# Patient Record
Sex: FEMALE | Race: WHITE | NOT HISPANIC OR LATINO | URBAN - METROPOLITAN AREA
[De-identification: names, ages, dates, MRNs, and addresses within clinical notes are randomized per-mention and may not be internally consistent; named-entity substitution may affect disease eponyms.]

---

## 2017-09-11 ENCOUNTER — INPATIENT (INPATIENT)
Facility: HOSPITAL | Age: 72
LOS: 0 days | Discharge: HOME | End: 2017-09-12
Attending: INTERNAL MEDICINE | Admitting: FAMILY MEDICINE

## 2017-09-11 ENCOUNTER — EMERGENCY (EMERGENCY)
Facility: HOSPITAL | Age: 72
LOS: 0 days | Discharge: HOME | End: 2017-09-11
Admitting: FAMILY MEDICINE

## 2017-09-11 DIAGNOSIS — E11.9 TYPE 2 DIABETES MELLITUS WITHOUT COMPLICATIONS: ICD-10-CM

## 2017-09-11 DIAGNOSIS — R04.0 EPISTAXIS: ICD-10-CM

## 2017-09-11 DIAGNOSIS — Z95.5 PRESENCE OF CORONARY ANGIOPLASTY IMPLANT AND GRAFT: ICD-10-CM

## 2017-09-11 DIAGNOSIS — R00.0 TACHYCARDIA, UNSPECIFIED: ICD-10-CM

## 2017-09-11 DIAGNOSIS — R42 DIZZINESS AND GIDDINESS: ICD-10-CM

## 2017-09-11 DIAGNOSIS — Z79.01 LONG TERM (CURRENT) USE OF ANTICOAGULANTS: ICD-10-CM

## 2017-09-11 DIAGNOSIS — I25.10 ATHEROSCLEROTIC HEART DISEASE OF NATIVE CORONARY ARTERY WITHOUT ANGINA PECTORIS: ICD-10-CM

## 2017-09-11 DIAGNOSIS — I10 ESSENTIAL (PRIMARY) HYPERTENSION: ICD-10-CM

## 2017-09-11 DIAGNOSIS — E78.5 HYPERLIPIDEMIA, UNSPECIFIED: ICD-10-CM

## 2017-09-13 ENCOUNTER — APPOINTMENT (OUTPATIENT)
Dept: OTOLARYNGOLOGY | Facility: CLINIC | Age: 72
End: 2017-09-13
Payer: MEDICARE

## 2017-09-13 VITALS — HEIGHT: 62 IN | WEIGHT: 140 LBS | BODY MASS INDEX: 25.76 KG/M2

## 2017-09-13 DIAGNOSIS — E11.9 TYPE 2 DIABETES MELLITUS W/OUT COMPLICATIONS: ICD-10-CM

## 2017-09-13 DIAGNOSIS — I10 ESSENTIAL (PRIMARY) HYPERTENSION: ICD-10-CM

## 2017-09-13 DIAGNOSIS — Z80.52 FAMILY HISTORY OF MALIGNANT NEOPLASM OF BLADDER: ICD-10-CM

## 2017-09-13 PROCEDURE — 99213 OFFICE O/P EST LOW 20 MIN: CPT | Mod: 25

## 2017-09-13 PROCEDURE — 31231 NASAL ENDOSCOPY DX: CPT

## 2017-09-13 RX ORDER — METOPROLOL TARTRATE 50 MG/1
50 TABLET, FILM COATED ORAL
Qty: 180 | Refills: 0 | Status: ACTIVE | COMMUNITY
Start: 2017-06-29

## 2017-09-13 RX ORDER — ATORVASTATIN CALCIUM 80 MG/1
80 TABLET, FILM COATED ORAL
Qty: 90 | Refills: 0 | Status: ACTIVE | COMMUNITY
Start: 2017-06-29

## 2017-09-13 RX ORDER — AMLODIPINE BESYLATE 5 MG/1
5 TABLET ORAL
Refills: 0 | Status: ACTIVE | COMMUNITY

## 2017-09-13 RX ORDER — METFORMIN ER 750 MG 750 MG/1
750 TABLET ORAL
Qty: 90 | Refills: 0 | Status: ACTIVE | COMMUNITY
Start: 2017-07-26

## 2017-09-25 DIAGNOSIS — Z79.82 LONG TERM (CURRENT) USE OF ASPIRIN: ICD-10-CM

## 2017-09-25 DIAGNOSIS — D72.829 ELEVATED WHITE BLOOD CELL COUNT, UNSPECIFIED: ICD-10-CM

## 2017-09-25 DIAGNOSIS — I49.9 CARDIAC ARRHYTHMIA, UNSPECIFIED: ICD-10-CM

## 2017-09-25 DIAGNOSIS — R04.0 EPISTAXIS: ICD-10-CM

## 2017-09-25 DIAGNOSIS — E78.00 PURE HYPERCHOLESTEROLEMIA, UNSPECIFIED: ICD-10-CM

## 2017-09-25 DIAGNOSIS — F17.210 NICOTINE DEPENDENCE, CIGARETTES, UNCOMPLICATED: ICD-10-CM

## 2017-09-25 DIAGNOSIS — Z95.5 PRESENCE OF CORONARY ANGIOPLASTY IMPLANT AND GRAFT: ICD-10-CM

## 2017-09-25 DIAGNOSIS — I25.2 OLD MYOCARDIAL INFARCTION: ICD-10-CM

## 2017-09-25 DIAGNOSIS — Z79.84 LONG TERM (CURRENT) USE OF ORAL HYPOGLYCEMIC DRUGS: ICD-10-CM

## 2017-09-25 DIAGNOSIS — E11.9 TYPE 2 DIABETES MELLITUS WITHOUT COMPLICATIONS: ICD-10-CM

## 2017-09-25 DIAGNOSIS — I25.10 ATHEROSCLEROTIC HEART DISEASE OF NATIVE CORONARY ARTERY WITHOUT ANGINA PECTORIS: ICD-10-CM

## 2020-01-01 ENCOUNTER — LABORATORY RESULT (OUTPATIENT)
Age: 75
End: 2020-01-01

## 2020-01-01 ENCOUNTER — APPOINTMENT (OUTPATIENT)
Dept: HEMATOLOGY ONCOLOGY | Facility: CLINIC | Age: 75
End: 2020-01-01

## 2020-01-01 ENCOUNTER — RESULT REVIEW (OUTPATIENT)
Age: 75
End: 2020-01-01

## 2020-01-01 ENCOUNTER — OUTPATIENT (OUTPATIENT)
Dept: OUTPATIENT SERVICES | Facility: HOSPITAL | Age: 75
LOS: 1 days | Discharge: HOME | End: 2020-01-01

## 2020-01-01 ENCOUNTER — TRANSCRIPTION ENCOUNTER (OUTPATIENT)
Age: 75
End: 2020-01-01

## 2020-01-01 ENCOUNTER — APPOINTMENT (OUTPATIENT)
Dept: HEMATOLOGY ONCOLOGY | Facility: CLINIC | Age: 75
End: 2020-01-01
Payer: MEDICARE

## 2020-01-01 ENCOUNTER — APPOINTMENT (OUTPATIENT)
Dept: INFUSION THERAPY | Facility: CLINIC | Age: 75
End: 2020-01-01

## 2020-01-01 ENCOUNTER — OUTPATIENT (OUTPATIENT)
Dept: OUTPATIENT SERVICES | Facility: HOSPITAL | Age: 75
LOS: 1 days | Discharge: HOME | End: 2020-01-01
Payer: MEDICARE

## 2020-01-01 ENCOUNTER — APPOINTMENT (OUTPATIENT)
Dept: INFUSION THERAPY | Facility: CLINIC | Age: 75
End: 2020-01-01
Payer: MEDICARE

## 2020-01-01 ENCOUNTER — INPATIENT (INPATIENT)
Facility: HOSPITAL | Age: 75
LOS: 1 days | Discharge: HOME HEALTH | End: 2020-04-30
Attending: INTERNAL MEDICINE | Admitting: INTERNAL MEDICINE
Payer: MEDICARE

## 2020-01-01 ENCOUNTER — NON-APPOINTMENT (OUTPATIENT)
Age: 75
End: 2020-01-01

## 2020-01-01 ENCOUNTER — EMERGENCY (EMERGENCY)
Facility: HOSPITAL | Age: 75
LOS: 0 days | Discharge: HOME | End: 2020-06-11
Attending: EMERGENCY MEDICINE | Admitting: EMERGENCY MEDICINE
Payer: MEDICARE

## 2020-01-01 ENCOUNTER — INPATIENT (INPATIENT)
Facility: HOSPITAL | Age: 75
LOS: 3 days | Discharge: HOME HEALTH | End: 2020-07-09
Attending: HOSPITALIST | Admitting: HOSPITALIST
Payer: MEDICARE

## 2020-01-01 ENCOUNTER — INPATIENT (INPATIENT)
Facility: HOSPITAL | Age: 75
LOS: 8 days | Discharge: HOME HEALTH | End: 2020-09-02
Attending: INTERNAL MEDICINE | Admitting: INTERNAL MEDICINE
Payer: MEDICARE

## 2020-01-01 ENCOUNTER — INPATIENT (INPATIENT)
Facility: HOSPITAL | Age: 75
LOS: 8 days | Discharge: HOME HEALTH | End: 2020-10-21
Attending: STUDENT IN AN ORGANIZED HEALTH CARE EDUCATION/TRAINING PROGRAM | Admitting: STUDENT IN AN ORGANIZED HEALTH CARE EDUCATION/TRAINING PROGRAM
Payer: MEDICARE

## 2020-01-01 VITALS
DIASTOLIC BLOOD PRESSURE: 56 MMHG | RESPIRATION RATE: 14 BRPM | TEMPERATURE: 99.9 F | SYSTOLIC BLOOD PRESSURE: 97 MMHG | HEART RATE: 98 BPM

## 2020-01-01 VITALS
TEMPERATURE: 100 F | HEART RATE: 135 BPM | DIASTOLIC BLOOD PRESSURE: 51 MMHG | RESPIRATION RATE: 16 BRPM | SYSTOLIC BLOOD PRESSURE: 121 MMHG | OXYGEN SATURATION: 93 % | WEIGHT: 100.09 LBS | HEIGHT: 63 IN

## 2020-01-01 VITALS
WEIGHT: 127 LBS | DIASTOLIC BLOOD PRESSURE: 75 MMHG | TEMPERATURE: 96 F | HEIGHT: 62 IN | SYSTOLIC BLOOD PRESSURE: 153 MMHG | RESPIRATION RATE: 14 BRPM | HEART RATE: 84 BPM | BODY MASS INDEX: 23.37 KG/M2

## 2020-01-01 VITALS
RESPIRATION RATE: 14 BRPM | WEIGHT: 133 LBS | BODY MASS INDEX: 24.48 KG/M2 | DIASTOLIC BLOOD PRESSURE: 47 MMHG | TEMPERATURE: 96.2 F | HEART RATE: 102 BPM | SYSTOLIC BLOOD PRESSURE: 92 MMHG | HEIGHT: 62 IN

## 2020-01-01 VITALS
RESPIRATION RATE: 14 BRPM | SYSTOLIC BLOOD PRESSURE: 97 MMHG | HEART RATE: 108 BPM | DIASTOLIC BLOOD PRESSURE: 47 MMHG | TEMPERATURE: 96.2 F | HEIGHT: 62 IN

## 2020-01-01 VITALS
WEIGHT: 133 LBS | TEMPERATURE: 96.8 F | BODY MASS INDEX: 24.48 KG/M2 | HEART RATE: 113 BPM | RESPIRATION RATE: 14 BRPM | SYSTOLIC BLOOD PRESSURE: 120 MMHG | HEIGHT: 62 IN | DIASTOLIC BLOOD PRESSURE: 59 MMHG

## 2020-01-01 VITALS
SYSTOLIC BLOOD PRESSURE: 138 MMHG | BODY MASS INDEX: 23.55 KG/M2 | RESPIRATION RATE: 14 BRPM | DIASTOLIC BLOOD PRESSURE: 63 MMHG | WEIGHT: 128 LBS | TEMPERATURE: 98.5 F | HEIGHT: 62 IN | HEART RATE: 83 BPM

## 2020-01-01 VITALS
HEIGHT: 62 IN | BODY MASS INDEX: 24.48 KG/M2 | RESPIRATION RATE: 14 BRPM | WEIGHT: 133 LBS | SYSTOLIC BLOOD PRESSURE: 128 MMHG | HEART RATE: 98 BPM | TEMPERATURE: 97.8 F | DIASTOLIC BLOOD PRESSURE: 70 MMHG

## 2020-01-01 VITALS
RESPIRATION RATE: 14 BRPM | HEIGHT: 62 IN | BODY MASS INDEX: 23.74 KG/M2 | DIASTOLIC BLOOD PRESSURE: 66 MMHG | HEART RATE: 92 BPM | WEIGHT: 129 LBS | TEMPERATURE: 98.5 F | SYSTOLIC BLOOD PRESSURE: 145 MMHG

## 2020-01-01 VITALS
TEMPERATURE: 98 F | DIASTOLIC BLOOD PRESSURE: 63 MMHG | RESPIRATION RATE: 18 BRPM | HEART RATE: 97 BPM | SYSTOLIC BLOOD PRESSURE: 132 MMHG

## 2020-01-01 VITALS
OXYGEN SATURATION: 98 % | RESPIRATION RATE: 26 BRPM | DIASTOLIC BLOOD PRESSURE: 71 MMHG | HEART RATE: 161 BPM | TEMPERATURE: 99 F | SYSTOLIC BLOOD PRESSURE: 115 MMHG

## 2020-01-01 VITALS
SYSTOLIC BLOOD PRESSURE: 112 MMHG | RESPIRATION RATE: 20 BRPM | DIASTOLIC BLOOD PRESSURE: 56 MMHG | TEMPERATURE: 99 F | OXYGEN SATURATION: 97 % | HEART RATE: 108 BPM | WEIGHT: 132.94 LBS | HEIGHT: 62 IN

## 2020-01-01 VITALS
DIASTOLIC BLOOD PRESSURE: 66 MMHG | TEMPERATURE: 97 F | SYSTOLIC BLOOD PRESSURE: 149 MMHG | RESPIRATION RATE: 18 BRPM | HEART RATE: 98 BPM

## 2020-01-01 VITALS
RESPIRATION RATE: 18 BRPM | TEMPERATURE: 96 F | HEART RATE: 83 BPM | DIASTOLIC BLOOD PRESSURE: 69 MMHG | SYSTOLIC BLOOD PRESSURE: 156 MMHG

## 2020-01-01 VITALS — DIASTOLIC BLOOD PRESSURE: 55 MMHG | SYSTOLIC BLOOD PRESSURE: 119 MMHG

## 2020-01-01 VITALS
TEMPERATURE: 98 F | DIASTOLIC BLOOD PRESSURE: 58 MMHG | SYSTOLIC BLOOD PRESSURE: 114 MMHG | RESPIRATION RATE: 20 BRPM | HEART RATE: 98 BPM | OXYGEN SATURATION: 97 %

## 2020-01-01 VITALS
OXYGEN SATURATION: 100 % | HEART RATE: 98 BPM | RESPIRATION RATE: 19 BRPM | SYSTOLIC BLOOD PRESSURE: 112 MMHG | TEMPERATURE: 99 F | DIASTOLIC BLOOD PRESSURE: 54 MMHG

## 2020-01-01 VITALS — BODY MASS INDEX: 24.48 KG/M2 | WEIGHT: 133 LBS | HEIGHT: 62 IN

## 2020-01-01 VITALS
HEIGHT: 62 IN | HEART RATE: 113 BPM | TEMPERATURE: 98.6 F | DIASTOLIC BLOOD PRESSURE: 53 MMHG | RESPIRATION RATE: 14 BRPM | WEIGHT: 133 LBS | SYSTOLIC BLOOD PRESSURE: 109 MMHG | BODY MASS INDEX: 24.48 KG/M2

## 2020-01-01 VITALS
RESPIRATION RATE: 16 BRPM | SYSTOLIC BLOOD PRESSURE: 116 MMHG | WEIGHT: 139.99 LBS | HEIGHT: 62 IN | HEART RATE: 107 BPM | OXYGEN SATURATION: 98 % | TEMPERATURE: 99 F | DIASTOLIC BLOOD PRESSURE: 55 MMHG

## 2020-01-01 VITALS
RESPIRATION RATE: 22 BRPM | SYSTOLIC BLOOD PRESSURE: 60 MMHG | HEART RATE: 105 BPM | OXYGEN SATURATION: 97 % | HEIGHT: 63 IN | DIASTOLIC BLOOD PRESSURE: 30 MMHG | WEIGHT: 132.06 LBS

## 2020-01-01 VITALS — TEMPERATURE: 97.4 F | HEART RATE: 100 BPM

## 2020-01-01 VITALS
TEMPERATURE: 98 F | HEART RATE: 72 BPM | RESPIRATION RATE: 18 BRPM | DIASTOLIC BLOOD PRESSURE: 49 MMHG | SYSTOLIC BLOOD PRESSURE: 99 MMHG

## 2020-01-01 DIAGNOSIS — Z11.59 ENCOUNTER FOR SCREENING FOR OTHER VIRAL DISEASES: ICD-10-CM

## 2020-01-01 DIAGNOSIS — B49 UNSPECIFIED MYCOSIS: ICD-10-CM

## 2020-01-01 DIAGNOSIS — R16.1 SPLENOMEGALY, NOT ELSEWHERE CLASSIFIED: ICD-10-CM

## 2020-01-01 DIAGNOSIS — I25.10 ATHEROSCLEROTIC HEART DISEASE OF NATIVE CORONARY ARTERY WITHOUT ANGINA PECTORIS: ICD-10-CM

## 2020-01-01 DIAGNOSIS — K59.00 CONSTIPATION, UNSPECIFIED: ICD-10-CM

## 2020-01-01 DIAGNOSIS — T78.40XA ALLERGY, UNSPECIFIED, INITIAL ENCOUNTER: ICD-10-CM

## 2020-01-01 DIAGNOSIS — Z02.9 ENCOUNTER FOR ADMINISTRATIVE EXAMINATIONS, UNSPECIFIED: ICD-10-CM

## 2020-01-01 DIAGNOSIS — Z88.0 ALLERGY STATUS TO PENICILLIN: ICD-10-CM

## 2020-01-01 DIAGNOSIS — I10 ESSENTIAL (PRIMARY) HYPERTENSION: ICD-10-CM

## 2020-01-01 DIAGNOSIS — Z95.5 PRESENCE OF CORONARY ANGIOPLASTY IMPLANT AND GRAFT: Chronic | ICD-10-CM

## 2020-01-01 DIAGNOSIS — E11.9 TYPE 2 DIABETES MELLITUS WITHOUT COMPLICATIONS: ICD-10-CM

## 2020-01-01 DIAGNOSIS — A41.9 SEPSIS, UNSPECIFIED ORGANISM: ICD-10-CM

## 2020-01-01 DIAGNOSIS — D64.9 ANEMIA, UNSPECIFIED: ICD-10-CM

## 2020-01-01 DIAGNOSIS — N39.0 URINARY TRACT INFECTION, SITE NOT SPECIFIED: ICD-10-CM

## 2020-01-01 DIAGNOSIS — M79.605 PAIN IN LEFT LEG: ICD-10-CM

## 2020-01-01 DIAGNOSIS — R53.1 WEAKNESS: ICD-10-CM

## 2020-01-01 DIAGNOSIS — Z51.11 ENCOUNTER FOR ANTINEOPLASTIC CHEMOTHERAPY: ICD-10-CM

## 2020-01-01 DIAGNOSIS — E87.1 HYPO-OSMOLALITY AND HYPONATREMIA: ICD-10-CM

## 2020-01-01 DIAGNOSIS — C85.80 OTHER SPECIFIED TYPES OF NON-HODGKIN LYMPHOMA, UNSPECIFIED SITE: ICD-10-CM

## 2020-01-01 DIAGNOSIS — K44.9 DIAPHRAGMATIC HERNIA WITHOUT OBSTRUCTION OR GANGRENE: ICD-10-CM

## 2020-01-01 DIAGNOSIS — E86.0 DEHYDRATION: ICD-10-CM

## 2020-01-01 DIAGNOSIS — Z95.5 PRESENCE OF CORONARY ANGIOPLASTY IMPLANT AND GRAFT: ICD-10-CM

## 2020-01-01 DIAGNOSIS — Z20.828 CONTACT WITH AND (SUSPECTED) EXPOSURE TO OTHER VIRAL COMMUNICABLE DISEASES: ICD-10-CM

## 2020-01-01 DIAGNOSIS — D47.9 NEOPLASM OF UNCERTAIN BEHAVIOR OF LYMPHOID, HEMATOPOIETIC AND RELATED TISSUE, UNSPECIFIED: ICD-10-CM

## 2020-01-01 DIAGNOSIS — K29.80 DUODENITIS WITHOUT BLEEDING: ICD-10-CM

## 2020-01-01 DIAGNOSIS — F17.200 NICOTINE DEPENDENCE, UNSPECIFIED, UNCOMPLICATED: ICD-10-CM

## 2020-01-01 DIAGNOSIS — D63.0 ANEMIA IN NEOPLASTIC DISEASE: ICD-10-CM

## 2020-01-01 DIAGNOSIS — M79.89 OTHER SPECIFIED SOFT TISSUE DISORDERS: ICD-10-CM

## 2020-01-01 DIAGNOSIS — Z00.00 ENCOUNTER FOR GENERAL ADULT MEDICAL EXAMINATION W/OUT ABNORMAL FINDINGS: ICD-10-CM

## 2020-01-01 DIAGNOSIS — Z87.891 PERSONAL HISTORY OF NICOTINE DEPENDENCE: ICD-10-CM

## 2020-01-01 DIAGNOSIS — N30.90 CYSTITIS, UNSPECIFIED WITHOUT HEMATURIA: ICD-10-CM

## 2020-01-01 DIAGNOSIS — C85.88 OTHER SPECIFIED TYPES OF NON-HODGKIN LYMPHOMA, LYMPH NODES OF MULTIPLE SITES: ICD-10-CM

## 2020-01-01 DIAGNOSIS — E83.42 HYPOMAGNESEMIA: ICD-10-CM

## 2020-01-01 DIAGNOSIS — C85.10 UNSPECIFIED B-CELL LYMPHOMA, UNSPECIFIED SITE: ICD-10-CM

## 2020-01-01 DIAGNOSIS — D89.9 DISORDER INVOLVING THE IMMUNE MECHANISM, UNSPECIFIED: ICD-10-CM

## 2020-01-01 DIAGNOSIS — Z91.14 PATIENT'S OTHER NONCOMPLIANCE WITH MEDICATION REGIMEN: ICD-10-CM

## 2020-01-01 DIAGNOSIS — Z79.84 LONG TERM (CURRENT) USE OF ORAL HYPOGLYCEMIC DRUGS: ICD-10-CM

## 2020-01-01 DIAGNOSIS — R33.9 RETENTION OF URINE, UNSPECIFIED: ICD-10-CM

## 2020-01-01 DIAGNOSIS — A41.81 SEPSIS DUE TO ENTEROCOCCUS: ICD-10-CM

## 2020-01-01 DIAGNOSIS — D50.9 IRON DEFICIENCY ANEMIA, UNSPECIFIED: ICD-10-CM

## 2020-01-01 DIAGNOSIS — B95.2 ENTEROCOCCUS AS THE CAUSE OF DISEASES CLASSIFIED ELSEWHERE: ICD-10-CM

## 2020-01-01 DIAGNOSIS — E87.2 ACIDOSIS: ICD-10-CM

## 2020-01-01 DIAGNOSIS — Z71.6 TOBACCO ABUSE COUNSELING: ICD-10-CM

## 2020-01-01 DIAGNOSIS — F17.210 NICOTINE DEPENDENCE, CIGARETTES, UNCOMPLICATED: ICD-10-CM

## 2020-01-01 DIAGNOSIS — Z01.818 ENCOUNTER FOR OTHER PREPROCEDURAL EXAMINATION: ICD-10-CM

## 2020-01-01 DIAGNOSIS — G93.41 METABOLIC ENCEPHALOPATHY: ICD-10-CM

## 2020-01-01 DIAGNOSIS — R65.21 SEVERE SEPSIS WITH SEPTIC SHOCK: ICD-10-CM

## 2020-01-01 DIAGNOSIS — E86.1 HYPOVOLEMIA: ICD-10-CM
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
## 2020-01-01 DIAGNOSIS — K29.61 OTHER GASTRITIS WITH BLEEDING: ICD-10-CM

## 2020-01-01 DIAGNOSIS — Z79.82 LONG TERM (CURRENT) USE OF ASPIRIN: ICD-10-CM

## 2020-01-01 DIAGNOSIS — D69.6 THROMBOCYTOPENIA, UNSPECIFIED: ICD-10-CM

## 2020-01-01 DIAGNOSIS — R57.1 HYPOVOLEMIC SHOCK: ICD-10-CM

## 2020-01-01 DIAGNOSIS — I95.9 HYPOTENSION, UNSPECIFIED: ICD-10-CM

## 2020-01-01 DIAGNOSIS — C91.10 CHRONIC LYMPHOCYTIC LEUKEMIA OF B-CELL TYPE NOT HAVING ACHIEVED REMISSION: ICD-10-CM

## 2020-01-01 DIAGNOSIS — D61.818 OTHER PANCYTOPENIA: ICD-10-CM

## 2020-01-01 DIAGNOSIS — R00.0 TACHYCARDIA, UNSPECIFIED: ICD-10-CM

## 2020-01-01 DIAGNOSIS — D69.59 OTHER SECONDARY THROMBOCYTOPENIA: ICD-10-CM

## 2020-01-01 DIAGNOSIS — Z95.1 PRESENCE OF AORTOCORONARY BYPASS GRAFT: ICD-10-CM

## 2020-01-01 DIAGNOSIS — T45.1X5A ADVERSE EFFECT OF ANTINEOPLASTIC AND IMMUNOSUPPRESSIVE DRUGS, INITIAL ENCOUNTER: ICD-10-CM

## 2020-01-01 DIAGNOSIS — K20.90 ESOPHAGITIS, UNSPECIFIED WITHOUT BLEEDING: ICD-10-CM

## 2020-01-01 DIAGNOSIS — S80.02XA CONTUSION OF LEFT KNEE, INITIAL ENCOUNTER: ICD-10-CM

## 2020-01-01 DIAGNOSIS — E11.65 TYPE 2 DIABETES MELLITUS WITH HYPERGLYCEMIA: ICD-10-CM

## 2020-01-01 DIAGNOSIS — T78.2XXA ANAPHYLACTIC SHOCK, UNSPECIFIED, INITIAL ENCOUNTER: ICD-10-CM

## 2020-01-01 DIAGNOSIS — D72.820 LYMPHOCYTOSIS (SYMPTOMATIC): ICD-10-CM

## 2020-01-01 DIAGNOSIS — C85.12 UNSPECIFIED B-CELL LYMPHOMA, INTRATHORACIC LYMPH NODES: ICD-10-CM

## 2020-01-01 DIAGNOSIS — R31.9 HEMATURIA, UNSPECIFIED: ICD-10-CM

## 2020-01-01 DIAGNOSIS — R65.10 SYSTEMIC INFLAMMATORY RESPONSE SYNDROME (SIRS) OF NON-INFECTIOUS ORIGIN WITHOUT ACUTE ORGAN DYSFUNCTION: ICD-10-CM

## 2020-01-01 DIAGNOSIS — Z11.9 ENCOUNTER FOR SCREENING FOR INFECTIOUS AND PARASITIC DISEASES, UNSPECIFIED: ICD-10-CM

## 2020-01-01 DIAGNOSIS — D68.4 ACQUIRED COAGULATION FACTOR DEFICIENCY: ICD-10-CM

## 2020-01-01 DIAGNOSIS — Z87.440 PERSONAL HISTORY OF URINARY (TRACT) INFECTIONS: ICD-10-CM

## 2020-01-01 DIAGNOSIS — R41.82 ALTERED MENTAL STATUS, UNSPECIFIED: ICD-10-CM

## 2020-01-01 DIAGNOSIS — E78.5 HYPERLIPIDEMIA, UNSPECIFIED: ICD-10-CM
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
## 2020-01-01 DIAGNOSIS — R79.1 ABNORMAL COAGULATION PROFILE: ICD-10-CM

## 2020-01-01 DIAGNOSIS — E78.00 PURE HYPERCHOLESTEROLEMIA, UNSPECIFIED: ICD-10-CM
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
## 2020-01-01 LAB
% ALBUMIN: 60.5 % — SIGNIFICANT CHANGE UP
% ALPHA 1: 4.5 % — SIGNIFICANT CHANGE UP
% ALPHA 2: 9.1 % — SIGNIFICANT CHANGE UP
% BETA: 10.2 % — SIGNIFICANT CHANGE UP
% GAMMA, URINE: 14.2 % — SIGNIFICANT CHANGE UP
% GAMMA: 15.7 % — SIGNIFICANT CHANGE UP
% M SPIKE: SIGNIFICANT CHANGE UP
-  AMIKACIN: SIGNIFICANT CHANGE UP
-  AMOXICILLIN/CLAVULANIC ACID: SIGNIFICANT CHANGE UP
-  AMPICILLIN/SULBACTAM: SIGNIFICANT CHANGE UP
-  AMPICILLIN: SIGNIFICANT CHANGE UP
-  AMPICILLIN: SIGNIFICANT CHANGE UP
-  AZTREONAM: SIGNIFICANT CHANGE UP
-  CEFAZOLIN: SIGNIFICANT CHANGE UP
-  CEFEPIME: SIGNIFICANT CHANGE UP
-  CEFOXITIN: SIGNIFICANT CHANGE UP
-  CEFTRIAXONE: SIGNIFICANT CHANGE UP
-  CIPROFLOXACIN: SIGNIFICANT CHANGE UP
-  CIPROFLOXACIN: SIGNIFICANT CHANGE UP
-  DAPTOMYCIN: SIGNIFICANT CHANGE UP
-  ERTAPENEM: SIGNIFICANT CHANGE UP
-  GENTAMICIN: SIGNIFICANT CHANGE UP
-  IMIPENEM: SIGNIFICANT CHANGE UP
-  LEVOFLOXACIN: SIGNIFICANT CHANGE UP
-  LEVOFLOXACIN: SIGNIFICANT CHANGE UP
-  LINEZOLID: SIGNIFICANT CHANGE UP
-  MEROPENEM: SIGNIFICANT CHANGE UP
-  NITROFURANTOIN: SIGNIFICANT CHANGE UP
-  NITROFURANTOIN: SIGNIFICANT CHANGE UP
-  PIPERACILLIN/TAZOBACTAM: SIGNIFICANT CHANGE UP
-  TETRACYCLINE: SIGNIFICANT CHANGE UP
-  TIGECYCLINE: SIGNIFICANT CHANGE UP
-  TOBRAMYCIN: SIGNIFICANT CHANGE UP
-  TRIMETHOPRIM/SULFAMETHOXAZOLE: SIGNIFICANT CHANGE UP
-  VANCOMYCIN: SIGNIFICANT CHANGE UP
A1C WITH ESTIMATED AVERAGE GLUCOSE RESULT: 6.9 % — HIGH (ref 4–5.6)
A1C WITH ESTIMATED AVERAGE GLUCOSE RESULT: 7.4 % — HIGH (ref 4–5.6)
ABO + RH PNL BLD: NORMAL
ALBUMIN 24H MFR UR ELPH: 29.8 % — SIGNIFICANT CHANGE UP
ALBUMIN MFR SERPL ELPH: 61.4 %
ALBUMIN SERPL ELPH-MCNC: 2.6 G/DL — LOW (ref 3.5–5.2)
ALBUMIN SERPL ELPH-MCNC: 2.8 G/DL — LOW (ref 3.5–5.2)
ALBUMIN SERPL ELPH-MCNC: 2.9 G/DL — LOW (ref 3.5–5.2)
ALBUMIN SERPL ELPH-MCNC: 3 G/DL — LOW (ref 3.5–5.2)
ALBUMIN SERPL ELPH-MCNC: 3.1 G/DL — LOW (ref 3.5–5.2)
ALBUMIN SERPL ELPH-MCNC: 3.2 G/DL — LOW (ref 3.5–5.2)
ALBUMIN SERPL ELPH-MCNC: 3.2 G/DL — LOW (ref 3.5–5.2)
ALBUMIN SERPL ELPH-MCNC: 3.3 G/DL — LOW (ref 3.5–5.2)
ALBUMIN SERPL ELPH-MCNC: 3.3 G/DL — LOW (ref 3.5–5.2)
ALBUMIN SERPL ELPH-MCNC: 3.3 G/DL — LOW (ref 3.6–5.5)
ALBUMIN SERPL ELPH-MCNC: 3.4 G/DL
ALBUMIN SERPL ELPH-MCNC: 3.4 G/DL — LOW (ref 3.5–5.2)
ALBUMIN SERPL ELPH-MCNC: 3.5 G/DL — SIGNIFICANT CHANGE UP (ref 3.5–5.2)
ALBUMIN SERPL ELPH-MCNC: 3.5 G/DL — SIGNIFICANT CHANGE UP (ref 3.5–5.2)
ALBUMIN SERPL ELPH-MCNC: 3.6 G/DL
ALBUMIN SERPL ELPH-MCNC: 3.6 G/DL — SIGNIFICANT CHANGE UP (ref 3.5–5.2)
ALBUMIN SERPL ELPH-MCNC: 3.8 G/DL
ALBUMIN SERPL ELPH-MCNC: 3.8 G/DL
ALBUMIN SERPL ELPH-MCNC: 4 G/DL
ALBUMIN SERPL ELPH-MCNC: 4 G/DL — SIGNIFICANT CHANGE UP (ref 3.5–5.2)
ALBUMIN SERPL ELPH-MCNC: 4.1 G/DL
ALBUMIN SERPL ELPH-MCNC: 4.1 G/DL
ALBUMIN SERPL ELPH-MCNC: 4.2 G/DL
ALBUMIN SERPL-MCNC: 3.5 G/DL
ALBUMIN/GLOB SERPL ELPH: 1.5 RATIO — SIGNIFICANT CHANGE UP
ALBUMIN/GLOB SERPL: 1.6 RATIO
ALP BLD-CCNC: 178 U/L
ALP BLD-CCNC: 205 U/L
ALP BLD-CCNC: 206 U/L
ALP BLD-CCNC: 208 U/L
ALP BLD-CCNC: 219 U/L
ALP BLD-CCNC: 273 U/L
ALP BLD-CCNC: 284 U/L
ALP BLD-CCNC: 329 U/L
ALP SERPL-CCNC: 107 U/L — SIGNIFICANT CHANGE UP (ref 30–115)
ALP SERPL-CCNC: 129 U/L — HIGH (ref 30–115)
ALP SERPL-CCNC: 130 U/L — HIGH (ref 30–115)
ALP SERPL-CCNC: 133 U/L — HIGH (ref 30–115)
ALP SERPL-CCNC: 136 U/L — HIGH (ref 30–115)
ALP SERPL-CCNC: 160 U/L — HIGH (ref 30–115)
ALP SERPL-CCNC: 166 U/L — HIGH (ref 30–115)
ALP SERPL-CCNC: 175 U/L — HIGH (ref 30–115)
ALP SERPL-CCNC: 175 U/L — HIGH (ref 30–115)
ALP SERPL-CCNC: 179 U/L — HIGH (ref 30–115)
ALP SERPL-CCNC: 184 U/L — HIGH (ref 30–115)
ALP SERPL-CCNC: 193 U/L — HIGH (ref 30–115)
ALP SERPL-CCNC: 197 U/L — HIGH (ref 30–115)
ALP SERPL-CCNC: 205 U/L — HIGH (ref 30–115)
ALP SERPL-CCNC: 206 U/L — HIGH (ref 30–115)
ALP SERPL-CCNC: 206 U/L — HIGH (ref 30–115)
ALP SERPL-CCNC: 210 U/L — HIGH (ref 30–115)
ALP SERPL-CCNC: 218 U/L — HIGH (ref 30–115)
ALP SERPL-CCNC: 221 U/L — HIGH (ref 30–115)
ALP SERPL-CCNC: 223 U/L — HIGH (ref 30–115)
ALP SERPL-CCNC: 226 U/L — HIGH (ref 30–115)
ALP SERPL-CCNC: 238 U/L — HIGH (ref 30–115)
ALP SERPL-CCNC: 242 U/L — HIGH (ref 30–115)
ALP SERPL-CCNC: 246 U/L — HIGH (ref 30–115)
ALP SERPL-CCNC: 250 U/L — HIGH (ref 30–115)
ALP SERPL-CCNC: 83 U/L — SIGNIFICANT CHANGE UP (ref 30–115)
ALPHA1 GLOB 24H MFR UR ELPH: 28.4 % — SIGNIFICANT CHANGE UP
ALPHA1 GLOB MFR SERPL ELPH: 6.6 %
ALPHA1 GLOB SERPL ELPH-MCNC: 0.2 G/DL — SIGNIFICANT CHANGE UP (ref 0.1–0.4)
ALPHA1 GLOB SERPL ELPH-MCNC: 0.4 G/DL
ALPHA2 GLOB 24H MFR UR ELPH: 15.8 % — SIGNIFICANT CHANGE UP
ALPHA2 GLOB MFR SERPL ELPH: 9 %
ALPHA2 GLOB SERPL ELPH-MCNC: 0.5 G/DL
ALPHA2 GLOB SERPL ELPH-MCNC: 0.5 G/DL — SIGNIFICANT CHANGE UP (ref 0.5–1)
ALT FLD-CCNC: 12 U/L — SIGNIFICANT CHANGE UP (ref 0–41)
ALT FLD-CCNC: 15 U/L — SIGNIFICANT CHANGE UP (ref 0–41)
ALT FLD-CCNC: 16 U/L — SIGNIFICANT CHANGE UP (ref 0–41)
ALT FLD-CCNC: 17 U/L — SIGNIFICANT CHANGE UP (ref 0–41)
ALT FLD-CCNC: 18 U/L — SIGNIFICANT CHANGE UP (ref 0–41)
ALT FLD-CCNC: 19 U/L — SIGNIFICANT CHANGE UP (ref 0–41)
ALT FLD-CCNC: 21 U/L — SIGNIFICANT CHANGE UP (ref 0–41)
ALT FLD-CCNC: 21 U/L — SIGNIFICANT CHANGE UP (ref 0–41)
ALT FLD-CCNC: 22 U/L — SIGNIFICANT CHANGE UP (ref 0–41)
ALT FLD-CCNC: 24 U/L — SIGNIFICANT CHANGE UP (ref 0–41)
ALT FLD-CCNC: 24 U/L — SIGNIFICANT CHANGE UP (ref 0–41)
ALT FLD-CCNC: 25 U/L — SIGNIFICANT CHANGE UP (ref 0–41)
ALT FLD-CCNC: 27 U/L — SIGNIFICANT CHANGE UP (ref 0–41)
ALT FLD-CCNC: 29 U/L — SIGNIFICANT CHANGE UP (ref 0–41)
ALT FLD-CCNC: 29 U/L — SIGNIFICANT CHANGE UP (ref 0–41)
ALT FLD-CCNC: 30 U/L — SIGNIFICANT CHANGE UP (ref 0–41)
ALT FLD-CCNC: 30 U/L — SIGNIFICANT CHANGE UP (ref 0–41)
ALT FLD-CCNC: 33 U/L — SIGNIFICANT CHANGE UP (ref 0–41)
ALT FLD-CCNC: 34 U/L — SIGNIFICANT CHANGE UP (ref 0–41)
ALT FLD-CCNC: 34 U/L — SIGNIFICANT CHANGE UP (ref 0–41)
ALT FLD-CCNC: 35 U/L — SIGNIFICANT CHANGE UP (ref 0–41)
ALT FLD-CCNC: 37 U/L — SIGNIFICANT CHANGE UP (ref 0–41)
ALT SERPL-CCNC: 14 U/L
ALT SERPL-CCNC: 17 U/L
ALT SERPL-CCNC: 22 U/L
ALT SERPL-CCNC: 25 U/L
ALT SERPL-CCNC: 26 U/L
ALT SERPL-CCNC: 29 U/L
ALT SERPL-CCNC: 31 U/L
ALT SERPL-CCNC: 35 U/L
ANION GAP SERPL CALC-SCNC: 10 MMOL/L — SIGNIFICANT CHANGE UP (ref 7–14)
ANION GAP SERPL CALC-SCNC: 11 MMOL/L — SIGNIFICANT CHANGE UP (ref 7–14)
ANION GAP SERPL CALC-SCNC: 12 MMOL/L
ANION GAP SERPL CALC-SCNC: 12 MMOL/L — SIGNIFICANT CHANGE UP (ref 7–14)
ANION GAP SERPL CALC-SCNC: 13 MMOL/L
ANION GAP SERPL CALC-SCNC: 13 MMOL/L
ANION GAP SERPL CALC-SCNC: 13 MMOL/L — SIGNIFICANT CHANGE UP (ref 7–14)
ANION GAP SERPL CALC-SCNC: 13 MMOL/L — SIGNIFICANT CHANGE UP (ref 7–14)
ANION GAP SERPL CALC-SCNC: 14 MMOL/L
ANION GAP SERPL CALC-SCNC: 14 MMOL/L — SIGNIFICANT CHANGE UP (ref 7–14)
ANION GAP SERPL CALC-SCNC: 15 MMOL/L
ANION GAP SERPL CALC-SCNC: 15 MMOL/L
ANION GAP SERPL CALC-SCNC: 15 MMOL/L — HIGH (ref 7–14)
ANION GAP SERPL CALC-SCNC: 16 MMOL/L
ANION GAP SERPL CALC-SCNC: 17 MMOL/L
ANION GAP SERPL CALC-SCNC: 20 MMOL/L — HIGH (ref 7–14)
ANION GAP SERPL CALC-SCNC: 7 MMOL/L — SIGNIFICANT CHANGE UP (ref 7–14)
ANION GAP SERPL CALC-SCNC: 8 MMOL/L — SIGNIFICANT CHANGE UP (ref 7–14)
ANION GAP SERPL CALC-SCNC: 9 MMOL/L — SIGNIFICANT CHANGE UP (ref 7–14)
ANISOCYTOSIS BLD QL: SIGNIFICANT CHANGE UP
ANISOCYTOSIS BLD QL: SLIGHT — SIGNIFICANT CHANGE UP
APPEARANCE UR: ABNORMAL
APPEARANCE UR: CLEAR — SIGNIFICANT CHANGE UP
APPEARANCE: ABNORMAL
APPEARANCE: ABNORMAL
APPEARANCE: CLEAR
APTT BLD: 30.5 SEC — SIGNIFICANT CHANGE UP (ref 27–39.2)
APTT BLD: 32.2 SEC — SIGNIFICANT CHANGE UP (ref 27–39.2)
APTT BLD: 32.4 SEC — SIGNIFICANT CHANGE UP (ref 27–39.2)
APTT BLD: 34.4 SEC — SIGNIFICANT CHANGE UP (ref 27–39.2)
APTT BLD: 34.5 SEC — SIGNIFICANT CHANGE UP (ref 27–39.2)
APTT BLD: 39.4 SEC — HIGH (ref 27–39.2)
APTT BLD: 45.9 SEC — HIGH (ref 27–39.2)
AST SERPL-CCNC: 19 U/L
AST SERPL-CCNC: 19 U/L — SIGNIFICANT CHANGE UP (ref 0–41)
AST SERPL-CCNC: 21 U/L — SIGNIFICANT CHANGE UP (ref 0–41)
AST SERPL-CCNC: 22 U/L — SIGNIFICANT CHANGE UP (ref 0–41)
AST SERPL-CCNC: 24 U/L
AST SERPL-CCNC: 25 U/L — SIGNIFICANT CHANGE UP (ref 0–41)
AST SERPL-CCNC: 26 U/L
AST SERPL-CCNC: 26 U/L — SIGNIFICANT CHANGE UP (ref 0–41)
AST SERPL-CCNC: 26 U/L — SIGNIFICANT CHANGE UP (ref 0–41)
AST SERPL-CCNC: 28 U/L — SIGNIFICANT CHANGE UP (ref 0–41)
AST SERPL-CCNC: 29 U/L — SIGNIFICANT CHANGE UP (ref 0–41)
AST SERPL-CCNC: 30 U/L — SIGNIFICANT CHANGE UP (ref 0–41)
AST SERPL-CCNC: 31 U/L — SIGNIFICANT CHANGE UP (ref 0–41)
AST SERPL-CCNC: 32 U/L
AST SERPL-CCNC: 32 U/L — SIGNIFICANT CHANGE UP (ref 0–41)
AST SERPL-CCNC: 34 U/L — SIGNIFICANT CHANGE UP (ref 0–41)
AST SERPL-CCNC: 39 U/L — SIGNIFICANT CHANGE UP (ref 0–41)
AST SERPL-CCNC: 40 U/L
AST SERPL-CCNC: 41 U/L — SIGNIFICANT CHANGE UP (ref 0–41)
AST SERPL-CCNC: 42 U/L
AST SERPL-CCNC: 42 U/L
AST SERPL-CCNC: 43 U/L — HIGH (ref 0–41)
AST SERPL-CCNC: 44 U/L
AST SERPL-CCNC: 45 U/L — HIGH (ref 0–41)
AST SERPL-CCNC: 46 U/L — HIGH (ref 0–41)
AST SERPL-CCNC: 47 U/L — HIGH (ref 0–41)
AST SERPL-CCNC: 57 U/L — HIGH (ref 0–41)
AST SERPL-CCNC: 59 U/L — HIGH (ref 0–41)
B-GLOBULIN 24H MFR UR ELPH: 11.8 % — SIGNIFICANT CHANGE UP
B-GLOBULIN MFR SERPL ELPH: 8.9 %
B-GLOBULIN SERPL ELPH-MCNC: 0.5 G/DL
B-GLOBULIN SERPL ELPH-MCNC: 0.6 G/DL — SIGNIFICANT CHANGE UP (ref 0.5–1)
B-OH-BUTYR SERPL-SCNC: <0.2 MMOL/L — SIGNIFICANT CHANGE UP
BACTERIA # UR AUTO: ABNORMAL
BACTERIA # UR AUTO: ABNORMAL
BACTERIA # UR AUTO: NEGATIVE — SIGNIFICANT CHANGE UP
BACTERIA UR CULT: ABNORMAL
BACTERIA UR CULT: ABNORMAL
BASE EXCESS BLDV CALC-SCNC: -4.6 MMOL/L — LOW (ref -2–2)
BASE EXCESS BLDV CALC-SCNC: -5.1 MMOL/L — LOW (ref -2–2)
BASE EXCESS BLDV CALC-SCNC: -5.6 MMOL/L — LOW (ref -2–2)
BASE EXCESS BLDV CALC-SCNC: 3.3 MMOL/L — HIGH (ref -2–2)
BASO STIPL BLD QL SMEAR: PRESENT — SIGNIFICANT CHANGE UP
BASOPHILS # BLD AUTO: 0 K/UL — SIGNIFICANT CHANGE UP (ref 0–0.2)
BASOPHILS # BLD AUTO: 0.01 K/UL — SIGNIFICANT CHANGE UP (ref 0–0.2)
BASOPHILS # BLD AUTO: 0.02 K/UL — SIGNIFICANT CHANGE UP (ref 0–0.2)
BASOPHILS # BLD AUTO: 0.03 K/UL — SIGNIFICANT CHANGE UP (ref 0–0.2)
BASOPHILS # BLD AUTO: 0.04 K/UL — SIGNIFICANT CHANGE UP (ref 0–0.2)
BASOPHILS # BLD AUTO: 0.07 K/UL — SIGNIFICANT CHANGE UP (ref 0–0.2)
BASOPHILS # BLD AUTO: 0.07 K/UL — SIGNIFICANT CHANGE UP (ref 0–0.2)
BASOPHILS # BLD AUTO: 0.09 K/UL — SIGNIFICANT CHANGE UP (ref 0–0.2)
BASOPHILS # BLD AUTO: 0.1 K/UL — SIGNIFICANT CHANGE UP (ref 0–0.2)
BASOPHILS # BLD AUTO: 0.1 K/UL — SIGNIFICANT CHANGE UP (ref 0–0.2)
BASOPHILS # BLD AUTO: 0.2 K/UL — SIGNIFICANT CHANGE UP (ref 0–0.2)
BASOPHILS # BLD AUTO: 0.21 K/UL — HIGH (ref 0–0.2)
BASOPHILS # BLD AUTO: 0.24 K/UL — HIGH (ref 0–0.2)
BASOPHILS NFR BLD AUTO: 0 % — SIGNIFICANT CHANGE UP (ref 0–1)
BASOPHILS NFR BLD AUTO: 0.2 % — SIGNIFICANT CHANGE UP (ref 0–1)
BASOPHILS NFR BLD AUTO: 0.3 % — SIGNIFICANT CHANGE UP (ref 0–1)
BASOPHILS NFR BLD AUTO: 0.4 % — SIGNIFICANT CHANGE UP (ref 0–1)
BASOPHILS NFR BLD AUTO: 0.5 % — SIGNIFICANT CHANGE UP (ref 0–1)
BASOPHILS NFR BLD AUTO: 0.6 % — SIGNIFICANT CHANGE UP (ref 0–1)
BASOPHILS NFR BLD AUTO: 0.6 % — SIGNIFICANT CHANGE UP (ref 0–1)
BASOPHILS NFR BLD AUTO: 0.7 % — SIGNIFICANT CHANGE UP (ref 0–1)
BASOPHILS NFR BLD AUTO: 0.9 % — SIGNIFICANT CHANGE UP (ref 0–1)
BASOPHILS NFR BLD AUTO: 0.9 % — SIGNIFICANT CHANGE UP (ref 0–1)
BASOPHILS NFR BLD AUTO: 1 % — SIGNIFICANT CHANGE UP (ref 0–1)
BASOPHILS NFR BLD AUTO: 1 % — SIGNIFICANT CHANGE UP (ref 0–1)
BASOPHILS NFR BLD AUTO: 1.1 % — HIGH (ref 0–1)
BASOPHILS NFR BLD AUTO: 1.1 % — HIGH (ref 0–1)
BASOPHILS NFR BLD AUTO: 1.2 % — HIGH (ref 0–1)
BASOPHILS NFR BLD AUTO: 2.6 % — HIGH (ref 0–1)
BCR/ABL BY RT - PCR QUANTITATIVE: SIGNIFICANT CHANGE UP
BILIRUB SERPL-MCNC: 0.5 MG/DL
BILIRUB SERPL-MCNC: 0.5 MG/DL — SIGNIFICANT CHANGE UP (ref 0.2–1.2)
BILIRUB SERPL-MCNC: 0.9 MG/DL
BILIRUB SERPL-MCNC: 0.9 MG/DL — SIGNIFICANT CHANGE UP (ref 0.2–1.2)
BILIRUB SERPL-MCNC: 1 MG/DL
BILIRUB SERPL-MCNC: 1 MG/DL — SIGNIFICANT CHANGE UP (ref 0.2–1.2)
BILIRUB SERPL-MCNC: 1.1 MG/DL
BILIRUB SERPL-MCNC: 1.1 MG/DL — SIGNIFICANT CHANGE UP (ref 0.2–1.2)
BILIRUB SERPL-MCNC: 1.2 MG/DL — SIGNIFICANT CHANGE UP (ref 0.2–1.2)
BILIRUB SERPL-MCNC: 1.3 MG/DL
BILIRUB SERPL-MCNC: 1.3 MG/DL — HIGH (ref 0.2–1.2)
BILIRUB SERPL-MCNC: 1.4 MG/DL — HIGH (ref 0.2–1.2)
BILIRUB SERPL-MCNC: 1.4 MG/DL — HIGH (ref 0.2–1.2)
BILIRUB SERPL-MCNC: 1.5 MG/DL
BILIRUB SERPL-MCNC: 1.6 MG/DL — HIGH (ref 0.2–1.2)
BILIRUB SERPL-MCNC: 1.9 MG/DL — HIGH (ref 0.2–1.2)
BILIRUB SERPL-MCNC: 2.1 MG/DL — HIGH (ref 0.2–1.2)
BILIRUB UR-MCNC: NEGATIVE — SIGNIFICANT CHANGE UP
BILIRUBIN URINE: NEGATIVE
BLD GP AB SCN SERPL QL: NORMAL
BLD GP AB SCN SERPL QL: SIGNIFICANT CHANGE UP
BLOOD URINE: ABNORMAL
BLOOD URINE: NEGATIVE
BLOOD URINE: NORMAL
BUN SERPL-MCNC: 10 MG/DL — SIGNIFICANT CHANGE UP (ref 10–20)
BUN SERPL-MCNC: 11 MG/DL
BUN SERPL-MCNC: 11 MG/DL — SIGNIFICANT CHANGE UP (ref 10–20)
BUN SERPL-MCNC: 11 MG/DL — SIGNIFICANT CHANGE UP (ref 10–20)
BUN SERPL-MCNC: 12 MG/DL — SIGNIFICANT CHANGE UP (ref 10–20)
BUN SERPL-MCNC: 13 MG/DL
BUN SERPL-MCNC: 15 MG/DL
BUN SERPL-MCNC: 15 MG/DL
BUN SERPL-MCNC: 15 MG/DL — SIGNIFICANT CHANGE UP (ref 10–20)
BUN SERPL-MCNC: 15 MG/DL — SIGNIFICANT CHANGE UP (ref 10–20)
BUN SERPL-MCNC: 16 MG/DL
BUN SERPL-MCNC: 16 MG/DL — SIGNIFICANT CHANGE UP (ref 10–20)
BUN SERPL-MCNC: 16 MG/DL — SIGNIFICANT CHANGE UP (ref 10–20)
BUN SERPL-MCNC: 17 MG/DL — SIGNIFICANT CHANGE UP (ref 10–20)
BUN SERPL-MCNC: 19 MG/DL — SIGNIFICANT CHANGE UP (ref 10–20)
BUN SERPL-MCNC: 21 MG/DL — HIGH (ref 10–20)
BUN SERPL-MCNC: 25 MG/DL — HIGH (ref 10–20)
BUN SERPL-MCNC: 25 MG/DL — HIGH (ref 10–20)
BUN SERPL-MCNC: 40 MG/DL — HIGH (ref 10–20)
BUN SERPL-MCNC: 7 MG/DL
BUN SERPL-MCNC: 7 MG/DL — LOW (ref 10–20)
BUN SERPL-MCNC: 8 MG/DL
BUN SERPL-MCNC: 8 MG/DL — LOW (ref 10–20)
BUN SERPL-MCNC: 87 MG/DL — CRITICAL HIGH (ref 10–20)
BUN SERPL-MCNC: 9 MG/DL
BUN SERPL-MCNC: 9 MG/DL — LOW (ref 10–20)
BUN SERPL-MCNC: 91 MG/DL — CRITICAL HIGH (ref 10–20)
BUN SERPL-MCNC: 99 MG/DL — CRITICAL HIGH (ref 10–20)
BURR CELLS BLD QL SMEAR: PRESENT — SIGNIFICANT CHANGE UP
CA-I SERPL-SCNC: 1.12 MMOL/L — SIGNIFICANT CHANGE UP (ref 1.12–1.3)
CA-I SERPL-SCNC: 1.13 MMOL/L — SIGNIFICANT CHANGE UP (ref 1.12–1.3)
CA-I SERPL-SCNC: 1.13 MMOL/L — SIGNIFICANT CHANGE UP (ref 1.12–1.3)
CA-I SERPL-SCNC: 1.2 MMOL/L — SIGNIFICANT CHANGE UP (ref 1.12–1.3)
CALCIUM SERPL-MCNC: 7.6 MG/DL — LOW (ref 8.5–10.1)
CALCIUM SERPL-MCNC: 7.8 MG/DL — LOW (ref 8.5–10.1)
CALCIUM SERPL-MCNC: 7.9 MG/DL — LOW (ref 8.5–10.1)
CALCIUM SERPL-MCNC: 7.9 MG/DL — LOW (ref 8.5–10.1)
CALCIUM SERPL-MCNC: 8 MG/DL — LOW (ref 8.5–10.1)
CALCIUM SERPL-MCNC: 8.1 MG/DL — LOW (ref 8.5–10.1)
CALCIUM SERPL-MCNC: 8.2 MG/DL — LOW (ref 8.5–10.1)
CALCIUM SERPL-MCNC: 8.2 MG/DL — LOW (ref 8.5–10.1)
CALCIUM SERPL-MCNC: 8.3 MG/DL — LOW (ref 8.5–10.1)
CALCIUM SERPL-MCNC: 8.3 MG/DL — LOW (ref 8.5–10.1)
CALCIUM SERPL-MCNC: 8.4 MG/DL — LOW (ref 8.5–10.1)
CALCIUM SERPL-MCNC: 8.5 MG/DL — SIGNIFICANT CHANGE UP (ref 8.5–10.1)
CALCIUM SERPL-MCNC: 8.5 MG/DL — SIGNIFICANT CHANGE UP (ref 8.5–10.1)
CALCIUM SERPL-MCNC: 8.6 MG/DL — SIGNIFICANT CHANGE UP (ref 8.5–10.1)
CALCIUM SERPL-MCNC: 8.7 MG/DL
CALCIUM SERPL-MCNC: 8.7 MG/DL — SIGNIFICANT CHANGE UP (ref 8.5–10.1)
CALCIUM SERPL-MCNC: 8.8 MG/DL — SIGNIFICANT CHANGE UP (ref 8.5–10.1)
CALCIUM SERPL-MCNC: 8.8 MG/DL — SIGNIFICANT CHANGE UP (ref 8.5–10.1)
CALCIUM SERPL-MCNC: 9 MG/DL
CALCIUM SERPL-MCNC: 9 MG/DL — SIGNIFICANT CHANGE UP (ref 8.5–10.1)
CANCER AG15-3 SERPL-ACNC: 21.9 U/ML
CEA SERPL-MCNC: 6.6 NG/ML
CHLORIDE SERPL-SCNC: 100 MMOL/L
CHLORIDE SERPL-SCNC: 100 MMOL/L — SIGNIFICANT CHANGE UP (ref 98–110)
CHLORIDE SERPL-SCNC: 101 MMOL/L — SIGNIFICANT CHANGE UP (ref 98–110)
CHLORIDE SERPL-SCNC: 102 MMOL/L — SIGNIFICANT CHANGE UP (ref 98–110)
CHLORIDE SERPL-SCNC: 104 MMOL/L
CHLORIDE SERPL-SCNC: 104 MMOL/L — SIGNIFICANT CHANGE UP (ref 98–110)
CHLORIDE SERPL-SCNC: 104 MMOL/L — SIGNIFICANT CHANGE UP (ref 98–110)
CHLORIDE SERPL-SCNC: 105 MMOL/L — SIGNIFICANT CHANGE UP (ref 98–110)
CHLORIDE SERPL-SCNC: 106 MMOL/L — SIGNIFICANT CHANGE UP (ref 98–110)
CHLORIDE SERPL-SCNC: 92 MMOL/L
CHLORIDE SERPL-SCNC: 92 MMOL/L — LOW (ref 98–110)
CHLORIDE SERPL-SCNC: 95 MMOL/L
CHLORIDE SERPL-SCNC: 95 MMOL/L — LOW (ref 98–110)
CHLORIDE SERPL-SCNC: 96 MMOL/L
CHLORIDE SERPL-SCNC: 96 MMOL/L — LOW (ref 98–110)
CHLORIDE SERPL-SCNC: 97 MMOL/L — LOW (ref 98–110)
CHLORIDE SERPL-SCNC: 98 MMOL/L
CHLORIDE SERPL-SCNC: 98 MMOL/L
CHLORIDE SERPL-SCNC: 98 MMOL/L — SIGNIFICANT CHANGE UP (ref 98–110)
CHLORIDE SERPL-SCNC: 99 MMOL/L
CHLORIDE SERPL-SCNC: 99 MMOL/L — SIGNIFICANT CHANGE UP (ref 98–110)
CHROM ANALY INTERPHASE BLD FISH-IMP: SIGNIFICANT CHANGE UP
CHROM ANALY INTERPHASE BLD FISH-IMP: SIGNIFICANT CHANGE UP
CHROM ANALY OVERALL INTERP SPEC-IMP: SIGNIFICANT CHANGE UP
CHROM ANALY OVERALL INTERP SPEC-IMP: SIGNIFICANT CHANGE UP
CK SERPL-CCNC: 22 U/L — SIGNIFICANT CHANGE UP (ref 0–225)
CK SERPL-CCNC: 32 U/L — SIGNIFICANT CHANGE UP (ref 0–225)
CO2 SERPL-SCNC: 19 MMOL/L — SIGNIFICANT CHANGE UP (ref 17–32)
CO2 SERPL-SCNC: 20 MMOL/L
CO2 SERPL-SCNC: 20 MMOL/L — SIGNIFICANT CHANGE UP (ref 17–32)
CO2 SERPL-SCNC: 21 MMOL/L — SIGNIFICANT CHANGE UP (ref 17–32)
CO2 SERPL-SCNC: 22 MMOL/L
CO2 SERPL-SCNC: 22 MMOL/L — SIGNIFICANT CHANGE UP (ref 17–32)
CO2 SERPL-SCNC: 23 MMOL/L
CO2 SERPL-SCNC: 23 MMOL/L — SIGNIFICANT CHANGE UP (ref 17–32)
CO2 SERPL-SCNC: 24 MMOL/L
CO2 SERPL-SCNC: 24 MMOL/L
CO2 SERPL-SCNC: 24 MMOL/L — SIGNIFICANT CHANGE UP (ref 17–32)
CO2 SERPL-SCNC: 25 MMOL/L — SIGNIFICANT CHANGE UP (ref 17–32)
CO2 SERPL-SCNC: 26 MMOL/L — SIGNIFICANT CHANGE UP (ref 17–32)
CO2 SERPL-SCNC: 27 MMOL/L — SIGNIFICANT CHANGE UP (ref 17–32)
CO2 SERPL-SCNC: 28 MMOL/L
CO2 SERPL-SCNC: 29 MMOL/L — SIGNIFICANT CHANGE UP (ref 17–32)
COLOR SPEC: YELLOW — SIGNIFICANT CHANGE UP
COLOR: YELLOW
CREAT SERPL-MCNC: 0.5 MG/DL
CREAT SERPL-MCNC: 0.5 MG/DL — LOW (ref 0.7–1.5)
CREAT SERPL-MCNC: 0.6 MG/DL
CREAT SERPL-MCNC: 0.6 MG/DL — LOW (ref 0.7–1.5)
CREAT SERPL-MCNC: 0.9 MG/DL — SIGNIFICANT CHANGE UP (ref 0.7–1.5)
CREAT SERPL-MCNC: <0.5 MG/DL
CREAT SERPL-MCNC: <0.5 MG/DL — LOW (ref 0.7–1.5)
CREATININE, URINE RESULT: 80 MG/DL — SIGNIFICANT CHANGE UP
CRP SERPL-MCNC: 0.77 MG/DL — HIGH (ref 0–0.4)
CRYOCRIT SER SPUN WESTERGREN: 0 MM
CRYOGLOB SERPL-MCNC: NEGATIVE
CULTURE RESULTS: NO GROWTH — SIGNIFICANT CHANGE UP
CULTURE RESULTS: NO GROWTH — SIGNIFICANT CHANGE UP
CULTURE RESULTS: SIGNIFICANT CHANGE UP
DEPRECATED KAPPA LC FREE/LAMBDA SER: 3.72 RATIO
DIFF PNL FLD: NEGATIVE — SIGNIFICANT CHANGE UP
DIR ANTIGLOB POLYSPECIFIC INTERPRETATION: SIGNIFICANT CHANGE UP
DIR ANTIGLOB POLYSPECIFIC INTERPRETATION: SIGNIFICANT CHANGE UP
EOSINOPHIL # BLD AUTO: 0 K/UL — SIGNIFICANT CHANGE UP (ref 0–0.7)
EOSINOPHIL # BLD AUTO: 0.01 K/UL — SIGNIFICANT CHANGE UP (ref 0–0.7)
EOSINOPHIL # BLD AUTO: 0.02 K/UL — SIGNIFICANT CHANGE UP (ref 0–0.7)
EOSINOPHIL # BLD AUTO: 0.02 K/UL — SIGNIFICANT CHANGE UP (ref 0–0.7)
EOSINOPHIL # BLD AUTO: 0.03 K/UL — SIGNIFICANT CHANGE UP (ref 0–0.7)
EOSINOPHIL # BLD AUTO: 0.04 K/UL — SIGNIFICANT CHANGE UP (ref 0–0.7)
EOSINOPHIL # BLD AUTO: 0.05 K/UL — SIGNIFICANT CHANGE UP (ref 0–0.7)
EOSINOPHIL # BLD AUTO: 0.06 K/UL — SIGNIFICANT CHANGE UP (ref 0–0.7)
EOSINOPHIL # BLD AUTO: 0.06 K/UL — SIGNIFICANT CHANGE UP (ref 0–0.7)
EOSINOPHIL # BLD AUTO: 0.08 K/UL — SIGNIFICANT CHANGE UP (ref 0–0.7)
EOSINOPHIL # BLD AUTO: 0.08 K/UL — SIGNIFICANT CHANGE UP (ref 0–0.7)
EOSINOPHIL # BLD AUTO: 0.09 K/UL — SIGNIFICANT CHANGE UP (ref 0–0.7)
EOSINOPHIL # BLD AUTO: 0.1 K/UL — SIGNIFICANT CHANGE UP (ref 0–0.7)
EOSINOPHIL # BLD AUTO: 0.11 K/UL — SIGNIFICANT CHANGE UP (ref 0–0.7)
EOSINOPHIL # BLD AUTO: 0.16 K/UL — SIGNIFICANT CHANGE UP (ref 0–0.7)
EOSINOPHIL # BLD AUTO: 0.17 K/UL — SIGNIFICANT CHANGE UP (ref 0–0.7)
EOSINOPHIL # BLD AUTO: 0.17 K/UL — SIGNIFICANT CHANGE UP (ref 0–0.7)
EOSINOPHIL NFR BLD AUTO: 0 % — SIGNIFICANT CHANGE UP (ref 0–8)
EOSINOPHIL NFR BLD AUTO: 0.2 % — SIGNIFICANT CHANGE UP (ref 0–8)
EOSINOPHIL NFR BLD AUTO: 0.3 % — SIGNIFICANT CHANGE UP (ref 0–8)
EOSINOPHIL NFR BLD AUTO: 0.5 % — SIGNIFICANT CHANGE UP (ref 0–8)
EOSINOPHIL NFR BLD AUTO: 0.6 % — SIGNIFICANT CHANGE UP (ref 0–8)
EOSINOPHIL NFR BLD AUTO: 0.7 % — SIGNIFICANT CHANGE UP (ref 0–8)
EOSINOPHIL NFR BLD AUTO: 0.7 % — SIGNIFICANT CHANGE UP (ref 0–8)
EOSINOPHIL NFR BLD AUTO: 0.8 % — SIGNIFICANT CHANGE UP (ref 0–8)
EOSINOPHIL NFR BLD AUTO: 0.9 % — SIGNIFICANT CHANGE UP (ref 0–8)
EOSINOPHIL NFR BLD AUTO: 1 % — SIGNIFICANT CHANGE UP (ref 0–8)
EOSINOPHIL NFR BLD AUTO: 1.1 % — SIGNIFICANT CHANGE UP (ref 0–8)
EOSINOPHIL NFR BLD AUTO: 1.1 % — SIGNIFICANT CHANGE UP (ref 0–8)
EOSINOPHIL NFR BLD AUTO: 1.2 % — SIGNIFICANT CHANGE UP (ref 0–8)
EOSINOPHIL NFR BLD AUTO: 1.8 % — SIGNIFICANT CHANGE UP (ref 0–8)
EOSINOPHIL NFR BLD AUTO: 2 % — SIGNIFICANT CHANGE UP (ref 0–8)
EOSINOPHIL NFR BLD AUTO: 2.1 % — SIGNIFICANT CHANGE UP (ref 0–8)
EPI CELLS # UR: 1 /HPF — SIGNIFICANT CHANGE UP (ref 0–5)
EPI CELLS # UR: 10 /HPF — HIGH (ref 0–5)
EPI CELLS # UR: 10 /HPF — HIGH (ref 0–5)
EPI CELLS # UR: 14 /HPF — HIGH (ref 0–5)
EPI CELLS # UR: 7 /HPF — HIGH (ref 0–5)
ERYTHROCYTE [SEDIMENTATION RATE] IN BLOOD: 33 MM/HR — HIGH (ref 0–20)
ESTIMATED AVERAGE GLUCOSE: 151 MG/DL — HIGH (ref 68–114)
ESTIMATED AVERAGE GLUCOSE: 166 MG/DL — HIGH (ref 68–114)
FERRITIN SERPL-MCNC: 546 NG/ML
FERRITIN SERPL-MCNC: 646 NG/ML — HIGH (ref 15–150)
FERRITIN SERPL-MCNC: 7051 NG/ML
FOLATE SERPL-MCNC: 15.8 NG/ML
FOLATE SERPL-MCNC: 2.6 NG/ML — LOW
FOLATE SERPL-MCNC: 4.2 NG/ML
G6PD SER-CCNC: 18.2 U/G HGB
GAMMA GLOB FLD ELPH-MCNC: 0.8 G/DL
GAMMA GLOB MFR SERPL ELPH: 14.1 %
GAMMA GLOBULIN: 0.9 G/DL — SIGNIFICANT CHANGE UP (ref 0.6–1.6)
GAS PNL BLDV: 126 MMOL/L — LOW (ref 136–145)
GAS PNL BLDV: 127 MMOL/L — LOW (ref 136–145)
GAS PNL BLDV: 134 MMOL/L — LOW (ref 136–145)
GAS PNL BLDV: 135 MMOL/L — LOW (ref 136–145)
GAS PNL BLDV: SIGNIFICANT CHANGE UP
GIANT PLATELETS BLD QL SMEAR: PRESENT — SIGNIFICANT CHANGE UP
GLUCOSE BLDC GLUCOMTR-MCNC: 100 MG/DL — HIGH (ref 70–99)
GLUCOSE BLDC GLUCOMTR-MCNC: 105 MG/DL — HIGH (ref 70–99)
GLUCOSE BLDC GLUCOMTR-MCNC: 107 MG/DL — HIGH (ref 70–99)
GLUCOSE BLDC GLUCOMTR-MCNC: 107 MG/DL — HIGH (ref 70–99)
GLUCOSE BLDC GLUCOMTR-MCNC: 109 MG/DL — HIGH (ref 70–99)
GLUCOSE BLDC GLUCOMTR-MCNC: 109 MG/DL — HIGH (ref 70–99)
GLUCOSE BLDC GLUCOMTR-MCNC: 111 MG/DL — HIGH (ref 70–99)
GLUCOSE BLDC GLUCOMTR-MCNC: 113 MG/DL — HIGH (ref 70–99)
GLUCOSE BLDC GLUCOMTR-MCNC: 116 MG/DL — HIGH (ref 70–99)
GLUCOSE BLDC GLUCOMTR-MCNC: 117 MG/DL — HIGH (ref 70–99)
GLUCOSE BLDC GLUCOMTR-MCNC: 119 MG/DL — HIGH (ref 70–99)
GLUCOSE BLDC GLUCOMTR-MCNC: 122 MG/DL — HIGH (ref 70–99)
GLUCOSE BLDC GLUCOMTR-MCNC: 122 MG/DL — HIGH (ref 70–99)
GLUCOSE BLDC GLUCOMTR-MCNC: 123 MG/DL — HIGH (ref 70–99)
GLUCOSE BLDC GLUCOMTR-MCNC: 124 MG/DL — HIGH (ref 70–99)
GLUCOSE BLDC GLUCOMTR-MCNC: 124 MG/DL — HIGH (ref 70–99)
GLUCOSE BLDC GLUCOMTR-MCNC: 125 MG/DL — HIGH (ref 70–99)
GLUCOSE BLDC GLUCOMTR-MCNC: 126 MG/DL — HIGH (ref 70–99)
GLUCOSE BLDC GLUCOMTR-MCNC: 127 MG/DL — HIGH (ref 70–99)
GLUCOSE BLDC GLUCOMTR-MCNC: 127 MG/DL — HIGH (ref 70–99)
GLUCOSE BLDC GLUCOMTR-MCNC: 130 MG/DL — HIGH (ref 70–99)
GLUCOSE BLDC GLUCOMTR-MCNC: 131 MG/DL — HIGH (ref 70–99)
GLUCOSE BLDC GLUCOMTR-MCNC: 133 MG/DL — HIGH (ref 70–99)
GLUCOSE BLDC GLUCOMTR-MCNC: 135 MG/DL — HIGH (ref 70–99)
GLUCOSE BLDC GLUCOMTR-MCNC: 137 MG/DL — HIGH (ref 70–99)
GLUCOSE BLDC GLUCOMTR-MCNC: 138 MG/DL — HIGH (ref 70–99)
GLUCOSE BLDC GLUCOMTR-MCNC: 139 MG/DL — HIGH (ref 70–99)
GLUCOSE BLDC GLUCOMTR-MCNC: 141 MG/DL — HIGH (ref 70–99)
GLUCOSE BLDC GLUCOMTR-MCNC: 142 MG/DL — HIGH (ref 70–99)
GLUCOSE BLDC GLUCOMTR-MCNC: 142 MG/DL — HIGH (ref 70–99)
GLUCOSE BLDC GLUCOMTR-MCNC: 143 MG/DL — HIGH (ref 70–99)
GLUCOSE BLDC GLUCOMTR-MCNC: 144 MG/DL — HIGH (ref 70–99)
GLUCOSE BLDC GLUCOMTR-MCNC: 145 MG/DL — HIGH (ref 70–99)
GLUCOSE BLDC GLUCOMTR-MCNC: 147 MG/DL — HIGH (ref 70–99)
GLUCOSE BLDC GLUCOMTR-MCNC: 148 MG/DL — HIGH (ref 70–99)
GLUCOSE BLDC GLUCOMTR-MCNC: 150 MG/DL — HIGH (ref 70–99)
GLUCOSE BLDC GLUCOMTR-MCNC: 151 MG/DL — HIGH (ref 70–99)
GLUCOSE BLDC GLUCOMTR-MCNC: 152 MG/DL — HIGH (ref 70–99)
GLUCOSE BLDC GLUCOMTR-MCNC: 153 MG/DL — HIGH (ref 70–99)
GLUCOSE BLDC GLUCOMTR-MCNC: 155 MG/DL — HIGH (ref 70–99)
GLUCOSE BLDC GLUCOMTR-MCNC: 156 MG/DL — HIGH (ref 70–99)
GLUCOSE BLDC GLUCOMTR-MCNC: 158 MG/DL — HIGH (ref 70–99)
GLUCOSE BLDC GLUCOMTR-MCNC: 158 MG/DL — HIGH (ref 70–99)
GLUCOSE BLDC GLUCOMTR-MCNC: 160 MG/DL — HIGH (ref 70–99)
GLUCOSE BLDC GLUCOMTR-MCNC: 161 MG/DL — HIGH (ref 70–99)
GLUCOSE BLDC GLUCOMTR-MCNC: 162 MG/DL — HIGH (ref 70–99)
GLUCOSE BLDC GLUCOMTR-MCNC: 163 MG/DL — HIGH (ref 70–99)
GLUCOSE BLDC GLUCOMTR-MCNC: 165 MG/DL — HIGH (ref 70–99)
GLUCOSE BLDC GLUCOMTR-MCNC: 166 MG/DL — HIGH (ref 70–99)
GLUCOSE BLDC GLUCOMTR-MCNC: 166 MG/DL — HIGH (ref 70–99)
GLUCOSE BLDC GLUCOMTR-MCNC: 168 MG/DL — HIGH (ref 70–99)
GLUCOSE BLDC GLUCOMTR-MCNC: 168 MG/DL — HIGH (ref 70–99)
GLUCOSE BLDC GLUCOMTR-MCNC: 169 MG/DL — HIGH (ref 70–99)
GLUCOSE BLDC GLUCOMTR-MCNC: 172 MG/DL — HIGH (ref 70–99)
GLUCOSE BLDC GLUCOMTR-MCNC: 172 MG/DL — HIGH (ref 70–99)
GLUCOSE BLDC GLUCOMTR-MCNC: 175 MG/DL — HIGH (ref 70–99)
GLUCOSE BLDC GLUCOMTR-MCNC: 176 MG/DL — HIGH (ref 70–99)
GLUCOSE BLDC GLUCOMTR-MCNC: 177 MG/DL — HIGH (ref 70–99)
GLUCOSE BLDC GLUCOMTR-MCNC: 178 MG/DL — HIGH (ref 70–99)
GLUCOSE BLDC GLUCOMTR-MCNC: 180 MG/DL — HIGH (ref 70–99)
GLUCOSE BLDC GLUCOMTR-MCNC: 180 MG/DL — HIGH (ref 70–99)
GLUCOSE BLDC GLUCOMTR-MCNC: 187 MG/DL — HIGH (ref 70–99)
GLUCOSE BLDC GLUCOMTR-MCNC: 187 MG/DL — HIGH (ref 70–99)
GLUCOSE BLDC GLUCOMTR-MCNC: 188 MG/DL — HIGH (ref 70–99)
GLUCOSE BLDC GLUCOMTR-MCNC: 190 MG/DL — HIGH (ref 70–99)
GLUCOSE BLDC GLUCOMTR-MCNC: 195 MG/DL — HIGH (ref 70–99)
GLUCOSE BLDC GLUCOMTR-MCNC: 196 MG/DL — HIGH (ref 70–99)
GLUCOSE BLDC GLUCOMTR-MCNC: 196 MG/DL — HIGH (ref 70–99)
GLUCOSE BLDC GLUCOMTR-MCNC: 198 MG/DL — HIGH (ref 70–99)
GLUCOSE BLDC GLUCOMTR-MCNC: 201 MG/DL — HIGH (ref 70–99)
GLUCOSE BLDC GLUCOMTR-MCNC: 205 MG/DL — HIGH (ref 70–99)
GLUCOSE BLDC GLUCOMTR-MCNC: 209 MG/DL — HIGH (ref 70–99)
GLUCOSE BLDC GLUCOMTR-MCNC: 210 MG/DL — HIGH (ref 70–99)
GLUCOSE BLDC GLUCOMTR-MCNC: 221 MG/DL — HIGH (ref 70–99)
GLUCOSE BLDC GLUCOMTR-MCNC: 238 MG/DL — HIGH (ref 70–99)
GLUCOSE BLDC GLUCOMTR-MCNC: 249 MG/DL — HIGH (ref 70–99)
GLUCOSE BLDC GLUCOMTR-MCNC: 251 MG/DL — HIGH (ref 70–99)
GLUCOSE BLDC GLUCOMTR-MCNC: 255 MG/DL — HIGH (ref 70–99)
GLUCOSE BLDC GLUCOMTR-MCNC: 267 MG/DL — HIGH (ref 70–99)
GLUCOSE BLDC GLUCOMTR-MCNC: 267 MG/DL — HIGH (ref 70–99)
GLUCOSE BLDC GLUCOMTR-MCNC: 268 MG/DL — HIGH (ref 70–99)
GLUCOSE BLDC GLUCOMTR-MCNC: 302 MG/DL — HIGH (ref 70–99)
GLUCOSE BLDC GLUCOMTR-MCNC: 372 MG/DL — HIGH (ref 70–99)
GLUCOSE BLDC GLUCOMTR-MCNC: 396 MG/DL — HIGH (ref 70–99)
GLUCOSE BLDC GLUCOMTR-MCNC: 82 MG/DL — SIGNIFICANT CHANGE UP (ref 70–99)
GLUCOSE BLDC GLUCOMTR-MCNC: 83 MG/DL — SIGNIFICANT CHANGE UP (ref 70–99)
GLUCOSE BLDC GLUCOMTR-MCNC: 83 MG/DL — SIGNIFICANT CHANGE UP (ref 70–99)
GLUCOSE BLDC GLUCOMTR-MCNC: 89 MG/DL — SIGNIFICANT CHANGE UP (ref 70–99)
GLUCOSE BLDC GLUCOMTR-MCNC: 94 MG/DL — SIGNIFICANT CHANGE UP (ref 70–99)
GLUCOSE BLDC GLUCOMTR-MCNC: 94 MG/DL — SIGNIFICANT CHANGE UP (ref 70–99)
GLUCOSE BLDC GLUCOMTR-MCNC: 95 MG/DL — SIGNIFICANT CHANGE UP (ref 70–99)
GLUCOSE BLDC GLUCOMTR-MCNC: 96 MG/DL — SIGNIFICANT CHANGE UP (ref 70–99)
GLUCOSE BLDC GLUCOMTR-MCNC: 98 MG/DL — SIGNIFICANT CHANGE UP (ref 70–99)
GLUCOSE BLDC GLUCOMTR-MCNC: 99 MG/DL — SIGNIFICANT CHANGE UP (ref 70–99)
GLUCOSE QUALITATIVE U: NEGATIVE
GLUCOSE QUALITATIVE U: NEGATIVE
GLUCOSE QUALITATIVE U: NORMAL
GLUCOSE SERPL-MCNC: 101 MG/DL — HIGH (ref 70–99)
GLUCOSE SERPL-MCNC: 109 MG/DL — HIGH (ref 70–99)
GLUCOSE SERPL-MCNC: 109 MG/DL — HIGH (ref 70–99)
GLUCOSE SERPL-MCNC: 114 MG/DL — HIGH (ref 70–99)
GLUCOSE SERPL-MCNC: 118 MG/DL — HIGH (ref 70–99)
GLUCOSE SERPL-MCNC: 121 MG/DL — HIGH (ref 70–99)
GLUCOSE SERPL-MCNC: 121 MG/DL — HIGH (ref 70–99)
GLUCOSE SERPL-MCNC: 123 MG/DL
GLUCOSE SERPL-MCNC: 124 MG/DL — HIGH (ref 70–99)
GLUCOSE SERPL-MCNC: 124 MG/DL — HIGH (ref 70–99)
GLUCOSE SERPL-MCNC: 125 MG/DL — HIGH (ref 70–99)
GLUCOSE SERPL-MCNC: 126 MG/DL — HIGH (ref 70–99)
GLUCOSE SERPL-MCNC: 135 MG/DL — HIGH (ref 70–99)
GLUCOSE SERPL-MCNC: 136 MG/DL — HIGH (ref 70–99)
GLUCOSE SERPL-MCNC: 150 MG/DL
GLUCOSE SERPL-MCNC: 151 MG/DL — HIGH (ref 70–99)
GLUCOSE SERPL-MCNC: 152 MG/DL — HIGH (ref 70–99)
GLUCOSE SERPL-MCNC: 153 MG/DL
GLUCOSE SERPL-MCNC: 153 MG/DL — HIGH (ref 70–99)
GLUCOSE SERPL-MCNC: 158 MG/DL
GLUCOSE SERPL-MCNC: 160 MG/DL — HIGH (ref 70–99)
GLUCOSE SERPL-MCNC: 161 MG/DL — HIGH (ref 70–99)
GLUCOSE SERPL-MCNC: 162 MG/DL — HIGH (ref 70–99)
GLUCOSE SERPL-MCNC: 163 MG/DL — HIGH (ref 70–99)
GLUCOSE SERPL-MCNC: 168 MG/DL — HIGH (ref 70–99)
GLUCOSE SERPL-MCNC: 176 MG/DL
GLUCOSE SERPL-MCNC: 181 MG/DL
GLUCOSE SERPL-MCNC: 187 MG/DL — HIGH (ref 70–99)
GLUCOSE SERPL-MCNC: 189 MG/DL — HIGH (ref 70–99)
GLUCOSE SERPL-MCNC: 196 MG/DL
GLUCOSE SERPL-MCNC: 201 MG/DL — HIGH (ref 70–99)
GLUCOSE SERPL-MCNC: 217 MG/DL
GLUCOSE SERPL-MCNC: 262 MG/DL — HIGH (ref 70–99)
GLUCOSE SERPL-MCNC: 270 MG/DL — HIGH (ref 70–99)
GLUCOSE SERPL-MCNC: 282 MG/DL — HIGH (ref 70–99)
GLUCOSE SERPL-MCNC: 490 MG/DL — CRITICAL HIGH (ref 70–99)
GLUCOSE SERPL-MCNC: 66 MG/DL — LOW (ref 70–99)
GLUCOSE SERPL-MCNC: 73 MG/DL — SIGNIFICANT CHANGE UP (ref 70–99)
GLUCOSE SERPL-MCNC: 88 MG/DL — SIGNIFICANT CHANGE UP (ref 70–99)
GLUCOSE UR QL: ABNORMAL
GLUCOSE UR QL: NEGATIVE — SIGNIFICANT CHANGE UP
HAPTOGLOB SERPL-MCNC: 37 MG/DL
HAPTOGLOB SERPL-MCNC: 44 MG/DL — SIGNIFICANT CHANGE UP (ref 34–200)
HAPTOGLOB SERPL-MCNC: <20 MG/DL — LOW (ref 34–200)
HAPTOGLOB SERPL-MCNC: <20 MG/DL — LOW (ref 34–200)
HBV CORE IGG+IGM SER QL: NONREACTIVE
HBV SURFACE AB SER QL: NONREACTIVE
HBV SURFACE AG SER QL: NONREACTIVE
HCO3 BLDV-SCNC: 19 MMOL/L — LOW (ref 22–29)
HCO3 BLDV-SCNC: 20 MMOL/L — LOW (ref 22–29)
HCO3 BLDV-SCNC: 21 MMOL/L — LOW (ref 22–29)
HCO3 BLDV-SCNC: 28 MMOL/L — SIGNIFICANT CHANGE UP (ref 22–29)
HCT VFR BLD CALC: 14 % — LOW (ref 37–47)
HCT VFR BLD CALC: 14.3 % — LOW (ref 37–47)
HCT VFR BLD CALC: 20.2 %
HCT VFR BLD CALC: 20.6 % — LOW (ref 37–47)
HCT VFR BLD CALC: 21 %
HCT VFR BLD CALC: 21.2 % — LOW (ref 37–47)
HCT VFR BLD CALC: 21.4 % — LOW (ref 37–47)
HCT VFR BLD CALC: 21.7 % — LOW (ref 37–47)
HCT VFR BLD CALC: 22.3 % — LOW (ref 37–47)
HCT VFR BLD CALC: 22.5 % — LOW (ref 37–47)
HCT VFR BLD CALC: 22.9 % — LOW (ref 37–47)
HCT VFR BLD CALC: 22.9 % — LOW (ref 37–47)
HCT VFR BLD CALC: 23 %
HCT VFR BLD CALC: 23.5 % — LOW (ref 37–47)
HCT VFR BLD CALC: 23.7 % — LOW (ref 37–47)
HCT VFR BLD CALC: 23.8 % — LOW (ref 37–47)
HCT VFR BLD CALC: 24.2 % — LOW (ref 37–47)
HCT VFR BLD CALC: 24.2 % — LOW (ref 37–47)
HCT VFR BLD CALC: 24.3 %
HCT VFR BLD CALC: 24.4 % — LOW (ref 37–47)
HCT VFR BLD CALC: 24.6 % — LOW (ref 37–47)
HCT VFR BLD CALC: 24.7 % — LOW (ref 37–47)
HCT VFR BLD CALC: 24.8 % — LOW (ref 37–47)
HCT VFR BLD CALC: 25.1 % — LOW (ref 37–47)
HCT VFR BLD CALC: 25.2 % — LOW (ref 37–47)
HCT VFR BLD CALC: 25.3 % — LOW (ref 37–47)
HCT VFR BLD CALC: 25.7 %
HCT VFR BLD CALC: 26 %
HCT VFR BLD CALC: 26.1 % — LOW (ref 37–47)
HCT VFR BLD CALC: 26.1 % — LOW (ref 37–47)
HCT VFR BLD CALC: 26.2 % — LOW (ref 37–47)
HCT VFR BLD CALC: 26.3 % — LOW (ref 37–47)
HCT VFR BLD CALC: 26.3 % — LOW (ref 37–47)
HCT VFR BLD CALC: 26.4 % — LOW (ref 37–47)
HCT VFR BLD CALC: 26.5 %
HCT VFR BLD CALC: 26.6 %
HCT VFR BLD CALC: 26.8 % — LOW (ref 37–47)
HCT VFR BLD CALC: 27.1 % — LOW (ref 37–47)
HCT VFR BLD CALC: 27.7 %
HCT VFR BLD CALC: 28.1 %
HCT VFR BLD CALC: 28.1 % — LOW (ref 37–47)
HCT VFR BLD CALC: 28.3 % — LOW (ref 37–47)
HCT VFR BLD CALC: 28.7 % — LOW (ref 37–47)
HCT VFR BLD CALC: 28.8 % — LOW (ref 37–47)
HCT VFR BLD CALC: 29.3 % — LOW (ref 37–47)
HCT VFR BLD CALC: 29.4 % — LOW (ref 37–47)
HCT VFR BLD CALC: 29.5 % — LOW (ref 37–47)
HCT VFR BLD CALC: 29.5 % — LOW (ref 37–47)
HCT VFR BLD CALC: 29.6 % — LOW (ref 37–47)
HCT VFR BLD CALC: 29.6 % — LOW (ref 37–47)
HCT VFR BLD CALC: 30.1 % — LOW (ref 37–47)
HCT VFR BLD CALC: 30.4 %
HCT VFR BLD CALC: 30.5 % — LOW (ref 37–47)
HCT VFR BLD CALC: 30.8 % — LOW (ref 37–47)
HCT VFR BLD CALC: 31.5 %
HCT VFR BLD CALC: 32.6 %
HCT VFR BLD CALC: 32.8 %
HCT VFR BLD CALC: 35 %
HCT VFR BLD CALC: 35.8 %
HCT VFR BLD CALC: 36.3 %
HCT VFR BLD CALC: 37.2 %
HCT VFR BLD CALC: 39 %
HCT VFR BLD CALC: 41.7 %
HCT VFR BLDA CALC: 12.5 % — LOW (ref 34–44)
HCT VFR BLDA CALC: 15.6 % — LOW (ref 34–44)
HCT VFR BLDA CALC: 25 % — LOW (ref 34–44)
HCT VFR BLDA CALC: 28.9 % — LOW (ref 34–44)
HCV AB S/CO SERPL IA: 0.05 COI — SIGNIFICANT CHANGE UP
HCV AB SER QL: NONREACTIVE
HCV AB SERPL-IMP: SIGNIFICANT CHANGE UP
HCV S/CO RATIO: 0.2 S/CO
HEMATOPATHOLOGY REPORT: SIGNIFICANT CHANGE UP
HGB BLD CALC-MCNC: 4.1 G/DL — CRITICAL LOW (ref 14–18)
HGB BLD CALC-MCNC: 5.1 G/DL — LOW (ref 14–18)
HGB BLD CALC-MCNC: 8.1 G/DL — LOW (ref 14–18)
HGB BLD CALC-MCNC: 9.4 G/DL — LOW (ref 14–18)
HGB BLD-MCNC: 10.1 G/DL — LOW (ref 12–16)
HGB BLD-MCNC: 10.4 G/DL — LOW (ref 12–16)
HGB BLD-MCNC: 10.5 G/DL
HGB BLD-MCNC: 10.6 G/DL
HGB BLD-MCNC: 10.6 G/DL
HGB BLD-MCNC: 11 G/DL
HGB BLD-MCNC: 11.2 G/DL
HGB BLD-MCNC: 11.5 G/DL
HGB BLD-MCNC: 11.6 G/DL
HGB BLD-MCNC: 12.3 G/DL
HGB BLD-MCNC: 13.4 G/DL
HGB BLD-MCNC: 4.2 G/DL — CRITICAL LOW (ref 12–16)
HGB BLD-MCNC: 5 G/DL — CRITICAL LOW (ref 12–16)
HGB BLD-MCNC: 6.8 G/DL
HGB BLD-MCNC: 7.1 G/DL
HGB BLD-MCNC: 7.2 G/DL — LOW (ref 12–16)
HGB BLD-MCNC: 7.2 G/DL — LOW (ref 12–16)
HGB BLD-MCNC: 7.3 G/DL — LOW (ref 12–16)
HGB BLD-MCNC: 7.4 G/DL — LOW (ref 12–16)
HGB BLD-MCNC: 7.5 G/DL — LOW (ref 12–16)
HGB BLD-MCNC: 7.6 G/DL — LOW (ref 12–16)
HGB BLD-MCNC: 7.6 G/DL — LOW (ref 12–16)
HGB BLD-MCNC: 7.7 G/DL
HGB BLD-MCNC: 7.7 G/DL — LOW (ref 12–16)
HGB BLD-MCNC: 7.8 G/DL — LOW (ref 12–16)
HGB BLD-MCNC: 7.8 G/DL — LOW (ref 12–16)
HGB BLD-MCNC: 7.9 G/DL — LOW (ref 12–16)
HGB BLD-MCNC: 8 G/DL
HGB BLD-MCNC: 8 G/DL — LOW (ref 12–16)
HGB BLD-MCNC: 8 G/DL — LOW (ref 12–16)
HGB BLD-MCNC: 8.1 G/DL — LOW (ref 12–16)
HGB BLD-MCNC: 8.2 G/DL — LOW (ref 12–16)
HGB BLD-MCNC: 8.3 G/DL
HGB BLD-MCNC: 8.4 G/DL — LOW (ref 12–16)
HGB BLD-MCNC: 8.4 G/DL — LOW (ref 12–16)
HGB BLD-MCNC: 8.5 G/DL — LOW (ref 12–16)
HGB BLD-MCNC: 8.5 G/DL — LOW (ref 12–16)
HGB BLD-MCNC: 8.6 G/DL
HGB BLD-MCNC: 8.6 G/DL — LOW (ref 12–16)
HGB BLD-MCNC: 8.7 G/DL — LOW (ref 12–16)
HGB BLD-MCNC: 8.8 G/DL
HGB BLD-MCNC: 8.8 G/DL — LOW (ref 12–16)
HGB BLD-MCNC: 8.8 G/DL — LOW (ref 12–16)
HGB BLD-MCNC: 8.9 G/DL — LOW (ref 12–16)
HGB BLD-MCNC: 9 G/DL
HGB BLD-MCNC: 9 G/DL — LOW (ref 12–16)
HGB BLD-MCNC: 9 G/DL — LOW (ref 12–16)
HGB BLD-MCNC: 9.1 G/DL — LOW (ref 12–16)
HGB BLD-MCNC: 9.2 G/DL
HGB BLD-MCNC: 9.2 G/DL — LOW (ref 12–16)
HGB BLD-MCNC: 9.3 G/DL
HGB BLD-MCNC: 9.3 G/DL — LOW (ref 12–16)
HGB BLD-MCNC: 9.4 G/DL — LOW (ref 12–16)
HGB BLD-MCNC: 9.5 G/DL
HGB BLD-MCNC: 9.8 G/DL — LOW (ref 12–16)
HGB BLD-MCNC: 9.8 G/DL — LOW (ref 12–16)
HGB BLD-MCNC: 9.9 G/DL — LOW (ref 12–16)
HIV1+2 AB SPEC QL IA.RAPID: NONREACTIVE
HYALINE CASTS # UR AUTO: 11 /LPF — HIGH (ref 0–7)
HYALINE CASTS # UR AUTO: 30 /LPF — HIGH (ref 0–7)
HYALINE CASTS # UR AUTO: 4 /LPF — SIGNIFICANT CHANGE UP (ref 0–7)
HYALINE CASTS # UR AUTO: 7 /LPF — SIGNIFICANT CHANGE UP (ref 0–7)
HYALINE CASTS # UR AUTO: 7 /LPF — SIGNIFICANT CHANGE UP (ref 0–7)
HYPOCHROMIA BLD QL: SLIGHT — SIGNIFICANT CHANGE UP
IGA FLD-MCNC: 129 MG/DL — SIGNIFICANT CHANGE UP (ref 84–499)
IGA SER QL IEP: 82 MG/DL
IGG FLD-MCNC: 872 MG/DL — SIGNIFICANT CHANGE UP (ref 610–1660)
IGG SER QL IEP: 894 MG/DL
IGM SER QL IEP: 39 MG/DL
IGM SERPL-MCNC: 47 MG/DL — SIGNIFICANT CHANGE UP (ref 35–242)
IMM GRANULOCYTES NFR BLD AUTO: 0.2 % — SIGNIFICANT CHANGE UP (ref 0.1–0.3)
IMM GRANULOCYTES NFR BLD AUTO: 0.3 % — SIGNIFICANT CHANGE UP (ref 0.1–0.3)
IMM GRANULOCYTES NFR BLD AUTO: 0.4 % — HIGH (ref 0.1–0.3)
IMM GRANULOCYTES NFR BLD AUTO: 0.5 % — HIGH (ref 0.1–0.3)
IMM GRANULOCYTES NFR BLD AUTO: 0.7 % — HIGH (ref 0.1–0.3)
IMM GRANULOCYTES NFR BLD AUTO: 0.9 % — HIGH (ref 0.1–0.3)
IMM GRANULOCYTES NFR BLD AUTO: 0.9 % — HIGH (ref 0.1–0.3)
IMM GRANULOCYTES NFR BLD AUTO: 1 % — HIGH (ref 0.1–0.3)
IMM GRANULOCYTES NFR BLD AUTO: 1 % — HIGH (ref 0.1–0.3)
IMM GRANULOCYTES NFR BLD AUTO: 1.2 % — HIGH (ref 0.1–0.3)
IMM GRANULOCYTES NFR BLD AUTO: 1.2 % — HIGH (ref 0.1–0.3)
IMM GRANULOCYTES NFR BLD AUTO: 1.3 % — HIGH (ref 0.1–0.3)
IMM GRANULOCYTES NFR BLD AUTO: 1.3 % — HIGH (ref 0.1–0.3)
IMM GRANULOCYTES NFR BLD AUTO: 1.4 % — HIGH (ref 0.1–0.3)
IMM GRANULOCYTES NFR BLD AUTO: 1.7 % — HIGH (ref 0.1–0.3)
IMM GRANULOCYTES NFR BLD AUTO: 1.7 % — HIGH (ref 0.1–0.3)
IMM GRANULOCYTES NFR BLD AUTO: 1.9 % — HIGH (ref 0.1–0.3)
IMM GRANULOCYTES NFR BLD AUTO: 2 % — HIGH (ref 0.1–0.3)
IMM GRANULOCYTES NFR BLD AUTO: 2.4 % — HIGH (ref 0.1–0.3)
IMM GRANULOCYTES NFR BLD AUTO: 2.8 % — HIGH (ref 0.1–0.3)
IMM GRANULOCYTES NFR BLD AUTO: 2.9 % — HIGH (ref 0.1–0.3)
IMM GRANULOCYTES NFR BLD AUTO: 3.4 % — HIGH (ref 0.1–0.3)
INR BLD: 1.37 RATIO — HIGH (ref 0.65–1.3)
INR BLD: 1.37 RATIO — HIGH (ref 0.65–1.3)
INR BLD: 1.41 RATIO — HIGH (ref 0.65–1.3)
INR BLD: 1.41 RATIO — HIGH (ref 0.65–1.3)
INR BLD: 1.5 RATIO — HIGH (ref 0.65–1.3)
INR BLD: 1.57 RATIO — HIGH (ref 0.65–1.3)
INR BLD: 1.71 RATIO — HIGH (ref 0.65–1.3)
INR BLD: 1.77 RATIO — HIGH (ref 0.65–1.3)
INR BLD: 1.82 RATIO — HIGH (ref 0.65–1.3)
INTERPRETATION 24H UR IFE-IMP: SIGNIFICANT CHANGE UP
INTERPRETATION SERPL IEP-IMP: NORMAL
INTERPRETATION SERPL IFE-IMP: SIGNIFICANT CHANGE UP
IRON SATN MFR SERPL: 295 UG/DL — HIGH (ref 35–150)
IRON SATN MFR SERPL: 94 % — HIGH (ref 15–50)
IRON SATN MFR SERPL: NORMAL %
IRON SERPL-MCNC: 243 UG/DL
IRON SERPL-MCNC: 291 UG/DL
KAPPA LC CSF-MCNC: 1.53 MG/DL
KAPPA LC SER QL IFE: 5.87 MG/DL — HIGH (ref 0.33–1.94)
KAPPA LC SERPL-MCNC: 5.69 MG/DL
KAPPA/LAMBDA FREE LIGHT CHAIN RATIO, SERUM: 4.81 RATIO — HIGH (ref 0.26–1.65)
KETONES UR-MCNC: NEGATIVE — SIGNIFICANT CHANGE UP
KETONES URINE: NEGATIVE
LACTATE BLDV-MCNC: 2.7 MMOL/L — HIGH (ref 0.5–1.6)
LACTATE BLDV-MCNC: 3.9 MMOL/L — HIGH (ref 0.5–1.6)
LACTATE BLDV-MCNC: 4.1 MMOL/L — HIGH (ref 0.5–1.6)
LACTATE BLDV-MCNC: 6.2 MMOL/L — HIGH (ref 0.5–1.6)
LACTATE SERPL-SCNC: 1.4 MMOL/L — SIGNIFICANT CHANGE UP (ref 0.7–2)
LACTATE SERPL-SCNC: 1.6 MMOL/L — SIGNIFICANT CHANGE UP (ref 0.7–2)
LACTATE SERPL-SCNC: 2.4 MMOL/L — HIGH (ref 0.7–2)
LACTATE SERPL-SCNC: 2.5 MMOL/L — HIGH (ref 0.7–2)
LACTATE SERPL-SCNC: 2.9 MMOL/L — HIGH (ref 0.7–2)
LACTATE SERPL-SCNC: 3.1 MMOL/L — HIGH (ref 0.7–2)
LAMBDA LC SER QL IFE: 1.22 MG/DL — SIGNIFICANT CHANGE UP (ref 0.57–2.63)
LDH SERPL L TO P-CCNC: 177 — SIGNIFICANT CHANGE UP (ref 50–242)
LDH SERPL L TO P-CCNC: 272 — HIGH (ref 50–242)
LDH SERPL L TO P-CCNC: 297 — HIGH (ref 50–242)
LDH SERPL-CCNC: 227
LDH SERPL-CCNC: 242
LDH SERPL-CCNC: 294
LDH SERPL-CCNC: 409
LDH SERPL-CCNC: 446
LEUKOCYTE ESTERASE UR-ACNC: ABNORMAL
LEUKOCYTE ESTERASE UR-ACNC: NEGATIVE — SIGNIFICANT CHANGE UP
LEUKOCYTE ESTERASE UR-ACNC: NEGATIVE — SIGNIFICANT CHANGE UP
LEUKOCYTE ESTERASE URINE: ABNORMAL
LEUKOCYTE ESTERASE URINE: ABNORMAL
LEUKOCYTE ESTERASE URINE: NEGATIVE
LIDOCAIN IGE QN: 34 U/L — SIGNIFICANT CHANGE UP (ref 7–60)
LYMPHOCYTES # BLD AUTO: 1.14 K/UL — LOW (ref 1.2–3.4)
LYMPHOCYTES # BLD AUTO: 1.4 K/UL — SIGNIFICANT CHANGE UP (ref 1.2–3.4)
LYMPHOCYTES # BLD AUTO: 1.45 K/UL — SIGNIFICANT CHANGE UP (ref 1.2–3.4)
LYMPHOCYTES # BLD AUTO: 1.46 K/UL — SIGNIFICANT CHANGE UP (ref 1.2–3.4)
LYMPHOCYTES # BLD AUTO: 1.49 K/UL — SIGNIFICANT CHANGE UP (ref 1.2–3.4)
LYMPHOCYTES # BLD AUTO: 1.53 K/UL — SIGNIFICANT CHANGE UP (ref 1.2–3.4)
LYMPHOCYTES # BLD AUTO: 1.56 K/UL — SIGNIFICANT CHANGE UP (ref 1.2–3.4)
LYMPHOCYTES # BLD AUTO: 1.57 K/UL — SIGNIFICANT CHANGE UP (ref 1.2–3.4)
LYMPHOCYTES # BLD AUTO: 1.63 K/UL — SIGNIFICANT CHANGE UP (ref 1.2–3.4)
LYMPHOCYTES # BLD AUTO: 1.65 K/UL — SIGNIFICANT CHANGE UP (ref 1.2–3.4)
LYMPHOCYTES # BLD AUTO: 1.68 K/UL — SIGNIFICANT CHANGE UP (ref 1.2–3.4)
LYMPHOCYTES # BLD AUTO: 1.74 K/UL — SIGNIFICANT CHANGE UP (ref 1.2–3.4)
LYMPHOCYTES # BLD AUTO: 1.83 K/UL — SIGNIFICANT CHANGE UP (ref 1.2–3.4)
LYMPHOCYTES # BLD AUTO: 11.76 K/UL — HIGH (ref 1.2–3.4)
LYMPHOCYTES # BLD AUTO: 11.87 K/UL — HIGH (ref 1.2–3.4)
LYMPHOCYTES # BLD AUTO: 13.4 % — LOW (ref 20.5–51.1)
LYMPHOCYTES # BLD AUTO: 16.58 K/UL — HIGH (ref 1.2–3.4)
LYMPHOCYTES # BLD AUTO: 2.01 K/UL — SIGNIFICANT CHANGE UP (ref 1.2–3.4)
LYMPHOCYTES # BLD AUTO: 2.04 K/UL — SIGNIFICANT CHANGE UP (ref 1.2–3.4)
LYMPHOCYTES # BLD AUTO: 2.47 K/UL — SIGNIFICANT CHANGE UP (ref 1.2–3.4)
LYMPHOCYTES # BLD AUTO: 21.9 % — SIGNIFICANT CHANGE UP (ref 20.5–51.1)
LYMPHOCYTES # BLD AUTO: 24.4 % — SIGNIFICANT CHANGE UP (ref 20.5–51.1)
LYMPHOCYTES # BLD AUTO: 24.5 % — SIGNIFICANT CHANGE UP (ref 20.5–51.1)
LYMPHOCYTES # BLD AUTO: 25.2 % — SIGNIFICANT CHANGE UP (ref 20.5–51.1)
LYMPHOCYTES # BLD AUTO: 26.7 % — SIGNIFICANT CHANGE UP (ref 20.5–51.1)
LYMPHOCYTES # BLD AUTO: 27.6 % — SIGNIFICANT CHANGE UP (ref 20.5–51.1)
LYMPHOCYTES # BLD AUTO: 27.6 % — SIGNIFICANT CHANGE UP (ref 20.5–51.1)
LYMPHOCYTES # BLD AUTO: 28.3 % — SIGNIFICANT CHANGE UP (ref 20.5–51.1)
LYMPHOCYTES # BLD AUTO: 28.3 % — SIGNIFICANT CHANGE UP (ref 20.5–51.1)
LYMPHOCYTES # BLD AUTO: 29.4 % — SIGNIFICANT CHANGE UP (ref 20.5–51.1)
LYMPHOCYTES # BLD AUTO: 29.4 % — SIGNIFICANT CHANGE UP (ref 20.5–51.1)
LYMPHOCYTES # BLD AUTO: 29.8 % — SIGNIFICANT CHANGE UP (ref 20.5–51.1)
LYMPHOCYTES # BLD AUTO: 3.04 K/UL — SIGNIFICANT CHANGE UP (ref 1.2–3.4)
LYMPHOCYTES # BLD AUTO: 3.08 K/UL — SIGNIFICANT CHANGE UP (ref 1.2–3.4)
LYMPHOCYTES # BLD AUTO: 3.25 K/UL — SIGNIFICANT CHANGE UP (ref 1.2–3.4)
LYMPHOCYTES # BLD AUTO: 3.6 K/UL — HIGH (ref 1.2–3.4)
LYMPHOCYTES # BLD AUTO: 3.8 K/UL — HIGH (ref 1.2–3.4)
LYMPHOCYTES # BLD AUTO: 3.86 K/UL — HIGH (ref 1.2–3.4)
LYMPHOCYTES # BLD AUTO: 3.87 K/UL — HIGH (ref 1.2–3.4)
LYMPHOCYTES # BLD AUTO: 32.1 % — SIGNIFICANT CHANGE UP (ref 20.5–51.1)
LYMPHOCYTES # BLD AUTO: 32.3 % — SIGNIFICANT CHANGE UP (ref 20.5–51.1)
LYMPHOCYTES # BLD AUTO: 33.3 % — SIGNIFICANT CHANGE UP (ref 20.5–51.1)
LYMPHOCYTES # BLD AUTO: 33.6 % — SIGNIFICANT CHANGE UP (ref 20.5–51.1)
LYMPHOCYTES # BLD AUTO: 34.5 % — SIGNIFICANT CHANGE UP (ref 20.5–51.1)
LYMPHOCYTES # BLD AUTO: 37.4 % — SIGNIFICANT CHANGE UP (ref 20.5–51.1)
LYMPHOCYTES # BLD AUTO: 4.1 K/UL — HIGH (ref 1.2–3.4)
LYMPHOCYTES # BLD AUTO: 4.52 K/UL — HIGH (ref 1.2–3.4)
LYMPHOCYTES # BLD AUTO: 4.86 K/UL — HIGH (ref 1.2–3.4)
LYMPHOCYTES # BLD AUTO: 41.1 % — SIGNIFICANT CHANGE UP (ref 20.5–51.1)
LYMPHOCYTES # BLD AUTO: 41.7 % — SIGNIFICANT CHANGE UP (ref 20.5–51.1)
LYMPHOCYTES # BLD AUTO: 41.8 % — SIGNIFICANT CHANGE UP (ref 20.5–51.1)
LYMPHOCYTES # BLD AUTO: 43.7 % — SIGNIFICANT CHANGE UP (ref 20.5–51.1)
LYMPHOCYTES # BLD AUTO: 44.8 % — SIGNIFICANT CHANGE UP (ref 20.5–51.1)
LYMPHOCYTES # BLD AUTO: 45.6 % — SIGNIFICANT CHANGE UP (ref 20.5–51.1)
LYMPHOCYTES # BLD AUTO: 47.3 % — SIGNIFICANT CHANGE UP (ref 20.5–51.1)
LYMPHOCYTES # BLD AUTO: 5.36 K/UL — HIGH (ref 1.2–3.4)
LYMPHOCYTES # BLD AUTO: 5.69 K/UL — HIGH (ref 1.2–3.4)
LYMPHOCYTES # BLD AUTO: 5.73 K/UL — HIGH (ref 1.2–3.4)
LYMPHOCYTES # BLD AUTO: 50 % — SIGNIFICANT CHANGE UP (ref 20.5–51.1)
LYMPHOCYTES # BLD AUTO: 50.9 % — SIGNIFICANT CHANGE UP (ref 20.5–51.1)
LYMPHOCYTES # BLD AUTO: 51.7 % — HIGH (ref 20.5–51.1)
LYMPHOCYTES # BLD AUTO: 6.16 K/UL — HIGH (ref 1.2–3.4)
LYMPHOCYTES # BLD AUTO: 6.29 K/UL — HIGH (ref 1.2–3.4)
LYMPHOCYTES # BLD AUTO: 60.4 % — HIGH (ref 20.5–51.1)
LYMPHOCYTES # BLD AUTO: 60.9 % — HIGH (ref 20.5–51.1)
LYMPHOCYTES # BLD AUTO: 65.5 % — HIGH (ref 20.5–51.1)
LYMPHOCYTES # BLD AUTO: 66.2 % — HIGH (ref 20.5–51.1)
LYMPHOCYTES # BLD AUTO: 66.4 % — HIGH (ref 20.5–51.1)
LYMPHOCYTES # BLD AUTO: 66.4 % — HIGH (ref 20.5–51.1)
LYMPHOCYTES # BLD AUTO: 7.6 K/UL — HIGH (ref 1.2–3.4)
M PROTEIN 24H UR ELPH-MRATE: PRESENT
M PROTEIN MFR SERPL ELPH: NORMAL
M PROTEIN SPEC IFE-MCNC: NORMAL
M-SPIKE: SIGNIFICANT CHANGE UP (ref 0–0)
MACROCYTES BLD QL: SLIGHT — SIGNIFICANT CHANGE UP
MAGNESIUM SERPL-MCNC: 1.5 MG/DL — LOW (ref 1.8–2.4)
MAGNESIUM SERPL-MCNC: 1.6 MG/DL
MAGNESIUM SERPL-MCNC: 1.6 MG/DL — LOW (ref 1.8–2.4)
MAGNESIUM SERPL-MCNC: 1.6 MG/DL — LOW (ref 1.8–2.4)
MAGNESIUM SERPL-MCNC: 1.7 MG/DL — LOW (ref 1.8–2.4)
MAGNESIUM SERPL-MCNC: 1.8 MG/DL — SIGNIFICANT CHANGE UP (ref 1.8–2.4)
MAGNESIUM SERPL-MCNC: 1.9 MG/DL
MAGNESIUM SERPL-MCNC: 1.9 MG/DL — SIGNIFICANT CHANGE UP (ref 1.8–2.4)
MAGNESIUM SERPL-MCNC: 2.1 MG/DL — SIGNIFICANT CHANGE UP (ref 1.8–2.4)
MANUAL SMEAR VERIFICATION: SIGNIFICANT CHANGE UP
MCHC RBC-ENTMCNC: 28.2 PG — SIGNIFICANT CHANGE UP (ref 27–31)
MCHC RBC-ENTMCNC: 28.8 PG
MCHC RBC-ENTMCNC: 28.8 PG
MCHC RBC-ENTMCNC: 29.1 PG — SIGNIFICANT CHANGE UP (ref 27–31)
MCHC RBC-ENTMCNC: 29.2 PG
MCHC RBC-ENTMCNC: 29.2 PG
MCHC RBC-ENTMCNC: 29.4 G/DL — LOW (ref 32–37)
MCHC RBC-ENTMCNC: 29.5 PG
MCHC RBC-ENTMCNC: 29.5 PG
MCHC RBC-ENTMCNC: 29.5 PG — SIGNIFICANT CHANGE UP (ref 27–31)
MCHC RBC-ENTMCNC: 29.6 G/DL
MCHC RBC-ENTMCNC: 29.6 PG
MCHC RBC-ENTMCNC: 29.7 PG
MCHC RBC-ENTMCNC: 30 PG
MCHC RBC-ENTMCNC: 30 PG
MCHC RBC-ENTMCNC: 30 PG — SIGNIFICANT CHANGE UP (ref 27–31)
MCHC RBC-ENTMCNC: 30.1 PG
MCHC RBC-ENTMCNC: 30.1 PG — SIGNIFICANT CHANGE UP (ref 27–31)
MCHC RBC-ENTMCNC: 30.2 G/DL — LOW (ref 32–37)
MCHC RBC-ENTMCNC: 30.2 PG — SIGNIFICANT CHANGE UP (ref 27–31)
MCHC RBC-ENTMCNC: 30.3 PG
MCHC RBC-ENTMCNC: 30.3 PG
MCHC RBC-ENTMCNC: 30.3 PG — SIGNIFICANT CHANGE UP (ref 27–31)
MCHC RBC-ENTMCNC: 30.3 PG — SIGNIFICANT CHANGE UP (ref 27–31)
MCHC RBC-ENTMCNC: 30.4 PG
MCHC RBC-ENTMCNC: 30.5 PG
MCHC RBC-ENTMCNC: 30.5 PG — SIGNIFICANT CHANGE UP (ref 27–31)
MCHC RBC-ENTMCNC: 30.6 PG
MCHC RBC-ENTMCNC: 30.6 PG — SIGNIFICANT CHANGE UP (ref 27–31)
MCHC RBC-ENTMCNC: 30.6 PG — SIGNIFICANT CHANGE UP (ref 27–31)
MCHC RBC-ENTMCNC: 30.7 G/DL — LOW (ref 32–37)
MCHC RBC-ENTMCNC: 30.7 PG — SIGNIFICANT CHANGE UP (ref 27–31)
MCHC RBC-ENTMCNC: 30.7 PG — SIGNIFICANT CHANGE UP (ref 27–31)
MCHC RBC-ENTMCNC: 30.8 G/DL — LOW (ref 32–37)
MCHC RBC-ENTMCNC: 30.8 G/DL — LOW (ref 32–37)
MCHC RBC-ENTMCNC: 30.8 PG — SIGNIFICANT CHANGE UP (ref 27–31)
MCHC RBC-ENTMCNC: 30.9 PG — SIGNIFICANT CHANGE UP (ref 27–31)
MCHC RBC-ENTMCNC: 30.9 PG — SIGNIFICANT CHANGE UP (ref 27–31)
MCHC RBC-ENTMCNC: 31 G/DL — LOW (ref 32–37)
MCHC RBC-ENTMCNC: 31 PG — SIGNIFICANT CHANGE UP (ref 27–31)
MCHC RBC-ENTMCNC: 31.1 G/DL — LOW (ref 32–37)
MCHC RBC-ENTMCNC: 31.1 G/DL — LOW (ref 32–37)
MCHC RBC-ENTMCNC: 31.1 PG — HIGH (ref 27–31)
MCHC RBC-ENTMCNC: 31.2 PG — HIGH (ref 27–31)
MCHC RBC-ENTMCNC: 31.3 G/DL
MCHC RBC-ENTMCNC: 31.3 PG
MCHC RBC-ENTMCNC: 31.3 PG — HIGH (ref 27–31)
MCHC RBC-ENTMCNC: 31.4 PG
MCHC RBC-ENTMCNC: 31.4 PG — HIGH (ref 27–31)
MCHC RBC-ENTMCNC: 31.4 PG — HIGH (ref 27–31)
MCHC RBC-ENTMCNC: 31.5 G/DL
MCHC RBC-ENTMCNC: 31.5 G/DL — LOW (ref 32–37)
MCHC RBC-ENTMCNC: 31.5 PG
MCHC RBC-ENTMCNC: 31.5 PG
MCHC RBC-ENTMCNC: 31.6 G/DL — LOW (ref 32–37)
MCHC RBC-ENTMCNC: 31.7 G/DL
MCHC RBC-ENTMCNC: 31.7 G/DL — LOW (ref 32–37)
MCHC RBC-ENTMCNC: 31.7 G/DL — LOW (ref 32–37)
MCHC RBC-ENTMCNC: 31.7 PG — HIGH (ref 27–31)
MCHC RBC-ENTMCNC: 31.9 G/DL
MCHC RBC-ENTMCNC: 31.9 PG — HIGH (ref 27–31)
MCHC RBC-ENTMCNC: 32 G/DL
MCHC RBC-ENTMCNC: 32 PG — HIGH (ref 27–31)
MCHC RBC-ENTMCNC: 32 PG — HIGH (ref 27–31)
MCHC RBC-ENTMCNC: 32.1 G/DL
MCHC RBC-ENTMCNC: 32.1 G/DL — SIGNIFICANT CHANGE UP (ref 32–37)
MCHC RBC-ENTMCNC: 32.1 G/DL — SIGNIFICANT CHANGE UP (ref 32–37)
MCHC RBC-ENTMCNC: 32.1 PG — HIGH (ref 27–31)
MCHC RBC-ENTMCNC: 32.1 PG — HIGH (ref 27–31)
MCHC RBC-ENTMCNC: 32.3 G/DL
MCHC RBC-ENTMCNC: 32.3 G/DL — SIGNIFICANT CHANGE UP (ref 32–37)
MCHC RBC-ENTMCNC: 32.4 G/DL
MCHC RBC-ENTMCNC: 32.4 G/DL — SIGNIFICANT CHANGE UP (ref 32–37)
MCHC RBC-ENTMCNC: 32.4 G/DL — SIGNIFICANT CHANGE UP (ref 32–37)
MCHC RBC-ENTMCNC: 32.5 G/DL
MCHC RBC-ENTMCNC: 32.5 G/DL
MCHC RBC-ENTMCNC: 32.9 G/DL
MCHC RBC-ENTMCNC: 33 G/DL — SIGNIFICANT CHANGE UP (ref 32–37)
MCHC RBC-ENTMCNC: 33.1 G/DL
MCHC RBC-ENTMCNC: 33.1 G/DL
MCHC RBC-ENTMCNC: 33.1 G/DL — SIGNIFICANT CHANGE UP (ref 32–37)
MCHC RBC-ENTMCNC: 33.2 G/DL — SIGNIFICANT CHANGE UP (ref 32–37)
MCHC RBC-ENTMCNC: 33.2 G/DL — SIGNIFICANT CHANGE UP (ref 32–37)
MCHC RBC-ENTMCNC: 33.3 G/DL
MCHC RBC-ENTMCNC: 33.3 G/DL — SIGNIFICANT CHANGE UP (ref 32–37)
MCHC RBC-ENTMCNC: 33.5 G/DL
MCHC RBC-ENTMCNC: 33.5 G/DL
MCHC RBC-ENTMCNC: 33.5 G/DL — SIGNIFICANT CHANGE UP (ref 32–37)
MCHC RBC-ENTMCNC: 33.6 G/DL — SIGNIFICANT CHANGE UP (ref 32–37)
MCHC RBC-ENTMCNC: 33.6 G/DL — SIGNIFICANT CHANGE UP (ref 32–37)
MCHC RBC-ENTMCNC: 33.6 PG — HIGH (ref 27–31)
MCHC RBC-ENTMCNC: 33.7 G/DL
MCHC RBC-ENTMCNC: 33.7 G/DL — SIGNIFICANT CHANGE UP (ref 32–37)
MCHC RBC-ENTMCNC: 33.7 G/DL — SIGNIFICANT CHANGE UP (ref 32–37)
MCHC RBC-ENTMCNC: 33.8 G/DL
MCHC RBC-ENTMCNC: 33.8 G/DL — SIGNIFICANT CHANGE UP (ref 32–37)
MCHC RBC-ENTMCNC: 33.8 G/DL — SIGNIFICANT CHANGE UP (ref 32–37)
MCHC RBC-ENTMCNC: 34 G/DL — SIGNIFICANT CHANGE UP (ref 32–37)
MCHC RBC-ENTMCNC: 34 G/DL — SIGNIFICANT CHANGE UP (ref 32–37)
MCHC RBC-ENTMCNC: 34.2 G/DL — SIGNIFICANT CHANGE UP (ref 32–37)
MCHC RBC-ENTMCNC: 34.5 G/DL — SIGNIFICANT CHANGE UP (ref 32–37)
MCHC RBC-ENTMCNC: 34.5 G/DL — SIGNIFICANT CHANGE UP (ref 32–37)
MCHC RBC-ENTMCNC: 34.6 G/DL — SIGNIFICANT CHANGE UP (ref 32–37)
MCHC RBC-ENTMCNC: 34.7 G/DL
MCHC RBC-ENTMCNC: 35 G/DL — SIGNIFICANT CHANGE UP (ref 32–37)
MCHC RBC-ENTMCNC: 35.6 G/DL — SIGNIFICANT CHANGE UP (ref 32–37)
MCHC RBC-ENTMCNC: 35.7 G/DL — SIGNIFICANT CHANGE UP (ref 32–37)
MCHC RBC-ENTMCNC: 36.8 G/DL — SIGNIFICANT CHANGE UP (ref 32–37)
MCV RBC AUTO: 100 FL — HIGH (ref 81–99)
MCV RBC AUTO: 100.3 FL
MCV RBC AUTO: 100.3 FL — HIGH (ref 81–99)
MCV RBC AUTO: 100.7 FL — HIGH (ref 81–99)
MCV RBC AUTO: 101.2 FL — HIGH (ref 81–99)
MCV RBC AUTO: 101.2 FL — HIGH (ref 81–99)
MCV RBC AUTO: 101.4 FL
MCV RBC AUTO: 103.5 FL — HIGH (ref 81–99)
MCV RBC AUTO: 114.4 FL — HIGH (ref 81–99)
MCV RBC AUTO: 79.1 FL — LOW (ref 81–99)
MCV RBC AUTO: 82.8 FL — SIGNIFICANT CHANGE UP (ref 81–99)
MCV RBC AUTO: 83 FL — SIGNIFICANT CHANGE UP (ref 81–99)
MCV RBC AUTO: 85.6 FL
MCV RBC AUTO: 87.1 FL
MCV RBC AUTO: 87.4 FL
MCV RBC AUTO: 87.9 FL — SIGNIFICANT CHANGE UP (ref 81–99)
MCV RBC AUTO: 88 FL
MCV RBC AUTO: 88.1 FL
MCV RBC AUTO: 88.4 FL
MCV RBC AUTO: 88.4 FL
MCV RBC AUTO: 88.4 FL — SIGNIFICANT CHANGE UP (ref 81–99)
MCV RBC AUTO: 88.8 FL — SIGNIFICANT CHANGE UP (ref 81–99)
MCV RBC AUTO: 88.9 FL — SIGNIFICANT CHANGE UP (ref 81–99)
MCV RBC AUTO: 89.8 FL — SIGNIFICANT CHANGE UP (ref 81–99)
MCV RBC AUTO: 90 FL — SIGNIFICANT CHANGE UP (ref 81–99)
MCV RBC AUTO: 90 FL — SIGNIFICANT CHANGE UP (ref 81–99)
MCV RBC AUTO: 90.4 FL — SIGNIFICANT CHANGE UP (ref 81–99)
MCV RBC AUTO: 90.6 FL — SIGNIFICANT CHANGE UP (ref 81–99)
MCV RBC AUTO: 90.8 FL
MCV RBC AUTO: 90.8 FL
MCV RBC AUTO: 90.9 FL — SIGNIFICANT CHANGE UP (ref 81–99)
MCV RBC AUTO: 91.1 FL — SIGNIFICANT CHANGE UP (ref 81–99)
MCV RBC AUTO: 91.3 FL
MCV RBC AUTO: 91.3 FL — SIGNIFICANT CHANGE UP (ref 81–99)
MCV RBC AUTO: 91.5 FL — SIGNIFICANT CHANGE UP (ref 81–99)
MCV RBC AUTO: 91.6 FL — SIGNIFICANT CHANGE UP (ref 81–99)
MCV RBC AUTO: 91.6 FL — SIGNIFICANT CHANGE UP (ref 81–99)
MCV RBC AUTO: 92 FL — SIGNIFICANT CHANGE UP (ref 81–99)
MCV RBC AUTO: 92.3 FL
MCV RBC AUTO: 92.5 FL — SIGNIFICANT CHANGE UP (ref 81–99)
MCV RBC AUTO: 92.7 FL
MCV RBC AUTO: 92.9 FL — SIGNIFICANT CHANGE UP (ref 81–99)
MCV RBC AUTO: 93 FL — SIGNIFICANT CHANGE UP (ref 81–99)
MCV RBC AUTO: 93.8 FL
MCV RBC AUTO: 93.9 FL — SIGNIFICANT CHANGE UP (ref 81–99)
MCV RBC AUTO: 94.3 FL
MCV RBC AUTO: 94.6 FL — SIGNIFICANT CHANGE UP (ref 81–99)
MCV RBC AUTO: 94.9 FL — SIGNIFICANT CHANGE UP (ref 81–99)
MCV RBC AUTO: 95 FL — SIGNIFICANT CHANGE UP (ref 81–99)
MCV RBC AUTO: 95.6 FL — SIGNIFICANT CHANGE UP (ref 81–99)
MCV RBC AUTO: 95.7 FL — SIGNIFICANT CHANGE UP (ref 81–99)
MCV RBC AUTO: 95.8 FL
MCV RBC AUTO: 96.5 FL — SIGNIFICANT CHANGE UP (ref 81–99)
MCV RBC AUTO: 96.9 FL
MCV RBC AUTO: 97.5 FL — SIGNIFICANT CHANGE UP (ref 81–99)
MCV RBC AUTO: 97.9 FL — SIGNIFICANT CHANGE UP (ref 81–99)
MCV RBC AUTO: 98.1 FL
MCV RBC AUTO: 98.6 FL — SIGNIFICANT CHANGE UP (ref 81–99)
MCV RBC AUTO: 99 FL — SIGNIFICANT CHANGE UP (ref 81–99)
MCV RBC AUTO: 99 FL — SIGNIFICANT CHANGE UP (ref 81–99)
MCV RBC AUTO: 99.2 FL — HIGH (ref 81–99)
MCV RBC AUTO: 99.3 FL — HIGH (ref 81–99)
MCV RBC AUTO: 99.7 FL
METAMYELOCYTES # FLD: 1 % — HIGH (ref 0–0)
METHOD TYPE: SIGNIFICANT CHANGE UP
METHOD TYPE: SIGNIFICANT CHANGE UP
MICROCYTES BLD QL: SIGNIFICANT CHANGE UP
MICROCYTES BLD QL: SLIGHT — SIGNIFICANT CHANGE UP
MONOCLON BAND OBS SERPL: NORMAL
MONOCYTES # BLD AUTO: 0.1 K/UL — SIGNIFICANT CHANGE UP (ref 0.1–0.6)
MONOCYTES # BLD AUTO: 0.3 K/UL — SIGNIFICANT CHANGE UP (ref 0.1–0.6)
MONOCYTES # BLD AUTO: 0.42 K/UL — SIGNIFICANT CHANGE UP (ref 0.1–0.6)
MONOCYTES # BLD AUTO: 0.43 K/UL — SIGNIFICANT CHANGE UP (ref 0.1–0.6)
MONOCYTES # BLD AUTO: 0.43 K/UL — SIGNIFICANT CHANGE UP (ref 0.1–0.6)
MONOCYTES # BLD AUTO: 0.47 K/UL — SIGNIFICANT CHANGE UP (ref 0.1–0.6)
MONOCYTES # BLD AUTO: 0.49 K/UL — SIGNIFICANT CHANGE UP (ref 0.1–0.6)
MONOCYTES # BLD AUTO: 0.51 K/UL — SIGNIFICANT CHANGE UP (ref 0.1–0.6)
MONOCYTES # BLD AUTO: 0.51 K/UL — SIGNIFICANT CHANGE UP (ref 0.1–0.6)
MONOCYTES # BLD AUTO: 0.52 K/UL — SIGNIFICANT CHANGE UP (ref 0.1–0.6)
MONOCYTES # BLD AUTO: 0.53 K/UL — SIGNIFICANT CHANGE UP (ref 0.1–0.6)
MONOCYTES # BLD AUTO: 0.55 K/UL — SIGNIFICANT CHANGE UP (ref 0.1–0.6)
MONOCYTES # BLD AUTO: 0.58 K/UL — SIGNIFICANT CHANGE UP (ref 0.1–0.6)
MONOCYTES # BLD AUTO: 0.62 K/UL — HIGH (ref 0.1–0.6)
MONOCYTES # BLD AUTO: 0.62 K/UL — HIGH (ref 0.1–0.6)
MONOCYTES # BLD AUTO: 0.63 K/UL — HIGH (ref 0.1–0.6)
MONOCYTES # BLD AUTO: 0.65 K/UL — HIGH (ref 0.1–0.6)
MONOCYTES # BLD AUTO: 0.71 K/UL — HIGH (ref 0.1–0.6)
MONOCYTES # BLD AUTO: 0.73 K/UL — HIGH (ref 0.1–0.6)
MONOCYTES # BLD AUTO: 0.75 K/UL — HIGH (ref 0.1–0.6)
MONOCYTES # BLD AUTO: 0.78 K/UL — HIGH (ref 0.1–0.6)
MONOCYTES # BLD AUTO: 0.89 K/UL — HIGH (ref 0.1–0.6)
MONOCYTES # BLD AUTO: 1 K/UL — HIGH (ref 0.1–0.6)
MONOCYTES # BLD AUTO: 1.05 K/UL — HIGH (ref 0.1–0.6)
MONOCYTES # BLD AUTO: 1.09 K/UL — HIGH (ref 0.1–0.6)
MONOCYTES # BLD AUTO: 1.33 K/UL — HIGH (ref 0.1–0.6)
MONOCYTES # BLD AUTO: 1.5 K/UL — HIGH (ref 0.1–0.6)
MONOCYTES # BLD AUTO: 1.51 K/UL — HIGH (ref 0.1–0.6)
MONOCYTES # BLD AUTO: 2.26 K/UL — HIGH (ref 0.1–0.6)
MONOCYTES # BLD AUTO: 2.31 K/UL — HIGH (ref 0.1–0.6)
MONOCYTES # BLD AUTO: 2.51 K/UL — HIGH (ref 0.1–0.6)
MONOCYTES # BLD AUTO: 2.58 K/UL — HIGH (ref 0.1–0.6)
MONOCYTES # BLD AUTO: 2.6 K/UL — HIGH (ref 0.1–0.6)
MONOCYTES # BLD AUTO: 2.7 K/UL — HIGH (ref 0.1–0.6)
MONOCYTES # BLD AUTO: 4.19 K/UL — HIGH (ref 0.1–0.6)
MONOCYTES NFR BLD AUTO: 0.9 % — LOW (ref 1.7–9.3)
MONOCYTES NFR BLD AUTO: 10.2 % — HIGH (ref 1.7–9.3)
MONOCYTES NFR BLD AUTO: 10.6 % — HIGH (ref 1.7–9.3)
MONOCYTES NFR BLD AUTO: 11.3 % — HIGH (ref 1.7–9.3)
MONOCYTES NFR BLD AUTO: 11.5 % — HIGH (ref 1.7–9.3)
MONOCYTES NFR BLD AUTO: 11.7 % — HIGH (ref 1.7–9.3)
MONOCYTES NFR BLD AUTO: 12 % — HIGH (ref 1.7–9.3)
MONOCYTES NFR BLD AUTO: 12.2 % — HIGH (ref 1.7–9.3)
MONOCYTES NFR BLD AUTO: 14.1 % — HIGH (ref 1.7–9.3)
MONOCYTES NFR BLD AUTO: 16.9 % — HIGH (ref 1.7–9.3)
MONOCYTES NFR BLD AUTO: 18.4 % — HIGH (ref 1.7–9.3)
MONOCYTES NFR BLD AUTO: 21.8 % — HIGH (ref 1.7–9.3)
MONOCYTES NFR BLD AUTO: 24.3 % — HIGH (ref 1.7–9.3)
MONOCYTES NFR BLD AUTO: 26.1 % — HIGH (ref 1.7–9.3)
MONOCYTES NFR BLD AUTO: 27.3 % — HIGH (ref 1.7–9.3)
MONOCYTES NFR BLD AUTO: 29.7 % — HIGH (ref 1.7–9.3)
MONOCYTES NFR BLD AUTO: 3.4 % — SIGNIFICANT CHANGE UP (ref 1.7–9.3)
MONOCYTES NFR BLD AUTO: 5.1 % — SIGNIFICANT CHANGE UP (ref 1.7–9.3)
MONOCYTES NFR BLD AUTO: 5.3 % — SIGNIFICANT CHANGE UP (ref 1.7–9.3)
MONOCYTES NFR BLD AUTO: 7.1 % — SIGNIFICANT CHANGE UP (ref 1.7–9.3)
MONOCYTES NFR BLD AUTO: 7.1 % — SIGNIFICANT CHANGE UP (ref 1.7–9.3)
MONOCYTES NFR BLD AUTO: 7.8 % — SIGNIFICANT CHANGE UP (ref 1.7–9.3)
MONOCYTES NFR BLD AUTO: 8 % — SIGNIFICANT CHANGE UP (ref 1.7–9.3)
MONOCYTES NFR BLD AUTO: 8.3 % — SIGNIFICANT CHANGE UP (ref 1.7–9.3)
MONOCYTES NFR BLD AUTO: 8.4 % — SIGNIFICANT CHANGE UP (ref 1.7–9.3)
MONOCYTES NFR BLD AUTO: 8.6 % — SIGNIFICANT CHANGE UP (ref 1.7–9.3)
MONOCYTES NFR BLD AUTO: 8.6 % — SIGNIFICANT CHANGE UP (ref 1.7–9.3)
MONOCYTES NFR BLD AUTO: 8.7 % — SIGNIFICANT CHANGE UP (ref 1.7–9.3)
MONOCYTES NFR BLD AUTO: 8.8 % — SIGNIFICANT CHANGE UP (ref 1.7–9.3)
MONOCYTES NFR BLD AUTO: 9.2 % — SIGNIFICANT CHANGE UP (ref 1.7–9.3)
MONOCYTES NFR BLD AUTO: 9.2 % — SIGNIFICANT CHANGE UP (ref 1.7–9.3)
MONOCYTES NFR BLD AUTO: 9.3 % — SIGNIFICANT CHANGE UP (ref 1.7–9.3)
MONOCYTES NFR BLD AUTO: 9.3 % — SIGNIFICANT CHANGE UP (ref 1.7–9.3)
MONOCYTES NFR BLD AUTO: 9.4 % — HIGH (ref 1.7–9.3)
MONOCYTES NFR BLD AUTO: 9.7 % — HIGH (ref 1.7–9.3)
MRSA PCR RESULT.: NEGATIVE — SIGNIFICANT CHANGE UP
MYELOCYTES NFR BLD: 0.9 % — HIGH (ref 0–0)
MYELOCYTES NFR BLD: 0.9 % — HIGH (ref 0–0)
NEUTROPHILS # BLD AUTO: 1.63 K/UL — SIGNIFICANT CHANGE UP (ref 1.4–6.5)
NEUTROPHILS # BLD AUTO: 1.65 K/UL — SIGNIFICANT CHANGE UP (ref 1.4–6.5)
NEUTROPHILS # BLD AUTO: 1.87 K/UL — SIGNIFICANT CHANGE UP (ref 1.4–6.5)
NEUTROPHILS # BLD AUTO: 1.96 K/UL — SIGNIFICANT CHANGE UP (ref 1.4–6.5)
NEUTROPHILS # BLD AUTO: 13.13 K/UL — HIGH (ref 1.4–6.5)
NEUTROPHILS # BLD AUTO: 17.38 K/UL — HIGH (ref 1.4–6.5)
NEUTROPHILS # BLD AUTO: 2.07 K/UL — SIGNIFICANT CHANGE UP (ref 1.4–6.5)
NEUTROPHILS # BLD AUTO: 2.19 K/UL — SIGNIFICANT CHANGE UP (ref 1.4–6.5)
NEUTROPHILS # BLD AUTO: 2.3 K/UL — SIGNIFICANT CHANGE UP (ref 1.4–6.5)
NEUTROPHILS # BLD AUTO: 2.34 K/UL — SIGNIFICANT CHANGE UP (ref 1.4–6.5)
NEUTROPHILS # BLD AUTO: 2.37 K/UL — SIGNIFICANT CHANGE UP (ref 1.4–6.5)
NEUTROPHILS # BLD AUTO: 2.39 K/UL — SIGNIFICANT CHANGE UP (ref 1.4–6.5)
NEUTROPHILS # BLD AUTO: 2.44 K/UL — SIGNIFICANT CHANGE UP (ref 1.4–6.5)
NEUTROPHILS # BLD AUTO: 2.45 K/UL — SIGNIFICANT CHANGE UP (ref 1.4–6.5)
NEUTROPHILS # BLD AUTO: 2.88 K/UL — SIGNIFICANT CHANGE UP (ref 1.4–6.5)
NEUTROPHILS # BLD AUTO: 2.95 K/UL — SIGNIFICANT CHANGE UP (ref 1.4–6.5)
NEUTROPHILS # BLD AUTO: 20.94 K/UL — HIGH (ref 1.4–6.5)
NEUTROPHILS # BLD AUTO: 3.11 K/UL — SIGNIFICANT CHANGE UP (ref 1.4–6.5)
NEUTROPHILS # BLD AUTO: 3.23 K/UL — SIGNIFICANT CHANGE UP (ref 1.4–6.5)
NEUTROPHILS # BLD AUTO: 3.36 K/UL — SIGNIFICANT CHANGE UP (ref 1.4–6.5)
NEUTROPHILS # BLD AUTO: 3.4 K/UL — SIGNIFICANT CHANGE UP (ref 1.4–6.5)
NEUTROPHILS # BLD AUTO: 3.47 K/UL — SIGNIFICANT CHANGE UP (ref 1.4–6.5)
NEUTROPHILS # BLD AUTO: 3.48 K/UL — SIGNIFICANT CHANGE UP (ref 1.4–6.5)
NEUTROPHILS # BLD AUTO: 3.53 K/UL — SIGNIFICANT CHANGE UP (ref 1.4–6.5)
NEUTROPHILS # BLD AUTO: 3.57 K/UL — SIGNIFICANT CHANGE UP (ref 1.4–6.5)
NEUTROPHILS # BLD AUTO: 3.7 K/UL — SIGNIFICANT CHANGE UP (ref 1.4–6.5)
NEUTROPHILS # BLD AUTO: 3.72 K/UL — SIGNIFICANT CHANGE UP (ref 1.4–6.5)
NEUTROPHILS # BLD AUTO: 3.86 K/UL — SIGNIFICANT CHANGE UP (ref 1.4–6.5)
NEUTROPHILS # BLD AUTO: 3.9 K/UL — SIGNIFICANT CHANGE UP (ref 1.4–6.5)
NEUTROPHILS # BLD AUTO: 3.91 K/UL — SIGNIFICANT CHANGE UP (ref 1.4–6.5)
NEUTROPHILS # BLD AUTO: 3.97 K/UL — SIGNIFICANT CHANGE UP (ref 1.4–6.5)
NEUTROPHILS # BLD AUTO: 4.9 K/UL — SIGNIFICANT CHANGE UP (ref 1.4–6.5)
NEUTROPHILS # BLD AUTO: 5.07 K/UL — SIGNIFICANT CHANGE UP (ref 1.4–6.5)
NEUTROPHILS # BLD AUTO: 5.97 K/UL — SIGNIFICANT CHANGE UP (ref 1.4–6.5)
NEUTROPHILS # BLD AUTO: 9.04 K/UL — HIGH (ref 1.4–6.5)
NEUTROPHILS NFR BLD AUTO: 19 % — LOW (ref 42.2–75.2)
NEUTROPHILS NFR BLD AUTO: 19.8 % — LOW (ref 42.2–75.2)
NEUTROPHILS NFR BLD AUTO: 22 % — LOW (ref 42.2–75.2)
NEUTROPHILS NFR BLD AUTO: 22.4 % — LOW (ref 42.2–75.2)
NEUTROPHILS NFR BLD AUTO: 23.1 % — LOW (ref 42.2–75.2)
NEUTROPHILS NFR BLD AUTO: 23.5 % — LOW (ref 42.2–75.2)
NEUTROPHILS NFR BLD AUTO: 26.2 % — LOW (ref 42.2–75.2)
NEUTROPHILS NFR BLD AUTO: 26.9 % — LOW (ref 42.2–75.2)
NEUTROPHILS NFR BLD AUTO: 27.1 % — LOW (ref 42.2–75.2)
NEUTROPHILS NFR BLD AUTO: 28.1 % — LOW (ref 42.2–75.2)
NEUTROPHILS NFR BLD AUTO: 30.6 % — LOW (ref 42.2–75.2)
NEUTROPHILS NFR BLD AUTO: 31.2 % — LOW (ref 42.2–75.2)
NEUTROPHILS NFR BLD AUTO: 33.6 % — LOW (ref 42.2–75.2)
NEUTROPHILS NFR BLD AUTO: 35 % — LOW (ref 42.2–75.2)
NEUTROPHILS NFR BLD AUTO: 43.7 % — SIGNIFICANT CHANGE UP (ref 42.2–75.2)
NEUTROPHILS NFR BLD AUTO: 48.5 % — SIGNIFICANT CHANGE UP (ref 42.2–75.2)
NEUTROPHILS NFR BLD AUTO: 49.1 % — SIGNIFICANT CHANGE UP (ref 42.2–75.2)
NEUTROPHILS NFR BLD AUTO: 50.4 % — SIGNIFICANT CHANGE UP (ref 42.2–75.2)
NEUTROPHILS NFR BLD AUTO: 55.1 % — SIGNIFICANT CHANGE UP (ref 42.2–75.2)
NEUTROPHILS NFR BLD AUTO: 56.3 % — SIGNIFICANT CHANGE UP (ref 42.2–75.2)
NEUTROPHILS NFR BLD AUTO: 56.6 % — SIGNIFICANT CHANGE UP (ref 42.2–75.2)
NEUTROPHILS NFR BLD AUTO: 58.4 % — SIGNIFICANT CHANGE UP (ref 42.2–75.2)
NEUTROPHILS NFR BLD AUTO: 58.7 % — SIGNIFICANT CHANGE UP (ref 42.2–75.2)
NEUTROPHILS NFR BLD AUTO: 59.6 % — SIGNIFICANT CHANGE UP (ref 42.2–75.2)
NEUTROPHILS NFR BLD AUTO: 59.7 % — SIGNIFICANT CHANGE UP (ref 42.2–75.2)
NEUTROPHILS NFR BLD AUTO: 59.9 % — SIGNIFICANT CHANGE UP (ref 42.2–75.2)
NEUTROPHILS NFR BLD AUTO: 60.5 % — SIGNIFICANT CHANGE UP (ref 42.2–75.2)
NEUTROPHILS NFR BLD AUTO: 61.1 % — SIGNIFICANT CHANGE UP (ref 42.2–75.2)
NEUTROPHILS NFR BLD AUTO: 61.2 % — SIGNIFICANT CHANGE UP (ref 42.2–75.2)
NEUTROPHILS NFR BLD AUTO: 62.3 % — SIGNIFICANT CHANGE UP (ref 42.2–75.2)
NEUTROPHILS NFR BLD AUTO: 63.1 % — SIGNIFICANT CHANGE UP (ref 42.2–75.2)
NEUTROPHILS NFR BLD AUTO: 63.5 % — SIGNIFICANT CHANGE UP (ref 42.2–75.2)
NEUTROPHILS NFR BLD AUTO: 64.7 % — SIGNIFICANT CHANGE UP (ref 42.2–75.2)
NEUTROPHILS NFR BLD AUTO: 67.9 % — SIGNIFICANT CHANGE UP (ref 42.2–75.2)
NEUTROPHILS NFR BLD AUTO: 79.1 % — HIGH (ref 42.2–75.2)
NEUTS BAND # BLD: 0.9 % — SIGNIFICANT CHANGE UP (ref 0–6)
NEUTS BAND # BLD: 0.9 % — SIGNIFICANT CHANGE UP (ref 0–6)
NEUTS BAND # BLD: 1 % — SIGNIFICANT CHANGE UP (ref 0–6)
NEUTS BAND # BLD: 2.8 % — SIGNIFICANT CHANGE UP (ref 0–6)
NEUTS BAND # BLD: 4.4 % — SIGNIFICANT CHANGE UP (ref 0–6)
NITRITE UR-MCNC: NEGATIVE — SIGNIFICANT CHANGE UP
NITRITE URINE: NEGATIVE
NITRITE URINE: NEGATIVE
NITRITE URINE: POSITIVE
NRBC # BLD: 0 /100 WBCS — SIGNIFICANT CHANGE UP (ref 0–0)
NRBC # BLD: 0 /100 — SIGNIFICANT CHANGE UP (ref 0–0)
NRBC # BLD: 1 /100 WBCS — HIGH (ref 0–0)
NRBC # BLD: 1 /100 WBCS — HIGH (ref 0–0)
NRBC # BLD: 11 /100 WBCS — HIGH (ref 0–0)
NRBC # BLD: 13 /100 WBCS — HIGH (ref 0–0)
NRBC # BLD: 2 /100 WBCS — HIGH (ref 0–0)
NRBC # BLD: 20 /100 — HIGH (ref 0–0)
NRBC # BLD: 4 /100 WBCS — HIGH (ref 0–0)
NRBC # BLD: 4 /100 WBCS — HIGH (ref 0–0)
NRBC # BLD: 6 /100 — HIGH (ref 0–0)
NRBC # BLD: 9 /100 WBCS — HIGH (ref 0–0)
NRBC # BLD: SIGNIFICANT CHANGE UP /100 WBCS (ref 0–0)
NT-PROBNP SERPL-SCNC: 3119 PG/ML — HIGH (ref 0–300)
NT-PROBNP SERPL-SCNC: 3424 PG/ML — HIGH (ref 0–300)
ORGANISM # SPEC MICROSCOPIC CNT: SIGNIFICANT CHANGE UP
OSMOLALITY SERPL: 274 MOSMOL/KG — LOW (ref 280–301)
OSMOLALITY UR: 439 MOS/KG — SIGNIFICANT CHANGE UP (ref 50–1400)
OSMOLALITY UR: 506 MOS/KG — SIGNIFICANT CHANGE UP (ref 50–1400)
OVALOCYTES BLD QL SMEAR: SIGNIFICANT CHANGE UP
OVALOCYTES BLD QL SMEAR: SLIGHT — SIGNIFICANT CHANGE UP
OVALOCYTES BLD QL SMEAR: SLIGHT — SIGNIFICANT CHANGE UP
PCO2 BLDV: 33 MMHG — LOW (ref 41–51)
PCO2 BLDV: 37 MMHG — LOW (ref 41–51)
PCO2 BLDV: 40 MMHG — LOW (ref 41–51)
PCO2 BLDV: 42 MMHG — SIGNIFICANT CHANGE UP (ref 41–51)
PH BLDV: 7.31 — SIGNIFICANT CHANGE UP (ref 7.26–7.43)
PH BLDV: 7.34 — SIGNIFICANT CHANGE UP (ref 7.26–7.43)
PH BLDV: 7.37 — SIGNIFICANT CHANGE UP (ref 7.26–7.43)
PH BLDV: 7.45 — HIGH (ref 7.26–7.43)
PH UR: 5.5 — SIGNIFICANT CHANGE UP (ref 5–8)
PH UR: 6 — SIGNIFICANT CHANGE UP (ref 5–8)
PH URINE: 6
PH URINE: 6
PH URINE: 7.5
PHOSPHATE SERPL-MCNC: 4.2 MG/DL
PHOSPHATE SERPL-MCNC: 4.9 MG/DL — SIGNIFICANT CHANGE UP (ref 2.1–4.9)
PLAT MORPH BLD: ABNORMAL
PLAT MORPH BLD: NORMAL — SIGNIFICANT CHANGE UP
PLATELET # BLD AUTO: 101 K/UL — LOW (ref 130–400)
PLATELET # BLD AUTO: 102 K/UL
PLATELET # BLD AUTO: 102 K/UL — LOW (ref 130–400)
PLATELET # BLD AUTO: 102 K/UL — LOW (ref 130–400)
PLATELET # BLD AUTO: 104 K/UL — LOW (ref 130–400)
PLATELET # BLD AUTO: 107 K/UL — LOW (ref 130–400)
PLATELET # BLD AUTO: 114 K/UL — LOW (ref 130–400)
PLATELET # BLD AUTO: 116 K/UL — LOW (ref 130–400)
PLATELET # BLD AUTO: 118 K/UL
PLATELET # BLD AUTO: 125 K/UL — LOW (ref 130–400)
PLATELET # BLD AUTO: 127 K/UL
PLATELET # BLD AUTO: 127 K/UL — LOW (ref 130–400)
PLATELET # BLD AUTO: 127 K/UL — LOW (ref 130–400)
PLATELET # BLD AUTO: 128 K/UL
PLATELET # BLD AUTO: 129 K/UL
PLATELET # BLD AUTO: 131 K/UL — SIGNIFICANT CHANGE UP (ref 130–400)
PLATELET # BLD AUTO: 132 K/UL
PLATELET # BLD AUTO: 132 K/UL — SIGNIFICANT CHANGE UP (ref 130–400)
PLATELET # BLD AUTO: 133 K/UL
PLATELET # BLD AUTO: 136 K/UL — SIGNIFICANT CHANGE UP (ref 130–400)
PLATELET # BLD AUTO: 140 K/UL — SIGNIFICANT CHANGE UP (ref 130–400)
PLATELET # BLD AUTO: 142 K/UL — SIGNIFICANT CHANGE UP (ref 130–400)
PLATELET # BLD AUTO: 146 K/UL — SIGNIFICANT CHANGE UP (ref 130–400)
PLATELET # BLD AUTO: 150 K/UL — SIGNIFICANT CHANGE UP (ref 130–400)
PLATELET # BLD AUTO: 151 K/UL — SIGNIFICANT CHANGE UP (ref 130–400)
PLATELET # BLD AUTO: 162 K/UL
PLATELET # BLD AUTO: 212 K/UL — SIGNIFICANT CHANGE UP (ref 130–400)
PLATELET # BLD AUTO: 213 K/UL — SIGNIFICANT CHANGE UP (ref 130–400)
PLATELET # BLD AUTO: 255 K/UL — SIGNIFICANT CHANGE UP (ref 130–400)
PLATELET # BLD AUTO: 33 K/UL
PLATELET # BLD AUTO: 35 K/UL
PLATELET # BLD AUTO: 48 K/UL — LOW (ref 130–400)
PLATELET # BLD AUTO: 49 K/UL — LOW (ref 130–400)
PLATELET # BLD AUTO: 50 K/UL
PLATELET # BLD AUTO: 50 K/UL — LOW (ref 130–400)
PLATELET # BLD AUTO: 57 K/UL
PLATELET # BLD AUTO: 58 K/UL — LOW (ref 130–400)
PLATELET # BLD AUTO: 59 K/UL — LOW (ref 130–400)
PLATELET # BLD AUTO: 63 K/UL — LOW (ref 130–400)
PLATELET # BLD AUTO: 66 K/UL
PLATELET # BLD AUTO: 68 K/UL — LOW (ref 130–400)
PLATELET # BLD AUTO: 69 K/UL — LOW (ref 130–400)
PLATELET # BLD AUTO: 72 K/UL — LOW (ref 130–400)
PLATELET # BLD AUTO: 74 K/UL — LOW (ref 130–400)
PLATELET # BLD AUTO: 77 K/UL
PLATELET # BLD AUTO: 79 K/UL — LOW (ref 130–400)
PLATELET # BLD AUTO: 79 K/UL — LOW (ref 130–400)
PLATELET # BLD AUTO: 80 K/UL — LOW (ref 130–400)
PLATELET # BLD AUTO: 81 K/UL
PLATELET # BLD AUTO: 82 K/UL
PLATELET # BLD AUTO: 82 K/UL — LOW (ref 130–400)
PLATELET # BLD AUTO: 83 K/UL
PLATELET # BLD AUTO: 86 K/UL
PLATELET # BLD AUTO: 87 K/UL — LOW (ref 130–400)
PLATELET # BLD AUTO: 88 K/UL — LOW (ref 130–400)
PLATELET # BLD AUTO: 89 K/UL
PLATELET # BLD AUTO: 89 K/UL
PLATELET # BLD AUTO: 90 K/UL — LOW (ref 130–400)
PLATELET # BLD AUTO: 90 K/UL — LOW (ref 130–400)
PLATELET # BLD AUTO: 97 K/UL — LOW (ref 130–400)
PLATELET # BLD AUTO: 98 K/UL — LOW (ref 130–400)
PMV BLD: 10.6 FL
PMV BLD: 10.7 FL
PMV BLD: 11 FL
PMV BLD: 11.1 FL
PMV BLD: 11.2 FL
PMV BLD: 11.2 FL
PMV BLD: 11.3 FL
PMV BLD: 11.4 FL
PMV BLD: 11.5 FL
PMV BLD: 11.5 FL
PMV BLD: 11.7 FL
PMV BLD: 11.7 FL
PMV BLD: 11.8 FL
PMV BLD: 11.9 FL
PMV BLD: 11.9 FL
PMV BLD: 12.1 FL
PMV BLD: 12.5 FL
PMV BLD: NORMAL
PO2 BLDV: 18 MMHG — LOW (ref 20–40)
PO2 BLDV: 32 MMHG — SIGNIFICANT CHANGE UP (ref 20–40)
PO2 BLDV: 34 MMHG — SIGNIFICANT CHANGE UP (ref 20–40)
PO2 BLDV: 97 MMHG — HIGH (ref 20–40)
POIKILOCYTOSIS BLD QL AUTO: SIGNIFICANT CHANGE UP
POIKILOCYTOSIS BLD QL AUTO: SIGNIFICANT CHANGE UP
POIKILOCYTOSIS BLD QL AUTO: SLIGHT — SIGNIFICANT CHANGE UP
POLYCHROMASIA BLD QL SMEAR: SIGNIFICANT CHANGE UP
POLYCHROMASIA BLD QL SMEAR: SLIGHT — SIGNIFICANT CHANGE UP
POLYCHROMASIA BLD QL SMEAR: SLIGHT — SIGNIFICANT CHANGE UP
POTASSIUM BLDV-SCNC: 3.7 MMOL/L — SIGNIFICANT CHANGE UP (ref 3.3–5.6)
POTASSIUM BLDV-SCNC: 3.9 MMOL/L — SIGNIFICANT CHANGE UP (ref 3.3–5.6)
POTASSIUM BLDV-SCNC: 5.1 MMOL/L — SIGNIFICANT CHANGE UP (ref 3.3–5.6)
POTASSIUM BLDV-SCNC: 5.2 MMOL/L — SIGNIFICANT CHANGE UP (ref 3.3–5.6)
POTASSIUM SERPL-MCNC: 3.6 MMOL/L — SIGNIFICANT CHANGE UP (ref 3.5–5)
POTASSIUM SERPL-MCNC: 3.8 MMOL/L — SIGNIFICANT CHANGE UP (ref 3.5–5)
POTASSIUM SERPL-MCNC: 3.9 MMOL/L — SIGNIFICANT CHANGE UP (ref 3.5–5)
POTASSIUM SERPL-MCNC: 4 MMOL/L — SIGNIFICANT CHANGE UP (ref 3.5–5)
POTASSIUM SERPL-MCNC: 4.1 MMOL/L — SIGNIFICANT CHANGE UP (ref 3.5–5)
POTASSIUM SERPL-MCNC: 4.2 MMOL/L — SIGNIFICANT CHANGE UP (ref 3.5–5)
POTASSIUM SERPL-MCNC: 4.3 MMOL/L — SIGNIFICANT CHANGE UP (ref 3.5–5)
POTASSIUM SERPL-MCNC: 4.4 MMOL/L — SIGNIFICANT CHANGE UP (ref 3.5–5)
POTASSIUM SERPL-MCNC: 4.4 MMOL/L — SIGNIFICANT CHANGE UP (ref 3.5–5)
POTASSIUM SERPL-MCNC: 4.5 MMOL/L — SIGNIFICANT CHANGE UP (ref 3.5–5)
POTASSIUM SERPL-MCNC: 4.8 MMOL/L — SIGNIFICANT CHANGE UP (ref 3.5–5)
POTASSIUM SERPL-MCNC: 5 MMOL/L — SIGNIFICANT CHANGE UP (ref 3.5–5)
POTASSIUM SERPL-MCNC: 5.7 MMOL/L — HIGH (ref 3.5–5)
POTASSIUM SERPL-SCNC: 3.6 MMOL/L — SIGNIFICANT CHANGE UP (ref 3.5–5)
POTASSIUM SERPL-SCNC: 3.8 MMOL/L — SIGNIFICANT CHANGE UP (ref 3.5–5)
POTASSIUM SERPL-SCNC: 3.9 MMOL/L
POTASSIUM SERPL-SCNC: 3.9 MMOL/L — SIGNIFICANT CHANGE UP (ref 3.5–5)
POTASSIUM SERPL-SCNC: 4 MMOL/L
POTASSIUM SERPL-SCNC: 4 MMOL/L
POTASSIUM SERPL-SCNC: 4 MMOL/L — SIGNIFICANT CHANGE UP (ref 3.5–5)
POTASSIUM SERPL-SCNC: 4.1 MMOL/L — SIGNIFICANT CHANGE UP (ref 3.5–5)
POTASSIUM SERPL-SCNC: 4.2 MMOL/L
POTASSIUM SERPL-SCNC: 4.2 MMOL/L
POTASSIUM SERPL-SCNC: 4.2 MMOL/L — SIGNIFICANT CHANGE UP (ref 3.5–5)
POTASSIUM SERPL-SCNC: 4.3 MMOL/L — SIGNIFICANT CHANGE UP (ref 3.5–5)
POTASSIUM SERPL-SCNC: 4.4 MMOL/L
POTASSIUM SERPL-SCNC: 4.4 MMOL/L — SIGNIFICANT CHANGE UP (ref 3.5–5)
POTASSIUM SERPL-SCNC: 4.4 MMOL/L — SIGNIFICANT CHANGE UP (ref 3.5–5)
POTASSIUM SERPL-SCNC: 4.5 MMOL/L — SIGNIFICANT CHANGE UP (ref 3.5–5)
POTASSIUM SERPL-SCNC: 4.6 MMOL/L
POTASSIUM SERPL-SCNC: 4.6 MMOL/L
POTASSIUM SERPL-SCNC: 4.8 MMOL/L — SIGNIFICANT CHANGE UP (ref 3.5–5)
POTASSIUM SERPL-SCNC: 5 MMOL/L — SIGNIFICANT CHANGE UP (ref 3.5–5)
POTASSIUM SERPL-SCNC: 5.7 MMOL/L — HIGH (ref 3.5–5)
PROCALCITONIN SERPL-MCNC: 0.26 NG/ML — HIGH (ref 0.02–0.1)
PROCALCITONIN SERPL-MCNC: 0.43 NG/ML — HIGH (ref 0.02–0.1)
PROT ?TM UR-MCNC: 22 MG/DL — HIGH (ref 0–12)
PROT ?TM UR-MCNC: 22 MG/DL — HIGH (ref 0–12)
PROT PATTERN 24H UR ELPH-IMP: SIGNIFICANT CHANGE UP
PROT PATTERN SERPL ELPH-IMP: SIGNIFICANT CHANGE UP
PROT SERPL-MCNC: 3.9 G/DL — LOW (ref 6–8)
PROT SERPL-MCNC: 4.1 G/DL — LOW (ref 6–8)
PROT SERPL-MCNC: 4.2 G/DL — LOW (ref 6–8)
PROT SERPL-MCNC: 4.3 G/DL — LOW (ref 6–8)
PROT SERPL-MCNC: 4.4 G/DL — LOW (ref 6–8)
PROT SERPL-MCNC: 4.4 G/DL — LOW (ref 6–8)
PROT SERPL-MCNC: 4.5 G/DL — LOW (ref 6–8)
PROT SERPL-MCNC: 4.5 G/DL — LOW (ref 6–8)
PROT SERPL-MCNC: 4.6 G/DL — LOW (ref 6–8)
PROT SERPL-MCNC: 4.9 G/DL — LOW (ref 6–8)
PROT SERPL-MCNC: 5 G/DL — LOW (ref 6–8)
PROT SERPL-MCNC: 5 G/DL — LOW (ref 6–8)
PROT SERPL-MCNC: 5.1 G/DL — LOW (ref 6–8)
PROT SERPL-MCNC: 5.1 G/DL — LOW (ref 6–8)
PROT SERPL-MCNC: 5.2 G/DL
PROT SERPL-MCNC: 5.2 G/DL — LOW (ref 6–8)
PROT SERPL-MCNC: 5.3 G/DL
PROT SERPL-MCNC: 5.5 G/DL — LOW (ref 6–8.3)
PROT SERPL-MCNC: 5.5 G/DL — LOW (ref 6–8.3)
PROT SERPL-MCNC: 5.7 G/DL
PROT SERPL-MCNC: 5.7 G/DL
PROT SERPL-MCNC: 5.8 G/DL — LOW (ref 6–8)
PROT SERPL-MCNC: 5.9 G/DL
PROT SERPL-MCNC: 6 G/DL — SIGNIFICANT CHANGE UP (ref 6–8)
PROT SERPL-MCNC: 6.1 G/DL
PROT SERPL-MCNC: 6.1 G/DL — SIGNIFICANT CHANGE UP (ref 6–8)
PROT SERPL-MCNC: 6.1 G/DL — SIGNIFICANT CHANGE UP (ref 6–8)
PROT SERPL-MCNC: 6.2 G/DL
PROT SERPL-MCNC: 6.3 G/DL
PROT SERPL-MCNC: 6.3 G/DL
PROT UR-MCNC: ABNORMAL
PROT UR-MCNC: ABNORMAL
PROT UR-MCNC: SIGNIFICANT CHANGE UP
PROTEIN URINE: ABNORMAL
PROTEIN URINE: NORMAL
PROTEIN URINE: NORMAL
PROTHROM AB SERPL-ACNC: 15.7 SEC — HIGH (ref 9.95–12.87)
PROTHROM AB SERPL-ACNC: 15.8 SEC — HIGH (ref 9.95–12.87)
PROTHROM AB SERPL-ACNC: 16.2 SEC — HIGH (ref 9.95–12.87)
PROTHROM AB SERPL-ACNC: 16.2 SEC — HIGH (ref 9.95–12.87)
PROTHROM AB SERPL-ACNC: 17.3 SEC — HIGH (ref 9.95–12.87)
PROTHROM AB SERPL-ACNC: 18.1 SEC — HIGH (ref 9.95–12.87)
PROTHROM AB SERPL-ACNC: 19.7 SEC — HIGH (ref 9.95–12.87)
PROTHROM AB SERPL-ACNC: 20.4 SEC — HIGH (ref 9.95–12.87)
PROTHROM AB SERPL-ACNC: 20.9 SEC — HIGH (ref 9.95–12.87)
RBC # BLD: 1.25 M/UL — LOW (ref 4.2–5.4)
RBC # BLD: 1.77 M/UL — LOW (ref 4.2–5.4)
RBC # BLD: 2.29 M/UL — LOW (ref 4.2–5.4)
RBC # BLD: 2.33 M/UL — LOW (ref 4.2–5.4)
RBC # BLD: 2.36 M/UL
RBC # BLD: 2.37 M/UL — LOW (ref 4.2–5.4)
RBC # BLD: 2.37 M/UL — LOW (ref 4.2–5.4)
RBC # BLD: 2.4 M/UL — LOW (ref 4.2–5.4)
RBC # BLD: 2.4 M/UL — LOW (ref 4.2–5.4)
RBC # BLD: 2.41 M/UL
RBC # BLD: 2.41 M/UL — LOW (ref 4.2–5.4)
RBC # BLD: 2.41 M/UL — LOW (ref 4.2–5.4)
RBC # BLD: 2.43 M/UL — LOW (ref 4.2–5.4)
RBC # BLD: 2.44 M/UL — LOW (ref 4.2–5.4)
RBC # BLD: 2.49 M/UL — LOW (ref 4.2–5.4)
RBC # BLD: 2.53 M/UL — LOW (ref 4.2–5.4)
RBC # BLD: 2.54 M/UL — LOW (ref 4.2–5.4)
RBC # BLD: 2.56 M/UL — LOW (ref 4.2–5.4)
RBC # BLD: 2.56 M/UL — LOW (ref 4.2–5.4)
RBC # BLD: 2.6 M/UL — LOW (ref 4.2–5.4)
RBC # BLD: 2.61 M/UL
RBC # BLD: 2.65 M/UL
RBC # BLD: 2.69 M/UL — LOW (ref 4.2–5.4)
RBC # BLD: 2.7 M/UL — LOW (ref 4.2–5.4)
RBC # BLD: 2.71 M/UL — LOW (ref 4.2–5.4)
RBC # BLD: 2.74 M/UL — LOW (ref 4.2–5.4)
RBC # BLD: 2.75 M/UL — LOW (ref 4.2–5.4)
RBC # BLD: 2.77 M/UL — LOW (ref 4.2–5.4)
RBC # BLD: 2.78 M/UL
RBC # BLD: 2.78 M/UL — LOW (ref 4.2–5.4)
RBC # BLD: 2.79 M/UL — LOW (ref 4.2–5.4)
RBC # BLD: 2.81 M/UL — LOW (ref 4.2–5.4)
RBC # BLD: 2.82 M/UL — LOW (ref 4.2–5.4)
RBC # BLD: 2.82 M/UL — LOW (ref 4.2–5.4)
RBC # BLD: 2.83 M/UL
RBC # BLD: 2.86 M/UL
RBC # BLD: 2.87 M/UL — LOW (ref 4.2–5.4)
RBC # BLD: 2.88 M/UL — LOW (ref 4.2–5.4)
RBC # BLD: 2.91 M/UL — LOW (ref 4.2–5.4)
RBC # BLD: 2.98 M/UL — LOW (ref 4.2–5.4)
RBC # BLD: 2.99 M/UL — LOW (ref 4.2–5.4)
RBC # BLD: 3 M/UL — LOW (ref 4.2–5.4)
RBC # BLD: 3.01 M/UL
RBC # BLD: 3.01 M/UL
RBC # BLD: 3.03 M/UL
RBC # BLD: 3.07 M/UL — LOW (ref 4.2–5.4)
RBC # BLD: 3.18 M/UL
RBC # BLD: 3.18 M/UL — LOW (ref 4.2–5.4)
RBC # BLD: 3.23 M/UL — LOW (ref 4.2–5.4)
RBC # BLD: 3.24 M/UL — LOW (ref 4.2–5.4)
RBC # BLD: 3.25 M/UL — LOW (ref 4.2–5.4)
RBC # BLD: 3.43 M/UL — LOW (ref 4.2–5.4)
RBC # BLD: 3.47 M/UL
RBC # BLD: 3.48 M/UL
RBC # BLD: 3.57 M/UL
RBC # BLD: 3.67 M/UL
RBC # BLD: 3.73 M/UL
RBC # BLD: 3.79 M/UL
RBC # BLD: 3.86 M/UL
RBC # BLD: 3.91 M/UL
RBC # BLD: 4.52 M/UL
RBC # FLD: 15.1 %
RBC # FLD: 15.4 % — HIGH (ref 11.5–14.5)
RBC # FLD: 15.5 %
RBC # FLD: 16.4 % — HIGH (ref 11.5–14.5)
RBC # FLD: 16.4 % — HIGH (ref 11.5–14.5)
RBC # FLD: 16.7 % — HIGH (ref 11.5–14.5)
RBC # FLD: 16.9 %
RBC # FLD: 16.9 %
RBC # FLD: 17 %
RBC # FLD: 17 %
RBC # FLD: 17.1 %
RBC # FLD: 17.2 %
RBC # FLD: 17.2 % — HIGH (ref 11.5–14.5)
RBC # FLD: 17.4 %
RBC # FLD: 17.5 % — HIGH (ref 11.5–14.5)
RBC # FLD: 17.6 % — HIGH (ref 11.5–14.5)
RBC # FLD: 17.7 % — HIGH (ref 11.5–14.5)
RBC # FLD: 18 %
RBC # FLD: 18.1 %
RBC # FLD: 18.2 %
RBC # FLD: 18.2 %
RBC # FLD: 18.3 % — HIGH (ref 11.5–14.5)
RBC # FLD: 18.5 % — HIGH (ref 11.5–14.5)
RBC # FLD: 18.8 % — HIGH (ref 11.5–14.5)
RBC # FLD: 18.9 % — HIGH (ref 11.5–14.5)
RBC # FLD: 19 % — HIGH (ref 11.5–14.5)
RBC # FLD: 19.2 %
RBC # FLD: 19.4 % — HIGH (ref 11.5–14.5)
RBC # FLD: 19.4 % — HIGH (ref 11.5–14.5)
RBC # FLD: 19.7 % — HIGH (ref 11.5–14.5)
RBC # FLD: 19.8 % — HIGH (ref 11.5–14.5)
RBC # FLD: 19.8 % — HIGH (ref 11.5–14.5)
RBC # FLD: 19.9 % — HIGH (ref 11.5–14.5)
RBC # FLD: 20.1 % — HIGH (ref 11.5–14.5)
RBC # FLD: 20.1 % — HIGH (ref 11.5–14.5)
RBC # FLD: 20.2 % — HIGH (ref 11.5–14.5)
RBC # FLD: 20.4 %
RBC # FLD: 20.4 % — HIGH (ref 11.5–14.5)
RBC # FLD: 20.5 %
RBC # FLD: 20.5 %
RBC # FLD: 20.5 % — HIGH (ref 11.5–14.5)
RBC # FLD: 20.5 % — HIGH (ref 11.5–14.5)
RBC # FLD: 21.2 % — HIGH (ref 11.5–14.5)
RBC # FLD: 21.3 % — HIGH (ref 11.5–14.5)
RBC # FLD: 21.5 %
RBC # FLD: 21.5 %
RBC # FLD: 21.6 %
RBC # FLD: 21.9 % — HIGH (ref 11.5–14.5)
RBC # FLD: 22.4 % — HIGH (ref 11.5–14.5)
RBC # FLD: 23 % — HIGH (ref 11.5–14.5)
RBC # FLD: 23.1 % — HIGH (ref 11.5–14.5)
RBC # FLD: 23.2 % — HIGH (ref 11.5–14.5)
RBC # FLD: 23.9 % — HIGH (ref 11.5–14.5)
RBC # FLD: 24.5 % — HIGH (ref 11.5–14.5)
RBC # FLD: 24.5 % — HIGH (ref 11.5–14.5)
RBC # FLD: 24.9 % — HIGH (ref 11.5–14.5)
RBC # FLD: 26.5 % — HIGH (ref 11.5–14.5)
RBC BLD AUTO: ABNORMAL
RBC BLD AUTO: NORMAL — SIGNIFICANT CHANGE UP
RBC BLD AUTO: SIGNIFICANT CHANGE UP
RBC CASTS # UR COMP ASSIST: 2 /HPF — SIGNIFICANT CHANGE UP (ref 0–4)
RBC CASTS # UR COMP ASSIST: 4 /HPF — SIGNIFICANT CHANGE UP (ref 0–4)
RBC CASTS # UR COMP ASSIST: 4 /HPF — SIGNIFICANT CHANGE UP (ref 0–4)
RBC CASTS # UR COMP ASSIST: 7 /HPF — HIGH (ref 0–4)
RBC CASTS # UR COMP ASSIST: 9 /HPF — HIGH (ref 0–4)
RETICS # AUTO: 4.9 %
RETICS #: 14.5 K/UL — LOW (ref 25–125)
RETICS #: 358.8 K/UL — HIGH (ref 25–125)
RETICS AGGREG/RBC NFR: 181.1 K/UL
RETICS/RBC NFR: 0.6 % — SIGNIFICANT CHANGE UP (ref 0.5–1.5)
RETICS/RBC NFR: 15.1 % — HIGH (ref 0.5–1.5)
SAO2 % BLDV: 21 % — SIGNIFICANT CHANGE UP
SAO2 % BLDV: 61 % — SIGNIFICANT CHANGE UP
SAO2 % BLDV: 62 % — SIGNIFICANT CHANGE UP
SAO2 % BLDV: 98 % — SIGNIFICANT CHANGE UP
SARS-COV-2 IGG SERPL QL IA: NEGATIVE — SIGNIFICANT CHANGE UP
SARS-COV-2 IGG SERPL QL IA: NEGATIVE — SIGNIFICANT CHANGE UP
SARS-COV-2 IGM SERPL IA-ACNC: <0.1 INDEX — SIGNIFICANT CHANGE UP
SARS-COV-2 IGM SERPL IA-ACNC: <3.8 AU/ML — SIGNIFICANT CHANGE UP
SARS-COV-2 RNA SPEC QL NAA+PROBE: SIGNIFICANT CHANGE UP
SMUDGE CELLS # BLD: PRESENT — SIGNIFICANT CHANGE UP
SMUDGE CELLS # BLD: PRESENT — SIGNIFICANT CHANGE UP
SODIUM SERPL-SCNC: 125 MMOL/L — LOW (ref 135–146)
SODIUM SERPL-SCNC: 127 MMOL/L — LOW (ref 135–146)
SODIUM SERPL-SCNC: 127 MMOL/L — LOW (ref 135–146)
SODIUM SERPL-SCNC: 128 MMOL/L — LOW (ref 135–146)
SODIUM SERPL-SCNC: 128 MMOL/L — LOW (ref 135–146)
SODIUM SERPL-SCNC: 129 MMOL/L
SODIUM SERPL-SCNC: 129 MMOL/L — LOW (ref 135–146)
SODIUM SERPL-SCNC: 130 MMOL/L — LOW (ref 135–146)
SODIUM SERPL-SCNC: 130 MMOL/L — LOW (ref 135–146)
SODIUM SERPL-SCNC: 131 MMOL/L — LOW (ref 135–146)
SODIUM SERPL-SCNC: 133 MMOL/L
SODIUM SERPL-SCNC: 133 MMOL/L — LOW (ref 135–146)
SODIUM SERPL-SCNC: 134 MMOL/L
SODIUM SERPL-SCNC: 134 MMOL/L — LOW (ref 135–146)
SODIUM SERPL-SCNC: 135 MMOL/L
SODIUM SERPL-SCNC: 136 MMOL/L
SODIUM SERPL-SCNC: 136 MMOL/L — SIGNIFICANT CHANGE UP (ref 135–146)
SODIUM SERPL-SCNC: 137 MMOL/L
SODIUM SERPL-SCNC: 137 MMOL/L — SIGNIFICANT CHANGE UP (ref 135–146)
SODIUM SERPL-SCNC: 137 MMOL/L — SIGNIFICANT CHANGE UP (ref 135–146)
SODIUM SERPL-SCNC: 138 MMOL/L — SIGNIFICANT CHANGE UP (ref 135–146)
SODIUM SERPL-SCNC: 139 MMOL/L — SIGNIFICANT CHANGE UP (ref 135–146)
SODIUM SERPL-SCNC: 140 MMOL/L
SODIUM SERPL-SCNC: 140 MMOL/L
SODIUM SERPL-SCNC: 140 MMOL/L — SIGNIFICANT CHANGE UP (ref 135–146)
SODIUM UR-SCNC: 31 MMOL/L — SIGNIFICANT CHANGE UP
SP GR SPEC: 1.01 — SIGNIFICANT CHANGE UP (ref 1.01–1.02)
SP GR SPEC: 1.02 — SIGNIFICANT CHANGE UP (ref 1.01–1.02)
SP GR SPEC: 1.02 — SIGNIFICANT CHANGE UP (ref 1.01–1.03)
SPECIFIC GRAVITY URINE: 1.01
SPECIFIC GRAVITY URINE: 1.02
SPECIFIC GRAVITY URINE: 1.02
SPECIMEN SOURCE: SIGNIFICANT CHANGE UP
SURGICAL PATHOLOGY STUDY: SIGNIFICANT CHANGE UP
TARGETS BLD QL SMEAR: SLIGHT — SIGNIFICANT CHANGE UP
TIBC SERPL-MCNC: 315 UG/DL — SIGNIFICANT CHANGE UP (ref 220–430)
TIBC SERPL-MCNC: <260 UG/DL
TIBC SERPL-MCNC: <308 UG/DL
TM INTERPRETATION: SIGNIFICANT CHANGE UP
TM INTERPRETATION: SIGNIFICANT CHANGE UP
TOTAL VOLUME - 24 HOUR: SIGNIFICANT CHANGE UP ML
TROPONIN T SERPL-MCNC: 0.01 NG/ML — SIGNIFICANT CHANGE UP
TROPONIN T SERPL-MCNC: 0.02 NG/ML — HIGH
TROPONIN T SERPL-MCNC: 0.04 NG/ML — CRITICAL HIGH
TROPONIN T SERPL-MCNC: <0.01 NG/ML — SIGNIFICANT CHANGE UP
TSH SERPL-MCNC: 2.1 UIU/ML — SIGNIFICANT CHANGE UP (ref 0.27–4.2)
UIBC SERPL-MCNC: 20 UG/DL — LOW (ref 110–370)
UIBC SERPL-MCNC: <17 UG/DL
UIBC SERPL-MCNC: <17 UG/DL
URATE SERPL-MCNC: 5.6 MG/DL
URATE SERPL-MCNC: 6.7 MG/DL
URINE CREATININE CALCULATION: SIGNIFICANT CHANGE UP G/24 H (ref 0.8–1.8)
UROBILINOGEN FLD QL: SIGNIFICANT CHANGE UP
UROBILINOGEN URINE: NORMAL
VANCOMYCIN TROUGH SERPL-MCNC: 11.5 UG/ML — HIGH (ref 5–10)
VARIANT LYMPHS # BLD: 2.7 % — SIGNIFICANT CHANGE UP (ref 0–5)
VARIANT LYMPHS # BLD: 5.3 % — HIGH (ref 0–5)
VIT B12 SERPL-MCNC: 457 PG/ML
VIT B12 SERPL-MCNC: 472 PG/ML
VIT B12 SERPL-MCNC: 533 PG/ML — SIGNIFICANT CHANGE UP (ref 232–1245)
WBC # BLD: 10.36 K/UL — SIGNIFICANT CHANGE UP (ref 4.8–10.8)
WBC # BLD: 10.66 K/UL — SIGNIFICANT CHANGE UP (ref 4.8–10.8)
WBC # BLD: 10.81 K/UL — HIGH (ref 4.8–10.8)
WBC # BLD: 11.11 K/UL — HIGH (ref 4.8–10.8)
WBC # BLD: 11.43 K/UL — HIGH (ref 4.8–10.8)
WBC # BLD: 11.61 K/UL — HIGH (ref 4.8–10.8)
WBC # BLD: 14.89 K/UL — HIGH (ref 4.8–10.8)
WBC # BLD: 15.25 K/UL — HIGH (ref 4.8–10.8)
WBC # BLD: 26.26 K/UL — HIGH (ref 4.8–10.8)
WBC # BLD: 26.54 K/UL — HIGH (ref 4.8–10.8)
WBC # BLD: 35.32 K/UL — HIGH (ref 4.8–10.8)
WBC # BLD: 39.71 K/UL — HIGH (ref 4.8–10.8)
WBC # BLD: 4.33 K/UL — LOW (ref 4.8–10.8)
WBC # BLD: 4.94 K/UL — SIGNIFICANT CHANGE UP (ref 4.8–10.8)
WBC # BLD: 41.36 K/UL — CRITICAL HIGH (ref 4.8–10.8)
WBC # BLD: 5.2 K/UL — SIGNIFICANT CHANGE UP (ref 4.8–10.8)
WBC # BLD: 5.26 K/UL — SIGNIFICANT CHANGE UP (ref 4.8–10.8)
WBC # BLD: 5.27 K/UL — SIGNIFICANT CHANGE UP (ref 4.8–10.8)
WBC # BLD: 5.63 K/UL — SIGNIFICANT CHANGE UP (ref 4.8–10.8)
WBC # BLD: 5.75 K/UL — SIGNIFICANT CHANGE UP (ref 4.8–10.8)
WBC # BLD: 5.87 K/UL — SIGNIFICANT CHANGE UP (ref 4.8–10.8)
WBC # BLD: 5.91 K/UL — SIGNIFICANT CHANGE UP (ref 4.8–10.8)
WBC # BLD: 5.93 K/UL — SIGNIFICANT CHANGE UP (ref 4.8–10.8)
WBC # BLD: 5.96 K/UL — SIGNIFICANT CHANGE UP (ref 4.8–10.8)
WBC # BLD: 5.97 K/UL — SIGNIFICANT CHANGE UP (ref 4.8–10.8)
WBC # BLD: 6.08 K/UL — SIGNIFICANT CHANGE UP (ref 4.8–10.8)
WBC # BLD: 6.29 K/UL — SIGNIFICANT CHANGE UP (ref 4.8–10.8)
WBC # BLD: 6.35 K/UL — SIGNIFICANT CHANGE UP (ref 4.8–10.8)
WBC # BLD: 6.4 K/UL — SIGNIFICANT CHANGE UP (ref 4.8–10.8)
WBC # BLD: 6.62 K/UL — SIGNIFICANT CHANGE UP (ref 4.8–10.8)
WBC # BLD: 6.87 K/UL — SIGNIFICANT CHANGE UP (ref 4.8–10.8)
WBC # BLD: 7.99 K/UL — SIGNIFICANT CHANGE UP (ref 4.8–10.8)
WBC # BLD: 8.19 K/UL — SIGNIFICANT CHANGE UP (ref 4.8–10.8)
WBC # BLD: 8.46 K/UL — SIGNIFICANT CHANGE UP (ref 4.8–10.8)
WBC # BLD: 8.6 K/UL — SIGNIFICANT CHANGE UP (ref 4.8–10.8)
WBC # BLD: 8.7 K/UL — SIGNIFICANT CHANGE UP (ref 4.8–10.8)
WBC # BLD: 8.88 K/UL — SIGNIFICANT CHANGE UP (ref 4.8–10.8)
WBC # BLD: 8.94 K/UL — SIGNIFICANT CHANGE UP (ref 4.8–10.8)
WBC # BLD: 9.27 K/UL — SIGNIFICANT CHANGE UP (ref 4.8–10.8)
WBC # BLD: 9.38 K/UL — SIGNIFICANT CHANGE UP (ref 4.8–10.8)
WBC # BLD: 9.41 K/UL — SIGNIFICANT CHANGE UP (ref 4.8–10.8)
WBC # BLD: 9.48 K/UL — SIGNIFICANT CHANGE UP (ref 4.8–10.8)
WBC # BLD: 9.97 K/UL — SIGNIFICANT CHANGE UP (ref 4.8–10.8)
WBC # FLD AUTO: 10.2 K/UL
WBC # FLD AUTO: 10.27 K/UL
WBC # FLD AUTO: 10.35 K/UL
WBC # FLD AUTO: 10.36 K/UL — SIGNIFICANT CHANGE UP (ref 4.8–10.8)
WBC # FLD AUTO: 10.66 K/UL — SIGNIFICANT CHANGE UP (ref 4.8–10.8)
WBC # FLD AUTO: 10.76 K/UL
WBC # FLD AUTO: 10.8 K/UL
WBC # FLD AUTO: 10.81 K/UL — HIGH (ref 4.8–10.8)
WBC # FLD AUTO: 10.89 K/UL
WBC # FLD AUTO: 11.11 K/UL — HIGH (ref 4.8–10.8)
WBC # FLD AUTO: 11.43 K/UL — HIGH (ref 4.8–10.8)
WBC # FLD AUTO: 11.49 K/UL
WBC # FLD AUTO: 11.51 K/UL
WBC # FLD AUTO: 11.61 K/UL — HIGH (ref 4.8–10.8)
WBC # FLD AUTO: 12.84 K/UL
WBC # FLD AUTO: 14.89 K/UL — HIGH (ref 4.8–10.8)
WBC # FLD AUTO: 15.25 K/UL — HIGH (ref 4.8–10.8)
WBC # FLD AUTO: 26.26 K/UL — HIGH (ref 4.8–10.8)
WBC # FLD AUTO: 26.54 K/UL — HIGH (ref 4.8–10.8)
WBC # FLD AUTO: 27.82 K/UL
WBC # FLD AUTO: 31.51 K/UL
WBC # FLD AUTO: 35.32 K/UL — HIGH (ref 4.8–10.8)
WBC # FLD AUTO: 39.71 K/UL — HIGH (ref 4.8–10.8)
WBC # FLD AUTO: 4.08 K/UL
WBC # FLD AUTO: 4.33 K/UL — LOW (ref 4.8–10.8)
WBC # FLD AUTO: 4.94 K/UL — SIGNIFICANT CHANGE UP (ref 4.8–10.8)
WBC # FLD AUTO: 41.36 K/UL — CRITICAL HIGH (ref 4.8–10.8)
WBC # FLD AUTO: 5.2 K/UL — SIGNIFICANT CHANGE UP (ref 4.8–10.8)
WBC # FLD AUTO: 5.26 K/UL — SIGNIFICANT CHANGE UP (ref 4.8–10.8)
WBC # FLD AUTO: 5.27 K/UL — SIGNIFICANT CHANGE UP (ref 4.8–10.8)
WBC # FLD AUTO: 5.59 K/UL
WBC # FLD AUTO: 5.62 K/UL
WBC # FLD AUTO: 5.63 K/UL — SIGNIFICANT CHANGE UP (ref 4.8–10.8)
WBC # FLD AUTO: 5.75 K/UL — SIGNIFICANT CHANGE UP (ref 4.8–10.8)
WBC # FLD AUTO: 5.87 K/UL — SIGNIFICANT CHANGE UP (ref 4.8–10.8)
WBC # FLD AUTO: 5.91 K/UL — SIGNIFICANT CHANGE UP (ref 4.8–10.8)
WBC # FLD AUTO: 5.93 K/UL — SIGNIFICANT CHANGE UP (ref 4.8–10.8)
WBC # FLD AUTO: 5.96 K/UL — SIGNIFICANT CHANGE UP (ref 4.8–10.8)
WBC # FLD AUTO: 5.97 K/UL — SIGNIFICANT CHANGE UP (ref 4.8–10.8)
WBC # FLD AUTO: 6.08 K/UL — SIGNIFICANT CHANGE UP (ref 4.8–10.8)
WBC # FLD AUTO: 6.29 K/UL — SIGNIFICANT CHANGE UP (ref 4.8–10.8)
WBC # FLD AUTO: 6.35 K/UL — SIGNIFICANT CHANGE UP (ref 4.8–10.8)
WBC # FLD AUTO: 6.4 K/UL — SIGNIFICANT CHANGE UP (ref 4.8–10.8)
WBC # FLD AUTO: 6.61 K/UL
WBC # FLD AUTO: 6.62 K/UL — SIGNIFICANT CHANGE UP (ref 4.8–10.8)
WBC # FLD AUTO: 6.87 K/UL — SIGNIFICANT CHANGE UP (ref 4.8–10.8)
WBC # FLD AUTO: 6.97 K/UL
WBC # FLD AUTO: 7.99 K/UL
WBC # FLD AUTO: 7.99 K/UL — SIGNIFICANT CHANGE UP (ref 4.8–10.8)
WBC # FLD AUTO: 8.19 K/UL — SIGNIFICANT CHANGE UP (ref 4.8–10.8)
WBC # FLD AUTO: 8.46 K/UL — SIGNIFICANT CHANGE UP (ref 4.8–10.8)
WBC # FLD AUTO: 8.6 K/UL — SIGNIFICANT CHANGE UP (ref 4.8–10.8)
WBC # FLD AUTO: 8.7 K/UL — SIGNIFICANT CHANGE UP (ref 4.8–10.8)
WBC # FLD AUTO: 8.88 K/UL — SIGNIFICANT CHANGE UP (ref 4.8–10.8)
WBC # FLD AUTO: 8.94 K/UL — SIGNIFICANT CHANGE UP (ref 4.8–10.8)
WBC # FLD AUTO: 9.27 K/UL — SIGNIFICANT CHANGE UP (ref 4.8–10.8)
WBC # FLD AUTO: 9.38 K/UL — SIGNIFICANT CHANGE UP (ref 4.8–10.8)
WBC # FLD AUTO: 9.41 K/UL — SIGNIFICANT CHANGE UP (ref 4.8–10.8)
WBC # FLD AUTO: 9.48 K/UL — SIGNIFICANT CHANGE UP (ref 4.8–10.8)
WBC # FLD AUTO: 9.97 K/UL — SIGNIFICANT CHANGE UP (ref 4.8–10.8)
WBC # FLD AUTO: 9.99 K/UL
WBC UR QL: 17 /HPF — HIGH (ref 0–5)
WBC UR QL: 26 /HPF — HIGH (ref 0–5)
WBC UR QL: 31 /HPF — HIGH (ref 0–5)
WBC UR QL: 55 /HPF — HIGH (ref 0–5)
WBC UR QL: 61 /HPF — HIGH (ref 0–5)

## 2020-01-01 PROCEDURE — 43239 EGD BIOPSY SINGLE/MULTIPLE: CPT

## 2020-01-01 PROCEDURE — 74018 RADEX ABDOMEN 1 VIEW: CPT | Mod: 26

## 2020-01-01 PROCEDURE — 99215 OFFICE O/P EST HI 40 MIN: CPT

## 2020-01-01 PROCEDURE — 71260 CT THORAX DX C+: CPT | Mod: 26

## 2020-01-01 PROCEDURE — 99152 MOD SED SAME PHYS/QHP 5/>YRS: CPT

## 2020-01-01 PROCEDURE — 99291 CRITICAL CARE FIRST HOUR: CPT | Mod: 25

## 2020-01-01 PROCEDURE — 99497 ADVNCD CARE PLAN 30 MIN: CPT

## 2020-01-01 PROCEDURE — 93970 EXTREMITY STUDY: CPT | Mod: 26

## 2020-01-01 PROCEDURE — 99222 1ST HOSP IP/OBS MODERATE 55: CPT

## 2020-01-01 PROCEDURE — 99232 SBSQ HOSP IP/OBS MODERATE 35: CPT

## 2020-01-01 PROCEDURE — 88341 IMHCHEM/IMCYTCHM EA ADD ANTB: CPT | Mod: 26

## 2020-01-01 PROCEDURE — 99239 HOSP IP/OBS DSCHRG MGMT >30: CPT

## 2020-01-01 PROCEDURE — 71275 CT ANGIOGRAPHY CHEST: CPT | Mod: 26

## 2020-01-01 PROCEDURE — 99213 OFFICE O/P EST LOW 20 MIN: CPT

## 2020-01-01 PROCEDURE — 93010 ELECTROCARDIOGRAM REPORT: CPT

## 2020-01-01 PROCEDURE — 88189 FLOWCYTOMETRY/READ 16 & >: CPT

## 2020-01-01 PROCEDURE — 73630 X-RAY EXAM OF FOOT: CPT | Mod: 26,LT

## 2020-01-01 PROCEDURE — 36556 INSERT NON-TUNNEL CV CATH: CPT

## 2020-01-01 PROCEDURE — 99233 SBSQ HOSP IP/OBS HIGH 50: CPT

## 2020-01-01 PROCEDURE — 76705 ECHO EXAM OF ABDOMEN: CPT | Mod: 26

## 2020-01-01 PROCEDURE — 38222 DX BONE MARROW BX & ASPIR: CPT | Mod: LT

## 2020-01-01 PROCEDURE — 99218: CPT | Mod: CS

## 2020-01-01 PROCEDURE — 78815 PET IMAGE W/CT SKULL-THIGH: CPT | Mod: 26,PI

## 2020-01-01 PROCEDURE — 99223 1ST HOSP IP/OBS HIGH 75: CPT

## 2020-01-01 PROCEDURE — 88342 IMHCHEM/IMCYTCHM 1ST ANTB: CPT | Mod: 26

## 2020-01-01 PROCEDURE — 73502 X-RAY EXAM HIP UNI 2-3 VIEWS: CPT | Mod: 26,LT

## 2020-01-01 PROCEDURE — 71045 X-RAY EXAM CHEST 1 VIEW: CPT | Mod: 26

## 2020-01-01 PROCEDURE — 74177 CT ABD & PELVIS W/CONTRAST: CPT | Mod: 26

## 2020-01-01 PROCEDURE — 99285 EMERGENCY DEPT VISIT HI MDM: CPT

## 2020-01-01 PROCEDURE — 93925 LOWER EXTREMITY STUDY: CPT | Mod: 26

## 2020-01-01 PROCEDURE — 99233 SBSQ HOSP IP/OBS HIGH 50: CPT | Mod: GC

## 2020-01-01 PROCEDURE — 77012 CT SCAN FOR NEEDLE BIOPSY: CPT | Mod: 26

## 2020-01-01 PROCEDURE — 88305 TISSUE EXAM BY PATHOLOGIST: CPT | Mod: 26

## 2020-01-01 PROCEDURE — 99217: CPT | Mod: CS

## 2020-01-01 PROCEDURE — 88311 DECALCIFY TISSUE: CPT | Mod: 26

## 2020-01-01 PROCEDURE — 99497 ADVNCD CARE PLAN 30 MIN: CPT | Mod: 25

## 2020-01-01 PROCEDURE — 99223 1ST HOSP IP/OBS HIGH 75: CPT | Mod: AI

## 2020-01-01 PROCEDURE — 88312 SPECIAL STAINS GROUP 1: CPT | Mod: 26

## 2020-01-01 PROCEDURE — 99231 SBSQ HOSP IP/OBS SF/LOW 25: CPT

## 2020-01-01 PROCEDURE — 99291 CRITICAL CARE FIRST HOUR: CPT | Mod: CS

## 2020-01-01 PROCEDURE — 70450 CT HEAD/BRAIN W/O DYE: CPT | Mod: 26

## 2020-01-01 PROCEDURE — 73610 X-RAY EXAM OF ANKLE: CPT | Mod: 26,LT

## 2020-01-01 PROCEDURE — 99291 CRITICAL CARE FIRST HOUR: CPT | Mod: CS,GC

## 2020-01-01 PROCEDURE — 93306 TTE W/DOPPLER COMPLETE: CPT | Mod: 26

## 2020-01-01 PROCEDURE — 88313 SPECIAL STAINS GROUP 2: CPT | Mod: 26

## 2020-01-01 PROCEDURE — 99211 OFF/OP EST MAY X REQ PHY/QHP: CPT

## 2020-01-01 PROCEDURE — 76700 US EXAM ABDOM COMPLETE: CPT | Mod: 26

## 2020-01-01 RX ORDER — FUROSEMIDE 40 MG
20 TABLET ORAL ONCE
Refills: 0 | Status: COMPLETED | OUTPATIENT
Start: 2020-01-01 | End: 2020-01-01

## 2020-01-01 RX ORDER — INSULIN GLARGINE 100 [IU]/ML
15 INJECTION, SOLUTION SUBCUTANEOUS AT BEDTIME
Refills: 0 | Status: DISCONTINUED | OUTPATIENT
Start: 2020-01-01 | End: 2020-01-01

## 2020-01-01 RX ORDER — LINEZOLID 600 MG/300ML
INJECTION, SOLUTION INTRAVENOUS
Refills: 0 | Status: DISCONTINUED | OUTPATIENT
Start: 2020-01-01 | End: 2020-01-01

## 2020-01-01 RX ORDER — PANTOPRAZOLE SODIUM 20 MG/1
40 TABLET, DELAYED RELEASE ORAL
Refills: 0 | Status: DISCONTINUED | OUTPATIENT
Start: 2020-01-01 | End: 2020-01-01

## 2020-01-01 RX ORDER — SODIUM CHLORIDE 9 MG/ML
1000 INJECTION, SOLUTION INTRAVENOUS ONCE
Refills: 0 | Status: COMPLETED | OUTPATIENT
Start: 2020-01-01 | End: 2020-01-01

## 2020-01-01 RX ORDER — SODIUM CHLORIDE 9 MG/ML
1000 INJECTION INTRAMUSCULAR; INTRAVENOUS; SUBCUTANEOUS
Refills: 0 | Status: DISCONTINUED | OUTPATIENT
Start: 2020-01-01 | End: 2020-01-01

## 2020-01-01 RX ORDER — IOHEXOL 300 MG/ML
30 INJECTION, SOLUTION INTRAVENOUS ONCE
Refills: 0 | Status: COMPLETED | OUTPATIENT
Start: 2020-01-01 | End: 2020-01-01

## 2020-01-01 RX ORDER — DEXTROSE 50 % IN WATER 50 %
25 SYRINGE (ML) INTRAVENOUS ONCE
Refills: 0 | Status: DISCONTINUED | OUTPATIENT
Start: 2020-01-01 | End: 2020-01-01

## 2020-01-01 RX ORDER — CIPROFLOXACIN LACTATE 400MG/40ML
200 VIAL (ML) INTRAVENOUS ONCE
Refills: 0 | Status: COMPLETED | OUTPATIENT
Start: 2020-01-01 | End: 2020-01-01

## 2020-01-01 RX ORDER — LISINOPRIL 2.5 MG/1
10 TABLET ORAL DAILY
Refills: 0 | Status: DISCONTINUED | OUTPATIENT
Start: 2020-01-01 | End: 2020-01-01

## 2020-01-01 RX ORDER — INSULIN LISPRO 100/ML
5 VIAL (ML) SUBCUTANEOUS
Refills: 0 | Status: DISCONTINUED | OUTPATIENT
Start: 2020-01-01 | End: 2020-01-01

## 2020-01-01 RX ORDER — ONDANSETRON 8 MG/1
8 TABLET, FILM COATED ORAL ONCE
Refills: 0 | Status: COMPLETED | OUTPATIENT
Start: 2020-01-01 | End: 2020-01-01

## 2020-01-01 RX ORDER — ATORVASTATIN CALCIUM 80 MG/1
1 TABLET, FILM COATED ORAL
Qty: 0 | Refills: 0 | DISCHARGE

## 2020-01-01 RX ORDER — ACETAMINOPHEN 500 MG
650 TABLET ORAL EVERY 6 HOURS
Refills: 0 | Status: DISCONTINUED | OUTPATIENT
Start: 2020-01-01 | End: 2020-01-01

## 2020-01-01 RX ORDER — MIDODRINE HYDROCHLORIDE 2.5 MG/1
10 TABLET ORAL EVERY 8 HOURS
Refills: 0 | Status: DISCONTINUED | OUTPATIENT
Start: 2020-01-01 | End: 2020-01-01

## 2020-01-01 RX ORDER — CHLORHEXIDINE GLUCONATE 213 G/1000ML
1 SOLUTION TOPICAL
Refills: 0 | Status: DISCONTINUED | OUTPATIENT
Start: 2020-01-01 | End: 2020-01-01

## 2020-01-01 RX ORDER — METFORMIN HYDROCHLORIDE 850 MG/1
1 TABLET ORAL
Qty: 0 | Refills: 0 | DISCHARGE

## 2020-01-01 RX ORDER — ATORVASTATIN CALCIUM 80 MG/1
80 TABLET, FILM COATED ORAL AT BEDTIME
Refills: 0 | Status: DISCONTINUED | OUTPATIENT
Start: 2020-01-01 | End: 2020-01-01

## 2020-01-01 RX ORDER — AZTREONAM 2 G
1000 VIAL (EA) INJECTION EVERY 8 HOURS
Refills: 0 | Status: DISCONTINUED | OUTPATIENT
Start: 2020-01-01 | End: 2020-01-01

## 2020-01-01 RX ORDER — ASPIRIN/CALCIUM CARB/MAGNESIUM 324 MG
81 TABLET ORAL DAILY
Refills: 0 | Status: DISCONTINUED | OUTPATIENT
Start: 2020-01-01 | End: 2020-01-01

## 2020-01-01 RX ORDER — CEFEPIME 1 G/1
2000 INJECTION, POWDER, FOR SOLUTION INTRAMUSCULAR; INTRAVENOUS EVERY 12 HOURS
Refills: 0 | Status: DISCONTINUED | OUTPATIENT
Start: 2020-01-01 | End: 2020-01-01

## 2020-01-01 RX ORDER — MAGNESIUM SULFATE 500 MG/ML
2 VIAL (ML) INJECTION ONCE
Refills: 0 | Status: COMPLETED | OUTPATIENT
Start: 2020-01-01 | End: 2020-01-01

## 2020-01-01 RX ORDER — DEXTROSE 50 % IN WATER 50 %
12.5 SYRINGE (ML) INTRAVENOUS ONCE
Refills: 0 | Status: DISCONTINUED | OUTPATIENT
Start: 2020-01-01 | End: 2020-01-01

## 2020-01-01 RX ORDER — SODIUM CHLORIDE 9 MG/ML
500 INJECTION, SOLUTION INTRAVENOUS ONCE
Refills: 0 | Status: COMPLETED | OUTPATIENT
Start: 2020-01-01 | End: 2020-01-01

## 2020-01-01 RX ORDER — ACETAMINOPHEN 500 MG
650 TABLET ORAL ONCE
Refills: 0 | Status: COMPLETED | OUTPATIENT
Start: 2020-01-01 | End: 2020-01-01

## 2020-01-01 RX ORDER — MAGNESIUM SULFATE 500 MG/ML
2 VIAL (ML) INJECTION EVERY 4 HOURS
Refills: 0 | Status: COMPLETED | OUTPATIENT
Start: 2020-01-01 | End: 2020-01-01

## 2020-01-01 RX ORDER — ISOPROPYL ALCOHOL, BENZOCAINE .7; .06 ML/ML; ML/ML
1 SWAB TOPICAL
Qty: 100 | Refills: 1
Start: 2020-01-01 | End: 2020-01-01

## 2020-01-01 RX ORDER — METFORMIN HYDROCHLORIDE 850 MG/1
1 TABLET ORAL
Qty: 60 | Refills: 2
Start: 2020-01-01 | End: 2021-01-01

## 2020-01-01 RX ORDER — MEROPENEM 1 G/30ML
1000 INJECTION INTRAVENOUS EVERY 8 HOURS
Refills: 0 | Status: DISCONTINUED | OUTPATIENT
Start: 2020-01-01 | End: 2020-01-01

## 2020-01-01 RX ORDER — FOLIC ACID 1 MG/1
1 TABLET ORAL DAILY
Qty: 30 | Refills: 3 | Status: ACTIVE | COMMUNITY
Start: 2020-01-01 | End: 1900-01-01

## 2020-01-01 RX ORDER — FOLIC ACID 0.8 MG
1 TABLET ORAL DAILY
Refills: 0 | Status: DISCONTINUED | OUTPATIENT
Start: 2020-01-01 | End: 2020-01-01

## 2020-01-01 RX ORDER — FLUCONAZOLE 150 MG/1
2 TABLET ORAL
Qty: 0 | Refills: 0 | DISCHARGE

## 2020-01-01 RX ORDER — LISINOPRIL 2.5 MG/1
1 TABLET ORAL
Qty: 0 | Refills: 0 | DISCHARGE

## 2020-01-01 RX ORDER — CEFEPIME 1 G/1
2000 INJECTION, POWDER, FOR SOLUTION INTRAMUSCULAR; INTRAVENOUS ONCE
Refills: 0 | Status: COMPLETED | OUTPATIENT
Start: 2020-01-01 | End: 2020-01-01

## 2020-01-01 RX ORDER — CLOPIDOGREL BISULFATE 75 MG/1
75 TABLET, FILM COATED ORAL DAILY
Refills: 0 | Status: DISCONTINUED | OUTPATIENT
Start: 2020-01-01 | End: 2020-01-01

## 2020-01-01 RX ORDER — DIPHENHYDRAMINE HCL 50 MG
25 CAPSULE ORAL EVERY 4 HOURS
Refills: 0 | Status: DISCONTINUED | OUTPATIENT
Start: 2020-01-01 | End: 2020-01-01

## 2020-01-01 RX ORDER — FLUCONAZOLE 150 MG/1
400 TABLET ORAL DAILY
Refills: 0 | Status: DISCONTINUED | OUTPATIENT
Start: 2020-01-01 | End: 2020-01-01

## 2020-01-01 RX ORDER — IBRUTINIB 140 MG/1
1 TABLET, FILM COATED ORAL
Qty: 0 | Refills: 0 | DISCHARGE

## 2020-01-01 RX ORDER — AMOXICILLIN AND CLAVULANATE POTASSIUM 500; 125 MG/1; 1/1
500-125 TABLET, FILM COATED ORAL
Refills: 0 | Status: DISCONTINUED | COMMUNITY
End: 2020-01-01

## 2020-01-01 RX ORDER — SODIUM CHLORIDE 9 MG/ML
2000 INJECTION, SOLUTION INTRAVENOUS ONCE
Refills: 0 | Status: COMPLETED | OUTPATIENT
Start: 2020-01-01 | End: 2020-01-01

## 2020-01-01 RX ORDER — NYSTATIN 100000 1/G
100000 POWDER TOPICAL
Qty: 1 | Refills: 1 | Status: ACTIVE | COMMUNITY
Start: 2020-01-01 | End: 1900-01-01

## 2020-01-01 RX ORDER — CIPROFLOXACIN HYDROCHLORIDE 500 MG/1
500 TABLET, FILM COATED ORAL
Qty: 14 | Refills: 0 | Status: DISCONTINUED | COMMUNITY
Start: 2020-01-01 | End: 2020-01-01

## 2020-01-01 RX ORDER — BENZOCAINE AND MENTHOL 5; 1 G/100ML; G/100ML
1 LIQUID ORAL
Refills: 0 | Status: DISCONTINUED | OUTPATIENT
Start: 2020-01-01 | End: 2020-01-01

## 2020-01-01 RX ORDER — AZTREONAM 2 G
2000 VIAL (EA) INJECTION ONCE
Refills: 0 | Status: COMPLETED | OUTPATIENT
Start: 2020-01-01 | End: 2020-01-01

## 2020-01-01 RX ORDER — PANTOPRAZOLE 40 MG/1
40 TABLET, DELAYED RELEASE ORAL TWICE DAILY
Qty: 60 | Refills: 3 | Status: ACTIVE | COMMUNITY
Start: 2020-01-01 | End: 1900-01-01

## 2020-01-01 RX ORDER — VANCOMYCIN HCL 1 G
1250 VIAL (EA) INTRAVENOUS EVERY 12 HOURS
Refills: 0 | Status: DISCONTINUED | OUTPATIENT
Start: 2020-01-01 | End: 2020-01-01

## 2020-01-01 RX ORDER — RITUXIMAB 10 MG/ML
600 INJECTION, SOLUTION INTRAVENOUS ONCE
Refills: 0 | Status: COMPLETED | OUTPATIENT
Start: 2020-01-01 | End: 2020-01-01

## 2020-01-01 RX ORDER — DIPHENHYDRAMINE HCL 50 MG
50 CAPSULE ORAL ONCE
Refills: 0 | Status: COMPLETED | OUTPATIENT
Start: 2020-01-01 | End: 2020-01-01

## 2020-01-01 RX ORDER — ACYCLOVIR SODIUM 500 MG
1 VIAL (EA) INTRAVENOUS
Qty: 0 | Refills: 0 | DISCHARGE
Start: 2020-01-01

## 2020-01-01 RX ORDER — CIPROFLOXACIN LACTATE 400MG/40ML
VIAL (ML) INTRAVENOUS
Refills: 0 | Status: DISCONTINUED | OUTPATIENT
Start: 2020-01-01 | End: 2020-01-01

## 2020-01-01 RX ORDER — LINEZOLID 600 MG/300ML
600 INJECTION, SOLUTION INTRAVENOUS ONCE
Refills: 0 | Status: COMPLETED | OUTPATIENT
Start: 2020-01-01 | End: 2020-01-01

## 2020-01-01 RX ORDER — GLUCAGON INJECTION, SOLUTION 0.5 MG/.1ML
1 INJECTION, SOLUTION SUBCUTANEOUS ONCE
Refills: 0 | Status: DISCONTINUED | OUTPATIENT
Start: 2020-01-01 | End: 2020-01-01

## 2020-01-01 RX ORDER — LACTULOSE 10 G/15ML
10 SOLUTION ORAL ONCE
Refills: 0 | Status: COMPLETED | OUTPATIENT
Start: 2020-01-01 | End: 2020-01-01

## 2020-01-01 RX ORDER — CLOPIDOGREL BISULFATE 75 MG/1
75 TABLET, FILM COATED ORAL
Qty: 90 | Refills: 0 | Status: DISCONTINUED | COMMUNITY
Start: 2017-07-04 | End: 2020-01-01

## 2020-01-01 RX ORDER — EPINEPHRINE 0.3 MG/.3ML
0.3 INJECTION INTRAMUSCULAR; SUBCUTANEOUS ONCE
Refills: 0 | Status: COMPLETED | OUTPATIENT
Start: 2020-01-01 | End: 2020-01-01

## 2020-01-01 RX ORDER — INSULIN HUMAN 100 [IU]/ML
5 INJECTION, SOLUTION SUBCUTANEOUS
Qty: 100 | Refills: 0 | Status: DISCONTINUED | OUTPATIENT
Start: 2020-01-01 | End: 2020-01-01

## 2020-01-01 RX ORDER — CHLORHEXIDINE GLUCONATE 213 G/1000ML
1 SOLUTION TOPICAL EVERY 24 HOURS
Refills: 0 | Status: DISCONTINUED | OUTPATIENT
Start: 2020-01-01 | End: 2020-01-01

## 2020-01-01 RX ORDER — FLUCONAZOLE 150 MG/1
200 TABLET ORAL DAILY
Refills: 0 | Status: DISCONTINUED | OUTPATIENT
Start: 2020-01-01 | End: 2020-01-01

## 2020-01-01 RX ORDER — ACYCLOVIR 400 MG/1
400 TABLET ORAL DAILY
Qty: 30 | Refills: 0 | Status: ACTIVE | COMMUNITY
Start: 2020-01-01 | End: 1900-01-01

## 2020-01-01 RX ORDER — ATORVASTATIN CALCIUM 80 MG/1
1 TABLET, FILM COATED ORAL
Qty: 0 | Refills: 0 | DISCHARGE
Start: 2020-01-01

## 2020-01-01 RX ORDER — IPRATROPIUM/ALBUTEROL SULFATE 18-103MCG
3 AEROSOL WITH ADAPTER (GRAM) INHALATION
Refills: 0 | Status: COMPLETED | OUTPATIENT
Start: 2020-01-01 | End: 2020-01-01

## 2020-01-01 RX ORDER — NOREPINEPHRINE BITARTRATE/D5W 8 MG/250ML
0.05 PLASTIC BAG, INJECTION (ML) INTRAVENOUS
Qty: 8 | Refills: 0 | Status: DISCONTINUED | OUTPATIENT
Start: 2020-01-01 | End: 2020-01-01

## 2020-01-01 RX ORDER — FOLIC ACID 0.8 MG
1 TABLET ORAL
Qty: 30 | Refills: 0
Start: 2020-01-01 | End: 2020-01-01

## 2020-01-01 RX ORDER — PHYTONADIONE (VIT K1) 5 MG
5 TABLET ORAL ONCE
Refills: 0 | Status: COMPLETED | OUTPATIENT
Start: 2020-01-01 | End: 2020-01-01

## 2020-01-01 RX ORDER — VANCOMYCIN HCL 1 G
1000 VIAL (EA) INTRAVENOUS ONCE
Refills: 0 | Status: COMPLETED | OUTPATIENT
Start: 2020-01-01 | End: 2020-01-01

## 2020-01-01 RX ORDER — ACYCLOVIR SODIUM 500 MG
1 VIAL (EA) INTRAVENOUS
Qty: 30 | Refills: 0
Start: 2020-01-01 | End: 2020-01-01

## 2020-01-01 RX ORDER — DEXTROSE 50 % IN WATER 50 %
15 SYRINGE (ML) INTRAVENOUS ONCE
Refills: 0 | Status: DISCONTINUED | OUTPATIENT
Start: 2020-01-01 | End: 2020-01-01

## 2020-01-01 RX ORDER — CLOPIDOGREL BISULFATE 75 MG/1
1 TABLET, FILM COATED ORAL
Qty: 0 | Refills: 0 | DISCHARGE

## 2020-01-01 RX ORDER — AMLODIPINE BESYLATE 2.5 MG/1
5 TABLET ORAL DAILY
Refills: 0 | Status: DISCONTINUED | OUTPATIENT
Start: 2020-01-01 | End: 2020-01-01

## 2020-01-01 RX ORDER — VANCOMYCIN HCL 1 G
1000 VIAL (EA) INTRAVENOUS EVERY 12 HOURS
Refills: 0 | Status: DISCONTINUED | OUTPATIENT
Start: 2020-01-01 | End: 2020-01-01

## 2020-01-01 RX ORDER — ONDANSETRON 8 MG/1
8 TABLET ORAL EVERY 8 HOURS
Qty: 30 | Refills: 1 | Status: ACTIVE | COMMUNITY
Start: 2020-01-01 | End: 1900-01-01

## 2020-01-01 RX ORDER — ENOXAPARIN SODIUM 100 MG/ML
40 INJECTION SUBCUTANEOUS DAILY
Refills: 0 | Status: DISCONTINUED | OUTPATIENT
Start: 2020-01-01 | End: 2020-01-01

## 2020-01-01 RX ORDER — METOPROLOL TARTRATE 50 MG
1 TABLET ORAL
Qty: 0 | Refills: 0 | DISCHARGE

## 2020-01-01 RX ORDER — ASPIRIN/CALCIUM CARB/MAGNESIUM 324 MG
1 TABLET ORAL
Qty: 0 | Refills: 0 | DISCHARGE
Start: 2020-01-01

## 2020-01-01 RX ORDER — INSULIN HUMAN 100 [IU]/ML
10 INJECTION, SOLUTION SUBCUTANEOUS ONCE
Refills: 0 | Status: COMPLETED | OUTPATIENT
Start: 2020-01-01 | End: 2020-01-01

## 2020-01-01 RX ORDER — LINEZOLID 600 MG/300ML
600 INJECTION, SOLUTION INTRAVENOUS EVERY 12 HOURS
Refills: 0 | Status: DISCONTINUED | OUTPATIENT
Start: 2020-01-01 | End: 2020-01-01

## 2020-01-01 RX ORDER — INSULIN LISPRO 100/ML
VIAL (ML) SUBCUTANEOUS
Refills: 0 | Status: DISCONTINUED | OUTPATIENT
Start: 2020-01-01 | End: 2020-01-01

## 2020-01-01 RX ORDER — SULFAMETHOXAZOLE AND TRIMETHOPRIM 800; 160 MG/1; MG/1
800-160 TABLET ORAL TWICE DAILY
Qty: 10 | Refills: 0 | Status: DISCONTINUED | COMMUNITY
Start: 2020-01-01 | End: 2020-01-01

## 2020-01-01 RX ORDER — FAMOTIDINE 10 MG/ML
20 INJECTION INTRAVENOUS ONCE
Refills: 0 | Status: COMPLETED | OUTPATIENT
Start: 2020-01-01 | End: 2020-01-01

## 2020-01-01 RX ORDER — FLUCONAZOLE 200 MG/1
200 TABLET ORAL
Qty: 14 | Refills: 0 | Status: DISCONTINUED | COMMUNITY
Start: 2020-01-01 | End: 2020-01-01

## 2020-01-01 RX ORDER — PHYTONADIONE (VIT K1) 5 MG
2.5 TABLET ORAL ONCE
Refills: 0 | Status: COMPLETED | OUTPATIENT
Start: 2020-01-01 | End: 2020-01-01

## 2020-01-01 RX ORDER — ACYCLOVIR SODIUM 500 MG
400 VIAL (EA) INTRAVENOUS DAILY
Refills: 0 | Status: DISCONTINUED | OUTPATIENT
Start: 2020-01-01 | End: 2020-01-01

## 2020-01-01 RX ORDER — ENOXAPARIN SODIUM 100 MG/ML
40 INJECTION SUBCUTANEOUS AT BEDTIME
Refills: 0 | Status: DISCONTINUED | OUTPATIENT
Start: 2020-01-01 | End: 2020-01-01

## 2020-01-01 RX ORDER — SODIUM CHLORIDE 9 MG/ML
1000 INJECTION, SOLUTION INTRAVENOUS
Refills: 0 | Status: DISCONTINUED | OUTPATIENT
Start: 2020-01-01 | End: 2020-01-01

## 2020-01-01 RX ORDER — ACETAMINOPHEN 500 MG
975 TABLET ORAL ONCE
Refills: 0 | Status: COMPLETED | OUTPATIENT
Start: 2020-01-01 | End: 2020-01-01

## 2020-01-01 RX ORDER — DIPHENHYDRAMINE HCL 50 MG
25 CAPSULE ORAL ONCE
Refills: 0 | Status: COMPLETED | OUTPATIENT
Start: 2020-01-01 | End: 2020-01-01

## 2020-01-01 RX ORDER — IPRATROPIUM/ALBUTEROL SULFATE 18-103MCG
3 AEROSOL WITH ADAPTER (GRAM) INHALATION ONCE
Refills: 0 | Status: COMPLETED | OUTPATIENT
Start: 2020-01-01 | End: 2020-01-01

## 2020-01-01 RX ORDER — PHYTONADIONE (VIT K1) 5 MG
5 TABLET ORAL ONCE
Refills: 0 | Status: DISCONTINUED | OUTPATIENT
Start: 2020-01-01 | End: 2020-01-01

## 2020-01-01 RX ORDER — NOREPINEPHRINE BITARTRATE/D5W 8 MG/250ML
0.1 PLASTIC BAG, INJECTION (ML) INTRAVENOUS
Qty: 8 | Refills: 0 | Status: DISCONTINUED | OUTPATIENT
Start: 2020-01-01 | End: 2020-01-01

## 2020-01-01 RX ORDER — MAGNESIUM SULFATE 500 MG/ML
2 VIAL (ML) INJECTION ONCE
Refills: 0 | Status: DISCONTINUED | OUTPATIENT
Start: 2020-01-01 | End: 2020-01-01

## 2020-01-01 RX ORDER — METOPROLOL TARTRATE 50 MG
50 TABLET ORAL
Refills: 0 | Status: DISCONTINUED | OUTPATIENT
Start: 2020-01-01 | End: 2020-01-01

## 2020-01-01 RX ORDER — IBRUTINIB 140 MG/1
420 TABLET, FILM COATED ORAL DAILY
Refills: 0 | Status: DISCONTINUED | OUTPATIENT
Start: 2020-01-01 | End: 2020-01-01

## 2020-01-01 RX ORDER — PANTOPRAZOLE SODIUM 20 MG/1
40 TABLET, DELAYED RELEASE ORAL EVERY 12 HOURS
Refills: 0 | Status: DISCONTINUED | OUTPATIENT
Start: 2020-01-01 | End: 2020-01-01

## 2020-01-01 RX ORDER — CEFEPIME 1 G/1
INJECTION, POWDER, FOR SOLUTION INTRAMUSCULAR; INTRAVENOUS
Refills: 0 | Status: DISCONTINUED | OUTPATIENT
Start: 2020-01-01 | End: 2020-01-01

## 2020-01-01 RX ORDER — NOREPINEPHRINE BITARTRATE/D5W 8 MG/250ML
0.12 PLASTIC BAG, INJECTION (ML) INTRAVENOUS
Qty: 8 | Refills: 0 | Status: DISCONTINUED | OUTPATIENT
Start: 2020-01-01 | End: 2020-01-01

## 2020-01-01 RX ORDER — AMLODIPINE BESYLATE 2.5 MG/1
5 TABLET ORAL ONCE
Refills: 0 | Status: DISCONTINUED | OUTPATIENT
Start: 2020-01-01 | End: 2020-01-01

## 2020-01-01 RX ORDER — MAGNESIUM SULFATE 500 MG/ML
2 VIAL (ML) INJECTION
Refills: 0 | Status: COMPLETED | OUTPATIENT
Start: 2020-01-01 | End: 2020-01-01

## 2020-01-01 RX ORDER — INFLUENZA VIRUS VACCINE 15; 15; 15; 15 UG/.5ML; UG/.5ML; UG/.5ML; UG/.5ML
0.5 SUSPENSION INTRAMUSCULAR ONCE
Refills: 0 | Status: COMPLETED | OUTPATIENT
Start: 2020-01-01 | End: 2020-01-01

## 2020-01-01 RX ORDER — HYDROCORTISONE 20 MG
100 TABLET ORAL ONCE
Refills: 0 | Status: COMPLETED | OUTPATIENT
Start: 2020-01-01 | End: 2020-01-01

## 2020-01-01 RX ORDER — SODIUM CHLORIDE 9 MG/ML
500 INJECTION INTRAMUSCULAR; INTRAVENOUS; SUBCUTANEOUS
Refills: 0 | Status: DISCONTINUED | OUTPATIENT
Start: 2020-01-01 | End: 2021-01-01

## 2020-01-01 RX ORDER — PANTOPRAZOLE SODIUM 20 MG/1
1 TABLET, DELAYED RELEASE ORAL
Qty: 60 | Refills: 0
Start: 2020-01-01 | End: 2020-01-01

## 2020-01-01 RX ORDER — FOLIC ACID 0.8 MG
500 TABLET ORAL
Qty: 7 | Refills: 1 | Status: ACTIVE | COMMUNITY
Start: 2020-01-01 | End: 1900-01-01

## 2020-01-01 RX ORDER — FLUCONAZOLE 150 MG/1
2 TABLET ORAL
Qty: 0 | Refills: 0 | DISCHARGE
Start: 2020-01-01

## 2020-01-01 RX ORDER — VANCOMYCIN HCL 1 G
1500 VIAL (EA) INTRAVENOUS ONCE
Refills: 0 | Status: DISCONTINUED | OUTPATIENT
Start: 2020-01-01 | End: 2020-01-01

## 2020-01-01 RX ORDER — INSULIN HUMAN 100 [IU]/ML
20 INJECTION, SOLUTION SUBCUTANEOUS ONCE
Refills: 0 | Status: COMPLETED | OUTPATIENT
Start: 2020-01-01 | End: 2020-01-01

## 2020-01-01 RX ORDER — INSULIN LISPRO 100/ML
3 VIAL (ML) SUBCUTANEOUS ONCE
Refills: 0 | Status: COMPLETED | OUTPATIENT
Start: 2020-01-01 | End: 2020-01-01

## 2020-01-01 RX ORDER — METOPROLOL TARTRATE 50 MG
25 TABLET ORAL
Refills: 0 | Status: DISCONTINUED | OUTPATIENT
Start: 2020-01-01 | End: 2020-01-01

## 2020-01-01 RX ORDER — CIPROFLOXACIN LACTATE 400MG/40ML
200 VIAL (ML) INTRAVENOUS EVERY 12 HOURS
Refills: 0 | Status: DISCONTINUED | OUTPATIENT
Start: 2020-01-01 | End: 2020-01-01

## 2020-01-01 RX ORDER — LINEZOLID 600 MG/300ML
1 INJECTION, SOLUTION INTRAVENOUS
Qty: 2 | Refills: 0
Start: 2020-01-01 | End: 2020-01-01

## 2020-01-01 RX ORDER — METOPROLOL TARTRATE 50 MG
25 TABLET ORAL ONCE
Refills: 0 | Status: DISCONTINUED | OUTPATIENT
Start: 2020-01-01 | End: 2020-01-01

## 2020-01-01 RX ORDER — ACYCLOVIR SODIUM 500 MG
1 VIAL (EA) INTRAVENOUS
Qty: 0 | Refills: 0 | DISCHARGE

## 2020-01-01 RX ORDER — FLUCONAZOLE 150 MG/1
2 TABLET ORAL
Qty: 60 | Refills: 0
Start: 2020-01-01 | End: 2020-01-01

## 2020-01-01 RX ADMIN — ATORVASTATIN CALCIUM 80 MILLIGRAM(S): 80 TABLET, FILM COATED ORAL at 22:07

## 2020-01-01 RX ADMIN — CHLORHEXIDINE GLUCONATE 1 APPLICATION(S): 213 SOLUTION TOPICAL at 05:58

## 2020-01-01 RX ADMIN — FLUCONAZOLE 400 MILLIGRAM(S): 150 TABLET ORAL at 11:56

## 2020-01-01 RX ADMIN — LINEZOLID 100 MILLIGRAM(S): 600 INJECTION, SOLUTION INTRAVENOUS at 17:09

## 2020-01-01 RX ADMIN — PANTOPRAZOLE SODIUM 40 MILLIGRAM(S): 20 TABLET, DELAYED RELEASE ORAL at 05:01

## 2020-01-01 RX ADMIN — Medication 100 MILLIGRAM(S): at 21:00

## 2020-01-01 RX ADMIN — LINEZOLID 100 MILLIGRAM(S): 600 INJECTION, SOLUTION INTRAVENOUS at 05:01

## 2020-01-01 RX ADMIN — MEROPENEM 100 MILLIGRAM(S): 1 INJECTION INTRAVENOUS at 06:10

## 2020-01-01 RX ADMIN — Medication 250 MILLIGRAM(S): at 19:31

## 2020-01-01 RX ADMIN — ATORVASTATIN CALCIUM 80 MILLIGRAM(S): 80 TABLET, FILM COATED ORAL at 22:04

## 2020-01-01 RX ADMIN — Medication 5 UNIT(S): at 07:40

## 2020-01-01 RX ADMIN — ATORVASTATIN CALCIUM 80 MILLIGRAM(S): 80 TABLET, FILM COATED ORAL at 21:27

## 2020-01-01 RX ADMIN — PANTOPRAZOLE SODIUM 40 MILLIGRAM(S): 20 TABLET, DELAYED RELEASE ORAL at 17:42

## 2020-01-01 RX ADMIN — ATORVASTATIN CALCIUM 80 MILLIGRAM(S): 80 TABLET, FILM COATED ORAL at 21:18

## 2020-01-01 RX ADMIN — Medication 81 MILLIGRAM(S): at 12:11

## 2020-01-01 RX ADMIN — Medication 3 MILLILITER(S): at 15:09

## 2020-01-01 RX ADMIN — Medication 1: at 11:44

## 2020-01-01 RX ADMIN — Medication 400 MILLIGRAM(S): at 12:13

## 2020-01-01 RX ADMIN — Medication 81 MILLIGRAM(S): at 11:16

## 2020-01-01 RX ADMIN — CLOPIDOGREL BISULFATE 75 MILLIGRAM(S): 75 TABLET, FILM COATED ORAL at 11:27

## 2020-01-01 RX ADMIN — Medication 1 MILLIGRAM(S): at 11:35

## 2020-01-01 RX ADMIN — Medication 1 MILLIGRAM(S): at 11:13

## 2020-01-01 RX ADMIN — Medication 1 MILLIGRAM(S): at 11:16

## 2020-01-01 RX ADMIN — SODIUM CHLORIDE 1000 MILLILITER(S): 9 INJECTION, SOLUTION INTRAVENOUS at 19:03

## 2020-01-01 RX ADMIN — INSULIN HUMAN 20 UNIT(S): 100 INJECTION, SOLUTION SUBCUTANEOUS at 11:55

## 2020-01-01 RX ADMIN — LISINOPRIL 10 MILLIGRAM(S): 2.5 TABLET ORAL at 06:23

## 2020-01-01 RX ADMIN — Medication 400 MILLIGRAM(S): at 12:33

## 2020-01-01 RX ADMIN — Medication 1 MILLIGRAM(S): at 11:06

## 2020-01-01 RX ADMIN — LINEZOLID 100 MILLIGRAM(S): 600 INJECTION, SOLUTION INTRAVENOUS at 16:54

## 2020-01-01 RX ADMIN — PANTOPRAZOLE SODIUM 40 MILLIGRAM(S): 20 TABLET, DELAYED RELEASE ORAL at 06:00

## 2020-01-01 RX ADMIN — Medication 5 UNIT(S): at 08:10

## 2020-01-01 RX ADMIN — LISINOPRIL 10 MILLIGRAM(S): 2.5 TABLET ORAL at 06:07

## 2020-01-01 RX ADMIN — FLUCONAZOLE 400 MILLIGRAM(S): 150 TABLET ORAL at 12:12

## 2020-01-01 RX ADMIN — LISINOPRIL 10 MILLIGRAM(S): 2.5 TABLET ORAL at 06:24

## 2020-01-01 RX ADMIN — LINEZOLID 100 MILLIGRAM(S): 600 INJECTION, SOLUTION INTRAVENOUS at 05:35

## 2020-01-01 RX ADMIN — Medication 1 MILLIGRAM(S): at 11:59

## 2020-01-01 RX ADMIN — LINEZOLID 300 MILLIGRAM(S): 600 INJECTION, SOLUTION INTRAVENOUS at 05:51

## 2020-01-01 RX ADMIN — CHLORHEXIDINE GLUCONATE 1 APPLICATION(S): 213 SOLUTION TOPICAL at 05:02

## 2020-01-01 RX ADMIN — Medication 81 MILLIGRAM(S): at 11:59

## 2020-01-01 RX ADMIN — Medication 1 MILLIGRAM(S): at 11:26

## 2020-01-01 RX ADMIN — Medication 1: at 11:18

## 2020-01-01 RX ADMIN — Medication 5 UNIT(S): at 07:42

## 2020-01-01 RX ADMIN — Medication 400 MILLIGRAM(S): at 11:16

## 2020-01-01 RX ADMIN — Medication 81 MILLIGRAM(S): at 12:04

## 2020-01-01 RX ADMIN — MEROPENEM 100 MILLIGRAM(S): 1 INJECTION INTRAVENOUS at 07:31

## 2020-01-01 RX ADMIN — Medication 25 MILLIGRAM(S): at 17:03

## 2020-01-01 RX ADMIN — CHLORHEXIDINE GLUCONATE 1 APPLICATION(S): 213 SOLUTION TOPICAL at 05:13

## 2020-01-01 RX ADMIN — Medication 20 MILLIGRAM(S): at 11:18

## 2020-01-01 RX ADMIN — Medication 50 GRAM(S): at 11:58

## 2020-01-01 RX ADMIN — CHLORHEXIDINE GLUCONATE 1 APPLICATION(S): 213 SOLUTION TOPICAL at 05:11

## 2020-01-01 RX ADMIN — PANTOPRAZOLE SODIUM 40 MILLIGRAM(S): 20 TABLET, DELAYED RELEASE ORAL at 06:10

## 2020-01-01 RX ADMIN — Medication 50 MILLIGRAM(S): at 06:43

## 2020-01-01 RX ADMIN — Medication 250 MILLIGRAM(S): at 08:08

## 2020-01-01 RX ADMIN — FLUCONAZOLE 400 MILLIGRAM(S): 150 TABLET ORAL at 11:17

## 2020-01-01 RX ADMIN — SODIUM CHLORIDE 1000 MILLILITER(S): 9 INJECTION, SOLUTION INTRAVENOUS at 22:33

## 2020-01-01 RX ADMIN — Medication 81 MILLIGRAM(S): at 12:27

## 2020-01-01 RX ADMIN — ENOXAPARIN SODIUM 40 MILLIGRAM(S): 100 INJECTION SUBCUTANEOUS at 11:14

## 2020-01-01 RX ADMIN — Medication 1 MILLIGRAM(S): at 11:44

## 2020-01-01 RX ADMIN — Medication 400 MILLIGRAM(S): at 11:56

## 2020-01-01 RX ADMIN — Medication 100 MILLIGRAM(S): at 06:27

## 2020-01-01 RX ADMIN — Medication 5 UNIT(S): at 11:55

## 2020-01-01 RX ADMIN — Medication 50 MILLIGRAM(S): at 06:24

## 2020-01-01 RX ADMIN — CHLORHEXIDINE GLUCONATE 1 APPLICATION(S): 213 SOLUTION TOPICAL at 06:07

## 2020-01-01 RX ADMIN — LINEZOLID 100 MILLIGRAM(S): 600 INJECTION, SOLUTION INTRAVENOUS at 05:09

## 2020-01-01 RX ADMIN — Medication 3 MILLILITER(S): at 16:28

## 2020-01-01 RX ADMIN — LINEZOLID 300 MILLIGRAM(S): 600 INJECTION, SOLUTION INTRAVENOUS at 17:01

## 2020-01-01 RX ADMIN — ATORVASTATIN CALCIUM 80 MILLIGRAM(S): 80 TABLET, FILM COATED ORAL at 22:39

## 2020-01-01 RX ADMIN — CLOPIDOGREL BISULFATE 75 MILLIGRAM(S): 75 TABLET, FILM COATED ORAL at 12:24

## 2020-01-01 RX ADMIN — INSULIN GLARGINE 15 UNIT(S): 100 INJECTION, SOLUTION SUBCUTANEOUS at 21:07

## 2020-01-01 RX ADMIN — ATORVASTATIN CALCIUM 80 MILLIGRAM(S): 80 TABLET, FILM COATED ORAL at 22:08

## 2020-01-01 RX ADMIN — AMLODIPINE BESYLATE 5 MILLIGRAM(S): 2.5 TABLET ORAL at 12:11

## 2020-01-01 RX ADMIN — FLUCONAZOLE 400 MILLIGRAM(S): 150 TABLET ORAL at 12:01

## 2020-01-01 RX ADMIN — Medication 50 MILLIGRAM(S): at 15:23

## 2020-01-01 RX ADMIN — Medication 650 MILLIGRAM(S): at 19:03

## 2020-01-01 RX ADMIN — FLUCONAZOLE 200 MILLIGRAM(S): 150 TABLET ORAL at 11:46

## 2020-01-01 RX ADMIN — INSULIN GLARGINE 15 UNIT(S): 100 INJECTION, SOLUTION SUBCUTANEOUS at 22:08

## 2020-01-01 RX ADMIN — Medication 100 MILLIGRAM(S): at 15:23

## 2020-01-01 RX ADMIN — CHLORHEXIDINE GLUCONATE 1 APPLICATION(S): 213 SOLUTION TOPICAL at 06:24

## 2020-01-01 RX ADMIN — ATORVASTATIN CALCIUM 80 MILLIGRAM(S): 80 TABLET, FILM COATED ORAL at 21:02

## 2020-01-01 RX ADMIN — Medication 11.2 MICROGRAM(S)/KG/MIN: at 09:17

## 2020-01-01 RX ADMIN — Medication 1 MILLIGRAM(S): at 12:24

## 2020-01-01 RX ADMIN — ATORVASTATIN CALCIUM 80 MILLIGRAM(S): 80 TABLET, FILM COATED ORAL at 21:03

## 2020-01-01 RX ADMIN — Medication 1 MILLIGRAM(S): at 12:34

## 2020-01-01 RX ADMIN — Medication 650 MILLIGRAM(S): at 11:06

## 2020-01-01 RX ADMIN — LINEZOLID 100 MILLIGRAM(S): 600 INJECTION, SOLUTION INTRAVENOUS at 12:22

## 2020-01-01 RX ADMIN — EPINEPHRINE 0.3 MILLIGRAM(S): 0.3 INJECTION INTRAMUSCULAR; SUBCUTANEOUS at 14:23

## 2020-01-01 RX ADMIN — Medication 1: at 18:19

## 2020-01-01 RX ADMIN — LINEZOLID 100 MILLIGRAM(S): 600 INJECTION, SOLUTION INTRAVENOUS at 18:21

## 2020-01-01 RX ADMIN — Medication 1: at 11:37

## 2020-01-01 RX ADMIN — FLUCONAZOLE 400 MILLIGRAM(S): 150 TABLET ORAL at 11:05

## 2020-01-01 RX ADMIN — IOHEXOL 30 MILLILITER(S): 300 INJECTION, SOLUTION INTRAVENOUS at 10:58

## 2020-01-01 RX ADMIN — Medication 650 MILLIGRAM(S): at 14:58

## 2020-01-01 RX ADMIN — Medication 1 MILLIGRAM(S): at 10:14

## 2020-01-01 RX ADMIN — MEROPENEM 100 MILLIGRAM(S): 1 INJECTION INTRAVENOUS at 15:00

## 2020-01-01 RX ADMIN — Medication 400 MILLIGRAM(S): at 11:05

## 2020-01-01 RX ADMIN — LINEZOLID 300 MILLIGRAM(S): 600 INJECTION, SOLUTION INTRAVENOUS at 05:36

## 2020-01-01 RX ADMIN — Medication 1 MILLIGRAM(S): at 11:28

## 2020-01-01 RX ADMIN — SODIUM CHLORIDE 50 MILLILITER(S): 9 INJECTION INTRAMUSCULAR; INTRAVENOUS; SUBCUTANEOUS at 13:24

## 2020-01-01 RX ADMIN — PANTOPRAZOLE SODIUM 40 MILLIGRAM(S): 20 TABLET, DELAYED RELEASE ORAL at 05:13

## 2020-01-01 RX ADMIN — MEROPENEM 100 MILLIGRAM(S): 1 INJECTION INTRAVENOUS at 05:21

## 2020-01-01 RX ADMIN — Medication 81 MILLIGRAM(S): at 11:05

## 2020-01-01 RX ADMIN — Medication 50 MILLIGRAM(S): at 17:45

## 2020-01-01 RX ADMIN — CEFEPIME 100 MILLIGRAM(S): 1 INJECTION, POWDER, FOR SOLUTION INTRAMUSCULAR; INTRAVENOUS at 05:13

## 2020-01-01 RX ADMIN — Medication 400 MILLIGRAM(S): at 11:25

## 2020-01-01 RX ADMIN — MEROPENEM 100 MILLIGRAM(S): 1 INJECTION INTRAVENOUS at 15:51

## 2020-01-01 RX ADMIN — Medication 1 MILLIGRAM(S): at 12:01

## 2020-01-01 RX ADMIN — ENOXAPARIN SODIUM 40 MILLIGRAM(S): 100 INJECTION SUBCUTANEOUS at 11:38

## 2020-01-01 RX ADMIN — Medication 81 MILLIGRAM(S): at 11:45

## 2020-01-01 RX ADMIN — ATORVASTATIN CALCIUM 80 MILLIGRAM(S): 80 TABLET, FILM COATED ORAL at 21:07

## 2020-01-01 RX ADMIN — CHLORHEXIDINE GLUCONATE 1 APPLICATION(S): 213 SOLUTION TOPICAL at 06:16

## 2020-01-01 RX ADMIN — ATORVASTATIN CALCIUM 80 MILLIGRAM(S): 80 TABLET, FILM COATED ORAL at 21:43

## 2020-01-01 RX ADMIN — Medication 81 MILLIGRAM(S): at 12:24

## 2020-01-01 RX ADMIN — INSULIN GLARGINE 15 UNIT(S): 100 INJECTION, SOLUTION SUBCUTANEOUS at 23:01

## 2020-01-01 RX ADMIN — CHLORHEXIDINE GLUCONATE 1 APPLICATION(S): 213 SOLUTION TOPICAL at 06:10

## 2020-01-01 RX ADMIN — FLUCONAZOLE 200 MILLIGRAM(S): 150 TABLET ORAL at 12:09

## 2020-01-01 RX ADMIN — CEFEPIME 100 MILLIGRAM(S): 1 INJECTION, POWDER, FOR SOLUTION INTRAMUSCULAR; INTRAVENOUS at 16:40

## 2020-01-01 RX ADMIN — FLUCONAZOLE 200 MILLIGRAM(S): 150 TABLET ORAL at 12:24

## 2020-01-01 RX ADMIN — ATORVASTATIN CALCIUM 80 MILLIGRAM(S): 80 TABLET, FILM COATED ORAL at 21:39

## 2020-01-01 RX ADMIN — INSULIN GLARGINE 15 UNIT(S): 100 INJECTION, SOLUTION SUBCUTANEOUS at 21:49

## 2020-01-01 RX ADMIN — Medication 650 MILLIGRAM(S): at 15:28

## 2020-01-01 RX ADMIN — Medication 650 MILLIGRAM(S): at 09:31

## 2020-01-01 RX ADMIN — Medication 1: at 07:43

## 2020-01-01 RX ADMIN — LINEZOLID 100 MILLIGRAM(S): 600 INJECTION, SOLUTION INTRAVENOUS at 05:23

## 2020-01-01 RX ADMIN — MEROPENEM 100 MILLIGRAM(S): 1 INJECTION INTRAVENOUS at 05:51

## 2020-01-01 RX ADMIN — PANTOPRAZOLE SODIUM 40 MILLIGRAM(S): 20 TABLET, DELAYED RELEASE ORAL at 05:51

## 2020-01-01 RX ADMIN — ATORVASTATIN CALCIUM 80 MILLIGRAM(S): 80 TABLET, FILM COATED ORAL at 21:51

## 2020-01-01 RX ADMIN — FLUCONAZOLE 400 MILLIGRAM(S): 150 TABLET ORAL at 12:33

## 2020-01-01 RX ADMIN — Medication 81 MILLIGRAM(S): at 11:56

## 2020-01-01 RX ADMIN — PANTOPRAZOLE SODIUM 40 MILLIGRAM(S): 20 TABLET, DELAYED RELEASE ORAL at 17:39

## 2020-01-01 RX ADMIN — Medication 400 MILLIGRAM(S): at 11:14

## 2020-01-01 RX ADMIN — CHLORHEXIDINE GLUCONATE 1 APPLICATION(S): 213 SOLUTION TOPICAL at 06:19

## 2020-01-01 RX ADMIN — Medication 400 MILLIGRAM(S): at 12:21

## 2020-01-01 RX ADMIN — Medication 100 MILLIGRAM(S): at 05:09

## 2020-01-01 RX ADMIN — Medication 400 MILLIGRAM(S): at 12:24

## 2020-01-01 RX ADMIN — SODIUM CHLORIDE 1000 MILLILITER(S): 9 INJECTION, SOLUTION INTRAVENOUS at 20:07

## 2020-01-01 RX ADMIN — INSULIN GLARGINE 15 UNIT(S): 100 INJECTION, SOLUTION SUBCUTANEOUS at 23:22

## 2020-01-01 RX ADMIN — FAMOTIDINE 20 MILLIGRAM(S): 10 INJECTION INTRAVENOUS at 14:37

## 2020-01-01 RX ADMIN — PANTOPRAZOLE SODIUM 40 MILLIGRAM(S): 20 TABLET, DELAYED RELEASE ORAL at 17:43

## 2020-01-01 RX ADMIN — Medication 5 UNIT(S): at 16:40

## 2020-01-01 RX ADMIN — ATORVASTATIN CALCIUM 80 MILLIGRAM(S): 80 TABLET, FILM COATED ORAL at 21:38

## 2020-01-01 RX ADMIN — Medication 81 MILLIGRAM(S): at 11:29

## 2020-01-01 RX ADMIN — Medication 3 MILLILITER(S): at 16:27

## 2020-01-01 RX ADMIN — Medication 1 MILLIGRAM(S): at 12:19

## 2020-01-01 RX ADMIN — SODIUM CHLORIDE 50 MILLILITER(S): 9 INJECTION INTRAMUSCULAR; INTRAVENOUS; SUBCUTANEOUS at 06:41

## 2020-01-01 RX ADMIN — MIDODRINE HYDROCHLORIDE 10 MILLIGRAM(S): 2.5 TABLET ORAL at 05:55

## 2020-01-01 RX ADMIN — MEROPENEM 100 MILLIGRAM(S): 1 INJECTION INTRAVENOUS at 05:14

## 2020-01-01 RX ADMIN — Medication 1: at 11:47

## 2020-01-01 RX ADMIN — Medication 81 MILLIGRAM(S): at 11:25

## 2020-01-01 RX ADMIN — Medication 100 MILLIGRAM(S): at 12:15

## 2020-01-01 RX ADMIN — SODIUM CHLORIDE 75 MILLILITER(S): 9 INJECTION INTRAMUSCULAR; INTRAVENOUS; SUBCUTANEOUS at 00:39

## 2020-01-01 RX ADMIN — Medication 25 MILLIGRAM(S): at 05:35

## 2020-01-01 RX ADMIN — CLOPIDOGREL BISULFATE 75 MILLIGRAM(S): 75 TABLET, FILM COATED ORAL at 11:29

## 2020-01-01 RX ADMIN — LACTULOSE 10 GRAM(S): 10 SOLUTION ORAL at 16:30

## 2020-01-01 RX ADMIN — ATORVASTATIN CALCIUM 80 MILLIGRAM(S): 80 TABLET, FILM COATED ORAL at 21:44

## 2020-01-01 RX ADMIN — Medication 3 MILLILITER(S): at 15:07

## 2020-01-01 RX ADMIN — Medication 81 MILLIGRAM(S): at 12:21

## 2020-01-01 RX ADMIN — MEROPENEM 100 MILLIGRAM(S): 1 INJECTION INTRAVENOUS at 22:28

## 2020-01-01 RX ADMIN — CHLORHEXIDINE GLUCONATE 1 APPLICATION(S): 213 SOLUTION TOPICAL at 05:35

## 2020-01-01 RX ADMIN — Medication 25 MILLIGRAM(S): at 17:14

## 2020-01-01 RX ADMIN — MEROPENEM 100 MILLIGRAM(S): 1 INJECTION INTRAVENOUS at 22:39

## 2020-01-01 RX ADMIN — SODIUM CHLORIDE 1000 MILLILITER(S): 9 INJECTION, SOLUTION INTRAVENOUS at 09:49

## 2020-01-01 RX ADMIN — PANTOPRAZOLE SODIUM 40 MILLIGRAM(S): 20 TABLET, DELAYED RELEASE ORAL at 17:17

## 2020-01-01 RX ADMIN — ATORVASTATIN CALCIUM 80 MILLIGRAM(S): 80 TABLET, FILM COATED ORAL at 22:05

## 2020-01-01 RX ADMIN — Medication 5 UNIT(S): at 12:14

## 2020-01-01 RX ADMIN — ATORVASTATIN CALCIUM 80 MILLIGRAM(S): 80 TABLET, FILM COATED ORAL at 21:11

## 2020-01-01 RX ADMIN — PANTOPRAZOLE SODIUM 40 MILLIGRAM(S): 20 TABLET, DELAYED RELEASE ORAL at 05:10

## 2020-01-01 RX ADMIN — Medication 50 MILLIGRAM(S): at 06:20

## 2020-01-01 RX ADMIN — Medication 81 MILLIGRAM(S): at 12:08

## 2020-01-01 RX ADMIN — Medication 81 MILLIGRAM(S): at 11:27

## 2020-01-01 RX ADMIN — LINEZOLID 100 MILLIGRAM(S): 600 INJECTION, SOLUTION INTRAVENOUS at 23:05

## 2020-01-01 RX ADMIN — Medication 1: at 17:13

## 2020-01-01 RX ADMIN — Medication 400 MILLIGRAM(S): at 12:08

## 2020-01-01 RX ADMIN — SODIUM CHLORIDE 100 MILLILITER(S): 9 INJECTION INTRAMUSCULAR; INTRAVENOUS; SUBCUTANEOUS at 06:26

## 2020-01-01 RX ADMIN — Medication 5 MILLIGRAM(S): at 09:24

## 2020-01-01 RX ADMIN — AMLODIPINE BESYLATE 5 MILLIGRAM(S): 2.5 TABLET ORAL at 05:48

## 2020-01-01 RX ADMIN — PANTOPRAZOLE SODIUM 40 MILLIGRAM(S): 20 TABLET, DELAYED RELEASE ORAL at 17:45

## 2020-01-01 RX ADMIN — Medication 5 UNIT(S): at 09:12

## 2020-01-01 RX ADMIN — AMLODIPINE BESYLATE 5 MILLIGRAM(S): 2.5 TABLET ORAL at 06:18

## 2020-01-01 RX ADMIN — Medication 1 MILLIGRAM(S): at 11:39

## 2020-01-01 RX ADMIN — Medication 5 UNIT(S): at 17:35

## 2020-01-01 RX ADMIN — Medication 81 MILLIGRAM(S): at 11:38

## 2020-01-01 RX ADMIN — CEFEPIME 100 MILLIGRAM(S): 1 INJECTION, POWDER, FOR SOLUTION INTRAMUSCULAR; INTRAVENOUS at 11:17

## 2020-01-01 RX ADMIN — Medication 81 MILLIGRAM(S): at 11:14

## 2020-01-01 RX ADMIN — Medication 400 MILLIGRAM(S): at 11:27

## 2020-01-01 RX ADMIN — Medication 650 MILLIGRAM(S): at 21:17

## 2020-01-01 RX ADMIN — PANTOPRAZOLE SODIUM 40 MILLIGRAM(S): 20 TABLET, DELAYED RELEASE ORAL at 17:12

## 2020-01-01 RX ADMIN — PANTOPRAZOLE SODIUM 40 MILLIGRAM(S): 20 TABLET, DELAYED RELEASE ORAL at 06:36

## 2020-01-01 RX ADMIN — PANTOPRAZOLE SODIUM 40 MILLIGRAM(S): 20 TABLET, DELAYED RELEASE ORAL at 06:26

## 2020-01-01 RX ADMIN — SODIUM CHLORIDE 500 MILLILITER(S): 9 INJECTION INTRAMUSCULAR; INTRAVENOUS; SUBCUTANEOUS at 11:15

## 2020-01-01 RX ADMIN — ATORVASTATIN CALCIUM 80 MILLIGRAM(S): 80 TABLET, FILM COATED ORAL at 22:28

## 2020-01-01 RX ADMIN — Medication 25 MILLIGRAM(S): at 05:02

## 2020-01-01 RX ADMIN — MEROPENEM 100 MILLIGRAM(S): 1 INJECTION INTRAVENOUS at 15:50

## 2020-01-01 RX ADMIN — LISINOPRIL 10 MILLIGRAM(S): 2.5 TABLET ORAL at 06:26

## 2020-01-01 RX ADMIN — Medication 20 MILLIGRAM(S): at 15:41

## 2020-01-01 RX ADMIN — SODIUM CHLORIDE 1000 MILLILITER(S): 9 INJECTION, SOLUTION INTRAVENOUS at 11:07

## 2020-01-01 RX ADMIN — Medication 400 MILLIGRAM(S): at 12:00

## 2020-01-01 RX ADMIN — Medication 400 MILLIGRAM(S): at 11:45

## 2020-01-01 RX ADMIN — LINEZOLID 100 MILLIGRAM(S): 600 INJECTION, SOLUTION INTRAVENOUS at 17:14

## 2020-01-01 RX ADMIN — Medication 4.26 MICROGRAM(S)/KG/MIN: at 00:38

## 2020-01-01 RX ADMIN — Medication 650 MILLIGRAM(S): at 00:57

## 2020-01-01 RX ADMIN — Medication 5 UNIT(S): at 16:38

## 2020-01-01 RX ADMIN — Medication 400 MILLIGRAM(S): at 11:44

## 2020-01-01 RX ADMIN — Medication 25 MILLIGRAM(S): at 18:21

## 2020-01-01 RX ADMIN — CLOPIDOGREL BISULFATE 75 MILLIGRAM(S): 75 TABLET, FILM COATED ORAL at 11:16

## 2020-01-01 RX ADMIN — Medication 250 MILLIGRAM(S): at 17:39

## 2020-01-01 RX ADMIN — Medication 1 MILLIGRAM(S): at 11:29

## 2020-01-01 RX ADMIN — Medication 1 MILLIGRAM(S): at 11:45

## 2020-01-01 RX ADMIN — MIDODRINE HYDROCHLORIDE 10 MILLIGRAM(S): 2.5 TABLET ORAL at 14:41

## 2020-01-01 RX ADMIN — Medication 25 MILLIGRAM(S): at 06:16

## 2020-01-01 RX ADMIN — Medication 50 MILLIGRAM(S): at 21:17

## 2020-01-01 RX ADMIN — Medication 5 MILLIGRAM(S): at 16:31

## 2020-01-01 RX ADMIN — ATORVASTATIN CALCIUM 80 MILLIGRAM(S): 80 TABLET, FILM COATED ORAL at 21:59

## 2020-01-01 RX ADMIN — RITUXIMAB 100 MILLIGRAM(S): 10 INJECTION, SOLUTION INTRAVENOUS at 12:05

## 2020-01-01 RX ADMIN — Medication 1 MILLIGRAM(S): at 11:05

## 2020-01-01 RX ADMIN — FLUCONAZOLE 400 MILLIGRAM(S): 150 TABLET ORAL at 11:45

## 2020-01-01 RX ADMIN — Medication 100 MILLIGRAM(S): at 17:09

## 2020-01-01 RX ADMIN — FLUCONAZOLE 200 MILLIGRAM(S): 150 TABLET ORAL at 11:28

## 2020-01-01 RX ADMIN — Medication 50 MILLIGRAM(S): at 17:17

## 2020-01-01 RX ADMIN — Medication 81 MILLIGRAM(S): at 11:44

## 2020-01-01 RX ADMIN — Medication 3 UNIT(S): at 08:13

## 2020-01-01 RX ADMIN — Medication 50 GRAM(S): at 13:42

## 2020-01-01 RX ADMIN — Medication 400 MILLIGRAM(S): at 11:39

## 2020-01-01 RX ADMIN — ONDANSETRON 108 MILLIGRAM(S): 8 TABLET, FILM COATED ORAL at 11:21

## 2020-01-01 RX ADMIN — Medication 1: at 08:12

## 2020-01-01 RX ADMIN — LISINOPRIL 10 MILLIGRAM(S): 2.5 TABLET ORAL at 06:08

## 2020-01-01 RX ADMIN — ENOXAPARIN SODIUM 40 MILLIGRAM(S): 100 INJECTION SUBCUTANEOUS at 21:03

## 2020-01-01 RX ADMIN — CLOPIDOGREL BISULFATE 75 MILLIGRAM(S): 75 TABLET, FILM COATED ORAL at 11:38

## 2020-01-01 RX ADMIN — Medication 25 GRAM(S): at 13:16

## 2020-01-01 RX ADMIN — LINEZOLID 100 MILLIGRAM(S): 600 INJECTION, SOLUTION INTRAVENOUS at 17:03

## 2020-01-01 RX ADMIN — SODIUM CHLORIDE 50 MILLILITER(S): 9 INJECTION INTRAMUSCULAR; INTRAVENOUS; SUBCUTANEOUS at 12:46

## 2020-01-01 RX ADMIN — Medication 250 MILLIGRAM(S): at 11:39

## 2020-01-01 RX ADMIN — Medication 25 MILLIGRAM(S): at 05:24

## 2020-01-01 RX ADMIN — LISINOPRIL 10 MILLIGRAM(S): 2.5 TABLET ORAL at 05:39

## 2020-01-01 RX ADMIN — Medication 5 UNIT(S): at 17:43

## 2020-01-01 RX ADMIN — LINEZOLID 100 MILLIGRAM(S): 600 INJECTION, SOLUTION INTRAVENOUS at 10:14

## 2020-01-01 RX ADMIN — MEROPENEM 100 MILLIGRAM(S): 1 INJECTION INTRAVENOUS at 21:18

## 2020-01-01 RX ADMIN — FLUCONAZOLE 200 MILLIGRAM(S): 150 TABLET ORAL at 11:38

## 2020-01-01 RX ADMIN — PANTOPRAZOLE SODIUM 40 MILLIGRAM(S): 20 TABLET, DELAYED RELEASE ORAL at 17:02

## 2020-01-01 RX ADMIN — Medication 2.5 MILLIGRAM(S): at 15:50

## 2020-01-01 RX ADMIN — SODIUM CHLORIDE 2000 MILLILITER(S): 9 INJECTION, SOLUTION INTRAVENOUS at 09:18

## 2020-01-01 RX ADMIN — ATORVASTATIN CALCIUM 80 MILLIGRAM(S): 80 TABLET, FILM COATED ORAL at 21:45

## 2020-01-01 RX ADMIN — Medication 25 GRAM(S): at 14:01

## 2020-01-01 RX ADMIN — Medication 250 MILLIGRAM(S): at 22:00

## 2020-01-01 RX ADMIN — FLUCONAZOLE 400 MILLIGRAM(S): 150 TABLET ORAL at 11:06

## 2020-01-01 RX ADMIN — Medication 5 UNIT(S): at 08:12

## 2020-01-01 RX ADMIN — Medication 1: at 07:59

## 2020-01-01 RX ADMIN — CHLORHEXIDINE GLUCONATE 1 APPLICATION(S): 213 SOLUTION TOPICAL at 05:49

## 2020-01-01 RX ADMIN — Medication 20 MILLIGRAM(S): at 11:21

## 2020-01-01 RX ADMIN — Medication 50 MILLIGRAM(S): at 06:18

## 2020-01-01 RX ADMIN — Medication 50 MILLIGRAM(S): at 17:39

## 2020-01-01 RX ADMIN — Medication 5 UNIT(S): at 11:40

## 2020-01-01 RX ADMIN — Medication 101 MILLIGRAM(S): at 11:21

## 2020-01-01 RX ADMIN — Medication 5 UNIT(S): at 16:26

## 2020-01-01 RX ADMIN — Medication 5 UNIT(S): at 16:53

## 2020-01-01 RX ADMIN — SODIUM CHLORIDE 50 MILLILITER(S): 9 INJECTION INTRAMUSCULAR; INTRAVENOUS; SUBCUTANEOUS at 05:35

## 2020-01-01 RX ADMIN — PANTOPRAZOLE SODIUM 40 MILLIGRAM(S): 20 TABLET, DELAYED RELEASE ORAL at 05:17

## 2020-01-01 RX ADMIN — Medication 400 MILLIGRAM(S): at 11:29

## 2020-01-01 RX ADMIN — CLOPIDOGREL BISULFATE 75 MILLIGRAM(S): 75 TABLET, FILM COATED ORAL at 12:21

## 2020-01-01 RX ADMIN — PANTOPRAZOLE SODIUM 40 MILLIGRAM(S): 20 TABLET, DELAYED RELEASE ORAL at 17:05

## 2020-01-01 RX ADMIN — FLUCONAZOLE 200 MILLIGRAM(S): 150 TABLET ORAL at 11:29

## 2020-01-01 RX ADMIN — PANTOPRAZOLE SODIUM 40 MILLIGRAM(S): 20 TABLET, DELAYED RELEASE ORAL at 17:50

## 2020-01-01 RX ADMIN — CEFEPIME 100 MILLIGRAM(S): 1 INJECTION, POWDER, FOR SOLUTION INTRAMUSCULAR; INTRAVENOUS at 17:02

## 2020-01-01 RX ADMIN — CHLORHEXIDINE GLUCONATE 1 APPLICATION(S): 213 SOLUTION TOPICAL at 05:24

## 2020-01-01 RX ADMIN — FLUCONAZOLE 200 MILLIGRAM(S): 150 TABLET ORAL at 11:16

## 2020-01-01 RX ADMIN — Medication 5 UNIT(S): at 07:55

## 2020-01-01 RX ADMIN — Medication 1 MILLIGRAM(S): at 11:56

## 2020-01-01 RX ADMIN — ENOXAPARIN SODIUM 40 MILLIGRAM(S): 100 INJECTION SUBCUTANEOUS at 11:47

## 2020-01-01 RX ADMIN — Medication 5 UNIT(S): at 11:30

## 2020-01-01 RX ADMIN — SODIUM CHLORIDE 1000 MILLILITER(S): 9 INJECTION, SOLUTION INTRAVENOUS at 16:50

## 2020-01-01 RX ADMIN — PANTOPRAZOLE SODIUM 40 MILLIGRAM(S): 20 TABLET, DELAYED RELEASE ORAL at 06:55

## 2020-01-01 RX ADMIN — Medication 81 MILLIGRAM(S): at 11:06

## 2020-01-01 RX ADMIN — Medication 81 MILLIGRAM(S): at 13:48

## 2020-01-01 RX ADMIN — Medication 81 MILLIGRAM(S): at 12:33

## 2020-01-01 RX ADMIN — FLUCONAZOLE 200 MILLIGRAM(S): 150 TABLET ORAL at 11:14

## 2020-01-01 RX ADMIN — Medication 81 MILLIGRAM(S): at 11:35

## 2020-01-01 RX ADMIN — CHLORHEXIDINE GLUCONATE 1 APPLICATION(S): 213 SOLUTION TOPICAL at 05:52

## 2020-01-01 RX ADMIN — CHLORHEXIDINE GLUCONATE 1 APPLICATION(S): 213 SOLUTION TOPICAL at 06:18

## 2020-01-01 RX ADMIN — Medication 50 MILLIGRAM(S): at 05:08

## 2020-01-01 RX ADMIN — PANTOPRAZOLE SODIUM 40 MILLIGRAM(S): 20 TABLET, DELAYED RELEASE ORAL at 05:08

## 2020-01-01 RX ADMIN — Medication 5 UNIT(S): at 11:43

## 2020-01-01 RX ADMIN — Medication 1 MILLIGRAM(S): at 12:08

## 2020-01-01 RX ADMIN — CHLORHEXIDINE GLUCONATE 1 APPLICATION(S): 213 SOLUTION TOPICAL at 05:09

## 2020-01-01 RX ADMIN — AMLODIPINE BESYLATE 5 MILLIGRAM(S): 2.5 TABLET ORAL at 06:19

## 2020-01-01 RX ADMIN — Medication 650 MILLIGRAM(S): at 15:39

## 2020-01-01 RX ADMIN — Medication 20 MILLIGRAM(S): at 15:55

## 2020-01-01 RX ADMIN — Medication 400 MILLIGRAM(S): at 11:06

## 2020-01-01 RX ADMIN — LISINOPRIL 10 MILLIGRAM(S): 2.5 TABLET ORAL at 05:10

## 2020-01-01 RX ADMIN — Medication 975 MILLIGRAM(S): at 16:56

## 2020-01-01 RX ADMIN — PANTOPRAZOLE SODIUM 40 MILLIGRAM(S): 20 TABLET, DELAYED RELEASE ORAL at 05:55

## 2020-01-01 RX ADMIN — MEROPENEM 100 MILLIGRAM(S): 1 INJECTION INTRAVENOUS at 21:44

## 2020-01-01 RX ADMIN — Medication 50 MILLIGRAM(S): at 16:33

## 2020-01-01 RX ADMIN — Medication 650 MILLIGRAM(S): at 16:34

## 2020-01-01 RX ADMIN — CEFEPIME 100 MILLIGRAM(S): 1 INJECTION, POWDER, FOR SOLUTION INTRAMUSCULAR; INTRAVENOUS at 05:01

## 2020-01-01 RX ADMIN — LINEZOLID 300 MILLIGRAM(S): 600 INJECTION, SOLUTION INTRAVENOUS at 18:31

## 2020-01-01 RX ADMIN — ENOXAPARIN SODIUM 40 MILLIGRAM(S): 100 INJECTION SUBCUTANEOUS at 11:29

## 2020-01-01 RX ADMIN — Medication 1 MILLIGRAM(S): at 12:11

## 2020-01-01 RX ADMIN — FLUCONAZOLE 200 MILLIGRAM(S): 150 TABLET ORAL at 12:20

## 2020-01-01 RX ADMIN — PANTOPRAZOLE SODIUM 40 MILLIGRAM(S): 20 TABLET, DELAYED RELEASE ORAL at 06:16

## 2020-01-01 RX ADMIN — Medication 50 MILLIGRAM(S): at 05:48

## 2020-01-01 RX ADMIN — CHLORHEXIDINE GLUCONATE 1 APPLICATION(S): 213 SOLUTION TOPICAL at 05:14

## 2020-01-01 RX ADMIN — CHLORHEXIDINE GLUCONATE 1 APPLICATION(S): 213 SOLUTION TOPICAL at 05:08

## 2020-01-01 RX ADMIN — Medication 50 GRAM(S): at 12:23

## 2020-01-01 RX ADMIN — MIDODRINE HYDROCHLORIDE 10 MILLIGRAM(S): 2.5 TABLET ORAL at 11:29

## 2020-01-01 RX ADMIN — Medication 5 UNIT(S): at 11:24

## 2020-01-01 RX ADMIN — MIDODRINE HYDROCHLORIDE 10 MILLIGRAM(S): 2.5 TABLET ORAL at 21:51

## 2020-01-01 RX ADMIN — CLOPIDOGREL BISULFATE 75 MILLIGRAM(S): 75 TABLET, FILM COATED ORAL at 11:14

## 2020-01-01 RX ADMIN — Medication 5 UNIT(S): at 11:45

## 2020-01-01 RX ADMIN — CHLORHEXIDINE GLUCONATE 1 APPLICATION(S): 213 SOLUTION TOPICAL at 06:43

## 2020-01-01 RX ADMIN — Medication 100 MILLIGRAM(S): at 00:15

## 2020-01-01 RX ADMIN — FLUCONAZOLE 400 MILLIGRAM(S): 150 TABLET ORAL at 11:25

## 2020-01-01 RX ADMIN — SODIUM CHLORIDE 75 MILLILITER(S): 9 INJECTION INTRAMUSCULAR; INTRAVENOUS; SUBCUTANEOUS at 19:05

## 2020-01-01 RX ADMIN — PANTOPRAZOLE SODIUM 40 MILLIGRAM(S): 20 TABLET, DELAYED RELEASE ORAL at 06:19

## 2020-01-01 RX ADMIN — ATORVASTATIN CALCIUM 80 MILLIGRAM(S): 80 TABLET, FILM COATED ORAL at 21:41

## 2020-01-01 RX ADMIN — MEROPENEM 100 MILLIGRAM(S): 1 INJECTION INTRAVENOUS at 15:27

## 2020-01-01 RX ADMIN — INSULIN GLARGINE 15 UNIT(S): 100 INJECTION, SOLUTION SUBCUTANEOUS at 21:11

## 2020-01-01 RX ADMIN — Medication 5 UNIT(S): at 17:04

## 2020-01-01 RX ADMIN — Medication 400 MILLIGRAM(S): at 11:17

## 2020-01-01 RX ADMIN — Medication 650 MILLIGRAM(S): at 11:22

## 2020-01-01 RX ADMIN — Medication 25 GRAM(S): at 11:44

## 2020-01-01 RX ADMIN — CHLORHEXIDINE GLUCONATE 1 APPLICATION(S): 213 SOLUTION TOPICAL at 06:36

## 2020-01-01 RX ADMIN — PANTOPRAZOLE SODIUM 40 MILLIGRAM(S): 20 TABLET, DELAYED RELEASE ORAL at 05:35

## 2020-01-01 RX ADMIN — AMLODIPINE BESYLATE 5 MILLIGRAM(S): 2.5 TABLET ORAL at 05:08

## 2020-01-01 RX ADMIN — CLOPIDOGREL BISULFATE 75 MILLIGRAM(S): 75 TABLET, FILM COATED ORAL at 11:45

## 2020-01-01 RX ADMIN — LINEZOLID 100 MILLIGRAM(S): 600 INJECTION, SOLUTION INTRAVENOUS at 11:19

## 2020-01-01 RX ADMIN — Medication 250 MILLIGRAM(S): at 05:20

## 2020-01-01 RX ADMIN — CEFEPIME 100 MILLIGRAM(S): 1 INJECTION, POWDER, FOR SOLUTION INTRAMUSCULAR; INTRAVENOUS at 17:05

## 2020-01-01 RX ADMIN — Medication 81 MILLIGRAM(S): at 12:58

## 2020-01-01 RX ADMIN — ATORVASTATIN CALCIUM 80 MILLIGRAM(S): 80 TABLET, FILM COATED ORAL at 21:24

## 2020-01-01 RX ADMIN — PANTOPRAZOLE SODIUM 40 MILLIGRAM(S): 20 TABLET, DELAYED RELEASE ORAL at 06:15

## 2020-01-01 RX ADMIN — LINEZOLID 100 MILLIGRAM(S): 600 INJECTION, SOLUTION INTRAVENOUS at 06:15

## 2020-01-01 RX ADMIN — ATORVASTATIN CALCIUM 80 MILLIGRAM(S): 80 TABLET, FILM COATED ORAL at 21:32

## 2020-01-01 RX ADMIN — PANTOPRAZOLE SODIUM 40 MILLIGRAM(S): 20 TABLET, DELAYED RELEASE ORAL at 05:24

## 2020-01-01 RX ADMIN — INSULIN HUMAN 10 UNIT(S): 100 INJECTION, SOLUTION SUBCUTANEOUS at 18:15

## 2020-01-01 RX ADMIN — PANTOPRAZOLE SODIUM 40 MILLIGRAM(S): 20 TABLET, DELAYED RELEASE ORAL at 05:49

## 2020-01-01 RX ADMIN — ENOXAPARIN SODIUM 40 MILLIGRAM(S): 100 INJECTION SUBCUTANEOUS at 21:18

## 2020-01-01 RX ADMIN — Medication 20 MILLIGRAM(S): at 10:27

## 2020-01-01 RX ADMIN — Medication 25 MILLIGRAM(S): at 17:09

## 2020-01-01 RX ADMIN — Medication 1: at 17:09

## 2020-01-01 RX ADMIN — ENOXAPARIN SODIUM 40 MILLIGRAM(S): 100 INJECTION SUBCUTANEOUS at 21:32

## 2020-01-01 RX ADMIN — Medication 1: at 07:36

## 2020-01-01 RX ADMIN — Medication 25 GRAM(S): at 11:35

## 2020-04-28 NOTE — ED PROVIDER NOTE - PHYSICAL EXAMINATION
Vital Signs: I have reviewed the initial vital signs.  Constitutional: NAD, appears stated age.  HEENT: Airway patent, moist MM, no erythema/swelling/deformity of oral structures. EOMI, PERRLA.  CV: regular rate, regular rhythm, well-perfused extremities, 2+ b/l DP and radial pulses equal.  Lungs: BCTA, no increased WOB.  ABD: NTND, no guarding or rebound, no pulsatile mass, no hernias.   MSK: Neck supple, nontender, nl ROM, no stepoff. Chest nontender. Back nontender in TLS spine or to b/l bony structures or flanks. Ext nontender, nl rom, trace nonpitting edema of left LE throughout. relative coolness of left LE compared to right. Trace palpable DP and PT pulses of left, normal palpable pulse of right LE. No color difference.   INTEG: Skin warm, dry, no rash.  NEURO: A&Ox3, CN II-XII intact. 4/5 strength of right LE, 3.5/5 strength in left LE throughout, nl sensation throughout, normal speech.   PSYCH: Calm, cooperative, normal affect and interaction.

## 2020-04-28 NOTE — H&P ADULT - NSHPLABSRESULTS_GEN_ALL_CORE
5.0    11.61 )-----------( 255      ( 28 Apr 2020 14:03 )             14.0   04-28    138  |  99  |  17  ----------------------------<  153<H>  4.1   |  24  |  <0.5<L>    Ca    8.5      28 Apr 2020 14:51  Mg     1.8     04-28    TPro  6.1  /  Alb  4.0  /  TBili  1.0  /  DBili  x   /  AST  19  /  ALT  15  /  AlkPhos  130<H>  04-28    Blood Gas Venous - Lactate (04.28.20 @ 14:26) 2.7: Elevated lactate    Imaging:    < from: Xray Chest 1 View- PORTABLE-Urgent (04.28.20 @ 16:08) >  Impression:    No radiographic evidence of acute cardiopulmonary disease.  < end of copied text >    EKG:    < from: 12 Lead ECG (04.28.20 @ 14:19) >  Diagnosis Line Sinus tachycardia  Right bundle branch block  Abnormal ECG  < end of copied text >

## 2020-04-28 NOTE — CONSULT NOTE ADULT - SUBJECTIVE AND OBJECTIVE BOX
AD BASURTO [id number]  74y Female    HPI:  74F w/PMHx of CAD s/p stents, HTN, and HLD presents with progressive weakness and pain on exertion of the left lower extremity for the past two weeks. She also endorses decreased appetite and fatigue. She reports that prior to the onset of symptoms a few weeks ago, she was ambulating independently without assist. Two weeks ago she started using a walker to ambulate, and in the past few days has been limited to bedrest. In the ED she is vitally stable. Labs significant for WBC of 11.61, Hgb of 5.0. Transfused 2 units PRBCs. Arterial duplex performed, preliminary read not significant for obvious vascular disease.     PAST MEDICAL & SURGICAL HISTORY:  CAD (coronary artery disease): s/p stenting    MEDICATIONS  (STANDING):    MEDICATIONS  (PRN):    Allergies  penicillin (Anaphylaxis; Hives)    REVIEW OF SYSTEMS    [ X ] A ten-point review of systems was otherwise negative except as noted.  [ ] Due to altered mental status/intubation, subjective information were not able to be obtained from the patient. History was obtained, to the extent possible, from review of the chart and collateral sources of information.    Vital Signs Last 24 Hrs  T(C): 37.1 (28 Apr 2020 13:11), Max: 37.1 (28 Apr 2020 13:11)  T(F): 98.8 (28 Apr 2020 13:11), Max: 98.8 (28 Apr 2020 13:11)  HR: 98 (28 Apr 2020 13:11) (98 - 98)  BP: 112/54 (28 Apr 2020 13:11) (112/54 - 112/54)  RR: 19 (28 Apr 2020 13:11) (19 - 19)  SpO2: 100% (28 Apr 2020 13:11) (100% - 100%)    PHYSICAL EXAM:  GENERAL: NAD  CHEST/LUNG: Clear to auscultation bilaterally  HEART: Regular rate and rhythm  ABDOMEN: Soft, Nontender, Nondistended  EXTREMITIES: Left lower extremity warm, well-perfused; palpable PT pulse, no tenderness, sensation in tact; remainder of extremity exam WNL    LABS:                        5.0    11.61 )-----------( 255      ( 28 Apr 2020 14:03 )             14.0       Auto Neutrophil %: 33.6 % (04-28-20 @ 14:03)    04-28    138  |  99  |  17  ----------------------------<  153<H>  4.1   |  24  |  <0.5<L>    Calcium, Total Serum: 8.5 mg/dL (04-28-20 @ 14:51)    LFTs:             6.1  | 1.0  | 19       ------------------[130     ( 28 Apr 2020 14:51 )  4.0  | x    | 15          Lipase:34       Blood Gas Venous - Lactate: 2.7 mmoL/L (04-28-20 @ 14:26)    Coags:     18.10  ----< 1.57    ( 28 Apr 2020 14:03 )     32.2     CARDIAC MARKERS ( 28 Apr 2020 14:03 )  x     / <0.01 ng/mL / 32 U/L / x     / x        RADIOLOGY & ADDITIONAL STUDIES:  *F/U Arterial duplex read

## 2020-04-28 NOTE — ED ADULT TRIAGE NOTE - CHIEF COMPLAINT QUOTE
Patient brought in by daughter after being evaluated by Dr. Shepard for weakness over the past few weeks. At baseline about 3 weeks ago patient was independent with care now noted to have increased weakness. Patient at baseline mental status as per family, patient is alert and oriented just noted weakness.

## 2020-04-28 NOTE — H&P ADULT - ASSESSMENT
#Acute Microcytic Anemia    #Generalized weakness with decreasing mobility    #HTN    #DLD    #CAD s/p stents      DVT PPx: SCD   GI PPx: not indicated   Diet: Dash Diet   Activity: Increase as tolerated , pending PT & Physiatry evaluation  Code Status:  Dispo: 75 y/o F with PMH of CAD s/p stent, DLD, HTN who presented to the ED with a complaint of LE weakness and decreased PO intake.    #Acute Microcytic Anemia    #Generalized weakness with decreasing mobility    #HTN    #DLD    #CAD s/p stents      DVT PPx: SCD   GI PPx: not indicated   Diet: Dash Diet   Activity: Increase as tolerated , pending PT & Physiatry evaluation  Code Status:  Dispo: 73 y/o F with PMH of CAD s/p stent, DLD, HTN who presented to the ED with a complaint of LE weakness and decreased PO intake.    #Acute Microcytic Anemia  - Hgb of 5 on admission   - pt denies active bleeding, trauma, melena, history of anemia   - pt unsure when her last colonoscopy was done   - normal Hgb on labs from 2017 in [facility]   - 3 units of pRBC ordered (1st unit given at time of admission), delayed due to hemolyzed T&S  - follow up repeat Hgb at 11:30 &  in the AM   - follow up Iron studies  - monitor for signs of GI bleeding  - keep active type and screen    #Generalized weakness with decreasing mobility  - likely due to low hemoglobin   - no complaints of saddle anesthesia, negative straight leg test b/l   - 3+ muscle strength of b/l LE but no FND noted on exam   - vascular consulted, arterial and venous duplex performed, prelim is negative  - follow up official results of duplex  - Physiatry & PT eval   - fall precaution     #HTN  - c/w home meds (lisinopril 10mg daily)    #DLD  - c/w home meds (atorvastatin 80mg daily)    #CAD s/p stents  - unclear if pt on ASA at home (medication not listed with [facility]  - was on metorpolol 50mg BID but hasn't filled med since August (unclear why)  - c/w atorvastatin 80 mg daily  - consider resuming metoprolol if pt remains tachycardic post transfusion    #DM II  - has not filled metformin since Aug 2019  (unclear why), not on any other DM meds   - fingersticks before meals, qhs  - basal bolus insulin if >180   - carb consistency     Med Rec confirmed with [facility]     DVT PPx: SCD   GI PPx: not indicated   Diet: Dash Diet, carb consistent   Activity: Increase as tolerated , pending PT & Physiatry evaluation  Code Status: Full Code   Dispo: From home, acute pending work up

## 2020-04-28 NOTE — H&P ADULT - NSHPPHYSICALEXAM_GEN_ALL_CORE
Vital Signs Last 24 Hrs  T(C): 36.8 (28 Apr 2020 16:30), Max: 37.1 (28 Apr 2020 13:11)  T(F): 98.2 (28 Apr 2020 16:30), Max: 98.8 (28 Apr 2020 13:11)  HR: 107 (28 Apr 2020 16:30) (98 - 107)  BP: 129/60 (28 Apr 2020 16:30) (112/54 - 129/60)  BP(mean): --  RR: 18 (28 Apr 2020 16:30) (18 - 19)  SpO2: 94% (28 Apr 2020 16:30) (94% - 100%) Vital Signs Last 24 Hrs  T(C): 36.8 (28 Apr 2020 16:30), Max: 37.1 (28 Apr 2020 13:11)  T(F): 98.2 (28 Apr 2020 16:30), Max: 98.8 (28 Apr 2020 13:11)  HR: 107 (28 Apr 2020 16:30) (98 - 107)  BP: 129/60 (28 Apr 2020 16:30) (112/54 - 129/60)  BP(mean): --  RR: 18 (28 Apr 2020 16:30) (18 - 19)  SpO2: 94% (28 Apr 2020 16:30) (94% - 100%)    General: NAD, resting comfortably in bed   HEENT: NCAT  Cardiac: regular rate and rhythm, normal s1, s2. No rubs gallops or murmurs  Chest/Lungs: CTA b/l  Abdomen/: Soft, non tender, non distended, normal bowel sounds  Extremities: no lower extremity edema b/l, 3+ strength in b/l LE, 4+ strength UE b/l, negative straight leg test b/l   Neuro: A&O x , no FND   Skin: No rashes or open wounds

## 2020-04-28 NOTE — ED ADULT NURSE NOTE - NSIMPLEMENTINTERV_GEN_ALL_ED
Implemented All Fall Risk Interventions:  [place] to call system. Call bell, personal items and telephone within reach. Instruct patient to call for assistance. Room bathroom lighting operational. Non-slip footwear when patient is off stretcher. Physically safe environment: no spills, clutter or unnecessary equipment. Stretcher in lowest position, wheels locked, appropriate side rails in place. Provide visual cue, wrist band, yellow gown, etc. Monitor gait and stability. Monitor for mental status changes and reorient to person, place, and time. Review medications for side effects contributing to fall risk. Reinforce activity limits and safety measures with patient and family.

## 2020-04-28 NOTE — ED PROVIDER NOTE - OBJECTIVE STATEMENT
74 y F PMH HTN, HLD, CAD s/p stenting, pw generalized weakness worsening x 2-3 wks, with worst weakness and pain with wobbling under minimal exertion in left leg at home.  Associated poor appetite and PO intake reduced x 2 wks. Previously ambulating independently, then 2 wks ago with roller walker, then almost but not entirely stuck in bed x 2 days. No fever, chills, nausea, vomiting, diarrhea, chest pain, SOB, abd pain, cough, back pain, sweats, dysuria, hematuria.

## 2020-04-28 NOTE — GOALS OF CARE CONVERSATION - ADVANCED CARE PLANNING - CONVERSATION DETAILS
Goals of care discussed in light of patient's comorbidities and presenting symptoms, patient is to be full code.

## 2020-04-28 NOTE — ED PROVIDER NOTE - NS ED ROS FT
Constitutional: (-) fever, (-) chills  Eyes/ENT: (-) blurry vision, (-) epistaxis, (-) sore throat  Cardiovascular: (-) chest pain, (-) syncope  Respiratory: (-) cough, (-) shortness of breath  Gastrointestinal: (-) abdominal pain, (-) vomiting, (-) diarrhea  Musculoskeletal: (-) neck pain, (-) back pain, (-) joint pain, (+) limb pain.  Integumentary: (-) rash, (+) edema  Neurological: (-) headache, (-) altered mental status, (+) generalized weakness.  : (-) dysuria, hematuria.  Allergic/Immunologic: (-) pruritus, rash

## 2020-04-28 NOTE — H&P ADULT - ATTENDING COMMENTS
Discussed case with patient's daughter, Kathrine, via patient's 's phone at [phone], daughter's contact is [phone], states she is available 24/7 for updates. Patient provided limited history.     PHYSICAL EXAM:    CONSTITUTIONAL: NAD  ENMT: EOMI, PERRLA, pallor conjunctiva, No tonsillar erythema, exudates, or enlargement, neck supple, No JVD  PSYCH: Alert & Oriented X2  RESPIRATORY: Clear to percussion bilaterally; No rales, rhonchi, wheezing, or rubs  CARDIOVASCULAR: Regular rate and rhythm; No murmurs, rubs, or gallops, negative edema  GASTROINTESTINAL: Soft, Nontender, Nondistended; Bowel sounds present  EXTREMITIES:  2+ Peripheral Pulses, No clubbing, cyanosis  SKIN: No rashes or lesions    75 yo F with PMHx of CAD s/p PCI (14 years ago), DLD, HTN, as per daughter has not been to a physician for many years, was brought to Cardiologist's office today in hopes of expedited admission to ED for evaluation of lethargy, generalized weakness with inability to sit herself up and decreased PO intake for over 2 weeks duration with worsened progression in the last 2 days, as per daughter. New to daughter is patient's self report of left groin discomfort for "many years", patient points to her left groin area, unable to further elaborate on pain. Daughter states patient has not had any falls, is a&ox3 at baseline, and independent with all activities of daily living, patient's AMS is new as of day of presentation. As per daughter she has also noticed patient has had frequent urination and prolonged urination (states she may urinate up to 10 minutes at a time) patient denies any dysuria or changes in urine color or odor, ROS also significant for constipation, last bowel movement 3 days ago as per daughter, thought positive flatus since then.     #Symptomatic Anemia: Hgb 5.0 on admission, s/p 2 unit PRBC on interview, f/u CBC, iron studies, awaiting Hematology for evaluation of underlying malignancy/dyscrasia in light of increased lymphocytes, patient has not had any active bleeding, melena, hematochezia, trauma, remains hemodynamically stable    #Cystitis, Metabolic Encephalopathy: Ciprofloxacin in light of penicillin allergy, f/u urine culture    #Left Groin Pain: r/o Osetoarthritis, f/u left hip xray    #Constipation: Daughter is an EMT concern for obstruction, requested GI consult, advised her not warranted at this time, will proceed with KUB to assess stool burden, bowel regiment     #HTN/HLD/CAD/DM II (monitor fingersticks basal bolus insulin if greater than 180): continue home medications     Disposition: Acute, PT/Rehab once medically stable

## 2020-04-28 NOTE — ED PROVIDER NOTE - CLINICAL SUMMARY MEDICAL DECISION MAKING FREE TEXT BOX
pw generalized weakness, progressive, and left leg weakness and pain with decreased pulses. Evaluated by vascular surgery - deemed no acute intervention and likely exacerbated 2ndary to noted anemia on workup. CXR without acute cardiopulmonary process. plan for iron studies, transfusion. Patient to be admitted. Case discussed with and care endorsed to medical admitting resident. Admitting physician notified.

## 2020-04-28 NOTE — H&P ADULT - NSCORESITESY/N_GEN_A_CORE_RD
Nutrition/Hydration-ADULT     Nutrient/Hydration intake appropriate for improving, restoring or maintaining nutritional needs Progressing        Potential for Falls     Patient will remain free of falls Progressing        Prexisting or High Potential for Compromised Skin Integrity     Skin integrity is maintained or improved Progressing No

## 2020-04-28 NOTE — ED PROVIDER NOTE - CARE PLAN
Assessment and plan of treatment:	Eval for vascular insufficiency, arterial of greater likelihood than venous, of left LE. Also eval for organ dysfunction, electrolyte abnormality, or cardiorespiratory cause of generalized weakness limiting ability to function at home. Principal Discharge DX:	Anemia  Assessment and plan of treatment:	Eval for vascular insufficiency, arterial of greater likelihood than venous, of left LE. Also eval for organ dysfunction, electrolyte abnormality, or cardiorespiratory cause of generalized weakness limiting ability to function at home.  Secondary Diagnosis:	Left leg pain

## 2020-04-28 NOTE — H&P ADULT - HISTORY OF PRESENT ILLNESS
75 y/o F with PMH of CAD s/p stent, DLD, HTN who presented to the ED with a complaint of LE weakness and decreased PO intake.     In the ED, T-98.2, HR 98, /52, O2 100 on RA. Labs remarkable for WBC 11.61, Hgb 5, Lactate 2.7. CXR negative for acute pathology. Vascular consulted and prelim reads on LE arterieal and venous duplex negative (pending official read). 3 Total units of pRBC ordered by ED for transfusion. 73 y/o F with PMH of CAD s/p stent, DLD, HTN who presented to the ED with a complaint of LE weakness and decreased PO intake. History taken from the charts as patient is a poor historian and unable to make contact with . Symptoms began 2 weeks ago with LE weakness associated with tingling in her left leg and decreased mobility. Prior to this she was able to ambulate without issue. However, she initially required a walker to move around and for the last several days has not been ambulating at all. She has also had decreased PO intake.     She denies fevers, chills, cough, SOB, CP, abdominal pain, nausea, vomiting, diarrhea constipation, urinary symptoms, joint pain, radiating pain, saddle anesthesia, any recent trauma, falls or signs of active bleeding.     In the ED, T-98.2, HR 98, /52, O2 100 on RA. Labs remarkable for WBC 11.61, Hgb 5, Lactate 2.7. CXR negative for acute pathology. Vascular consulted and prelim reads on LE arterieal and venous duplex negative (pending official read). 3 Total units of pRBC ordered by ED for transfusion. 75 y/o F with PMH of CAD s/p stent, DLD, HTN who presented to the ED with a complaint of LE weakness and decreased PO intake. History taken from the charts as patient is a poor historian and unable to make contact with . Symptoms began 2 weeks ago with LE weakness associated with tingling in her left leg and decreased mobility. Prior to this she was able to ambulate without issue. However, she initially required a walker to move around and for the last several days has not been ambulating at all. She has also had decreased PO intake. Patient taken by her daughter to see Dr. Shepard who sent her to the ED.    She denies fevers, chills, cough, SOB, CP, abdominal pain, nausea, vomiting, diarrhea constipation, urinary symptoms, joint pain, radiating pain, saddle anesthesia, any recent trauma, falls or signs of active bleeding.     In the ED, T-98.2, HR 98, /52, O2 100 on RA. Labs remarkable for WBC 11.61, Hgb 5, Lactate 2.7. CXR negative for acute pathology. Vascular consulted and prelim reads on LE arterieal and venous duplex negative (pending official read). 3 Total units of pRBC ordered by ED for transfusion. 73 y/o F with PMH of CAD s/p stent, DLD, HTN who presented to the ED with a complaint of LE weakness and decreased PO intake. History taken from the charts as patient is a poor historian and unable to make contact with . Symptoms began 2 weeks ago with LE weakness associated with tingling in her left leg and decreased mobility. Prior to this she was able to ambulate without issue. However, she initially required a walker to move around and for the last several days has not been ambulating at all. She has also had decreased PO intake. Patient taken by her daughter to see Dr. Urban who sent her to the ED.    She denies fevers, chills, cough, SOB, CP, abdominal pain, nausea, vomiting, diarrhea constipation, urinary symptoms, joint pain, radiating pain, saddle anesthesia, any recent trauma, falls or signs of active bleeding.     In the ED, T-98.2, HR 98, /52, O2 100 on RA. Labs remarkable for WBC 11.61, Hgb 5, Lactate 2.7. CXR negative for acute pathology. Vascular consulted and prelim reads on LE arterieal and venous duplex negative (pending official read). 3 Total units of pRBC ordered by ED for transfusion.

## 2020-04-28 NOTE — ED PROVIDER NOTE - PLAN OF CARE
Eval for vascular insufficiency, arterial of greater likelihood than venous, of left LE. Also eval for organ dysfunction, electrolyte abnormality, or cardiorespiratory cause of generalized weakness limiting ability to function at home.

## 2020-04-28 NOTE — CONSULT NOTE ADULT - ASSESSMENT
74F w/PMHx of CAD s/p stents, HTN, and HLD presents with progressive weakness and pain on exertion of the left lower extremity for the past two weeks. Vascular surgery consulted for concern for LLE arterial disease.     Plan:   -Prelim read of arterial duplex insignificant for vascular abnormalities  -Follow-up official read of duplex   -Trend CBC, transfuse for hemoglobin >7   -Further imaging/testing necessary for workup of anemia   -No vascular surgery intervention indicated at this time   -Plan discussed with Vascular Surgery fellow and Dr. Dahl

## 2020-04-28 NOTE — ED ADULT NURSE NOTE - OBJECTIVE STATEMENT
Pt. c/o of weakness to bilateral lower extremities more specially the left for the past 3 weeks. The weakness, causes her to have the inability to ambulate. Pt. denies pain to extremities. Pt also denies CP, SOB, fever, chills, abdominal pain or any urinary symptoms.

## 2020-04-28 NOTE — ED PROVIDER NOTE - PROGRESS NOTE DETAILS
Consulted Vascular surgery - coming to see patient. Spoke to daughter Kathrine who reinforced story. Coming to ED to advocate for patient. Vascular prelims negative. Patient noted to have microcytic anemia. Iron studies sent. Transfusion consent with daughter. Patient to be admitted. Case discussed with and care endorsed to medical admitting resident. Admitting physician notified.

## 2020-04-29 NOTE — PHYSICAL THERAPY INITIAL EVALUATION ADULT - PLANNED THERAPY INTERVENTIONS, PT EVAL
postural re-education/transfer training/balance training/gait training/bed mobility training/strengthening

## 2020-04-29 NOTE — PHYSICAL THERAPY INITIAL EVALUATION ADULT - TRANSFER SAFETY CONCERNS NOTED: SIT/STAND, REHAB EVAL
decreased balance during turns/decreased step length/decreased sequencing ability/decreased safety awareness

## 2020-04-29 NOTE — CONSULT NOTE ADULT - ASSESSMENT
74 yof with PMH of CAD s/p PCI, HTN, HLD presented with increased weakness and LLE pain x 2 weeks.  Hematology consulted secondary to abnormal CBC.  Patient found to have WBC of 11.67 with lymphocyte predominance (absolute lymphocyte count 7600), hemoglobin of 5 (MCV 79.1), and platelet 255.  Lymphocyte predominance apparently present since 2017; anemia is new since then.    1. Anemia-New since 9/2017.  Patient responded appropriately to PRBC transfusion.  Iron studies appear to have been taken during or after PRBC transfusion and demonstrate iron of 295, TIBC of 315, % saturation of 94, and ferritin of 646.  LDH mildly elevated at 272, haptoglobin <20 (repeat pending for confirmation), reticulocyte count c/w hypoproliferation.  2. Mild leukocytosis with lymphocyte predominance-Also present on 9/2017 CBC.  Patient also incidentally found to have splenomegaly on KUB performed secondary to constipation.  Recommend ultrasound of spleen for further evaluation. 74 yof with PMH of CAD s/p PCI, HTN, HLD presented with increased weakness and LLE pain x 2 weeks.  Hematology consulted secondary to abnormal CBC.  Patient found to have WBC of 11.67 with lymphocyte predominance (absolute lymphocyte count 7600), hemoglobin of 5 (MCV 79.1), and platelet 255.  Lymphocyte predominance apparently present since 2017; anemia is new since then.    1. Anemia with mild leukocytosis (lymphocyte predominance)-Anemia new since 9/2017.  Leukocytosis with lymphocyte predominance present since at least 9/2017.  Patient responded appropriately to PRBC transfusion.  Iron studies appear to have been taken during or after PRBC transfusion and demonstrate iron of 295, TIBC of 315, % saturation of 94, and ferritin of 646.  LDH mildly elevated at 272, haptoglobin <20 (may be low secondary to transfusions), direct Keith negative, reticulocyte count c/w hypoproliferation.  No evidence of overt hemolysis. Patient also incidentally found to have splenomegaly on KUB.    Differential diagnosis includes CLL vs. other low-grade lymphoma.  Recommend checking flow cytometry, BCR-ABL (papers left in chart), folate, B12, MMA, TSH, SPEP, UPEP, serum immunofixation, free light chains, IgA, IgM, IgG.  Consider CT chest/abdomen/pelvis to evaluate for lymphadenopathy.    Explained need for further workup to patient.  Patient is reluctant to undergo any additional inpatient workup and adamantly states that she wants to go home.  Recommend above workup as inpatient, if patient is willing.    Patient does state that she is willing to follow up as outpatient.  Upon discharge, will make appointment with Dr. Guardado ([address] [place] [address], [phone]).

## 2020-04-29 NOTE — CONSULT NOTE ADULT - SUBJECTIVE AND OBJECTIVE BOX
Patient is a 74y old  Female who presents with a chief complaint of Weakness, low hemoglobin (28 Apr 2020 17:22)      HPI:  73 y/o F with PMH of CAD s/p stent, DLD, HTN who presented to the ED with a complaint of LE weakness and decreased PO intake. History taken from the charts as patient is a poor historian and unable to make contact with . Symptoms began 2 weeks ago with LE weakness associated with tingling in her left leg and decreased mobility. Prior to this she was able to ambulate without issue. However, she initially required a walker to move around and for the last several days has not been ambulating at all. She has also had decreased PO intake. Patient taken by her daughter to see Dr. Urban who sent her to the ED.    She denies fevers, chills, cough, SOB, CP, abdominal pain, nausea, vomiting, diarrhea constipation, urinary symptoms, joint pain, radiating pain, saddle anesthesia, any recent trauma, falls or signs of active bleeding.     In the ED, T-98.2, HR 98, /52, O2 100 on RA. Labs remarkable for WBC 11.61, Hgb 5, Lactate 2.7. CXR negative for acute pathology. Vascular consulted and prelim reads on LE arterieal and venous duplex negative (pending official read). 3 Total units of pRBC ordered by ED for transfusion. (28 Apr 2020 17:22)       ROS:  Negative except for:    PAST MEDICAL & SURGICAL HISTORY:  CAD (coronary artery disease): s/p stenting  No significant past surgical history      SOCIAL HISTORY:    FAMILY HISTORY:  No pertinent family history in first degree relatives      MEDICATIONS  (STANDING):  atorvastatin 80 milliGRAM(s) Oral at bedtime  chlorhexidine 4% Liquid 1 Application(s) Topical <User Schedule>  ciprofloxacin   IVPB      ciprofloxacin   IVPB 200 milliGRAM(s) IV Intermittent every 12 hours  lisinopril 10 milliGRAM(s) Oral daily  pantoprazole    Tablet 40 milliGRAM(s) Oral before breakfast  sodium chloride 0.9%. 1000 milliLiter(s) (50 mL/Hr) IV Continuous <Continuous>    MEDICATIONS  (PRN):    Height (cm): 157.5 (04-28-20 @ 18:48)  Weight (kg): 65.5 (04-28-20 @ 18:48)  BMI (kg/m2): 26.4 (04-28-20 @ 18:48)  BSA (m2): 1.66 (04-28-20 @ 18:48)  Allergies    penicillin (Anaphylaxis; Hives)    Intolerances        Vital Signs Last 24 Hrs  T(C): 37 (29 Apr 2020 04:20), Max: 37.1 (28 Apr 2020 13:11)  T(F): 98.6 (29 Apr 2020 04:20), Max: 98.8 (28 Apr 2020 13:11)  HR: 101 (29 Apr 2020 04:20) (98 - 112)  BP: 128/73 (29 Apr 2020 04:20) (112/54 - 139/61)  BP(mean): --  RR: 18 (29 Apr 2020 04:20) (18 - 20)  SpO2: 96% (28 Apr 2020 21:53) (93% - 100%)    PHYSICAL EXAM  General: adult in NAD  HEENT: clear oropharynx, anicteric sclera, pink conjunctiva  Neck: supple  CV: normal S1/S2 with no murmur rubs or gallops  Lungs: positive air movement b/l ant lungs,clear to auscultation, no wheezes, no rales  Abdomen: soft non-tender non-distended, no hepatosplenomegaly  Ext: no clubbing cyanosis or edema  Skin: no rashes and no petechiae  Neuro: alert and oriented X 4, no focal deficits      LABS:                          7.8    11.11 )-----------( 212      ( 29 Apr 2020 06:31 )             21.2         Mean Cell Volume : 82.8 fL  Mean Cell Hemoglobin : 30.5 pg  Mean Cell Hemoglobin Concentration : 36.8 g/dL  Auto Neutrophil # : 3.40 K/uL  Auto Lymphocyte # : 4.86 K/uL  Auto Monocyte # : 2.70 K/uL  Auto Eosinophil # : 0.10 K/uL  Auto Basophil # : 0.03 K/uL  Auto Neutrophil % : 30.6 %  Auto Lymphocyte % : 43.7 %  Auto Monocyte % : 24.3 %  Auto Eosinophil % : 0.9 %  Auto Basophil % : 0.3 %      Serial CBC's  04-29 @ 06:31  Hct-21.2 / Hgb-7.8 / Plat-212 / RBC-2.56 / WBC-11.11  Serial CBC's  04-28 @ 14:03  Hct-14.0 / Hgb-5.0 / Plat-255 / RBC-1.77 / WBC-11.61      04-29    138  |  102  |  16  ----------------------------<  109<H>  4.1   |  26  |  0.5<L>    Ca    8.6      29 Apr 2020 06:31  Mg     1.8     04-29    TPro  6.1  /  Alb  4.0  /  TBili  1.0  /  DBili  x   /  AST  19  /  ALT  15  /  AlkPhos  130<H>  04-28      PT/INR - ( 28 Apr 2020 14:03 )   PT: 18.10 sec;   INR: 1.57 ratio         PTT - ( 28 Apr 2020 14:03 )  PTT:32.2 sec    Ferritin, Serum: 646 ng/mL (04-28 @ 17:38)  Iron - Total Binding Capacity.: 315 ug/dL (04-28 @ 17:38)              BLOOD SMEAR INTERPRETATION:       RADIOLOGY & ADDITIONAL STUDIES: Patient is a 74y old  Female who presents with a chief complaint of Weakness, low hemoglobin (28 Apr 2020 17:22)      HPI:    74 yof with PMH of CAD s/p PCI, HTN, HLD presented with increased weakness and LLE pain x 2 weeks.  She normally ambulates with a walker, but for the past several days, she has been not been ambulating.  She was also complaining of LLE pain, weakness, and urinary frequency.  Patient went to her cardiologist, who sent her to the ED.  B/L LE venous and arterial duplexes were unremarkable.  Patient was seen by vascular surgery team, who did not recommend any intervention.    Hematology consulted secondary to abnormal CBC.  Patient found to have WBC of 11.67 with lymphocyte predominance (absolute lymphocyte count 7600), hemoglobin of 5 (MCV 79.1), and platelet 255.  Patient received 3 units of PRBCs.  Repeat CBC showed WBC of 11.11 with increased lymphocytes and monocytes, hemoglobin 7.8 (MCV of 82.8), and platelet count of 212.  Prior CBC from 9/11/2017 demonstrated WBC of 14.17 (increased lymphocyte and monocytes). hemoglobin of 13.3, and platelet count of 217.    Patient states that she was once told that she had anemia and was taking iron supplements many years ago.  She is unaware of additional details.  Has never had blood transfusion prior to this admission.  Denies ever having EGD or colonoscopy.  Denies melena, hematochezia, hematuria, vaginal bleeding, fevers, anorexia, weight loss, night sweats.  States that she is currently very tired.    Iron studies appear to have been taken during or after PRBC transfusion and demonstrate iron of 295, TIBC of 315, % saturation of 94, and ferritin of 646.  LDH elevated at 297 but hemolyzed, haptoglobin <20, reticulocyte count c/w hypoproliferation.  Patient also incidentally found to have splenomegaly on KUB performed secondary to constipation.       ROS:  Negative except for:  See HPI.    PAST MEDICAL & SURGICAL HISTORY:  CAD (coronary artery disease): s/p stenting  No significant past surgical history      SOCIAL HISTORY:  Smoker.  Lives with .    FAMILY HISTORY:  No pertinent family history in first degree relatives      MEDICATIONS  (STANDING):  atorvastatin 80 milliGRAM(s) Oral at bedtime  chlorhexidine 4% Liquid 1 Application(s) Topical <User Schedule>  ciprofloxacin   IVPB      ciprofloxacin   IVPB 200 milliGRAM(s) IV Intermittent every 12 hours  lisinopril 10 milliGRAM(s) Oral daily  pantoprazole    Tablet 40 milliGRAM(s) Oral before breakfast  sodium chloride 0.9%. 1000 milliLiter(s) (50 mL/Hr) IV Continuous <Continuous>    MEDICATIONS  (PRN):    Height (cm): 157.5 (04-28-20 @ 18:48)  Weight (kg): 65.5 (04-28-20 @ 18:48)  BMI (kg/m2): 26.4 (04-28-20 @ 18:48)  BSA (m2): 1.66 (04-28-20 @ 18:48)  Allergies    penicillin (Anaphylaxis; Hives)    Intolerances        Vital Signs Last 24 Hrs  T(C): 37 (29 Apr 2020 04:20), Max: 37.1 (28 Apr 2020 13:11)  T(F): 98.6 (29 Apr 2020 04:20), Max: 98.8 (28 Apr 2020 13:11)  HR: 101 (29 Apr 2020 04:20) (98 - 112)  BP: 128/73 (29 Apr 2020 04:20) (112/54 - 139/61)  BP(mean): --  RR: 18 (29 Apr 2020 04:20) (18 - 20)  SpO2: 96% (28 Apr 2020 21:53) (93% - 100%)    PHYSICAL EXAM  General: adult in NAD, resting comfortably in bed  HEENT: NC/AT  Lungs: positive air movement b/l ant lungs  Abdomen: soft  Skin: no rashes and no petechiae  Neuro: alert and oriented X 4, no focal deficits      LABS:                          7.8    11.11 )-----------( 212      ( 29 Apr 2020 06:31 )             21.2         Mean Cell Volume : 82.8 fL  Mean Cell Hemoglobin : 30.5 pg  Mean Cell Hemoglobin Concentration : 36.8 g/dL  Auto Neutrophil # : 3.40 K/uL  Auto Lymphocyte # : 4.86 K/uL  Auto Monocyte # : 2.70 K/uL  Auto Eosinophil # : 0.10 K/uL  Auto Basophil # : 0.03 K/uL  Auto Neutrophil % : 30.6 %  Auto Lymphocyte % : 43.7 %  Auto Monocyte % : 24.3 %  Auto Eosinophil % : 0.9 %  Auto Basophil % : 0.3 %      Serial CBC's  04-29 @ 06:31  Hct-21.2 / Hgb-7.8 / Plat-212 / RBC-2.56 / WBC-11.11  Serial CBC's  04-28 @ 14:03  Hct-14.0 / Hgb-5.0 / Plat-255 / RBC-1.77 / WBC-11.61      04-29    138  |  102  |  16  ----------------------------<  109<H>  4.1   |  26  |  0.5<L>    Ca    8.6      29 Apr 2020 06:31  Mg     1.8     04-29    TPro  6.1  /  Alb  4.0  /  TBili  1.0  /  DBili  x   /  AST  19  /  ALT  15  /  AlkPhos  130<H>  04-28      PT/INR - ( 28 Apr 2020 14:03 )   PT: 18.10 sec;   INR: 1.57 ratio         PTT - ( 28 Apr 2020 14:03 )  PTT:32.2 sec    Ferritin, Serum: 646 ng/mL (04-28 @ 17:38)  Iron - Total Binding Capacity.: 315 ug/dL (04-28 @ 17:38)              BLOOD SMEAR INTERPRETATION:       RADIOLOGY & ADDITIONAL STUDIES: Patient is a 74y old  Female who presents with a chief complaint of Weakness, low hemoglobin (28 Apr 2020 17:22)      HPI:    74 yof with PMH of CAD s/p PCI, HTN, HLD presented with increased weakness and LLE pain x 2 weeks.  She normally ambulates with a walker, but for the past several days, she has been not been ambulating.  She was also complaining of LLE pain, weakness, and urinary frequency.  Patient went to her cardiologist, who sent her to the ED.  B/L LE venous and arterial duplexes were unremarkable.  Patient was seen by vascular surgery team, who did not recommend any intervention.    Hematology consulted secondary to abnormal CBC.  Patient found to have WBC of 11.67 with lymphocyte predominance (absolute lymphocyte count 7600), hemoglobin of 5 (MCV 79.1), and platelet 255.  Patient received 3 units of PRBCs.  Repeat CBC showed WBC of 11.11 with increased lymphocytes and monocytes, hemoglobin 7.8 (MCV of 82.8), and platelet count of 212.  Prior CBC from 9/11/2017 demonstrated WBC of 14.17 (increased lymphocyte and monocytes). hemoglobin of 13.3, and platelet count of 217.    Patient states that she was once told that she had anemia and was taking iron supplements many years ago.  She is unaware of additional details.  Has never had blood transfusion prior to this admission.  Denies ever having EGD or colonoscopy.  Denies melena, hematochezia, hematuria, vaginal bleeding, fevers, anorexia, weight loss, night sweats.  States that she is currently very tired.    Iron studies appear to have been taken during or after PRBC transfusion and demonstrate iron of 295, TIBC of 315, % saturation of 94, and ferritin of 646.  LDH elevated at 297 but hemolyzed, haptoglobin <20, reticulocyte count c/w hypoproliferation.  Patient also incidentally found to have splenomegaly on KUB performed secondary to constipation.       ROS:  Negative except for:  See HPI.    PAST MEDICAL & SURGICAL HISTORY:  CAD (coronary artery disease): s/p stenting  No significant past surgical history      SOCIAL HISTORY:  Smoker.  Lives with .    FAMILY HISTORY:  No pertinent family history in first degree relatives      MEDICATIONS  (STANDING):  atorvastatin 80 milliGRAM(s) Oral at bedtime  chlorhexidine 4% Liquid 1 Application(s) Topical <User Schedule>  ciprofloxacin   IVPB      ciprofloxacin   IVPB 200 milliGRAM(s) IV Intermittent every 12 hours  lisinopril 10 milliGRAM(s) Oral daily  pantoprazole    Tablet 40 milliGRAM(s) Oral before breakfast  sodium chloride 0.9%. 1000 milliLiter(s) (50 mL/Hr) IV Continuous <Continuous>    MEDICATIONS  (PRN):    Height (cm): 157.5 (04-28-20 @ 18:48)  Weight (kg): 65.5 (04-28-20 @ 18:48)  BMI (kg/m2): 26.4 (04-28-20 @ 18:48)  BSA (m2): 1.66 (04-28-20 @ 18:48)  Allergies    penicillin (Anaphylaxis; Hives)    Intolerances        Vital Signs Last 24 Hrs  T(C): 37 (29 Apr 2020 04:20), Max: 37.1 (28 Apr 2020 13:11)  T(F): 98.6 (29 Apr 2020 04:20), Max: 98.8 (28 Apr 2020 13:11)  HR: 101 (29 Apr 2020 04:20) (98 - 112)  BP: 128/73 (29 Apr 2020 04:20) (112/54 - 139/61)  BP(mean): --  RR: 18 (29 Apr 2020 04:20) (18 - 20)  SpO2: 96% (28 Apr 2020 21:53) (93% - 100%)    PHYSICAL EXAM  General: adult in NAD, resting comfortably in bed  HEENT: NC/AT  Lungs: positive air movement b/l ant lungs  Abdomen: soft  Skin: no rashes and no petechiae  Neuro: alert and oriented X 4, no focal deficits      LABS:                          7.8    11.11 )-----------( 212      ( 29 Apr 2020 06:31 )             21.2         Mean Cell Volume : 82.8 fL  Mean Cell Hemoglobin : 30.5 pg  Mean Cell Hemoglobin Concentration : 36.8 g/dL  Auto Neutrophil # : 3.40 K/uL  Auto Lymphocyte # : 4.86 K/uL  Auto Monocyte # : 2.70 K/uL  Auto Eosinophil # : 0.10 K/uL  Auto Basophil # : 0.03 K/uL  Auto Neutrophil % : 30.6 %  Auto Lymphocyte % : 43.7 %  Auto Monocyte % : 24.3 %  Auto Eosinophil % : 0.9 %  Auto Basophil % : 0.3 %      Serial CBC's  04-29 @ 06:31  Hct-21.2 / Hgb-7.8 / Plat-212 / RBC-2.56 / WBC-11.11  Serial CBC's  04-28 @ 14:03  Hct-14.0 / Hgb-5.0 / Plat-255 / RBC-1.77 / WBC-11.61      04-29    138  |  102  |  16  ----------------------------<  109<H>  4.1   |  26  |  0.5<L>    Ca    8.6      29 Apr 2020 06:31  Mg     1.8     04-29    TPro  6.1  /  Alb  4.0  /  TBili  1.0  /  DBili  x   /  AST  19  /  ALT  15  /  AlkPhos  130<H>  04-28      PT/INR - ( 28 Apr 2020 14:03 )   PT: 18.10 sec;   INR: 1.57 ratio         PTT - ( 28 Apr 2020 14:03 )  PTT:32.2 sec    Ferritin, Serum: 646 ng/mL (04-28 @ 17:38)  Iron - Total Binding Capacity.: 315 ug/dL (04-28 @ 17:38)

## 2020-04-29 NOTE — CONSULT NOTE ADULT - ASSESSMENT
IMPRESSION: Rehab of Debilitation /anemia    PRECAUTIONS: [  x  ] Cardiac  [    ] Respiratory  [    ] Seizures [    ] Contact Isolation  [    ] Droplet Isolation  [    ] Other    Weight Bearing Status:     RECOMMENDATION:    Out of Bed to Chair     DVT/Decubiti Prophylaxis    REHAB PLAN:     [  x   ] Bedside P/T 3-5 times a week   [     ] Bedside O/T  2-3 times a week   [     ] No Rehab Therapy Indicated   [     ]  Speech Therapy   Conditioning/ROM                                 ADL  Bed Mobility                                            Conditioning/ROM  Transfers                                                  Bed Mobility  Sitting /Standing Balance                      Transfers                                        Gait Training                                            Sitting/Standing Balance  Stair Training [   ]Applicable                 Home equipment Eval                                                                     Splinting  [   ] Only      GOALS:   ADL   [  x  ]   Independent         Transfers  [ x   ] Independent            Ambulation  [  x   ] Independent     [  x   ] With device                            [    ]  CG                                               [    ]  CG                                                    [     ] CG                            [    ] Min A                                          [    ] Min A                                                [     ] Min  A          DISCHARGE PLAN:   [     ]  Good candidate for Intensive Rehabilitation/Hospital based                                             Will tolerate 3hrs Intensive Rehab Daily                                       [      ]  Short Term Rehab in Skilled Nursing Facility                                       [   x   ]  Home with Outpatient or VN services    REFUSES HOME SERVICES- STATES FAMILY AVAILABLE TO ASSIST WITH CARE                                       [      ]  Possible Candidate for Intensive Hospital based Rehab

## 2020-04-29 NOTE — PHYSICAL THERAPY INITIAL EVALUATION ADULT - PERTINENT HX OF CURRENT PROBLEM, REHAB EVAL
5 y/o F with PMH of CAD s/p stent, DLD, HTN who presented to the ED with a complaint of LE weakness and decreased PO intake. Symptoms began 2 weeks ago with LE weakness associated with tingling in her left leg and decreased mobility.

## 2020-04-29 NOTE — PHYSICAL THERAPY INITIAL EVALUATION ADULT - TRANSFER SAFETY CONCERNS NOTED: BED/CHAIR, REHAB EVAL
decreased balance during turns/decreased sequencing ability/decreased weight-shifting ability/decreased safety awareness

## 2020-04-29 NOTE — PHYSICAL THERAPY INITIAL EVALUATION ADULT - GENERAL OBSERVATIONS, REHAB EVAL
PT IE completed. Patient encountered semi fowlers in bed, in NAD, +IV lock,  agreeable to therapy. Patient c/o of generalized weakness but no pain noted.

## 2020-04-29 NOTE — PROGRESS NOTE ADULT - ATTENDING COMMENTS
Agree with resident's note, HPI, PE, assessment and plan.  Pt was seen and examined independently.     Pt does not look interested in conversation, she replies with yes-no, does not want to elaborate her answers. Denies bleeding, no vaginal spotting. Feeling weak for about 2 weeks only. states she had similar problem a few years ago, but did not want to elaborate. Active smoker. No FHx significant for malignancy.     Physical exam:  NAD, looks slightly dehydrated   HEENT: PERRL  NECK: supple, no JVD  RESP: CTA b/l, no crackles, rhonchi  CVS: S1S2,RRR  GI: abdomen soft NT, ND  Extremities: no c/c/edema  NEURO: AOx3, no focal deficit  H/L: no enlarged LN noted     labs reviewed  Hemoglobin: 7.8 (04-29)  Hemoglobin: 5.0 (04-28)  Auto Lymphocyte #: 4.86 K/uL (04.29.20 @ 06:31)  Lactate Dehydrogenase, Serum: 272 (04.29.20 @ 11:14)  Haptoglobin, Serum: <20: Test Repeated mg/dL (04.28.20 @ 17:38)  Dir Antiglob Polyspecific Interpretation: NEG (04.29.20 @ 12:20)  RBC Count: 2.54 M/uL (04.29.20 @ 11:14)    A/P: Acute symptomatic anemia, hypoproliferative, no signs of overt bleeding or hemolysis. lymphocytosis, r/o CLL, MM.  - heme/onc eval and w/up  - Hb with good response, no need for transfusion at the moment, monitor Q24 hrs    # Tachycardia - sinus tachy, likely due to anemia, monitor for now, cont IVF    # Uncomplicated cystitis - f/u UCx, if negative can dc ABx started on admission     # HTN - cont Lisinopril    # DM2 - pt is not complaint with meds at home, fu HbA1C - monitor CBG, insulin lispro sliding scale     # CAD - cont ASA and Statin, not on BB    DVT ppx

## 2020-04-29 NOTE — PROGRESS NOTE ADULT - SUBJECTIVE AND OBJECTIVE BOX
LENGTH OF HOSPITAL STAY: 1d    CHIEF COMPLAINT:   Patient is a 74y old  Female who presents with a chief complaint of Weakness, low hemoglobin (2020 10:10)      HISTORY OF PRESENTING ILLNESS:    HPI:  75 y/o F with PMH of CAD s/p stent, DLD, HTN who presented to the ED with a complaint of LE weakness and decreased PO intake. History taken from the charts as patient is a poor historian and unable to make contact with . Symptoms began 2 weeks ago with LE weakness associated with tingling in her left leg and decreased mobility. Prior to this she was able to ambulate without issue. However, she initially required a walker to move around and for the last several days has not been ambulating at all. She has also had decreased PO intake. Patient taken by her daughter to see Dr. Urban who sent her to the ED.    She denies fevers, chills, cough, SOB, CP, abdominal pain, nausea, vomiting, diarrhea constipation, urinary symptoms, joint pain, radiating pain, saddle anesthesia, any recent trauma, falls or signs of active bleeding.     In the ED, T-98.2, HR 98, /52, O2 100 on RA. Labs remarkable for WBC 11.61, Hgb 5, Lactate 2.7. CXR negative for acute pathology. Vascular consulted and prelim reads on LE arterieal and venous duplex negative (pending official read). 3 Total units of pRBC ordered by ED for transfusion. (2020 17:22)    PAST MEDICAL & SURGICAL HISTORY  PAST MEDICAL & SURGICAL HISTORY:  CAD (coronary artery disease): s/p stenting  No significant past surgical history    SOCIAL HISTORY:    ALLERGIES:  penicillin (Anaphylaxis; Hives)    MEDICATIONS:  STANDING MEDICATIONS  atorvastatin 80 milliGRAM(s) Oral at bedtime  chlorhexidine 4% Liquid 1 Application(s) Topical <User Schedule>  ciprofloxacin   IVPB      ciprofloxacin   IVPB 200 milliGRAM(s) IV Intermittent every 12 hours  lisinopril 10 milliGRAM(s) Oral daily  pantoprazole    Tablet 40 milliGRAM(s) Oral before breakfast  sodium chloride 0.9%. 1000 milliLiter(s) IV Continuous <Continuous>    PRN MEDICATIONS    VITALS:   T(F): 98.6  HR: 101  BP: 128/73  RR: 18  SpO2: 96%    LABS:                        7.8    11.11 )-----------( 212      ( 2020 06:31 )             21.2         138  |  102  |  16  ----------------------------<  109<H>  4.1   |  26  |  0.5<L>    Ca    8.6      2020 06:31  Mg     1.8         TPro  6.1  /  Alb  4.0  /  TBili  1.0  /  DBili  x   /  AST  19  /  ALT  15  /  AlkPhos  130<H>      PT/INR - ( 2020 14:03 )   PT: 18.10 sec;   INR: 1.57 ratio         PTT - ( 2020 14:03 )  PTT:32.2 sec  Urinalysis Basic - ( 2020 17:49 )    Color: Yellow / Appearance: Slightly Turbid / S.020 / pH: x  Gluc: x / Ketone: Negative  / Bili: Negative / Urobili: <2 mg/dL   Blood: x / Protein: Trace / Nitrite: Negative   Leuk Esterase: Large / RBC: 4 /HPF / WBC 17 /HPF   Sq Epi: x / Non Sq Epi: 14 /HPF / Bacteria: Negative        Lactate, Blood: 1.4 mmol/L (20 @ 06:31)  Troponin T, Serum: <0.01 ng/mL (20 @ 14:03)  Creatine Kinase, Serum: 32 U/L (20 @ 14:03)      CARDIAC MARKERS ( 2020 14:03 )  x     / <0.01 ng/mL / 32 U/L / x     / x          RADIOLOGY:  < from: Xray Kidney Ureter Bladder (20 @ 08:23) >  FINDINGS/  IMPRESSION:    Nonobstructive bowel gas pattern. Apparent splenomegaly; this may be confirmed with ultrasound if clinically warranted.    Levoscoliosis of the lumbar spine. Degenerative changes changes of the hips and spine.      < end of copied text >      PHYSICAL EXAM:  GEN: No acute distress  HEENT: AT, NC, PERRLA  LUNGS: Clear to auscultation bilaterally   HEART: S1/S2 present. RRR.   ABD: Soft, non-tender, non-distended. Bowel sounds present  EXT: edema, cyanosis, clubbing absent  NEURO: AAOX3

## 2020-04-29 NOTE — PROGRESS NOTE ADULT - ASSESSMENT
73 y/o F with PMH of CAD s/p stent, DLD, HTN who presented to the ED with a complaint of LE weakness and decreased PO intake.    Patient assessed this morning. Patientr reports that she is feeling better today although she appeared tired. Patient 3 units of PRBCs last night.    # Acute Microcytic Anemia ( Hgb 5 on admission)  - s/p 3 U PRBC , Hb currently 7.8 mg/dl  - pt denies active bleeding, trauma, melena, history of anemia   - screening colonoscopy never done, FOBT to be sent  - Microcytic anemia , Fe studies show iron overload  - possible hemolysis, haptoglobin decreased, elevated LDH , normal Bili , will send blood for Keith Ab  - keep active type and screen  - Splenomegaly suspected on Xray KUB, will order USG for clarification    # Generalized weakness with decreasing mobility  - much improved today, patient ready to participate for PT  - arterial doppler negative for any vascular insufficiency  - Physiatry & PT eval   - fall precaution     # HTN  -  c/w home meds (lisinopril 10mg daily)    # DLD  - c/w home meds (atorvastatin 80mg daily)    #CAD s/p stents  - ASA  - was on metorpolol 50mg BID but hasn't filled med since August    #DM II  -non-compliant with most of her medications, was using metformin at home, but was not refilled for long time  - fingersticks before meals, qhs  - basal bolus insulin if >180   - carb consistency     DVT PPx: SCD   GI PPx: not indicated   Diet: Dash Diet, carb consistent   Activity: Increase as tolerated , pending PT & Physiatry evaluation  Code Status: Full Code   Dispo: From home, acute pending work up   pending : Abdominal USg, Ab screen, FOBT, PT eval

## 2020-04-29 NOTE — PHYSICAL THERAPY INITIAL EVALUATION ADULT - IMPAIRMENTS FOUND, PT EVAL
ergonomics and body mechanics/poor safety awareness/gait, locomotion, and balance/muscle strength/aerobic capacity/endurance

## 2020-04-29 NOTE — CONSULT NOTE ADULT - SUBJECTIVE AND OBJECTIVE BOX
Patient is a 74y old  Female who presents with a chief complaint of Weakness, low hemoglobin (2020 11:33)    HPI:  75 y/o F with PMH of CAD s/p stent, DLD, HTN who presented to the ED with a complaint of LE weakness and decreased PO intake. History taken from the charts as patient is a poor historian and unable to make contact with . Symptoms began 2 weeks ago with LE weakness associated with tingling in her left leg and decreased mobility. Prior to this she was able to ambulate without issue. However, she initially required a walker to move around and for the last several days has not been ambulating at all. She has also had decreased PO intake. Patient taken by her daughter to see Dr. Urban who sent her to the ED.    She denies fevers, chills, cough, SOB, CP, abdominal pain, nausea, vomiting, diarrhea constipation, urinary symptoms, joint pain, radiating pain, saddle anesthesia, any recent trauma, falls or signs of active bleeding.     In the ED, T-98.2, HR 98, /52, O2 100 on RA. Labs remarkable for WBC 11.61, Hgb 5, Lactate 2.7. CXR negative for acute pathology. Vascular consulted and prelim reads on LE arterieal and venous duplex negative (pending official read). 3 Total units of pRBC ordered by ED for transfusion. (2020 17:22)      PAST MEDICAL & SURGICAL HISTORY:  CAD (coronary artery disease): s/p stenting  No significant past surgical history      Hospital Course: Acute Microcytic Anemia ( Hgb 5 on admission)  - s/p 3 U PRBC , Hb currently 7.8 mg/dl  - pt denies active bleeding, trauma, melena, history of anemia   - screening colonoscopy never done, FOBT to be sent  - Microcytic anemia , Fe studies show iron overload  - possible hemolysis, haptoglobin decreased, elevated LDH , normal Bili , will send blood for Keith Ab  - keep active type and screen  - Splenomegaly suspected on Xray KUB, will order USG for clarification    # Generalized weakness with decreasing mobility  HTN  -  c/w home meds (lisinopril 10mg daily)    # DLD  - c/w home meds (atorvastatin 80mg daily)    #CAD s/p stents  - ASA  - was on metorpolol 50mg BID but hasn't filled med since August    #DM II  -non-compliant with most of her medications, was using metformin at home, but was not refilled for long time  - fingersticks before meals, qhs  - basal bolus insulin if >180   - carb consistency     DVT PPx: SCD   GI PPx: not indicated   TODAY'S SUBJECTIVE & REVIEW OF SYMPTOMS:     Constitutional Weakness   Cardio WNL   Resp WNL   GI WNL  Heme WNL  Endo WNL  Skin WNL  MSK WNL  Neuro WNL  Cognitive WNL  Psych WNL      MEDICATIONS  (STANDING):  aspirin  chewable 81 milliGRAM(s) Oral daily  atorvastatin 80 milliGRAM(s) Oral at bedtime  chlorhexidine 4% Liquid 1 Application(s) Topical <User Schedule>  ciprofloxacin   IVPB      ciprofloxacin   IVPB 200 milliGRAM(s) IV Intermittent every 12 hours  lisinopril 10 milliGRAM(s) Oral daily  pantoprazole    Tablet 40 milliGRAM(s) Oral before breakfast  sodium chloride 0.9%. 1000 milliLiter(s) (50 mL/Hr) IV Continuous <Continuous>    MEDICATIONS  (PRN):  benzocaine 15 mG/menthol 3.6 mG (Sugar-Free) Lozenge 1 Lozenge Oral five times a day PRN Sore Throat      FAMILY HISTORY:  No pertinent family history in first degree relatives      Allergies    penicillin (Anaphylaxis; Hives)    Intolerances        SOCIAL HISTORY:    [    ] Etoh  [    ] Smoking  [    ] Substance abuse     Home Environment:  [    ] Home Alone  [  x  ] Lives with Family  [    ] Home Health Aid    Dwelling:  [    ] Apartment  [   x ] Private House  [    ] Adult Home  [    ] Skilled Nursing Facility      [    ] Short Term  [    ] Long Term  [   x ] Stairs                           [    ] Elevator     FUNCTIONAL STATUS PTA: (Check all that apply)  Ambulation: [     ]Independent    [   x ] Dependent     [    ] Non-Ambulatory  Assistive Device: [   x ] SA Cane  [    ]  Q Cane  [   x ] Walker  [ x   ]  Wheelchair  ADL : [   x ] Independent  [    ]  Dependent       Vital Signs Last 24 Hrs  T(C): 36.4 (2020 14:43), Max: 37 (2020 01:56)  T(F): 97.5 (2020 14:43), Max: 98.6 (2020 01:56)  HR: 97 (2020 14:43) (97 - 112)  BP: 103/51 (2020 14:43) (103/51 - 139/61)  BP(mean): --  RR: 18 (2020 14:43) (18 - 20)  SpO2: 96% (2020 21:53) (93% - 98%)      PHYSICAL EXAM: Alert & Oriented X3  GENERAL: NAD, well-groomed, well-developed  HEAD:  Atraumatic, Normocephalic  EYES: EOMI, PERRLA, conjunctiva and sclera clear  NECK: Supple  CHEST/LUNG: Clear bilaterally  HEART: Regular rate and rhythm  ABDOMEN: Soft, Nontender, Nondistended; Bowel sounds present  EXTREMITIES:  no calf tenderness,no edema BLES    NERVOUS SYSTEM:  Cranial Nerves 2-12 intact [x    ] Abnormal  [    ]  ROM: WFL all extremities [ x   ]  Abnormal [     ]  Motor Strength: WFL all extremities  [    ]  Abnormal [ x   ]3+/5BUES, 2-3/5 Ravi hips 4/5 distally  Sensation: intact to light touch [ x   ] Abnormal [    ]      FUNCTIONAL STATUS:  Bed Mobility: [   ]  Independent [    ]  Supervision [ x   ]  Needs Assistance [  ]  N/A  Transfers: [    ]  Independent [    ]  Supervision [  x  ]  Needs Assistance [    ]  N/A    Ambulation:  [    ]  Independent [    ]  Supervision [  x  ]  Needs Assistance [    ]  N/A   ADL:  [    ]   Independent [  x  ] Requires Assistance [    ] N/A   Min A Transfers, Min A few steps OOB To chair with PT    LABS:                        7.8    11.11 )-----------( 212      ( 2020 06:31 )             21.2         138  |  102  |  16  ----------------------------<  109<H>  4.1   |  26  |  0.5<L>    Ca    8.6      2020 06:31  Mg     1.8         TPro  6.1  /  Alb  4.0  /  TBili  1.0  /  DBili  x   /  AST  19  /  ALT  15  /  AlkPhos  130<H>  28    PT/INR - ( 2020 14:03 )   PT: 18.10 sec;   INR: 1.57 ratio         PTT - ( 2020 14:03 )  PTT:32.2 sec  Urinalysis Basic - ( 2020 17:49 )    Color: Yellow / Appearance: Slightly Turbid / S.020 / pH: x  Gluc: x / Ketone: Negative  / Bili: Negative / Urobili: <2 mg/dL   Blood: x / Protein: Trace / Nitrite: Negative   Leuk Esterase: Large / RBC: 4 /HPF / WBC 17 /HPF   Sq Epi: x / Non Sq Epi: 14 /HPF / Bacteria: Negative        RADIOLOGY & ADDITIONAL STUDIES:

## 2020-04-29 NOTE — CONSULT NOTE ADULT - ATTENDING COMMENTS
Pt seen  and examined.  Left foot warm with no pain.  Palpable pulse, normal arterial duplex.  No vascular concerns
Severe anemia, etiology unclear, no clear-cut evidence of hemolysis, Keith negative, or iron deficiency, please obtain B12, folate, MM panel. Patient responded to 3units of PRBC transfusion appropriately.   Long standing h/o leukocytosis with lymphocytosis, in presence of splenomegaly, please obtain flow cytometry, BCR/ABL.   We have informed patient that we recommend additional imaging such as CT C/A/P she wishes to pursue work up as outpatient.   Additionally we have informed patient that bone marrow biopsy may be required to obtain definitive diagnosis, patient declined and wishes to pursue all work up as out patient.  Recommend heme follow up as outpatient.   Will follow inhouse.

## 2020-04-30 NOTE — DISCHARGE NOTE PROVIDER - NSDCCPCAREPLAN_GEN_ALL_CORE_FT
PRINCIPAL DISCHARGE DIAGNOSIS  Diagnosis: Anemia  Assessment and Plan of Treatment: On presentation to the hospital, your hemoglobin was found to be 5. You received blood transfusions while here. On imaging here, your spleen was found to be enlarged. The heme onc team evaluated you and wanted to do a work up. Your work up was started here and the heme onc team will follow up the results with you as an outpatient. They can be reached out [phone] and it is located at [address] [place] [address]. Please note that you may need to undergo a bone marrow biopsy but the heme onc team will reach out to you to set that up.      SECONDARY DISCHARGE DIAGNOSES  Diagnosis: Left leg pain  Assessment and Plan of Treatment:
no

## 2020-04-30 NOTE — CHART NOTE - NSCHARTNOTEFT_GEN_A_CORE
<<<RESIDENT DISCHARGE NOTE>>>     AD BASURTO  MRN-[id number]    VITAL SIGNS:  T(F): 98.1 (04-30-20 @ 12:05), Max: 98.7 (04-29-20 @ 19:53)  HR: 94 (04-30-20 @ 12:05)  BP: 112/62 (04-30-20 @ 12:05)  SpO2: 91% (04-30-20 @ 07:42)  Weight (kg): 64.2 (04-29-20 @ 22:56)  BMI (kg/m2): 27.6 (04-29-20 @ 22:56)    PHYSICAL EXAMINATION:  General: NAD  HEENT: NCAT, PERRL, EOMI, No JVD  Pulmonary: CTA b/l  Cardiovascular: +s1s2 RRR  Gastrointestinal/Abdomen & Pelvis: soft, NT/ND  Neurologic/Motor: AAOx3, limited ambulation  Extremities: No edema, bruising on arms from IVs    TEST RESULTS:                        8.0    9.97  )-----------( 213      ( 30 Apr 2020 05:46 )             22.5       04-30    139  |  104  |  15  ----------------------------<  114<H>  3.9   |  27  |  0.5<L>    Ca    9.0      30 Apr 2020 05:46  Mg     1.8     04-29        FINAL DISCHARGE INTERVIEW:  Resident(s) Present: (Name: Elda)    DISCHARGE MEDICATION RECONCILIATION  reviewed with Attending (Name: Evan)    DISPOSITION:   [ X ] Home,    [  ] Home with Visiting Nursing Services,   [    ]  SNF/ NH,    [   ] Acute Rehab (4A),   [   ] Other (Specify:)

## 2020-04-30 NOTE — DISCHARGE NOTE PROVIDER - CARE PROVIDER_API CALL
Beatriz Guardado)  HematologyOncology; Internal Medicine; Medical Oncology  [address] [place] [address]  Phone: [phone]  Fax: [phone]  Follow Up Time: 1 week

## 2020-04-30 NOTE — PROGRESS NOTE ADULT - ASSESSMENT
73 y/o F with PMH of CAD s/p stent, DLD, HTN who presented to the ED with a complaint of LE weakness and decreased PO intake.    # Acute Microcytic Anemia (Hgb 5 on admission) likely 2/2 CLL vs indolent lymphoma vs other  s/p 3 U PRBC  min elevation of WBC, but more lymphs in diff  Hb stable (going up)   no active bleed or hemolysis  high LDH  CT C/A/P done: a/p above; chest: pend read  Fe studies show iron overload  needs outpt f/u w/ h/o (they will call pt to arrange for f/u) - needs outpt BM bx    # Generalized weakness with decreasing mobility  improving, but still weak  arterial doppler negative for any vascular insufficiency  PT saw pt today  use walker  pt has plenty of help at home    # HTN  c/w home meds (lisinopril 10mg daily)    # DLD  c/w home meds (atorvastatin 80mg daily)    # CAD s/p stents  c/w ASA  off BB since August 2019    # DM II  non-compliant with most of her medications  using metformin at home, but was not refilled for long time  FS here are fine, so pt can just follow diabetic diet    # DVT PPx: SCD     # GI PPx: not indicated on d/c    # Activity: use walker; needs asst    # Code Status: Full Code     # Spoke w/ dtr, Kathrine, told her about CLL vs lymphoma; Kathrine will ensure that her mom follows up w/ [facility].  I also spoke w/ Dr Starr to let him no of dx.  Kathrine does NOT want mom to go to SNF for STR, but she will accept HC w/ home PT    Dispo: can d/c home today w/ outpt f/u w/ h/o (dtr will  pt)

## 2020-04-30 NOTE — CHART NOTE - NSCHARTNOTEFT_GEN_A_CORE
Family Communication:    Current status and treatment plan discussed with spouse Osman Quinones [phone].  All questions answered.  He prefers in the future daughter Kathrine to be a designated contact [phone].

## 2020-04-30 NOTE — PROGRESS NOTE ADULT - SUBJECTIVE AND OBJECTIVE BOX
AD BASURTO  74y  Female  ***My note supersedes ALL resident notes that I sign.  My corrections for their notes are in my note.***    I can be reached directly on [employer] [address]. My office number is [phone]. My personal cell number is [phone].    INTERVAL EVENTS: Here for f/u of anemia. Pt says that she would really like to go home. She has a lot of help at home. She walked about 5' w/ walker.    T(F): 98.1 (20 @ 12:05), Max: 98.7 (20 @ 19:53)  HR: 94 (20 @ 12:05) (94 - 100)  BP: 112/62 (20 @ 12:05) (112/62 - 151/67)  RR: 18 (20 @ 12:05) (16 - 18)  SpO2: 91% (20 @ 07:42) (91% - 91%)    20 @ 07:01  -  20 @ 07:00  --------------------------------------------------------  IN: 0 mL / OUT: 1250 mL / NET: -1250 mL    20 @ 07:01  -  20 @ 15:23  --------------------------------------------------------  IN: 0 mL / OUT: 50 mL / NET: -50 mL    Gen: NAD  HEENT: conj pale; PERRL; EOMI; mouth clr  Neck: no JVD, no nodes, thyroid nl  lungs: clr  hrt: s1s2 rrr  abd: soft NT/ND + splenomegaly no hepatomegaly  ext: no edema, no c/c; forearms have bruising from phlebotomy and IVs, areas are a little tender, no infection  neuro aa ox3 cn intact can move all 4 ext; limited walking    LABS:                        8.0     (    83.0   9.97  )-----------( ---------      213      ( 2020 05:46 )             22.5    (    20.4     Hemoglobin: 8.0 g/dL ( 05:46)  Hemoglobin: 7.8 g/dL ( @ 06:31)  Hemoglobin: 5.0 g/dL ( @ 14:03)    139   (   104   (   114      20 @ 05:46  ----------------------               3.9   (   27   (   15                             -----                        0.5  Ca  9.0   Mg  --    P   --     Urinalysis Basic - ( 2020 17:49 )    Color: Yellow / Appearance: Slightly Turbid / S.020 / pH: x  Gluc: x / Ketone: Negative  / Bili: Negative / Urobili: <2 mg/dL   Blood: x / Protein: Trace / Nitrite: Negative   Leuk Esterase: Large / RBC: 4 /HPF / WBC 17 /HPF   Sq Epi: x / Non Sq Epi: 14 /HPF / Bacteria: Negative    CAPILLARY BLOOD GLUCOSE  POCT Blood Glucose.: 139 (20 @ 11:11)  POCT Blood Glucose.: 133 (20 @ 07:42)  POCT Blood Glucose.: 123 (20 @ 21:30)  POCT Blood Glucose.: 148 (20 @ 16:41)  POCT Blood Glucose.: 131 (20 @ 07:36)  POCT Blood Glucose.: 145 (20 @ 01:40)    Culture - Urine (collected 20 @ 02:00)  Source: .Urine Clean Catch (Midstream)  Final Report (20 @ 10:12):    <10,000 CFU/mL Normal Urogenital Macie    RADIOLOGY & ADDITIONAL TESTS:  < from: CT Abdomen and Pelvis w/ Oral Cont and w/ IV Cont (20 @ 13:56) >  IMPRESSION:    1.  Marked splenomegaly; differential considerations include neoplastic and myeloproliferative processes, amongst other etiologies.    2.  No enlarged abdominal or pelvic lymph nodes.    3.  Enlarged fibroid uterus.    < end of copied text >    < from: US Spleen (20 @ 12:06) >  Impression:  Splenomegaly.    < end of copied text >    MEDICATIONS:  ciprofloxacin   IVPB      ciprofloxacin   IVPB 200 milliGRAM(s) IV Intermittent every 12 hours    aspirin  chewable 81 milliGRAM(s) Oral daily  atorvastatin 80 milliGRAM(s) Oral at bedtime  chlorhexidine 4% Liquid 1 Application(s) Topical <User Schedule>  lisinopril 10 milliGRAM(s) Oral daily  pantoprazole    Tablet 40 milliGRAM(s) Oral before breakfast  sodium chloride 0.9%. 1000 milliLiter(s) IV Continuous <Continuous>

## 2020-04-30 NOTE — DISCHARGE NOTE NURSING/CASE MANAGEMENT/SOCIAL WORK - PATIENT PORTAL LINK FT
You can access the FollowMyHealth Patient Portal offered by [facility] by registering at the following website: [email or web address] By joining [email or web address] [email or web address] portal, you will also be able to view your health information using other applications (apps) compatible with our system.

## 2020-04-30 NOTE — DISCHARGE NOTE PROVIDER - NSDCMRMEDTOKEN_GEN_ALL_CORE_FT
atorvastatin 80 mg oral tablet: 1 tab(s) orally once a day  lisinopril 10 mg oral tablet: 1 tab(s) orally once a day  metFORMIN 750 mg oral tablet, extended release: 1 tab(s) orally 2 times a day  Metoprolol Tartrate 50 mg oral tablet: 1 tab(s) orally 2 times a day aspirin 81 mg oral tablet, chewable: 1 tab(s) orally once a day  atorvastatin 80 mg oral tablet: 1 tab(s) orally once a day  lisinopril 10 mg oral tablet: 1 tab(s) orally once a day  metFORMIN 750 mg oral tablet, extended release: 1 tab(s) orally 2 times a day  Metoprolol Tartrate 50 mg oral tablet: 1 tab(s) orally 2 times a day aspirin 81 mg oral tablet, chewable: 1 tab(s) orally once a day  atorvastatin 80 mg oral tablet: 1 tab(s) orally once a day  lisinopril 10 mg oral tablet: 1 tab(s) orally once a day

## 2020-04-30 NOTE — DISCHARGE NOTE PROVIDER - HOSPITAL COURSE
73 y/o F with PMH of CAD s/p stent, DLD, HTN who presented to the ED with a complaint of LE weakness and decreased PO intake. History taken from the charts, per daughter, as patient is a poor historian. Pt has not been to a physician for many years, went to Cardiologist's office (Dr. Urban) who sent her to ED for evaluation of lethargy, generalized weakness with inability to sit herself up and decreased PO intake for over 2 weeks duration, which worsened  in the last 2 days. She initially required a walker to move around, but for the last several days has not been ambulating at all. Unknown to daughter, was pt's self reported left groin discomfort shes been experiencing for "many years" (patient points to her left groin area - unable to further elaborate on pain). Patient also had AMS on day of presentation as well constipation (last bowel movement 3 days- had had flatus) and increased urinary frequency and duration  (states she may urinate up to 10 minutes at a time) patient denies any dysuria or changes in urine color or odor. Daughter states patient has not had any falls, is AAOx3 at baseline, and independent with all activities of daily living. In the ED pt was found to be anemic (hgb 5) with leukocytosis. LE arterial and venous duplex negative (pending official read). Pt given 3 Total units of pRBC causing increased hgb 8. Iron studies showed iron overload; Keith was negative; reticulocyte count was consistent with hypoproliferation.         Pt admitted for weakness and anemia workup. 75 y/o F with PMH of CAD s/p stent, DLD, HTN who presented to the ED with a complaint of LE weakness and decreased PO intake. History taken from the charts, per daughter, as patient is a poor historian. Pt has not been to a physician for many years, went to Cardiologist's office (Dr. Urban) who sent her to ED for evaluation of lethargy, generalized weakness with inability to sit herself up and decreased PO intake for over 2 weeks duration, which worsened  in the last 2 days. She initially required a walker to move around, but for the last several days has not been ambulating at all. Unknown to daughter, was pt's self reported left groin discomfort shes been experiencing for "many years" (patient points to her left groin area - unable to further elaborate on pain). Patient also had AMS on day of presentation as well constipation (last bowel movement 3 days- had had flatus) and increased urinary frequency and duration  (states she may urinate up to 10 minutes at a time) patient denies any dysuria or changes in urine color or odor. Daughter states patient has not had any falls, is AAOx3 at baseline, and independent with all activities of daily living. In the ED pt was found to be anemic (hgb 5) with leukocytosis. LE arterial and venous duplex negative (pending official read). Pt given 3 Total units of pRBC causing increased hgb 8. Iron studies showed iron overload; Keith was negative; reticulocyte count was consistent with hypoproliferation. Splenomegaly was incidentally found on KUB and confirmed with CT Scan w(w po/iv contrast). Pts was seen and evaluated by H/O. A full lab workup, including flow cytometry, bcr-abl, SPEP, UPEP, serum immunofixation, free light chains, IgA, IgM, IgG. Pt will follow up with hem/onc outpt for further workup and evaluation. Pts presenting complaint for weakness is most likely do to her anemia. Pt is cleared to return home with assistance (per PT) from a medical standpoint. Pt and family are agreeable with the plan and ready for discharge

## 2020-05-01 PROBLEM — I25.10 ATHEROSCLEROTIC HEART DISEASE OF NATIVE CORONARY ARTERY WITHOUT ANGINA PECTORIS: Chronic | Status: ACTIVE | Noted: 2020-01-01

## 2020-05-13 PROBLEM — Z00.00 ENCOUNTER FOR PREVENTIVE HEALTH EXAMINATION: Status: ACTIVE | Noted: 2017-09-12

## 2020-05-13 NOTE — H&P PST ADULT - NSICDXPASTMEDICALHX_GEN_ALL_CORE_FT
PAST MEDICAL HISTORY:  Anemia     B-cell chronic lymphocytic leukemia variant     CAD (coronary artery disease) s/p stenting    DM (diabetes mellitus)     High cholesterol     HTN (hypertension)

## 2020-05-13 NOTE — H&P PST ADULT - HISTORY OF PRESENT ILLNESS
Pt is poor historian. As per pt daughter, pt was recently admitted for severe anemia and has h/o Lymphoma. Denies any chest pain, difficulty breathing, SOB, palpitations, dysuria, URI, or any other infections in the last 2 weeks. Denies any recent travel, contact, or exposure to any persons with known or suspected COVID-19. Pt also denies COVID testing within the last 2 weeks. Pt is wheelchair bound. SEGUNDO reviewed with patient's daughter.

## 2020-05-13 NOTE — H&P PST ADULT - NSANTHOSAYNRD_GEN_A_CORE
No. SEGUNDO screening performed.  STOP BANG Legend: 0-2 = LOW Risk; 3-4 = INTERMEDIATE Risk; 5-8 = HIGH Risk

## 2020-05-15 PROBLEM — I10 ESSENTIAL (PRIMARY) HYPERTENSION: Chronic | Status: ACTIVE | Noted: 2020-01-01

## 2020-05-15 PROBLEM — E11.9 TYPE 2 DIABETES MELLITUS WITHOUT COMPLICATIONS: Chronic | Status: ACTIVE | Noted: 2020-01-01

## 2020-05-15 PROBLEM — C91.10 CHRONIC LYMPHOCYTIC LEUKEMIA OF B-CELL TYPE NOT HAVING ACHIEVED REMISSION: Chronic | Status: ACTIVE | Noted: 2020-01-01

## 2020-05-15 PROBLEM — E78.00 PURE HYPERCHOLESTEROLEMIA, UNSPECIFIED: Chronic | Status: ACTIVE | Noted: 2020-01-01

## 2020-05-15 PROBLEM — D64.9 ANEMIA, UNSPECIFIED: Chronic | Status: ACTIVE | Noted: 2020-01-01

## 2020-05-20 NOTE — PROGRESS NOTE ADULT - SUBJECTIVE AND OBJECTIVE BOX
INTERVENTIONAL RADIOLOGY BRIEF-OPERATIVE NOTE    Procedure: Image guided bone marrow biopsy    Pre-Op Diagnosis: Anemia    Post-Op Diagnosis: Same    Attending: Elliot Landau  Resident: None    Anesthesia (type):  [ ] General Anesthesia  [x] Sedation  [ ] Spinal Anesthesia  [x] Local/Regional    Contrast: 0    Estimated Blood Loss: < 5 cc    Condition:   [ ] Critical  [ ] Serious  [ ] Fair   [x] Good    Findings/Follow up Plan of Care: Image guided bone marrow biopsy performed without complication. Patient tolerated procedure well.    Specimens Removed: Surgical Pathology    Implants: None    Complications: None    Disposition: Recovery room pending D/C      Please call Interventional Radiology [phone] with any questions, concerns, or issues.

## 2020-06-10 NOTE — ED PROVIDER NOTE - ATTENDING CONTRIBUTION TO CARE
I personally evaluated the patient. I reviewed the Resident’s or Physician Assistant’s note (as assigned above), and agree with the findings and plan except as documented in my note.  75 Y/O F DIAGNOSED WITH B CELL LYMPHOMA 5/2020, HTN, DLD, CAD, S/P PCI (ON ASA/PLAVIX), + SMOKER SENT TO ED FROM [facility] WITH PRESUMED MEDICATION REACTION. PT RECEIVED ONE UNIT PRBCS FOR ANEMIA AND THEN LASIX AFTER THE TRANSFUSION. PT WAS THEN STARTED ON RITUXAN AND 1/2 HOUR INTO THE INFUSION DEVELOPED "THROAT CLOSING." AND BECAME "JITTERY" AND TREMULOUS. HX AS PER DAUGHTER AT BEDSIDE. THE INFUSION WAS STOPPED AND PT WAS GIVEN SOLUCORTEF 100MG AND BENADRYL 50 MG IV AND TYLENOL. PT C/O DIFFICULTY BREATHING. NO LIP OR TONGUE EDEMA. NO RASH. NO CP. NO N/V/D, ABD PAIN. I personally evaluated the patient. I reviewed the Resident’s or Physician Assistant’s note (as assigned above), and agree with the findings and plan except as documented in my note.  75 Y/O F DIAGNOSED WITH B CELL LYMPHOMA 5/2020, HTN, DLD, CAD, S/P PCI (ON ASA/PLAVIX), + SMOKER SENT TO ED FROM [facility] WITH PRESUMED MEDICATION REACTION. PT RECEIVED ONE UNIT PRBCS FOR ANEMIA AND THEN LASIX AFTER THE TRANSFUSION. PT WAS THEN STARTED ON RITUXAN AND 1/2 HOUR INTO THE INFUSION DEVELOPED "THROAT CLOSING." AND BECAME "JITTERY" AND TREMULOUS. HX AS PER DAUGHTER AT BEDSIDE. THE INFUSION WAS STOPPED AND PT WAS GIVEN SOLUCORTEF 100MG AND BENADRYL 50 MG IV AND TYLENOL. PT C/O DIFFICULTY BREATHING. NO LIP OR TONGUE EDEMA. NO RASH. NO CP. NO N/V/D, ABD PAIN. VITALS NOTED. ALERT OX3 MILD DISTRESS. + TACHYPNEA. + PSYCHOMOTOR AGITATION. NCAT PERRL. EOMI. OP NORMAL. MMM. NI LIP ON TONGUE EDEMA. NO STRIDOR. NECK SUPPLE. LUNGS WITH RHONCHI B/L. NO WHEEZING. TACHYCARDIA. S1S2. ABD- SOFT NONTENDER. NO LEG EDEMA. CN 2-12 INTACT. NEURO EXAM NONFOCAL. NO RASH.

## 2020-06-10 NOTE — ED CDU PROVIDER INITIAL DAY NOTE - PROGRESS NOTE DETAILS
received s/o from MARIO Zamora, evaluated patient at bedside. no complaints. no resp distress. no pruritis. Wants to eat and drink. Vss still tachy but daughter reported it has already improved from 160s. will give IV fluid. will continue to observe.

## 2020-06-10 NOTE — ED ADULT TRIAGE NOTE - CHIEF COMPLAINT QUOTE
Patien bIBA from chemo treatment. Started dose of Chemo for the first time. Shortness of breath noted and treatment stopped. Benadryl and solumedrol given at treatment center. Patient presents agitated and short of breath.

## 2020-06-10 NOTE — ED CDU PROVIDER INITIAL DAY NOTE - DETAILS
Pt had anaphylaxis and required epi. Pt responded well but still will admit for observation for potential rebound reaction

## 2020-06-10 NOTE — ED PROVIDER NOTE - CARE PLAN
Principal Discharge DX:	SOB (shortness of breath)  Secondary Diagnosis:	Anaphylactic reaction Principal Discharge DX:	SOB (shortness of breath)  Secondary Diagnosis:	Anaphylactic reaction  Secondary Diagnosis:	Tachycardia

## 2020-06-10 NOTE — ED CDU PROVIDER INITIAL DAY NOTE - OBJECTIVE STATEMENT
Pt with CLL received Rituxan today and developed a reaction. Had SOB, shakiness and tachycardia soon after receiving the medication. Was brought to the ED and treated for allergic reaction and given steroids and epi with good results

## 2020-06-10 NOTE — ED PROVIDER NOTE - PROGRESS NOTE DETAILS
DR. GARCIA, HEME/ONC FELLOW AT PTS BEDSIDE, LIKELY REACTION TO RITUXAN. CONTINUE SUPPORTIVE CARE. PT CLINICALLY IMPROVED. ALL SXS RESOLVED. PT WITH NO COMPLAINTS. PT 2-MN. WILL TRANSFER TO [facility] FOR OBSERVATION OVERNIGHT.

## 2020-06-10 NOTE — ED PROVIDER NOTE - PHYSICAL EXAMINATION
CONST: Pt appears uncomfortable  EYES: Sclera and conjunctiva clear.   ENT: No nasal discharge. Oropharynx normal appearing, no erythema or exudates. No abscess or swelling. Uvula midline.   NECK: Non-tender, no meningeal signs. normal ROM. supple   CARD: Tachycardiac S1 S2; No jvd  RESP: B/L LL crackles  GI: Soft, non-tender, non-distended. normal BS  MS: Normal ROM in all extremities. pulses 2 +. no calf tenderness or swelling  SKIN: Warm, dry, no acute rashes. Good turgor  NEURO: A&Ox3, No focal deficits. Strength 5/5 with no sensory deficits.

## 2020-06-10 NOTE — ED PROVIDER NOTE - NS ED ROS FT
Constitutional: (-) fever  Eyes/ENT: (+) throat closing, (-) blurry vision, (-) epistaxis  Cardiovascular: (-) chest pain, (-) syncope  Respiratory: (+) shortness of breath, (-) cough  Gastrointestinal: (-) vomiting, (-) diarrhea  : (-) dysuria, (-) hematuria  Musculoskeletal: (-) neck pain, (-) back pain, (-) joint pain  Integumentary: (-) rash, (-) edema  Neurological: (-) headache, (-) altered mental status  Allergic/Immunologic: (-) pruritus

## 2020-06-10 NOTE — ED CDU PROVIDER INITIAL DAY NOTE - MEDICAL DECISION MAKING DETAILS
73yo woman, h/o HTN, DM, CLL was placed in CDU for observation according to 2MN status after she had a reaction to first dose Rituximab in her oncologist's office (Dr Guardado). Pt was treated as anaphylaxis in the ED and improved after steroids and epi. She remained stable overnight, plan was for reassessment by hem-onc in the morning and if improved, d/c home.

## 2020-06-10 NOTE — ED PROVIDER NOTE - CLINICAL SUMMARY MEDICAL DECISION MAKING FREE TEXT BOX
73 Y/O F PMHX AS DOCUMENTED WITH MEDICATION REACTION TO FIRST DOSE OF RITUXAN. MEDICATIONS GIVEN AND PT IMPROVED CLINICALLY IN THE ED. PT TO [facility] FOR OBSERVATION. HEME/ONC CONSULTED.

## 2020-06-10 NOTE — ED PROVIDER NOTE - PMH
Anemia    B-cell chronic lymphocytic leukemia variant    CAD (coronary artery disease)  s/p stenting  DM (diabetes mellitus)    High cholesterol    HTN (hypertension)

## 2020-06-10 NOTE — ED CDU PROVIDER INITIAL DAY NOTE - ATTENDING CONTRIBUTION TO CARE
75yo woman, h/o HTN, DM, CLL was placed in CDU for observation according to 2MN status after she had a reaction to first dose Rituximab in her oncologist's office (Dr Guardado). Pt was treated as anaphylaxis in the ED and improved after steroids and epi. She remained stable overnight, plan was for reassessment by hem-onc in the morning and if improved, d/c home.

## 2020-06-11 NOTE — ED CDU PROVIDER DISPOSITION NOTE - PATIENT PORTAL LINK FT
You can access the FollowMyHealth Patient Portal offered by [facility] by registering at the following website: [email or web address]. By joining [employer]’s FollowMyHealth portal, you will also be able to view your health information using other applications (apps) compatible with our system.

## 2020-06-11 NOTE — ED CDU PROVIDER DISPOSITION NOTE - CLINICAL COURSE
pt symptoms much improved from yesterdays anaphylaxis, will follow up with Dr. ward. 75yo woman, h/o HTN, DM, CLL was placed in CDU for observation according to 2MN status after she had a reaction to first dose Rituximab in her oncologist's office (Dr Guardado). Pt was treated as anaphylaxis in the ED and improved after steroids and epi. She remained stable overnight, VS, exam as noted. On my eval pt feeling much better, O2sat in the low 90s on RA, but daughter has a pulse oximeter at home and says this is her baseline since she has been a heavy smoker for years (and continues daily smoking). Seen by Dr Guardado in the ED, agrees that this is likely pt's baseline and that sx from infusion have completely resolved. She will follow pt closely. Pt and daughter comfortable with d/c home.

## 2020-06-11 NOTE — CONSULT NOTE ADULT - SUBJECTIVE AND OBJECTIVE BOX
Patient is a 74y old  Female who presents with a chief complaint of     HPI: 73 y/o female with recent diagnosis of  B cell lymphoma was started on 1st dose rituxan yesterday and after 30 min started having symptoms of throat closing, feeling jittery, nervousness with confusion and mild agitation. So the infusion was stopped and She was given solucortef and benadryl and sent to ED. In ED she received epinephrine as well. Her vitals were stable, however she had mild distress and was tachypneic and she was monitored overnight   This morning her symptoms have resolved and she is back to baseline          ROS:  Negative except for:    PAST MEDICAL & SURGICAL HISTORY:  Anemia  DM (diabetes mellitus)  High cholesterol  HTN (hypertension)  B-cell chronic lymphocytic leukemia variant  CAD (coronary artery disease): s/p stenting  H/O heart artery stent        FAMILY HISTORY:  Family history of diabetes mellitus (DM)      MEDICATIONS  (STANDING):  aspirin enteric coated 81 milliGRAM(s) Oral daily  atorvastatin 80 milliGRAM(s) Oral at bedtime  lisinopril 10 milliGRAM(s) Oral daily  sodium chloride 0.9%. 1000 milliLiter(s) (125 mL/Hr) IV Continuous <Continuous>  sodium chloride 0.9%. 1000 milliLiter(s) (100 mL/Hr) IV Continuous <Continuous>    MEDICATIONS  (PRN):  diphenhydrAMINE 25 milliGRAM(s) Oral every 4 hours PRN Rash and/or Itching      Allergies    penicillin (Anaphylaxis; Hives)    Intolerances        Vital Signs Last 24 Hrs  T(C): 36.7 (2020 07:47), Max: 37.1 (10 Gonzalo 2020 14:15)  T(F): 98.1 (2020 07:47), Max: 98.7 (10 Gonzalo 2020 14:15)  HR: 98 (2020 07:47) (98 - 161)  BP: 114/58 (2020 07:47) (97/49 - 139/65)  BP(mean): 76 (10 Gonzalo 2020 20:34) (76 - 76)  RR: 20 (2020 07:47) (16 - 26)  SpO2: 97% (2020 07:47) (94% - 99%)    PHYSICAL EXAM  General: adult in NAD  HEENT: clear oropharynx  CV: normal S1/S2 with no murmur rubs or gallops  Lungs: positive air movement b/l ant lungs,  Abdomen: soft. Splenomegaly++  Ext: no clubbing cyanosis or edema  Skin: no rashes and no petechiae  Neuro: alert and oriented X 4, no focal deficits      LABS:                          9.8    11.43 )-----------( 131      ( 10 Gonzalo 2020 14:46 )             28.8         Mean Cell Volume : 88.9 fL  Mean Cell Hemoglobin : 30.2 pg  Mean Cell Hemoglobin Concentration : 34.0 g/dL  Auto Neutrophil # : 9.04 K/uL  Auto Lymphocyte # : 1.53 K/uL  Auto Monocyte # : 0.58 K/uL  Auto Eosinophil # : 0.04 K/uL  Auto Basophil # : 0.02 K/uL  Auto Neutrophil % : 79.1 %  Auto Lymphocyte % : 13.4 %  Auto Monocyte % : 5.1 %  Auto Eosinophil % : 0.3 %  Auto Basophil % : 0.2 %      Serial CBC's  06-10 @ 14:46  Hct-28.8 / Hgb-9.8 / Plat-131 / RBC-3.24 / WBC-11.43      06-10    137  |  98  |  15  ----------------------------<  187<H>  4.5   |  19  |  0.6<L>    Ca    8.4<L>      10 Gonzalo 2020 14:46    TPro  6.0  /  Alb  4.0  /  TBili  1.1  /  DBili  x   /  AST  59<H>  /  ALT  24  /  AlkPhos  242<H>  06-10      Hematopathology Report (20 @ 11:30)    Hematopathology Report:   ACCESSION No:  [id number]    AD BASURTO        Hematopathology Report          Final Diagnosis  1, 2 & 3. Bone marrow biopsy, clot & aspirate  - Bone marrow involvement by CD5 negative, CD10 negative B-cell  lymphoma  - See comment    Comment:  The patient has splenomegaly and weak IgG Kappa band detected by  serum immunofixation. The differential diagnosis includes splenic  marginal zone lymphoma, lymphoplasmacytic lymphoma and other CD5  negative, CD10 negative B-cell lymphoma.  Pending MyD88 molecular  study, the result will be reported in addendum. Case discussed  with Dr. CHAZ Gaurdado on 2020. Clinical correlation is  recommended.    Case reported to Tumor Registry.    PERIPHERAL BLOOD:  (performed on2020)  Test Code Result Reference Range  WBC 9.99 K/uL 4.80 - 10.80  RBC 2.61 M/uL L 4.20 - 5.40  HGB 7.7 g/dL L 12.0 - 16.0  HCT 23.0 % L 37.0 - 47.0  MCV 88.1 fL 81.0 - 99.0  MCH 29.5 pg 27.0 - 31.0  MCHC 33.5 g/dL 32.0 - 37.0  RDW 18.1 % H 11.5 - 14.5   K/uL 130 - 400  NEUT% 29.8 % L 42.2 - 75.2  NEUT# 2.97 K/uL 1.40 - 6.50  BANDS% 4.4 % 0.0 - 6.0  LYMPH% 36.9 % 20.5 - 51.1  LYMPH# 3.69 K/uL H 1.20 - 3.40  MONO% 31.8 % H 1.7 - 9.3  MONO# 3.18 K/uL H 0.10 - 0.60  EOS% 0.9 % 0.0 - 8.0  EOS# 0.09 K/uL 0.00 - 0.70  BASO% 0.4 % 0.0 - 1.0  BASO# 0.04 K/uL 0.00 - 0.20      PROTEIN STUDIES:  Serum protein electrophoresis (2020):  Weak Gamma-Migrating Paraprotein Identified.    Serum immunofixation (2020):  Weak IgG Kappa Band Identified.            AD BASURTO                       3        Hematopathology Report            Urine immunofixation (2020):  Bence Travis protein Kappa type.    Urine protein electrophoresis (2020):  Gamma Migrating Paraprotein Identified    BONE MARROW ASPIRATE SMEAR AND TOUCH IMPRINT:  Aspirate smear: Cellular spicules are not present, precluding  differential count. Review of the smear shows maturing and mature  myeloid, erythroid elements and some small lymphocytes with  mature cytological feature. Megakaryocytes are not seen.  Touch imprint: Maturing and mature myeloid, erythroid elements  and some small lymphocytes. The megakaryocytes are adequate with  normal morphology.    BONE MARROW/ BLOOD CLOT BIOPSY:  Quality: Subcortical bone marrow, suboptimal for evaluation  Cellularity: Slightly hypercellular (30-40%)  M:E ratio: Normal  Blasts: Not significantly increased  Myeloid lineage: Present with full maturation  Erythroid lineage: Present with full maturation  Megakaryocytes: Adequate with normal morphology  Lymphocytes: Increased in number with mature cytological features  (with mostly interstitial infiltrate pattern)  Plasma cells: Scattered    IMMUNOPHENOTYPIN)  FLOW CYTOMETRIC ANALYSIS: ([facility], [id number])  Bone marrow aspirate:  - The findings show 19% of CD5 negative, CD10 negative  monoclonal B-cells.  - The myeloid immunophenotypic findings show normal myeloid  granularity, no increase in myeloid immaturity, and normal  myeloid antigen maturation pattern.    2) Immunohistochemistry  Immunohistochemical studies performed on 1A: CD3, CD20, PAX5,  CD34,   B-cell antigens, CD20 and PAX5, highlight lymphoid infiltrate  (interstitial infiltrate pattern). CD3 highlights fewer T-cells.  There is no increase in CD34 or  positive cells.    CYTOGENETIC STUDIES: See separate report(s)      Clinical History  Anemia and splenomegaly, R/O lymphoma    Specimen(s) Submitted  1     Bone marrow biopsy - core  2     Bone marrow clot  3     Bone marrow aspirate slides - 10          < from: NM PET/CT Onc FDG Skull to Thigh, Inital (20 @ 10:34) >  No other sites of abnormal FDG uptake   IMPRESSION:   Nonspecific finding of diffusely increased FDG uptake in an enlarged spleen (SUV of spleen 5.1 compared to liver 3.8). The spleen measures 26.2 cm in length (coronal emission images).    Mild FDG uptake (SUV 4.8 -below our laboratory threshold for biologic tumor activity)) coregistering with the left fourth rib at the costochondral junction with no CT correlate (image 194).      No other sites of abnormal FDG uptake     APRYL HALL D.O., M.M.M, C.P.E.      < end of copied text >            BLOOD SMEAR INTERPRETATION:       RADIOLOGY & ADDITIONAL STUDIES:

## 2020-06-11 NOTE — ED CDU PROVIDER DISPOSITION NOTE - CARE PROVIDER_API CALL
Beatriz Guardado  HEMATOLOGY/ONCOLOGY  [address] [place] [address]  Phone: [phone]  Fax: [phone]  Follow Up Time:

## 2020-06-11 NOTE — ED CDU PROVIDER DISPOSITION NOTE - ATTENDING CONTRIBUTION TO CARE
73yo woman, h/o HTN, DM, CLL was placed in CDU for observation according to 2MN status after she had a reaction to first dose Rituximab in her oncologist's office (Dr Guardado). Pt was treated as anaphylaxis in the ED and improved after steroids and epi. She remained stable overnight, VS, exam as noted. On my eval pt feeling much better, O2sat in the low 90s on RA, but daughter has a pulse oximeter at home and says this is her baseline since she has been a heavy smoker for years (and continues daily smoking). Seen by Dr Guardado in the ED, agrees that this is likely pt's baseline and that sx from infusion have completely resolved. She will follow pt closely. Pt and daughter comfortable with d/c home.

## 2020-06-11 NOTE — ED CDU PROVIDER SUBSEQUENT DAY NOTE - PROGRESS NOTE DETAILS
patient resting comfortably. no complaints. will continue to observe . HR down to 107 patient resting comfortably. no complaints. will continue to observe . HR down to 104

## 2020-06-11 NOTE — ED ADULT NURSE REASSESSMENT NOTE - NS ED NURSE REASSESS COMMENT FT1
pt is hypotensive and tachycardic at this time. petar cee made aware. Patient initiated on LR maintenance fluids. Will continue to monitor.
Patient is sleeping and resting quietly, V/S are stable no signs or symptoms of distress noted. No needs or complaints expressed at this time, Monitored, pulse ox, and b/p continued.

## 2020-06-11 NOTE — CONSULT NOTE ADULT - ASSESSMENT
75 y/o female with new diagnosis of B cell lymphoma admitted after she had an allergic reaction to 1st dose Rituxan infusion   and was monitored overnight in ED       1. CD5 negative, CD10 negative B-cell lymphoma-  The differential diagnosis includes splenic marginal zone lymphoma, lymphoplasmacytic lymphoma   PET shows FDG uptake in the splenic area with splenomegaly     She was receiving her 1st dose rituxan yesterday and 30 min later- developed allergic reaction   She received Solu-cortef, benadryl and epinephrine   Today she is back to baseline with no symptoms     Labs reviewed - stable with Hb 9.8.    She can be discharged and follow with DR Guardado for further management  She has next lab appt at [facility] on June 15th-6pm

## 2020-06-12 NOTE — HISTORY OF PRESENT ILLNESS
[de-identified] : Kathrine is a savanna 74 year old female who presented to ER 04/28/2020 with weakness and lethargy. She went to see her cardiologist and she was referred to ER from there. \par She was found to have low HB of 5.0 on admission and she was transfused 3 units of PRBC. Also noted that her Absolute lymphocyte count was >5000. She has the following work up in hospital:\par CBC\par 11.61  High   \par  RBC Count 1.77        \par  Hemoglobin 5.0   \par  Hematocrit 14.0  Low %  37.0 - 47.0     \par  MCV 79.1  Low fL  81.0 - 99.0 Final      \par  MCH 28.2    pg  27.0 - 31.0 Final      \par  MCHC 35.7    g/dL  32.0 - 37.0 Final      \par  RCDW 26.5  High %  11.5 - 14.5 Final      \par  Platelet Count - Automated 255    K/uL  130 - 400 Final      \par  MPV 11.3  High fL  7.4 - 10.4 Final      \par  Auto Neutrophil % 33.6   \par  Auto Lymphocyte % 65.5  High %  20.5 - 51.1 Final      \par  Auto Monocyte % 0.9  Low %  1.7 - 9.3 Final      \par  Auto Eosinophil % 0.0    %  0.0 - 8.0 Final      \par  Auto Basophil % 0.0    %  0.0 - 1.0 Final      \par  Auto Neutrophil # 3.90    K/uL  1.40 - 6.50 Final      \par  Auto Lymphocyte # 7.60  High K/uL  1.20 - 3.40 Final      \par  Auto Monocyte # 0.10    \par  Auto Eosinophil # 0.00       \par  Auto Basophil # 0.00 \par M spike- unable to quantify. \par Weak IgG Kappa Band Identified. Immunoglobulins were normal.\par Reticulocyte -0.6\par Folate - 2.0\par Iron studies were done post transfusion. Ferritin -646.\par Keith- negative\par Flow cytometry -DIAGNOSIS:\par Peripheral blood:\par      - Monotypic B-cells (18% of cells), positive for kappa, CD19, CD20, FMC-7; negative CD5, CD10, CD23.\par      - The myeloid immunophenotypic findings show no increase in myeloid immaturity.\par \par Imaging: \par CT chest on 4/30/2020 revealed \par 1. No evidence for intrathoracic lymphadenopathy.\par 2. No suspicious mass or nodule.\par \par CT A/P on 4/30/2020 revealed \par Marked splenomegaly; differential considerations include neoplastic and myeloproliferative processes, amongst other etiologies.\par 2.  No enlarged abdominal or pelvic lymph nodes.\par 3.  Enlarged fibroid uterus.\par \par She was discharged few days later, as she preferred completing the work up as out patient.  She is an active smoker for more than 40 years. She never had colonoscopy. Mammogram was many years ago. \par  [de-identified] : 5/6/2020: She feels very tired and is wobbly. She is on wheelchair now, she lives with her  and her daughter. She denies B symptoms. Melena, bleeding per rectum. \par Images were personally reviewed by MD Debby and discussed with patient and family.\par

## 2020-06-12 NOTE — ASSESSMENT
[FreeTextEntry1] : Hypoproliferative  anemia and massive splenomegaly, flow consisitent with B-cell lymphoproliferative d/o\par --S/p 3 units of PRBC inhouse\par --Flow cytometry revealed Monotypic B-cells (18% of cells), positive for kappa, CD19, CD20, FMC-7; negative CD5, CD10, CD23.\par --No B symptoms. no evidence of overt bleeding. \par --No over evidence of hemolysis, Keith negative \par --PET CT ordered on 5/6/2020\par --Will also require BMBx, she prefers on arrange with IR\par \par Plan:\par Discussed with patient and her daughter the findings of flow cytometry and imaging.\par Differential includes Marginal zone lymphoma vs lymphoplasmacytic , mantle cell. \par She will need PET/CT scan to see if there are any additional sites of involvement. She would also need bone marrow biopsy if we do not see any other sites to obtain biopsy. \par she requested to be done undersedation so will schedule IR to do it. \par In the mean time will check CBC, CMP, LDH, Hepatitis, HIV  panel, B12 and folate. \par Will transfuse if her HB is '<7. \par She is also instructed to take Folic acid. \par \par Will also arrange for home blood draws until we have final diagnosis. Further recommendations on treatment will be made after above mentioned work up. \par \par Patient seen and examined with Dr. Guardado. \par

## 2020-06-12 NOTE — REVIEW OF SYSTEMS
[Fatigue] : fatigue [Constipation] : constipation [Joint Pain] : joint pain [Negative] : Heme/Lymph [Fever] : no fever [Chills] : no chills [Night Sweats] : no night sweats [Recent Change In Weight] : ~T no recent weight change

## 2020-06-12 NOTE — REASON FOR VISIT
[Follow-Up Visit] : a follow-up visit for [Lymphoma] : lymphoma [Family Member] : family member [FreeTextEntry2] : Anemia, B-cell lymphoproliferative D/O, HFU

## 2020-06-12 NOTE — ADDENDUM
[FreeTextEntry1] : Patient requires a light weight travel wheelchair to assist with mobility both inside and outside of the home. She is not independent with ambulation even with use of an assistive device. Use of a walker would not improve mobility. Patient is willing to use a lightweight wheelchair; he would be unable to propel a standard wheelchair. Daughter is willing to assist as needed. Use of a wheelchair would assure that patient can get to all medical appointments and follow ups. \par \par Patient required home O2  secondary to deterioration pulmonary status\par Recent O2 sat on room air is 87%\par This improved to O2 sat on 2L 94%\par Exertion parameters were not tested, patient is not ambulatory at baseline\par Patient requires  non-concentrated flow oxygen at 2L, continuously \par Length of need is 99 months.

## 2020-07-05 NOTE — ED ADULT NURSE NOTE - INTERVENTIONS DEFINITIONS
Reinforce activity limits and safety measures with patient and family/Monitor for mental status changes and reorient to person, place, and time/[place] to call system/Instruct patient to call for assistance/Physically safe environment: no spills, clutter or unnecessary equipment/Room bathroom lighting operational/Non-slip footwear when patient is off stretcher/Review medications for side effects contributing to fall risk/Stretcher in lowest position, wheels locked, appropriate side rails in place/Monitor gait and stability/Call bell, personal items and telephone within reach

## 2020-07-05 NOTE — ED PROVIDER NOTE - PROGRESS NOTE DETAILS
Talked to patient's oncologist who would like pt to get blood. Her hgb went from 9.6-->7.6 from june 10th to now. Dr. Kim believe he HGB will continue to drop. Patient spiked fever. Will do full septic workup Talked with Dr. Kim to update her that patient spiked a fever and will be admitted.

## 2020-07-05 NOTE — H&P ADULT - NSHPLABSRESULTS_GEN_ALL_CORE
7.6    7.99  )-----------( 101      ( 05 Jul 2020 13:56 )             22.9   07-05    134<L>  |  97<L>  |  12  ----------------------------<  152<H>  4.2   |  23  |  0.5<L>    Ca    9.0      05 Jul 2020 13:56    TPro  5.8<L>  /  Alb  3.6  /  TBili  1.2  /  DBili  x   /  AST  39  /  ALT  33  /  AlkPhos  205<H>  07-05

## 2020-07-05 NOTE — H&P ADULT - HISTORY OF PRESENT ILLNESS
75 y/o F with PMH of CAD s/p stent, DLD, HTN, B josue lymphoma, recently diagnosed B cell lymphoproliferative disorder (follows Dr. Guardado) presented to the ED after an episode of lightheadedness and mechanical fall. History obtained by patient and daughter over phone. Per the daughter, patient was being followed by Dr. Guardado with pending workup, PET scan and received 3 PRBC for symptomatic anemia. Patient developed acute anemia post chemo and with progression of splenomegaly. She was prescribed home O2 by Dr. Guardado. [place] to admission, around 1 am, patient was assisted to the bathroom by , when she tripped over her ankle, denies any loss of consciousness prodromal symptoms and or any trauma. P   pt denies hitting head, loc, f/c/n/v/d, abd pain, dysuria, urinary urgency/frequency, back pain, coug 73 y/o F with PMH of CAD s/p stent, DLD, HTN, B josue lymphoma, recently diagnosed B cell lymphoproliferative disorder (follows Dr. Guardado), on Ibrutinib presented to the ED after an episode of lightheadedness and mechanical fall. History obtained by patient and daughter over phone. Per the daughter, patient was being followed by Dr. Guardado with pending workup, PET scan and received 3 PRBC for symptomatic anemia. Patient developed acute anemia post chemo and with progression of splenomegaly. She was prescribed home O2 by Dr. Guardado. Piror to admission, around 1 am, patient was assisted to the bathroom by , when she tripped over her ankle, denies any loss of consciousness prodromal symptoms and or any trauma. Patient denies hitting head, loc, f/c/n/v/d, abd pain, dysuria, urinary urgency/frequency, back pain, cough.     ED: 112/56, HR: 108, RR;18, Tmax 101.2, Hb: 7.6 (9.8 hb on June 10th), EKG no ST changes, sinus tachycardia, Abdominal U/S: Splenomegaly 24 cm (stable since previous exam), + gallstones

## 2020-07-05 NOTE — ED PROVIDER NOTE - PHYSICAL EXAMINATION
GEN: Alert & Oriented x 3, No acute distress. Calm, appropriate. Pale in appearance.  Head and Neck: Normocephalic, atraumatic.   ENT:Oral mucosa pink, moist without lesions.   Eyes: PERRL. EOMI. No conjunctival injection. No scleral icterus.   RESP: Lungs clear to auscult bilat. no wheezes, rhonchi or rales. No retractions. Equal air entry.  CARDIO: regular rate and rhythm, no murmurs, rubs or gallops. Normal S1, S2. . Radial pulses 2+ bilaterally. No lower extremity edema. Distal pedal pulses present.  ABD: Soft, Nondistended. No rebound tenderness/guarding. No pulsatile mass. Tenderness with palpation to RUQ. No tenderness with palpation x RLQ/LLQ/LUQ.   MS: Swelling noted to left ankle. no tenderness with palpation to left ankle. no tenderness with palpation of knees, stable pelvis. no midline tenderness throughout. FROM of extremities and neck.   SKIN: no rashes/lesions, no petechiae, no ecchymosis.  NEURO: CN II-XII intact. Strength + sensation intact x 4 extremities. Speech and cognition normal. GEN: Alert & Oriented x 3, No acute distress. Calm, appropriate. Pale in appearance.  Head and Neck: Normocephalic, atraumatic.   ENT:Oral mucosa pink, moist without lesions.   Eyes: PERRL. EOMI. No conjunctival injection. No scleral icterus.   RESP: Lungs clear to auscult bilat. no wheezes, rhonchi or rales. No retractions. Equal air entry.  CARDIO: regular rate and rhythm, no murmurs, rubs or gallops. Normal S1, S2. . Radial pulses 2+ bilaterally. No lower extremity edema. Distal pedal pulses present.  ABD: Soft, Nondistended. No rebound tenderness/guarding. No pulsatile mass. Tenderness with palpation to RUQ. No tenderness with palpation x RLQ/LLQ/LUQ.   MS: Swelling noted to left ankle. no tenderness with palpation to left ankle. no tenderness with palpation of knees, stable pelvis. no midline tenderness throughout. FROM of extremities and neck.   SKIN: no rashes/lesions, no petechiae. (+) bruising noted to lateral aspect of left foot.   NEURO: CN II-XII intact. Strength + sensation intact x 4 extremities. Speech and cognition normal.

## 2020-07-05 NOTE — ED PROVIDER NOTE - OBJECTIVE STATEMENT
The pt is a 74y F with PMH HTN, spleen Ca undergoing chemo is presenting to ED with episode of lightheadedness. Pt states she felt lightheaded and fell onto her left ankle. pt denies hitting head, loc, f/c/n/v/d, abd pain, dysuria, urinary urgency/frequency, back pain, cough. The pt is a 74y F with PMH HTN, CAD, DM, leukemia undergoing chemo is presenting to ED with episode of lightheadedness. Pt states she felt lightheaded and fell onto her left ankle.Pt currently asymptomatic. pt denies hitting head, loc, f/c/n/v/d, abd pain, dysuria, urinary urgency/frequency, back pain, cough. Per pt she typically gets transfused due to low hb from [place]. Pt sees Dr. Kim (Oncologist). pt spoke with oncologist and sent in for blood transfusion.

## 2020-07-05 NOTE — H&P ADULT - ATTENDING COMMENTS
73 YO F with a PMH of CAD s/p stent, DLD, HTN, B cell lymphoma, recently diagnosed B cell lymphoproliferative disorder (follows Dr. Guardado), on Ibrutinib who presents to the hospital with a c/o left ankle pain s/p fall in bathroom due to lightheadedness,  was witness. Denies any LOC or head trauma. No black/bloody stools, CP, palpitations, or focal weakness. In the ED, pt with low Hgb. Plan was to transfuse and send home but pt spiked fever and became tachycardic. 73 YO F with a PMH of CAD s/p stent, DLD, HTN, B cell lymphoma, recently diagnosed B cell lymphoproliferative disorder (follows Dr. Guardado), on Ibrutinib who presents to the hospital with a c/o left ankle pain s/p fall in bathroom due to lightheadedness,  was witness. Denies any LOC or head trauma. No black/bloody stools, CP, palpitations, or focal weakness. In the ED, pt with low Hgb. Plan was to transfuse and send home but pt spiked fever and became tachycardic. Blood cultures drawn and pt started on IV ABXs (Aztreonam/Vanco). X-Rays of left ankle were negative (pending official read).     Physical exam shows pt in NAD. Tachycardic (110s), afebrile, not hypoxic on RA. A&Ox3. Non-focal neuro exam. Muscle strength/sensation intact. CTA B/L with no W/C/R. RRR, no M/G/R. ABD is soft and non-tender, normoactive BSs. LEs without swelling. No rashes. Labs and radiology as above.     Left ankle pain due to fall due to lightheadedness, likely caused by her normocytic anemia. PRN pain meds. ACE wrap. Active T&S. Transfuse 1 unit PRBCs. Send anemia studies. Supplements. Heme/Onc recs.    Fevers + tachycardia in pt on chemotherapy, rule out infectious process vs B cell symptoms. FU blood cultures. Send ESR/CRP. Chest X-Ray. B/L LE doppler. Send UA. ID consult. IV ABXs (Aztreonam/Vanco).    Elevated lactate, likley demand. IVFs (LR). Repeat lactate in the AM    HX of CAD s/p stent, DLD, HTN, B cell lymphoma, recently diagnosed B cell lymphoproliferative disorder. Restart home meds. GI and DVT PPX. Inform PCP of pt's admission to hospital. My note supersedes the residents note.

## 2020-07-05 NOTE — ED PROVIDER NOTE - ATTENDING CONTRIBUTION TO CARE
73 yo F with PMH of anemia, B-cell chronic lymphocytic leukemia (on chemo), CAD, DM, HTN presents to Ed for anemia. Patient felt weak this morning and fell onto her L ankle. She did not hit her head. She felt this way last time she needed a blood transfusion. Pt requires many blood transfusions due to her CA.   No SOB, palpitations, CP, nausea, vomiting, fever, chills, abdominal pain.     Const: Well nourished, well developed, appears stated age  Eyes: PERRL, pale conjunctiva   CV: RRR, Warm, well-perfused extremities  RESP: CTA B/L, no tachypnea   GI: soft, non-tender, non-distended  MSK: Ecchymosis to L ankle. Non-tender. Mild swelling.   Skin: Warm, dry. No rashes  Neuro: Alert, CNs II-XII grossly intact. Sensation and motor function of extremities grossly intact.  Psych: Appropriate mood and affect.    Concern for anemia. Will do labs.

## 2020-07-05 NOTE — H&P ADULT - ASSESSMENT
75 y/o F with PMH of CAD s/p stent, DLD, HTN, B josue lymphoma, recently diagnosed B cell lymphoproliferative disorder (follows Dr. Guardado), on Ibrutinib presented to the ED after an episode of lightheadedness and mechanical fall.    Lightheadedness + fevers, tachycardia suspected Sepsis in Immunocompromised Patient   Recent dx of lymphoproliferative Disorder  ddx includes sepsis vs B cell symptoms  - Tmax 101, sinus tachycardia, lightheadedness  - patient on active chemo (dr. Guardado), admission for severe anemia in April s/o PRBC  - Heme onc consulted  - Pending bcx, ucx, ESR, CRP  - CXR unremarkable for PNA,  - no clear source for infection, but given the patient is on active chemo will cover with broad spectrum abx (c/w vancomycin + aztreonem (penicillin allergy with profuse hives)  - ID consult placed     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   - patient with history of symptomatic anemia secondary to splenic sequestration  - recent admission in April with very low hb, requiring 3 PRBC  - hb 7.6 on admission (10.2 in June), baseline over 12, will get 1 PRBC (splenomegaly on exam)  - per daughter, patient not candidate for splenectomy  - f/u repeat lab work  - f/u further heme onc reccs  - c/w folic acid supplemental     S/p Mechanical fall  - left knee ecchymosis  - patient educated about risks of falls with containment splenomegaly   - no evidence of fracture  - ace wrap, ice left ankle     CAD s/p stents  HTN  - c/w aspirin 81 mg daily  - c/w Lisinopril 10 mg daily     DLD  - c/w Atorvastatin 80 mg    CODE: full  DVT ppx: heparin subq  Diet: DASH TLC  Dispo: from home, uses wheelchair at baseline, can walk with walker 73 y/o F with PMH of CAD s/p stent, DLD, HTN, B josue lymphoma, recently diagnosed B cell lymphoproliferative disorder (follows Dr. Guardado), on Ibrutinib presented to the ED after an episode of lightheadedness and mechanical fall.    Lightheadedness + fevers, tachycardia suspected Sepsis in Immunocompromised Patient   Recent dx of lymphoproliferative Disorder  ddx includes sepsis vs B cell symptoms  - Tmax 101, sinus tachycardia, lightheadedness  - patient on active chemo (dr. Guardado), admission for severe anemia in April s/o PRBC  - Heme onc consulted  - Pending bcx, ucx, ESR, CRP  - UA negative  - CXR unremarkable for PNA,  - no clear source for infection, but given the patient is on active immunotherapy will cover with broad spectrum abx (c/w vancomycin + aztreonem (penicillin allergy with profuse hives)  - ID consult placed     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   - patient with history of symptomatic anemia secondary to splenic sequestration  - recent admission in April with very low hb, requiring 3 PRBC  - hb 7.6 on admission (10.2 in June), baseline over 12, will get 1 PRBC (splenomegaly on exam)  - per daughter, patient not candidate for splenectomy  - f/u repeat lab work  - f/u further heme onc reccs  - c/w folic acid supplemental     S/p Mechanical fall  - left knee ecchymosis  - patient educated about risks of falls with containment splenomegaly   - no evidence of fracture  - ace wrap, ice left ankle     CAD s/p stents  HTN  - c/w aspirin 81 mg daily  - c/w Lisinopril 10 mg daily     DLD  - c/w Atorvastatin 80 mg    CODE: full  DVT ppx: heparin subq  Diet: DASH TLC  Dispo: from home, uses wheelchair at baseline, can walk with walker

## 2020-07-05 NOTE — H&P ADULT - NSHPREVIEWOFSYSTEMS_GEN_ALL_CORE
REVIEW OF SYSTEMS:    CONSTITUTIONAL: No weakness, + fevers, + lightheadedness  EYES/ENT: No visual changes;  No vertigo or throat pain   NECK: No pain or stiffness  RESPIRATORY: No cough, wheezing, hemoptysis; No shortness of breath  CARDIOVASCULAR: No chest pain or palpitations  GASTROINTESTINAL: No abdominal or epigastric pain. No nausea, vomiting, or hematemesis; No diarrhea or constipation. No melena or hematochezia.  GENITOURINARY: No dysuria, frequency or hematuria  NEUROLOGICAL: No numbness or weakness  EXTREMITIES: + left ankle mild swelling  SKIN: No itching, rashes

## 2020-07-05 NOTE — H&P ADULT - NSHPPHYSICALEXAM_GEN_ALL_CORE
General: frail, NAD  Neuro: alert and oriented, no focal deficits, moves all extremities spontaneously  HEENT: NCAT, EOMI, anicteric, mucosa moist  Respiratory: airway patent, respirations unlabored  CVS: regular rate and rhythm  Abdomen: soft, nontender, nondistended; Large splenomegaly   Extremities: left ankle ecchymosis, mild tenderness to palpation, no effusion, no acte TTP  Skin: warm, dry, appropriate color

## 2020-07-05 NOTE — ED PROVIDER NOTE - NS ED ROS FT
GEN: (-) fever, (-) chills, (-) malaise  HEENT: (-) vision changes, (-) HA  CV: (-) chest pain, (-) palpitations, (-) edema  PULM: (-) cough, (-) wheezing, (-) dyspnea  GI: (-) abdominal pain,(-) Nausea, (-) Vomiting, (-) Diarrhea  NEURO: (-) weakness, (-) paresthesias, (-) syncope, (+) lightheadedness  : (-) dysuria, (-) frequency, (-) urgency, (-) incontinence   MS: (-) back pain, (+) joint pain, (-)myalgias, (+) swelling  SKIN: (-) rashes, (-) new lesions  HEME: (-) bleeding, (-) ecchymosis

## 2020-07-05 NOTE — ED PROVIDER NOTE - CLINICAL SUMMARY MEDICAL DECISION MAKING FREE TEXT BOX
73 yo F presented to ED for lightheadedness. Pt received 1 unit RBCs. Patient was found to be febrile. Due to immunocompromised state with pt being on chemo she is immunocompromised. Therefore pt was treated as sepsis and started on broad spectrum antibiotics and admitted for further management.

## 2020-07-06 NOTE — CONSULT NOTE ADULT - SUBJECTIVE AND OBJECTIVE BOX
MIGUEL ANGELKADEAD  74y, Female  Allergy: penicillin (Anaphylaxis; Hives)      All historical available data reviewed.    HPI:  75 y/o F with PMH of CAD s/p stent, DLD, HTN, B josue lymphoma, recently diagnosed B cell lymphoproliferative disorder (follows Dr. Guardado), on Ibrutinib presented to the ED after an episode of lightheadedness and mechanical fall. History obtained by patient and daughter over phone. Per the daughter, patient was being followed by Dr. Guardado with pending workup, PET scan and received 3 PRBC for symptomatic anemia. Patient developed acute anemia post chemo and with progression of splenomegaly. She was prescribed home O2 by Dr. Guardado. Piror to admission, around 1 am, patient was assisted to the bathroom by , when she tripped over her ankle, denies any loss of consciousness prodromal symptoms and or any trauma. Patient denies hitting head, loc, f/c/n/v/d, abd pain, dysuria, urinary urgency/frequency, back pain, cough.     ED: 112/56, HR: 108, RR;18, Tmax 101.2, Hb: 7.6 (9.8 hb on ), EKG no ST changes, sinus tachycardia, Abdominal U/S: Splenomegaly 24 cm (stable since previous exam), + gallstones (2020 22:39)    FAMILY HISTORY:  Family history of diabetes mellitus (DM)    PAST MEDICAL & SURGICAL HISTORY:  Anemia  DM (diabetes mellitus)  High cholesterol  HTN (hypertension)  B-cell chronic lymphocytic leukemia variant  CAD (coronary artery disease): s/p stenting  H/O heart artery stent        VITALS:  T(F): 99, Max: 101 (20 @ 16:37)  HR: 110  BP: 114/56  RR: 22Vital Signs Last 24 Hrs  T(C): 37.2 (2020 06:30), Max: 38.3 (2020 16:37)  T(F): 99 (2020 06:30), Max: 101 (2020 16:37)  HR: 110 (2020 06:30) (104 - 115)  BP: 114/56 (2020 06:30) (97/50 - 134/70)  BP(mean): 75 (2020 18:15) (71 - 75)  RR: 22 (2020 06:30) (15 - 22)  SpO2: 95% (2020 00:00) (95% - 98%)    TESTS & MEASUREMENTS:                        7.9    6.40  )-----------( 72       ( 2020 09:30 )             22.9     07-06    131<L>  |  98  |  12  ----------------------------<  161<H>  4.0   |  22  |  0.5<L>    Ca    8.2<L>      2020 09:30  Mg     1.6     07-06    TPro  5.8<L>  /  Alb  3.6  /  TBili  1.2  /  DBili  x   /  AST  39  /  ALT  33  /  AlkPhos  205<H>  07-05    LIVER FUNCTIONS - ( 2020 13:56 )  Alb: 3.6 g/dL / Pro: 5.8 g/dL / ALK PHOS: 205 U/L / ALT: 33 U/L / AST: 39 U/L / GGT: x             Urinalysis Basic - ( 2020 00:00 )    Color: Yellow / Appearance: Slightly Turbid / S.015 / pH: x  Gluc: x / Ketone: Negative  / Bili: Negative / Urobili: <2 mg/dL   Blood: x / Protein: Trace / Nitrite: Negative   Leuk Esterase: Large / RBC: 2 /HPF / WBC 61 /HPF   Sq Epi: x / Non Sq Epi: 10 /HPF / Bacteria: Moderate          RADIOLOGY & ADDITIONAL TESTS:  Personal review of radiological diagnostics performed  Echo and EKG results noted when applicable.     MEDICATIONS:  aspirin  chewable 81 milliGRAM(s) Oral daily  atorvastatin 80 milliGRAM(s) Oral at bedtime  aztreonam  IVPB 1000 milliGRAM(s) IV Intermittent every 8 hours  chlorhexidine 4% Liquid 1 Application(s) Topical every 24 hours  enoxaparin Injectable 40 milliGRAM(s) SubCutaneous at bedtime  folic acid 1 milliGRAM(s) Oral daily  lisinopril 10 milliGRAM(s) Oral daily  vancomycin  IVPB 1000 milliGRAM(s) IV Intermittent every 12 hours      ANTIBIOTICS:  aztreonam  IVPB 1000 milliGRAM(s) IV Intermittent every 8 hours  vancomycin  IVPB 1000 milliGRAM(s) IV Intermittent every 12 hours

## 2020-07-06 NOTE — CONSULT NOTE ADULT - ASSESSMENT
75 y/o F with PMH of CAD s/p stent, DLD, HTN, B cell lymphoproliferative disorder, recently diagnosed B cell lymphoproliferative disorder (follows Dr. Guardado), on Ibrutinib presented to the ED after an episode of lightheadedness and mechanical fall.    IMPRESSION;   SIRS ( T 101.2, /m ) on admission with no clear source of infection  Clinically has no complaints  No PNA . CXR no focal consolidation and clinically no complains consistent with a PNA  No pyelonephritis. Has pyuria  no intraabdominal focus  COVID-19 7/5 NG    RECOMMENDATIONS;   BCx  vancomycin 1 gm iv q12  Aztreonam 1 gm iv q8h  Will hold ABx if BCx NG

## 2020-07-06 NOTE — CONSULT NOTE ADULT - NEGATIVE NEUROLOGICAL SYMPTOMS
no vertigo/no focal seizures/no transient paralysis/no loss of sensation/no generalized seizures/no paresthesias

## 2020-07-06 NOTE — CONSULT NOTE ADULT - ASSESSMENT
Patient is a 73 y/o F with PMH of CAD s/p stent, DLD, HTN, B cell lymphoproliferative disorder, recently diagnosed B cell lymphoproliferative disorder (follows Dr. Guardado), on Ibrutinib presented to the ED after an episode of lightheadedness and mechanical fall. Hemoglobin on admission 7.6 with hemoglobin of 10 as of June 2020. Primary team requesting consult for management of symptomatic anemia.     Assessment:    #) Symptomatic Anemia in the setting of B Cell lymphoproliferative disorder, DDx includes Marginal Cell vs Lymphoplasmacytic vs Mantle Cell Lymphoma  - Initial Bone Marroy biopsy was done on 5/20/20 and Flow cytometry revealed Monotypic B cells (18% of cells), positive for Kappa, CD19, CD20, FMC-7, negative for CD5, CD10, and CD23. 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 Atypical cells were negative for cyclin D1, molecular studies negative MYD88, L265P and BRAF V600 mutations. ANNEXIN1 seemed to be positive on T Cells and myeloid cells. The negative BRAF V600 makes hairy cell leukemia less likely. BCL2 was negative. Patient is a 73 y/o F with PMH of CAD s/p stent, DLD, HTN, B cell lymphoproliferative disorder, recently diagnosed B cell lymphoproliferative disorder (follows Dr. Guardado), on Ibrutinib presented to the ED after an episode of lightheadedness and mechanical fall. Hemoglobin on admission 7.6 with hemoglobin of 10 as of June 2020. Primary team requesting consult for management of symptomatic anemia.     Assessment:    #) Symptomatic Anemia with Splenomegaly in the setting of B Cell lymphoproliferative disorder, DDx includes Marginal Cell vs Lymphoplasmacytic vs Mantle Cell Lymphoma  - Initial Bone Marroy biopsy was done on 5/20/20 and Flow cytometry revealed Monotypic B cells (18% of cells), positive for Kappa, CD19, CD20, FMC-7, negative for CD5, CD10, and CD23. 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 Atypical cells were negative for cyclin D1, molecular studies negative MYD88, L265P and BRAF V600 mutations. ANNEXIN1 seemed to be positive on T Cells and myeloid cells. The negative BRAF V600 makes hairy cell leukemia less likely. BCL2 was negative.  - PET CT as of May 2020 showing diffuse FDG uptake in an enlarged spleen   - Patient pending repeat PET/CT as well as possible Bone Marrow Biopsy with IR to establish final diagnosis   - Patient with almost weekly transfusions of pRBCs as outpatient  - Goal Hb >7   - Ibrutinib started on June 2020 with progressive reduction in pills (3 pills down to 2 pill) based on patient symptoms   - Patient is a 73 y/o F with PMH of CAD s/p stent, DLD, HTN, B cell lymphoproliferative disorder, recently diagnosed B cell lymphoproliferative disorder (follows Dr. Guardado), on Ibrutinib presented to the ED after an episode of lightheadedness and mechanical fall. Hemoglobin on admission 7.6 with hemoglobin of 10 as of June 2020. Primary team requesting consult for management of symptomatic anemia.     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 Atypical cells were negative for cyclin D1, molecular studies negative MYD88, L265P and BRAF V600 mutations. ANNEXIN1 seemed to be positive on T Cells and myeloid cells. The negative BRAF V600 makes hairy cell leukemia less likely. BCL2 was negative.  - PET CT as of May 2020 showing diffuse FDG uptake in an enlarged spleen   - Patient with almost weekly transfusions of pRBCs as outpatient with goal Hb >7  - Plan for repeat flow cytometry with possible repeat of bone marrow biopsy  - Patient started on Ibrutinib 3 tabs daily on 6/17/2020. However by 6/20/2020 she began to developed mild thrombocytopenia and anemia. Ibrutinib was subsequently decreased to 2 pills per day  - Plan was to restart on 1 pill of Ibrutinib on 7/6/20 if rash resolved. However, given that the patient is currently undergoing active treatment for infection, will hold off on resuming Ibrutinib at this time until infection adequately treated     #) Elevated INR  - INR of 1.7, please repeat    #) Thrombocytopenia  - Likely reactive, continue to monitor   - Do not need to transfuse unless plt< 10,000    Plan:  - Hold Ibrutinib while patient actively undergoing treatment for underlying infection, f/u ID and pending blood cultures/ urine culture   - Trend Hb and platelets. Transfuse for Hb <7 and platelets < 10,000  - Repeat INR, If remains elevated can give small dose of Vitamin K 2.5 mg PO

## 2020-07-06 NOTE — CONSULT NOTE ADULT - SUBJECTIVE AND OBJECTIVE BOX
Patient is a 74y old  Female who presents with a chief complaint of Weakness s/p fall (06 Jul 2020 09:01)      HPI:  73 y/o F with PMH of CAD s/p stent, DLD, HTN, B cell lymphoma, recently diagnosed B cell lymphoproliferative disorder (follows Dr. Guardado), on Ibrutinib presented to the ED after an episode of lightheadedness and mechanical fall. History obtained by patient and daughter over phone. Per the daughter, patient was being followed by Dr. Guardado with pending workup, PET scan and received 3 PRBC for symptomatic anemia. Patient developed acute anemia post chemo and with progression of splenomegaly. She was prescribed home O2 by Dr. Guardado. Piror to admission, around 1 am, patient was assisted to the bathroom by , when she tripped over her ankle, denies any loss of consciousness prodromal symptoms and or any trauma. Patient denies hitting head, loc, f/c/n/v/d, abd pain, dysuria, urinary urgency/frequency, back pain, cough.     ED: 112/56, HR: 108, RR;18, Tmax 101.2, Hb: 7.6 (9.8 hb on June 10th), EKG no ST changes, sinus tachycardia, Abdominal U/S: Splenomegaly 24 cm (stable since previous exam), + gallstones (05 Jul 2020 22:39)    Oncology History:   Patient following with Dr. Guardado. currently being worked up for       ROS:  Negative except for:    PAST MEDICAL & SURGICAL HISTORY:  Anemia  DM (diabetes mellitus)  High cholesterol  HTN (hypertension)  B-cell chronic lymphocytic leukemia variant  CAD (coronary artery disease): s/p stenting  H/O heart artery stent      SOCIAL HISTORY:    FAMILY HISTORY:  Family history of diabetes mellitus (DM)      MEDICATIONS  (STANDING):  aspirin  chewable 81 milliGRAM(s) Oral daily  atorvastatin 80 milliGRAM(s) Oral at bedtime  aztreonam  IVPB 1000 milliGRAM(s) IV Intermittent every 8 hours  chlorhexidine 4% Liquid 1 Application(s) Topical every 24 hours  enoxaparin Injectable 40 milliGRAM(s) SubCutaneous at bedtime  folic acid 1 milliGRAM(s) Oral daily  lisinopril 10 milliGRAM(s) Oral daily  vancomycin  IVPB 1000 milliGRAM(s) IV Intermittent every 12 hours    MEDICATIONS  (PRN):    Height (cm): 157.48 (07-05-20 @ 13:20)  Weight (kg): 60.3 (07-05-20 @ 13:20)  BMI (kg/m2): 24.3 (07-05-20 @ 13:20)  BSA (m2): 1.61 (07-05-20 @ 13:20)  Allergies    penicillin (Anaphylaxis; Hives)    Intolerances        Vital Signs Last 24 Hrs  T(C): 37.2 (06 Jul 2020 06:30), Max: 38.3 (05 Jul 2020 16:37)  T(F): 99 (06 Jul 2020 06:30), Max: 101 (05 Jul 2020 16:37)  HR: 110 (06 Jul 2020 06:30) (104 - 115)  BP: 114/56 (06 Jul 2020 06:30) (97/50 - 134/70)  BP(mean): 75 (05 Jul 2020 18:15) (71 - 75)  RR: 22 (06 Jul 2020 06:30) (15 - 22)  SpO2: 95% (06 Jul 2020 00:00) (95% - 98%)    PHYSICAL EXAM  General: adult in NAD  HEENT: clear oropharynx, anicteric sclera, pink conjunctiva  Neck: supple  CV: normal S1/S2 with no murmur rubs or gallops  Lungs: positive air movement b/l ant lungs,clear to auscultation, no wheezes, no rales  Abdomen: soft non-tender non-distended, no hepatosplenomegaly  Ext: no clubbing cyanosis or edema  Skin: no rashes and no petechiae  Neuro: alert and oriented X 4, no focal deficits      LABS:                          7.9    6.40  )-----------( 72       ( 06 Jul 2020 09:30 )             22.9         Mean Cell Volume : 92.0 fL  Mean Cell Hemoglobin : 31.7 pg  Mean Cell Hemoglobin Concentration : 34.5 g/dL  Auto Neutrophil # : 3.91 K/uL  Auto Lymphocyte # : 1.56 K/uL  Auto Monocyte # : 0.75 K/uL  Auto Eosinophil # : 0.01 K/uL  Auto Basophil # : 0.04 K/uL  Auto Neutrophil % : 61.1 %  Auto Lymphocyte % : 24.4 %  Auto Monocyte % : 11.7 %  Auto Eosinophil % : 0.2 %  Auto Basophil % : 0.6 %      Serial CBC's  07-06 @ 09:30  Hct-22.9 / Hgb-7.9 / Plat-72 / RBC-2.49 / WBC-6.40  Serial CBC's  07-05 @ 13:56  Hct-22.9 / Hgb-7.6 / Plat-101 / RBC-2.44 / WBC-7.99      07-06    131<L>  |  98  |  12  ----------------------------<  161<H>  4.0   |  22  |  0.5<L>    Ca    8.2<L>      06 Jul 2020 09:30  Mg     1.6     07-06    TPro  5.8<L>  /  Alb  3.6  /  TBili  1.2  /  DBili  x   /  AST  39  /  ALT  33  /  AlkPhos  205<H>  07-05      PT/INR - ( 05 Jul 2020 17:08 )   PT: 19.70 sec;   INR: 1.71 ratio         PTT - ( 05 Jul 2020 17:08 )  PTT:32.4 sec                BLOOD SMEAR INTERPRETATION:       RADIOLOGY & ADDITIONAL STUDIES: Patient is a 74y old  Female who presents with a chief complaint of Weakness s/p fall (06 Jul 2020 09:01)      HPI:  73 y/o F with PMH of CAD s/p stent, DLD, HTN, B cell lymphoma, recently diagnosed B cell lymphoproliferative disorder (follows Dr. Guardado), on Ibrutinib presented to the ED after an episode of lightheadedness and mechanical fall. History obtained by patient and daughter over phone. Per the daughter, patient was being followed by Dr. Guardado with pending workup, PET scan and received 3 PRBC for symptomatic anemia. Patient developed acute anemia post chemo and with progression of splenomegaly. She was prescribed home O2 by Dr. Guardado. Piror to admission, around 1 am, patient was assisted to the bathroom by , when she tripped over her ankle, denies any loss of consciousness prodromal symptoms and or any trauma. Patient denies hitting head, loc, f/c/n/v/d, abd pain, dysuria, urinary urgency/frequency, back pain, cough.     ED: 112/56, HR: 108, RR;18, Tmax 101.2, Hb: 7.6 (9.8 hb on June 10th), EKG no ST changes, sinus tachycardia, Abdominal U/S: Splenomegaly 24 cm (stable since previous exam), + gallstones (05 Jul 2020 22:39)    Oncology History:   Patient following with Dr. Guardado. currently being worked up for B Cell Lymphoproliferative Disorder. Initial Bone Marroy biopsy was done on 5/20/20 and Flow cytometry revealed Monotypic B cells (18% of cells), positive for Kappa, CD19, CD20, FMC-7, negative for CD5, CD10, and CD23. 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 Atypical cells were negative for cyclin D1, molecular studies negative MYD88, L265P and BRAF V600 mutations. ANNEXIN1 seemed to be positive on T Cells and myeloid cells. The negative BRAF V600 makes hairy cell leukemia less likely. BCL2 was negative.    PET CT done in May 2020 showed evidence of diffuse FDG uptake in an enlarged spleen and mild FDG uptake at the left fourth rib at the costochondral junction with no CT correlate. Patient with no B Cell symptoms and currently denies any symptoms on current admission. Plan was for patient to undergo repeat PET/CT to evaluate for any additional sites of biopsy. If none, found, plan was to undergo CT guided biopsy via IR (per patient preference).     As far as treatment, patient attempted to start Rituxan in June 2020 but developed a severe allergic reaction. Given her severity of reaction, patient was instead started on Ibrutinib 3 tabs daily on 6/17/2020. However by 6/20/2020 she began to developed mild thrombocytopenia and anemia. Ibrutinb was subsequently decreased to 2 pills per day but patient on follow up had a rash to L breast and L groin so plan was to hold pill until 7/6/2020. If rash resolved, Ibrutinib could be restarted at 1 pill daily.     Patent had mechanical fall at home and admitted to hospital. On admission, found to have        ROS:  Negative except for:    PAST MEDICAL & SURGICAL HISTORY:  Anemia  DM (diabetes mellitus)  High cholesterol  HTN (hypertension)  B-cell chronic lymphocytic leukemia variant  CAD (coronary artery disease): s/p stenting  H/O heart artery stent      SOCIAL HISTORY:    FAMILY HISTORY:  Family history of diabetes mellitus (DM)      MEDICATIONS  (STANDING):  aspirin  chewable 81 milliGRAM(s) Oral daily  atorvastatin 80 milliGRAM(s) Oral at bedtime  aztreonam  IVPB 1000 milliGRAM(s) IV Intermittent every 8 hours  chlorhexidine 4% Liquid 1 Application(s) Topical every 24 hours  enoxaparin Injectable 40 milliGRAM(s) SubCutaneous at bedtime  folic acid 1 milliGRAM(s) Oral daily  lisinopril 10 milliGRAM(s) Oral daily  vancomycin  IVPB 1000 milliGRAM(s) IV Intermittent every 12 hours    MEDICATIONS  (PRN):    Height (cm): 157.48 (07-05-20 @ 13:20)  Weight (kg): 60.3 (07-05-20 @ 13:20)  BMI (kg/m2): 24.3 (07-05-20 @ 13:20)  BSA (m2): 1.61 (07-05-20 @ 13:20)  Allergies    penicillin (Anaphylaxis; Hives)    Intolerances        Vital Signs Last 24 Hrs  T(C): 37.2 (06 Jul 2020 06:30), Max: 38.3 (05 Jul 2020 16:37)  T(F): 99 (06 Jul 2020 06:30), Max: 101 (05 Jul 2020 16:37)  HR: 110 (06 Jul 2020 06:30) (104 - 115)  BP: 114/56 (06 Jul 2020 06:30) (97/50 - 134/70)  BP(mean): 75 (05 Jul 2020 18:15) (71 - 75)  RR: 22 (06 Jul 2020 06:30) (15 - 22)  SpO2: 95% (06 Jul 2020 00:00) (95% - 98%)    PHYSICAL EXAM  General: adult in NAD  HEENT: clear oropharynx, anicteric sclera, pink conjunctiva  Neck: supple  CV: normal S1/S2 with no murmur rubs or gallops  Lungs: positive air movement b/l ant lungs,clear to auscultation, no wheezes, no rales  Abdomen: soft non-tender non-distended, no hepatosplenomegaly  Ext: no clubbing cyanosis or edema  Skin: no rashes and no petechiae  Neuro: alert and oriented X 4, no focal deficits      LABS:                          7.9    6.40  )-----------( 72       ( 06 Jul 2020 09:30 )             22.9         Mean Cell Volume : 92.0 fL  Mean Cell Hemoglobin : 31.7 pg  Mean Cell Hemoglobin Concentration : 34.5 g/dL  Auto Neutrophil # : 3.91 K/uL  Auto Lymphocyte # : 1.56 K/uL  Auto Monocyte # : 0.75 K/uL  Auto Eosinophil # : 0.01 K/uL  Auto Basophil # : 0.04 K/uL  Auto Neutrophil % : 61.1 %  Auto Lymphocyte % : 24.4 %  Auto Monocyte % : 11.7 %  Auto Eosinophil % : 0.2 %  Auto Basophil % : 0.6 %      Serial CBC's  07-06 @ 09:30  Hct-22.9 / Hgb-7.9 / Plat-72 / RBC-2.49 / WBC-6.40  Serial CBC's  07-05 @ 13:56  Hct-22.9 / Hgb-7.6 / Plat-101 / RBC-2.44 / WBC-7.99      07-06    131<L>  |  98  |  12  ----------------------------<  161<H>  4.0   |  22  |  0.5<L>    Ca    8.2<L>      06 Jul 2020 09:30  Mg     1.6     07-06    TPro  5.8<L>  /  Alb  3.6  /  TBili  1.2  /  DBili  x   /  AST  39  /  ALT  33  /  AlkPhos  205<H>  07-05      PT/INR - ( 05 Jul 2020 17:08 )   PT: 19.70 sec;   INR: 1.71 ratio         PTT - ( 05 Jul 2020 17:08 )  PTT:32.4 sec                BLOOD SMEAR INTERPRETATION:       RADIOLOGY & ADDITIONAL STUDIES:    < from: Xray Foot AP + Lateral + Oblique, Left (07.05.20 @ 14:29) >  IMPRESSION:    No acute displaced fracture.    Mild soft tissue swelling.    < end of copied text >    < from: Xray Ankle Complete 3 Views, Left (07.05.20 @ 14:31) >  IMPRESSION:    There is ankle soft tissue swelling especially at the lateral malleolus.     Below the lateral malleolus is lucency and a pocket of subcutaneous emphysema is not excluded. There is no definite evidence of erosive or destructive bony changes to suggest osteomyelitis.    < end of copied text > Patient is a 74y old  Female who presents with a chief complaint of Weakness s/p fall (06 Jul 2020 09:01)      HPI:  73 y/o F with PMH of CAD s/p stent, DLD, HTN, B cell lymphoma, recently diagnosed B cell lymphoproliferative disorder (follows Dr. Guardado), on Ibrutinib presented to the ED after an episode of lightheadedness and mechanical fall. History obtained by patient and daughter over phone. Per the daughter, patient was being followed by Dr. Guardado with pending workup, PET scan and received 3 PRBC for symptomatic anemia. Patient developed acute anemia post chemo and with progression of splenomegaly. She was prescribed home O2 by Dr. Guardado. Piror to admission, around 1 am, patient was assisted to the bathroom by , when she tripped over her ankle, denies any loss of consciousness prodromal symptoms and or any trauma. Patient denies hitting head, loc, f/c/n/v/d, abd pain, dysuria, urinary urgency/frequency, back pain, cough.     ED: 112/56, HR: 108, RR;18, Tmax 101.2, Hb: 7.6 (9.8 hb on June 10th), EKG no ST changes, sinus tachycardia, Abdominal U/S: Splenomegaly 24 cm (stable since previous exam), + gallstones (05 Jul 2020 22:39)    Oncology History:   Patient following with Dr. Guardado. currently being worked up for B Cell Lymphoproliferative Disorder. Initial Bone Marroy biopsy was done on 5/20/20 and Flow cytometry revealed Monotypic B cells (18% of cells), positive for Kappa, CD19, CD20, FMC-7, negative for CD5, CD10, and CD23. 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 Atypical cells were negative for cyclin D1, molecular studies negative MYD88, L265P and BRAF V600 mutations. ANNEXIN1 seemed to be positive on T Cells and myeloid cells. The negative BRAF V600 makes hairy cell leukemia less likely. BCL2 was negative.    PET CT done in May 2020 showed evidence of diffuse FDG uptake in an enlarged spleen and mild FDG uptake at the left fourth rib at the costochondral junction with no CT correlate. Patient with no B Cell symptoms and currently denies any symptoms on current admission.     As far as treatment, patient attempted to start Rituxan in June 2020 but developed a severe allergic reaction. Given her severity of reaction, patient was instead started on Ibrutinib 3 tabs daily on 6/17/2020. However by 6/20/2020 she began to developed mild thrombocytopenia and anemia. Ibrutinib was subsequently decreased to 2 pills per day but patient on follow up had a rash to L breast and L groin so plan was to hold pill until 7/6/2020. If rash resolved, Ibrutinib could be restarted at 1 pill daily.     Patent had mechanical fall at home and admitted to hospital. On admission, found to have positive UA and currently on treatment with broad spectrum antibiotics until cultures have resulted.         ROS:  Negative except for:    PAST MEDICAL & SURGICAL HISTORY:  Anemia  DM (diabetes mellitus)  High cholesterol  HTN (hypertension)  B-cell chronic lymphocytic leukemia variant  CAD (coronary artery disease): s/p stenting  H/O heart artery stent      SOCIAL HISTORY:    FAMILY HISTORY:  Family history of diabetes mellitus (DM)      MEDICATIONS  (STANDING):  aspirin  chewable 81 milliGRAM(s) Oral daily  atorvastatin 80 milliGRAM(s) Oral at bedtime  aztreonam  IVPB 1000 milliGRAM(s) IV Intermittent every 8 hours  chlorhexidine 4% Liquid 1 Application(s) Topical every 24 hours  enoxaparin Injectable 40 milliGRAM(s) SubCutaneous at bedtime  folic acid 1 milliGRAM(s) Oral daily  lisinopril 10 milliGRAM(s) Oral daily  vancomycin  IVPB 1000 milliGRAM(s) IV Intermittent every 12 hours    MEDICATIONS  (PRN):    Height (cm): 157.48 (07-05-20 @ 13:20)  Weight (kg): 60.3 (07-05-20 @ 13:20)  BMI (kg/m2): 24.3 (07-05-20 @ 13:20)  BSA (m2): 1.61 (07-05-20 @ 13:20)  Allergies    penicillin (Anaphylaxis; Hives)    Intolerances        Vital Signs Last 24 Hrs  T(C): 37.2 (06 Jul 2020 06:30), Max: 38.3 (05 Jul 2020 16:37)  T(F): 99 (06 Jul 2020 06:30), Max: 101 (05 Jul 2020 16:37)  HR: 110 (06 Jul 2020 06:30) (104 - 115)  BP: 114/56 (06 Jul 2020 06:30) (97/50 - 134/70)  BP(mean): 75 (05 Jul 2020 18:15) (71 - 75)  RR: 22 (06 Jul 2020 06:30) (15 - 22)  SpO2: 95% (06 Jul 2020 00:00) (95% - 98%)    PHYSICAL EXAM  General: adult in NAD  HEENT: clear oropharynx, anicteric sclera, pink conjunctiva  Neck: supple  CV: normal S1/S2 with no murmur rubs or gallops  Lungs: CTABL  Abdomen: soft non-tender non-distended  Ext: no clubbing cyanosis or edema  Skin: no rashes and no petechiae  Neuro: alert and oriented X 4, no focal deficits      LABS:                          7.9    6.40  )-----------( 72       ( 06 Jul 2020 09:30 )             22.9         Mean Cell Volume : 92.0 fL  Mean Cell Hemoglobin : 31.7 pg  Mean Cell Hemoglobin Concentration : 34.5 g/dL  Auto Neutrophil # : 3.91 K/uL  Auto Lymphocyte # : 1.56 K/uL  Auto Monocyte # : 0.75 K/uL  Auto Eosinophil # : 0.01 K/uL  Auto Basophil # : 0.04 K/uL  Auto Neutrophil % : 61.1 %  Auto Lymphocyte % : 24.4 %  Auto Monocyte % : 11.7 %  Auto Eosinophil % : 0.2 %  Auto Basophil % : 0.6 %      Serial CBC's  07-06 @ 09:30  Hct-22.9 / Hgb-7.9 / Plat-72 / RBC-2.49 / WBC-6.40  Serial CBC's  07-05 @ 13:56  Hct-22.9 / Hgb-7.6 / Plat-101 / RBC-2.44 / WBC-7.99      07-06    131<L>  |  98  |  12  ----------------------------<  161<H>  4.0   |  22  |  0.5<L>    Ca    8.2<L>      06 Jul 2020 09:30  Mg     1.6     07-06    TPro  5.8<L>  /  Alb  3.6  /  TBili  1.2  /  DBili  x   /  AST  39  /  ALT  33  /  AlkPhos  205<H>  07-05      PT/INR - ( 05 Jul 2020 17:08 )   PT: 19.70 sec;   INR: 1.71 ratio         PTT - ( 05 Jul 2020 17:08 )  PTT:32.4 sec                BLOOD SMEAR INTERPRETATION:       RADIOLOGY & ADDITIONAL STUDIES:    < from: Xray Foot AP + Lateral + Oblique, Left (07.05.20 @ 14:29) >  IMPRESSION:    No acute displaced fracture.    Mild soft tissue swelling.    < end of copied text >    < from: Xray Ankle Complete 3 Views, Left (07.05.20 @ 14:31) >  IMPRESSION:    There is ankle soft tissue swelling especially at the lateral malleolus.     Below the lateral malleolus is lucency and a pocket of subcutaneous emphysema is not excluded. There is no definite evidence of erosive or destructive bony changes to suggest osteomyelitis.    < end of copied text >

## 2020-07-06 NOTE — DISCHARGE NOTE NURSING/CASE MANAGEMENT/SOCIAL WORK - PATIENT PORTAL LINK FT
You can access the FollowMyHealth Patient Portal offered by [facility] by registering at the following website: [email or web address]. By joining [facility] [employer]’s FollowMyHealth portal, you will also be able to view your health information using other applications (apps) compatible with our system.

## 2020-07-06 NOTE — CONSULT NOTE ADULT - ATTENDING COMMENTS
Kathrine was admitted s/p fall, noted to have a symptomatic UTI, currently managed with abroad spectrum antibiotics.   Trend Hb keep Hb above 8.0 and platelet count - keep above 50K.   Ibrutinib is currently on hold 2/2 rash, that is resolving, will consider rechallenging once all cultures are back and patient is no longer febrile.   Case was discussed with patients daughter Kathrine over the phone.

## 2020-07-06 NOTE — PROGRESS NOTE ADULT - ASSESSMENT
73 y/o F with PMH of CAD s/p stent, DLD, HTN, B josue lymphoma, recently diagnosed B cell lymphoproliferative disorder (follows Dr. Guardado), on Ibrutinib presented to the ED after an episode of lightheadedness and mechanical fall.    #Lightheadedness + fevers, tachycardia suspected Sepsis in Immunocompromised Patient, on active chemo w. Dr. Guardado (Ibrutinib)  Recent dx of lymphoproliferative Disorder  ddx includes sepsis vs B cell symptoms  - Febrile and tachycardic on presentation, febrile to 101 overnight  - Has no complaints at this time  - CXR unremarkable for PNA  - UA pos; pending bcx, ucx, ESR, CRP, Lactate  - no clear source for infection, but given the patient is on active immunotherapy will cover with broad spectrum abx (c/w vancomycin + aztreonam (penicillin allergy with profuse hives)  - ID consult placed   - Heme Onc c/s placed    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   - patient with history of symptomatic anemia secondary to splenic sequestration  - recent admission in April with very low hb, requiring 3 PRBC  - hb 7.6 on admission (10.2 in June), baseline over 12, s/p 1 U pRBCs, f/u repeat CBC this AM  - Splenomegaly appreciated on abdominal US on admission, stable since previous admission  - per daughter, patient not candidate for splenectomy  - c/w folic acid supplemental   - Heme Onc c/s placed    S/p Mechanical fall  - left knee ecchymosis  - patient educated about risks of falls with containment splenomegaly   - no evidence of fracture  - c/w pain control PRN    CAD s/p stents  HTN  - c/w aspirin 81 mg daily  - c/w Lisinopril 10 mg daily     DLD  - c/w Atorvastatin 80 mg    DVT ppx: HSQ  Diet: DASH TLC  Code status: Full code  Dispo: from home, uses wheelchair at baseline, can walk with walker

## 2020-07-07 NOTE — PROGRESS NOTE ADULT - ASSESSMENT
H· Assessment		  75 y/o F with PMH of CAD s/p stent, DLD, HTN, B cell lymphoproliferative disorder, recently diagnosed B cell lymphoproliferative disorder (follows Dr. Guardado), on Ibrutinib presented to the ED after an episode of lightheadedness and mechanical fall.    IMPRESSION;   Resolved SIRS   Clinically has no complaints and wants to go home  BCx 7/5 NG  WBC 5.9  No PNA . CXR no focal consolidation and clinically no complains consistent with a PNA  No pyelonephritis. Has pyuria  no intraabdominal focus  COVID-19 7/5 NG    RECOMMENDATIONS;   Hold ABx

## 2020-07-07 NOTE — PROGRESS NOTE ADULT - ATTENDING COMMENTS
73 y/o F with PMH of CAD s/p stent, DLD, HTN, B josue lymphoma, recently diagnosed B cell lymphoproliferative disorder (follows Dr. Guardado), on Ibrutinib presented to the ED after an episode of lightheadedness and mechanical fall.    #Lightheadedness a/w fevers, tachycardia - 2/2   # Sepsis (POA)   source unclear.   Immunocompromised Patient. UA positive. possible UTI.   s/p Vanc, Azrteonam (7/5 - 7/7). Now ID discontinued it.   Tmax [id number]  awaiting blood, urine culture.   If negative - d/c to home tomorrow. (w/ home PT)    #Recent dx of lymphoproliferative Disorder  on active chemo w. Dr. Guardado (Ibrutinib). Holding while she is septic.    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   - Has hx of symptomatic anemia secondary to splenic sequestration  - recent admission in April with very low hb, requiring 3 PRBC  - hb 7.6 on admission (10.2 in June)  - Hb 7.5 this morning, will transfuse 1 U pRBCs today, f/u repeat CBC at 4 PM    - Splenomegaly appreciated on abdominal US on admission, stable since previous admission  - per daughter, patient was not candidate for splenectomy  - c/w folic acid supplemental   - Heme Onc following - keep Hb >8, platelets > 50,000, monitor INR and if elevated, give Vitamin K    Hyponatremia  - 129 --> 133  - No clinical sx of hyponatremia, will cont to monitor    S/p Mechanical fall  - No evidence of fx on imaging on admission  - c/w pain control PRN  STR vs Home PT per physiatry    CAD s/p stents  HTN  - c/w aspirin 81 mg daily  - c/w Lisinopril 10 mg daily     DLD  - c/w Atorvastatin 80 mg    DVT ppx: HSQ  Diet: DASH TLC  Code status: Full code  Activity: Inc as tolerated, work with PT  Dispo: from home  If cultures negative and no more fevers, d/c tomorrow without any antibiotics.   Home w/ home PT vs STR (patient apparently preferring Home; confirm with daughter too) 75 y/o F with PMH of CAD s/p stent, DLD, HTN, B josue lymphoma, recently diagnosed B cell lymphoproliferative disorder (follows Dr. Guardado), on Ibrutinib presented to the ED after an episode of lightheadedness and mechanical fall.    #Lightheadedness a/w fevers, tachycardia - 2/2   # Sepsis (POA)   source unclear.   Immunocompromised Patient. UA positive. possible UTI.   s/p Vanc, Azrteonam (7/5 - 7/7). Now ID discontinued it.   Tmax [id number]  awaiting blood, urine culture.   If negative - d/c to home tomorrow. (w/ home PT)    EKG Sinus tach, check orthos.     #Recent dx of lymphoproliferative Disorder  on active chemo w. Dr. Guardado (Ibrutinib). Holding while she is septic.    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   - Has hx of symptomatic anemia secondary to splenic sequestration  - recent admission in April with very low hb, requiring 3 PRBC  - hb 7.6 on admission (10.2 in June)  - Hb 7.5 this morning, will transfuse 1 U pRBCs today, f/u repeat CBC at 4 PM    - Splenomegaly appreciated on abdominal US on admission, stable since previous admission  - per daughter, patient was not candidate for splenectomy  - c/w folic acid supplemental   - Heme Onc following - keep Hb >8, platelets > 50,000, monitor INR and if elevated, give Vitamin K    Hyponatremia  - 129 --> 133  - No clinical sx of hyponatremia, will cont to monitor    S/p Mechanical fall  - No evidence of fx on imaging on admission  - c/w pain control PRN  STR vs Home PT per physiatry    CAD s/p stents  HTN  - c/w aspirin 81 mg daily  - c/w Lisinopril 10 mg daily     DLD  - c/w Atorvastatin 80 mg    DVT ppx: HSQ  Diet: DASH TLC  Code status: Full code  Activity: Inc as tolerated, work with PT  Dispo: from home  If cultures negative and no more fevers, d/c tomorrow without any antibiotics.   Home w/ home PT vs STR (patient apparently preferring Home; confirm with daughter too)

## 2020-07-07 NOTE — CONSULT NOTE ADULT - CONSULT REASON
73 yo with CAD,  lymphoma, lymphoproliferative disorder, with symptomatic anemia and sepsis
gait dysfunction
sepsis

## 2020-07-07 NOTE — CONSULT NOTE ADULT - ASSESSMENT
IMPRESSION: Rehab of gait dysfunction     PRECAUTIONS: [  ] Cardiac  [  ] Respiratory  [  ] Seizures [  ] Contact Isolation  [  ] Droplet Isolation  [  ] Other    Weight Bearing Status:     RECOMMENDATION:    Out of Bed to Chair     DVT/Decubiti Prophylaxis    REHAB PLAN:     [x   ] Bedside P/T 3-5 times a week   [   ]   Bedside O/T  2-3 times a week             [   ] No Rehab Therapy Indicated                   [   ]  Speech Therapy   Conditioning/ROM                                    ADL  Bed Mobility                                               Conditioning/ROM  Transfers                                                     Bed Mobility  Sitting /Standing Balance                         Transfers                                        Gait Training                                               Sitting/Standing Balance  Stair Training [   ]Applicable                    Home equipment Eval                                                                        Splinting  [   ] Only      GOALS:   ADL   [   ]   Independent                    Transfers  [ x  ] Independent                          Ambulation  [ x  ] Independent     [  x  ] With device                            [   ]  CG                                                         [   ]  CG                                                                  [   ] CG                            [    ] Min A                                                   [   ] Min A                                                              [   ] Min  A          DISCHARGE PLAN:   [   ]  Good candidate for Intensive Rehabilitation/Hospital based                                             Will tolerate 3hrs Intensive Rehab Daily                                       [  x  ]  Short Term Rehab in Skilled Nursing Facility                              vs         [ x   ]  Home with Outpatient or VN services                                         [    ]  Possible Candidate for Intensive Hospital based Rehab

## 2020-07-07 NOTE — PHYSICAL THERAPY INITIAL EVALUATION ADULT - SPECIFY REASON(S)
pt currently does not have activity orders. PT will f/u to complete IE when pt has appropriate activity orders.

## 2020-07-07 NOTE — PROGRESS NOTE ADULT - ASSESSMENT
75 y/o F with PMH of CAD s/p stent, DLD, HTN, B josue lymphoma, recently diagnosed B cell lymphoproliferative disorder (follows Dr. Guardado), on Ibrutinib presented to the ED after an episode of lightheadedness and mechanical fall.    #Lightheadedness + fevers, tachycardia; suspected Sepsis in Immunocompromised Patient, on active chemo w. Dr. Guardado (Ibrutinib)  Recent dx of lymphoproliferative Disorder  - Febrile and tachycardic on presentation, fever of 100.5 yesterday  - CXR 7/6 negative for acute pulmonary dz  - UA revealed moderate bacteria, large LE, neg nitrites  - Lactate remains elevated 2.5, inflammatory markers slightly elevated  - no clear source for infection, but given the patient is on active immunotherapy will cover with broad spectrum ABx (c/w vancomycin + aztreonam (penicillin allergy with profuse hives)  - ID following - c/w ABx regimen, will d/c ABx if blood cx come back negative  - Blood Cx 7/5 prelim neg, Urine Cx pending    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   - Has hx of symptomatic anemia secondary to splenic sequestration  - recent admission in April with very low hb, requiring 3 PRBC  - hb 7.6 on admission (10.2 in June)  - Hb 7.5 this morning, will transfuse 1 U pRBCs today, f/u repeat CBC at 4 PM  - Splenomegaly appreciated on abdominal US on admission, stable since previous admission  - per daughter, patient not candidate for splenectomy  - c/w folic acid supplemental   - Heme Onc following - keep Hb >7, platelets > 50,000, monitor INR and if elevated, give Vitamin K    Hyponatremia  - 129 --> 133  - No clinical sx of hyponatremia, will cont to monitor    S/p Mechanical fall  - No evidence of fx on imaging on admission  - c/w pain control PRN    CAD s/p stents  HTN  - c/w aspirin 81 mg daily  - c/w Lisinopril 10 mg daily     DLD  - c/w Atorvastatin 80 mg    DVT ppx: HSQ  Diet: DASH TLC  Code status: Full code  Activity: Inc as tolerated, is refusing to get out of bed  Dispo: from home

## 2020-07-07 NOTE — PROGRESS NOTE ADULT - ASSESSMENT
Patient is a 75 y/o F with PMH of CAD s/p stent, DLD, HTN, B cell lymphoproliferative disorder, recently diagnosed B cell lymphoproliferative disorder (follows Dr. Guardado), on Ibrutinib presented to the ED after an episode of lightheadedness and mechanical fall. Hemoglobin on admission 7.6 with hemoglobin of 10 as of June 2020. Primary team requesting consult for management of symptomatic anemia.     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 Atypical cells were negative for cyclin D1, molecular studies negative MYD88, L265P and BRAF V600 mutations. ANNEXIN1 seemed to be positive on T Cells and myeloid cells. The negative BRAF V600 makes hairy cell leukemia less likely. BCL2 was negative.  - PET CT as of May 2020 showing diffuse FDG uptake in an enlarged spleen   - Patient with almost weekly transfusions of pRBCs as outpatient with goal Hb >7  - Patient started on Ibrutinib 3 tabs daily on 6/17/2020. However by 6/20/2020 she began to developed mild thrombocytopenia and anemia. Ibrutinib was subsequently decreased to 2 pills per day  - Plan was to restart on 1 pill of Ibrutinib on 7/6/20 if rash resolved. However, given that the patient is currently undergoing active treatment for infection, will hold off on resuming Ibrutinib at this time until infection adequately treated   -Thrombocytopenia is worsening likely 2/2 antibiotics, as per ID plan is to hold antibiotics, keep platelets above 50K, transfuse if needed, transfuse 1 more unit of PRBC    #) Elevated INR  - INR of 1.7  --Consider administering 2.5-5mg PO vitamin K    #) Thrombocytopenia  - Likely reactive, continue to monitor   - Keep platelets above 50K    Plan:  - Hold Ibrutinib while patient actively undergoing treatment for underlying infection, f/u ID and pending blood cultures/ urine culture   - Trend Hb and platelets. Transfuse for Hb <7 and platelets < 10,000  - Repeat INR, If remains elevated can give small dose of Vitamin K 2.5 mg PO     Case was discussed with house staff and patients daughter over the phone

## 2020-07-07 NOTE — CONSULT NOTE ADULT - SUBJECTIVE AND OBJECTIVE BOX
HPI:  75 y/o F with PMH of CAD s/p stent, DLD, HTN, B josue lymphoma, recently diagnosed B cell lymphoproliferative disorder (follows Dr. Guardado), on Ibrutinib presented to the ED after an episode of lightheadedness and mechanical fall. History obtained by patient and daughter over phone. Per the daughter, patient was being followed by Dr. Guardado with pending workup, PET scan and received 3 PRBC for symptomatic anemia. Patient developed acute anemia post chemo and with progression of splenomegaly. She was prescribed home O2 by Dr. Guardado. Piror to admission, around 1 am, patient was assisted to the bathroom by , when she tripped over her ankle, denies any loss of consciousness prodromal symptoms and or any trauma. Patient denies hitting head, loc, f/c/n/v/d, abd pain, dysuria, urinary urgency/frequency, back pain, cough.     ED: 112/56, HR: 108, RR;18, Tmax 101.2, Hb: 7.6 (9.8 hb on ), EKG no ST changes, sinus tachycardia, Abdominal U/S: Splenomegaly 24 cm (stable since previous exam), + gallstones (2020 22:39)      PAST MEDICAL & SURGICAL HISTORY:  Anemia  DM (diabetes mellitus)  High cholesterol  HTN (hypertension)  B-cell chronic lymphocytic leukemia variant  CAD (coronary artery disease): s/p stenting  H/O heart artery stent      Hospital Course:    TODAY'S SUBJECTIVE & REVIEW OF SYMPTOMS:     Constitutional WNL   Cardio WNL   Resp WNL   GI WNL  Heme WNL  Endo WNL  Skin WNL  MSK Weakness  Neuro WNL  Cognitive WNL  Psych WNL      MEDICATIONS  (STANDING):  aspirin  chewable 81 milliGRAM(s) Oral daily  atorvastatin 80 milliGRAM(s) Oral at bedtime  chlorhexidine 4% Liquid 1 Application(s) Topical every 24 hours  enoxaparin Injectable 40 milliGRAM(s) SubCutaneous at bedtime  folic acid 1 milliGRAM(s) Oral daily  lisinopril 10 milliGRAM(s) Oral daily    MEDICATIONS  (PRN):      FAMILY HISTORY:  Family history of diabetes mellitus (DM)      Allergies    penicillin (Anaphylaxis; Hives)    Intolerances        SOCIAL HISTORY:    [  ] Etoh  [  ] Smoking  [  ] Substance abuse     Home Environment:  [  ] Home Alone  [ x ] Lives with Family  [  ] Home Health Aid    Dwelling:  [  ] Apartment  [x  ] Private House  [  ] Adult Home  [  ] Skilled Nursing Facility      [  ] Short Term  [  ] Long Term  [ x ] Stairs       Elevator [  ]    FUNCTIONAL STATUS PTA: (Check all that apply)  Ambulation: [  x ]Independent    [  ] Dependent     [  ] Non-Ambulatory  Assistive Device: [  ] SA Cane  [  ]  Q Cane  [ x ] Walker  [ x ]  Wheelchair  ADL : [x  ] Independent  [  ]  Dependent       Vital Signs Last 24 Hrs  T(C): 37.3 (2020 14:18), Max: 38.1 (2020 16:28)  T(F): 99.2 (2020 14:18), Max: 100.5 (2020 16:28)  HR: 106 (2020 14:18) (104 - 107)  BP: 113/54 (2020 14:18) (106/54 - 113/54)  BP(mean): --  RR: 19 (2020 14:18) (19 - 22)  SpO2: --      PHYSICAL EXAM: Alert & Oriented X3  GENERAL: NAD  HEAD:  Atraumatic, Normocephalic  CHEST/LUNG: Clear   HEART: S1S2+  ABDOMEN: Soft, Nontender  EXTREMITIES:  no calf tenderness    NERVOUS SYSTEM:  Cranial Nerves 2-12 intact [  ] Abnormal  [  ]  ROM: WFL all extremities [x  ]  Abnormal [  ]  Motor Strength: WFL all extremities  [  ]  Abnormal [x  ]4/5 all ext  Sensation: intact to light touch [x  ] Abnormal [  ]  Reflexes: Symmetric [  ]  Abnormal [  ]    FUNCTIONAL STATUS:  Bed Mobility: Independent [  ]  Supervision [  ]  Needs Assistance [x  ]  N/A [  ]  Transfers: Independent [  ]  Supervision [  ]  Needs Assistance [x  ]  N/A [  ]   Ambulation: Independent [  ]  Supervision [  ]  Needs Assistance [  ]  N/A [  ]  ADL: Independent [  ] Requires Assistance [  ] N/A [  ]      LABS:                        7.5    5.91  )-----------( 63       ( 2020 05:27 )             22.3     07-    133<L>  |  97<L>  |  11  ----------------------------<  124<H>  3.6   |  23  |  <0.5<L>    Ca    8.3<L>      2020 05:27  Mg     1.9         TPro  5.1<L>  /  Alb  3.4<L>  /  TBili  1.4<H>  /  DBili  x   /  AST  41  /  ALT  35  /  AlkPhos  175<H>      PT/INR - ( 2020 11:03 )   PT: 20.90 sec;   INR: 1.82 ratio         PTT - ( 2020 11:03 )  PTT:34.4 sec  Urinalysis Basic - ( 2020 06:30 )    Color: Yellow / Appearance: Slightly Turbid / S.022 / pH: x  Gluc: x / Ketone: Negative  / Bili: Negative / Urobili: <2 mg/dL   Blood: x / Protein: Trace / Nitrite: Negative   Leuk Esterase: Large / RBC: 9 /HPF / WBC 55 /HPF   Sq Epi: x / Non Sq Epi: 7 /HPF / Bacteria: Negative        RADIOLOGY & ADDITIONAL STUDIES:    Assesment:

## 2020-07-08 NOTE — PROGRESS NOTE ADULT - ASSESSMENT
73 y/o F with PMH of CAD s/p stent, DLD, HTN, B josue lymphoma, recently diagnosed B cell lymphoproliferative disorder (follows Dr. Guardado), on Ibrutinib presented to the ED after an episode of lightheadedness and mechanical fall.    #Lightheadedness + fevers, tachycardia; suspected Sepsis in Immunocompromised Patient, on active chemo w. Dr. 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   - Has hx of symptomatic anemia secondary to splenic sequestration  - recent admission in April with very low hb, requiring 3 PRBC  - hb 7.6 on admission (10.2 in June)  - Pt s/p 2U transfusion of pRBCs, Hb now 9.8  - Splenomegaly appreciated on abdominal US on admission, stable since previous admission  - per daughter, patient not candidate for splenectomy  - c/w folic acid supplemental   - Plt count 48 today, s/p 1U platelets  - F/u CBC in morning  - Per Heme-Onc team, monitor pt for improvement of platelet count; possible d/c tomorrow    Hyponatremia - improving  - 129 --> 134  - No clinical sx of hyponatremia, will cont to monitor    S/p Mechanical fall  - No evidence of fx on imaging on admission  - c/w pain control PRN    CAD s/p stents  HTN  - c/w aspirin 81 mg daily  - c/w Lisinopril 10 mg daily     DLD  - c/w Atorvastatin 80 mg    DVT ppx: HSQ  Diet: DASH TLC  Code status: Full code  Activity: Inc as tolerated, is refusing to get out of bed; per daughter, pt requires max assistance at baseline  Dispo: from home, possible d/c tomorrow, pending improvement of CBC

## 2020-07-08 NOTE — PROGRESS NOTE ADULT - ASSESSMENT
Assessment		  73 y/o F with PMH of CAD s/p stent, DLD, HTN, B cell lymphoproliferative disorder, recently diagnosed B cell lymphoproliferative disorder (follows Dr. Guardado), on Ibrutinib presented to the ED after an episode of lightheadedness and mechanical fall.    IMPRESSION;   Resolved SIRS   Clinically has no complaints and wants to go home  BCx 7/5 NG  WBC 5.8  No PNA . CXR no focal consolidation and clinically no complains consistent with a PNA  No pyelonephritis. Has pyuria  no intraabdominal focus  COVID-19 7/5 NG    RECOMMENDATIONS;   Off ABx   recall prn please

## 2020-07-08 NOTE — PROGRESS NOTE ADULT - ASSESSMENT
73 y/o F with PMH of CAD s/p stent, DLD, HTN, B josue lymphoma, recently diagnosed B cell lymphoproliferative disorder (follows Dr. Guardado), on Ibrutinib presented to the ED after an episode of lightheadedness and mechanical fall.    #Lightheadedness a/w fevers, tachycardia - 2/2   # Sepsis (POA)   source unclear.   Immunocompromised Patient. UA positive. possible UTI.   off ABX   Tmax [id number]  blood culture is negative.       EKG Sinus tach, check orthos.     #Recent dx of lymphoproliferative Disorder  on active chemo w. Dr. Guardado (Ibrutinib). 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   - Has hx of symptomatic anemia secondary to splenic sequestration  - recent admission in April with very low hb, requiring 3 PRBC  - hb 7.6 on admission (10.2 in June)  - Hb  now is 9.8 s/p 3units of PRBC  - Splenomegaly appreciated on abdominal US on admission, stable since previous admission  - per daughter, patient was not candidate for splenectomy  - c/w folic acid supplemental   - Heme Onc following - keep Hb >8, platelets > 50,000, monitor INR and if elevated, got Vitamin K    Hyponatremia  - resolved  - No clinical sx of hyponatremia, will cont to monitor    S/p Mechanical fall  - No evidence of fx on imaging on admission  - c/w pain control PRN  STR vs Home PT per physiatry    CAD s/p stents  HTN  - c/w aspirin 81 mg daily  - c/w Lisinopril 10 mg daily     DLD  - c/w Atorvastatin 80 mg    Dc home today-- she could ambulate independently. spent more than 30mins.

## 2020-07-08 NOTE — OCCUPATIONAL THERAPY INITIAL EVALUATION ADULT - GENERAL OBSERVATIONS, REHAB EVAL
Pt. encountered semifowler in bed in NAD with +IV lock, required max encouragement to participate in OT IE. Pt. left as found with call bell within reach.

## 2020-07-08 NOTE — PHYSICAL THERAPY INITIAL EVALUATION ADULT - PERTINENT HX OF CURRENT PROBLEM, REHAB EVAL
73 y/o F with PMH of CAD s/p stent, DLD, HTN, B josue lymphoma, recently diagnosed B cell lymphoproliferative disorder (follows Dr. Guardado), on Ibrutinib presented to the ED after an episode of lightheadedness and mechanical fall.

## 2020-07-08 NOTE — OCCUPATIONAL THERAPY INITIAL EVALUATION ADULT - PLANNED THERAPY INTERVENTIONS, OT EVAL
ROM/balance training/strengthening/ADL retraining/transfer training/parent/caregiver training.../IADL retraining

## 2020-07-08 NOTE — PHYSICAL THERAPY INITIAL EVALUATION ADULT - GENERAL OBSERVATIONS, REHAB EVAL
encountered supine in bed, NAD, required max encouragement to participate. Time in: 13:30 Time out: 14:00

## 2020-07-08 NOTE — OCCUPATIONAL THERAPY INITIAL EVALUATION ADULT - NS ASR FOLLOW COMMAND OT EVAL
100% of the time/Pt. repeatedly verbalized that she wants to go home/able to follow multistep instructions

## 2020-07-08 NOTE — OCCUPATIONAL THERAPY INITIAL EVALUATION ADULT - ADDITIONAL COMMENTS
Pt. lives on the 1st floor of a  with 3 RAKAN and required supervision and assistance with ADL/IADL and functional transfers. Pt. reports there are at least 7 people living with her and received 24 hour supervision with +HCS throughout the week.

## 2020-07-08 NOTE — PHYSICAL THERAPY INITIAL EVALUATION ADULT - CRITERIA FOR SKILLED THERAPEUTIC INTERVENTIONS
anticipated discharge recommendation/impairments found/rehab potential/therapy frequency/anticipated equipment needs at discharge/functional limitations in following categories

## 2020-07-08 NOTE — OCCUPATIONAL THERAPY INITIAL EVALUATION ADULT - PERTINENT HX OF CURRENT PROBLEM, REHAB EVAL
75 y/o F with PMH of CAD s/p stent, DLD, HTN, B josue lymphoma, recently diagnosed B cell lymphoproliferative disorder (follows Dr. Guardado), on Ibrutinib presented to the ED after an episode of lightheadedness and mechanical fall.

## 2020-07-09 NOTE — PROGRESS NOTE ADULT - REASON FOR ADMISSION
Weakness s/p fall

## 2020-07-09 NOTE — DISCHARGE NOTE PROVIDER - CARE PROVIDER_API CALL
Beatriz Guardado  HEMATOLOGY/ONCOLOGY  [address] [place] [address]  Phone: [phone]  Fax: [phone]  Established Patient  Follow Up Time: 1 week

## 2020-07-09 NOTE — PROGRESS NOTE ADULT - ASSESSMENT
Patient is a 73 y/o F with PMH of CAD s/p stent, DLD, HTN, B cell lymphoproliferative disorder, recently diagnosed B cell lymphoproliferative disorder (follows Dr. Guardado), on Ibrutinib presented to the ED after an episode of lightheadedness and mechanical fall. Hemoglobin on admission 7.6 with hemoglobin of 10 as of June 2020. Primary team requesting consult for management of symptomatic anemia.     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 Atypical cells were negative for cyclin D1, molecular studies negative MYD88, L265P and BRAF V600 mutations. ANNEXIN1 seemed to be positive on T Cells and myeloid cells. The negative BRAF V600 makes hairy cell leukemia less likely. BCL2 was negative.  - PET CT as of May 2020 showing diffuse FDG uptake in an enlarged spleen   - Patient with almost weekly transfusions of pRBCs as outpatient with goal Hb >7  - Patient started on Ibrutinib 3 tabs daily on 6/17/2020. However by 6/20/2020 she began to developed mild thrombocytopenia and anemia. Ibrutinib was subsequently decreased to 2 pills per day  - Plan was to restart on 1 pill of Ibrutinib on 7/6/20 if rash resolved. However, given that the patient is currently undergoing active treatment for infection, will hold off on resuming Ibrutinib at this time until infection adequately treated   -Thrombocytopenia is worsening likely 2/2 antibiotics, as per ID plan is to hold antibiotics, keep platelets above 50K and Hb > 8    #) Elevated INR  - INR improved today to 1.4    #) Thrombocytopenia  - Likely reactive, continue to monitor   - Keep platelets above 50K    Plan:  - Hold Ibrutinib. Patient will continue as outpatient once she follows with Dr. Guardado. She will continue her weekly transfusions as outpatient   - Trend Hb and platelets. Transfuse for Hb <8 and platelets < 50,000  - Patient to received additional unit of platelets today (total of 2 units)  - She can be discharged home today after receiving platelets     Case was discussed with med resident Patient is a 73 y/o F with PMH of CAD s/p stent, DLD, HTN, B cell lymphoproliferative disorder, recently diagnosed B cell lymphoproliferative disorder (follows Dr. Guardado), on Ibrutinib presented to the ED after an episode of lightheadedness and mechanical fall. Hemoglobin on admission 7.6 with hemoglobin of 10 as of June 2020. Primary team requesting consult for management of symptomatic anemia.     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 Atypical cells were negative for cyclin D1, molecular studies negative MYD88, L265P and BRAF V600 mutations. ANNEXIN1 seemed to be positive on T Cells and myeloid cells. The negative BRAF V600 makes hairy cell leukemia less likely. BCL2 was negative.  - PET CT as of May 2020 showing diffuse FDG uptake in an enlarged spleen   - Patient with almost weekly transfusions of pRBCs as outpatient with goal Hb >7  - Patient started on Ibrutinib 3 tabs daily on 6/17/2020. However by 6/20/2020 she began to developed mild thrombocytopenia and anemia. Ibrutinib was subsequently decreased to 2 pills per day  - Plan was to restart on 1 pill of Ibrutinib on 7/6/20 if rash resolved. However, given that the patient is currently undergoing active treatment for infection, will hold off on resuming Ibrutinib at this time until infection adequately treated   -Thrombocytopenia is worsening likely 2/2 antibiotics, as per ID plan is to hold antibiotics, keep platelets above 50K and Hb > 8    #) Elevated INR  - INR improved today to 1.4    #) Thrombocytopenia  - Likely reactive, continue to monitor   - Keep platelets above 50K    Plan:  - Hold Ibrutinib. Patient will continue as outpatient once she follows with Dr. Guardado. She will continue her weekly transfusions as outpatient   - Trend Hb and platelets. Transfuse for Hb <8 and platelets < 50,000  - Patient to received additional unit of platelets today (total of 2 units)  - She can be discharged home today after receiving platelets   - Patient scheduled for blood work at [facility] on 7/13/2020 10:30AM and follow up appointment with Dr. Guardado on 7/14/2020 at 11:15AM     Case was discussed with med resident

## 2020-07-09 NOTE — DISCHARGE NOTE PROVIDER - HOSPITAL COURSE
73 y/o F with PMH of CAD s/p stent, DLD, HTN, B josue lymphoma, recently diagnosed B cell lymphoproliferative disorder (follows Dr. Guardado), on Ibrutinib presented to the ED after an episode of lightheadedness and mechanical fall. History obtained by patient and daughter over phone. Per the daughter, patient was being followed by Dr. Guardado with pending workup, PET scan and received 3 PRBC for symptomatic anemia. Patient developed acute anemia post chemo and with progression of splenomegaly. She was prescribed home O2 by Dr. Guardado. Piror to admission, around 1 am, patient was assisted to the bathroom by , when she tripped over her ankle, denies any loss of consciousness prodromal symptoms and or any trauma. Patient denies hitting head, loc, f/c/n/v/d, abd pain, dysuria, urinary urgency/frequency, back pain, cough.     ED: 112/56, HR: 108, RR;18, Tmax 101.2, Hb: 7.6 (9.8 hb on June 10th), EKG no ST changes, sinus tachycardia, Abdominal U/S: Splenomegaly 24 cm (stable since previous exam), + gallstones.     Echo revealed normal EF, orthostatics negative. Pt was thought to have sepsis from unknown source in setting of immunocompromised state and was started on Vanco and Aztreonam. UA revealed large LE, moderate bacteria, neg nitrites, urine and blood cx negative, antibiotics were d/c'd. Pt was transfused 2 U pRBCs. Plt count was found to be 48 during admission and pt was transfused 2 U plts. Heme Onc was following pt, recommend to keep Hb > 8 and platelets > 50K; she will resume outpt chemo regimen after discharge. Pt's weakness and lightheadedness improved and pt is stable for discharge.

## 2020-07-09 NOTE — DISCHARGE NOTE PROVIDER - NSDCFUSCHEDAPPT_GEN_ALL_CORE_FT
AD BASURTO ; 07/13/2020 ; [facility] AD [place]unes ; 07/15/2020 ; [facility] AD Willis ; 07/20/2020 ; [facility] AD [place]unes ; 07/22/2020 ; [facility] [place] FARAH

## 2020-07-09 NOTE — PROGRESS NOTE ADULT - SUBJECTIVE AND OBJECTIVE BOX
Patient Information:  AD BASURTO / 74y / Female / MRN#:[id number]    Hospital Day: 2d    Interval History:  Patient seen and examined at bedside. Pt had fever of 100.5 yesterday afternoon, improved with Tylenol. CXR was performed and negative for acute dz, urine cx and repeat blood cx were obtained. This morning, pt is sitting up in bed. She states that she feels fine, denies any complaints. She states that at baseline, she does not ambulate. Per nursing and PT staff, she refuses to get out of bed and ambulate. Will attempt to get in touch with daughter to clarify baseline activity level.    Past Medical History:  Anemia  DM (diabetes mellitus)  High cholesterol  HTN (hypertension)  B-cell chronic lymphocytic leukemia variant  CAD (coronary artery disease)    Past Surgical History:  H/O heart artery stent  No significant past surgical history    Allergies:  penicillin (Anaphylaxis; Hives)    Medications:  PRN:    Standing:  aspirin  chewable 81 milliGRAM(s) Oral daily  atorvastatin 80 milliGRAM(s) Oral at bedtime  aztreonam  IVPB 1000 milliGRAM(s) IV Intermittent every 8 hours  chlorhexidine 4% Liquid 1 Application(s) Topical every 24 hours  enoxaparin Injectable 40 milliGRAM(s) SubCutaneous at bedtime  folic acid 1 milliGRAM(s) Oral daily  lisinopril 10 milliGRAM(s) Oral daily  vancomycin  IVPB 1000 milliGRAM(s) IV Intermittent every 12 hours    Home:  aspirin 81 mg oral tablet, chewable: 1 tab(s) orally once a day  atorvastatin 80 mg oral tablet: 1 tab(s) orally once a day  lisinopril 10 mg oral tablet: 1 tab(s) orally once a day    Vitals:  T(C): 35.8, Max: 38.1 (20 @ 16:28)  T(F): 96.4, Max: 100.5 (20 @ 16:28)  HR: 104 (104 - 107)  BP: 106/54 (106/54 - 110/58)  RR: 19 (19 - 22)  SpO2: --    Physical Exam:  General: Awake, alert, NAD, resting comfortably in bed, on RA  HEENT: Head NC/AT  Heart: S1/S2; inc rate, normal rhythm; no rubs, murmurs appreciated  Lungs: Clear to auscultation bilaterally, no wheezing, rales or rhonchi  Abdomen: Soft, nontender, nondistended, BS+  Extremities: No edema, clubbing, cyanosis in upper or lower extremities  Neuro: AAOx2, NFD  Labs:  CBC ( @ 05:27)                        Hgb: 7.5    WBC: 5.91  )-----------------( Plts: 63                               Hct: 22.3     Chem (:27)  Na: 133  |     Cl: 97     |  BUN: 11  -----------------------------------------< Gluc: 124    K: 3.6   |    HCO3: 23  |  Cr: <0.5    Ca 8.3 ( @ :27)  Mg 1.9 (:27)    LFTs (:)  TPro 5.1  /  Alb 3.4  TBili 1.4  /  DBili     AST 41  /  ALT 35  /  AlkPhos 175    Cardiac Markers ( @ 11:15)  Troponin I X  Troponin T X  CK X  CKMB X  CKMB Units X  Myoglobin X  Lactate 2.5  ESR X    Cardiac Markers ( @ 09:30)  Troponin I X  Troponin T X  CK X  CKMB X  CKMB Units X  Myoglobin X  Lactate X  ESR 33    Cardiac Markers ( @ 17:08)  Troponin I X  Troponin T X  CK X  CKMB X  CKMB Units X  Myoglobin X  Lactate 2.4  ESR X        PT/INR ( @ 17:08)  PT: 19.70; INR: 1.71   PTT: 32.4           Urinalysis Basic ( @ 06:30)  Color: Yellow  Appearance: Slightly Turbid  S.022  pH:   Gluc:   Ketone: Negative  Bili: Negative  Urobili: <2 mg/dL   Blood:   Protein: Trace  Nitrite: Negative   Leuk Esterase: Large  RBC: 9  WBC: 55   Sq Epi:   Non Sq Epi: 7  Bacteria: Negative    Microbiology:  Culture - Blood (collected  @ 17:08)  Source: .Blood Blood-Peripheral  Preliminary Report ( @ 01:01):    No growth to date.    Culture - Blood (collected  @ 17:08)  Source: .Blood Blood-Peripheral  Preliminary Report ( @ 01:01):    No growth to date.
Patient is a 74y old  Female who presents with a chief complaint of Weakness s/p fall (06 Jul 2020 09:01)      HPI:  75 y/o F with PMH of CAD s/p stent, DLD, HTN, B cell lymphoma, recently diagnosed B cell lymphoproliferative disorder (follows Dr. Guardado), on Ibrutinib presented to the ED after an episode of lightheadedness and mechanical fall. History obtained by patient and daughter over phone. Per the daughter, patient was being followed by Dr. Guardado with pending workup, PET scan and received 3 PRBC for symptomatic anemia. Patient developed acute anemia post chemo and with progression of splenomegaly. She was prescribed home O2 by Dr. Guardado. Piror to admission, around 1 am, patient was assisted to the bathroom by , when she tripped over her ankle, denies any loss of consciousness prodromal symptoms and or any trauma. Patient denies hitting head, loc, f/c/n/v/d, abd pain, dysuria, urinary urgency/frequency, back pain, cough.     ED: 112/56, HR: 108, RR;18, Tmax 101.2, Hb: 7.6 (9.8 hb on June 10th), EKG no ST changes, sinus tachycardia, Abdominal U/S: Splenomegaly 24 cm (stable since previous exam), + gallstones (05 Jul 2020 22:39)    Oncology History:   Patient following with Dr. Guardado. currently being worked up for B Cell Lymphoproliferative Disorder. Initial Bone Marroy biopsy was done on 5/20/20 and Flow cytometry revealed Monotypic B cells (18% of cells), positive for Kappa, CD19, CD20, FMC-7, negative for CD5, CD10, and CD23. 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 Atypical cells were negative for cyclin D1, molecular studies negative MYD88, L265P and BRAF V600 mutations. ANNEXIN1 seemed to be positive on T Cells and myeloid cells. The negative BRAF V600 makes hairy cell leukemia less likely. BCL2 was negative.    PET CT done in May 2020 showed evidence of diffuse FDG uptake in an enlarged spleen and mild FDG uptake at the left fourth rib at the costochondral junction with no CT correlate. Patient with no B Cell symptoms and currently denies any symptoms on current admission.     As far as treatment, patient attempted to start Rituxan in June 2020 but developed a severe allergic reaction. Given her severity of reaction, patient was instead started on Ibrutinib 3 tabs daily on 6/17/2020. However by 6/20/2020 she began to developed mild thrombocytopenia and anemia. Ibrutinib was subsequently decreased to 2 pills per day but patient on follow up had a rash to L breast and L groin so plan was to hold pill until 7/6/2020. If rash resolved, Ibrutinib could be restarted at 1 pill daily.     Patent had mechanical fall at home and admitted to hospital. On admission, found to have positive UA and currently on treatment with broad spectrum antibiotics until cultures have resulted.         ROS:  Negative except for:    PAST MEDICAL & SURGICAL HISTORY:  Anemia  DM (diabetes mellitus)  High cholesterol  HTN (hypertension)  B-cell chronic lymphocytic leukemia variant  CAD (coronary artery disease): s/p stenting  H/O heart artery stent      SOCIAL HISTORY:    FAMILY HISTORY:  Family history of diabetes mellitus (DM)      MEDICATIONS  (STANDING):  aspirin  chewable 81 milliGRAM(s) Oral daily  atorvastatin 80 milliGRAM(s) Oral at bedtime  chlorhexidine 4% Liquid 1 Application(s) Topical every 24 hours  enoxaparin Injectable 40 milliGRAM(s) SubCutaneous at bedtime  folic acid 1 milliGRAM(s) Oral daily  lisinopril 10 milliGRAM(s) Oral daily    MEDICATIONS  (PRN):      penicillin (Anaphylaxis; Hives)    Intolerances    Vital Signs Last 24 Hrs  T(C): 36.4 (07 Jul 2020 20:40), Max: 37.3 (07 Jul 2020 14:18)  T(F): 97.6 (07 Jul 2020 20:40), Max: 99.2 (07 Jul 2020 14:18)  HR: 100 (07 Jul 2020 20:40) (100 - 106)  BP: 164/69 (07 Jul 2020 20:40) (106/54 - 164/69)  BP(mean): --  RR: 20 (07 Jul 2020 20:40) (19 - 20)  SpO2: 96% (07 Jul 2020 20:40) (96% - 96%)    PHYSICAL EXAM  General: adult in NAD  HEENT: clear oropharynx, anicteric sclera, pink conjunctiva  Neck: supple  CV: normal S1/S2 with no murmur rubs or gallops  Lungs: CTABL  Abdomen: soft non-tender non-distended  Ext: no clubbing cyanosis or edema  Skin: no rashes and no petechiae  Neuro: alert and oriented X 4, no focal deficits      LABS:                          7.5    5.91  )-----------( 63       ( 07 Jul 2020 05:27 )             22.3       Serial CBC's  07-06 @ 09:30  Hct-22.9 / Hgb-7.9 / Plat-72 / RBC-2.49 / WBC-6.40  Serial CBC's  07-05 @ 13:56  Hct-22.9 / Hgb-7.6 / Plat-101 / RBC-2.44 / WBC-7.99    07-07    133<L>  |  97<L>  |  11  ----------------------------<  124<H>  3.6   |  23  |  <0.5<L>    Ca    8.3<L>      07 Jul 2020 05:27  Mg     1.9     07-07    TPro  5.1<L>  /  Alb  3.4<L>  /  TBili  1.4<H>  /  DBili  x   /  AST  41  /  ALT  35  /  AlkPhos  175<H>  07-07        PT/INR - ( 05 Jul 2020 17:08 )   PT: 19.70 sec;   INR: 1.71 ratio         PTT - ( 05 Jul 2020 17:08 )  PTT:32.4 sec                BLOOD SMEAR INTERPRETATION:       RADIOLOGY & ADDITIONAL STUDIES:    < from: Xray Foot AP + Lateral + Oblique, Left (07.05.20 @ 14:29) >  IMPRESSION:    No acute displaced fracture.    Mild soft tissue swelling.    < end of copied text >    < from: Xray Ankle Complete 3 Views, Left (07.05.20 @ 14:31) >  IMPRESSION:    There is ankle soft tissue swelling especially at the lateral malleolus.     Below the lateral malleolus is lucency and a pocket of subcutaneous emphysema is not excluded. There is no definite evidence of erosive or destructive bony changes to suggest osteomyelitis.    < end of copied text >
Patient is a 74y old  Female who presents with a chief complaint of Weakness s/p fall (08 Jul 2020 14:21)      Subjective: Patient feels well today. Her energy level has improved and she reports a good appetite       Vital Signs Last 24 Hrs  T(C): 36.4 (09 Jul 2020 05:00), Max: 37.1 (08 Jul 2020 21:32)  T(F): 97.5 (09 Jul 2020 05:00), Max: 98.7 (08 Jul 2020 21:32)  HR: 97 (09 Jul 2020 05:00) (91 - 105)  BP: 140/80 (09 Jul 2020 05:00) (130/75 - 147/67)  BP(mean): --  RR: 18 (09 Jul 2020 05:00) (18 - 20)  SpO2: 94% (09 Jul 2020 07:30) (94% - 96%)    PHYSICAL EXAM  General: adult in NAD  HEENT: clear oropharynx, anicteric sclera, pink conjunctiva  Neck: supple  CV: normal S1/S2 with no murmur rubs or gallops  Lungs: CTABL  Abdomen: soft non-tender non-distended, splenomegaly  Ext: no clubbing cyanosis or edema  Skin: no rashes and no petechiae  Neuro: alert and oriented X 4, no focal deficits    MEDICATIONS  (STANDING):  aspirin  chewable 81 milliGRAM(s) Oral daily  atorvastatin 80 milliGRAM(s) Oral at bedtime  chlorhexidine 4% Liquid 1 Application(s) Topical every 24 hours  enoxaparin Injectable 40 milliGRAM(s) SubCutaneous at bedtime  folic acid 1 milliGRAM(s) Oral daily  lisinopril 10 milliGRAM(s) Oral daily    MEDICATIONS  (PRN):      LABS:                          9.9    4.94  )-----------( 50       ( 09 Jul 2020 05:48 )             29.4         Mean Cell Volume : 92.5 fL  Mean Cell Hemoglobin : 31.1 pg  Mean Cell Hemoglobin Concentration : 33.7 g/dL  Auto Neutrophil # : 2.95 K/uL  Auto Lymphocyte # : 1.45 K/uL  Auto Monocyte # : 0.43 K/uL  Auto Eosinophil # : 0.03 K/uL  Auto Basophil # : 0.03 K/uL  Auto Neutrophil % : 59.7 %  Auto Lymphocyte % : 29.4 %  Auto Monocyte % : 8.7 %  Auto Eosinophil % : 0.6 %  Auto Basophil % : 0.6 %      Serial CBC's  07-09 @ 05:48  Hct-29.4 / Hgb-9.9 / Plat-50 / RBC-3.18 / WBC-4.94  Serial CBC's  07-08 @ 14:36  Hct-30.8 / Hgb-10.4 / Plat-59 / RBC-3.43 / WBC-5.20  Serial CBC's  07-08 @ 05:35  Hct-29.6 / Hgb-9.8 / Plat-48 / RBC-3.25 / WBC-5.87  Serial CBC's  07-08 @ 00:52  Hct-29.5 / Hgb-10.1 / Plat-49 / RBC-3.23 / WBC-5.96  Serial CBC's  07-07 @ 05:27  Hct-22.3 / Hgb-7.5 / Plat-63 / RBC-2.40 / WBC-5.91  Serial CBC's  07-06 @ 20:23  Hct-23.8 / Hgb-8.1 / Plat-69 / RBC-2.60 / WBC-6.62  Serial CBC's  07-06 @ 09:30  Hct-22.9 / Hgb-7.9 / Plat-72 / RBC-2.49 / WBC-6.40  Serial CBC's  07-05 @ 13:56  Hct-22.9 / Hgb-7.6 / Plat-101 / RBC-2.44 / WBC-7.99      07-09    138  |  101  |  10  ----------------------------<  101<H>  4.0   |  26  |  <0.5<L>    Ca    8.2<L>      09 Jul 2020 05:48  Mg     1.8     07-09    TPro  5.0<L>  /  Alb  3.2<L>  /  TBili  1.9<H>  /  DBili  x   /  AST  46<H>  /  ALT  34  /  AlkPhos  221<H>  07-09      PT/INR - ( 09 Jul 2020 05:48 )   PT: 16.20 sec;   INR: 1.41 ratio         PTT - ( 07 Jul 2020 11:03 )  PTT:34.4 sec      Culture - Urine (collected 07 Jul 2020 06:30)  Source: .Urine Clean Catch (Midstream)  Final Report (08 Jul 2020 12:37):    No growth    Culture - Blood (collected 07 Jul 2020 05:27)  Source: .Blood None  Preliminary Report (08 Jul 2020 13:01):    No growth to date.    Culture - Blood (collected 06 Jul 2020 20:23)  Source: .Blood Blood-Venous  Preliminary Report (08 Jul 2020 03:01):    No growth to date.          BLOOD SMEAR INTERPRETATION:       RADIOLOGY & ADDITIONAL STUDIES:
SUBJECTIVE:    Patient is a 74y old Female who presents with a chief complaint of Weakness s/p fall (09 Jul 2020 10:24)    Currently admitted to medicine with the primary diagnosis of Sepsis     Today is hospital day 4d.     PAST MEDICAL & SURGICAL HISTORY  Anemia  DM (diabetes mellitus)  High cholesterol  HTN (hypertension)  B-cell chronic lymphocytic leukemia variant  CAD (coronary artery disease): s/p stenting  H/O heart artery stent    ALLERGIES:  penicillin (Anaphylaxis; Hives)    MEDICATIONS:  STANDING MEDICATIONS  aspirin  chewable 81 milliGRAM(s) Oral daily  atorvastatin 80 milliGRAM(s) Oral at bedtime  chlorhexidine 4% Liquid 1 Application(s) Topical every 24 hours  enoxaparin Injectable 40 milliGRAM(s) SubCutaneous at bedtime  folic acid 1 milliGRAM(s) Oral daily  lisinopril 10 milliGRAM(s) Oral daily    PRN MEDICATIONS    VITALS:   T(F): 97.5  HR: 97  BP: 140/80  RR: 18  SpO2: 94%    LABS:                        9.9    4.94  )-----------( 50       ( 09 Jul 2020 05:48 )             29.4     07-09    138  |  101  |  10  ----------------------------<  101<H>  4.0   |  26  |  <0.5<L>    Ca    8.2<L>      09 Jul 2020 05:48  Mg     1.8     07-09    TPro  5.0<L>  /  Alb  3.2<L>  /  TBili  1.9<H>  /  DBili  x   /  AST  46<H>  /  ALT  34  /  AlkPhos  221<H>  07-09    PT/INR - ( 09 Jul 2020 05:48 )   PT: 16.20 sec;   INR: 1.41 ratio                   Culture - Urine (collected 07 Jul 2020 06:30)  Source: .Urine Clean Catch (Midstream)  Final Report (08 Jul 2020 12:37):    No growth    Culture - Blood (collected 07 Jul 2020 05:27)  Source: .Blood None  Preliminary Report (08 Jul 2020 13:01):    No growth to date.    Culture - Blood (collected 06 Jul 2020 20:23)  Source: .Blood Blood-Venous  Preliminary Report (08 Jul 2020 03:01):    No growth to date.          RADIOLOGY:    PHYSICAL EXAM:  GEN: No acute distress  LUNGS: Clear to auscultation bilaterally   HEART: S1/S2 present. RRR.   ABD/ GI: Soft, non-tender, non-distended. Bowel sounds present  EXT: NC/NC/NE/2+PP/DUNBAR  NEURO: AAOX3
SUBJECTIVE:    Patient is a 74y old Female who presents with a chief complaint of Weakness s/p fall (2020 22:56)    Currently admitted to medicine with the primary diagnosis of Sepsis     Today is hospital day 3d.     PAST MEDICAL & SURGICAL HISTORY  Anemia  DM (diabetes mellitus)  High cholesterol  HTN (hypertension)  B-cell chronic lymphocytic leukemia variant  CAD (coronary artery disease): s/p stenting  H/O heart artery stent    ALLERGIES:  penicillin (Anaphylaxis; Hives)    MEDICATIONS:  STANDING MEDICATIONS  aspirin  chewable 81 milliGRAM(s) Oral daily  atorvastatin 80 milliGRAM(s) Oral at bedtime  chlorhexidine 4% Liquid 1 Application(s) Topical every 24 hours  enoxaparin Injectable 40 milliGRAM(s) SubCutaneous at bedtime  folic acid 1 milliGRAM(s) Oral daily  lisinopril 10 milliGRAM(s) Oral daily    PRN MEDICATIONS    VITALS:   T(F): 97.4  HR: 99  BP: 141/70  RR: 20  SpO2: 96%    LABS:                        9.8    5.87  )-----------( 48       ( 2020 05:35 )             29.6     07-    134<L>  |  98  |  9<L>  ----------------------------<  109<H>  3.9   |  25  |  <0.5<L>    Ca    8.1<L>      2020 05:35  Mg     1.8     -08    TPro  5.0<L>  /  Alb  3.3<L>  /  TBili  1.6<H>  /  DBili  x   /  AST  45<H>  /  ALT  37  /  AlkPhos  184<H>  -08    PT/INR - ( 2020 11:03 )   PT: 20.90 sec;   INR: 1.82 ratio         PTT - ( 2020 11:03 )  PTT:34.4 sec  Urinalysis Basic - ( 2020 06:30 )    Color: Yellow / Appearance: Slightly Turbid / S.022 / pH: x  Gluc: x / Ketone: Negative  / Bili: Negative / Urobili: <2 mg/dL   Blood: x / Protein: Trace / Nitrite: Negative   Leuk Esterase: Large / RBC: 9 /HPF / WBC 55 /HPF   Sq Epi: x / Non Sq Epi: 7 /HPF / Bacteria: Negative            Culture - Blood (collected 2020 20:23)  Source: .Blood Blood-Venous  Preliminary Report (2020 03:01):    No growth to date.    Culture - Blood (collected 2020 17:08)  Source: .Blood Blood-Peripheral  Preliminary Report (2020 01:01):    No growth to date.    Culture - Blood (collected 2020 17:08)  Source: .Blood Blood-Peripheral  Preliminary Report (2020 01:01):    No growth to date.          RADIOLOGY:    PHYSICAL EXAM:  GEN: No acute distress  LUNGS: Clear to auscultation bilaterally   HEART: S1/S2 present. RRR.   ABD/ GI: Soft, non-tender, non-distended. Bowel sounds present  EXT: NC/NC/NE/2+PP/DUNBAR  NEURO: AAOX3
AD BASURTO  74y, Female    All available historical data reviewed    OVERNIGHT EVENTS:  low grade fever  feels well and has no complaints     ROS:  General: Denies rigors, nightsweats  HEENT: Denies headache, rhinorrhea, sore throat, eye pain  CV: Denies CP, palpitations  PULM: Denies wheezing, hemoptysis  GI: Denies hematemesis, hematochezia, melena  : Denies discharge, hematuria  MSK: Denies arthralgias, myalgias  SKIN: Denies rash, lesions  NEURO: Denies paresthesias, weakness  PSYCH: Denies depression, anxiety    VITALS:  T(F): 96.4, Max: 100.5 (20 @ 16:28)  HR: 104  BP: 106/54  RR: 19Vital Signs Last 24 Hrs  T(C): 35.8 (2020 05:00), Max: 38.1 (2020 16:28)  T(F): 96.4 (2020 05:00), Max: 100.5 (2020 16:28)  HR: 104 (2020 05:00) (104 - 107)  BP: 106/54 (2020 05:00) (106/54 - 110/58)  BP(mean): --  RR: 19 (2020 05:00) (19 - 22)  SpO2: --    TESTS & MEASUREMENTS:                        7.5    5.91  )-----------( 63       ( 2020 05:27 )             22.3     -    133<L>  |  97<L>  |  11  ----------------------------<  124<H>  3.6   |  23  |  <0.5<L>    Ca    8.3<L>      2020 05:27  Mg     1.9     -    TPro  5.1<L>  /  Alb  3.4<L>  /  TBili  1.4<H>  /  DBili  x   /  AST  41  /  ALT  35  /  AlkPhos  175<H>  07    LIVER FUNCTIONS - ( 2020 05:27 )  Alb: 3.4 g/dL / Pro: 5.1 g/dL / ALK PHOS: 175 U/L / ALT: 35 U/L / AST: 41 U/L / GGT: x             Culture - Blood (collected 20 @ 17:08)  Source: .Blood Blood-Peripheral  Preliminary Report (20 @ 01:01):    No growth to date.    Culture - Blood (collected 20 @ 17:08)  Source: .Blood Blood-Peripheral  Preliminary Report (20 @ 01:01):    No growth to date.      Urinalysis Basic - ( 2020 06:30 )    Color: Yellow / Appearance: Slightly Turbid / S.022 / pH: x  Gluc: x / Ketone: Negative  / Bili: Negative / Urobili: <2 mg/dL   Blood: x / Protein: Trace / Nitrite: Negative   Leuk Esterase: Large / RBC: 9 /HPF / WBC 55 /HPF   Sq Epi: x / Non Sq Epi: 7 /HPF / Bacteria: Negative          RADIOLOGY & ADDITIONAL TESTS:  Personal review of radiological diagnostics performed  Echo and EKG results noted when applicable.     MEDICATIONS:  aspirin  chewable 81 milliGRAM(s) Oral daily  atorvastatin 80 milliGRAM(s) Oral at bedtime  aztreonam  IVPB 1000 milliGRAM(s) IV Intermittent every 8 hours  chlorhexidine 4% Liquid 1 Application(s) Topical every 24 hours  enoxaparin Injectable 40 milliGRAM(s) SubCutaneous at bedtime  folic acid 1 milliGRAM(s) Oral daily  lisinopril 10 milliGRAM(s) Oral daily  vancomycin  IVPB 1000 milliGRAM(s) IV Intermittent every 12 hours      ANTIBIOTICS:  aztreonam  IVPB 1000 milliGRAM(s) IV Intermittent every 8 hours  vancomycin  IVPB 1000 milliGRAM(s) IV Intermittent every 12 hours
Patient Information:  AD BASURTO / 74y / Female / MRN#:[id number]    Hospital Day: 1d    Interval History:  Patient seen and examined at bedside. No acute events overnight. Pt is resting in bed, on RA, denies any SOB, chest pain or discomfort, dizziness. Denies urinary symptoms. Reports that she has been eating well.     Past Medical History:  Anemia  DM (diabetes mellitus)  High cholesterol  HTN (hypertension)  B-cell chronic lymphocytic leukemia variant  CAD (coronary artery disease)    Past Surgical History:  H/O heart artery stent  No significant past surgical history    Allergies:  penicillin (Anaphylaxis; Hives)    Medications:  PRN:    Standing:  aspirin  chewable 81 milliGRAM(s) Oral daily  atorvastatin 80 milliGRAM(s) Oral at bedtime  aztreonam  IVPB 1000 milliGRAM(s) IV Intermittent every 8 hours  chlorhexidine 4% Liquid 1 Application(s) Topical every 24 hours  enoxaparin Injectable 40 milliGRAM(s) SubCutaneous at bedtime  folic acid 1 milliGRAM(s) Oral daily  lisinopril 10 milliGRAM(s) Oral daily  vancomycin  IVPB 1000 milliGRAM(s) IV Intermittent every 12 hours    Home:  aspirin 81 mg oral tablet, chewable: 1 tab(s) orally once a day  atorvastatin 80 mg oral tablet: 1 tab(s) orally once a day  lisinopril 10 mg oral tablet: 1 tab(s) orally once a day    Vitals:  T(C): 37.2, Max: 38.3 (20 @ 16:37)  T(F): 99, Max: 101 (20 @ 16:37)  HR: 110 (104 - 115)  BP: 114/56 (97/50 - 134/70)  RR: 22 (15 - 22)  SpO2: 95% (95% - 98%)    Physical Exam:  General: Awake, alert, NAD, resting comfortably in bed, on RA  HEENT: Head NC/AT  Heart: S1/S2; inc rate, normal rhythm; no rubs, murmurs appreciated  Lungs: Clear to auscultation bilaterally, no wheezing, rales or rhonchi  Abdomen: Soft, nontender, nondistended, BS+  Extremities: No edema, clubbing, cyanosis in upper or lower extremities; 5/5 motor strength of lower ext b/l, mild ecchymosis on L knee  Neuro: AAOx2, NFD    Labs:  CBC ( @ 13:56)                        Hgb: 7.6    WBC: 7.99  )-----------------( Plts: 101                              Hct: 22.9     Chem ( @ 13:56)  Na: 134  |     Cl: 97     |  BUN: 12  -----------------------------------------< Gluc: 152    K: 4.2   |    HCO3: 23  |  Cr: 0.5    Ca 9.0 ( @ 13:56)    LFTs ( @ 13:56)  TPro 5.8  /  Alb 3.6  TBili 1.2  /  DBili     AST 39  /  ALT 33  /  AlkPhos 205    Cardiac Markers ( @ 17:08)  Troponin I X  Troponin T X  CK X  CKMB X  CKMB Units X  Myoglobin X  Lactate 2.4  ESR X        PT/INR ( @ 17:08)  PT: 19.70; INR: 1.71   PTT: 32.4           Urinalysis Basic ( @ 00:00)  Color: Yellow  Appearance: Slightly Turbid  S.015  pH:   Gluc:   Ketone: Negative  Bili: Negative  Urobili: <2 mg/dL   Blood:   Protein: Trace  Nitrite: Negative   Leuk Esterase: Large  RBC: 2  WBC: 61   Sq Epi:   Non Sq Epi: 10  Bacteria: Moderate    Microbiology: pending
Patient Information:  AD BASURTO / 74y / Female / MRN#:[id number]    Hospital Day: 3d    Interval History:  Patient seen and examined at bedside. No acute events overnight. Pt is resting in bed this morning, states she feels tired, but "great".   She is s/p transfusion 2U of pRBC's yesterday due to Hb count of 7.5, s/p 1U platelets today due to plt count of 48.   She wishes to go home. Per Heme-Onc team, pt needs additional day of monitoring for improvement of platelet count. Possible d/c tomorrow.    Past Medical History:  Anemia  DM (diabetes mellitus)  High cholesterol  HTN (hypertension)  B-cell chronic lymphocytic leukemia variant  CAD (coronary artery disease)    Past Surgical History:  H/O heart artery stent  No significant past surgical history    Allergies:  penicillin (Anaphylaxis; Hives)    Medications:  PRN:    Standing:  aspirin  chewable 81 milliGRAM(s) Oral daily  atorvastatin 80 milliGRAM(s) Oral at bedtime  chlorhexidine 4% Liquid 1 Application(s) Topical every 24 hours  enoxaparin Injectable 40 milliGRAM(s) SubCutaneous at bedtime  folic acid 1 milliGRAM(s) Oral daily  lisinopril 10 milliGRAM(s) Oral daily    Home:  aspirin 81 mg oral tablet, chewable: 1 tab(s) orally once a day  atorvastatin 80 mg oral tablet: 1 tab(s) orally once a day  lisinopril 10 mg oral tablet: 1 tab(s) orally once a day    Vitals:  T(C): 36.3, Max: 37.3 (20 @ 14:18)  T(F): 97.4, Max: 99.2 (20 @ 14:18)  HR: 99 (99 - 106)  BP: 141/70 (113/54 - 164/69)  RR: 20 (19 - 20)  SpO2: 96% (96% - 96%)    Physical Exam:  General: Awake, alert, NAD, resting comfortably in bed, on RA  HEENT: Head NC/AT  Heart: S1/S2; inc rate, normal rhythm; no rubs, murmurs appreciated  Lungs: Clear to auscultation bilaterally, no wheezing, rales or rhonchi  Abdomen: Soft, nontender, nondistended, BS+  Extremities: No edema, clubbing, cyanosis in upper or lower extremities  Neuro: AAOx2, NFD    Labs:  CBC ( @ 05:35)                        Hgb: 9.8    WBC: 5.87  )-----------------( Plts: 48                               Hct: 29.6     Chem ( @ 05:35)  Na: 134  |     Cl: 98     |  BUN: 9   -----------------------------------------< Gluc: 109    K: 3.9   |    HCO3: 25  |  Cr: <0.5    Ca 8.1 ( 05:35)  Mg 1.8 ( 05:35)    LFTs ( @ 05:35)  TPro 5.0  /  Alb 3.3  TBili 1.6  /  DBili     AST 45  /  ALT 37  /  AlkPhos 184    Cardiac Markers ( @ 11:15)  Troponin I X  Troponin T X  CK X  CKMB X  CKMB Units X  Myoglobin X  Lactate 2.5  ESR X    Cardiac Markers ( @ 09:30)  Troponin I X  Troponin T X  CK X  CKMB X  CKMB Units X  Myoglobin X  Lactate X  ESR 33    Cardiac Markers ( @ 17:08)  Troponin I X  Troponin T X  CK X  CKMB X  CKMB Units X  Myoglobin X  Lactate 2.4  ESR X        PT/INR ( @ 11:03)  PT: 20.90; INR: 1.82   PTT: 34.4           Urinalysis Basic ( @ 06:30)  Color: Yellow  Appearance: Slightly Turbid  S.022  pH:   Gluc:   Ketone: Negative  Bili: Negative  Urobili: <2 mg/dL   Blood:   Protein: Trace  Nitrite: Negative   Leuk Esterase: Large  RBC: 9  WBC: 55   Sq Epi:   Non Sq Epi: 7  Bacteria: Negative    Microbiology:  Culture - Urine (collected  @ 06:30)  Source: .Urine Clean Catch (Midstream)  Final Report ( @ 12:37):    No growth    Culture - Blood (collected  @ 05:27)  Source: .Blood None  Preliminary Report ( @ 13:01):    No growth to date.    Culture - Blood (collected  @ 20:23)  Source: .Blood Blood-Venous  Preliminary Report ( @ 03:01):    No growth to date.    Culture - Blood (collected  @ 17:08)  Source: .Blood Blood-Peripheral  Preliminary Report ( @ 01:01):    No growth to date.    Culture - Blood (collected  @ 17:08)  Source: .Blood Blood-Peripheral  Preliminary Report (:01):    No growth to date.
AD BASURTO  74y, Female    All available historical data reviewed    OVERNIGHT EVENTS:  no fevers  feels well and has no complaints ans wants to go home    ROS:  General: Denies rigors, night sweats  HEENT: Denies headache, rhinorrhea, sore throat, eye pain  CV: Denies CP, palpitations  PULM: Denies wheezing, hemoptysis  GI: Denies hematemesis, hematochezia, melena  : Denies discharge, hematuria  MSK: Denies arthralgias, myalgias  SKIN: Denies rash, lesions  NEURO: Denies paresthesias, weakness  PSYCH: Denies depression, anxiety    VITALS:  T(F): 98.5, Max: 98.5 (20 @ 13:30)  HR: 91  BP: 147/67  RR: 20Vital Signs Last 24 Hrs  T(C): 36.9 (2020 13:30), Max: 36.9 (2020 13:30)  T(F): 98.5 (2020 13:30), Max: 98.5 (2020 13:30)  HR: 91 (2020 13:30) (91 - 100)  BP: 147/67 (2020 13:30) (141/70 - 164/69)  BP(mean): --  RR: 20 (2020 13:30) (20 - 20)  SpO2: 96% (2020 20:40) (96% - 96%)    TESTS & MEASUREMENTS:                        9.8    5.87  )-----------( 48       ( 2020 05:35 )             29.6     07-08    134<L>  |  98  |  9<L>  ----------------------------<  109<H>  3.9   |  25  |  <0.5<L>    Ca    8.1<L>      2020 05:35  Mg     1.8     07-08    TPro  5.0<L>  /  Alb  3.3<L>  /  TBili  1.6<H>  /  DBili  x   /  AST  45<H>  /  ALT  37  /  AlkPhos  184<H>  07-08    LIVER FUNCTIONS - ( 2020 05:35 )  Alb: 3.3 g/dL / Pro: 5.0 g/dL / ALK PHOS: 184 U/L / ALT: 37 U/L / AST: 45 U/L / GGT: x             Culture - Urine (collected 20 @ 06:30)  Source: .Urine Clean Catch (Midstream)  Final Report (20 @ 12:37):    No growth    Culture - Blood (collected 20 @ 05:27)  Source: .Blood None  Preliminary Report (20 @ 13:01):    No growth to date.    Culture - Blood (collected 20 @ 20:23)  Source: .Blood Blood-Venous  Preliminary Report (20 @ 03:01):    No growth to date.    Culture - Blood (collected 20 @ 17:08)  Source: .Blood Blood-Peripheral  Preliminary Report (20 @ 01:01):    No growth to date.    Culture - Blood (collected 20 @ 17:08)  Source: .Blood Blood-Peripheral  Preliminary Report (20 @ 01:01):    No growth to date.      Urinalysis Basic - ( 2020 06:30 )    Color: Yellow / Appearance: Slightly Turbid / S.022 / pH: x  Gluc: x / Ketone: Negative  / Bili: Negative / Urobili: <2 mg/dL   Blood: x / Protein: Trace / Nitrite: Negative   Leuk Esterase: Large / RBC: 9 /HPF / WBC 55 /HPF   Sq Epi: x / Non Sq Epi: 7 /HPF / Bacteria: Negative          RADIOLOGY & ADDITIONAL TESTS:  Personal review of radiological diagnostics performed  Echo and EKG results noted when applicable.     MEDICATIONS:  aspirin  chewable 81 milliGRAM(s) Oral daily  atorvastatin 80 milliGRAM(s) Oral at bedtime  chlorhexidine 4% Liquid 1 Application(s) Topical every 24 hours  enoxaparin Injectable 40 milliGRAM(s) SubCutaneous at bedtime  folic acid 1 milliGRAM(s) Oral daily  lisinopril 10 milliGRAM(s) Oral daily      ANTIBIOTICS:

## 2020-07-09 NOTE — DISCHARGE NOTE PROVIDER - NSDCMRMEDTOKEN_GEN_ALL_CORE_FT
aspirin 81 mg oral tablet, chewable: 1 tab(s) orally once a day  atorvastatin 80 mg oral tablet: 1 tab(s) orally once a day  folic acid 1 mg oral tablet: 1 tab(s) orally once a day  lisinopril 10 mg oral tablet: 1 tab(s) orally once a day

## 2020-07-09 NOTE — PROGRESS NOTE ADULT - PROVIDER SPECIALTY LIST ADULT
Heme/Onc
Heme/Onc
Hospitalist
Hospitalist
Internal Medicine
Internal Medicine
Infectious Disease
Internal Medicine
Infectious Disease

## 2020-07-09 NOTE — CDI QUERY NOTE - NSCDIOTHERTXTBX_GEN_ALL_CORE_HH
DOCUMENTATION CLARIFICATION FORM     Encounter #: [id number]                                      Patient’s Name: Kathrine Quinones  Medical Record #: [id number]                            Admit Date: 7-5-2020  CDI Specialist/: Ron                               Contact #: [phone]    Dear Dr. Hodge,                                Date: 7-5-2020                   The Physician’s or Provider’s documentation of the patient’s presentation, evaluation and  medical management, as identified below, may support a diagnosis that is not documented in the medical record.  In order to accurately capture all diagnoses to the greatest degree of specificity reflecting the patient’s actual severity of illness, the documentation in this patient’s medical record requires additional clarification.  Please include more specific documentation, either known or suspected, of a corresponding diagnosis associated with the clinical information described below in your Progress Note and/or Discharge Summary.    Conflicting Documentation  ?	7-5 In ED- T 101.0, , RR 20  ?	7-5 Blood Lactate Level 2.4, repeat 2.5, Pro-calcitonin 0.26  ?	7-5 Urinalysis Positive for Leukocytes, Negative for Nitrates  ?	Urine and Blood Cultures and x2 sets Negative  ?	7-6 ID Consult states: “SIRS ( T 101.2, /m ) on admission with no clear source of infection”  ?	7-8 PN states- “#Lightheadedness a/w fevers, tachycardia - 2/2 # Sepsis (POA) source unclear.  Immunocompromised Patient. UA positive. Possible UTI.”    Based on the clinical indicators and your professional judgment, please clarify the documentation of the following diagnoses:    (  ) Sepsis evaluated and after study, ruled in  (  ) Sepsis evaluated and after study, ruled out  (  ) Non Infectious SIRS evaluated and after study, ruled in  (  ) Non Infectious SIRS evaluated and after study, ruled out  (  ) Other (please specify): __________  (  ) Unable to clinically determine    Present on Admission:  Was the condition present on admission, if so, please document in the chart that “(the condition) was present on admission.”         Documentation clarification is required for compliance, accuracy in coding and billing, and reporting severity of illness, quality data   and risk of mortality.  --------------------------------------------------------------------------------------------------------------------------------------------  DO NOT REMOVE THIS RECORD WITHOUT FIRST NOTIFYING THE CDI SPECIALIST  This form is NOT a part of the permanent Medical Record.

## 2020-07-09 NOTE — PROGRESS NOTE ADULT - ATTENDING COMMENTS
Transfuse 1 unit of platelets in preparation for home D/C will arrange for CBC check with possible transfusion in Monday in the office.   Case was discussed with housestaff.

## 2020-07-09 NOTE — CHART NOTE - NSCHARTNOTEFT_GEN_A_CORE
Patient Information:  AD BASURTO / 74y / Female / MRN#:[id number]    Hospital Day: 4d    Interval History:  Patient seen and examined at bedside. No acute events overnight. Pt is resting in bed this morning, states she feels "great", wishes to go home. Pt will be getting 1 unit of platelets prior to discharge per Heme-Onc recommendations.    Past Medical History:  Anemia  DM (diabetes mellitus)  High cholesterol  HTN (hypertension)  B-cell chronic lymphocytic leukemia variant  CAD (coronary artery disease)    Past Surgical History:  H/O heart artery stent  No significant past surgical history    Allergies:  penicillin (Anaphylaxis; Hives)    Medications:  PRN:    Standing:  aspirin  chewable 81 milliGRAM(s) Oral daily  atorvastatin 80 milliGRAM(s) Oral at bedtime  chlorhexidine 4% Liquid 1 Application(s) Topical every 24 hours  enoxaparin Injectable 40 milliGRAM(s) SubCutaneous at bedtime  folic acid 1 milliGRAM(s) Oral daily  lisinopril 10 milliGRAM(s) Oral daily    Home:  aspirin 81 mg oral tablet, chewable: 1 tab(s) orally once a day  atorvastatin 80 mg oral tablet: 1 tab(s) orally once a day  lisinopril 10 mg oral tablet: 1 tab(s) orally once a day    Vitals:  T(C): 36.4, Max: 37.1 (07-08-20 @ 21:32)  T(F): 97.5, Max: 98.7 (07-08-20 @ 21:32)  HR: 97 (91 - 105)  BP: 140/80 (130/75 - 147/67)  RR: 18 (18 - 20)  SpO2: 94% (94% - 96%)    Physical Exam:  General: Awake, alert, NAD, resting comfortably in bed, on RA  HEENT: Head NC/AT  Heart: S1/S2; inc rate, normal rhythm; no rubs, murmurs appreciated  Lungs: Clear to auscultation bilaterally, no wheezing, rales or rhonchi  Abdomen: Soft, nontender, nondistended, BS+  Extremities: No edema, clubbing, cyanosis in upper or lower extremities  Neuro: AAOx2, NFD    #Lightheadedness + fevers, tachycardia; suspected Sepsis in Immunocompromised Patient, on active chemo wRenata Guardado (Ibrutinib)  Recent dx of lymphoproliferative Disorder  - CXR 7/6 negative for acute pulmonary dz   - EKG - sinus tachy, echo revealed normal EF  - UA revealed moderate bacteria, large LE, neg nitrites  - no clear source for infection, blood cx 7/5 NGTD, urine cx 7/7 NGTD  - Has been afebrile, HD stable  - No ABx indicated per ID recs    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   - Has 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

## 2020-07-09 NOTE — PROGRESS NOTE ADULT - ASSESSMENT
73 y/o F with PMH of CAD s/p stent, DLD, HTN, B josue lymphoma, recently diagnosed B cell lymphoproliferative disorder (follows Dr. Guardado), on Ibrutinib presented to the ED after an episode of lightheadedness and mechanical fall.    #Lightheadedness + fevers, tachycardia; suspected Sepsis in Immunocompromised Patient, on active chemo w. Dr. 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   - -she got 2 units of PRBC here  - Splenomegaly appreciated on abdominal US on admission, stable since previous admission  -- c/w folic acid supplemental   - Plt count 50 today, s/p 1U platelets yesterday and 2 more to be given today    #Hyponatremia - improving  - 129 --> 134  - No clinical sx of hyponatremia, will cont to monitor    S/p Mechanical fall  - No evidence of fx on imaging on admission  - c/w pain control PRN    CAD s/p stents  HTN  - c/w aspirin 81 mg daily  - c/w Lisinopril 10 mg daily     DLD  - c/w Atorvastatin 80 mg    pt requires max assistance at baseline but wants to go home-- will get 2 more units of PRBC  Dispo: Dc home today-- spent more than 30mins.

## 2020-07-15 NOTE — PHYSICAL EXAM
[Capable of only limited self care, confined to bed or chair more than 50% of waking hours] : Status 3- Capable of only limited self care, confined to bed or chair more than 50% of waking hours [Normal] : normal appearance, no rash, nodules, vesicles, ulcers, erythema [de-identified] : Macular papular rash under L breast and in the L groin, resolving [de-identified] : enlarged spleen

## 2020-07-15 NOTE — PHYSICAL EXAM
[Capable of only limited self care, confined to bed or chair more than 50% of waking hours] : Status 3- Capable of only limited self care, confined to bed or chair more than 50% of waking hours [Normal] : normal appearance, no rash, nodules, vesicles, ulcers, erythema [de-identified] : enlarged spleen  [de-identified] : Macular papular rash under L breast and in the L groin, resolving

## 2020-07-15 NOTE — REVIEW OF SYSTEMS
[Fatigue] : fatigue [Constipation] : constipation [Joint Pain] : joint pain [Negative] : Heme/Lymph [Chills] : no chills [Fever] : no fever [Night Sweats] : no night sweats [Recent Change In Weight] : ~T no recent weight change

## 2020-07-15 NOTE — HISTORY OF PRESENT ILLNESS
[de-identified] : Kathrine is a savanna 74 year old female who presented to ER 04/28/2020 with weakness and lethargy. She went to see her cardiologist and she was referred to ER from there. \par She was found to have low HB of 5.0 on admission and she was transfused 3 units of PRBC. Also noted that her Absolute lymphocyte count was >5000. She has the following work up in hospital:\par CBC\par 11.61  High   \par  RBC Count 1.77        \par  Hemoglobin 5.0   \par  Hematocrit 14.0  Low %  37.0 - 47.0     \par  MCV 79.1  Low fL  81.0 - 99.0 Final      \par  MCH 28.2    pg  27.0 - 31.0 Final      \par  MCHC 35.7    g/dL  32.0 - 37.0 Final      \par  RCDW 26.5  High %  11.5 - 14.5 Final      \par  Platelet Count - Automated 255    K/uL  130 - 400 Final      \par  MPV 11.3  High fL  7.4 - 10.4 Final      \par  Auto Neutrophil % 33.6   \par  Auto Lymphocyte % 65.5  High %  20.5 - 51.1 Final      \par  Auto Monocyte % 0.9  Low %  1.7 - 9.3 Final      \par  Auto Eosinophil % 0.0    %  0.0 - 8.0 Final      \par  Auto Basophil % 0.0    %  0.0 - 1.0 Final      \par  Auto Neutrophil # 3.90    K/uL  1.40 - 6.50 Final      \par  Auto Lymphocyte # 7.60  High K/uL  1.20 - 3.40 Final      \par  Auto Monocyte # 0.10    \par  Auto Eosinophil # 0.00       \par  Auto Basophil # 0.00 \par M spike- unable to quantify. \par Weak IgG Kappa Band Identified. Immunoglobulins were normal.\par Reticulocyte -0.6\par Folate - 2.0\par Iron studies were done post transfusion. Ferritin -646.\par Keith- negative\par Flow cytometry -DIAGNOSIS:\par Peripheral blood:\par      - Monotypic B-cells (18% of cells), positive for kappa, CD19, CD20, FMC-7; negative CD5, CD10, CD23.\par      - The myeloid immunophenotypic findings show no increase in myeloid immaturity.\par \par Imaging: \par CT chest on 4/30/2020 revealed \par 1. No evidence for intrathoracic lymphadenopathy.\par 2. No suspicious mass or nodule.\par \par CT A/P on 4/30/2020 revealed \par Marked splenomegaly; differential considerations include neoplastic and myeloproliferative processes, amongst other etiologies.\par 2.  No enlarged abdominal or pelvic lymph nodes.\par 3.  Enlarged fibroid uterus.\par \par She was discharged few days later, as she preferred completing the work up as out patient.  She is an active smoker for more than 40 years. She never had colonoscopy. Mammogram was many years ago. \par  [de-identified] : 5/6/2020: She feels very tired and is wobbly. She is on wheelchair now, she lives with her  and her daughter. She denies B symptoms. Melena, bleeding per rectum. \par Images were personally reviewed by MD Debby and discussed with patient and family.\par \par 7/1/2020: Kathrine is here for follow up. Since last visit she has required almost weekly transfusions of PRBC, Hb was going down to 5-6 range in between the transfusions. 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 BCL2 was negative. These findings were previously discussed with patient's daughter Kathrine.\par Patient was also seen in second opinion at [facility] in [place], clinical trial was offered, patient chose not to participate. \par On 6/10/2020 we have attempted to initiate Rituximab, Kathrine has developed a SEVERE allergic reaction to Rituxan, requiring overnight ER stay. \par Given the severity of reaction the plan is NOT to attempt rechallenging with Rituximab. \par She started on Ibrutinib 3 tabs daily on 6/17/2020, by 6/22/2020 she has started developing mild thrombocytopenia, but anemia improved, we asked her to decrease the pill to 2 a day, by 6/27/2020 she has developed a macular papular rash under her L breast and in her L groin, she was asked to hold the pill, the rash today looks fainter, stopped spreading and overall is improved. She will continue to hold Ibrutinib till next Monday on 7/6/2020 and then if rash has resolved will restart at 1 pill daily. \par US of the spleen to obtain new baseline on 6/27/2020 revealed Splenomegaly correlating with CT exam. Enlarged homogeneous \par spleen 22.5 cm x 9.3 cm in width with no focal mass or perisplenic fluid \par collection. Vascular flow, unremarkable.\par \par 7/15/2020: Kathrine was unable to restart on Ibrutibib yet, she sustained a fall at home and was brought to ED on 7/5/2020, she was noted to be febrile in ED, urine was positive and she was started on broad spectrum antibiotics, she was also noted to have worsening thrombocytopenia of admission, she required blood and platelet transfusions inhouse, platelets are improving since discharge, she is currently not on antibiotics. She reports she has not smoked for close to 3 weeks. She is in a wheelchair today and with home O2. She reports feeling well.

## 2020-07-15 NOTE — ASSESSMENT
[FreeTextEntry1] : Hypoproliferative  anemia and massive splenomegaly, flow consistent with B-cell lymphoproliferative disorder CD5 and CD10 negative, consistent with Marginal Zone Lymphoma of the Spleen, involving BM\par --BMBx on 5/20/2020 revealed Marginal Zone Lymphoma \par --S/p 3 units of PRBC inhouse at diagnosis, requiring frequent PRBC transfusions\par --Flow cytometry revealed Monotypic B-cells (18% of cells), positive for kappa, CD19, CD20, FMC-7; negative CD5, CD10, CD23.\par --No B symptoms, no evidence of overt bleeding\par --No over evidence of hemolysis, Keith negative \par --PET CT on 5/8/2020 reveals splenic uptake as well as splenomegaly \par --Unable to tolerate Rituxan\par --Developed thrombocytopenia and rash with 3 and 2 tabs of Ibrutinib, will plan to rechallenge, on 7/15/2020 still unable to restart on Ibrutinib 2/2 thrombocytopenia, which is improving \par --Continue with weekly CBC and type and cross with PRN transfusions \par --Keep Hb over 8.0, platelet count above 50K, patient is on antiplatelet therapy for h/o cardiac stent \par --Continue with folic acid PO\par \par Will also arrange for home blood draws until we have final diagnosis. Further recommendations on treatment will be made after above mentioned work up. \par Follow up in 2-3 weeks or earlier if needed \par

## 2020-07-15 NOTE — HISTORY OF PRESENT ILLNESS
[de-identified] : 5/6/2020: She feels very tired and is wobbly. She is on wheelchair now, she lives with her  and her daughter. She denies B symptoms. Melena, bleeding per rectum. \par Images were personally reviewed by MD Debby and discussed with patient and family.\par \par 7/1/2020: Kathrine is here for follow up. Since last visit she has required almost weekly transfusions of PRBC, Hb was going down to 5-6 range in between the transfusions. 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 BCL2 was negative. These findings were previously discussed with patient's daughter Kathrine.\par Patient was also seen in second opinion at [facility] [place] in [place], clinical trial was offered, patient chose not to participate. \par On 6/10/2020 we have attempted to initiate Rituximab, Kathrine has developed a SEVERE allergic reaction to Rituxan, requiring overnight ER stay. \par Given the severity of reaction the plan is NOT to attempt rechallenging with Rituximab. \par She started on Ibrutinib 3 tabs daily on 6/17/2020, by 6/22/2020 she has started developing mild thrombocytopenia, but anemia improved, we asked her to decrease the pill to 2 a day, by 6/27/2020 she has developed a macular papular rash under her L breast and in her L groin, she was asked to hold the pill, the rash today looks fainter, stopped spreading and overall is improved. She will continue to hold Ibrutinib till next Monday on 7/6/2020 and then if rash has resolved will restart at 1 pill daily. \par US of the spleen to obtain new baseline on 6/27/2020 revealed Splenomegaly correlating with CT exam. Enlarged homogeneous \par spleen 22.5 cm x 9.3 cm in width with no focal mass or perisplenic fluid \par collection. Vascular flow, unremarkable. [de-identified] : Kathrine is a savanna 74 year old female who presented to ER 04/28/2020 with weakness and lethargy. She went to see her cardiologist and she was referred to ER from there. \par She was found to have low HB of 5.0 on admission and she was transfused 3 units of PRBC. Also noted that her Absolute lymphocyte count was >5000. She has the following work up in hospital:\par CBC\par 11.61  High   \par  RBC Count 1.77        \par  Hemoglobin 5.0   \par  Hematocrit 14.0  Low %  37.0 - 47.0     \par  MCV 79.1  Low fL  81.0 - 99.0 Final      \par  MCH 28.2    pg  27.0 - 31.0 Final      \par  MCHC 35.7    g/dL  32.0 - 37.0 Final      \par  RCDW 26.5  High %  11.5 - 14.5 Final      \par  Platelet Count - Automated 255    K/uL  130 - 400 Final      \par  MPV 11.3  High fL  7.4 - 10.4 Final      \par  Auto Neutrophil % 33.6   \par  Auto Lymphocyte % 65.5  High %  20.5 - 51.1 Final      \par  Auto Monocyte % 0.9  Low %  1.7 - 9.3 Final      \par  Auto Eosinophil % 0.0    %  0.0 - 8.0 Final      \par  Auto Basophil % 0.0    %  0.0 - 1.0 Final      \par  Auto Neutrophil # 3.90    K/uL  1.40 - 6.50 Final      \par  Auto Lymphocyte # 7.60  High K/uL  1.20 - 3.40 Final      \par  Auto Monocyte # 0.10    \par  Auto Eosinophil # 0.00       \par  Auto Basophil # 0.00 \par M spike- unable to quantify. \par Weak IgG Kappa Band Identified. Immunoglobulins were normal.\par Reticulocyte -0.6\par Folate - 2.0\par Iron studies were done post transfusion. Ferritin -646.\par Keith- negative\par Flow cytometry -DIAGNOSIS:\par Peripheral blood:\par      - Monotypic B-cells (18% of cells), positive for kappa, CD19, CD20, FMC-7; negative CD5, CD10, CD23.\par      - The myeloid immunophenotypic findings show no increase in myeloid immaturity.\par \par Imaging: \par CT chest on 4/30/2020 revealed \par 1. No evidence for intrathoracic lymphadenopathy.\par 2. No suspicious mass or nodule.\par \par CT A/P on 4/30/2020 revealed \par Marked splenomegaly; differential considerations include neoplastic and myeloproliferative processes, amongst other etiologies.\par 2.  No enlarged abdominal or pelvic lymph nodes.\par 3.  Enlarged fibroid uterus.\par \par She was discharged few days later, as she preferred completing the work up as out patient.  She is an active smoker for more than 40 years. She never had colonoscopy. Mammogram was many years ago. \par

## 2020-07-15 NOTE — REVIEW OF SYSTEMS
[Fatigue] : fatigue [Constipation] : constipation [Joint Pain] : joint pain [Negative] : Heme/Lymph [Fever] : no fever [Night Sweats] : no night sweats [Chills] : no chills [Recent Change In Weight] : ~T no recent weight change

## 2020-07-15 NOTE — ASSESSMENT
[FreeTextEntry1] : Hypoproliferative  anemia and massive splenomegaly, flow consistent with B-cell lymphoproliferative disorder CD5 and CD10 negative, consistent with Marginal Zone Lymphoma of the Spleen, involving BM\par --BMBx on 5/20/2020 revealed Marginal Zone Lymphoma \par --S/p 3 units of PRBC inhouse at diagnosis, requiring frequent PRBC transfusions\par --Flow cytometry revealed Monotypic B-cells (18% of cells), positive for kappa, CD19, CD20, FMC-7; negative CD5, CD10, CD23.\par --No B symptoms, no evidence of overt bleeding\par --No over evidence of hemolysis, Keith negative \par --PET CT on 5/8/2020 reveals splenic uptake as well as splenomegaly \par --Unable to tolerate Rituxan\par --Developed thrombocytopenia and rash with 3 and 2 tabs of Ibrutinib, will plan to rechallenge \par --Continue with weekly CBC and type and cross with PRN transfusions \par --Keep Hb over 8.0, platelet count above 50K, patient is on antiplatelet therapy for h/o cardiac stent \par --Continue with folic acid PO\par \par Will also arrange for home blood draws until we have final diagnosis. Further recommendations on treatment will be made after above mentioned work up. \par Follow up in 2 weeks or earlier if needed \par

## 2020-07-28 PROBLEM — B49 FUNGAL INFECTION: Status: ACTIVE | Noted: 2020-01-01

## 2020-08-05 NOTE — HISTORY OF PRESENT ILLNESS
[de-identified] : Kathrine is a savanna 74 year old female who presented to ER 04/28/2020 with weakness and lethargy. She went to see her cardiologist and she was referred to ER from there. \par She was found to have low HB of 5.0 on admission and she was transfused 3 units of PRBC. Also noted that her Absolute lymphocyte count was >5000. She has the following work up in hospital:\par CBC\par 11.61  High   \par  RBC Count 1.77        \par  Hemoglobin 5.0   \par  Hematocrit 14.0  Low %  37.0 - 47.0     \par  MCV 79.1  Low fL  81.0 - 99.0 Final      \par  MCH 28.2    pg  27.0 - 31.0 Final      \par  MCHC 35.7    g/dL  32.0 - 37.0 Final      \par  RCDW 26.5  High %  11.5 - 14.5 Final      \par  Platelet Count - Automated 255    K/uL  130 - 400 Final      \par  MPV 11.3  High fL  7.4 - 10.4 Final      \par  Auto Neutrophil % 33.6   \par  Auto Lymphocyte % 65.5  High %  20.5 - 51.1 Final      \par  Auto Monocyte % 0.9  Low %  1.7 - 9.3 Final      \par  Auto Eosinophil % 0.0    %  0.0 - 8.0 Final      \par  Auto Basophil % 0.0    %  0.0 - 1.0 Final      \par  Auto Neutrophil # 3.90    K/uL  1.40 - 6.50 Final      \par  Auto Lymphocyte # 7.60  High K/uL  1.20 - 3.40 Final      \par  Auto Monocyte # 0.10    \par  Auto Eosinophil # 0.00       \par  Auto Basophil # 0.00 \par M spike- unable to quantify. \par Weak IgG Kappa Band Identified. Immunoglobulins were normal.\par Reticulocyte -0.6\par Folate - 2.0\par Iron studies were done post transfusion. Ferritin -646.\par Keith- negative\par Flow cytometry -DIAGNOSIS:\par Peripheral blood:\par      - Monotypic B-cells (18% of cells), positive for kappa, CD19, CD20, FMC-7; negative CD5, CD10, CD23.\par      - The myeloid immunophenotypic findings show no increase in myeloid immaturity.\par \par Imaging: \par CT chest on 4/30/2020 revealed \par 1. No evidence for intrathoracic lymphadenopathy.\par 2. No suspicious mass or nodule.\par \par CT A/P on 4/30/2020 revealed \par Marked splenomegaly; differential considerations include neoplastic and myeloproliferative processes, amongst other etiologies.\par 2.  No enlarged abdominal or pelvic lymph nodes.\par 3.  Enlarged fibroid uterus.\par \par She was discharged few days later, as she preferred completing the work up as out patient.  She is an active smoker for more than 40 years. She never had colonoscopy. Mammogram was many years ago. \par  [de-identified] : 5/6/2020: She feels very tired and is wobbly. She is on wheelchair now, she lives with her  and her daughter. She denies B symptoms. Melena, bleeding per rectum. \par Images were personally reviewed by MD Debby and discussed with patient and family.\par \par 7/1/2020: Kathrine is here for follow up. Since last visit she has required almost weekly transfusions of PRBC, Hb was going down to 5-6 range in between the transfusions. 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 BCL2 was negative. These findings were previously discussed with patient's daughter Kathrine.\par Patient was also seen in second opinion at [facility] [place] in [place], clinical trial was offered, patient chose not to participate. \par On 6/10/2020 we have attempted to initiate Rituximab, Kathrine has developed a SEVERE allergic reaction to Rituxan, requiring overnight ER stay. \par Given the severity of reaction the plan is NOT to attempt rechallenging with Rituximab. \par She started on Ibrutinib 3 tabs daily on 6/17/2020, by 6/22/2020 she has started developing mild thrombocytopenia, but anemia improved, we asked her to decrease the pill to 2 a day, by 6/27/2020 she has developed a macular papular rash under her L breast and in her L groin, she was asked to hold the pill, the rash today looks fainter, stopped spreading and overall is improved. She will continue to hold Ibrutinib till next Monday on 7/6/2020 and then if rash has resolved will restart at 1 pill daily. \par US of the spleen to obtain new baseline on 6/27/2020 revealed Splenomegaly correlating with CT exam. Enlarged homogeneous \par spleen 22.5 cm x 9.3 cm in width with no focal mass or perisplenic fluid \par collection. Vascular flow, unremarkable.\par \par 7/15/2020: Kathrine was unable to restart on Ibrutibib yet, she sustained a fall at home and was brought to ED on 7/5/2020, she was noted to be febrile in ED, urine was positive and she was started on broad spectrum antibiotics, she was also noted to have worsening thrombocytopenia of admission, she required blood and platelet transfusions inhouse, platelets are improving since discharge, she is currently not on antibiotics. She reports she has not smoked for close to 3 weeks. She is in a wheelchair today and with home O2. She reports feeling well. \par \par 7/28/2020:  Pt came in today accompanied by daughter c/o diffuse, erythematous rash to left groin since last week.  She changed the brand of her diapers from "Depends" to "Always".  She was using Nystatin powder to her rash underneath left breast which was not helping.  Daughter is concerned because her platelets are still low (plt=65,000).\par Hb has been holding up, will defer restarting on Imbruvica for now.

## 2020-08-05 NOTE — PHYSICAL EXAM
[Capable of only limited self care, confined to bed or chair more than 50% of waking hours] : Status 3- Capable of only limited self care, confined to bed or chair more than 50% of waking hours [Normal] : normal appearance, no rash, nodules, vesicles, ulcers, erythema [de-identified] : Macular papular rash under L breast.  Diffuse eythematous rash to left groin area ~10" x 5" [de-identified] : enlarged spleen

## 2020-08-05 NOTE — ASSESSMENT
[FreeTextEntry1] : Hypoproliferative  anemia and massive splenomegaly, flow consistent with B-cell lymphoproliferative disorder CD5 and CD10 negative, consistent with Marginal Zone Lymphoma of the Spleen, involving BM\par --BMBx on 5/20/2020 revealed Marginal Zone Lymphoma \par --S/p 3 units of PRBC inhouse at diagnosis, requiring frequent PRBC transfusions\par --Flow cytometry revealed Monotypic B-cells (18% of cells), positive for kappa, CD19, CD20, FMC-7; negative CD5, CD10, CD23.\par --No B symptoms, no evidence of overt bleeding\par --No over evidence of hemolysis, Keith negative \par --PET CT on 5/8/2020 reveals splenic uptake as well as splenomegaly \par --Unable to tolerate Rituxan\par --Developed thrombocytopenia and rash with 3 and 2 tabs of Ibrutinib, will plan to rechallenge, on 7/15/2020 still unable to restart on Ibrutinib 2/2 thrombocytopenia, which is improving \par --Continue with weekly CBC and type and cross with PRN transfusions \par --Keep Hb over 8.0, platelet count above 50K, patient is on antiplatelet therapy for h/o cardiac stent \par --Continue with folic acid PO\par --Prescribed Fluconazole 200 mg 2 tabs PO daily x 1 week and Acyclovir 400 mg PO daily.\par \par Will also arrange for home blood draws until we have final diagnosis. Further recommendations on treatment will be made after above mentioned work up. \par Follow up in 1 week.\par \par Case was seen and discussed with Dr. Guardado who agreed with the assessment and plan.\par

## 2020-08-12 NOTE — ASSESSMENT
[FreeTextEntry1] : Hypoproliferative  anemia and massive splenomegaly, flow consistent with B-cell lymphoproliferative disorder CD5 and CD10 negative, consistent with Marginal Zone Lymphoma of the Spleen, involving BM\par --BMBx on 5/20/2020 revealed Marginal Zone Lymphoma \par --S/p 3 units of PRBC inhouse at diagnosis, requiring frequent PRBC transfusions\par --Flow cytometry revealed Monotypic B-cells (18% of cells), positive for kappa, CD19, CD20, FMC-7; negative CD5, CD10, CD23.\par --No B symptoms, no evidence of overt bleeding\par --No over evidence of hemolysis, Keith negative \par --PET CT on 5/8/2020 reveals splenic uptake as well as splenomegaly \par --Unable to tolerate Rituxan\par --Developed thrombocytopenia and rash with 3 and 2 tabs of Ibrutinib, will plan to rechallenge, on 7/15/2020 still unable to restart on Ibrutinib 2/2 thrombocytopenia, which is improving \par --Continue with weekly CBC and type and cross with PRN transfusions \par --Keep Hb over 8.0, platelet count above 50K, patient is on antiplatelet therapy for h/o cardiac stent \par --Continue with folic acid PO\par --Acyclovir 400 mg PO daily ppx\par --Fluconazole 200mg BID ppx\par \par UTI\par --Hydration on 8/4/2020\par --Cipro prescribed \par \par Will also arrange for home blood draws until we have final diagnosis. Further recommendations on treatment will be made after above mentioned work up. \par Follow up in 1 week.\par \par Case was seen and discussed with Dr. Guardado who agreed with the assessment and plan.\par

## 2020-08-12 NOTE — PHYSICAL EXAM
[Capable of only limited self care, confined to bed or chair more than 50% of waking hours] : Status 3- Capable of only limited self care, confined to bed or chair more than 50% of waking hours [Normal] : normal appearance, no rash, nodules, vesicles, ulcers, erythema [de-identified] : enlarged spleen  [de-identified] : Macular papular rash under L breast.  Diffuse eythematous rash to left groin area ~10" x 5"

## 2020-08-12 NOTE — HISTORY OF PRESENT ILLNESS
[de-identified] : Kathrine is a savanna 74 year old female who presented to ER 04/28/2020 with weakness and lethargy. She went to see her cardiologist and she was referred to ER from there. \par She was found to have low HB of 5.0 on admission and she was transfused 3 units of PRBC. Also noted that her Absolute lymphocyte count was >5000. She has the following work up in hospital:\par CBC\par 11.61  High   \par  RBC Count 1.77        \par  Hemoglobin 5.0   \par  Hematocrit 14.0  Low %  37.0 - 47.0     \par  MCV 79.1  Low fL  81.0 - 99.0 Final      \par  MCH 28.2    pg  27.0 - 31.0 Final      \par  MCHC 35.7    g/dL  32.0 - 37.0 Final      \par  RCDW 26.5  High %  11.5 - 14.5 Final      \par  Platelet Count - Automated 255    K/uL  130 - 400 Final      \par  MPV 11.3  High fL  7.4 - 10.4 Final      \par  Auto Neutrophil % 33.6   \par  Auto Lymphocyte % 65.5  High %  20.5 - 51.1 Final      \par  Auto Monocyte % 0.9  Low %  1.7 - 9.3 Final      \par  Auto Eosinophil % 0.0    %  0.0 - 8.0 Final      \par  Auto Basophil % 0.0    %  0.0 - 1.0 Final      \par  Auto Neutrophil # 3.90    K/uL  1.40 - 6.50 Final      \par  Auto Lymphocyte # 7.60  High K/uL  1.20 - 3.40 Final      \par  Auto Monocyte # 0.10    \par  Auto Eosinophil # 0.00       \par  Auto Basophil # 0.00 \par M spike- unable to quantify. \par Weak IgG Kappa Band Identified. Immunoglobulins were normal.\par Reticulocyte -0.6\par Folate - 2.0\par Iron studies were done post transfusion. Ferritin -646.\par Keith- negative\par Flow cytometry -DIAGNOSIS:\par Peripheral blood:\par      - Monotypic B-cells (18% of cells), positive for kappa, CD19, CD20, FMC-7; negative CD5, CD10, CD23.\par      - The myeloid immunophenotypic findings show no increase in myeloid immaturity.\par \par Imaging: \par CT chest on 4/30/2020 revealed \par 1. No evidence for intrathoracic lymphadenopathy.\par 2. No suspicious mass or nodule.\par \par CT A/P on 4/30/2020 revealed \par Marked splenomegaly; differential considerations include neoplastic and myeloproliferative processes, amongst other etiologies.\par 2.  No enlarged abdominal or pelvic lymph nodes.\par 3.  Enlarged fibroid uterus.\par \par She was discharged few days later, as she preferred completing the work up as out patient.  She is an active smoker for more than 40 years. She never had colonoscopy. Mammogram was many years ago. \par  [de-identified] : 5/6/2020: She feels very tired and is wobbly. She is on wheelchair now, she lives with her  and her daughter. She denies B symptoms. Melena, bleeding per rectum. \par Images were personally reviewed by MD Debby and discussed with patient and family.\par \par 7/1/2020: Kathrine is here for follow up. Since last visit she has required almost weekly transfusions of PRBC, Hb was going down to 5-6 range in between the transfusions. 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 BCL2 was negative. These findings were previously discussed with patient's daughter Kathrine.\par Patient was also [place] [facility] [place] clinical [place] was offered, patient chose not to participate. \par On 6/10/2020 we have attempted to initiate Rituximab, Kathrine has developed a SEVERE allergic reaction to Rituxan, requiring overnight ER stay. \par Given the severity of reaction the plan is NOT to attempt rechallenging with Rituximab. \par She started on Ibrutinib 3 tabs daily on 6/17/2020, by 6/22/2020 she has started developing mild thrombocytopenia, but anemia improved, we asked her to decrease the pill to 2 a day, by 6/27/2020 she has developed a macular papular rash under her L breast and in her L groin, she was asked to hold the pill, the rash today looks fainter, stopped spreading and overall is improved. She will continue to hold Ibrutinib till next Monday on 7/6/2020 and then if rash has resolved will restart at 1 pill daily. \par US of the spleen to obtain new baseline on 6/27/2020 revealed Splenomegaly correlating with CT exam. Enlarged homogeneous \par spleen 22.5 cm x 9.3 cm in width with no focal mass or perisplenic fluid \par collection. Vascular flow, unremarkable.\par \par 7/15/2020: Kathrine was unable to restart on Ibrutibib yet, she sustained a fall at home and was brought to ED on 7/5/2020, she was noted to be febrile in ED, urine was positive and she was started on broad spectrum antibiotics, she was also noted to have worsening thrombocytopenia of admission, she required blood and platelet transfusions inhouse, platelets are improving since discharge, she is currently not on antibiotics. She reports she has not smoked for close to 3 weeks. She is in a wheelchair today and with home O2. She reports feeling well. \par \par 7/28/2020:  Pt came in today accompanied by daughter c/o diffuse, erythematous rash to left groin since last week.  She changed the brand of her diapers from "Depends" to "Always".  She was using Nystatin powder to her rash underneath left breast which was not helping.  Daughter is concerned because her platelets are still low (plt=65,000).\par Hb has been holding up, will defer restarting on Imbruvica for now. \par \par 8/4/2020; As per her daughter Kathrine has been feeling weaker than usual, complaining of urinary symptoms. We will check UA today and start on empiric antibiotics. CBC from today reveals Hb remains stable, transfusion is not necessary, platelets remain low at 57K. We will continue to hold Imbruvica at this point. We will also offer gentle hydration today.

## 2020-08-24 NOTE — HISTORY OF PRESENT ILLNESS
[de-identified] : Kathrine is a savanna 74 year old female who presented to ER 04/28/2020 with weakness and lethargy. She went to see her cardiologist and she was referred to ER from there. \par She was found to have low HB of 5.0 on admission and she was transfused 3 units of PRBC. Also noted that her Absolute lymphocyte count was >5000. She has the following work up in hospital:\par CBC\par 11.61  High   \par  RBC Count 1.77        \par  Hemoglobin 5.0   \par  Hematocrit 14.0  Low %  37.0 - 47.0     \par  MCV 79.1  Low fL  81.0 - 99.0 Final      \par  MCH 28.2    pg  27.0 - 31.0 Final      \par  MCHC 35.7    g/dL  32.0 - 37.0 Final      \par  RCDW 26.5  High %  11.5 - 14.5 Final      \par  Platelet Count - Automated 255    K/uL  130 - 400 Final      \par  MPV 11.3  High fL  7.4 - 10.4 Final      \par  Auto Neutrophil % 33.6   \par  Auto Lymphocyte % 65.5  High %  20.5 - 51.1 Final      \par  Auto Monocyte % 0.9  Low %  1.7 - 9.3 Final      \par  Auto Eosinophil % 0.0    %  0.0 - 8.0 Final      \par  Auto Basophil % 0.0    %  0.0 - 1.0 Final      \par  Auto Neutrophil # 3.90    K/uL  1.40 - 6.50 Final      \par  Auto Lymphocyte # 7.60  High K/uL  1.20 - 3.40 Final      \par  Auto Monocyte # 0.10    \par  Auto Eosinophil # 0.00       \par  Auto Basophil # 0.00 \par M spike- unable to quantify. \par Weak IgG Kappa Band Identified. Immunoglobulins were normal.\par Reticulocyte -0.6\par Folate - 2.0\par Iron studies were done post transfusion. Ferritin -646.\par Keith- negative\par Flow cytometry -DIAGNOSIS:\par Peripheral blood:\par      - Monotypic B-cells (18% of cells), positive for kappa, CD19, CD20, FMC-7; negative CD5, CD10, CD23.\par      - The myeloid immunophenotypic findings show no increase in myeloid immaturity.\par \par Imaging: \par CT chest on 4/30/2020 revealed \par 1. No evidence for intrathoracic lymphadenopathy.\par 2. No suspicious mass or nodule.\par \par CT A/P on 4/30/2020 revealed \par Marked splenomegaly; differential considerations include neoplastic and myeloproliferative processes, amongst other etiologies.\par 2.  No enlarged abdominal or pelvic lymph nodes.\par 3.  Enlarged fibroid uterus.\par \par She was discharged few days later, as she preferred completing the work up as out patient.  She is an active smoker for more than 40 years. She never had colonoscopy. Mammogram was many years ago. \par  [de-identified] : 5/6/2020: She feels very tired and is wobbly. She is on wheelchair now, she lives with her  and her daughter. She denies B symptoms. Melena, bleeding per rectum. \par Images were personally reviewed by MD Debby and discussed with patient and family.\par \par 7/1/2020: Kathrine is here for follow up. Since last visit she has required almost weekly transfusions of PRBC, Hb was going down to 5-6 range in between the transfusions. 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 BCL2 was negative. These findings were previously discussed with patient's daughter Kathrine.\par Patient was also seen in second opinion at [facility] [place] in [place], clinical trial was offered, patient chose not to participate. \par On 6/10/2020 we have attempted to initiate Rituximab, Kathrine has developed a SEVERE allergic reaction to Rituxan, requiring overnight ER stay. \par Given the severity of reaction the plan is NOT to attempt rechallenging with Rituximab. \par She started on Ibrutinib 3 tabs daily on 6/17/2020, by 6/22/2020 she has started developing mild thrombocytopenia, but anemia improved, we asked her to decrease the pill to 2 a day, by 6/27/2020 she has developed a macular papular rash under her L breast and in her L groin, she was asked to hold the pill, the rash today looks fainter, stopped spreading and overall is improved. She will continue to hold Ibrutinib till next Monday on 7/6/2020 and then if rash has resolved will restart at 1 pill daily. \par US of the spleen to obtain new baseline on 6/27/2020 revealed Splenomegaly correlating with CT exam. Enlarged homogeneous \par spleen 22.5 cm x 9.3 cm in width with no focal mass or perisplenic fluid \par collection. Vascular flow, unremarkable.\par \par 7/15/2020: Kathrine was unable to restart on Ibrutibib yet, she sustained a fall at home and was brought to ED on 7/5/2020, she was noted to be febrile in ED, urine was positive and she was started on broad spectrum antibiotics, she was also noted to have worsening thrombocytopenia of admission, she required blood and platelet transfusions inhouse, platelets are improving since discharge, she is currently not on antibiotics. She reports she has not smoked for close to 3 weeks. She is in a wheelchair today and with home O2. She reports feeling well. \par \par 7/28/2020:  Pt came in today accompanied by daughter c/o diffuse, erythematous rash to left groin since last week.  She changed the brand of her diapers from "Depends" to "Always".  She was using Nystatin powder to her rash underneath left breast which was not helping.  Daughter is concerned because her platelets are still low (plt=65,000).\par Hb has been holding up, will defer restarting on Imbruvica for now. \par \par 8/4/2020; As per her daughter Kathrine has been feeling weaker than usual, complaining of urinary symptoms. We will check UA today and start on empiric antibiotics. CBC from today reveals Hb remains stable, transfusion is not necessary, platelets remain low at 57K. We will continue to hold Imbruvica at this point. We will also offer gentle hydration today. \par 8/18/20:\par Kathrine is 74 years old female came for a follow up visit for  B-cell lymphoproliferative disorder. Patient completed a full course of Antibiotics Bactrim.\par  Patient still have high frequency for urination.  She denies Fever, dysuria , or Hematuria. \par Patient ' daughter started Imbruvica   one tab every other day.  Patient tolerated well. \par We communicated CBC from 8/19/20 with HGB / HCT is 9.2/26.5 with platelet count is 102, 000. NO skin rash.\par

## 2020-08-24 NOTE — END OF VISIT
[FreeTextEntry3] : I was physically present for the key portions of the evaluation and management service provided.  I agree with the history and physical, and plan which I have reviewed and edited where appropriate.\par She is tolerating Ibrutinib as above. Her Blood counts  are improving, possible responding. Will c.w Ibrutinib as above.

## 2020-08-24 NOTE — HISTORY OF PRESENT ILLNESS
[de-identified] : Kathrine is a savanna 74 year old female who presented to ER 04/28/2020 with weakness and lethargy. She went to see her cardiologist and she was referred to ER from there. \par She was found to have low HB of 5.0 on admission and she was transfused 3 units of PRBC. Also noted that her Absolute lymphocyte count was >5000. She has the following work up in hospital:\par CBC\par 11.61  High   \par  RBC Count 1.77        \par  Hemoglobin 5.0   \par  Hematocrit 14.0  Low %  37.0 - 47.0     \par  MCV 79.1  Low fL  81.0 - 99.0 Final      \par  MCH 28.2    pg  27.0 - 31.0 Final      \par  MCHC 35.7    g/dL  32.0 - 37.0 Final      \par  RCDW 26.5  High %  11.5 - 14.5 Final      \par  Platelet Count - Automated 255    K/uL  130 - 400 Final      \par  MPV 11.3  High fL  7.4 - 10.4 Final      \par  Auto Neutrophil % 33.6   \par  Auto Lymphocyte % 65.5  High %  20.5 - 51.1 Final      \par  Auto Monocyte % 0.9  Low %  1.7 - 9.3 Final      \par  Auto Eosinophil % 0.0    %  0.0 - 8.0 Final      \par  Auto Basophil % 0.0    %  0.0 - 1.0 Final      \par  Auto Neutrophil # 3.90    K/uL  1.40 - 6.50 Final      \par  Auto Lymphocyte # 7.60  High K/uL  1.20 - 3.40 Final      \par  Auto Monocyte # 0.10    \par  Auto Eosinophil # 0.00       \par  Auto Basophil # 0.00 \par M spike- unable to quantify. \par Weak IgG Kappa Band Identified. Immunoglobulins were normal.\par Reticulocyte -0.6\par Folate - 2.0\par Iron studies were done post transfusion. Ferritin -646.\par Keith- negative\par Flow cytometry -DIAGNOSIS:\par Peripheral blood:\par      - Monotypic B-cells (18% of cells), positive for kappa, CD19, CD20, FMC-7; negative CD5, CD10, CD23.\par      - The myeloid immunophenotypic findings show no increase in myeloid immaturity.\par \par Imaging: \par CT chest on 4/30/2020 revealed \par 1. No evidence for intrathoracic lymphadenopathy.\par 2. No suspicious mass or nodule.\par \par CT A/P on 4/30/2020 revealed \par Marked splenomegaly; differential considerations include neoplastic and myeloproliferative processes, amongst other etiologies.\par 2.  No enlarged abdominal or pelvic lymph nodes.\par 3.  Enlarged fibroid uterus.\par \par She was discharged few days later, as she preferred completing the work up as out patient.  She is an active smoker for more than 40 years. She never had colonoscopy. Mammogram was many years ago. \par  [de-identified] : 5/6/2020: She feels very tired and is wobbly. She is on wheelchair now, she lives with her  and her daughter. She denies B symptoms. Melena, bleeding per rectum. \par Images were personally reviewed by MD Debby and discussed with patient and family.\par \par 7/1/2020: Kathrine is here for follow up. Since last visit she has required almost weekly transfusions of PRBC, Hb was going down to 5-6 range in between the transfusions. 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 BCL2 was negative. These findings were previously discussed with patient's daughter Kathrine.\par Patient was also seen in second opinion at [facility] [place] in [place], clinical trial was offered, patient chose not to participate. \par On 6/10/2020 we have attempted to initiate Rituximab, Kathrine has developed a SEVERE allergic reaction to Rituxan, requiring overnight ER stay. \par Given the severity of reaction the plan is NOT to attempt rechallenging with Rituximab. \par She started on Ibrutinib 3 tabs daily on 6/17/2020, by 6/22/2020 she has started developing mild thrombocytopenia, but anemia improved, we asked her to decrease the pill to 2 a day, by 6/27/2020 she has developed a macular papular rash under her L breast and in her L groin, she was asked to hold the pill, the rash today looks fainter, stopped spreading and overall is improved. She will continue to hold Ibrutinib till next Monday on 7/6/2020 and then if rash has resolved will restart at 1 pill daily. \par US of the spleen to obtain new baseline on 6/27/2020 revealed Splenomegaly correlating with CT exam. Enlarged homogeneous \par spleen 22.5 cm x 9.3 cm in width with no focal mass or perisplenic fluid \par collection. Vascular flow, unremarkable.\par \par 7/15/2020: Kathrine was unable to restart on Ibrutibib yet, she sustained a fall at home and was brought to ED on 7/5/2020, she was noted to be febrile in ED, urine was positive and she was started on broad spectrum antibiotics, she was also noted to have worsening thrombocytopenia of admission, she required blood and platelet transfusions inhouse, platelets are improving since discharge, she is currently not on antibiotics. She reports she has not smoked for close to 3 weeks. She is in a wheelchair today and with home O2. She reports feeling well. \par \par 7/28/2020:  Pt came in today accompanied by daughter c/o diffuse, erythematous rash to left groin since last week.  She changed the brand of her diapers from "Depends" to "Always".  She was using Nystatin powder to her rash underneath left breast which was not helping.  Daughter is concerned because her platelets are still low (plt=65,000).\par Hb has been holding up, will defer restarting on Imbruvica for now. \par \par 8/4/2020; As per her daughter Kathrine has been feeling weaker than usual, complaining of urinary symptoms. We will check UA today and start on empiric antibiotics. CBC from today reveals Hb remains stable, transfusion is not necessary, platelets remain low at 57K. We will continue to hold Imbruvica at this point. We will also offer gentle hydration today. \par 8/18/20:\par Kathrine is 74 years old female came for a follow up visit for  B-cell lymphoproliferative disorder. Patient completed a full course of Antibiotics Bactrim.\par  Patient still have high frequency for urination.  She denies Fever, dysuria , or Hematuria. \par Patient ' daughter started Imbruvica   one tab every other day.  Patient tolerated well. \par We communicated CBC from 8/19/20 with HGB / HCT is 9.2/26.5 with platelet count is 102, 000. NO skin rash.\par

## 2020-08-24 NOTE — ED ADULT TRIAGE NOTE - CHIEF COMPLAINT QUOTE
pt BIBA for altered mental status due to UTI. pt also has low hemoglobin and needs possible transfusion

## 2020-08-24 NOTE — ED PROVIDER NOTE - NS ED ROS FT
Eyes:  No visual changes, eye pain or discharge.  ENMT:  No hearing changes, pain, no sore throat or runny nose, no difficulty swallowing  Cardiac:  No chest pain, SOB or edema. No chest pain with exertion.  Respiratory:  No cough or respiratory distress. No hemoptysis. No history of asthma or RAD.  GI:  +abdominal pain   :  +hematuria  MS:  No myalgia, muscle weakness, joint pain or back pain.  Neuro:  +AMS  Skin:  No skin rash.

## 2020-08-24 NOTE — H&P ADULT - NSHPLABSRESULTS_GEN_ALL_CORE
.  LABS:                         8.1    6.08  )-----------( 142      ( 24 Aug 2020 17:49 )             24.2         125<L>  |  92<L>  |  25<H>  ----------------------------<  163<H>  5.0   |  20  |  0.6<L>    Ca    8.8      24 Aug 2020 17:49    TPro  5.2<L>  /  Alb  3.5  /  TBili  2.1<H>  /  DBili  x   /  AST  32  /  ALT  17  /  AlkPhos  246<H>      PT/INR - ( 24 Aug 2020 17:49 )   PT: 20.40 sec;   INR: 1.77 ratio         PTT - ( 24 Aug 2020 17:49 )  PTT:39.4 sec  Urinalysis Basic - ( 24 Aug 2020 19:40 )    Color: Yellow / Appearance: Slightly Turbid / S.019 / pH: x  Gluc: x / Ketone: Negative  / Bili: Negative / Urobili: <2 mg/dL   Blood: x / Protein: 30 mg/dL / Nitrite: Negative   Leuk Esterase: Moderate / RBC: 7 /HPF / WBC 31 /HPF   Sq Epi: x / Non Sq Epi: 1 /HPF / Bacteria: Moderate            Lactate, Blood: 3.1 mmol/L ( @ 19:51)  Lactate, Blood: 2.9 mmol/L ( @ 17:49)    < from: CT Abdomen and Pelvis w/ IV Cont (20 @ 21:49) >    FINDINGS:    LOWER CHEST: There is mild bibasilar subsegmental atelectasis.    HEPATOBILIARY: The liver is normal in appearance. No evidence of intra or extrahepatic biliary dilatation. The gallbladder is suboptimally imaged.    SPLEEN: Stable splenomegaly, measuring approximately 25.8 cm craniocaudal dimension (coronal series 6, image 25).    PANCREAS: Unremarkable.    ADRENAL GLANDS: Unremarkable.    KIDNEYS: Symmetric pattern of renal enhancement. No evidence of a mass, hydronephrosis or hydroureter. Stable right renal lower pole cyst measuring 2.5 cm. Other subcentimeter hypodensities, too small to further characterize.    ABDOMINOPELVIC NODES: No enlarged abdominal or pelvic lymph nodes.    PELVICORGANS: Stable, enlarged fibroid uterus.  PERITONEUM/MESENTERY/BOWEL: Sigmoid diverticulosis. No obstruction, intraperitoneal free air or ascites.  BONES/SOFT TISSUES: Diffuse osteopenia. Scoliosis. Degenerative changes of thoracolumbar spine.  VASCULAR: Calcified and noncalcified atherosclerotic disease of the aorta.  IMPRESSION:  Stable, marked splenomegaly, consistent with patient's known history of B-cell lymphoproliferative disorder.  Otherwise, no CT evidence of acute intra-abdominal pathology.  < end of copied text >    < from: CT Head No Cont (20 @ 21:41) >      Impression:  No evidence of intracranial hemorrhage, territorial infarct, or mass effect.  Severe microvascular changes  Chronic left frontal lobe infarct.    < end of copied text >    < from: 12 Lead ECG (20 @ 17:22) >    Diagnosis Line Sinus tachycardia  Rightward axis  Low voltage QRS  RSR' or QR pattern in V1 suggests right ventricular conduction delay  Borderline ECG    < end of copied text >    RADIOLOGY, EKG & ADDITIONAL TESTS: Reviewed.

## 2020-08-24 NOTE — ED PROVIDER NOTE - PROGRESS NOTE DETAILS
Kathrine-daughter also health care proxy to be reached at [phone] before admission. spoke to daughter Kathrine at length in waiting room, updated on status.  spoke to heme onc fellow- recommends admit to medicine not heme onc service

## 2020-08-24 NOTE — PHYSICAL EXAM
[Capable of only limited self care, confined to bed or chair more than 50% of waking hours] : Status 3- Capable of only limited self care, confined to bed or chair more than 50% of waking hours [Normal] : normal appearance, no rash, nodules, vesicles, ulcers, erythema [de-identified] : enlarged spleen  [de-identified] : Macular papular rash under L breast.  Diffuse eythematous rash to left groin area ~10" x 5"

## 2020-08-24 NOTE — H&P ADULT - NSHPREVIEWOFSYSTEMS_GEN_ALL_CORE
REVIEW OF SYSTEMS:  CONSTITUTIONAL: No fever, weight loss, or fatigue  EYES: No eye pain, visual disturbances, or discharge  ENMT:  No difficulty hearing, tinnitus, vertigo; No sinus or throat pain  RESPIRATORY: No cough, wheezing, chills or hemoptysis; No shortness of breath  CARDIOVASCULAR: No chest pain, palpitations, dizziness, or leg swelling  GASTROINTESTINAL: No abdominal or epigastric pain. No nausea, vomiting, or hematemesis; No diarrhea or constipation. No melena or hematochezia.  GENITOURINARY: No dysuria, frequency, hematuria, or incontinence  NEUROLOGICAL: No headaches, memory loss, loss of strength, numbness, or tremors  SKIN: No itching, burning, rashes, or lesions   ENDOCRINE: No heat or cold intolerance; No hair loss  PSYCHIATRIC: No depression, anxiety, mood swings, or difficulty sleeping  HEME/LYMPH: No easy bruising, or bleeding gums  ALLERY AND IMMUNOLOGIC: No hives or eczema CONSTITUTIONAL: No fever, weight loss, or fatigue  EYES: No eye pain, visual disturbances, or discharge  ENMT:  No difficulty hearing, tinnitus, vertigo; No sinus or throat pain  RESPIRATORY: No cough, wheezing, chills or hemoptysis; No shortness of breath  CARDIOVASCULAR: No chest pain, palpitations, dizziness, or leg swelling  GASTROINTESTINAL: No abdominal or epigastric pain. No nausea, vomiting, or hematemesis; No diarrhea or constipation. No melena or hematochezia.  GENITOURINARY: No dysuria, frequency, hematuria, or incontinence  NEUROLOGICAL: No headaches, memory loss, loss of strength, numbness, or tremors  SKIN: No itching, burning, rashes, or lesions   ENDOCRINE: No heat or cold intolerance; No hair loss  PSYCHIATRIC: No depression, anxiety, mood swings, or difficulty sleeping  HEME/LYMPH: No easy bruising, or bleeding gums  ALLERY AND IMMUNOLOGIC: No hives or eczema

## 2020-08-24 NOTE — ED PROVIDER NOTE - CLINICAL SUMMARY MEDICAL DECISION MAKING FREE TEXT BOX
pt with history of lymphoma on PO chemo followed by onc presenting with confusion, dehydration. +UTI, sepsis, subsequently hypotensive despite fluids. uptrending lactate. peripheral pressors started, dw Dr. Castaneda, approved for ICU under Dr. Guo. will place TLC for longterm pressors

## 2020-08-24 NOTE — ED ADULT NURSE NOTE - NSIMPLEMENTINTERV_GEN_ALL_ED
Implemented All Fall with Harm Risk Interventions:  [place] to call system. Call bell, personal items and telephone within reach. Instruct patient to call for assistance. Room bathroom lighting operational. Non-slip footwear when patient is off stretcher. Physically safe environment: no spills, clutter or unnecessary equipment. Stretcher in lowest position, wheels locked, appropriate side rails in place. Provide visual cue, wrist band, yellow gown, etc. Monitor gait and stability. Monitor for mental status changes and reorient to person, place, and time. Review medications for side effects contributing to fall risk. Reinforce activity limits and safety measures with patient and family. Provide visual clues: red socks.

## 2020-08-24 NOTE — H&P ADULT - HISTORY OF PRESENT ILLNESS
74y F with PMH of CAD s/p stent, DLD, HTN, B josue lymphoma, recently diagnosed B cell lymphoproliferative disorder (follows Dr. Guardado), on Ibrutinib 140mg q48 presented to the ED with AMS, dehydration, and hematuria x1 day. Majority of the history obtained through daughter. Pt got lab work done today that showed a hemoglobin of 7.2. Pt normally AAOx2-3, but daughter noticed that pt was not acting per baseline today. Patient is  independent at baseline.  noticed blood in the urine x1 episode today. +abdominal discomfort . Normal BM. Daughter noticed dry lips on the pt and says that the last time patient had these symptoms was when she had a UTI a month ago. family also noticed decreased PO intake recently and  increased weakness.  Pt tested negative for COVID a few days ago. Daughter added  that  patient  recent completed  5 day course of bactrim  for recurrent UTI. At bedside the patient  denies fever  chills, abdominal pain,  but notes that she  had 3 ep of diarrhaea but cannot remember the character.

## 2020-08-24 NOTE — H&P ADULT - ASSESSMENT
IMPRESSION:  Sever Sepsis On admission/ Septic Shock  Possible secondary  to Urosepsis/ Sinus Tachycardia   No Evidence  of Pyelonephritis / hydro on CT abdomen   Hypovolemic Hyponatremia   Splenomegaly    HO recurrent UTI's  Hematuria?   Immunocompromised   HO B cell lymphoproliferative disorder On Ibrutinib   HO Normocytic  Anemia   Quit Smoking 6 weeks ago           PLAN:    CNS: Avoid  Sedatives     HEENT: Oral care    PULMONARY:  HOB @ 45 degrees, On RA     CARDIOVASCULAR: I=O avoid antihypertensive Wean levophed as tolerated, Volume repletion     GI: GI prophylaxis.  Feeding     RENAL:  Follow up lytes.  Correct as needed    INFECTIOUS DISEASE: Follow up cultures, f/u COVID PRC, start On meropenem and Vanc f/u Trough    HEMATOLOGICAL:  DVT prophylaxis. SCD's , f/u VA duplex    ENDOCRINE:  Follow up FS.  Insulin protocol if needed.    MUSCULOSKELETAL: Increased ambulation as tolerated       Electrolyte Imbalances: Correct as needed  [x]  Hyponatremia   /   Hypernatremia  []   []  Hypokalemia   /   Hyperkalemia  []   []  Hypochlorhydria   /    Hypochlorhydria  []   []  Hypomagnesemia   /   Hypermagnesemia  []   []  Hypophosphatemia   /   Hyperphosphatemia  []       GI ppx:                                   [x] Not indicated   /   [] Pantoprazole 40mg PO Daily    DVT ppx:  [] Not indicated / [] Heparin 5000mg SubQ / [] Lovenox 40mg SubQ / [x] SCDs    Fluids:   [x] PO  |  [] IVF    Activity:  [X] Assisatnce needed   [X] Increase as Tolerated  /  [] OOB w/ assist  /  [] Bed Rest    BMI:      DISPO:  Patient to be discharged when condition(s) optimized.  [x} From Home     [ ] NH/SNF   [ ] 4A Rehab  [ ] Detox Clinic  [X] Plan Discussion with patient and/or family.  [] Discussed Case and Plan with the Medical Attending.    CODE STATUS  [X] FULL   /    [] DNR IMPRESSION:  Sever Sepsis On admission/ Septic Shock  Possible secondary  to Urosepsis/ Sinus Tachycardia   No Evidence  of Pyelonephritis / hydro on CT abdomen   Hypovolemic Hyponatremia   Splenomegaly    HO recurrent UTI's  Hematuria?   Immunocompromised   HO B cell lymphoproliferative disorder On Ibrutinib   HO Normocytic  Anemia   Quit Smoking 6 weeks ago           PLAN:    CNS: Avoid  Sedatives     HEENT: Oral care    PULMONARY:  HOB @ 45 degrees, On RA     CARDIOVASCULAR: I=O avoid antihypertensive Wean levophed as tolerated, Volume repletion s/p 2L LR,     GI: GI prophylaxis.  Feeding     RENAL:  Follow up lytes.  Correct as needed, f/u BMP AM    INFECTIOUS DISEASE: Follow up cultures, f/u COVID PRC, start On meropenem and Vanc f/u Trough, c/w PPX fluconazole/acyclovir      HEMATOLOGICAL:  DVT prophylaxis. SCD's , f/u VA duplex    ENDOCRINE:  Follow up FS.  Insulin protocol if needed.    MUSCULOSKELETAL: Increased ambulation as tolerated       Electrolyte Imbalances: Correct as needed  [x]  Hyponatremia   /   Hypernatremia  []   []  Hypokalemia   /   Hyperkalemia  []   []  Hypochlorhydria   /    Hypochlorhydria  []   []  Hypomagnesemia   /   Hypermagnesemia  []   []  Hypophosphatemia   /   Hyperphosphatemia  []       GI ppx:                                   [x] Not indicated   /   [] Pantoprazole 40mg PO Daily    DVT ppx:  [] Not indicated / [] Heparin 5000mg SubQ / [] Lovenox 40mg SubQ / [x] SCDs    Fluids:   [x] PO  |  [] IVF    Activity:  [X] Assisatnce needed   [X] Increase as Tolerated  /  [] OOB w/ assist  /  [] Bed Rest    BMI:      DISPO:  Patient to be discharged when condition(s) optimized.  [x} From Home     [ ] NH/SNF   [ ] 4A Rehab  [ ] Detox Clinic  [X] Plan Discussion with patient and/or family.  [] Discussed Case and Plan with the Medical Attending.    CODE STATUS  [X] FULL   /    [] DNR IMPRESSION:  Sever Sepsis On admission/ Septic Shock  Possible secondary  to Urosepsis/ Sinus Tachycardia   No Evidence  of Pyelonephritis / hydro on CT abdomen   Hypovolemic Hyponatremia - Decreased  PO Intake   Splenomegaly    HO recurrent UTI's  Hematuria?   Immunocompromised   HO B cell lymphoproliferative disorder On Ibrutinib   HO Normocytic  Anemia   Quit Smoking 6 weeks ago           PLAN:    CNS: Avoid  Sedatives     HEENT: Oral care    PULMONARY:  HOB @ 45 degrees, On RA     CARDIOVASCULAR: I=O avoid antihypertensive Wean levophed as tolerated, Volume repletion s/p 2L LR,     GI: GI prophylaxis.  Feeding     RENAL:  Follow up lytes.  Correct as needed, f/u BMP AM    INFECTIOUS DISEASE: Follow up cultures, f/u COVID PRC, start On meropenem and Vanc f/u Trough, c/w PPX fluconazole/acyclovir      HEMATOLOGICAL:  DVT prophylaxis. SCD's , f/u VA duplex    ENDOCRINE:  Follow up FS.  Insulin protocol if needed.    MUSCULOSKELETAL: Increased ambulation as tolerated       Electrolyte Imbalances: Correct as needed  [x]  Hyponatremia   /   Hypernatremia  []   []  Hypokalemia   /   Hyperkalemia  []   []  Hypochlorhydria   /    Hypochlorhydria  []   []  Hypomagnesemia   /   Hypermagnesemia  []   []  Hypophosphatemia   /   Hyperphosphatemia  []       GI ppx:                                   [x] Not indicated   /   [] Pantoprazole 40mg PO Daily    DVT ppx:  [] Not indicated / [] Heparin 5000mg SubQ / [] Lovenox 40mg SubQ / [x] SCDs    Fluids:   [x] PO  |  [] IVF    Activity:  [X] Assisatnce needed   [X] Increase as Tolerated  /  [] OOB w/ assist  /  [] Bed Rest    BMI:      DISPO:  Patient to be discharged when condition(s) optimized.  [x} From Home     [ ] NH/SNF   [ ] 4A Rehab  [ ] Detox Clinic  [X] Plan Discussion with patient and/or family.  [] Discussed Case and Plan with the Medical Attending.    CODE STATUS  [X] FULL   /    [] DNR

## 2020-08-24 NOTE — ED PROVIDER NOTE - OBJECTIVE STATEMENT
74y F with PMH of CAD s/p stent, DLD, HTN, B josue lymphoma, recently diagnosed B cell lymphoproliferative disorder (follows Dr. Guardado), on Ibrutinib presented to the ED with AMS, dehydration, and hematuria x1 day. Majority of the history obtained through daughter. Pt got lab work done today that showed a hemoglobin of 7.2. Pt getting chemotherapy for cancer. Pt normally AAOx3, but daughter noticed that pt was not acting per baseline today.  noticed blood in the urine x1 episode today. +abdominal pain. Normal BM. Daughter noticed dry lips on the pt and says that the last time patient had these symptoms was when she had a UTI a month ago. Pt tested negative for COVID a few days ago.

## 2020-08-24 NOTE — ED ADULT NURSE REASSESSMENT NOTE - NS ED NURSE REASSESS COMMENT FT1
Pt. received. Pt. assessed. Pt. alert and responsive to tactile and verbal stimuli. Right triple lumen EJ central line placed. Pt. tolerated procedure well. Will continue to monitor.

## 2020-08-24 NOTE — ASSESSMENT
[FreeTextEntry1] : Hypoproliferative  anemia and massive splenomegaly, flow consistent with B-cell lymphoproliferative disorder CD5 and CD10 negative, consistent with Marginal Zone Lymphoma of the Spleen, involving BM\par --BMBx on 5/20/2020 revealed Marginal Zone Lymphoma \par --S/p 3 units of PRBC inhouse at diagnosis, requiring frequent PRBC transfusions\par --Flow cytometry revealed Monotypic B-cells (18% of cells), positive for kappa, CD19, CD20, FMC-7; negative CD5, CD10, CD23.\par --No B symptoms, no evidence of overt bleeding\par --No over evidence of hemolysis, Keith negative \par --PET CT on 5/8/2020 reveals splenic uptake as well as splenomegaly \par --Unable to tolerate Rituxan\par --Developed thrombocytopenia and rash with 3 and 2 tabs of Ibrutinib, will plan to rechallenge, on 7/15/2020 still unable to restart on Ibrutinib 2/2 thrombocytopenia, which is improving \par --Continue with weekly CBC and type and cross with PRN transfusions \par --Keep Hb over 8.0, platelet count above 50K, patient is on antiplatelet therapy for h/o cardiac stent .\par _ As of 8/12/20 patient received Ibrutinib one tab every other day. \par _ CBC improved .\par increase Ibrutinib to one tab daily. with weekly monitoring CBC.\par --Continue with folic acid PO\par --Acyclovir 400 mg PO daily ppx\par --Fluconazole 200mg BID ppx\par \par UTI\par --Hydration on 8/4/2020\par --Bactrim last dose on 8/19/20. \par \par Follow up in 2 week.\par  Patient seen and case discussed and reviewed with Dr Meneses who agreed for the above plan of care. \par \par

## 2020-08-24 NOTE — H&P ADULT - NSHPPHYSICALEXAM_GEN_ALL_CORE
General: No acute distress.  Alert,  interactive, nonfocal. Mildly confused  elderly female, thin    HEENT: Pupils equal, reactive to light symmetrically.    PULM: Clear to auscultation bilaterally, no significant sputum production.    CVS: Regular rate and rhythm,  holosystolic Murmur herd  best at apex 3/6 , rubs, or gallops.    GI: Soft, nondistended, nontender, no masses. Splenomegaly noted >5cm  below CVA margin    EXT: No edema, nontender.    SKIN: Warm and well perfused, no rashes noted.    PSYCH: AAOX2-3     NEURO:  NOn Focal

## 2020-08-24 NOTE — ED ADULT NURSE NOTE - OBJECTIVE STATEMENT
Pt brought in by daughter for ams and worsening uti. Pt family member noted blood in urine. Pt normally a+ox3. Pt alert and oriented x1 at this time.

## 2020-08-24 NOTE — ED PROVIDER NOTE - NS_BEDUNITTYPES_ED_ALL_ED
[address], 
[id number], [address] [facility] 
[phone] Patient: Benedicto Martino MRN: [id number] :1973 Visit Information Date & Time Provider Department Dept. Phone Encounter #  
 2018 10:20 AM Nicole Yoon MD [facility] [phone] [id number] Upcoming Health Maintenance Date Due Pneumococcal 19-64 Medium Risk (1 of 1 - PPSV23) 1992 DTaP/Tdap/Td series (1 - Tdap) 1994 PAP AKA CERVICAL CYTOLOGY 3/12/2018 MEDICARE YEARLY EXAM 3/20/2018 Influenza Age 5 to Adult 2018 Allergies as of 2018  Review Complete On: 2018 By: Nicole Yoon MD  
 No Known Allergies Current Immunizations  Reviewed on 2014 Name Date Influenza Vaccine (Quad) PF 2014 Not reviewed this visit You Were Diagnosed With   
  
 Codes Comments MS [facility]multiple sclerosis) ([facility]    -  Primary ICD-10-CM: G35 
ICD-9-CM: 086 Chronic bilateral low back pain with bilateral sciatica     ICD-10-CM: M54.42, M54.41, G89.29 ICD-9-CM: 724.2, 724.3, 338.29 Tobacco abuse     ICD-10-CM: Z72.0 ICD-9-CM: 305.1 Reactive depression     ICD-10-CM: F32.9 ICD-9-CM: 300.4 Muscle spasms of both lower extremities     ICD-10-CM: N82.920 ICD-9-CM: 728.85 Vitals BP Pulse Height(growth percentile) Weight(growth percentile) SpO2 BMI  
 122/74 73 5' 4\" (1.626 m) 220 lb (99.8 kg) 96% 37.76 kg/m2 OB Status Smoking Status Having regular periods Current Every Day Smoker BMI and BSA Data Body Mass Index Body Surface Area  
 37.76 kg/m 2 2.12 m 2 Preferred Pharmacy Pharmacy Name Phone [facility] [address] [phone] Your Updated Medication List  
  
   
This list is accurate as of 18 10:50 AM.  Always use your most recent med list.  
  
  
  
 amitriptyline 25 mg tablet Commonly known as:  ELAVIL Take 4 Tabs by mouth nightly. Indications: FIBROMYALGIA  
  
 diazePAM 2 mg tablet Commonly known as:  VALIUM Use 30 minutes prior to MRI  
  
 dimethyl fumarate 240 mg Cpdr  
Commonly known as:  Federica Fudge Take 240 mg by mouth two (2) times a day. DULoxetine 60 mg capsule Commonly known as:  CYMBALTA Take 1 Cap by mouth two (2) times a day. * gabapentin 800 mg tablet Commonly known as:  NEURONTIN Take 2 Tabs by mouth three (3) times daily. * gabapentin 400 mg capsule Commonly known as:  NEURONTIN  
take 4 capsules by mouth three times a day * gabapentin 800 mg tablet Commonly known as:  NEURONTIN Take 2 Tabs by mouth three (3) times daily. tiZANidine 4 mg tablet Commonly known as:  Issa Rodriguez Take one and one half tablet three times a day XANAX 0.5 mg tablet Generic drug:  ALPRAZolam  
Take 0.5 mg by mouth three (3) times daily as needed for Anxiety. zolpidem 10 mg tablet Commonly known as:  AMBIEN  
TAKE ONE TABLET BY MOUTH EVERY NIGHT in the BED AS NEEDED FOR SLEEP * Notice: This list has 3 medication(s) that are the same as other medications prescribed for you. Read the directions carefully, and ask your doctor or other care provider to review them with you. Prescriptions Sent to Pharmacy Refills  
 tiZANidine (ZANAFLEX) 4 mg tablet 2 Sig: Take one and one half tablet three times a day Class: Normal  
 Pharmacy: [facility] Ph #: [phone]  
 gabapentin (NEURONTIN) 800 mg tablet 3 Sig: Take 2 Tabs by mouth three (3) times daily. Class: Normal  
 Pharmacy: [facility] Ph #: [phone] Route: Oral  
  
To-Do List   
 05/29/2018 Imaging:  MRI BRAIN W WO CONT   
  
 05/29/2018 Imaging:  MRI CERV SPINE W WO CONT Patient Instructions Office Policies Phone calls/patient messages: Please allow up to 24 hours for someone in the office to contact you about your call or message. Be mindful your provider may be out of the office or your message may require further review. We encourage you to use Enish for your messages as this is a faster, more efficient way to communicate with our office Medication Refills: 
 
Prescription medications require up to 48 business hours to process. We encourage you to use Enish for your refills. For controlled medications: Please allow up to 72 business hours to process. Certain medications may require you to  a written prescription at our office. NO narcotic/controlled medications will be prescribed after 4pm Monday through Friday or on weekends Form/Paperwork Completion: 
 
Please note there is a $25 fee for all paperwork completed by our providers. We ask that you allow 7-14 business days. Pre-payment is due prior to picking up/faxing the completed form. You may also download your forms to Enish to have your doctor print off. A Healthy Lifestyle: Care Instructions Your Care Instructions A healthy lifestyle can help you feel good, stay at a healthy weight, and have plenty of energy for both work and play. A healthy lifestyle is something you can share with your whole family. A healthy lifestyle also can lower your risk for serious health problems, such as high blood pressure, heart disease, and diabetes. You can follow a few steps listed below to improve your health and the health of your family. Follow-up care is a key part of your treatment and safety. Be sure to make and go to all appointments, and call your doctor if you are having problems. It's also a good idea to know your test results and keep a list of the medicines you take. How can you care for yourself at home? · Do not eat too much sugar, fat, or fast foods.  You can still have dessert and treats now and then. The goal is moderation. · Start small to improve your eating habits. Pay attention to portion sizes, drink less juice and soda pop, and eat more fruits and vegetables. ¨ Eat a healthy amount of food. A 3-ounce serving of meat, for example, is about the size of a deck of cards. Fill the rest of your plate with vegetables and whole grains. ¨ Limit the amount of soda and sports drinks you have every day. Drink more water when you are thirsty. ¨ Eat at least 5 servings of fruits and vegetables every day. It may seem like a lot, but it is not hard to reach this goal. A serving or helping is 1 piece of fruit, 1 cup of vegetables, or 2 cups of leafy, raw vegetables. Have an apple or some carrot sticks as an afternoon snack instead of a candy bar. Try to have fruits and/or vegetables at every meal. 
· Make exercise part of your daily routine. You may want to start with simple activities, such as walking, bicycling, or slow swimming. Try to be active 30 to 60 minutes every day. You do not need to do all 30 to 60 minutes all at once. For example, you can exercise 3 times a day for 10 or 20 minutes. Moderate exercise is safe for most people, but it is always a good idea to talk to your doctor before starting an exercise program. 
· Keep moving. Madie Goel the lawn, work in the garden, or Ridemakerz. Take the stairs instead of the elevator at work. · If you smoke, quit. People who smoke have an increased risk for heart attack, stroke, cancer, and other lung illnesses. Quitting is hard, but there are ways to boost your chance of quitting tobacco for good. ¨ Use nicotine gum, patches, or lozenges. ¨ Ask your doctor about stop-smoking programs and medicines. ¨ Keep trying.  
In addition to reducing your risk of diseases in the future, you will notice some benefits soon after you stop using tobacco. If you have shortness of breath or asthma symptoms, they will likely get better within a few weeks after you quit. · Limit how much alcohol you drink. Moderate amounts of alcohol (up to 2 drinks a day for men, 1 drink a day for women) are okay. But drinking too much can lead to liver problems, high blood pressure, and other health problems. Family health If you have a family, there are many things you can do together to improve your health. · Eat meals together as a family as often as possible. · Eat healthy foods. This includes fruits, vegetables, lean meats and dairy, and whole grains. · Include your family in your fitness plan. Most people think of activities such as jogging or tennis as the way to fitness, but there are many ways you and your family can be more active. Anything that makes you breathe hard and gets your heart pumping is exercise. Here are some tips: 
¨ Walk to do errands or to take your child to school or the bus. ¨ Go for a family bike ride after dinner instead of watching TV. Where can you learn more? Go to http://damaris-eloise.info/. Enter H846 in the search box to learn more about \"A Healthy Lifestyle: Care Instructions. \" Current as of: May 12, 2017 Content Version: 11.4 © 5187-0400 [employer]. Care instructions adapted under license by [employer] (which disclaims liability or warranty for this information). If you have questions about a medical condition or this instruction, always ask your healthcare professional. [place] [address] any warranty or liability for your use of this information. Patient Instructions History Introducing [facility]! Dear Robel Fragoso: Thank you for requesting a MobGold account. Our records indicate that you already have an active MobGold account. You can access your account anytime at [email or web address] Did you know that you can access your hospital and ER discharge instructions at any time in MobGold?   You can also review all of your test results from your hospital stay or ER visit. Additional Information If you have questions, please visit the Frequently Asked Questions section of the iBid2Save website at [email or web address] Remember, iBid2Save is NOT to be used for urgent needs. For medical emergencies, dial 911. Now available from your iPhone and Android! Please provide this summary of care documentation to your next provider. Your primary care clinician is listed as Trish Dc. If you have any questions after today's visit, please call [phone]. ICU

## 2020-08-24 NOTE — ED PROVIDER NOTE - ATTENDING CONTRIBUTION TO CARE
74F PMH CAD stent HTN HL B cell lymphoma/splenomegaly on chemo, p/w 1 day gen weakness, AMS. pt too confused to provide hx. daughter phillip in waiting room providing hx. daughter didn't know she had fever. no recent falls. no cough, uri. no cp, sob. no abd pain, nvdc. no dysuria, freq, hematuria. no ha, numbness, weakness. hgb 7.2 this morning, chronic anemia w freq transfusions. onc sylvia. recently treated for UTI w cipro and then switched to bactrim after culture came back. hx freq utis.     on exam, FVSS, well harshil nad, ncat, eomi, perrla, mmm, lctab, rrr nl s1s2 no mrg, abd soft ntnd, aaox2 (name and place), no focal deficits, no le edema or calf ttp,     a/p; Fever, ams, recent uti. suspect UTI.  will do labs, cultures, lactate, ua, cxr, covid testing, CT a/p, transfuse prn, IVF, broad spectrum IV abx. admit,

## 2020-08-24 NOTE — H&P ADULT - NSHPSOCIALHISTORY_GEN_ALL_CORE
Quit Smoking 5cigarets per day  6 weeks ago  Denies ETOH  Use  Denies  Illicit drug Use  Retired  [employer]

## 2020-08-24 NOTE — ED PROVIDER NOTE - PHYSICAL EXAMINATION
CONSTITUTIONAL: Well-developed; well-nourished; in no acute distress.   SKIN: warm to touch  HEAD: Normocephalic; atraumatic.  EYES: no conjunctival injection. PERRL.   ENT: No nasal discharge; airway clear.  NECK: Supple; non tender.  CARD:Tachycardic  RESP: +wheezing to Left lower lobe  ABD: soft, diffusely tender, diffusely distended  EXT: Normal ROM.  No clubbing, cyanosis or edema.   LYMPH: No acute cervical adenopathy.  NEURO: AAOx1, not following command  PSYCH: Cooperative

## 2020-08-24 NOTE — REVIEW OF SYSTEMS
[Fatigue] : fatigue [Joint Pain] : joint pain [Constipation] : constipation [Negative] : Heme/Lymph [Fever] : no fever [Chills] : no chills [Night Sweats] : no night sweats [Recent Change In Weight] : ~T no recent weight change

## 2020-08-24 NOTE — PHYSICAL EXAM
[Capable of only limited self care, confined to bed or chair more than 50% of waking hours] : Status 3- Capable of only limited self care, confined to bed or chair more than 50% of waking hours [Normal] : grossly intact [de-identified] : enlarged spleen  [de-identified] : Macular papular rash under L breast.  Diffuse eythematous rash to left groin area ~10" x 5"

## 2020-08-25 NOTE — CONSULT NOTE ADULT - ASSESSMENT
IMPRESSION:    Sepsis present on admission  Septic shock  HO CLL     PLAN:    CNS:  No depressants     HEENT: Oral care    PULMONARY:  HOB @ 45 degrees.  Aspiration precautions     CARDIOVASCULAR:  Wean Levophed.  Continue NS    GI: GI prophylaxis.  Feeding.      RENAL:  Follow up lytes.  Correct as needed    INFECTIOUS DISEASE: Follow up cultures.  DC Vanc.  Continue Meropenem .  Fu cultures     HEMATOLOGICAL:  DVT prophylaxis.     ENDOCRINE:  Follow up FS.      MUSCULOSKELETAL:  OOB to chair     KEITH Murry DC TLC once off pressors

## 2020-08-25 NOTE — CONSULT NOTE ADULT - ASSESSMENT
Patient is a 75 y/o F with PMH of marginal zone lymphoma, recurrent UTI, CAD s/p stent, DLD, HTN  admitted for sepsis due to suspected UTI.    #) Sepsis: suspected source is UTI, UA showed negative nitrite but moderate bacteria and leuk esterase +; CT AP negative  - C/w meropenem  - F/u cultures  - Wean levophed as tolerated    #) Marginal zone lymphoma with anemia and thrombocytopenia/splenomegaly: Flow cytometry revealed Monotypic B cells (18% of cells), positive for Kappa, CD19, CD20, FMC-7, negative for CD5, CD10, and CD23. 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 Atypical cells were negative for cyclin D1, molecular studies negative MYD88, L265P and BRAF V600 mutations. ANNEXIN1 seemed to be positive on T Cells and myeloid cells. The negative BRAF V600 makes hairy cell leukemia less likely. BCL2 was negative.  - PET CT as of May 2020 showing diffuse FDG uptake in an enlarged spleen   - Patient with almost weekly transfusions of pRBCs as outpatient with goal Hb >7  - Patient started on Ibrutinib 3 tabs daily on 6/17/2020, dose reduced and eventually held due to cytopenias and infection  - Ibrutinib restarted 8/12 QOD  - 8/19 ibrutinib changed to QD  - Currently holding ibrutinib    #) Elevated INR: Elevated to 1.77  - Consider PO Vit K 2.5mg     #) Thrombocytopenia: Improved from previous DC  - Likely due to infection vs abx Patient is a 75 y/o F with PMH of marginal zone lymphoma, recurrent UTI, CAD s/p stent, DLD, HTN  admitted for sepsis due to suspected UTI.    #) Sepsis: suspected source is UTI, UA showed negative nitrite but moderate bacteria and leuk esterase +; CT AP negative  - C/w meropenem  - F/u cultures  - Wean levophed as tolerated    #) Marginal zone lymphoma with anemia and thrombocytopenia/splenomegaly: Flow cytometry revealed Monotypic B cells (18% of cells), positive for Kappa, CD19, CD20, FMC-7, negative for CD5, CD10, and CD23. 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 Atypical cells were negative for cyclin D1, molecular studies negative MYD88, L265P and BRAF V600 mutations. ANNEXIN1 seemed to be positive on T Cells and myeloid cells. The negative BRAF V600 makes hairy cell leukemia less likely. BCL2 was negative.  - PET CT as of May 2020 showing diffuse FDG uptake in an enlarged spleen   - Patient with almost weekly transfusions of pRBCs as outpatient with goal Hb >7  - Patient started on Ibrutinib 3 tabs daily on 6/17/2020, dose reduced and eventually held due to cytopenias and infection  - Ibrutinib restarted 8/12 QOD  - 8/19 ibrutinib changed to QD  - Currently holding ibrutinib  - F/u BCx, UCx    #) Elevated INR: Elevated to 1.77  	    #) Thrombocytopenia: Improved from previous DC  - Likely due to infection vs abx Patient is a 75 y/o F with PMH of marginal zone lymphoma, recurrent UTI, CAD s/p stent, DLD, HTN  admitted for sepsis due to suspected UTI.    #) Sepsis: suspected source is UTI, UA showed negative nitrite but moderate bacteria and leuk esterase +; CT AP negative; patient recently treated with cipro and then Bactrim  - C/w meropenem  - F/u BCx, UCx  - Wean levophed as tolerated    #) Marginal zone lymphoma with anemia and thrombocytopenia/splenomegaly: Flow cytometry revealed Monotypic B cells (18% of cells), positive for Kappa, CD19, CD20, FMC-7, negative for CD5, CD10, and CD23. 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 Atypical cells were negative for cyclin D1, molecular studies negative MYD88, L265P and BRAF V600 mutations. ANNEXIN1 seemed to be positive on T Cells and myeloid cells. The negative BRAF V600 makes hairy cell leukemia less likely. BCL2 was negative.  - PET CT as of May 2020 showing diffuse FDG uptake in an enlarged spleen   - Patient with almost weekly transfusions of pRBCs as outpatient with goal Hb >7  - Patient started on Ibrutinib 3 tabs daily on 6/17/2020, dose reduced and eventually held due to cytopenias and infection  - Unclear if patient restarted ibrutinib on 8/12 QOD and then QD on 8/19  - Hold ibrutinib for now    #) Elevated INR: Elevated to 1.77  - Vitamin K 2.5mg	    #) Anemia: Patient had been getting transfusions nearly weekly until recently  - Consider 1 unit pRBC transfusion

## 2020-08-25 NOTE — MEDICAL STUDENT PROGRESS NOTE(EDUCATION) - NS MD HP STUD ASPLAN PLAN FT
# Septic shock  - Tachy but normotensive, on levo 0.05  - Wean levo, d/c TLC when off pressors  - Fu Cx, d/c vanc  - c/w shantal  - c/w NS  - F/u lytes, correct as needed  - d/c roblero    # H/o B cell CLL  - Heme/onc consulted - fu reccs  - Fluconazole ppx     #Diet: DASH  #DVT ppx: ASA  OOB to chair

## 2020-08-25 NOTE — CONSULT NOTE ADULT - SUBJECTIVE AND OBJECTIVE BOX
Patient is a 74y old  Female who presents with a chief complaint of Weakness, Abdominal Discomfort and   blood in urine (25 Aug 2020 07:46)      HPI:  74y F with PMH of CAD s/p stent, DLD, HTN, B josue lymphoma, recently diagnosed B cell lymphoproliferative disorder (follows Dr. Guardado), on Ibrutinib 140mg q48 presented to the ED with AMS, dehydration, and hematuria x1 day. Majority of the history obtained through daughter. Pt got lab work done today that showed a hemoglobin of 7.2. Pt normally AAOx2-3, but daughter noticed that pt was not acting per baseline today. Patient is  independent at baseline.  noticed blood in the urine x1 episode today. +abdominal discomfort . Normal BM. Daughter noticed dry lips on the pt and says that the last time patient had these symptoms was when she had a UTI a month ago. family also noticed decreased PO intake recently and  increased weakness.  Pt tested negative for COVID a few days ago. Daughter added  that  patient  recent completed  5 day course of bactrim  for recurrent UTI. At bedside the patient  denies fever  chills, abdominal pain,  but notes that she  had 3 ep of diarrhaea but cannot remember the character. (24 Aug 2020 23:49)     Oncologic Hx:  Initial Bone Marroy biopsy was done on 5/20/20 and Flow cytometry revealed Monotypic B cells (18% of cells), positive for Kappa, CD19, CD20, FMC-7, negative for CD5, CD10, and CD23. 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 Atypical cells were negative for cyclin D1, molecular studies negative MYD88, L265P and BRAF V600 mutations. ANNEXIN1 seemed to be positive on T Cells and myeloid cells. The negative BRAF V600 makes hairy cell leukemia less likely. BCL2 was negative.    PET CT done in May 2020 showed evidence of diffuse FDG uptake in an enlarged spleen and mild FDG uptake at the left fourth rib at the costochondral junction with no CT correlate.  Rituxan attempted in June 2020 but developed a severe allergic reaction. Given her severity of reaction, patient was instead started on Ibrutinib 3 tabs daily on 6/17/2020. However by 6/20/2020 she began to developed mild thrombocytopenia and anemia. Ibrutinib was subsequently decreased to 2 pills per day but patient on follow up had a rash to L breast and L groin so plan was to hold pill until 7/6/2020. If rash resolved, Ibrutinib could be restarted at 1 pill daily.         ROS:  Negative except for:    PAST MEDICAL & SURGICAL HISTORY:  Anemia  DM (diabetes mellitus)  High cholesterol  HTN (hypertension)  B-cell chronic lymphocytic leukemia variant  CAD (coronary artery disease): s/p stenting  H/O heart artery stent      SOCIAL HISTORY:    FAMILY HISTORY:  Family history of diabetes mellitus (DM)      MEDICATIONS  (STANDING):  acyclovir   Oral Tab/Cap 400 milliGRAM(s) Oral daily  aspirin  chewable 81 milliGRAM(s) Oral daily  atorvastatin 80 milliGRAM(s) Oral at bedtime  chlorhexidine 4% Liquid 1 Application(s) Topical <User Schedule>  fluconAZOLE   Tablet 200 milliGRAM(s) Oral daily  folic acid 1 milliGRAM(s) Oral daily  meropenem  IVPB 1000 milliGRAM(s) IV Intermittent every 8 hours  norepinephrine Infusion 0.05 MICROgram(s)/kG/Min (4.26 mL/Hr) IV Continuous <Continuous>  sodium chloride 0.9%. 1000 milliLiter(s) (75 mL/Hr) IV Continuous <Continuous>    MEDICATIONS  (PRN):    Height (cm): 162.6 (08-25-20 @ 01:30)  Weight (kg): 56.2 (08-25-20 @ 01:30)  BMI (kg/m2): 21.3 (08-25-20 @ 01:30)  BSA (m2): 1.6 (08-25-20 @ 01:30)  Allergies    penicillin (Anaphylaxis; Hives)    Intolerances        Vital Signs Last 24 Hrs  T(C): 37.2 (25 Aug 2020 08:00), Max: 38.6 (24 Aug 2020 18:03)  T(F): 98.9 (25 Aug 2020 08:00), Max: 101.5 (24 Aug 2020 18:03)  HR: 110 (25 Aug 2020 10:15) (88 - 140)  BP: 107/50 (25 Aug 2020 10:15) (78/40 - 136/46)  BP(mean): 63 (25 Aug 2020 10:15) (47 - 97)  RR: 23 (25 Aug 2020 10:15) (15 - 23)  SpO2: 97% (25 Aug 2020 10:15) (73% - 100%)    PHYSICAL EXAM  General: NAD  HEENT: NCAT, EOMI  Neck: supple  CV: normal S1/S2 , RRR  Lungs: Clear to ascultation bilaterally, no respiratory distress  Abdomen: soft non-tender non-distended  Ext: No edema  Skin: no rashes and no petechiae  Neuro: alert and oriented X 4, no focal deficits      LABS:                          7.2    6.29  )-----------( 114      ( 25 Aug 2020 05:04 )             20.6         Mean Cell Volume : 88.4 fL  Mean Cell Hemoglobin : 30.9 pg  Mean Cell Hemoglobin Concentration : 35.0 g/dL  Auto Neutrophil # : 1.65 K/uL  Auto Lymphocyte # : 3.80 K/uL  Auto Monocyte # : 0.71 K/uL  Auto Eosinophil # : 0.04 K/uL  Auto Basophil # : 0.03 K/uL  Auto Neutrophil % : 26.2 %  Auto Lymphocyte % : 60.4 %  Auto Monocyte % : 11.3 %  Auto Eosinophil % : 0.6 %  Auto Basophil % : 0.5 %      Serial CBC's  08-25 @ 05:04  Hct-20.6 / Hgb-7.2 / Plat-114 / RBC-2.33 / WBC-6.29  Serial CBC's  08-24 @ 17:49  Hct-24.2 / Hgb-8.1 / Plat-142 / RBC-2.69 / WBC-6.08      08-25    127<L>  |  96<L>  |  25<H>  ----------------------------<  135<H>  4.3   |  23  |  <0.5<L>    Ca    8.5      25 Aug 2020 05:04  Phos  4.9     08-25    TPro  4.5<L>  /  Alb  3.2<L>  /  TBili  1.3<H>  /  DBili  x   /  AST  34  /  ALT  16  /  AlkPhos  193<H>  08-25      PT/INR - ( 24 Aug 2020 17:49 )   PT: 20.40 sec;   INR: 1.77 ratio         PTT - ( 24 Aug 2020 17:49 )  PTT:39.4 sec                BLOOD SMEAR INTERPRETATION:       RADIOLOGY & ADDITIONAL STUDIES: Patient is a 74y old  Female who presents with a chief complaint of Weakness, Abdominal Discomfort and   blood in urine (25 Aug 2020 07:46)      HPI:  74y F with PMH of CAD s/p stent, DLD, HTN, B josue lymphoma, recently diagnosed B cell lymphoproliferative disorder (follows Dr. Guardado), on Ibrutinib 140mg q48 presented to the ED with AMS, dehydration, and hematuria x1 day. Majority of the history obtained through daughter. Pt got lab work done today that showed a hemoglobin of 7.2. Pt normally AAOx2-3, but daughter noticed that pt was not acting per baseline today. Patient is  independent at baseline.  noticed blood in the urine x1 episode today. +abdominal discomfort . Normal BM. Daughter noticed dry lips on the pt and says that the last time patient had these symptoms was when she had a UTI a month ago. family also noticed decreased PO intake recently and  increased weakness.  Pt tested negative for COVID a few days ago. Daughter added  that  patient  recent completed  5 day course of bactrim  for recurrent UTI. At bedside the patient  denies fever  chills, abdominal pain,  but notes that she  had 3 ep of diarrhaea but cannot remember the character. (24 Aug 2020 23:49)     Oncologic Hx:  Initial Bone Marroy biopsy was done on 5/20/20 and Flow cytometry revealed Monotypic B cells (18% of cells), positive for Kappa, CD19, CD20, FMC-7, negative for CD5, CD10, and CD23. 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 Atypical cells were negative for cyclin D1, molecular studies negative MYD88, L265P and BRAF V600 mutations. ANNEXIN1 seemed to be positive on T Cells and myeloid cells. The negative BRAF V600 makes hairy cell leukemia less likely. BCL2 was negative.    PET CT done in May 2020 showed evidence of diffuse FDG uptake in an enlarged spleen and mild FDG uptake at the left fourth rib at the costochondral junction with no CT correlate.  Rituxan attempted in June 2020 but developed a severe allergic reaction. Given her severity of reaction, patient was instead started on Ibrutinib 3 tabs daily on 6/17/2020. However by 6/20/2020 she began to developed mild thrombocytopenia and anemia. Ibrutinib was subsequently decreased to 2 pills per day but she then developed a rash and worsening cytopenias requiring nearly weekly PRBC infusion, additionally she had a fall and was admitted requiring plateles and PRBCs. Ibrutinin was held until 8/12 when it was restarted QOD, and then QD on 8/19.     ROS:  Negative except for:    PAST MEDICAL & SURGICAL HISTORY:  Anemia  DM (diabetes mellitus)  High cholesterol  HTN (hypertension)  B-cell chronic lymphocytic leukemia variant  CAD (coronary artery disease): s/p stenting  H/O heart artery stent      SOCIAL HISTORY:    FAMILY HISTORY:  Family history of diabetes mellitus (DM)      MEDICATIONS  (STANDING):  acyclovir   Oral Tab/Cap 400 milliGRAM(s) Oral daily  aspirin  chewable 81 milliGRAM(s) Oral daily  atorvastatin 80 milliGRAM(s) Oral at bedtime  chlorhexidine 4% Liquid 1 Application(s) Topical <User Schedule>  fluconAZOLE   Tablet 200 milliGRAM(s) Oral daily  folic acid 1 milliGRAM(s) Oral daily  meropenem  IVPB 1000 milliGRAM(s) IV Intermittent every 8 hours  norepinephrine Infusion 0.05 MICROgram(s)/kG/Min (4.26 mL/Hr) IV Continuous <Continuous>  sodium chloride 0.9%. 1000 milliLiter(s) (75 mL/Hr) IV Continuous <Continuous>    MEDICATIONS  (PRN):    Height (cm): 162.6 (08-25-20 @ 01:30)  Weight (kg): 56.2 (08-25-20 @ 01:30)  BMI (kg/m2): 21.3 (08-25-20 @ 01:30)  BSA (m2): 1.6 (08-25-20 @ 01:30)  Allergies    penicillin (Anaphylaxis; Hives)    Intolerances        Vital Signs Last 24 Hrs  T(C): 37.2 (25 Aug 2020 08:00), Max: 38.6 (24 Aug 2020 18:03)  T(F): 98.9 (25 Aug 2020 08:00), Max: 101.5 (24 Aug 2020 18:03)  HR: 110 (25 Aug 2020 10:15) (88 - 140)  BP: 107/50 (25 Aug 2020 10:15) (78/40 - 136/46)  BP(mean): 63 (25 Aug 2020 10:15) (47 - 97)  RR: 23 (25 Aug 2020 10:15) (15 - 23)  SpO2: 97% (25 Aug 2020 10:15) (73% - 100%)    PHYSICAL EXAM  General: NAD  HEENT: NCAT, EOMI  Neck: supple  CV: normal S1/S2 , RRR  Lungs: Clear to ascultation bilaterally, no respiratory distress  Abdomen: soft non-tender non-distended  Ext: No edema  Skin: no rashes and no petechiae  Neuro: alert and oriented, no focal deficits      LABS:                          7.2    6.29  )-----------( 114      ( 25 Aug 2020 05:04 )             20.6         Mean Cell Volume : 88.4 fL  Mean Cell Hemoglobin : 30.9 pg  Mean Cell Hemoglobin Concentration : 35.0 g/dL  Auto Neutrophil # : 1.65 K/uL  Auto Lymphocyte # : 3.80 K/uL  Auto Monocyte # : 0.71 K/uL  Auto Eosinophil # : 0.04 K/uL  Auto Basophil # : 0.03 K/uL  Auto Neutrophil % : 26.2 %  Auto Lymphocyte % : 60.4 %  Auto Monocyte % : 11.3 %  Auto Eosinophil % : 0.6 %  Auto Basophil % : 0.5 %      Serial CBC's  08-25 @ 05:04  Hct-20.6 / Hgb-7.2 / Plat-114 / RBC-2.33 / WBC-6.29  Serial CBC's  08-24 @ 17:49  Hct-24.2 / Hgb-8.1 / Plat-142 / RBC-2.69 / WBC-6.08      08-25    127<L>  |  96<L>  |  25<H>  ----------------------------<  135<H>  4.3   |  23  |  <0.5<L>    Ca    8.5      25 Aug 2020 05:04  Phos  4.9     08-25    TPro  4.5<L>  /  Alb  3.2<L>  /  TBili  1.3<H>  /  DBili  x   /  AST  34  /  ALT  16  /  AlkPhos  193<H>  08-25      PT/INR - ( 24 Aug 2020 17:49 )   PT: 20.40 sec;   INR: 1.77 ratio         PTT - ( 24 Aug 2020 17:49 )  PTT:39.4 sec                BLOOD SMEAR INTERPRETATION:       RADIOLOGY & ADDITIONAL STUDIES: Patient is a 74y old  Female who presents with a chief complaint of Weakness, Abdominal Discomfort and   blood in urine (25 Aug 2020 07:46)      HPI:  74y F with PMH of CAD s/p stent, DLD, HTN, B josue lymphoma, recently diagnosed B cell lymphoproliferative disorder (follows Dr. Guardado), on Ibrutinib 140mg q48 presented to the ED with AMS, dehydration, and hematuria x1 day. Majority of the history obtained through daughter. Pt got lab work done today that showed a hemoglobin of 7.2. Pt normally AAOx2-3, but daughter noticed that pt was not acting per baseline today. Patient is  independent at baseline.  noticed blood in the urine x1 episode today. +abdominal discomfort . Normal BM. Daughter noticed dry lips on the pt and says that the last time patient had these symptoms was when she had a UTI a month ago. family also noticed decreased PO intake recently and  increased weakness.  Pt tested negative for COVID a few days ago. Daughter added  that  patient  recent completed  5 day course of bactrim  for recurrent UTI. At bedside the patient  denies fever  chills, abdominal pain,  but notes that she  had 3 ep of diarrhaea but cannot remember the character. (24 Aug 2020 23:49)     Oncologic Hx:  Initial Bone Marroy biopsy was done on 5/20/20 and Flow cytometry revealed Monotypic B cells (18% of cells), positive for Kappa, CD19, CD20, FMC-7, negative for CD5, CD10, and CD23. 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 Atypical cells were negative for cyclin D1, molecular studies negative MYD88, L265P and BRAF V600 mutations. ANNEXIN1 seemed to be positive on T Cells and myeloid cells. The negative BRAF V600 makes hairy cell leukemia less likely. BCL2 was negative.    PET CT done in May 2020 showed evidence of diffuse FDG uptake in an enlarged spleen and mild FDG uptake at the left fourth rib at the costochondral junction with no CT correlate.  Rituxan attempted in June 2020 but developed a severe allergic reaction. Given her severity of reaction, patient was instead started on Ibrutinib 3 tabs daily on 6/17/2020. However by 6/20/2020 she began to developed mild thrombocytopenia and anemia. Ibrutinib was subsequently decreased to 2 pills per day but she then developed a rash and worsening cytopenias requiring nearly weekly PRBC infusion, additionally she had a fall and was admitted requiring plateles and PRBCs. Patient may have resumed ibrutinib QOD beginning 8/12 and then QD on 8/19, however unclear at this time.        PAST MEDICAL & SURGICAL HISTORY:  Anemia  DM (diabetes mellitus)  High cholesterol  HTN (hypertension)  B-cell chronic lymphocytic leukemia variant  CAD (coronary artery disease): s/p stenting  H/O heart artery stent      SOCIAL HISTORY:    FAMILY HISTORY:  Family history of diabetes mellitus (DM)      MEDICATIONS  (STANDING):  acyclovir   Oral Tab/Cap 400 milliGRAM(s) Oral daily  aspirin  chewable 81 milliGRAM(s) Oral daily  atorvastatin 80 milliGRAM(s) Oral at bedtime  chlorhexidine 4% Liquid 1 Application(s) Topical <User Schedule>  fluconAZOLE   Tablet 200 milliGRAM(s) Oral daily  folic acid 1 milliGRAM(s) Oral daily  meropenem  IVPB 1000 milliGRAM(s) IV Intermittent every 8 hours  norepinephrine Infusion 0.05 MICROgram(s)/kG/Min (4.26 mL/Hr) IV Continuous <Continuous>  sodium chloride 0.9%. 1000 milliLiter(s) (75 mL/Hr) IV Continuous <Continuous>    MEDICATIONS  (PRN):    Height (cm): 162.6 (08-25-20 @ 01:30)  Weight (kg): 56.2 (08-25-20 @ 01:30)  BMI (kg/m2): 21.3 (08-25-20 @ 01:30)  BSA (m2): 1.6 (08-25-20 @ 01:30)  Allergies    penicillin (Anaphylaxis; Hives)    Intolerances        Vital Signs Last 24 Hrs  T(C): 37.2 (25 Aug 2020 08:00), Max: 38.6 (24 Aug 2020 18:03)  T(F): 98.9 (25 Aug 2020 08:00), Max: 101.5 (24 Aug 2020 18:03)  HR: 110 (25 Aug 2020 10:15) (88 - 140)  BP: 107/50 (25 Aug 2020 10:15) (78/40 - 136/46)  BP(mean): 63 (25 Aug 2020 10:15) (47 - 97)  RR: 23 (25 Aug 2020 10:15) (15 - 23)  SpO2: 97% (25 Aug 2020 10:15) (73% - 100%)    PHYSICAL EXAM  General: NAD  HEENT: NCAT, EOMI  Neck: supple  CV: normal S1/S2 , RRR  Lungs: Clear to ascultation bilaterally, no respiratory distress  Abdomen: soft non-tender non-distended  Ext: No edema  Skin: no rashes and no petechiae  Neuro: alert and oriented, no focal deficits      LABS:                          7.2    6.29  )-----------( 114      ( 25 Aug 2020 05:04 )             20.6         Mean Cell Volume : 88.4 fL  Mean Cell Hemoglobin : 30.9 pg  Mean Cell Hemoglobin Concentration : 35.0 g/dL  Auto Neutrophil # : 1.65 K/uL  Auto Lymphocyte # : 3.80 K/uL  Auto Monocyte # : 0.71 K/uL  Auto Eosinophil # : 0.04 K/uL  Auto Basophil # : 0.03 K/uL  Auto Neutrophil % : 26.2 %  Auto Lymphocyte % : 60.4 %  Auto Monocyte % : 11.3 %  Auto Eosinophil % : 0.6 %  Auto Basophil % : 0.5 %      Serial CBC's  08-25 @ 05:04  Hct-20.6 / Hgb-7.2 / Plat-114 / RBC-2.33 / WBC-6.29  Serial CBC's  08-24 @ 17:49  Hct-24.2 / Hgb-8.1 / Plat-142 / RBC-2.69 / WBC-6.08      08-25    127<L>  |  96<L>  |  25<H>  ----------------------------<  135<H>  4.3   |  23  |  <0.5<L>    Ca    8.5      25 Aug 2020 05:04  Phos  4.9     08-25    TPro  4.5<L>  /  Alb  3.2<L>  /  TBili  1.3<H>  /  DBili  x   /  AST  34  /  ALT  16  /  AlkPhos  193<H>  08-25      PT/INR - ( 24 Aug 2020 17:49 )   PT: 20.40 sec;   INR: 1.77 ratio         PTT - ( 24 Aug 2020 17:49 )  PTT:39.4 sec                BLOOD SMEAR INTERPRETATION:       RADIOLOGY & ADDITIONAL STUDIES:

## 2020-08-25 NOTE — PROGRESS NOTE ADULT - ASSESSMENT
73 y/o F w PMHx of B cell lymphoproliferative disorder on Imbrutinib, DM, HTN, CAD s/p stent p/w AMS, dehydration, & hematuria x1 day admitted for septic shock due to UTI    # Septic shock s/p UTI  - CT Abd Pel and CT Head unremarkable  - UA +ve for Leuk Est and WBCs  - Tachy but normotensive, on levo 0.05  - Wean levo, d/c TLC when off pressors  - Fu Cx, d/c vanc  - c/w shantal  - c/w NS at 75 cc/hr  - F/u lytes, correct as needed  - d/c roblero  - LE Duplex negative  - Lactate improved 3.2 to 1.6 today  - Hb 7.2 today, f/u cbc    # H/o B cell CLL  - Heme/onc consulted - fu recs  - Fluconazole ppx   - Acyclovir ppx    #H/o CAD s/p stent  - Plavix 75 mg qd    #Diet: DASH  #DVT ppx: ASA  Dispo: Possible downgrade today  OOB to chair

## 2020-08-25 NOTE — CONSULT NOTE ADULT - ATTENDING COMMENTS
Kathrine was admitted from home with AMS, diagnosed with UTI, currently in ICU requiring pressor support, no intubated, MS currently at baseline.   Hold ibrutinib. Continue with full supportive care, Keep Hb closer to 8.   Mgt per ICU, will follow.

## 2020-08-25 NOTE — MEDICAL STUDENT PROGRESS NOTE(EDUCATION) - SUBJECTIVE AND OBJECTIVE BOX
AD BASURTO, female, 74y ([birth year]),   MRN-[id number]  Admit Date: 20 (1d)    Chief Complaint:  Patient is a 74y old  Female who presents with a chief complaint of Weakness, Abdominal Discomfort and   blood in urine (25 Aug 2020 10:37)      Past Medical and Surgical History:  PAST MEDICAL & SURGICAL HISTORY:  Anemia  DM (diabetes mellitus)  High cholesterol  HTN (hypertension)  B-cell chronic lymphocytic leukemia variant  CAD (coronary artery disease): s/p stenting  H/O heart artery stent      Current Medications:  MEDICATIONS  (STANDING):  acyclovir   Oral Tab/Cap 400 milliGRAM(s) Oral daily  aspirin  chewable 81 milliGRAM(s) Oral daily  atorvastatin 80 milliGRAM(s) Oral at bedtime  chlorhexidine 4% Liquid 1 Application(s) Topical <User Schedule>  fluconAZOLE   Tablet 200 milliGRAM(s) Oral daily  folic acid 1 milliGRAM(s) Oral daily  meropenem  IVPB 1000 milliGRAM(s) IV Intermittent every 8 hours  norepinephrine Infusion 0.05 MICROgram(s)/kG/Min (4.26 mL/Hr) IV Continuous <Continuous>  sodium chloride 0.9%. 1000 milliLiter(s) (75 mL/Hr) IV Continuous <Continuous>    MEDICATIONS  (PRN):      Interval History:  No acute events overnight. No complaints at this time.    Vital Signs:  T(F): 98.9 (20 @ 08:00), Max: 101.5 (20 @ 18:03)  HR: 110 (20 @ 10:15) (88 - 140)  BP: 107/50 (20 @ 10:15) (78/40 - 136/46)  RR: 23 (20 @ 10:15) (15 - 23)  SpO2: 97% (20 @ 10:15) (73% - 100%)  CAPILLARY BLOOD GLUCOSE      POCT Blood Glucose.: 210 mg/dL (25 Aug 2020 09:25)  POCT Blood Glucose.: 147 mg/dL (25 Aug 2020 03:20)      Physical Exam:  General: Not in distress.   HEENT: Moist mucus membranes. PERRLA.  Cardio: Tachycardic 110. Regular rhythm, S1, S2, no murmur, rub, or gallop.  Pulm: Clear to auscultation bilaterally. No wheezing, rales, or rhonchi.  Abdomen: Splenomegaly. Lower midline tenderness. Soft, non-distended. Normoactive bowel sounds.  Extremities: No cyanosis or edema bilaterally. No calf tenderness to palpation.  Neuro: A&O x3. No focal deficits. CN II - XII grossly intact.    Labs and Imaging:  CBC Full  -  ( 25 Aug 2020 05:04 )  WBC Count : 6.29 K/uL  RBC Count : 2.33 M/uL  Hemoglobin : 7.2 g/dL  Hematocrit : 20.6 %  Platelet Count - Automated : 114 K/uL  Mean Cell Volume : 88.4 fL  Mean Cell Hemoglobin : 30.9 pg  Mean Cell Hemoglobin Concentration : 35.0 g/dL  Auto Neutrophil # : 1.65 K/uL  Auto Lymphocyte # : 3.80 K/uL  Auto Monocyte # : 0.71 K/uL  Auto Eosinophil # : 0.04 K/uL  Auto Basophil # : 0.03 K/uL  Auto Neutrophil % : 26.2 %  Auto Lymphocyte % : 60.4 %  Auto Monocyte % : 11.3 %  Auto Eosinophil % : 0.6 %  Auto Basophil % : 0.5 %    RDW: 17.6  17.6    PT/INR/PTT: 20 @ 17:49  20.40 | 39.4        ^      1.77    BMP: 20 @ 05:04  127 | 96 | 25   -----------------< 135  4.3  | 23 | <0.5  eGFR(AA): 119, eGFR (non-AA): 103  Ca 8.5, Mg --, P 4.9  BMP: 20 @ 17:49  125 | 92 | 25   -----------------< 163  5.0  | 20 | 0.6  eGFR(AA): 104, eGFR (non-AA): 90  Ca 8.8, Mg --, P --    LFTs: 20 @ 05:04  TP  4.5  | 3.2 Alb   ---------------  TB  1.3  | --  DB   ---------------  ALT 16  | 34  AST            ^          193 ALK  LFTs: 20 @ 17:49  TP  5.2  | 3.5 Alb   ---------------  TB  2.1  | --  DB   ---------------  ALT 17  | 32  AST            ^          246 ALK      LFTs: 20 @ 05:04  Ca  8.5  | 34 AST   -----------------  TP  4.5  | 16 ALT  -----------------  Alb 3.2  | 193 ALK          ^        1.3         TB  LFTs: 20 @ 17:49  Ca  8.8  | 32 AST   -----------------  TP  5.2  | 17 ALT  -----------------  Alb 3.5  | 246 ALK          ^        2.1         TB      Cardiac Enzymes:    Urinalysis:  Urinalysis Basic - ( 24 Aug 2020 19:40 )    Color: Yellow / Appearance: Slightly Turbid / S.019 / pH: x  Gluc: x / Ketone: Negative  / Bili: Negative / Urobili: <2 mg/dL   Blood: x / Protein: 30 mg/dL / Nitrite: Negative   Leuk Esterase: Moderate / RBC: 7 /HPF / WBC 31 /HPF   Sq Epi: x / Non Sq Epi: 1 /HPF / Bacteria: Moderate      < from: [facility] Duplex Lower Ext Vein Scan, Bilat (20 @ 09:57) >  Impression:  No evidence of deep venous thrombosis or superficial thrombophlebitis in the bilateral lower extremities.  < end of copied text >    < from: Xray Chest 1 View AP/PA (20 @ 01:45) >  Impression:  New right IJ central venous catheter with tip in the SVC. No pneumothorax.  < end of copied text >    < from: CT Abdomen and Pelvis w/ IV Cont (20 @ 21:49) >  IMPRESSION:  Stable, marked splenomegaly, consistent with patient's known history of B-cell lymphoproliferative disorder.  Otherwise, no CT evidence of acute intra-abdominal pathology.  < end of copied text >    < from: CT Head No Cont (20 @ 21:41) >  Impression:  No evidence of intracranial hemorrhage, territorial infarct, or mass effect.  Severe microvascular changes  Chronic left frontal lobe infarct.  < end of copied text >    Home Medications:  Home Medications:  acyclovir 400 mg oral tablet: 1 tab(s) orally once a day (25 Aug 2020 00:00)  amLODIPine 5 mg oral tablet: 1 tab(s) orally once a day (25 Aug 2020 00:00)  aspirin 81 mg oral tablet, chewable: 1 tab(s) orally once a day (25 Aug 2020 00:00)  atorvastatin 80 mg oral tablet: 1 tab(s) orally once a day (25 Aug 2020 00:00)  clopidogrel 75 mg oral tablet: 1 tab(s) orally once a day (25 Aug 2020 00:00)  fluconazole 200 mg oral tablet: 2 tab(s) orally once a day (25 Aug 2020 00:00)  folic acid 1 mg oral tablet: 1 tab(s) orally once a day   Imbruvica 140 mg oral capsule: 1 cap(s) orally every 5 days (25 Aug 2020 00:00)  metoprolol tartrate 50 mg oral tablet: 1 tab(s) orally 2 times a day (25 Aug 2020 00:00)

## 2020-08-25 NOTE — MEDICAL STUDENT PROGRESS NOTE(EDUCATION) - NS MD HP STUD ASPLAN ASSES FT
73 y/o F w PMHx of B cell lymphoproliferative disorder, on Imbrutinib, p/w AMS, dehydration, & hematuria x1 day admitted for septic shock due to UTI.

## 2020-08-25 NOTE — PROGRESS NOTE ADULT - SUBJECTIVE AND OBJECTIVE BOX
AD BASURTO 74y Female  MRN#: [id number]   CODE STATUS:________      SUBJECTIVE  Patient is a 74y old Female who presents with a chief complaint of Weakness, Abdominal Discomfort and   blood in urine (25 Aug 2020 10:37)    74y F with PMH of CAD s/p stent, DLD, HTN, B josue lymphoma, recently diagnosed B cell lymphoproliferative disorder (follows Dr. Guardado), on Ibrutinib 140mg q48 presented to the ED with AMS, dehydration, and hematuria x1 day. Majority of the history obtained through daughter. Pt got lab work done today that showed a hemoglobin of 7.2. Pt normally AAOx2-3, but daughter noticed that pt was not acting per baseline today. Patient is  independent at baseline.  noticed blood in the urine x1 episode today. +abdominal discomfort . Normal BM. Daughter noticed dry lips on the pt and says that the last time patient had these symptoms was when she had a UTI a month ago. family also noticed decreased PO intake recently and  increased weakness.  Pt tested negative for COVID a few days ago. Daughter added  that  patient  recent completed  5 day course of bactrim  for recurrent UTI. At bedside the patient  denies fever  chills, abdominal pain,  but notes that she  had 3 ep of diarrhaea but cannot remember the character.      Today is hospital day 1d, and this morning she is stable.  Weaning Levophed.  No acute complaints besides demanding "toast" for breakfast.   No acute overnight events.     OBJECTIVE  PAST MEDICAL & SURGICAL HISTORY  Anemia  DM (diabetes mellitus)  High cholesterol  HTN (hypertension)  B-cell chronic lymphocytic leukemia variant  CAD (coronary artery disease): s/p stenting  H/O heart artery stent    ALLERGIES:  penicillin (Anaphylaxis; Hives)    MEDICATIONS:  STANDING MEDICATIONS  acyclovir   Oral Tab/Cap 400 milliGRAM(s) Oral daily  aspirin  chewable 81 milliGRAM(s) Oral daily  atorvastatin 80 milliGRAM(s) Oral at bedtime  chlorhexidine 4% Liquid 1 Application(s) Topical <User Schedule>  fluconAZOLE   Tablet 200 milliGRAM(s) Oral daily  folic acid 1 milliGRAM(s) Oral daily  meropenem  IVPB 1000 milliGRAM(s) IV Intermittent every 8 hours  norepinephrine Infusion 0.05 MICROgram(s)/kG/Min IV Continuous <Continuous>  sodium chloride 0.9%. 1000 milliLiter(s) IV Continuous <Continuous>    PRN MEDICATIONS      VITAL SIGNS: Last 24 Hours  T(C): 37.2 (25 Aug 2020 08:00), Max: 38.6 (24 Aug 2020 18:03)  T(F): 98.9 (25 Aug 2020 08:00), Max: 101.5 (24 Aug 2020 18:03)  HR: 108 (25 Aug 2020 11:30) (88 - 140)  BP: 115/58 (25 Aug 2020 11:30) (78/40 - 138/63)  BP(mean): 75 (25 Aug 2020 11:30) (47 - 97)  RR: 24 (25 Aug 2020 11:30) (15 - 24)  SpO2: 98% (25 Aug 2020 11:30) (73% - 100%)    LABS:                        7.2    6.29  )-----------( 114      ( 25 Aug 2020 05:04 )             20.6     0825    127<L>  |  96<L>  |  25<H>  ----------------------------<  135<H>  4.3   |  23  |  <0.5<L>    Ca    8.5      25 Aug 2020 05:04  Phos  4.9         TPro  4.5<L>  /  Alb  3.2<L>  /  TBili  1.3<H>  /  DBili  x   /  AST  34  /  ALT  16  /  AlkPhos  193<H>  08    PT/INR - ( 24 Aug 2020 17:49 )   PT: 20.40 sec;   INR: 1.77 ratio         PTT - ( 24 Aug 2020 17:49 )  PTT:39.4 sec  Urinalysis Basic - ( 24 Aug 2020 19:40 )    Color: Yellow / Appearance: Slightly Turbid / S.019 / pH: x  Gluc: x / Ketone: Negative  / Bili: Negative / Urobili: <2 mg/dL   Blood: x / Protein: 30 mg/dL / Nitrite: Negative   Leuk Esterase: Moderate / RBC: 7 /HPF / WBC 31 /HPF   Sq Epi: x / Non Sq Epi: 1 /HPF / Bacteria: Moderate        Lactate, Blood: 1.6 mmol/L (20 @ 05:04)  Lactate, Blood: 3.1 mmol/L <H> (08-24-20 @ 19:51)  Lactate, Blood: 2.9 mmol/L <H> (20 @ 17:49)          RADIOLOGY:      PHYSICAL EXAM:    GENERAL: NAD, well-developed, AAOx3  HEENT:  Atraumatic, Normocephalic. EOMI, PERRLA, conjunctiva and sclera clear, No JVD, oral thrush?  PULMONARY: Clear to auscultation bilaterally; No wheeze  CARDIOVASCULAR: Tachycardic and rhythm; No murmurs, rubs, or gallops  GASTROINTESTINAL: Soft, moderate TTP of suprapubic region, Nondistended  MUSCULOSKELETAL:  2+ Peripheral Pulses, No clubbing, cyanosis, or edema  NEUROLOGY: non-focal  SKIN: No rashes or lesions      ADMISSION SUMMARY  Patient is a 74y old Female who presents with a chief complaint of Weakness, Abdominal Discomfort and   blood in urine (25 Aug 2020 10:37) AD BASURTO 74y Female  MRN#: [id number]   CODE STATUS: Full Code      SUBJECTIVE  Patient is a 74y old Female who presents with a chief complaint of Weakness, Abdominal Discomfort and   blood in urine (25 Aug 2020 10:37)    74y F with PMH of CAD s/p stent, DLD, HTN, B josue lymphoma, recently diagnosed B cell lymphoproliferative disorder (follows Dr. Guardado), on Ibrutinib 140mg q48 presented to the ED with AMS, dehydration, and hematuria x1 day. Majority of the history obtained through daughter. Pt got lab work done today that showed a hemoglobin of 7.2. Pt normally AAOx2-3, but daughter noticed that pt was not acting per baseline today. Patient is  independent at baseline.  noticed blood in the urine x1 episode today. +abdominal discomfort . Normal BM. Daughter noticed dry lips on the pt and says that the last time patient had these symptoms was when she had a UTI a month ago. family also noticed decreased PO intake recently and  increased weakness.  Pt tested negative for COVID a few days ago. Daughter added  that  patient  recent completed  5 day course of bactrim  for recurrent UTI. At bedside the patient  denies fever  chills, abdominal pain,  but notes that she  had 3 ep of diarrhaea but cannot remember the character.      Today is hospital day 1d, and this morning she is stable.  Weaning Levophed.  No acute complaints besides demanding "toast" for breakfast.   No acute overnight events.     OBJECTIVE  PAST MEDICAL & SURGICAL HISTORY  Anemia  DM (diabetes mellitus)  High cholesterol  HTN (hypertension)  B-cell chronic lymphocytic leukemia variant  CAD (coronary artery disease): s/p stenting  H/O heart artery stent    ALLERGIES:  penicillin (Anaphylaxis; Hives)    MEDICATIONS:  STANDING MEDICATIONS  acyclovir   Oral Tab/Cap 400 milliGRAM(s) Oral daily  aspirin  chewable 81 milliGRAM(s) Oral daily  atorvastatin 80 milliGRAM(s) Oral at bedtime  chlorhexidine 4% Liquid 1 Application(s) Topical <User Schedule>  fluconAZOLE   Tablet 200 milliGRAM(s) Oral daily  folic acid 1 milliGRAM(s) Oral daily  meropenem  IVPB 1000 milliGRAM(s) IV Intermittent every 8 hours  norepinephrine Infusion 0.05 MICROgram(s)/kG/Min IV Continuous <Continuous>  sodium chloride 0.9%. 1000 milliLiter(s) IV Continuous <Continuous>    PRN MEDICATIONS      VITAL SIGNS: Last 24 Hours  T(C): 37.2 (25 Aug 2020 08:00), Max: 38.6 (24 Aug 2020 18:03)  T(F): 98.9 (25 Aug 2020 08:00), Max: 101.5 (24 Aug 2020 18:03)  HR: 108 (25 Aug 2020 11:30) (88 - 140)  BP: 115/58 (25 Aug 2020 11:30) (78/40 - 138/63)  BP(mean): 75 (25 Aug 2020 11:30) (47 - 97)  RR: 24 (25 Aug 2020 11:30) (15 - 24)  SpO2: 98% (25 Aug 2020 11:30) (73% - 100%)    LABS:                        7.2    6.29  )-----------( 114      ( 25 Aug 2020 05:04 )             20.6     08    127<L>  |  96<L>  |  25<H>  ----------------------------<  135<H>  4.3   |  23  |  <0.5<L>    Ca    8.5      25 Aug 2020 05:04  Phos  4.9         TPro  4.5<L>  /  Alb  3.2<L>  /  TBili  1.3<H>  /  DBili  x   /  AST  34  /  ALT  16  /  AlkPhos  193<H>  08    PT/INR - ( 24 Aug 2020 17:49 )   PT: 20.40 sec;   INR: 1.77 ratio         PTT - ( 24 Aug 2020 17:49 )  PTT:39.4 sec  Urinalysis Basic - ( 24 Aug 2020 19:40 )    Color: Yellow / Appearance: Slightly Turbid / S.019 / pH: x  Gluc: x / Ketone: Negative  / Bili: Negative / Urobili: <2 mg/dL   Blood: x / Protein: 30 mg/dL / Nitrite: Negative   Leuk Esterase: Moderate / RBC: 7 /HPF / WBC 31 /HPF   Sq Epi: x / Non Sq Epi: 1 /HPF / Bacteria: Moderate        Lactate, Blood: 1.6 mmol/L (20 @ 05:04)  Lactate, Blood: 3.1 mmol/L <H> (20 @ 19:51)  Lactate, Blood: 2.9 mmol/L <H> (20 @ 17:49)          RADIOLOGY:      PHYSICAL EXAM:    GENERAL: NAD, well-developed, AAOx3  HEENT:  Atraumatic, Normocephalic. EOMI, PERRLA, conjunctiva and sclera clear, No JVD, oral thrush?  PULMONARY: Clear to auscultation bilaterally; No wheeze  CARDIOVASCULAR: Tachycardic and rhythm; No murmurs, rubs, or gallops  GASTROINTESTINAL: Soft, moderate TTP of suprapubic region, Nondistended  MUSCULOSKELETAL:  2+ Peripheral Pulses, No clubbing, cyanosis, or edema  NEUROLOGY: non-focal  SKIN: No rashes or lesions      ADMISSION SUMMARY  Patient is a 74y old Female who presents with a chief complaint of Weakness, Abdominal Discomfort and   blood in urine (25 Aug 2020 10:37)

## 2020-08-25 NOTE — CONSULT NOTE ADULT - SUBJECTIVE AND OBJECTIVE BOX
Patient is a 74y old  Female who presents with a chief complaint of Weakness, Abdominal Discomfort and   blood in urine (24 Aug 2020 23:49)      HPI:  74y F with PMH of CAD s/p stent, DLD, HTN, B josue lymphoma, recently diagnosed B cell lymphoproliferative disorder (follows Dr. Guardado), on Ibrutinib 140mg q48 presented to the ED with AMS, dehydration, and hematuria x1 day. Majority of the history obtained through daughter. Pt got lab work done today that showed a hemoglobin of 7.2. Pt normally AAOx2-3, but daughter noticed that pt was not acting per baseline today. Patient is  independent at baseline.  noticed blood in the urine x1 episode today. +abdominal discomfort . Normal BM. Daughter noticed dry lips on the pt and says that the last time patient had these symptoms was when she had a UTI a month ago. family also noticed decreased PO intake recently and  increased weakness.  Pt tested negative for COVID a few days ago. Daughter added  that  patient  recent completed  5 day course of bactrim  for recurrent UTI. At bedside the patient  denies fever  chills, abdominal pain,  but notes that she  had 3 ep of diarrhaea but cannot remember the character. (24 Aug 2020 23:49)      PAST MEDICAL & SURGICAL HISTORY:  Anemia  DM (diabetes mellitus)  High cholesterol  HTN (hypertension)  B-cell chronic lymphocytic leukemia variant  CAD (coronary artery disease): s/p stenting  H/O heart artery stent      SOCIAL HX:   Smoking                         ETOH                            Other    FAMILY HISTORY:  Family history of diabetes mellitus (DM)  :  No known cardiovacular family hisotry     Review Of Systems:     All ROS are negative except per HPI       Allergies    penicillin (Anaphylaxis; Hives)    Intolerances          PHYSICAL EXAM    ICU Vital Signs Last 24 Hrs  T(C): 37.2 (25 Aug 2020 04:00), Max: 38.6 (24 Aug 2020 18:03)  T(F): 98.9 (25 Aug 2020 04:00), Max: 101.5 (24 Aug 2020 18:03)  HR: 100 (25 Aug 2020 07:00) (88 - 140)  BP: 95/49 (25 Aug 2020 07:00) (84/46 - 136/46)  BP(mean): 59 (25 Aug 2020 07:00) (54 - 97)  ABP: --  ABP(mean): --  RR: 20 (25 Aug 2020 07:00) (15 - 21)  SpO2: 99% (25 Aug 2020 07:00) (73% - 100%)      CONSTITUTIONAL:  Well nourished.  NAD    ENT:   Airway patent,   Mouth with normal mucosa.   No thrush    EYES:   pupils equal,   round and reactive to light.    CARDIAC:   Tachycardic   Regular rhythm.    Heart sounds S1, S2.   No edema      Vascular:   normal systolic impulse  no bruits    RESPIRATORY:   No wheezing   Normal chest expansion  No use of accessory muscles    GASTROINTESTINAL:  Abdomen soft   Non-tender,   No guarding,   + BS    GENITOURINARY  normal genitalia for sex  no edema    MUSCULOSKELETAL:   Range of motion is not limited,  Nno clubbing, cyanosis    NEUROLOGICAL:   Alert   Follows commands   No motor or sensory deficits.    SKIN:   Skin normal color for race,   Warm and dry  No evidence of rash.    PSYCHIATRIC:   Normal mood and affect.   No apparent risk to self or others.    HEME LYMPH:   No cervical  lymphadenopathy.  No inguinal lymphadenopathy            20 @ 07:01  -  20 @ 07:00  --------------------------------------------------------  IN:    norepinephrine Infusion: 4 mL    sodium chloride 0.9%.: 300 mL  Total IN: 304 mL    OUT:    Indwelling Catheter - Urethral: 300 mL    Voided: 600 mL  Total OUT: 900 mL    Total NET: -596 mL          LABS:                          7.2    6.29  )-----------( 114      ( 25 Aug 2020 05:04 )             20.6                                               08-25             7.2    6.29  )-----------( 114      (  @ 05:04 )             20.6                8.1    6.08  )-----------( 142      (  @ 17:49 )             24.2           127<L>  |  96<L>  |  25<H>  ----------------------------<  135<H>  4.3   |  23  |  <0.5<L>    Ca    8.5      25 Aug 2020 05:04  Phos  4.9     0825    TPro  4.5<L>  /  Alb  3.2<L>  /  TBili  1.3<H>  /  DBili  x   /  AST  34  /  ALT  16  /  AlkPhos  193<H>  08-25      PT/INR - ( 24 Aug 2020 17:49 )   PT: 20.40 sec;   INR: 1.77 ratio         PTT - ( 24 Aug 2020 17:49 )  PTT:39.4 sec                                       Urinalysis Basic - ( 24 Aug 2020 19:40 )    Color: Yellow / Appearance: Slightly Turbid / S.019 / pH: x  Gluc: x / Ketone: Negative  / Bili: Negative / Urobili: <2 mg/dL   Blood: x / Protein: 30 mg/dL / Nitrite: Negative   Leuk Esterase: Moderate / RBC: 7 /HPF / WBC 31 /HPF   Sq Epi: x / Non Sq Epi: 1 /HPF / Bacteria: Moderate                                                  LIVER FUNCTIONS - ( 25 Aug 2020 05:04 )  Alb: 3.2 g/dL / Pro: 4.5 g/dL / ALK PHOS: 193 U/L / ALT: 16 U/L / AST: 34 U/L / GGT: x                                                                                                                                       X-Rays reviewed:                                                                                    ECHO    CXR interpreted by me:  No infiltrate     MEDICATIONS  (STANDING):  acyclovir   Oral Tab/Cap 400 milliGRAM(s) Oral daily  aspirin  chewable 81 milliGRAM(s) Oral daily  atorvastatin 80 milliGRAM(s) Oral at bedtime  chlorhexidine 4% Liquid 1 Application(s) Topical <User Schedule>  fluconAZOLE   Tablet 200 milliGRAM(s) Oral daily  folic acid 1 milliGRAM(s) Oral daily  meropenem  IVPB 1000 milliGRAM(s) IV Intermittent every 8 hours  norepinephrine Infusion 0.05 MICROgram(s)/kG/Min (4.26 mL/Hr) IV Continuous <Continuous>  sodium chloride 0.9%. 1000 milliLiter(s) (75 mL/Hr) IV Continuous <Continuous>  vancomycin  IVPB 1000 milliGRAM(s) IV Intermittent every 12 hours    MEDICATIONS  (PRN):

## 2020-08-26 NOTE — MEDICAL STUDENT PROGRESS NOTE(EDUCATION) - SUBJECTIVE AND OBJECTIVE BOX
AD BASURTO, female, 74y ([id number]),   MRN-[id number]  Admit Date: 20 (2d)    Chief Complaint:  Patient is a 74y old  Female who presents with a chief complaint of Weakness, Abdominal Discomfort and   blood in urine (26 Aug 2020 08:03)      Past Medical and Surgical History:  PAST MEDICAL & SURGICAL HISTORY:  Anemia  DM (diabetes mellitus)  High cholesterol  HTN (hypertension)  B-cell chronic lymphocytic leukemia variant  CAD (coronary artery disease): s/p stenting  H/O heart artery stent      Current Medications:  MEDICATIONS  (STANDING):  acyclovir   Oral Tab/Cap 400 milliGRAM(s) Oral daily  aspirin  chewable 81 milliGRAM(s) Oral daily  atorvastatin 80 milliGRAM(s) Oral at bedtime  chlorhexidine 4% Liquid 1 Application(s) Topical <User Schedule>  clopidogrel Tablet 75 milliGRAM(s) Oral daily  fluconAZOLE   Tablet 200 milliGRAM(s) Oral daily  folic acid 1 milliGRAM(s) Oral daily  meropenem  IVPB 1000 milliGRAM(s) IV Intermittent every 8 hours  norepinephrine Infusion 0.05 MICROgram(s)/kG/Min (4.26 mL/Hr) IV Continuous <Continuous>  pantoprazole    Tablet 40 milliGRAM(s) Oral before breakfast    MEDICATIONS  (PRN):      Interval History:  Retaining urine overnight s/p roblero. No complaints at this time.    Vital Signs:  T(F): 96.3 (20 @ 08:00), Max: 101.5 (20 @ 18:03)  HR: 102 (20 @ 09:30) (88 - 140)  BP: 89/46 (20 @ 09:30) (77/43 - 138/63)  RR: 18 (20 @ 09:30) (15 - 35)  SpO2: 85% (20 @ 09:30) (73% - 100%)  CAPILLARY BLOOD GLUCOSE      POCT Blood Glucose.: 238 mg/dL (26 Aug 2020 09:19)  POCT Blood Glucose.: 168 mg/dL (25 Aug 2020 16:26)      Physical Exam:  General: Not in distress.   HEENT: Moist mucus membranes. PERRLA.  Cardio: Rapid rate. Regular rhythm, S1, S2, no murmur, rub, or gallop.  Pulm: Clear to auscultation bilaterally. No wheezing, rales, or rhonchi.  Abdomen: Soft, non-tender, non-distended. Normoactive bowel sounds. Splenomegaly.   Extremities: No cyanosis or edema bilaterally. No calf tenderness to palpation.  Neuro: A&O x3. No focal deficits. CN II - XII grossly intact.    Labs and Imaging:  CBC Full  -  ( 26 Aug 2020 04:45 )  WBC Count : 8.60 K/uL  RBC Count : 2.40 M/uL  Hemoglobin : 7.2 g/dL  Hematocrit : 21.7 %  Platelet Count - Automated : 116 K/uL  Mean Cell Volume : 90.4 fL  Mean Cell Hemoglobin : 30.0 pg  Mean Cell Hemoglobin Concentration : 33.2 g/dL  Auto Neutrophil # : 1.63 K/uL  Auto Lymphocyte # : 5.69 K/uL  Auto Monocyte # : 1.05 K/uL  Auto Eosinophil # : 0.06 K/uL  Auto Basophil # : 0.09 K/uL  Auto Neutrophil % : 19.0 %  Auto Lymphocyte % : 66.2 %  Auto Monocyte % : 12.2 %  Auto Eosinophil % : 0.7 %  Auto Basophil % : 1.0 %    RDW: 17.5  17.6    PT/INR/PTT: 20 @ 17:49  20.40 | 39.4        ^      1.77    BMP: 20 @ 04:45  131 | 99 | 16   -----------------< 121  4.0  | 22 | <0.5  eGFR(AA): 131, eGFR (non-AA): 113  Ca 8.0, Mg --, P --  BMP: 20 @ 16:30  128 | 97 | 19   -----------------< 162  4.2  | 21 | <0.5  eGFR(AA): 119, eGFR (non-AA): 103  Ca 8.1, Mg --, P --    LFTs: 20 @ 04:45  TP  4.5  | 3.0 Alb   ---------------  TB  1.0  | --  DB   ---------------  ALT 16  | 28  AST            ^          206 ALK  LFTs: 20 @ 16:30  TP  4.6  | 3.1 Alb   ---------------  TB  0.9  | --  DB   ---------------  ALT 17  | 31  AST            ^          223 ALK  LFTs: 20 @ 05:04  TP  4.5  | 3.2 Alb   ---------------  TB  1.3  | --  DB   ---------------  ALT 16  | 34  AST            ^          193 ALK  LFTs: 20 @ 17:49  TP  5.2  | 3.5 Alb   ---------------  TB  2.1  | --  DB   ---------------  ALT 17  | 32  AST            ^          246 ALK      LFTs: 20 @ 04:45  Ca  8.0  | 28 AST   -----------------  TP  4.5  | 16 ALT  -----------------  Alb 3.0  | 206 ALK          ^        1.0         TB  LFTs: 20 @ 16:30  Ca  8.1  | 31 AST   -----------------  TP  4.6  | 17 ALT  -----------------  Alb 3.1  | 223 ALK          ^        0.9         TB      Cardiac Enzymes:    Urinalysis:  Urinalysis Basic - ( 24 Aug 2020 19:40 )    Color: Yellow / Appearance: Slightly Turbid / S.019 / pH: x  Gluc: x / Ketone: Negative  / Bili: Negative / Urobili: <2 mg/dL   Blood: x / Protein: 30 mg/dL / Nitrite: Negative   Leuk Esterase: Moderate / RBC: 7 /HPF / WBC 31 /HPF   Sq Epi: x / Non Sq Epi: 1 /HPF / Bacteria: Moderate      Cultures:     (collected 20 @ 19:40)  Source: .Urine Clean Catch (Midstream)  Preliminary Report:    >100,000 CFU/ml Gram positive organisms    <10,000 CFU/ml Normal Urogenital anthony present     (collected 20 @ 18:50)  Source: .Blood Blood-Peripheral  Preliminary Report:    No growth to date.     (collected 20 @ 17:49)  Source: .Blood Blood-Peripheral  Preliminary Report:    No growth to date.    < from: [facility] Duplex Lower Ext Vein Scan, Bilat (20 @ 09:57) >  Impression:  No evidence of deep venous thrombosis or superficial thrombophlebitis in the bilateral lower extremities.  < end of copied text >    < from: Xray Chest 1 View AP/PA (20 @ 01:45) >  Impression:  New right IJ central venous catheter with tip in the SVC. No pneumothorax.  < end of copied text >      Home Medications:  Home Medications:  acyclovir 400 mg oral tablet: 1 tab(s) orally once a day (25 Aug 2020 00:00)  amLODIPine 5 mg oral tablet: 1 tab(s) orally once a day (25 Aug 2020 00:00)  aspirin 81 mg oral tablet, chewable: 1 tab(s) orally once a day (25 Aug 2020 00:00)  atorvastatin 80 mg oral tablet: 1 tab(s) orally once a day (25 Aug 2020 00:00)  clopidogrel 75 mg oral tablet: 1 tab(s) orally once a day (25 Aug 2020 00:00)  fluconazole 200 mg oral tablet: 2 tab(s) orally once a day (25 Aug 2020 00:00)  Imbruvica 140 mg oral capsule: 1 cap(s) orally every 5 days (25 Aug 2020 00:00)  metoprolol tartrate 50 mg oral tablet: 1 tab(s) orally 2 times a day (25 Aug 2020 00:00)

## 2020-08-26 NOTE — PROGRESS NOTE ADULT - ASSESSMENT
73 y/o F w PMHx of B cell lymphoproliferative disorder on Imbrutinib, DM, HTN, CAD s/p stent p/w AMS, dehydration, & hematuria x1 day admitted for septic shock due to UTI    # Septic shock s/p UTI  - CT Abd Pel and CT Head unremarkable  - UA +ve for Leuk Est and WBCs  - Tachy but normotensive, on levo 0.05  - Wean levo, d/c TLC when off pressors  - Fu Cx  - d/c vanc  - c/w shantal  - c/w NS at 75 cc/hr  - F/u lytes, correct as needed  - d/c roblero  - LE Duplex negative  - Lactate improved 3.2 to 1.6 today  - Hb 7.2 today  - 1U prbc, repeat cbc, f/u Hb  - f/u ABG    # H/o B cell CLL  - Heme/onc consulted - fu recs  - Fluconazole ppx   - Acyclovir ppx    #H/o CAD s/p stent  - Plavix 75 mg qd    #Diet: DASH  #DVT ppx: ASA  Dispo: Possible downgrade to CEU later today  OOB to chair

## 2020-08-26 NOTE — PROGRESS NOTE ADULT - SUBJECTIVE AND OBJECTIVE BOX
Patient is a 74y old  Female who presents with a chief complaint of Weakness, Abdominal Discomfort and   blood in urine (25 Aug 2020 11:51)        Over Night Events: remains on low dose levophed. s/p roblero placement for retention 700CC.        ROS:     All ROS are negative except HPI         PHYSICAL EXAM    ICU Vital Signs Last 24 Hrs  T(C): 36.2 (26 Aug 2020 04:00), Max: 37.2 (25 Aug 2020 08:00)  T(F): 97.1 (26 Aug 2020 04:00), Max: 98.9 (25 Aug 2020 08:00)  HR: 94 (26 Aug 2020 07:00) (92 - 112)  BP: 92/45 (26 Aug 2020 07:) (78/40 - 138/63)  BP(mean): 62 (26 Aug 2020 07:) (47 - 96)  RR: 20 (26 Aug 2020 07:) (16 - 35)  SpO2: 93% (26 Aug 2020 07:) (91% - 100%)      CONSTITUTIONAL:  Well nourished.  NAD    ENT:   Airway patent,   Mouth with normal mucosa.   No thrush    EYES:   Pupils equal,   Round and reactive to light.    CARDIAC:   tachycardia.,   Regular rhythm.    No edema      Vascular:  Normal systolic impulse  No Carotid bruits    RESPIRATORY:   No wheezing  Bilateral BS  Normal chest expansion  Not tachypneic,  No use of accessory muscles    GASTROINTESTINAL:  Abdomen soft,   Non-tender,   No guarding,   + BS    MUSCULOSKELETAL:   Range of motion is not limited,  No clubbing, cyanosis    NEUROLOGICAL:   Alert and orientedx2   No motor  deficits.    SKIN:   Skin normal color for race,   Warm and dry and intact.   No evidence of rash.    PSYCHIATRIC:   Normal mood and affect.   No apparent risk to self or others.    HEMATOLOGICAL:  No cervical  lymphadenopathy.  no inguinal lymphadenopathy      20 @ 07:01  -  20 @ 07:00  --------------------------------------------------------  IN:    IV PiggyBack: 100 mL    norepinephrine Infusion: 96.4 mL    sodium chloride 0.9%.: 1800 mL  Total IN: 1996.4 mL    OUT:    Indwelling Catheter - Urethral: 200 mL    Ureteral Catheter: 750 mL  Total OUT: 950 mL    Total NET: 1046.4 mL          LABS:                            7.2    8.60  )-----------( 116      ( 26 Aug 2020 04:45 )             21.7                                                   131<L>  |  99  |  16  ----------------------------<  121<H>  4.0   |  22  |  <0.5<L>    Ca    8.0<L>      26 Aug 2020 04:45  Phos  4.9     08    TPro  4.5<L>  /  Alb  3.0<L>  /  TBili  1.0  /  DBili  x   /  AST  28  /  ALT  16  /  AlkPhos  206<H>        PT/INR - ( 24 Aug 2020 17:49 )   PT: 20.40 sec;   INR: 1.77 ratio         PTT - ( 24 Aug 2020 17:49 )  PTT:39.4 sec                                       Urinalysis Basic - ( 24 Aug 2020 19:40 )    Color: Yellow / Appearance: Slightly Turbid / S.019 / pH: x  Gluc: x / Ketone: Negative  / Bili: Negative / Urobili: <2 mg/dL   Blood: x / Protein: 30 mg/dL / Nitrite: Negative   Leuk Esterase: Moderate / RBC: 7 /HPF / WBC 31 /HPF   Sq Epi: x / Non Sq Epi: 1 /HPF / Bacteria: Moderate                                                  LIVER FUNCTIONS - ( 26 Aug 2020 04:45 )  Alb: 3.0 g/dL / Pro: 4.5 g/dL / ALK PHOS: 206 U/L / ALT: 16 U/L / AST: 28 U/L / GGT: x                                                  Culture - Blood (collected 24 Aug 2020 18:50)  Source: .Blood Blood-Peripheral  Preliminary Report (26 Aug 2020 02:01):    No growth to date.    Culture - Blood (collected 24 Aug 2020 17:49)  Source: .Blood Blood-Peripheral  Preliminary Report (26 Aug 2020 02:01):    No growth to date.                                                                                           MEDICATIONS  (STANDING):  acyclovir   Oral Tab/Cap 400 milliGRAM(s) Oral daily  aspirin  chewable 81 milliGRAM(s) Oral daily  atorvastatin 80 milliGRAM(s) Oral at bedtime  chlorhexidine 4% Liquid 1 Application(s) Topical <User Schedule>  clopidogrel Tablet 75 milliGRAM(s) Oral daily  fluconAZOLE   Tablet 200 milliGRAM(s) Oral daily  folic acid 1 milliGRAM(s) Oral daily  meropenem  IVPB 1000 milliGRAM(s) IV Intermittent every 8 hours  norepinephrine Infusion 0.05 MICROgram(s)/kG/Min (4.26 mL/Hr) IV Continuous <Continuous>  pantoprazole    Tablet 40 milliGRAM(s) Oral before breakfast  sodium chloride 0.9%. 1000 milliLiter(s) (75 mL/Hr) IV Continuous <Continuous>    MEDICATIONS  (PRN):      New X-rays reviewed:                                                                                  ECHO    CXR interpreted by me:  no opacity, TLC ok.

## 2020-08-26 NOTE — PROGRESS NOTE ADULT - SUBJECTIVE AND OBJECTIVE BOX
24H events:  Hyponatremia improving  Bcx negative  No acute events    PAST MEDICAL & SURGICAL HISTORY  Anemia  DM (diabetes mellitus)  High cholesterol  HTN (hypertension)  B-cell chronic lymphocytic leukemia variant  CAD (coronary artery disease): s/p stenting  H/O heart artery stent    SOCIAL HISTORY:  Negative for smoking/alcohol/drug use.     ALLERGIES:  penicillin (Anaphylaxis; Hives)    MEDICATIONS:  STANDING MEDICATIONS  acyclovir   Oral Tab/Cap 400 milliGRAM(s) Oral daily  aspirin  chewable 81 milliGRAM(s) Oral daily  atorvastatin 80 milliGRAM(s) Oral at bedtime  chlorhexidine 4% Liquid 1 Application(s) Topical <User Schedule>  clopidogrel Tablet 75 milliGRAM(s) Oral daily  fluconAZOLE   Tablet 200 milliGRAM(s) Oral daily  folic acid 1 milliGRAM(s) Oral daily  meropenem  IVPB 1000 milliGRAM(s) IV Intermittent every 8 hours  norepinephrine Infusion 0.05 MICROgram(s)/kG/Min IV Continuous <Continuous>  pantoprazole    Tablet 40 milliGRAM(s) Oral before breakfast  sodium chloride 0.9%. 1000 milliLiter(s) IV Continuous <Continuous>    PRN MEDICATIONS    VITALS:   T(F): 97.1  HR: 94  BP: 92/45  RR: 20  SpO2: 93%    LABS:                        7.2    8.60  )-----------( 116      ( 26 Aug 2020 04:45 )             21.7     08-26    131<L>  |  99  |  16  ----------------------------<  121<H>  4.0   |  22  |  <0.5<L>    Ca    8.0<L>      26 Aug 2020 04:45  Phos  4.9     08-25    TPro  4.5<L>  /  Alb  3.0<L>  /  TBili  1.0  /  DBili  x   /  AST  28  /  ALT  16  /  AlkPhos  206<H>  08-26    PT/INR - ( 24 Aug 2020 17:49 )   PT: 20.40 sec;   INR: 1.77 ratio         PTT - ( 24 Aug 2020 17:49 )  PTT:39.4 sec  Urinalysis Basic - ( 24 Aug 2020 19:40 )    Color: Yellow / Appearance: Slightly Turbid / S.019 / pH: x  Gluc: x / Ketone: Negative  / Bili: Negative / Urobili: <2 mg/dL   Blood: x / Protein: 30 mg/dL / Nitrite: Negative   Leuk Esterase: Moderate / RBC: 7 /HPF / WBC 31 /HPF   Sq Epi: x / Non Sq Epi: 1 /HPF / Bacteria: Moderate            Culture - Blood (collected 24 Aug 2020 18:50)  Source: .Blood Blood-Peripheral  Preliminary Report (26 Aug 2020 02:01):    No growth to date.    Culture - Blood (collected 24 Aug 2020 17:49)  Source: .Blood Blood-Peripheral  Preliminary Report (26 Aug 2020 02:01):    No growth to date.          RADIOLOGY:  < from: [facility] Duplex Lower Ext Vein Scan, Bilat (20 @ 09:57) >  Impression:    No evidence of deep venous thrombosis or superficial thrombophlebitis in the bilateral lower extremities.    PHYSICAL EXAM:  GEN: Sitting in chair, eating breakfast comfortably  HENT: NCAT, EOMI  LYMPH: No appreciable adenopathy  LUNGS: No respiratory distress, clear to auscultation bilaterally   HEART: regular rate and rhythm  ABD: Soft, non-tender, non-distended  SKIN: No rash  EXT: No edema  NEURO: No focal neurologic defecit

## 2020-08-26 NOTE — CHART NOTE - NSCHARTNOTEFT_GEN_A_CORE
MICU Transfer Note    Transfer from: MICU  Transfer to:  (X) Medicine    (  ) Telemetry    (  ) RCU    (  ) Palliative    (  ) Stroke Unit    (  ) _______________  CEU      MICU COURSE:    74y F with PMH of CAD s/p stent, DLD, HTN, B josue lymphoma, recently diagnosed B cell lymphoproliferative disorder (follows Dr. Guardado), on Ibrutinib 140mg q48, chronic anemia, presented to the ED with AMS, dehydration, and hematuria x1 day. Majority of the history obtained through daughter. Pt got lab work done today that showed a hemoglobin of 7.2. Pt normally AAOx2-3, but daughter noticed that pt was not acting per baseline today. Patient is  independent at baseline.  noticed blood in the urine x1 episode today. +abdominal discomfort . Normal BM. Daughter noticed dry lips on the pt and says that the last time patient had these symptoms was when she had a UTI a month ago. family also noticed decreased PO intake recently and  increased weakness.  Pt tested negative for COVID a few days ago. Daughter added  that  patient  recent completed  5 day course of bactrim  for recurrent UTI. At bedside the patient  denies fever  chills, abdominal pain,  but notes that she  had 3 ep of diarrhaea but cannot remember the character.    In ICU, she required low dose levophed and was treated for septic shock s/p UTI with meropenem and vanc.  Today is hospital day 2d, and this morning she is stable.  Weaning Levophed.  Then d/c TLC. Hb 7.2 this AM, received 1U prbc.  Received 1 unit total during ICU stay.  Pt is chronically anemic.  No other acute overnight events.       ASSESSMENT & PLAN:     73 y/o F w PMHx of B cell lymphoproliferative disorder on Imbrutinib, DM, HTN, CAD s/p stent p/w AMS, dehydration, & hematuria x1 day admitted for septic shock due to UTI    # Septic shock s/p UTI  - CT Abd Pel and CT Head unremarkable  - UA +ve for Leuk Est and WBCs  - Tachy but normotensive, on levo 0.05  - Wean levo, d/c TLC when off pressors  - Fu Cx  - d/c vanc  - c/w shantal  - c/w NS at 75 cc/hr  - F/u lytes, correct as needed  - d/c roblero  - LE Duplex negative  - Lactate improved 3.2 to 1.6 today  - Hb 7.2 today  - 1U prbc, repeat cbc, f/u Hb  - f/u ABG    # H/o B cell CLL  - Heme/onc consulted - fu recs  - Fluconazole ppx   - Acyclovir ppx  - f/u US Spleen    #H/o CAD s/p stent  - Plavix 75 mg qd    #Diet: DASH  #DVT ppx: ASA  Dispo: Downgrade to CEU  OOB to chair     For Follow-Up:    - f/u repeat Hb s/p transfusion  - f/u ABG  - f/u US Spleen        Vital Signs Last 24 Hrs  T(C): 36.3 (26 Aug 2020 16:00), Max: 36.4 (25 Aug 2020 20:00)  T(F): 97.3 (26 Aug 2020 16:00), Max: 97.6 (25 Aug 2020 20:00)  HR: 96 (26 Aug 2020 16:00) (90 - 114)  BP: 136/66 (26 Aug 2020 16:00) (77/43 - 140/66)  BP(mean): 88 (26 Aug 2020 16:00) (55 - 104)  RR: 19 (26 Aug 2020 16:00) (16 - 25)  SpO2: 100% (26 Aug 2020 16:00) (85% - 100%)  I&O's Summary    25 Aug 2020 07:01  -  26 Aug 2020 07:00  --------------------------------------------------------  IN: 1996.4 mL / OUT: 950 mL / NET: 1046.4 mL          MEDICATIONS  (STANDING):  acyclovir   Oral Tab/Cap 400 milliGRAM(s) Oral daily  aspirin  chewable 81 milliGRAM(s) Oral daily  atorvastatin 80 milliGRAM(s) Oral at bedtime  chlorhexidine 4% Liquid 1 Application(s) Topical <User Schedule>  clopidogrel Tablet 75 milliGRAM(s) Oral daily  fluconAZOLE   Tablet 200 milliGRAM(s) Oral daily  folic acid 1 milliGRAM(s) Oral daily  meropenem  IVPB 1000 milliGRAM(s) IV Intermittent every 8 hours  norepinephrine Infusion 0.05 MICROgram(s)/kG/Min (4.26 mL/Hr) IV Continuous <Continuous>  pantoprazole    Tablet 40 milliGRAM(s) Oral before breakfast    MEDICATIONS  (PRN):        LABS                                            9.0                   Neurophils% (auto):   19.8   (08-26 @ 18:00):    9.48 )-----------(90           Lymphocytes% (auto):  66.4                                          26.1                   Eosinphils% (auto):   0.9      Manual%: Neutrophils x    ; Lymphocytes x    ; Eosinophils x    ; Bands%: x    ; Blasts x                                    131    |  99     |  16                  Calcium: 8.0   / iCa: x      (08-26 @ 04:45)    ----------------------------<  121       Magnesium: x                                4.0     |  22     |  <0.5             Phosphorous: x        TPro  4.5    /  Alb  3.0    /  TBili  1.0    /  DBili  x      /  AST  28     /  ALT  16     /  AlkPhos  206    26 Aug 2020 04:45

## 2020-08-26 NOTE — PROGRESS NOTE ADULT - ASSESSMENT
Patient is a 75 y/o F with PMH of marginal zone lymphoma, recurrent UTI, CAD s/p stent, DLD, HTN  admitted for sepsis due to suspected UTI.    #) Sepsis: suspected source is UTI, UA showed negative nitrite but moderate bacteria and leuk esterase +; CT AP negative; patient recently treated with cipro and then Bactrim  - On meropenem, abx per CC (recent use cipro and then Bactrim)  - BCx prelim negative  - UCx pending  - Wean levophed as tolerated    #) Marginal zone lymphoma with anemia and thrombocytopenia/splenomegaly: Flow cytometry revealed Monotypic B cells (18% of cells), positive for Kappa, CD19, CD20, FMC-7, negative for CD5, CD10, and CD23. 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 Atypical cells were negative for cyclin D1, molecular studies negative MYD88, L265P and BRAF V600 mutations. ANNEXIN1 seemed to be positive on T Cells and myeloid cells. The negative BRAF V600 makes hairy cell leukemia less likely. BCL2 was negative.  - PET CT as of May 2020 showing diffuse FDG uptake in an enlarged spleen   - Patient with almost weekly transfusions of pRBCs as outpatient   - Patient started on Ibrutinib 3 tabs daily on 6/17/2020, dose reduced and eventually held due to cytopenias and infection  - Hold ibrutinib for now    #) Elevated INR: Elevated to 1.77  - Recommend PO Vitamin K 2.5mg  - Recheck INR	    #) Anemia: Patient had been getting transfusions nearly weekly until recently  - Consider 1 unit pRBC transfusion to keep Hg near 8 Patient is a 75 y/o F with PMH of marginal zone lymphoma, recurrent UTI, CAD s/p stent, DLD, HTN  admitted for sepsis due to suspected UTI.    #) Sepsis: suspected source is UTI, UA showed negative nitrite but moderate bacteria and leuk esterase +; CT AP negative; patient recently treated with cipro and then Bactrim  - On meropenem, abx per CC (recent use cipro and then Bactrim)  - BCx prelim negative  - UCx pending  - Wean levophed as tolerated    #) Marginal zone lymphoma with anemia and thrombocytopenia/splenomegaly: Flow cytometry revealed Monotypic B cells (18% of cells), positive for Kappa, CD19, CD20, FMC-7, negative for CD5, CD10, and CD23. 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 Atypical cells were negative for cyclin D1, molecular studies negative MYD88, L265P and BRAF V600 mutations. ANNEXIN1 seemed to be positive on T Cells and myeloid cells. The negative BRAF V600 makes hairy cell leukemia less likely. BCL2 was negative.  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

## 2020-08-26 NOTE — PROGRESS NOTE ADULT - SUBJECTIVE AND OBJECTIVE BOX
AD BASURTO 74y Female  MRN#: [id number]       SUBJECTIVE  Patient is a 74y old Female who presents with a chief complaint of Weakness, Abdominal Discomfort and   blood in urine (26 Aug 2020 08:03)          OBJECTIVE  PAST MEDICAL & SURGICAL HISTORY  Anemia  DM (diabetes mellitus)  High cholesterol  HTN (hypertension)  B-cell chronic lymphocytic leukemia variant  CAD (coronary artery disease): s/p stenting  H/O heart artery stent    ALLERGIES:  penicillin (Anaphylaxis; Hives)    MEDICATIONS:  STANDING MEDICATIONS  acyclovir   Oral Tab/Cap 400 milliGRAM(s) Oral daily  aspirin  chewable 81 milliGRAM(s) Oral daily  atorvastatin 80 milliGRAM(s) Oral at bedtime  chlorhexidine 4% Liquid 1 Application(s) Topical <User Schedule>  clopidogrel Tablet 75 milliGRAM(s) Oral daily  fluconAZOLE   Tablet 200 milliGRAM(s) Oral daily  folic acid 1 milliGRAM(s) Oral daily  meropenem  IVPB 1000 milliGRAM(s) IV Intermittent every 8 hours  norepinephrine Infusion 0.05 MICROgram(s)/kG/Min IV Continuous <Continuous>  pantoprazole    Tablet 40 milliGRAM(s) Oral before breakfast  phytonadione   Solution 2.5 milliGRAM(s) Oral once    PRN MEDICATIONS      VITAL SIGNS: Last 24 Hours  T(C): 35.7 (26 Aug 2020 08:00), Max: 36.9 (25 Aug 2020 18:00)  T(F): 96.3 (26 Aug 2020 08:00), Max: 98.5 (25 Aug 2020 18:00)  HR: 98 (26 Aug 2020 12:30) (90 - 114)  BP: 98/51 (26 Aug 2020 12:30) (77/43 - 134/52)  BP(mean): 65 (26 Aug 2020 12:30) (55 - 104)  RR: 21 (26 Aug 2020 12:30) (16 - 35)  SpO2: 94% (26 Aug 2020 12:30) (85% - 100%)    LABS:                        7.2    8.60  )-----------( 116      ( 26 Aug 2020 04:45 )             21.7     08-    131<L>  |  99  |  16  ----------------------------<  121<H>  4.0   |  22  |  <0.5<L>    Ca    8.0<L>      26 Aug 2020 04:45  Phos  4.9     08-25    TPro  4.5<L>  /  Alb  3.0<L>  /  TBili  1.0  /  DBili  x   /  AST  28  /  ALT  16  /  AlkPhos  206<H>  08-    PT/INR - ( 24 Aug 2020 17:49 )   PT: 20.40 sec;   INR: 1.77 ratio         PTT - ( 24 Aug 2020 17:49 )  PTT:39.4 sec  Urinalysis Basic - ( 24 Aug 2020 19:40 )    Color: Yellow / Appearance: Slightly Turbid / S.019 / pH: x  Gluc: x / Ketone: Negative  / Bili: Negative / Urobili: <2 mg/dL   Blood: x / Protein: 30 mg/dL / Nitrite: Negative   Leuk Esterase: Moderate / RBC: 7 /HPF / WBC 31 /HPF   Sq Epi: x / Non Sq Epi: 1 /HPF / Bacteria: Moderate            Culture - Urine (collected 24 Aug 2020 19:40)  Source: .Urine Clean Catch (Midstream)  Preliminary Report (26 Aug 2020 09:41):    >100,000 CFU/ml Gram positive organisms    <10,000 CFU/ml Normal Urogenital anthony present    Culture - Blood (collected 24 Aug 2020 18:50)  Source: .Blood Blood-Peripheral  Preliminary Report (26 Aug 2020 02:01):    No growth to date.    Culture - Blood (collected 24 Aug 2020 17:49)  Source: .Blood Blood-Peripheral  Preliminary Report (26 Aug 2020 02:01):    No growth to date.          RADIOLOGY:      PHYSICAL EXAM:    GENERAL: NAD, well-developed, AAOx3  HEENT:  Atraumatic, Normocephalic. EOMI, PERRLA, conjunctiva and sclera clear, No JVD  PULMONARY: Clear to auscultation bilaterally; No wheeze  CARDIOVASCULAR: Regular rate and rhythm; No murmurs, rubs, or gallops  GASTROINTESTINAL: Soft, Nontender, Nondistended; Bowel sounds present  MUSCULOSKELETAL:  2+ Peripheral Pulses, No clubbing, cyanosis, or edema  NEUROLOGY: non-focal  SKIN: No rashes or lesions      ADMISSION SUMMARY  Patient is a 74y old Female who presents with a chief complaint of Weakness, Abdominal Discomfort and   blood in urine (26 Aug 2020 08:03) AD BASURTO 74y Female  MRN#: [id number]       SUBJECTIVE  Patient is a 74y old Female who presents with a chief complaint of Weakness, Abdominal Discomfort and   blood in urine (26 Aug 2020 08:03)    74y F with PMH of CAD s/p stent, DLD, HTN, B josue lymphoma, recently diagnosed B cell lymphoproliferative disorder (follows Dr. Guardado), on Ibrutinib 140mg q48 presented to the ED with AMS, dehydration, and hematuria x1 day. Majority of the history obtained through daughter. Pt got lab work done today that showed a hemoglobin of 7.2. Pt normally AAOx2-3, but daughter noticed that pt was not acting per baseline today. Patient is  independent at baseline.  noticed blood in the urine x1 episode today. +abdominal discomfort . Normal BM. Daughter noticed dry lips on the pt and says that the last time patient had these symptoms was when she had a UTI a month ago. family also noticed decreased PO intake recently and  increased weakness.  Pt tested negative for COVID a few days ago. Daughter added  that  patient  recent completed  5 day course of bactrim  for recurrent UTI. At bedside the patient  denies fever  chills, abdominal pain,  but notes that she  had 3 ep of diarrhaea but cannot remember the character.      Today is hospital day 2d, and this morning she is stable.  Weaning Levophed.  Hb 7.2 this AM, receiving 1U prbc.  No other acute overnight events.       OBJECTIVE  PAST MEDICAL & SURGICAL HISTORY  Anemia  DM (diabetes mellitus)  High cholesterol  HTN (hypertension)  B-cell chronic lymphocytic leukemia variant  CAD (coronary artery disease): s/p stenting  H/O heart artery stent    ALLERGIES:  penicillin (Anaphylaxis; Hives)    MEDICATIONS:  STANDING MEDICATIONS  acyclovir   Oral Tab/Cap 400 milliGRAM(s) Oral daily  aspirin  chewable 81 milliGRAM(s) Oral daily  atorvastatin 80 milliGRAM(s) Oral at bedtime  chlorhexidine 4% Liquid 1 Application(s) Topical <User Schedule>  clopidogrel Tablet 75 milliGRAM(s) Oral daily  fluconAZOLE   Tablet 200 milliGRAM(s) Oral daily  folic acid 1 milliGRAM(s) Oral daily  meropenem  IVPB 1000 milliGRAM(s) IV Intermittent every 8 hours  norepinephrine Infusion 0.05 MICROgram(s)/kG/Min IV Continuous <Continuous>  pantoprazole    Tablet 40 milliGRAM(s) Oral before breakfast  phytonadione   Solution 2.5 milliGRAM(s) Oral once    PRN MEDICATIONS      VITAL SIGNS: Last 24 Hours  T(C): 35.7 (26 Aug 2020 08:00), Max: 36.9 (25 Aug 2020 18:00)  T(F): 96.3 (26 Aug 2020 08:00), Max: 98.5 (25 Aug 2020 18:00)  HR: 98 (26 Aug 2020 12:30) (90 - 114)  BP: 98/51 (26 Aug 2020 12:30) (77/43 - 134/52)  BP(mean): 65 (26 Aug 2020 12:30) (55 - 104)  RR: 21 (26 Aug 2020 12:30) (16 - 35)  SpO2: 94% (26 Aug 2020 12:30) (85% - 100%)    LABS:                        7.2    8.60  )-----------( 116      ( 26 Aug 2020 04:45 )             21.7     08-26    131<L>  |  99  |  16  ----------------------------<  121<H>  4.0   |  22  |  <0.5<L>    Ca    8.0<L>      26 Aug 2020 04:45  Phos  4.9     08-25    TPro  4.5<L>  /  Alb  3.0<L>  /  TBili  1.0  /  DBili  x   /  AST  28  /  ALT  16  /  AlkPhos  206<H>  08-26    PT/INR - ( 24 Aug 2020 17:49 )   PT: 20.40 sec;   INR: 1.77 ratio         PTT - ( 24 Aug 2020 17:49 )  PTT:39.4 sec  Urinalysis Basic - ( 24 Aug 2020 19:40 )    Color: Yellow / Appearance: Slightly Turbid / S.019 / pH: x  Gluc: x / Ketone: Negative  / Bili: Negative / Urobili: <2 mg/dL   Blood: x / Protein: 30 mg/dL / Nitrite: Negative   Leuk Esterase: Moderate / RBC: 7 /HPF / WBC 31 /HPF   Sq Epi: x / Non Sq Epi: 1 /HPF / Bacteria: Moderate            Culture - Urine (collected 24 Aug 2020 19:40)  Source: .Urine Clean Catch (Midstream)  Preliminary Report (26 Aug 2020 09:41):    >100,000 CFU/ml Gram positive organisms    <10,000 CFU/ml Normal Urogenital anthony present    Culture - Blood (collected 24 Aug 2020 18:50)  Source: .Blood Blood-Peripheral  Preliminary Report (26 Aug 2020 02:01):    No growth to date.    Culture - Blood (collected 24 Aug 2020 17:49)  Source: .Blood Blood-Peripheral  Preliminary Report (26 Aug 2020 02:01):    No growth to date.          RADIOLOGY:      PHYSICAL EXAM:    GENERAL: NAD, well-developed, AAOx3  HEENT:  Atraumatic, Normocephalic. EOMI, PERRLA, conjunctiva and sclera clear, No JVD, oral thrush?  PULMONARY: Clear to auscultation bilaterally; No wheeze  CARDIOVASCULAR: Tachycardic and rhythm; No murmurs, rubs, or gallops  GASTROINTESTINAL: Soft, moderate TTP to LUQ, Nondistended  MUSCULOSKELETAL:  2+ Peripheral Pulses, No clubbing, cyanosis, or edema  NEUROLOGY: non-focal  SKIN: No rashes or lesions      ADMISSION SUMMARY  Patient is a 74y old Female who presents with a chief complaint of Weakness, Abdominal Discomfort and   blood in urine (26 Aug 2020 08:03)

## 2020-08-26 NOTE — MEDICAL STUDENT PROGRESS NOTE(EDUCATION) - NS MD HP STUD ASPLAN ASSES FT
74 y.o F w PMHx of [facility] lymphoproliferative disorder, on ibrutinib, p/w urosepsis.

## 2020-08-26 NOTE — MEDICAL STUDENT PROGRESS NOTE(EDUCATION) - NS MD HP STUD ASPLAN PLAN FT
# Septic shock  - Tachy but normotensive, on levo 0.02  - Wean levo, d/c TLC when off pressors  - c/w shantal  - c/w NS  - F/u lytes, correct as needed      # H/o B cell CLL  - Heme/onc recommendations appreciated:  	- PET CT as of May 2020 showing diffuse FDG uptake in an enlarged spleen   	- Patient with almost weekly transfusions of pRBCs as outpatient   	- Patient started on Ibrutinib 3 tabs daily on 6/17/2020, dose reduced and eventually held due to cytopenias and infection  	- Hold ibrutinib for now  	- Thrombocytopenia stable  - Fluconazole ppx     #) Anemia   - Hgb 7.2 today    - Pt previously needed near weekly transfusion  - Keep Hg near 8, give 1 unit pRBCs    #) Elevated INR  - INR 1.77 today   - Heme/onc recommendation appreciated:  	- Recommend PO Vitamin K 2.5mg  	- Patient is on DAPT, had been getting ibrutinib which also increases risk of bleeding  - F/u INR	  	    #Diet: DASH  #DVT ppx: ASA  OOB to chair  DOWNGRADE TO CEU

## 2020-08-27 NOTE — PROGRESS NOTE ADULT - ASSESSMENT
IMPRESSION:    Sepsis present on admission  Septic shock  HO CLL on ibrutinib  UTI VRE     PLAN:    CNS:  No depressants     HEENT: Oral care    PULMONARY:  HOB @ 45 degrees.  Aspiration precautions     CARDIOVASCULAR:  DC IVF     GI: GI prophylaxis.  Feeding.      RENAL:  Follow up lytes.  Correct as needed    INFECTIOUS DISEASE: Zyvox for now .    HEMATOLOGICAL:  DVT prophylaxis.     ENDOCRINE:  Follow up FS.      MUSCULOSKELETAL:  OOB to chair     DC Murry  DC TLC once off levophed.  downgrade to CEU.

## 2020-08-27 NOTE — CONSULT NOTE ADULT - ASSESSMENT
ASSESSMENT  74y F with marginal cell lymphoma who presents with shock and hematuria     Anemia  DM (diabetes mellitus)  High cholesterol  HTN (hypertension)  B-cell chronic lymphocytic leukemia variant  CAD (coronary artery disease)      IMPRESSION  #Shock, possibly septic   - No symptoms of dysuria, although with acute encephelopathy, improved while on antibiotics  - CT Abd/Pelvis without pyelo  - Urine Cx VRE Faecium    #Marginal Cell Lymphoma  #Hyponatremia     #Abx allergy: penicillin - hives as a child      RECOMMENDATIONS  - continue linezolid for now, watch platelets while on linezolid  - if continues to improve, will stop meropenem soon  - follow-up cultures until negative  - please obtain procalcitonin with AM labs    Please call or message on [employer] Teams if with any questions.  Spectra [id number]

## 2020-08-27 NOTE — PROGRESS NOTE ADULT - ASSESSMENT
Patient is a 73 y/o F with PMH of marginal zone lymphoma, recurrent UTI, CAD s/p stent, DLD, HTN  admitted for sepsis due to suspected UTI.    #) Sepsis, resolved: suspected source is UTI, UCx positive for gram + bacteria >100,000 CFU; CT AP negative; patient recently treated with cipro and then Bactrim  - On meropenem, abx per CC (recent use cipro and then Bactrim)  - BCx  negative  - F/u UCx sensitivities   - Wean levophed as tolerated    #) Marginal zone lymphoma with splenomegaly: Flow cytometry revealed Monotypic B cells (18% of cells), positive for Kappa, CD19, CD20, FMC-7, negative for CD5, CD10, and CD23. 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 Atypical cells were negative for cyclin D1, molecular studies negative MYD88, L265P and BRAF V600 mutations. ANNEXIN1 seemed to be positive on T Cells and myeloid cells. The negative BRAF V600 makes hairy cell leukemia less likely. BCL2 was negative.  - PET CT as of May 2020 showing diffuse FDG uptake in an enlarged spleen   - Patient with almost weekly transfusions of pRBCs as outpatient   - Patient started on Ibrutinib 3 tabs daily on 6/17/2020, dose reduced and eventually held due to cytopenias and infection  - Hold ibrutinib for now      #) Thrombocytopenia with elevated INR: Thrombocytopenia likely due to lymphoma/splenomegaly, INR due to vit K deficiency; patient is on DAPT with thrombocytopenia and ibrutinib also increases bleeding risk so recommended to give Vitk K  - Received PO Vitamin K 2.5mg 8/26  - Recheck INR  - Monitor CBC  - Recheck INR	  - Patient is on DAPT, had been getting ibrutinib which also increases risk of bleeding	    #) 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    #) Sepsis, resolved: suspected source is UTI, UCx positive for gram + bacteria >100,000 CFU; CT AP negative; patient recently treated with cipro and then Bactrim  - On meropenem, abx per CC (recent use cipro and then Bactrim)  - BCx  negative  - F/u UCx sensitivities   - Wean levophed as tolerated    #) Marginal zone lymphoma with splenomegaly: Flow cytometry revealed Monotypic B cells (18% of cells), positive for Kappa, CD19, CD20, FMC-7, negative for CD5, CD10, and CD23. 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 Atypical cells were negative for cyclin D1, molecular studies negative MYD88, L265P and BRAF V600 mutations. ANNEXIN1 seemed to be positive on T Cells and myeloid cells. The negative BRAF V600 makes hairy cell leukemia less likely. BCL2 was negative.  - PET CT as of May 2020 showing diffuse FDG uptake in an enlarged spleen   - Patient with almost weekly transfusions of pRBCs as outpatient   - Patient started on Ibrutinib 3 tabs daily on 6/17/2020, dose reduced and eventually held due to cytopenias and infection  - Hold ibrutinib for now	    #) Thrombocytopenia with elevated INR: Thrombocytopenia likely due to lymphoma/splenomegaly, INR due to vit K deficiency  - Received PO Vitamin K 2.5mg 8/26 (patient is on DAPT, had been getting ibrutinib which also increases risk of bleeding in the setting of thrombocytopenia)  - Recheck INR  - Monitor CBC    #) 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

## 2020-08-27 NOTE — PROGRESS NOTE ADULT - SUBJECTIVE AND OBJECTIVE BOX
AD BASURTO 74y Female  MRN#: [id number]       SUBJECTIVE  Patient is a 74y old Female who presents with a chief complaint of Weakness, Abdominal Discomfort and   blood in urine (27 Aug 2020 08:31)    74y F with PMH of CAD s/p stent, DLD, HTN, B josue lymphoma, recently diagnosed B cell lymphoproliferative disorder (follows Dr. Guardado), on Ibrutinib 140mg q48, chronic anemia, presented to the ED with AMS, dehydration, and hematuria x1 day. Majority of the history obtained through daughter. Pt got lab work done today that showed a hemoglobin of 7.2. Pt normally AAOx2-3, but daughter noticed that pt was not acting per baseline today. Patient is  independent at baseline.  noticed blood in the urine x1 episode today. +abdominal discomfort . Normal BM. Daughter noticed dry lips on the pt and says that the last time patient had these symptoms was when she had a UTI a month ago. family also noticed decreased PO intake recently and  increased weakness.  Pt tested negative for COVID a few days ago. Daughter added  that  patient  recent completed  5 day course of bactrim  for recurrent UTI. At bedside the patient  denies fever  chills, abdominal pain,  but notes that she  had 3 ep of diarrhaea but cannot remember the character.    In ICU, she required low dose levophed and was treated for septic shock s/p UTI with meropenem and vanc.  Today is hospital day 2d, and this morning she is stable.  Weaning Levophed.  Then d/c TLC. Hb 7.2 this AM, received 1U prbc.  Received 1 unit total during ICU stay.  Pt is chronically anemic.  No other acute overnight events.       Today she is stable.  No acute complaints.  Off pressors. Downgrading this AM.  No acute overnight events.     OBJECTIVE  PAST MEDICAL & SURGICAL HISTORY  Anemia  DM (diabetes mellitus)  High cholesterol  HTN (hypertension)  B-cell chronic lymphocytic leukemia variant  CAD (coronary artery disease): s/p stenting  H/O heart artery stent    ALLERGIES:  penicillin (Anaphylaxis; Hives)    MEDICATIONS:  STANDING MEDICATIONS  acyclovir   Oral Tab/Cap 400 milliGRAM(s) Oral daily  aspirin  chewable 81 milliGRAM(s) Oral daily  atorvastatin 80 milliGRAM(s) Oral at bedtime  chlorhexidine 4% Liquid 1 Application(s) Topical <User Schedule>  clopidogrel Tablet 75 milliGRAM(s) Oral daily  fluconAZOLE   Tablet 200 milliGRAM(s) Oral daily  folic acid 1 milliGRAM(s) Oral daily  linezolid  IVPB      linezolid  IVPB 600 milliGRAM(s) IV Intermittent every 12 hours  meropenem  IVPB 1000 milliGRAM(s) IV Intermittent every 8 hours  pantoprazole    Tablet 40 milliGRAM(s) Oral before breakfast    PRN MEDICATIONS      VITAL SIGNS: Last 24 Hours  T(C): 36.7 (27 Aug 2020 14:20), Max: 36.7 (27 Aug 2020 10:00)  T(F): 98.1 (27 Aug 2020 14:20), Max: 98.1 (27 Aug 2020 14:20)  HR: 108 (27 Aug 2020 14:20) (94 - 110)  BP: 108/59 (27 Aug 2020 14:20) (92/56 - 135/57)  BP(mean): 78 (27 Aug 2020 14:20) (58 - 88)  RR: 18 (27 Aug 2020 14:20) (17 - 23)  SpO2: 96% (27 Aug 2020 10:00) (78% - 100%)    LABS:                        8.5    9.41  )-----------( 97       ( 27 Aug 2020 04:08 )             25.3     08-27    131<L>  |  100  |  12  ----------------------------<  189<H>  3.9   |  22  |  <0.5<L>    Ca    8.0<L>      27 Aug 2020 04:08  Mg     1.7     08-27    TPro  4.2<L>  /  Alb  3.0<L>  /  TBili  1.1  /  DBili  x   /  AST  26  /  ALT  17  /  AlkPhos  238<H>  08-27              Culture - Urine (collected 24 Aug 2020 19:40)  Source: .Urine Clean Catch (Midstream)  Final Report (27 Aug 2020 08:09):    >100,000 CFU/ml Enterococcus faecium (vancomycin resistant)    <10,000 CFU/ml Normal Urogenital anthony present  Organism: Enterococcus faecium (vancomycin resistant) (27 Aug 2020 08:09)  Organism: Enterococcus faecium (vancomycin resistant) (27 Aug 2020 08:09)    Culture - Blood (collected 24 Aug 2020 18:50)  Source: .Blood Blood-Peripheral  Preliminary Report (26 Aug 2020 02:01):    No growth to date.    Culture - Blood (collected 24 Aug 2020 17:49)  Source: .Blood Blood-Peripheral  Preliminary Report (26 Aug 2020 02:01):    No growth to date.          RADIOLOGY:      PHYSICAL EXAM:    GENERAL: NAD, well-developed, AAOx3  HEENT:  Atraumatic, Normocephalic. EOMI, PERRLA, conjunctiva and sclera clear, No JVD, oral thrush?  PULMONARY: Clear to auscultation bilaterally; No wheeze  CARDIOVASCULAR: Tachycardic and rhythm; No murmurs, rubs, or gallops  GASTROINTESTINAL: Soft, mild TTP to LUQ, Nondistended  MUSCULOSKELETAL:  2+ Peripheral Pulses, No clubbing, cyanosis, or edema  NEUROLOGY: non-focal  SKIN: No rashes or lesions      ADMISSION SUMMARY  Patient is a 74y old Female who presents with a chief complaint of Weakness, Abdominal Discomfort and   blood in urine (27 Aug 2020 08:31)

## 2020-08-27 NOTE — PROGRESS NOTE ADULT - ASSESSMENT
75 y/o F w PMHx of B cell lymphoproliferative disorder on Imbrutinib, DM, HTN, CAD s/p stent p/w AMS, dehydration, & hematuria x1 day admitted for septic shock due to UTI    # Septic shock s/p UTI  - CT Abd Pel and CT Head unremarkable  - UA +ve for Leuk Est and WBCs  - Tachy but normotensive  - Off pressors  - Start Linezolid  - Fu Cx  - d/c vanc  - c/w shantal  - c/w NS at 75 cc/hr  - F/u lytes, correct as needed  - d/c roblero  - LE Duplex negative  - Lactate improved 3.2 to 1.6 today  - Hb 8.5 today  - f/u Hb  - f/u ABG    # H/o B cell CLL  - Heme/onc consulted - fu recs  - Fluconazole ppx   - Acyclovir ppx    #H/o CAD s/p stent  - Plavix 75 mg qd    #Diet: DASH  #DVT ppx: ASA  Dispo: Downgrade to CEU  OOB to chair

## 2020-08-27 NOTE — PROGRESS NOTE ADULT - SUBJECTIVE AND OBJECTIVE BOX
Patient is a 74y old  Female who presents with a chief complaint of Weakness, Abdominal Discomfort and   blood in urine (27 Aug 2020 08:05)        Over Night Events:  Was on Low dose levophed overnight./  Now off Levophed.            ROS:     All ROS are negative except HPI         PHYSICAL EXAM    ICU Vital Signs Last 24 Hrs  T(C): 36.1 (27 Aug 2020 08:00), Max: 36.5 (26 Aug 2020 20:00)  T(F): 97 (27 Aug 2020 08:00), Max: 97.7 (26 Aug 2020 20:00)  HR: 98 (27 Aug 2020 08:00) (90 - 110)  BP: 118/59 (27 Aug 2020 08:00) (77/43 - 140/66)  BP(mean): 77 (27 Aug 2020 08:00) (55 - 102)  ABP: --  ABP(mean): --  RR: 18 (27 Aug 2020 08:00) (16 - 25)  SpO2: 98% (27 Aug 2020 08:00) (78% - 100%)      CONSTITUTIONAL:  Well nourished.  NAD    ENT:   Airway patent,   Mouth with normal mucosa.   No thrush    EYES:   Pupils equal,   Round and reactive to light.    CARDIAC:   Normal rate,   Regular rhythm.    No edema      Vascular:  Normal systolic impulse  No Carotid bruits    RESPIRATORY:   No wheezing  Bilateral BS  Normal chest expansion  Not tachypneic,  No use of accessory muscles    GASTROINTESTINAL:  Abdomen soft,   Non-tender,   No guarding,   + BS    MUSCULOSKELETAL:   Range of motion is not limited,  No clubbing, cyanosis    NEUROLOGICAL:   Alert confused.    No motor  deficits.    SKIN:   Skin normal color for race,   Warm and dry and intact.   No evidence of rash.    PSYCHIATRIC:   Normal mood and affect.   No apparent risk to self or others.    HEMATOLOGICAL:  No cervical  lymphadenopathy.  no inguinal lymphadenopathy      08-26-20 @ 07:01  -  08-27-20 @ 07:00  --------------------------------------------------------  IN:    IV PiggyBack: 100 mL    Oral Fluid: 60 mL  Total IN: 160 mL    OUT:    Ureteral Catheter: 435 mL  Total OUT: 435 mL    Total NET: -275 mL          LABS:                            8.5    9.41  )-----------( 97       ( 27 Aug 2020 04:08 )             25.3                                               08-27    131<L>  |  100  |  12  ----------------------------<  189<H>  3.9   |  22  |  <0.5<L>    Ca    8.0<L>      27 Aug 2020 04:08  Mg     1.7     08-27    TPro  4.2<L>  /  Alb  3.0<L>  /  TBili  1.1  /  DBili  x   /  AST  26  /  ALT  17  /  AlkPhos  238<H>  08-27                                                                                           LIVER FUNCTIONS - ( 27 Aug 2020 04:08 )  Alb: 3.0 g/dL / Pro: 4.2 g/dL / ALK PHOS: 238 U/L / ALT: 17 U/L / AST: 26 U/L / GGT: x                                                  Culture - Urine (collected 24 Aug 2020 19:40)  Source: .Urine Clean Catch (Midstream)  Final Report (27 Aug 2020 08:09):    >100,000 CFU/ml Enterococcus faecium (vancomycin resistant)    <10,000 CFU/ml Normal Urogenital anthony present  Organism: Enterococcus faecium (vancomycin resistant) (27 Aug 2020 08:09)  Organism: Enterococcus faecium (vancomycin resistant) (27 Aug 2020 08:09)    Culture - Blood (collected 24 Aug 2020 18:50)  Source: .Blood Blood-Peripheral  Preliminary Report (26 Aug 2020 02:01):    No growth to date.    Culture - Blood (collected 24 Aug 2020 17:49)  Source: .Blood Blood-Peripheral  Preliminary Report (26 Aug 2020 02:01):    No growth to date.                                                                                           MEDICATIONS  (STANDING):  acyclovir   Oral Tab/Cap 400 milliGRAM(s) Oral daily  aspirin  chewable 81 milliGRAM(s) Oral daily  atorvastatin 80 milliGRAM(s) Oral at bedtime  chlorhexidine 4% Liquid 1 Application(s) Topical <User Schedule>  clopidogrel Tablet 75 milliGRAM(s) Oral daily  fluconAZOLE   Tablet 200 milliGRAM(s) Oral daily  folic acid 1 milliGRAM(s) Oral daily  meropenem  IVPB 1000 milliGRAM(s) IV Intermittent every 8 hours  norepinephrine Infusion 0.05 MICROgram(s)/kG/Min (4.26 mL/Hr) IV Continuous <Continuous>  pantoprazole    Tablet 40 milliGRAM(s) Oral before breakfast    MEDICATIONS  (PRN):      New X-rays reviewed:                                                                                  ECHO    CXR interpreted by me:

## 2020-08-27 NOTE — PROGRESS NOTE ADULT - SUBJECTIVE AND OBJECTIVE BOX
24H events:  Received 1 unit of PRBCs  Received PO Vit K 2.5mg  Urine growing gram + bacteria  Levophed tapered yesterday  Remains on low amount of o2  No complaints    PAST MEDICAL & SURGICAL HISTORY  Anemia  DM (diabetes mellitus)  High cholesterol  HTN (hypertension)  B-cell chronic lymphocytic leukemia variant  CAD (coronary artery disease): s/p stenting  H/O heart artery stent    SOCIAL HISTORY:  Negative for smoking/alcohol/drug use.     ALLERGIES:  penicillin (Anaphylaxis; Hives)    MEDICATIONS:  STANDING MEDICATIONS  acyclovir   Oral Tab/Cap 400 milliGRAM(s) Oral daily  aspirin  chewable 81 milliGRAM(s) Oral daily  atorvastatin 80 milliGRAM(s) Oral at bedtime  chlorhexidine 4% Liquid 1 Application(s) Topical <User Schedule>  clopidogrel Tablet 75 milliGRAM(s) Oral daily  fluconAZOLE   Tablet 200 milliGRAM(s) Oral daily  folic acid 1 milliGRAM(s) Oral daily  meropenem  IVPB 1000 milliGRAM(s) IV Intermittent every 8 hours  norepinephrine Infusion 0.05 MICROgram(s)/kG/Min IV Continuous <Continuous>  pantoprazole    Tablet 40 milliGRAM(s) Oral before breakfast    PRN MEDICATIONS    VITALS:   T(F): 97.7  HR: 96  BP: 117/54  RR: 19  SpO2: 99%    LABS:                        8.5    9.41  )-----------( 97       ( 27 Aug 2020 04:08 )             25.3     08-27    131<L>  |  100  |  12  ----------------------------<  189<H>  3.9   |  22  |  <0.5<L>    Ca    8.0<L>      27 Aug 2020 04:08  Mg     1.7     08-27    TPro  4.2<L>  /  Alb  3.0<L>  /  TBili  1.1  /  DBili  x   /  AST  26  /  ALT  17  /  AlkPhos  238<H>  08-27              Culture - Urine (collected 24 Aug 2020 19:40)  Source: .Urine Clean Catch (Midstream)  Preliminary Report (26 Aug 2020 09:41):    >100,000 CFU/ml Gram positive organisms    <10,000 CFU/ml Normal Urogenital anthony present    Culture - Blood (collected 24 Aug 2020 18:50)  Source: .Blood Blood-Peripheral  Preliminary Report (26 Aug 2020 02:01):    No growth to date.    Culture - Blood (collected 24 Aug 2020 17:49)  Source: .Blood Blood-Peripheral  Preliminary Report (26 Aug 2020 02:01):    No growth to date.          RADIOLOGY:    PHYSICAL EXAM:  GEN: No acute distress  HENT: NCAT, EOMI  LYMPH: No appreciable adenopathy  LUNGS: No respiratory distress, clear to auscultation bilaterally   HEART: regular rate and rhythm  ABD: Soft, non-tender, non-distended  SKIN: No rash  EXT: No edema  NEURO: No focal neurologic deficit

## 2020-08-27 NOTE — CONSULT NOTE ADULT - SUBJECTIVE AND OBJECTIVE BOX
AD BASURTO  74y, Female  Allergy: penicillin (Anaphylaxis; Hives)      CHIEF COMPLAINT: Weakness, Abdominal Discomfort and   blood in urine (27 Aug 2020 16:08)      LOS  3d    HPI:  74y F with PMH of CAD s/p stent, DLD, HTN, B josue lymphoma, recently diagnosed B cell lymphoproliferative disorder (follows Dr. Guardado), on Ibrutinib 140mg q48 presented to the ED with AMS, dehydration, and hematuria x1 day. Majority of the history obtained through daughter. Pt got lab work done today that showed a hemoglobin of 7.2. Pt normally AAOx2-3, but daughter noticed that pt was not acting per baseline today. Patient is  independent at baseline.  noticed blood in the urine x1 episode today. +abdominal discomfort . Normal BM. Daughter noticed dry lips on the pt and says that the last time patient had these symptoms was when she had a UTI a month ago. family also noticed decreased PO intake recently and  increased weakness.  Pt tested negative for COVID a few days ago. Daughter added  that  patient  recent completed  5 day course of bactrim  for recurrent UTI. At bedside the patient  denies fever  chills, abdominal pain,  but notes that she  had 3 ep of diarrhaea but cannot remember the character. (24 Aug 2020 23:49)      INFECTIOUS DISEASE HISTORY:  On Linezolid and Meropenem  Blood Cx have remained negative  Urine Cx Growing VRE fecium.     PAST MEDICAL & SURGICAL HISTORY:  Anemia  DM (diabetes mellitus)  High cholesterol  HTN (hypertension)  B-cell chronic lymphocytic leukemia variant  CAD (coronary artery disease): s/p stenting  H/O heart artery stent      FAMILY HISTORY  Family history of diabetes mellitus (DM)  No pertinent family history in first degree relatives      SOCIAL HISTORY  Social History:  Quit Smoking 5cigarets per day  6 weeks ago  Denies ETOH  Use  Denies  Illicit drug Use  Retired   (24 Aug 2020 23:49)        ROS  General: Denies rigors, nightsweats  HEENT: Denies headache, rhinorrhea, sore throat, eye pain  CV: Denies CP, palpitations  PULM: Denies wheezing, hemoptysis  GI: Denies hematemesis, hematochezia, melena  : Denies discharge, hematuria  MSK: Denies arthralgias, myalgias  SKIN: Denies rash, lesions  NEURO: Denies paresthesias, weakness  PSYCH: Denies depression, anxiety    VITALS:  T(F): 98.1, Max: 98.1 (08-27-20 @ 14:20)  HR: 108  BP: 108/59  RR: 18Vital Signs Last 24 Hrs  T(C): 36.7 (27 Aug 2020 14:20), Max: 36.7 (27 Aug 2020 10:00)  T(F): 98.1 (27 Aug 2020 14:20), Max: 98.1 (27 Aug 2020 14:20)  HR: 108 (27 Aug 2020 14:20) (94 - 110)  BP: 108/59 (27 Aug 2020 14:20) (92/56 - 135/57)  BP(mean): 78 (27 Aug 2020 14:20) (58 - 88)  RR: 18 (27 Aug 2020 14:20) (17 - 23)  SpO2: 96% (27 Aug 2020 10:00) (78% - 100%)    PHYSICAL EXAM:  Gen: NAD, resting in bed  HEENT: Normocephalic, atraumatic  Neck: supple, no lymphadenopathy  CV: Regular rate & regular rhythm  Lungs: decreased BS at bases, no fremitus  Abdomen: Soft, BS present  Ext: Warm, well perfused  Neuro: non focal, awake  Skin: no rash, no erythema  Lines: no phlebitis    TESTS & MEASUREMENTS:                        8.5    9.41  )-----------( 97       ( 27 Aug 2020 04:08 )             25.3     08-27    131<L>  |  100  |  12  ----------------------------<  189<H>  3.9   |  22  |  <0.5<L>    Ca    8.0<L>      27 Aug 2020 04:08  Mg     1.7     08-27    TPro  4.2<L>  /  Alb  3.0<L>  /  TBili  1.1  /  DBili  x   /  AST  26  /  ALT  17  /  AlkPhos  238<H>  08-27    eGFR if Non African American: 103 mL/min/1.73M2 (08-27-20 @ 04:08)  eGFR if African American: 119 mL/min/1.73M2 (08-27-20 @ 04:08)    LIVER FUNCTIONS - ( 27 Aug 2020 04:08 )  Alb: 3.0 g/dL / Pro: 4.2 g/dL / ALK PHOS: 238 U/L / ALT: 17 U/L / AST: 26 U/L / GGT: x               Culture - Urine (collected 08-24-20 @ 19:40)  Source: .Urine Clean Catch (Midstream)  Final Report (08-27-20 @ 08:09):    >100,000 CFU/ml Enterococcus faecium (vancomycin resistant)    <10,000 CFU/ml Normal Urogenital anthony present  Organism: Enterococcus faecium (vancomycin resistant) (08-27-20 @ 08:09)  Organism: Enterococcus faecium (vancomycin resistant) (08-27-20 @ 08:09)      -  Ampicillin: R >8 Predicts results to ampicillin/sulbactam, amoxacillin-clavulanate and  piperacillin-tazobactam.      -  Ciprofloxacin: R >2      -  Daptomycin: SDD 4 The breakpoint for SDD (sensitive dose dependent)is based on a dosage regimen of 8-12 mg/kg administered every 24 h in adults and is intended for serious infections due to E. faecium. Consultation with an infectious diseases specialist is recommended.      -  Levofloxacin: R >4      -  Linezolid: S 2      -  Nitrofurantoin: S <=32 Should not be used to treat pyelonephritis.      -  Tetra/Doxy: S <=4      -  Vancomycin: R >16      Method Type: MAHENDRA    Culture - Blood (collected 08-24-20 @ 18:50)  Source: .Blood Blood-Peripheral  Preliminary Report (08-26-20 @ 02:01):    No growth to date.    Culture - Blood (collected 08-24-20 @ 17:49)  Source: .Blood Blood-Peripheral  Preliminary Report (08-26-20 @ 02:01):    No growth to date.    Culture - Urine (collected 07-07-20 @ 06:30)  Source: .Urine Clean Catch (Midstream)  Final Report (07-08-20 @ 12:37):    No growth    Culture - Blood (collected 07-07-20 @ 05:27)  Source: .Blood None  Final Report (07-12-20 @ 13:00):    No Growth Final    Culture - Blood (collected 07-06-20 @ 20:23)  Source: .Blood Blood-Venous  Final Report (07-12-20 @ 03:00):    No Growth Final    Culture - Blood (collected 07-05-20 @ 17:08)  Source: .Blood Blood-Peripheral  Final Report (07-11-20 @ 01:01):    No Growth Final    Culture - Blood (collected 07-05-20 @ 17:08)  Source: .Blood Blood-Peripheral  Final Report (07-11-20 @ 01:01):    No Growth Final    Culture - Urine (collected 04-29-20 @ 02:00)  Source: .Urine Clean Catch (Midstream)  Final Report (04-30-20 @ 10:12):    <10,000 CFU/mL Normal Urogenital Anthony        Lactate, Blood: 1.6 mmol/L (08-25-20 @ 05:04)  Lactate, Blood: 3.1 mmol/L (08-24-20 @ 19:51)  Lactate, Blood: 2.9 mmol/L (08-24-20 @ 17:49)      INFECTIOUS DISEASES TESTING  MRSA PCR Result.: Negative (08-25-20 @ 05:04)  COVID-19 PCR: NotDetec (08-24-20 @ 19:00)  Procalcitonin, Serum: 0.26 ng/mL (07-06-20 @ 09:30)  COVID-19 PCR: NotDetec (07-05-20 @ 18:24)  COVID-19 PCR: NotDetec (06-10-20 @ 14:21)  COVID-19 PCR: NotDetec (05-18-20 @ 14:28)      RADIOLOGY & ADDITIONAL TESTS:  I have personally reviewed the last Chest xray  CXR  Xray Chest 1 View AP/PA:   EXAM:  XR CHEST FRONTAL 1V            PROCEDURE DATE:  08/25/2020            INTERPRETATION:  Clinical History / Reason for exam: Post TLC    Comparison : Chest radiograph August 24, 2020.    Technique/Positioning: Frontal chest radiograph.    Findings:    Support devices: Interval placement of a right IJ central venous catheter with tip in the SVC.    Cardiac/mediastinum/hilum: Unchanged.    Lung parenchyma/Pleura: No pleural effusions or pneumothorax.    Skeleton/soft tissues: Unchanged.    Impression:    New right IJ central venous catheter with tip in the SVC. No pneumothorax.                SEBASTIAN RAHMAN M.D., ATTENDING RADIOLOGIST  This document has been electronically signed. Aug 25 2020  9:39AM             (08-25-20 @ 01:45)      CT  CT Abdomen and Pelvis w/ IV Cont:   EXAM:  CT ABDOMEN AND PELVIS IC            PROCEDURE DATE:  08/24/2020            INTERPRETATION:  CLINICAL HISTORY / REASON FOR EXAM: Abdominal pain, history of B-cell lymphoproliferative disorder.    TECHNIQUE: Contiguous axial CT images were obtained from the lower chest to the pubic symphysis following the administration of 100 mL Omnipaque 350 intravenous contrast. Oral contrast was not administered. Reformatted images in the coronal and sagittal planes were acquired.    COMPARISON CT: CT abdomen and pelvis from April 30, 2020    FINDINGS:    LOWER CHEST: There is mild bibasilar subsegmental atelectasis.    HEPATOBILIARY: The liver is normal in appearance. No evidence of intra or extrahepatic biliary dilatation. The gallbladder is suboptimally imaged.    SPLEEN: Stable splenomegaly, measuring approximately 25.8 cm craniocaudal dimension (coronal series 6, image 25).    PANCREAS: Unremarkable.    ADRENAL GLANDS: Unremarkable.    KIDNEYS: Symmetric pattern of renal enhancement. No evidence of a mass, hydronephrosis or hydroureter. Stable right renal lower pole cyst measuring 2.5 cm. Other subcentimeter hypodensities, too small to further characterize.    ABDOMINOPELVIC NODES: No enlarged abdominal or pelvic lymph nodes.    PELVICORGANS: Stable, enlarged fibroid uterus.    PERITONEUM/MESENTERY/BOWEL: Sigmoid diverticulosis. No obstruction, intraperitoneal free air or ascites.    BONES/SOFT TISSUES: Diffuse osteopenia. Scoliosis. Degenerative changes of thoracolumbar spine.    VASCULAR: Calcified and noncalcified atherosclerotic disease of the aorta.      IMPRESSION:      Stable, marked splenomegaly, consistent with patient's known history of B-cell lymphoproliferative disorder.    Otherwise, no CT evidence of acute intra-abdominal pathology.            AHMET MONTIEL M.D., RESIDENT RADIOLOGIST  This document has been electronically signed.  FRANCO MCDANIEL M.D., ATTENDING RADIOLOGIST  This document has been electronically signed. Aug 24 2020 10:32PM             (08-24-20 @ 21:49)      CARDIOLOGY TESTING  12 Lead ECG:   Ventricular Rate 134 BPM    Atrial Rate 134 BPM    P-R Interval 156 ms    QRS Duration 116 ms    Q-T Interval 284 ms    QTC Calculation(Bezet) 424 ms    P Axis 88 degrees    R Axis 90 degrees    T Axis 39 degrees    Diagnosis Line Sinus tachycardia  Rightward axis  Low voltage QRS  RSR' or QR pattern in V1 suggests right ventricular conduction delay  Borderline ECG    Confirmed by VERONIKA FOSTER MD ([id number]) on 8/24/2020 11:09:38 PM (08-24-20 @ 17:22)      MEDICATIONS  acyclovir   Oral Tab/Cap 400 Oral daily  aspirin  chewable 81 Oral daily  atorvastatin 80 Oral at bedtime  chlorhexidine 4% Liquid 1 Topical <User Schedule>  clopidogrel Tablet 75 Oral daily  fluconAZOLE   Tablet 200 Oral daily  folic acid 1 Oral daily  linezolid  IVPB     linezolid  IVPB 600 IV Intermittent every 12 hours  meropenem  IVPB 1000 IV Intermittent every 8 hours  pantoprazole    Tablet 40 Oral before breakfast      Weight  Weight (kg): 56.2 (08-25-20 @ 01:30)    ANTIBIOTICS:  acyclovir   Oral Tab/Cap 400 milliGRAM(s) Oral daily  fluconAZOLE   Tablet 200 milliGRAM(s) Oral daily  linezolid  IVPB      linezolid  IVPB 600 milliGRAM(s) IV Intermittent every 12 hours  meropenem  IVPB 1000 milliGRAM(s) IV Intermittent every 8 hours      ALLERGIES:  penicillin (Anaphylaxis; Hives)

## 2020-08-28 NOTE — DIETITIAN INITIAL EVALUATION ADULT. - CONTINUE CURRENT NUTRITION CARE PLAN
1. Add Ensure compact BID. 2. Continue DASH/TLC diet with emphasis on small/frequent meals for optimal tolerance.

## 2020-08-28 NOTE — PROGRESS NOTE ADULT - ASSESSMENT
ASSESSMENT  74y F with marginal cell lymphoma who presents with shock and hematuria     Anemia  DM (diabetes mellitus)  High cholesterol  HTN (hypertension)  B-cell chronic lymphocytic leukemia variant  CAD (coronary artery disease)      IMPRESSION  #Shock, possibly septic   - No symptoms of dysuria, although with acute encephelopathy, improved while on antibiotics  - CT Abd/Pelvis without pyelo  - Urine Cx VRE Faecium (Linszolid suscepbile, Dapto SDD MAHENDRA 4)    #Marginal Cell Lymphoma  #Hyponatremia     #Abx allergy: penicillin - hives as a child    Creatinine, Serum: <0.5 mg/dL (08.28.20 @ 06:47)    RECOMMENDATIONS  - continue linezolid for now, watch platelets while on linezolid  - stop meropenem  - if PLT continue to drop over weekend, stop meropenem and switch to daptomycin 8 mg/kg q 24 hours (with CK baseline check)  - follow-up cultures until negative  - please obtain procalcitonin with AM labs    Please call or message on [employer] Teams if with any questions.  Spectra [phone]      This is a preliminary incomplete pended note, all final recommendations to follow after interview and examination of the patient. ASSESSMENT  74y F with marginal cell lymphoma who presents with shock and hematuria     Anemia  DM (diabetes mellitus)  High cholesterol  HTN (hypertension)  B-cell chronic lymphocytic leukemia variant  CAD (coronary artery disease)      IMPRESSION  #Shock, possibly septic   - No symptoms of dysuria, although with acute encephelopathy, improved while on antibiotics  - CT Abd/Pelvis without pyelo  - Urine Cx VRE Faecium Linezolid suscepbile, Dapto SDD MAHENDRA 4); although appears to have responded while on cefepime/meropenem as well    #Marginal Cell Lymphoma  #Hyponatremia     #Abx allergy: penicillin - hives as a child      RECOMMENDATIONS  - continue linezolid for now, watch platelets while on linezolid (plan for 7 day course)  - stop meropenem  - if PLT continue to drop over weekend, stop linezolid and switch to daptomycin 8 mg/kg q 24 hours (with CK baseline check)  - follow-up cultures until negative  - please obtain procalcitonin with AM labs  - if febrile again over weekend, restart cefepime while obtaining cultures     Please call or message on [employer] Teams if with any questions.  Spectra [phone]

## 2020-08-28 NOTE — PROGRESS NOTE ADULT - ASSESSMENT
Patient is a 73 y/o F with PMH of marginal zone lymphoma, recurrent UTI, CAD s/p stent, DLD, HTN  admitted for sepsis due to suspected UTI.    #) Sepsis, resolved secondary to VRE in Urine   - On meropenem and Linezolid   - BCx  negative  - Platelets to be monitored while on Linezolid     #) Marginal zone lymphoma with splenomegaly: Flow cytometry revealed Monotypic B cells (18% of cells), positive for Kappa, CD19, CD20, FMC-7, negative for CD5, CD10, and CD23. 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 Atypical cells were negative for cyclin D1, molecular studies negative MYD88, L265P and BRAF V600 mutations. ANNEXIN1 seemed to be positive on T Cells and myeloid cells. The negative BRAF V600 makes hairy cell leukemia less likely. BCL2 was negative.  - PET CT as of May 2020 showing diffuse FDG uptake in an enlarged spleen   - Patient with almost weekly transfusions of pRBCs as outpatient   - Patient started on Ibrutinib 3 tabs daily on 6/17/2020, dose reduced and eventually held due to cytopenias and infection  - Hold ibrutinib for now	    #) Thrombocytopenia with elevated INR: Thrombocytopenia likely due to lymphoma/splenomegaly, INR due to vit K deficiency  - Received PO Vitamin K 2.5mg 8/26 (patient is on DAPT, had been getting ibrutinib which also increases risk of bleeding in the setting of thrombocytopenia)  - Recheck INR--> 1.37 on 8/27   - Monitor CBC    #) 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    #) Sepsis, resolved secondary to VRE in Urine   - On meropenem and Linezolid   - BCx  negative  - Platelets to be monitored while on Linezolid     #) Marginal zone lymphoma with splenomegaly: Flow cytometry revealed Monotypic B cells (18% of cells), positive for Kappa, CD19, CD20, FMC-7, negative for CD5, CD10, and CD23. 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 Atypical cells were negative for cyclin D1, molecular studies negative MYD88, L265P and BRAF V600 mutations. ANNEXIN1 seemed to be positive on T Cells and myeloid cells. The negative BRAF V600 makes hairy cell leukemia less likely. BCL2 was negative.  - PET CT as of May 2020 showing diffuse FDG uptake in an enlarged spleen   - Patient with almost weekly transfusions of pRBCs as outpatient   - Patient started on Ibrutinib 3 tabs daily on 6/17/2020, dose reduced and eventually held due to cytopenias and infection  - Hold ibrutinib for now	    #) Thrombocytopenia with elevated INR: Thrombocytopenia likely due to lymphoma/splenomegaly, INR due to vit K deficiency  - Received PO Vitamin K 2.5mg 8/26 (patient is on DAPT, had been getting ibrutinib which also increases risk of bleeding in the setting of thrombocytopenia)  - Recheck INR--> 1.37 on 8/27   - Monitor CBC  - Can start DVT PPx.     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    #) Sepsis, resolved secondary to VRE in Urine   - On meropenem and Linezolid   - BCx  negative  - Platelets to be monitored while on Linezolid     #) Marginal zone lymphoma with splenomegaly: Flow cytometry revealed Monotypic B cells (18% of cells), positive for Kappa, CD19, CD20, FMC-7, negative for CD5, CD10, and CD23. 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 Atypical cells were negative for cyclin D1, molecular studies negative MYD88, L265P and BRAF V600 mutations. ANNEXIN1 seemed to be positive on T Cells and myeloid cells. The negative BRAF V600 makes hairy cell leukemia less likely. BCL2 was negative.  - PET CT as of May 2020 showing diffuse FDG uptake in an enlarged spleen   - Patient with almost weekly transfusions of pRBCs as outpatient   - Patient started on Ibrutinib 3 tabs daily on 6/17/2020, dose reduced and eventually held due to cytopenias and infection  - Hold ibrutinib for now	    #) Thrombocytopenia with elevated INR: Thrombocytopenia likely due to lymphoma/splenomegaly, INR due to vit K deficiency  - Received PO Vitamin K 2.5mg 8/26 (patient is on DAPT, had been getting ibrutinib which also increases risk of bleeding in the setting of thrombocytopenia)  - Recheck INR--> 1.37 on 8/27   - Monitor CBC  - Can start DVT PPx as long as Platelets >20,000 and not bleeding.       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

## 2020-08-28 NOTE — DIETITIAN INITIAL EVALUATION ADULT. - FACTORS AFF FOOD INTAKE
abd distention/ discomfort and early satiety noted. poor dentition but denies difficulty chewing/swallowing. moderate loose BM today 8/28. MAP: 80@1pm.
Implemented All Universal Safety Interventions:  [place] to call system. Call bell, personal items and telephone within reach. Instruct patient to call for assistance. Room bathroom lighting operational. Non-slip footwear when patient is off stretcher. Physically safe environment: no spills, clutter or unnecessary equipment. Stretcher in lowest position, wheels locked, appropriate side rails in place.

## 2020-08-28 NOTE — PROGRESS NOTE ADULT - SUBJECTIVE AND OBJECTIVE BOX
Patient is a 74y old  Female who presents with a chief complaint of Weakness, Abdominal Discomfort and   blood in urine (27 Aug 2020 18:51)      Subjective: No new events over night. Patient has no new complaints. She remains off of pressors.       Vital Signs Last 24 Hrs  T(C): 36.4 (28 Aug 2020 06:00), Max: 36.9 (27 Aug 2020 20:02)  T(F): 97.6 (28 Aug 2020 06:00), Max: 98.4 (27 Aug 2020 20:02)  HR: 104 (28 Aug 2020 10:30) (103 - 115)  BP: 132/59 (28 Aug 2020 10:30) (104/59 - 137/61)  BP(mean): 77 (28 Aug 2020 06:00) (77 - 79)  RR: 17 (28 Aug 2020 10:30) (17 - 18)  SpO2: 94% (28 Aug 2020 10:30) (94% - 95%)    PHYSICAL EXAM  General: elderly female in NAD   HEENT: anicteric sclera, pink conjunctiva  Neck: supple  CV: normal S1/S2 with no murmur rubs or gallops  Lungs: CTABL  Abdomen: soft non-tender with no apparent distension but with splenomegaly  Ext: trace pedal edema   Neuro: no focal deficits    MEDICATIONS  (STANDING):  acyclovir   Oral Tab/Cap 400 milliGRAM(s) Oral daily  aspirin  chewable 81 milliGRAM(s) Oral daily  atorvastatin 80 milliGRAM(s) Oral at bedtime  chlorhexidine 4% Liquid 1 Application(s) Topical <User Schedule>  clopidogrel Tablet 75 milliGRAM(s) Oral daily  fluconAZOLE   Tablet 200 milliGRAM(s) Oral daily  folic acid 1 milliGRAM(s) Oral daily  linezolid  IVPB      linezolid  IVPB 600 milliGRAM(s) IV Intermittent every 12 hours  meropenem  IVPB 1000 milliGRAM(s) IV Intermittent every 8 hours  pantoprazole    Tablet 40 milliGRAM(s) Oral before breakfast    MEDICATIONS  (PRN):      LABS:                          8.4    8.46  )-----------( 79       ( 28 Aug 2020 06:47 )             25.2         Mean Cell Volume : 91.6 fL  Mean Cell Hemoglobin : 30.5 pg  Mean Cell Hemoglobin Concentration : 33.3 g/dL  Auto Neutrophil # : 1.96 K/uL  Auto Lymphocyte # : 3.86 K/uL  Auto Monocyte # : 2.31 K/uL  Auto Eosinophil # : 0.10 K/uL  Auto Basophil # : 0.09 K/uL  Auto Neutrophil % : 23.1 %  Auto Lymphocyte % : 45.6 %  Auto Monocyte % : 27.3 %  Auto Eosinophil % : 1.2 %  Auto Basophil % : 1.1 %      Serial CBC's  08-28 @ 06:47  Hct-25.2 / Hgb-8.4 / Plat-79 / RBC-2.75 / WBC-8.46  Serial CBC's  08-27 @ 18:59  Hct-26.3 / Hgb-8.9 / Plat-82 / RBC-2.87 / WBC-10.36  Serial CBC's  08-27 @ 04:08  Hct-25.3 / Hgb-8.5 / Plat-97 / RBC-2.81 / WBC-9.41  Serial CBC's  08-26 @ 20:45  Hct-26.1 / Hgb-8.8 / Plat-90 / RBC-2.88 / WBC-9.38  Serial CBC's  08-26 @ 18:00  Hct-26.1 / Hgb-9.0 / Plat-90 / RBC-2.87 / WBC-9.48  Serial CBC's  08-26 @ 04:45  Hct-21.7 / Hgb-7.2 / Plat-116 / RBC-2.40 / WBC-8.60  Serial CBC's  08-25 @ 16:30  Hct-21.4 / Hgb-7.4 / Plat-104 / RBC-2.41 / WBC-9.27  Serial CBC's  08-25 @ 05:04  Hct-20.6 / Hgb-7.2 / Plat-114 / RBC-2.33 / WBC-6.29  Serial CBC's  08-24 @ 17:49  Hct-24.2 / Hgb-8.1 / Plat-142 / RBC-2.69 / WBC-6.08      08-28    131<L>  |  99  |  10  ----------------------------<  126<H>  4.1   |  22  |  <0.5<L>    Ca    8.0<L>      28 Aug 2020 06:47  Mg     1.9     08-28    TPro  4.3<L>  /  Alb  2.9<L>  /  TBili  1.4<H>  /  DBili  x   /  AST  25  /  ALT  16  /  AlkPhos  226<H>  08-28      PT/INR - ( 27 Aug 2020 18:59 )   PT: 15.70 sec;   INR: 1.37 ratio               BLOOD SMEAR INTERPRETATION:       RADIOLOGY & ADDITIONAL STUDIES:

## 2020-08-28 NOTE — DIETITIAN INITIAL EVALUATION ADULT. - PERTINENT LABORATORY DATA
(8/28/2020) RBC 2.75, H/H 8.4/25.2, POCT 145/268, glucose 126, Cr <0.5, Na 131, corrected Ca 8.88, elevated LFTs

## 2020-08-28 NOTE — DIETITIAN INITIAL EVALUATION ADULT. - ENERGY INTAKE
Pt consumes ~50% meals at best. Reports appetite fair but not at baseline. Agreeable to trial of Ensure compact. Fair (>50%)

## 2020-08-28 NOTE — DIETITIAN INITIAL EVALUATION ADULT. - FEEDING SKILL
Pt reports fair appetite and intake but noticed decrease in portion size at meal times a/w early satiety. Pt also attributes this likely to chemo and diagnosis. Continues to consumes 3 meals./day but amount at meal time varies daily. Denies use of nutrition supplements, no cultural/Mu-ism food preferences & NKFA.

## 2020-08-28 NOTE — PROGRESS NOTE ADULT - ASSESSMENT
75 y/o F w PMHx of B cell lymphoproliferative disorder on Imbrutinib, DM, HTN, CAD s/p stent p/w AMS, dehydration, & hematuria x1 day admitted for septic shock due to UTI Enterococcus VRE    # Septic shock s/p UTI  - CT Abd Pel and CT Head unremarkable  - UA +ve for Leuk Est and WBCs  - Off pressors  - Start Linezolid for VRE monitor platelets   - d/c vanc  - c/w shantal  - c/w NS at 75 cc/hr  - LE Duplex negative  - Lactate 1.6   - Hb 8.5 today  - f/u Hb    # H/o B cell CLL  - Heme/onc consulted - fu recs  - Fluconazole ppx   - Acyclovir ppx    #H/o CAD s/p stent  - Plavix 75 mg qd     TLC removed 08/28   #Diet: DASH  #DVT ppx: ASA; lovenox resumed after hematologist approval monitor platelets if < 50 stop  OOB to chair

## 2020-08-28 NOTE — DIETITIAN INITIAL EVALUATION ADULT. - ENERGY NEEDS
estimated calorie needs = 8862-9745 kcal/day (25-30 kcal/kg CBW d/t cancer previously on chemo).   estimated protein needs = 56-67 g/day (1.0-1.2 g/kg CBW)  estimated fluid needs = 1mL/kcal or per CEU team

## 2020-08-28 NOTE — DIETITIAN INITIAL EVALUATION ADULT. - OTHER INFO
Pt p/w weakness, abd discomfort and blood in urine. Primary dx: septic shock d/t UTI Enterococcus VRE. Required ICU monitoring and pressor support, weaned off levophed today and downgraded to CEU. h/o Marginal zone lymphoma with splenomegaly: Flow cytometry revealed Monotypic B cells--per HemeOnc: ibrutinib held d/t infection; pt received weekly transfusions as oupt. Thrombocytopenia likely d/t lymphoma/splenomegaly. Anemia d/t MCL s/p 1unit PRBC 8/26, responded appropriately.

## 2020-08-28 NOTE — PROGRESS NOTE ADULT - SUBJECTIVE AND OBJECTIVE BOX
AD BASURTO  74y, Female  Allergy: penicillin (Anaphylaxis; Hives)      LOS  4d    CHIEF COMPLAINT: Weakness, Abdominal Discomfort and   blood in urine (28 Aug 2020 10:46)      INTERVAL EVENTS/HPI  - No acute events overnight  - T(F): , Max: 98.4 (08-27-20 @ 20:02)  - WBC continues to improve; feels less tired   - Tolerating medication  - WBC Count: 8.46 (08-28-20 @ 06:47)  WBC Count: 10.36 (08-27-20 @ 18:59)     - Creatinine, Serum: <0.5 (08-28-20 @ 06:47)  Creatinine, Serum: <0.5 (08-27-20 @ 04:08)       ROS  General: Denies rigors, nightsweats  HEENT: Denies headache, rhinorrhea, sore throat, eye pain  CV: Denies CP, palpitations  PULM: Denies wheezing, hemoptysis  GI: Denies hematemesis, hematochezia, melena  : Denies discharge, hematuria  MSK: Denies arthralgias, myalgias  SKIN: Denies rash, lesions  NEURO: Denies paresthesias, weakness  PSYCH: Denies depression, anxiety    VITALS:  T(F): 97.6, Max: 98.4 (08-27-20 @ 20:02)  HR: 104  BP: 121/60  RR: 17Vital Signs Last 24 Hrs  T(C): 36.4 (28 Aug 2020 06:00), Max: 36.9 (27 Aug 2020 20:02)  T(F): 97.6 (28 Aug 2020 06:00), Max: 98.4 (27 Aug 2020 20:02)  HR: 104 (28 Aug 2020 10:30) (103 - 115)  BP: 121/60 (28 Aug 2020 12:54) (104/59 - 137/61)  BP(mean): 77 (28 Aug 2020 06:00) (77 - 79)  RR: 17 (28 Aug 2020 10:30) (17 - 18)  SpO2: 94% (28 Aug 2020 10:30) (94% - 95%)    PHYSICAL EXAM:  Gen: NAD, resting in bed  HEENT: Normocephalic, atraumatic  Neck: supple, no lymphadenopathy  CV: Regular rate & regular rhythm  Lungs: decreased BS at bases, no fremitus  Abdomen: Soft, BS present  Ext: Warm, well perfused  Neuro: non focal, awake  Skin: no rash, no erythema  Lines: no phlebitis    FH: Non-contributory  Social Hx: Non-contributory    TESTS & MEASUREMENTS:                        8.4    8.46  )-----------( 79       ( 28 Aug 2020 06:47 )             25.2     08-28    131<L>  |  99  |  10  ----------------------------<  126<H>  4.1   |  22  |  <0.5<L>    Ca    8.0<L>      28 Aug 2020 06:47  Mg     1.9     08-28    TPro  4.3<L>  /  Alb  2.9<L>  /  TBili  1.4<H>  /  DBili  x   /  AST  25  /  ALT  16  /  AlkPhos  226<H>  08-28    eGFR if Non African American: 103 mL/min/1.73M2 (08-28-20 @ 06:47)  eGFR if : 119 mL/min/1.73M2 (08-28-20 @ 06:47)    LIVER FUNCTIONS - ( 28 Aug 2020 06:47 )  Alb: 2.9 g/dL / Pro: 4.3 g/dL / ALK PHOS: 226 U/L / ALT: 16 U/L / AST: 25 U/L / GGT: x               Culture - Urine (collected 08-24-20 @ 19:40)  Source: .Urine Clean Catch (Midstream)  Final Report (08-27-20 @ 08:09):    >100,000 CFU/ml Enterococcus faecium (vancomycin resistant)    <10,000 CFU/ml Normal Urogenital anthony present  Organism: Enterococcus faecium (vancomycin resistant) (08-27-20 @ 08:09)  Organism: Enterococcus faecium (vancomycin resistant) (08-27-20 @ 08:09)      -  Ampicillin: R >8 Predicts results to ampicillin/sulbactam, amoxacillin-clavulanate and  piperacillin-tazobactam.      -  Ciprofloxacin: R >2      -  Daptomycin: SDD 4 The breakpoint for SDD (sensitive dose dependent)is based on a dosage regimen of 8-12 mg/kg administered every 24 h in adults and is intended for serious infections due to E. faecium. Consultation with an infectious diseases specialist is recommended.      -  Levofloxacin: R >4      -  Linezolid: S 2      -  Nitrofurantoin: S <=32 Should not be used to treat pyelonephritis.      -  Tetra/Doxy: S <=4      -  Vancomycin: R >16      Method Type: MAHENDRA    Culture - Blood (collected 08-24-20 @ 18:50)  Source: .Blood Blood-Peripheral  Preliminary Report (08-26-20 @ 02:01):    No growth to date.    Culture - Blood (collected 08-24-20 @ 17:49)  Source: .Blood Blood-Peripheral  Preliminary Report (08-26-20 @ 02:01):    No growth to date.        Lactate, Blood: 1.6 mmol/L (08-25-20 @ 05:04)  Lactate, Blood: 3.1 mmol/L (08-24-20 @ 19:51)  Lactate, Blood: 2.9 mmol/L (08-24-20 @ 17:49)      INFECTIOUS DISEASES TESTING  MRSA PCR Result.: Negative (08-25-20 @ 05:04)  COVID-19 PCR: NotDetec (08-24-20 @ 19:00)  Procalcitonin, Serum: 0.26 (07-06-20 @ 09:30)  COVID-19 PCR: NotDetec (07-05-20 @ 18:24)  COVID-19 PCR: NotDetec (06-10-20 @ 14:21)  COVID-19 PCR: NotDetec (05-18-20 @ 14:28)      INFLAMMATORY MARKERS  Sedimentation Rate, Erythrocyte: 33 mm/Hr (07-06-20 @ 09:30)  C-Reactive Protein, Serum: 0.77 mg/dL (07-06-20 @ 09:30)      RADIOLOGY & ADDITIONAL TESTS:  I have personally reviewed the last available Chest xray  CXR      CT      CARDIOLOGY TESTING  12 Lead ECG:   Ventricular Rate 134 BPM    Atrial Rate 134 BPM    P-R Interval 156 ms    QRS Duration 116 ms    Q-T Interval 284 ms    QTC Calculation(Bezet) 424 ms    P Axis 88 degrees    R Axis 90 degrees    T Axis 39 degrees    Diagnosis Line Sinus tachycardia  Rightward axis  Low voltage QRS  RSR' or QR pattern in V1 suggests right ventricular conduction delay  Borderline ECG    Confirmed by VERONIKA FOSTER MD ([id number]) on 8/24/2020 11:09:38 PM (08-24-20 @ 17:22)      MEDICATIONS  acyclovir   Oral Tab/Cap 400 Oral daily  aspirin  chewable 81 Oral daily  atorvastatin 80 Oral at bedtime  chlorhexidine 4% Liquid 1 Topical <User Schedule>  clopidogrel Tablet 75 Oral daily  enoxaparin Injectable 40 SubCutaneous daily  fluconAZOLE   Tablet 200 Oral daily  folic acid 1 Oral daily  linezolid  IVPB     linezolid  IVPB 600 IV Intermittent every 12 hours  meropenem  IVPB 1000 IV Intermittent every 8 hours  pantoprazole    Tablet 40 Oral before breakfast      WEIGHT  Weight (kg): 56.2 (08-25-20 @ 01:30)  Creatinine, Serum: <0.5 mg/dL (08-28-20 @ 06:47)      ANTIBIOTICS:  acyclovir   Oral Tab/Cap 400 milliGRAM(s) Oral daily  fluconAZOLE   Tablet 200 milliGRAM(s) Oral daily  linezolid  IVPB      linezolid  IVPB 600 milliGRAM(s) IV Intermittent every 12 hours  meropenem  IVPB 1000 milliGRAM(s) IV Intermittent every 8 hours      All available historical records have been reviewed

## 2020-08-28 NOTE — PROGRESS NOTE ADULT - SUBJECTIVE AND OBJECTIVE BOX
24H events:    Patient is a 74y old Female who presents with a chief complaint of Weakness, Abdominal Discomfort found to have urosepsis VRE    74y F with PMH of CAD s/p stent, DLD, HTN, B josue lymphoma, recently diagnosed B cell lymphoproliferative disorder (follows Dr. Guardado), on Ibrutinib 140mg q48, chronic anemia, presented to the ED with AMS, dehydration, and hematuria x1 day. Patient is  independent at baseline.  noticed blood in the urine x1 episode today. +abdominal discomfort . Normal BM. Daughter noticed dry lips on the pt and says that the last time patient had these symptoms was when she had a UTI a month ago. family also noticed decreased PO intake recently and  increased weakness.  Pt tested negative for COVID a few days ago. Daughter added  that  patient  recent completed  5 day course of bactrim  for recurrent UTI. At bedside the patient  denies fever  chills, abdominal pain,  but notes that she  had 3 ep of diarrhaea but cannot remember the character.    In ICU, she required low dose levophed and was treated for septic shock s/p UTI with meropenem and vanc (stopped)      Patient is stable clinically, complaining of neck stiffness. Her BP has been maintained off pressor. TLC removed  Patient tolerating food  No major complaint or event overnight        PAST MEDICAL & SURGICAL HISTORY  Anemia  DM (diabetes mellitus)  High cholesterol  HTN (hypertension)  B-cell chronic lymphocytic leukemia variant  CAD (coronary artery disease): s/p stenting  H/O heart artery stent    SOCIAL HISTORY:  Social History:  Quit Smoking 5cigarets per day  6 weeks ago  Denies ETOH  Use  Denies  Illicit drug Use  Retired  [employer] (24 Aug 2020 23:49)      ALLERGIES:  penicillin (Anaphylaxis; Hives)    MEDICATIONS:  STANDING MEDICATIONS  acyclovir   Oral Tab/Cap 400 milliGRAM(s) Oral daily  aspirin  chewable 81 milliGRAM(s) Oral daily  atorvastatin 80 milliGRAM(s) Oral at bedtime  chlorhexidine 4% Liquid 1 Application(s) Topical <User Schedule>  clopidogrel Tablet 75 milliGRAM(s) Oral daily  enoxaparin Injectable 40 milliGRAM(s) SubCutaneous daily  fluconAZOLE   Tablet 200 milliGRAM(s) Oral daily  folic acid 1 milliGRAM(s) Oral daily  linezolid  IVPB      linezolid  IVPB 600 milliGRAM(s) IV Intermittent every 12 hours  meropenem  IVPB 1000 milliGRAM(s) IV Intermittent every 8 hours  pantoprazole    Tablet 40 milliGRAM(s) Oral before breakfast    PRN MEDICATIONS    VITALS:   T(F): 97.6  HR: 104  BP: 121/60  RR: 17  SpO2: 94%    PHYSICAL EXAM:  GENERAL: NAD,  AAOx3  HEENT:  Atraumatic, Normocephalic. EOMI, PERRLA, conjunctiva and sclera clear, No JVD  PULMONARY: Clear to auscultation bilaterally; No wheeze  CARDIOVASCULAR: Tachycardic and rhythm; No murmurs, rubs, or gallops, mitral murmur  GASTROINTESTINAL: Soft, mild TTP to LUQ, Nondistended  MUSCULOSKELETAL:  2+ Peripheral Pulses, No clubbing, cyanosis, or edema  NEUROLOGY: non-focal  SKIN: No rashes or lesions  LABS:                        8.4    8.46  )-----------( 79       ( 28 Aug 2020 06:47 )             25.2     08-28    131<L>  |  99  |  10  ----------------------------<  126<H>  4.1   |  22  |  <0.5<L>    Ca    8.0<L>      28 Aug 2020 06:47  Mg     1.9     08-28    TPro  4.3<L>  /  Alb  2.9<L>  /  TBili  1.4<H>  /  DBili  x   /  AST  25  /  ALT  16  /  AlkPhos  226<H>  08-28    PT/INR - ( 27 Aug 2020 18:59 )   PT: 15.70 sec;   INR: 1.37 ratio             Culture - Urine (collected 24 Aug 2020 19:40)  Source: .Urine Clean Catch (Midstream)  Final Report (27 Aug 2020 08:09):    >100,000 CFU/ml Enterococcus faecium (vancomycin resistant)    <10,000 CFU/ml Normal Urogenital anthony present  Organism: Enterococcus faecium (vancomycin resistant) (27 Aug 2020 08:09)  Organism: Enterococcus faecium (vancomycin resistant) (27 Aug 2020 08:09)    Culture - Blood (collected 24 Aug 2020 18:50)  Source: .Blood Blood-Peripheral  Preliminary Report (26 Aug 2020 02:01):    No growth to date.    Culture - Blood (collected 24 Aug 2020 17:49)  Source: .Blood Blood-Peripheral  Preliminary Report (26 Aug 2020 02:01):    No growth to date.            RADIOLOGY:    < from: [facility] Duplex Lower Ext Vein Scan, Bilat (08.25.20 @ 09:57) >  No evidence of deep venous thrombosis or superficial thrombophlebitis in the bilateral lower extremities.    < from: Xray Chest 1 View AP/PA (08.25.20 @ 01:45) >  New right IJ central venous catheter with tip in the SVC. No pneumothorax.    < from: CT Abdomen and Pelvis w/ IV Cont (08.24.20 @ 21:49) >    Stable, marked splenomegaly, consistent with patient's known history of B-cell lymphoproliferative disorder.    Otherwise, no CT evidence of acute intra-abdominal pathology.    < from: CT Head No Cont (08.24.20 @ 21:41) >  No evidence of intracranial hemorrhage, territorial infarct, or mass effect.    Severe microvascular changes    Chronic left frontal lobe infarct. 24H events:    Patient is a 74y old Female who presents with a chief complaint of Weakness, Abdominal Discomfort found to have urosepsis VRE    74y F with PMH of CAD s/p stent, DLD, HTN, B josue lymphoma, recently diagnosed B cell lymphoproliferative disorder (follows Dr. Guardado), on Ibrutinib 140mg q48, chronic anemia, presented to the ED with AMS, dehydration, and hematuria x1 day. Patient is  independent at baseline.  noticed blood in the urine x1 episode today. +abdominal discomfort . Normal BM. Daughter noticed dry lips on the pt and says that the last time patient had these symptoms was when she had a UTI a month ago. family also noticed decreased PO intake recently and  increased weakness.  Pt tested negative for COVID a few days ago. Daughter added  that  patient  recent completed  5 day course of bactrim  for recurrent UTI. At bedside the patient  denies fever  chills, abdominal pain,  but notes that she  had 3 ep of diarrhaea but cannot remember the character.    In ICU, she required low dose levophed and was treated for septic shock s/p UTI with meropenem and vanc (stopped)      Patient is stable clinically, complaining of neck stiffness. Her BP has been maintained off pressor. TLC removed  Patient tolerating food  No major complaint or event overnight        PAST MEDICAL & SURGICAL HISTORY  Anemia  DM (diabetes mellitus)  High cholesterol  HTN (hypertension)  B-cell chronic lymphocytic leukemia variant  CAD (coronary artery disease): s/p stenting  H/O heart artery stent    SOCIAL HISTORY:  Social History:  Quit Smoking 5cigarets per day  6 weeks ago  Denies ETOH  Use  Denies  Illicit drug Use  Retired  [employer] (24 Aug 2020 23:49)      ALLERGIES:  penicillin (Anaphylaxis; Hives)    MEDICATIONS:  STANDING MEDICATIONS  acyclovir   Oral Tab/Cap 400 milliGRAM(s) Oral daily  aspirin  chewable 81 milliGRAM(s) Oral daily  atorvastatin 80 milliGRAM(s) Oral at bedtime  chlorhexidine 4% Liquid 1 Application(s) Topical <User Schedule>  clopidogrel Tablet 75 milliGRAM(s) Oral daily  enoxaparin Injectable 40 milliGRAM(s) SubCutaneous daily  fluconAZOLE   Tablet 200 milliGRAM(s) Oral daily  folic acid 1 milliGRAM(s) Oral daily  linezolid  IVPB      linezolid  IVPB 600 milliGRAM(s) IV Intermittent every 12 hours  meropenem  IVPB 1000 milliGRAM(s) IV Intermittent every 8 hours  pantoprazole    Tablet 40 milliGRAM(s) Oral before breakfast    PRN MEDICATIONS    VITALS:   T(F): 97.6  HR: 104  BP: 121/60  RR: 17  SpO2: 94%    PHYSICAL EXAM:  GENERAL: NAD,  AAOx3, ill looking  HEENT:  Atraumatic, Normocephalic. EOMI, PERRLA, conjunctiva and sclera clear, No JVD  PULMONARY: dec bs both abses  CARDIOVASCULAR: Tachycardic and rhythm; No murmurs, rubs, or gallops, mitral murmur  GASTROINTESTINAL: Soft, mild TTP to LUQ, Nondistended  MUSCULOSKELETAL:  2+ Peripheral Pulses, No clubbing, cyanosis, or edema  NEUROLOGY: non-focal  SKIN: No rashes or lesions  LABS:                        8.4    8.46  )-----------( 79       ( 28 Aug 2020 06:47 )             25.2     08-28    131<L>  |  99  |  10  ----------------------------<  126<H>  4.1   |  22  |  <0.5<L>    Ca    8.0<L>      28 Aug 2020 06:47  Mg     1.9     08-28    TPro  4.3<L>  /  Alb  2.9<L>  /  TBili  1.4<H>  /  DBili  x   /  AST  25  /  ALT  16  /  AlkPhos  226<H>  08-28    PT/INR - ( 27 Aug 2020 18:59 )   PT: 15.70 sec;   INR: 1.37 ratio             Culture - Urine (collected 24 Aug 2020 19:40)  Source: .Urine Clean Catch (Midstream)  Final Report (27 Aug 2020 08:09):    >100,000 CFU/ml Enterococcus faecium (vancomycin resistant)    <10,000 CFU/ml Normal Urogenital anthony present  Organism: Enterococcus faecium (vancomycin resistant) (27 Aug 2020 08:09)  Organism: Enterococcus faecium (vancomycin resistant) (27 Aug 2020 08:09)    Culture - Blood (collected 24 Aug 2020 18:50)  Source: .Blood Blood-Peripheral  Preliminary Report (26 Aug 2020 02:01):    No growth to date.    Culture - Blood (collected 24 Aug 2020 17:49)  Source: .Blood Blood-Peripheral  Preliminary Report (26 Aug 2020 02:01):    No growth to date.            RADIOLOGY:    < from: [facility] Duplex Lower Ext Vein Scan, Bilat (08.25.20 @ 09:57) >  No evidence of deep venous thrombosis or superficial thrombophlebitis in the bilateral lower extremities.    < from: Xray Chest 1 View AP/PA (08.25.20 @ 01:45) >  New right IJ central venous catheter with tip in the SVC. No pneumothorax.    < from: CT Abdomen and Pelvis w/ IV Cont (08.24.20 @ 21:49) >    Stable, marked splenomegaly, consistent with patient's known history of B-cell lymphoproliferative disorder.    Otherwise, no CT evidence of acute intra-abdominal pathology.    < from: CT Head No Cont (08.24.20 @ 21:41) >  No evidence of intracranial hemorrhage, territorial infarct, or mass effect.    Severe microvascular changes    Chronic left frontal lobe infarct.

## 2020-08-28 NOTE — DIETITIAN INITIAL EVALUATION ADULT. - PHYSICAL APPEARANCE
BMI 21.3 (124#, 64in). pt unsure of UBW, feels she may have lost wt but unsure. no significant muscle wasting/fat loss observed. skin intact. no edema

## 2020-08-29 NOTE — PROGRESS NOTE ADULT - ASSESSMENT
Patient is a 73 y/o F with PMH of marginal zone lymphoma, recurrent UTI, CAD s/p stent, DLD, HTN  admitted for sepsis due to suspected UTI.    #) Sepsis, resolved secondary to VRE in Urine:   - Per ID, continue with linezolid, if platelets dropping (stable today) can switch to dapto  - BCx  negative    #) Marginal zone lymphoma with splenomegaly: Flow cytometry revealed Monotypic B cells (18% of cells), positive for Kappa, CD19, CD20, FMC-7, negative for CD5, CD10, and CD23. 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 Atypical cells were negative for cyclin D1, molecular studies negative MYD88, L265P and BRAF V600 mutations. ANNEXIN1 seemed to be positive on T Cells and myeloid cells. The negative BRAF V600 makes hairy cell leukemia less likely. BCL2 was negative.  - PET CT as of May 2020 showing diffuse FDG uptake in an enlarged spleen   - Patient with almost weekly transfusions of pRBCs as outpatient   - Patient started on Ibrutinib 3 tabs daily on 6/17/2020, dose reduced and eventually held due to cytopenias and infection  - Hold ibrutinib for now	    #) Thrombocytopenia with elevated INR: Thrombocytopenia likely due to lymphoma/splenomegaly and now linezolid, INR due to vit K deficiency  - Received PO Vitamin K 2.5mg 8/26 (patient is on DAPT, had been getting ibrutinib which also increases risk of bleeding in the setting of thrombocytopenia)  - Recheck INR--> 1.37 on 8/27   - Monitor CBC  - Can start DVT PPx as long as Platelets >20,000 and not bleeding.  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    #) Sepsis, resolved secondary to VRE in Urine:   - Per ID, continue with linezolid, if platelets dropping (stable today) can switch to dapto  - BCx  negative    #) Marginal zone lymphoma with splenomegaly: Flow cytometry revealed Monotypic B cells (18% of cells), positive for Kappa, CD19, CD20, FMC-7, negative for CD5, CD10, and CD23. 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 Atypical cells were negative for cyclin D1, molecular studies negative MYD88, L265P and BRAF V600 mutations. ANNEXIN1 seemed to be positive on T Cells and myeloid cells. The negative BRAF V600 makes hairy cell leukemia less likely. BCL2 was negative.  - PET CT as of May 2020 showing diffuse FDG uptake in an enlarged spleen   - Patient with almost weekly transfusions of pRBCs as outpatient   - Patient started on Ibrutinib 3 tabs daily on 6/17/2020, dose reduced and eventually held due to cytopenias and infection  - Hold ibrutinib for now	    #) Thrombocytopenia with elevated INR: Thrombocytopenia likely due to lymphoma/splenomegaly and now linezolid, INR due to vit K deficiency  - Received PO Vitamin K 2.5mg 8/26 (patient is on DAPT, had been getting ibrutinib which also increases risk of bleeding in the setting of thrombocytopenia)  - Recheck INR--> 1.37 on 8/27   - Monitor CBC  - Keep platelets >50K  - If dropping switch linezolid to dapto per ID    #) 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

## 2020-08-29 NOTE — PROGRESS NOTE ADULT - ASSESSMENT
75 y/o F w PMHx of B cell lymphoproliferative disorder on Imbrutinib, DM, HTN, CAD s/p stent p/w AMS, dehydration, & hematuria x1 day admitted for septic shock due to UTI Enterococcus VRE    # Septic shock s/p UTI  - CT Abd Pel and CT Head unremarkable  - UA +ve for Leuk Est and WBCs  - Off pressors  - Start Linezolid for VRE monitor platelets   - d/c vanc  - c/w shantal  - c/w NS at 75 cc/hr  - LE Duplex negative  - Lactate 1.6   - Hb 8.5 today  - f/u Hb    # H/o B cell CLL  - Heme/onc consulted - fu recs  - Fluconazole ppx   - Acyclovir ppx    #H/o CAD s/p stent  - Plavix 75 mg qd     TLC removed 08/28   #Diet: DASH  #DVT ppx: ASA; lovenox resumed after hematologist approval monitor platelets if < 50 stop  OOB to chair. 73 y/o F w PMHx of B cell lymphoproliferative disorder on Imbrutinib, DM, HTN, CAD s/p stent p/w AMS, dehydration, & hematuria x1 day admitted for septic shock due to UTI Enterococcus VRE    #Metabolic Encephalopathy &  # Septic shock (POA) : 2/2 VRE UTI  Improving on IV Linezolid.   c/w Linezolid for now. If platelets drop, switch to Daptomycin per ID note.   VSS now.   TLC out on 8/28.  Good urine output.  Encourage ambulation.     # Marginal B Cell Lymphoma:   Been on outpatient Ibrutinib.   Has splenomegaly (contributing to thrombocytopenia) with increased FDG uptake   Holding Ibrutinib for now per heme. OP f/u  On Fluconazole & Acyclovir ppx    #Thrombocytopenia:   d/t B cell Lymphoma/Splenomegaly and now Linezolid.   Monitor.     #Anemia:   Chronic.   2/2 B cell lymphoma.  Receives weekly transfusions as outpatient  Monitor  Keep Hgb > 8.    #H/o CAD s/p stent  - Plavix 75 mg qd     #Diet: DASH  #DVT ppx: ASA; lovenox resumed after hematologist approval monitor platelets if < 50 stop  OOB to chair. 73 y/o F w PMHx of B cell lymphoproliferative disorder on Imbrutinib, DM, HTN, CAD s/p stent p/w AMS, dehydration, & hematuria x1 day admitted for septic shock due to UTI Enterococcus VRE    #Metabolic Encephalopathy &  # Septic shock (POA) : 2/2 VRE UTI  Improving on IV Linezolid.   c/w Linezolid for now. If platelets drop, switch to Daptomycin per ID note.   VSS now.   TLC out on 8/28.  Good urine output.  Encourage ambulation.     # Marginal B Cell Lymphoma:   Been on outpatient Ibrutinib.   Has splenomegaly (contributing to thrombocytopenia) with increased FDG uptake   Holding Ibrutinib for now per heme. OP f/u  On Fluconazole & Acyclovir ppx    #Thrombocytopenia:   d/t B cell Lymphoma/Splenomegaly and now Linezolid.   Monitor.     #Anemia:   Chronic.   2/2 B cell lymphoma.  Receives weekly transfusions as outpatient  Monitor  Keep Hgb > 8.    #H/o CAD s/p stent  - Plavix 75 mg qd    #HTN:  Now BP better.   STarting to get tachy.  Reintroduce home dose metoprolol T slowly. Start with 25 BID.    #DM:  Does not use any meds at home  SSI here  Maybe PO on d/c.  check A1c     #Diet: DASH  #DVT ppx: ASA; lovenox resumed after hematologist approval monitor platelets if < 50 stop  OOB to chair. 75 y/o F w PMHx of B cell lymphoproliferative disorder on Imbrutinib, DM, HTN, CAD s/p stent p/w AMS, dehydration, & hematuria x1 day admitted for septic shock due to UTI Enterococcus VRE    #Metabolic Encephalopathy &  # Septic shock (POA) : 2/2 VRE UTI  Improving on IV Linezolid.   c/w Linezolid for now. If platelets drop, switch to Daptomycin per ID note.   VSS now.   TLC out on 8/28.  Good urine output.  Encourage ambulation.     # Marginal B Cell Lymphoma:   Been on outpatient Ibrutinib.   Has splenomegaly (contributing to thrombocytopenia) with increased FDG uptake   Holding Ibrutinib for now per heme. OP f/u  On Fluconazole & Acyclovir ppx    #Thrombocytopenia:   d/t B cell Lymphoma/Splenomegaly and now Linezolid.   Monitor.     #Anemia:   Chronic.   2/2 B cell lymphoma.  Receives weekly transfusions as outpatient  Monitor  Keep Hgb > 8.    #Hyponatremia:  ?Hypovolemic.  try NS @ 50.    #H/o CAD s/p stent  - Plavix 75 mg qd    #HTN:  Now BP better.   STarting to get tachy.  Reintroduce home dose metoprolol T slowly. Start with 25 BID.    #DM:  Does not use any meds at home  SSI here  Maybe PO on d/c.  check A1c     #Diet: DASH  #DVT ppx: ASA; lovenox resumed after hematologist approval monitor platelets if < 50 stop  OOB to chair.

## 2020-08-29 NOTE — PROGRESS NOTE ADULT - SUBJECTIVE AND OBJECTIVE BOX
24H events:    No acute events  Patient has no complaints  UCx grew VRE enterococcus    PAST MEDICAL & SURGICAL HISTORY  Anemia  DM (diabetes mellitus)  High cholesterol  HTN (hypertension)  B-cell chronic lymphocytic leukemia variant  CAD (coronary artery disease): s/p stenting  H/O heart artery stent    SOCIAL HISTORY:  Negative for smoking/alcohol/drug use.     ALLERGIES:  penicillin (Anaphylaxis; Hives)    MEDICATIONS:  STANDING MEDICATIONS  acyclovir   Oral Tab/Cap 400 milliGRAM(s) Oral daily  aspirin  chewable 81 milliGRAM(s) Oral daily  atorvastatin 80 milliGRAM(s) Oral at bedtime  chlorhexidine 4% Liquid 1 Application(s) Topical <User Schedule>  clopidogrel Tablet 75 milliGRAM(s) Oral daily  enoxaparin Injectable 40 milliGRAM(s) SubCutaneous daily  fluconAZOLE   Tablet 200 milliGRAM(s) Oral daily  folic acid 1 milliGRAM(s) Oral daily  linezolid  IVPB      linezolid  IVPB 600 milliGRAM(s) IV Intermittent every 12 hours  metoprolol succinate ER 25 milliGRAM(s) Oral once  pantoprazole    Tablet 40 milliGRAM(s) Oral before breakfast    PRN MEDICATIONS    VITALS:   T(F): 97  HR: 87  BP: 113/57  RR: 18  SpO2: 96%    LABS:                        8.4    8.70  )-----------( 88       ( 29 Aug 2020 07:10 )             25.1     08-29    128<L>  |  97<L>  |  8<L>  ----------------------------<  151<H>  4.2   |  24  |  <0.5<L>    Ca    8.0<L>      29 Aug 2020 07:10  Mg     1.9     08-28    TPro  4.1<L>  /  Alb  2.8<L>  /  TBili  1.1  /  DBili  x   /  AST  29  /  ALT  18  /  AlkPhos  218<H>  08-29    PT/INR - ( 27 Aug 2020 18:59 )   PT: 15.70 sec;   INR: 1.37 ratio                       RADIOLOGY:    PHYSICAL EXAM:  GEN: No acute distress  HENT: NCAT, EOMI  LYMPH: No appreciable adenopathy  LUNGS: No respiratory distress, adventitious bbreath sounds  HEART: regular rate and rhythm  ABD: Soft, non-tender, non-distended  SKIN: No rash  EXT: No edema  NEURO: AAOX3

## 2020-08-29 NOTE — PROGRESS NOTE ADULT - SUBJECTIVE AND OBJECTIVE BOX
AD BASURTO  74y, Female    All available historical data reviewed    OVERNIGHT EVENTS:  no fevers  NAD at rest  no roblero    ROS:  General: Denies rigors, nightsweats  HEENT: Denies headache, rhinorrhea, sore throat, eye pain  CV: Denies CP, palpitations  PULM: Denies wheezing, hemoptysis  GI: Denies hematemesis, hematochezia, melena  : Denies discharge, hematuria  MSK: Denies arthralgias, myalgias  SKIN: Denies rash, lesions  NEURO: Denies paresthesias, weakness  PSYCH: Denies depression, anxiety    VITALS:  T(F): 97, Max: 99.9 (08-28-20 @ 20:23)  HR: 87  BP: 113/57  RR: 18Vital Signs Last 24 Hrs  T(C): 36.1 (29 Aug 2020 05:06), Max: 37.7 (28 Aug 2020 20:23)  T(F): 97 (29 Aug 2020 05:06), Max: 99.9 (28 Aug 2020 20:23)  HR: 87 (29 Aug 2020 05:06) (87 - 111)  BP: 113/57 (29 Aug 2020 05:06) (108/55 - 129/84)  BP(mean): 74 (28 Aug 2020 14:42) (74 - 74)  RR: 18 (29 Aug 2020 05:06) (18 - 18)  SpO2: 96% (29 Aug 2020 05:06) (95% - 97%)    TESTS & MEASUREMENTS:                        8.4    8.70  )-----------( 88       ( 29 Aug 2020 07:10 )             25.1     08-29    128<L>  |  97<L>  |  8<L>  ----------------------------<  151<H>  4.2   |  24  |  <0.5<L>    Ca    8.0<L>      29 Aug 2020 07:10  Mg     1.9     08-28    TPro  4.1<L>  /  Alb  2.8<L>  /  TBili  1.1  /  DBili  x   /  AST  29  /  ALT  18  /  AlkPhos  218<H>  08-29    LIVER FUNCTIONS - ( 29 Aug 2020 07:10 )  Alb: 2.8 g/dL / Pro: 4.1 g/dL / ALK PHOS: 218 U/L / ALT: 18 U/L / AST: 29 U/L / GGT: x             Culture - Urine (collected 08-24-20 @ 19:40)  Source: .Urine Clean Catch (Midstream)  Final Report (08-27-20 @ 08:09):    >100,000 CFU/ml Enterococcus faecium (vancomycin resistant)    <10,000 CFU/ml Normal Urogenital anthony present  Organism: Enterococcus faecium (vancomycin resistant) (08-27-20 @ 08:09)  Organism: Enterococcus faecium (vancomycin resistant) (08-27-20 @ 08:09)      -  Ampicillin: R >8 Predicts results to ampicillin/sulbactam, amoxacillin-clavulanate and  piperacillin-tazobactam.      -  Ciprofloxacin: R >2      -  Daptomycin: SDD 4 The breakpoint for SDD (sensitive dose dependent)is based on a dosage regimen of 8-12 mg/kg administered every 24 h in adults and is intended for serious infections due to E. faecium. Consultation with an infectious diseases specialist is recommended.      -  Levofloxacin: R >4      -  Linezolid: S 2      -  Nitrofurantoin: S <=32 Should not be used to treat pyelonephritis.      -  Tetra/Doxy: S <=4      -  Vancomycin: R >16      Method Type: MAHENDRA    Culture - Blood (collected 08-24-20 @ 18:50)  Source: .Blood Blood-Peripheral  Preliminary Report (08-26-20 @ 02:01):    No growth to date.    Culture - Blood (collected 08-24-20 @ 17:49)  Source: .Blood Blood-Peripheral  Preliminary Report (08-26-20 @ 02:01):    No growth to date.            RADIOLOGY & ADDITIONAL TESTS:  Personal review of radiological diagnostics performed  Echo and EKG results noted when applicable.     MEDICATIONS:  acyclovir   Oral Tab/Cap 400 milliGRAM(s) Oral daily  aspirin  chewable 81 milliGRAM(s) Oral daily  atorvastatin 80 milliGRAM(s) Oral at bedtime  chlorhexidine 4% Liquid 1 Application(s) Topical <User Schedule>  clopidogrel Tablet 75 milliGRAM(s) Oral daily  enoxaparin Injectable 40 milliGRAM(s) SubCutaneous daily  fluconAZOLE   Tablet 200 milliGRAM(s) Oral daily  folic acid 1 milliGRAM(s) Oral daily  linezolid  IVPB      linezolid  IVPB 600 milliGRAM(s) IV Intermittent every 12 hours  metoprolol succinate ER 25 milliGRAM(s) Oral once  pantoprazole    Tablet 40 milliGRAM(s) Oral before breakfast      ANTIBIOTICS:  acyclovir   Oral Tab/Cap 400 milliGRAM(s) Oral daily  fluconAZOLE   Tablet 200 milliGRAM(s) Oral daily  linezolid  IVPB      linezolid  IVPB 600 milliGRAM(s) IV Intermittent every 12 hours

## 2020-08-29 NOTE — PROGRESS NOTE ADULT - SUBJECTIVE AND OBJECTIVE BOX
S: No new events/complaints  feeling better  In bed since admission  eating better      All other pertinent ROS negative.      08-28-20 @ 07:01  -  08-29-20 @ 07:00  --------------------------------------------------------  IN: 480 mL / OUT: 400 mL / NET: 80 mL    08-29-20 @ 07:01  -  08-29-20 @ 19:14  --------------------------------------------------------  IN: 400 mL / OUT: 400 mL / NET: 0 mL      Vital Signs Last 24 Hrs  T(C): 36.9 (29 Aug 2020 13:27), Max: 37.7 (28 Aug 2020 20:23)  T(F): 98.4 (29 Aug 2020 13:27), Max: 99.9 (28 Aug 2020 20:23)  HR: 84 (29 Aug 2020 17:06) (83 - 111)  BP: 119/57 (29 Aug 2020 17:06) (111/53 - 129/84)  BP(mean): --  RR: 18 (29 Aug 2020 13:27) (18 - 18)  SpO2: 95% (29 Aug 2020 09:00) (95% - 96%)  PHYSICAL EXAM:    Constitutional: NAD, awake and alert, well-developed  HEENT: PERR, EOMI, Normal Hearing, MMM  Neck: Soft and supple, No LAD, No JVD  Respiratory: Breath sounds are clear bilaterally, No wheezing, rales or rhonchi  Cardiovascular: S1 and S2, regular rate and rhythm, no Murmurs, gallops or rubs  Gastrointestinal: Bowel Sounds present, soft, nontender, nondistended, no guarding, no rebound  Extremities: No peripheral edema  TLC taken out       MEDICATIONS:  MEDICATIONS  (STANDING):  acyclovir   Oral Tab/Cap 400 milliGRAM(s) Oral daily  aspirin  chewable 81 milliGRAM(s) Oral daily  atorvastatin 80 milliGRAM(s) Oral at bedtime  chlorhexidine 4% Liquid 1 Application(s) Topical <User Schedule>  clopidogrel Tablet 75 milliGRAM(s) Oral daily  dextrose 5%. 1000 milliLiter(s) (50 mL/Hr) IV Continuous <Continuous>  dextrose 50% Injectable 12.5 Gram(s) IV Push once  dextrose 50% Injectable 25 Gram(s) IV Push once  dextrose 50% Injectable 25 Gram(s) IV Push once  enoxaparin Injectable 40 milliGRAM(s) SubCutaneous daily  fluconAZOLE   Tablet 200 milliGRAM(s) Oral daily  folic acid 1 milliGRAM(s) Oral daily  insulin lispro (HumaLOG) corrective regimen sliding scale   SubCutaneous three times a day before meals  linezolid  IVPB      linezolid  IVPB 600 milliGRAM(s) IV Intermittent every 12 hours  metoprolol tartrate 25 milliGRAM(s) Oral two times a day  pantoprazole    Tablet 40 milliGRAM(s) Oral before breakfast  sodium chloride 0.9%. 1000 milliLiter(s) (50 mL/Hr) IV Continuous <Continuous>      LABS: All Labs Reviewed:                        8.4    8.70  )-----------( 88       ( 29 Aug 2020 07:10 )             25.1     08-29    128<L>  |  97<L>  |  8<L>  ----------------------------<  151<H>  4.2   |  24  |  <0.5<L>    Ca    8.0<L>      29 Aug 2020 07:10  Mg     1.9     08-28    TPro  4.1<L>  /  Alb  2.8<L>  /  TBili  1.1  /  DBili  x   /  AST  29  /  ALT  18  /  AlkPhos  218<H>  08-29          Blood Culture: 08-24 @ 19:40  Organism Enterococcus faecium (vancomycin resistant)  Gram Stain Blood -- Gram Stain --  Specimen Source .Urine Clean Catch (Midstream)  Culture-Blood --        Radiology: reviewed

## 2020-08-29 NOTE — PROGRESS NOTE ADULT - ASSESSMENT
ASSESSMENT  74y F with marginal cell lymphoma who presents with shock and hematuria     Anemia  DM (diabetes mellitus)  High cholesterol  HTN (hypertension)  B-cell chronic lymphocytic leukemia variant  CAD (coronary artery disease)      IMPRESSION  #Shock, possibly septic   - No symptoms of dysuria, although with acute encephelopathy, improved while on antibiotics  - CT Abd/Pelvis without pyelo  - Urine Cx VRE Faecium Linezolid suscepbile, Dapto SDD MAHENDRA 4); although appears to have responded while on cefepime/meropenem as well    #Marginal Cell Lymphoma  #Hyponatremia     #Abx allergy: penicillin - hives as a child      RECOMMENDATIONS  - continue linezolid for now, watch platelets while on linezolid (plan for 7 day course)  - if PLT continue to drop over weekend, stop linezolid and switch to daptomycin 8 mg/kg q 24 hours (with CK baseline check)  - if febrile again over weekend, restart cefepime while obtaining cultures

## 2020-08-30 NOTE — PHYSICAL THERAPY INITIAL EVALUATION ADULT - LEVEL OF INDEPENDENCE: BED TO CHAIR, REHAB EVAL
unable to perform/Attempted, but too unsafe to complete.  Pt was very dizzy.  Dizziness increased with sitting

## 2020-08-30 NOTE — PROGRESS NOTE ADULT - SUBJECTIVE AND OBJECTIVE BOX
S: No events/complaints  mostly in bed  eating, drinking, urinating fine.     All other pertinent ROS negative.      08-29-20 @ 07:01  -  08-30-20 @ 07:00  --------------------------------------------------------  IN: 400 mL / OUT: 400 mL / NET: 0 mL    08-30-20 @ 07:01  -  08-30-20 @ 17:29  --------------------------------------------------------  IN: 450 mL / OUT: 0 mL / NET: 450 mL      Vital Signs Last 24 Hrs  T(C): 36.1 (30 Aug 2020 13:16), Max: 36.1 (30 Aug 2020 13:16)  T(F): 97 (30 Aug 2020 13:16), Max: 97 (30 Aug 2020 13:16)  HR: 75 (30 Aug 2020 17:00) (71 - 77)  BP: 101/57 (30 Aug 2020 17:00) (101/57 - 123/58)  BP(mean): --  RR: 18 (30 Aug 2020 13:16) (18 - 18)  SpO2: 95% (30 Aug 2020 08:16) (95% - 95%)  PHYSICAL EXAM:    Constitutional: NAD, awake and alert, well-developed  HEENT: PERR, EOMI, Normal Hearing, MMM  Neck: Soft and supple, No LAD, No JVD  Respiratory: Breath sounds are clear bilaterally, No wheezing, rales or rhonchi  Cardiovascular: S1 and S2, regular rate and rhythm, no Murmurs, gallops or rubs  Gastrointestinal: Bowel Sounds present, soft, nontender, nondistended, no guarding, no rebound  Extremities: No peripheral edema      MEDICATIONS:  MEDICATIONS  (STANDING):  acyclovir   Oral Tab/Cap 400 milliGRAM(s) Oral daily  aspirin  chewable 81 milliGRAM(s) Oral daily  atorvastatin 80 milliGRAM(s) Oral at bedtime  chlorhexidine 4% Liquid 1 Application(s) Topical <User Schedule>  clopidogrel Tablet 75 milliGRAM(s) Oral daily  dextrose 5%. 1000 milliLiter(s) (50 mL/Hr) IV Continuous <Continuous>  dextrose 50% Injectable 12.5 Gram(s) IV Push once  dextrose 50% Injectable 25 Gram(s) IV Push once  dextrose 50% Injectable 25 Gram(s) IV Push once  enoxaparin Injectable 40 milliGRAM(s) SubCutaneous daily  fluconAZOLE   Tablet 200 milliGRAM(s) Oral daily  folic acid 1 milliGRAM(s) Oral daily  insulin lispro (HumaLOG) corrective regimen sliding scale   SubCutaneous three times a day before meals  linezolid  IVPB      linezolid  IVPB 600 milliGRAM(s) IV Intermittent every 12 hours  metoprolol tartrate 25 milliGRAM(s) Oral two times a day  pantoprazole    Tablet 40 milliGRAM(s) Oral before breakfast  sodium chloride 0.9%. 1000 milliLiter(s) (50 mL/Hr) IV Continuous <Continuous>      LABS: All Labs Reviewed:                        8.7    8.19  )-----------( 107      ( 30 Aug 2020 05:32 )             26.4     08-30    134<L>  |  100  |  8<L>  ----------------------------<  118<H>  4.4   |  25  |  <0.5<L>    Ca    8.0<L>      30 Aug 2020 05:32    TPro  4.2<L>  /  Alb  2.8<L>  /  TBili  1.2  /  DBili  x   /  AST  30  /  ALT  19  /  AlkPhos  206<H>  08-30    PT/INR - ( 30 Aug 2020 05:32 )   PT: 17.30 sec;   INR: 1.50 ratio         PTT - ( 30 Aug 2020 05:32 )  PTT:45.9 sec  CARDIAC MARKERS ( 30 Aug 2020 05:32 )  x     / x     / 22 U/L / x     / x          Blood Culture:     Radiology: reviewed

## 2020-08-30 NOTE — PROGRESS NOTE ADULT - ASSESSMENT
Patient is a 75 y/o F with PMH of marginal zone lymphoma, recurrent UTI, CAD s/p stent, DLD, HTN  admitted for sepsis due to VRE UTI    #) Sepsis, resolved secondary to VRE in Urine:   - Per ID, continue with linezolid, if platelets dropping (stable today) can switch to dapto  - BCx  negative    #) Marginal zone lymphoma with splenomegaly: Flow cytometry revealed Monotypic B cells (18% of cells), positive for Kappa, CD19, CD20, FMC-7, negative for CD5, CD10, and CD23. 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 Atypical cells were negative for cyclin D1, molecular studies negative MYD88, L265P and BRAF V600 mutations. ANNEXIN1 seemed to be positive on T Cells and myeloid cells. The negative BRAF V600 makes hairy cell leukemia less likely. BCL2 was negative.  - PET CT as of May 2020 showing diffuse FDG uptake in an enlarged spleen   - Patient with almost weekly transfusions of pRBCs as outpatient   - Patient started on Ibrutinib 3 tabs daily on 6/17/2020, dose reduced and eventually held due to cytopenias and infection  - Hold ibrutinib for now	    #) Thrombocytopenia with elevated INR: Thrombocytopenia likely due to lymphoma/splenomegaly and now linezolid, INR due to vit K deficiency  - Received PO Vitamin K 2.5mg 8/26 (patient is on DAPT, had been getting ibrutinib which also increases risk of bleeding in the setting of thrombocytopenia)  - Recheck INR--> 1.37 on 8/27, now rising again  - Monitor CBC  - Keep platelets >50K (greater than 100K on 8/30)  - If dropping switch linezolid to dapto per ID    #) 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

## 2020-08-30 NOTE — PHYSICAL THERAPY INITIAL EVALUATION ADULT - RANGE OF MOTION EXAMINATION, REHAB EVAL
bilateral upper extremity ROM was WFL (within functional limits)/bilateral lower extremity ROM was WFL (within functional limits)/bilateral upper extremity ROM was WNL (within normal limits)

## 2020-08-30 NOTE — PHYSICAL THERAPY INITIAL EVALUATION ADULT - PLANNED THERAPY INTERVENTIONS, PT EVAL
strengthening/balance training/bed mobility training/motor coordination training/neuromuscular re-education/ROM/stretching/transfer training/gait training

## 2020-08-30 NOTE — PROGRESS NOTE ADULT - ASSESSMENT
75 y/o F w PMHx of B cell lymphoproliferative disorder on Imbrutinib, DM, HTN, CAD s/p stent p/w AMS, dehydration, & hematuria x1 day admitted for septic shock due to UTI Enterococcus VRE    #Metabolic Encephalopathy &  # Septic shock (POA) : 2/2 VRE UTI  Improving on IV Linezolid.   c/w Linezolid for now. If platelets drop, switch to Daptomycin per ID note.   VSS now.   TLC out on 8/28.  Good urine output.  Encourage ambulation.     # Marginal B Cell Lymphoma:   Been on outpatient Ibrutinib.   Has splenomegaly (contributing to thrombocytopenia) with increased FDG uptake   Holding Ibrutinib for now per heme. OP f/u  On Fluconazole & Acyclovir ppx    #Thrombocytopenia:   d/t B cell Lymphoma/Splenomegaly and now Linezolid.   Monitor. STable for now.    #Anemia:   Chronic.   2/2 B cell lymphoma.  Receives weekly transfusions as outpatient  Monitor  Keep Hgb > 8.    #Hyponatremia:  ?Hypovolemic.  improved with NS @ 50.    #H/o CAD s/p stent  - Plavix 75 mg qd    #HTN:  Now BP better.   STarting to get tachy.  Reintroduce home dose metoprolol T slowly. Start with 25 BID.    #DM:  Does not use any meds at home  SSI here  Maybe PO on d/c.  A1c 7.4     #Diet: DASH  #DVT ppx: ASA; lovenox resumed after hematologist approval monitor platelets if < 50 stop  OOB to chair.  Get PT Monday, Dispo planning

## 2020-08-30 NOTE — PHYSICAL THERAPY INITIAL EVALUATION ADULT - GENERAL OBSERVATIONS, REHAB EVAL
Pt found in bed in NAD.  Pt was very sleepy, but attempted to answer questions.  Pt was unable to get oob due to dizziness.

## 2020-08-30 NOTE — PHYSICAL THERAPY INITIAL EVALUATION ADULT - IMPAIRMENTS FOUND, PT EVAL
muscle strength/Severe dizziness limited therapeutic interventions/posture/gait, locomotion, and balance/aerobic capacity/endurance/gross motor

## 2020-08-30 NOTE — PROGRESS NOTE ADULT - SUBJECTIVE AND OBJECTIVE BOX
24H events:    Platelets and hemoglobin stable  No events  No complaints  Roblero in place      PAST MEDICAL & SURGICAL HISTORY  Anemia  DM (diabetes mellitus)  High cholesterol  HTN (hypertension)  B-cell chronic lymphocytic leukemia variant  CAD (coronary artery disease): s/p stenting  H/O heart artery stent    SOCIAL HISTORY:  Negative for smoking/alcohol/drug use.     ALLERGIES:  penicillin (Anaphylaxis; Hives)    MEDICATIONS:  STANDING MEDICATIONS  acyclovir   Oral Tab/Cap 400 milliGRAM(s) Oral daily  aspirin  chewable 81 milliGRAM(s) Oral daily  atorvastatin 80 milliGRAM(s) Oral at bedtime  chlorhexidine 4% Liquid 1 Application(s) Topical <User Schedule>  clopidogrel Tablet 75 milliGRAM(s) Oral daily  dextrose 5%. 1000 milliLiter(s) IV Continuous <Continuous>  dextrose 50% Injectable 12.5 Gram(s) IV Push once  dextrose 50% Injectable 25 Gram(s) IV Push once  dextrose 50% Injectable 25 Gram(s) IV Push once  enoxaparin Injectable 40 milliGRAM(s) SubCutaneous daily  fluconAZOLE   Tablet 200 milliGRAM(s) Oral daily  folic acid 1 milliGRAM(s) Oral daily  insulin lispro (HumaLOG) corrective regimen sliding scale   SubCutaneous three times a day before meals  linezolid  IVPB      linezolid  IVPB 600 milliGRAM(s) IV Intermittent every 12 hours  metoprolol tartrate 25 milliGRAM(s) Oral two times a day  pantoprazole    Tablet 40 milliGRAM(s) Oral before breakfast  sodium chloride 0.9%. 1000 milliLiter(s) IV Continuous <Continuous>    PRN MEDICATIONS  dextrose 40% Gel 15 Gram(s) Oral once PRN  glucagon  Injectable 1 milliGRAM(s) IntraMuscular once PRN    VITALS:   T(F): 96.3  HR: 76  BP: 123/58  RR: 18  SpO2: 95%    LABS:                        8.7    8.19  )-----------( 107      ( 30 Aug 2020 05:32 )             26.4     08-30    134<L>  |  100  |  8<L>  ----------------------------<  118<H>  4.4   |  25  |  <0.5<L>    Ca    8.0<L>      30 Aug 2020 05:32    TPro  4.2<L>  /  Alb  2.8<L>  /  TBili  1.2  /  DBili  x   /  AST  30  /  ALT  19  /  AlkPhos  206<H>  08    PT/INR - ( 30 Aug 2020 05:32 )   PT: 17.30 sec;   INR: 1.50 ratio         PTT - ( 30 Aug 2020 05:32 )  PTT:45.9 sec  Urinalysis Basic - ( 30 Aug 2020 05:00 )    Color: Yellow / Appearance: Slightly Turbid / S.019 / pH: x  Gluc: x / Ketone: Negative  / Bili: Negative / Urobili: <2 mg/dL   Blood: x / Protein: 30 mg/dL / Nitrite: Negative   Leuk Esterase: Negative / RBC: 4 /HPF / WBC 26 /HPF   Sq Epi: x / Non Sq Epi: 10 /HPF / Bacteria: Negative        Creatine Kinase, Serum: 22 U/L (20 @ 05:32)      CARDIAC MARKERS ( 30 Aug 2020 05:32 )  x     / x     / 22 U/L / x     / x          RADIOLOGY:    PHYSICAL EXAM:  GEN: No acute distress  HENT: NCAT, EOMI  LYMPH: No appreciable adenopathy  LUNGS: No respiratory distress, clear to auscultation bilaterally   HEART: regular rate and rhythm  ABD: Soft, non-tender, non-distended  : roblero in place  SKIN: No rash  EXT: No edema  NEURO: AAOX3

## 2020-08-30 NOTE — PHYSICAL THERAPY INITIAL EVALUATION ADULT - LEVEL OF CONSCIOUSNESS, REHAB EVAL
----- Message from Bela Lucio sent at 9/24/2019 11:42 AM CDT -----  Contact: Courtney from home health in [place]  Called to check on paper work faxed to Dr Marino.       They can be reached at [phone]    Thanks  KB   lethargic/somnolent

## 2020-08-31 NOTE — PROGRESS NOTE ADULT - ASSESSMENT
ASSESSMENT  74y F with marginal cell lymphoma who presents with shock and hematuria     Anemia  DM (diabetes mellitus)  High cholesterol  HTN (hypertension)  B-cell chronic lymphocytic leukemia variant  CAD (coronary artery disease)      IMPRESSION  #Shock, possibly septic   - No symptoms of dysuria, although with acute encephelopathy, improved while on antibiotics  - CT Abd/Pelvis without pyelo  - Urine Cx VRE Faecium Linezolid suscepbile, Dapto SDD MAHENDRA 4); although appears to have responded while on cefepime/meropenem as well    #Marginal Cell Lymphoma  #Hyponatremia     #Abx allergy: penicillin - hives as a child      RECOMMENDATIONS  - continue linezolid (end date 9/2)  - monitor cell counts  - on ACV and Fluconazole PPx    Please call or message on Microsoft Teams if with any questions.  Spectra [phone]    This is a preliminary incomplete pended note, all final recommendations to follow after interview and examination of the patient. ASSESSMENT  74y F with marginal cell lymphoma who presents with shock and hematuria     Anemia  DM (diabetes mellitus)  High cholesterol  HTN (hypertension)  B-cell chronic lymphocytic leukemia variant  CAD (coronary artery disease)      IMPRESSION  #Shock, possibly septic   - No symptoms of dysuria, although with acute encephelopathy, improved while on antibiotics  - CT Abd/Pelvis without pyelo  - Urine Cx VRE Faecium Linezolid suscepbile, Dapto SDD MAHENDRA 4); although appears to have responded while on cefepime/meropenem as well    #Marginal Cell Lymphoma  #Hyponatremia     #Abx allergy: penicillin - hives as a child      RECOMMENDATIONS  - continue linezolid (end date 9/2)  - monitor cell counts - PLT have been stable   - on ACV and Fluconazole PPx    Please call or message on Microsoft Teams if with any questions.  Spectra [phone]

## 2020-08-31 NOTE — CHART NOTE - NSCHARTNOTEFT_GEN_A_CORE
Registered Dietitian Follow-Up     Patient Profile Reviewed                           Yes [x]   No []     Nutrition History Previously Obtained        Yes [x]  No []       Pertinent Subjective Information: Pt reports she's doing better. Continues to report that she eats at her baseline, small meals throughout the day. Previous RD recommendations not activated. Will continue to recommend to optimize po intake.      Pertinent Medical Interventions:  #Metabolic Encephalopathy & Septic shock (POA) : 2/2 VRE UTI - improved while on abx   #Marginal B Cell Lymphoma  #Thrombocytopenia  Per heme/onc clinically improving, platelets improved/stable     Diet order: DASH/TLC     Anthropometrics:  - Ht. 63"  - Wt.   141lbs (8/28) -- wt fluctuations likely d/t bed scale error, ?fluid retention however last edema documented on (8/26) Staff aware to document more accurate wt in EMR   130.2lbs (8/27)  121.2lbs (8/26)  - %wt change  - BMI 21.4 -- based on 121.2lbs   - IBW 115lbs     Pertinent Lab Data: (8/31) H/H 8.5/26.2, Na 130, BUN 9, Cr <0.5, , A1c 7.4% (8/25)  CAPILLARY BLOOD GLUCOSE  POCT Blood Glucose.: 172 mg/dL (31 Aug 2020 11:21)  POCT Blood Glucose.: 177 mg/dL (31 Aug 2020 07:40)  POCT Blood Glucose.: 190 mg/dL (30 Aug 2020 21:22)  POCT Blood Glucose.: 188 mg/dL (30 Aug 2020 16:51)       Pertinent Meds: lovenox, aspirin, abx, sodium chloride 0.9%, humalog, protonix, lipitor, folic acid      Physical Findings:  - Appearance: Alert and oriented. Last edema documented on (8/26) +3 R+L foot edema per RN flow sheets  - GI function: Last BM (8/30) No c/o N+V, constipation or diarrhea   - Tubes: N/A  - Oral/Mouth cavity: no chewing/swallowing difficulties reported by staff   - Skin: WDL      Nutrition Requirements  Weight Used: CBW 56.2kg -- continued from initial RD assessment (8/28)      Estimated Energy Needs    Continue [x]  Adjust []  estimated calorie needs = 7558-6729 kcal/day (25-30 kcal/kg CBW d/t cancer previously on chemo).         Estimated Protein Needs    Continue [x]  Adjust []  estimated protein needs = 56-67 g/day (1.0-1.2 g/kg CBW)        Estimated Fluid Needs        Continue [x]  Adjust []  estimated fluid needs = 1mL/kcal or per CEU team     Nutrient Intake: 100% x last 3-4 days per RN flow sheets        [x] Previous Nutrition Diagnosis: Inadequate Energy Intake            [] Ongoing          [x] Resolved       Nutrition Intervention: meals and snacks, medical food supplements     Goal/Expected Outcome: Pt to continue to meet greater than 75% of estimated needs within 7 days     Indicator/Monitoring: energy intake, body comp, NFPF, lyte/renal/glucose profile     Recommendations:  1. Add consistent carbohydrate (with evening snack) diet modifier as pt now with good po intake   2. Order Ensure Compact BID as pt eats small frequent meals throughout the day d/t early satiety

## 2020-08-31 NOTE — PROGRESS NOTE ADULT - ASSESSMENT
Assessment:   73 y/o F w PMHx of B cell lymphoproliferative disorder on Imbrutinib, DM, HTN, CAD s/p stent p/w AMS, dehydration, & hematuria x1 day admitted for septic shock due to UTI Enterococcus VRE    Plan:   #Metabolic Encephalopathy &  # Septic shock (POA) : 2/2 VRE UTI  - No symptoms of dysuria, although with acute encephelopathy, improved while on antibiotics  - CT Abd/Pelvis without pyelo  - Urine Cx VRE Faecium Linezolid suscepbile, Dapto SDD MAHENDRA 4); although appears to have responded while on cefepime/meropenem as well  -Improving on IV Linezolid.   -c/w Linezolid for now until 9/2. If platelets drop, switch to Daptomycin per ID note.   - on ACV and Fluconazole PPx  -VSS now.   Good urine output.    # Marginal B Cell Lymphoma:   Been on outpatient Ibrutinib.   Has splenomegaly (contributing to thrombocytopenia) with increased FDG uptake   Holding Ibrutinib for now per heme. OP f/u  On Fluconazole & Acyclovir ppx    #Thrombocytopenia:   d/t B cell Lymphoma/Splenomegaly and now Linezolid.   Monitor. Stable for now.    #Anemia:   Chronic.   2/2 B cell lymphoma.  Receives weekly transfusions as outpatient  Monitor  Keep Hgb > 8.    #Hyponatremia:  ?Hypovolemic.  improved with NS @ 50.    #H/o CAD s/p stent  - Plavix 75 mg qd    #HTN:  Now BP better.   STarting to get tachy.  Started  metoprolol 25 BID.    #DM:  Does not use any meds at home  SSI here  Maybe PO on d/c.  A1c 7.4     #Diet: DASH  #DVT ppx: ASA; lovenox resumed after hematologist approval monitor platelets if < 50 stop  OOB to chair.  Dispo planning

## 2020-08-31 NOTE — PROGRESS NOTE ADULT - SUBJECTIVE AND OBJECTIVE BOX
AD BASURTO 74y Female  MRN#: [id number]       Subjective   Pt reports she's doing better. Continues to report that she eats at her baseline, small meals throughout the day. Patient declined PT today . Pending rehab if she does not walk on her own  . Or home if she does.   OBJECTIVE  PAST MEDICAL & SURGICAL HISTORY  Anemia  DM (diabetes mellitus)  High cholesterol  HTN (hypertension)  B-cell chronic lymphocytic leukemia variant  CAD (coronary artery disease): s/p stenting  H/O heart artery stent    ALLERGIES:  penicillin (Anaphylaxis; Hives)    MEDICATIONS:  STANDING MEDICATIONS  acyclovir   Oral Tab/Cap 400 milliGRAM(s) Oral daily  aspirin  chewable 81 milliGRAM(s) Oral daily  atorvastatin 80 milliGRAM(s) Oral at bedtime  chlorhexidine 4% Liquid 1 Application(s) Topical <User Schedule>  clopidogrel Tablet 75 milliGRAM(s) Oral daily  dextrose 5%. 1000 milliLiter(s) IV Continuous <Continuous>  dextrose 50% Injectable 12.5 Gram(s) IV Push once  dextrose 50% Injectable 25 Gram(s) IV Push once  dextrose 50% Injectable 25 Gram(s) IV Push once  enoxaparin Injectable 40 milliGRAM(s) SubCutaneous daily  fluconAZOLE   Tablet 200 milliGRAM(s) Oral daily  folic acid 1 milliGRAM(s) Oral daily  insulin lispro (HumaLOG) corrective regimen sliding scale   SubCutaneous three times a day before meals  linezolid  IVPB      linezolid  IVPB 600 milliGRAM(s) IV Intermittent every 12 hours  metoprolol tartrate 25 milliGRAM(s) Oral two times a day  pantoprazole    Tablet 40 milliGRAM(s) Oral before breakfast  sodium chloride 0.9%. 1000 milliLiter(s) IV Continuous <Continuous>    PRN MEDICATIONS  dextrose 40% Gel 15 Gram(s) Oral once PRN  glucagon  Injectable 1 milliGRAM(s) IntraMuscular once PRN      VITAL SIGNS: Last 24 Hours  T(C): 35.5 (31 Aug 2020 13:44), Max: 35.6 (30 Aug 2020 21:00)  T(F): 95.9 (31 Aug 2020 13:44), Max: 96.1 (31 Aug 2020 05:00)  HR: 71 (31 Aug 2020 13:44) (71 - 75)  BP: 109/55 (31 Aug 2020 13:44) (101/57 - 111/60)  BP(mean): --  RR: 16 (31 Aug 2020 13:44) (16 - 18)  SpO2: 96% (31 Aug 2020 07:41) (94% - 96%)    Physical Exam   Gen: NAD, resting in bed  HEENT: Normocephalic, atraumatic  Neck: supple, no lymphadenopathy  CV: Regular rate & regular rhythm  Lungs: decreased BS at bases, no fremitus  Abdomen: Soft, BS present  Ext: Warm, well perfused  Neuro: non focal, awake  Skin: no rash, no erythema  Lines: no phlebitis      LABS:                        8.5    8.88  )-----------( 102      ( 31 Aug 2020 05:28 )             26.2     08-    130<L>  |  96<L>  |  9<L>  ----------------------------<  168<H>  4.3   |  22  |  <0.5<L>    Ca    7.8<L>      31 Aug 2020 05:28    TPro  4.1<L>  /  Alb  2.9<L>  /  TBili  1.1  /  DBili  x   /  AST  30  /  ALT  21  /  AlkPhos  250<H>  08-    PT/INR - ( 30 Aug 2020 05:32 )   PT: 17.30 sec;   INR: 1.50 ratio         PTT - ( 30 Aug 2020 05:32 )  PTT:45.9 sec  Urinalysis Basic - ( 30 Aug 2020 05:00 )    Color: Yellow / Appearance: Slightly Turbid / S.019 / pH: x  Gluc: x / Ketone: Negative  / Bili: Negative / Urobili: <2 mg/dL   Blood: x / Protein: 30 mg/dL / Nitrite: Negative   Leuk Esterase: Negative / RBC: 4 /HPF / WBC 26 /HPF   Sq Epi: x / Non Sq Epi: 10 /HPF / Bacteria: Negative     Culture - Urine (collected 30 Aug 2020 05:00)  Source: .Urine Clean Catch (Midstream)  Final Report (31 Aug 2020 07:22):    No growth      CARDIAC MARKERS ( 30 Aug 2020 05:32 )  x     / x     / 22 U/L / x     / x          RADIOLOGY:    Diagnosis Line Sinus tachycardia  Rightward axis  Low voltage QRS  RSR' or QR pattern in V1 suggests right ventricular conduction delay  Borderline ECG    < from: US Spleen (08.26.20 @ 17:00) >  Impression:  Splenomegaly, consistent with recent CT abdomen/pelvis.  < end of copied text >        < from: CT Abdomen and Pelvis w/ IV Cont (20 @ 21:49) >    Stable, marked splenomegaly, consistent with patient's known history of B-cell lymphoproliferative disorder.    Otherwise, no CT evidence of acute intra-abdominal pathology.    < from: [facility] Duplex Lower Ext Vein Scan, Bilat (20 @ 09:57) >    No evidence of deep venous thrombosis or superficial thrombophlebitis in the bilateral lower extremities.    < from: Transthoracic Echocardiogram (20 @ 14:03) >  1. Normal global left ventricular systolic function.   2. LV Ejection Fraction by Marsh's Method with a biplane EF of 66 %.   3. Mild eccentric left ventricular hypertrophy involving the Mid-cavity wall.   4. Mildly increased LV wall thickness.   5. Normal left ventricular internal cavity size.   6. Mid-cavity hypertrophy with a late peaking systolic gradient with a peak of approximately 36 mm Hg. The mean global longitudinal peak strain by speckle tracking is -16.7% which is borderline normal.   7. Sclerotic aortic valve with normal opening.   8. Estimated pulmonary artery systolic pressure is 38.7 mmHg assuming a right atrial pressure of 3mmHg, which is consistent with borderline pulmonary hypertension.    MRSA PCR Result.: Negative:

## 2020-08-31 NOTE — CONSULT NOTE ADULT - REASON FOR ADMISSION
Weakness, Abdominal Discomfort and   blood in urine

## 2020-08-31 NOTE — CONSULT NOTE ADULT - ASSESSMENT
IMPRESSION: Rehab of   75 yo F with lymphoma, sepsis    PRECAUTIONS: [x  ] Cardiac  [  ] Respiratory  [  ] Seizures [  ] Contact Isolation  [  ] Droplet Isolation  [  ] Other    Weight Bearing Status:     RECOMMENDATION:    Out of Bed to Chair     DVT/Decubiti Prophylaxis    REHAB PLAN:     [ x  ] Bedside P/T 3-5 times a week   [   ]   Bedside O/T  2-3 times a week             [   ] No Rehab Therapy Indicated                   [   ]  Speech Therapy   Conditioning/ROM                                    ADL  Bed Mobility                                               Conditioning/ROM  Transfers                                                     Bed Mobility  Sitting /Standing Balance                         Transfers                                        Gait Training                                               Sitting/Standing Balance  Stair Training [   ]Applicable                    Home equipment Eval                                                                        Splinting  [   ] Only      GOALS:   ADL   [ x  ]   Independent                    Transfers  [ x  ] Independent                          Ambulation  [ x  ] Independent     [ x   ] With device                            [   ]  CG                                                         [   ]  CG                                                                  [   ] CG                            [    ] Min A                                                   [   ] Min A                                                              [   ] Min  A          DISCHARGE PLAN:   [   ]  Good candidate for Intensive Rehabilitation/Hospital based-[facility]A [facility]                                             Will tolerate 3hrs Intensive Rehab Daily                                       [ x   ]  Short Term Rehab in Skilled Nursing Facility                                                             VS                                     [  x  ]  Home with Outpatient or VN services                                         [    ]  Possible Candidate for Intensive Hospital based Rehab

## 2020-08-31 NOTE — PROGRESS NOTE ADULT - SUBJECTIVE AND OBJECTIVE BOX
AD BASURTO  74y, Female  Allergy: penicillin (Anaphylaxis; Hives)      LOS  7d    CHIEF COMPLAINT: Weakness, Abdominal Discomfort and   blood in urine (30 Aug 2020 17:28)      INTERVAL EVENTS/HPI  - No acute events overnight  - T(F): , Max: 97 (20 @ 13:16)  - Denies any worsening symptoms  - Tolerating medication  - WBC Count: 8.88 (20 @ 05:28)  WBC Count: 8.19 (20 @ 05:32)     - Creatinine, Serum: <0.5 (20 @ 05:28)  Creatinine, Serum: <0.5 (20 @ 05:32)       ROS  General: Denies rigors, nightsweats  HEENT: Denies headache, rhinorrhea, sore throat, eye pain  CV: Denies CP, palpitations  PULM: Denies wheezing, hemoptysis  GI: Denies hematemesis, hematochezia, melena  : Denies discharge, hematuria  MSK: Denies arthralgias, myalgias  SKIN: Denies rash, lesions  NEURO: Denies paresthesias, weakness  PSYCH: Denies depression, anxiety    VITALS:  T(F): 96.1, Max: 97 (20 @ 13:16)  HR: 74  BP: 111/60  RR: 18Vital Signs Last 24 Hrs  T(C): 35.6 (31 Aug 2020 05:00), Max: 36.1 (30 Aug 2020 13:16)  T(F): 96.1 (31 Aug 2020 05:00), Max: 97 (30 Aug 2020 13:16)  HR: 74 (31 Aug 2020 05:00) (71 - 75)  BP: 111/60 (31 Aug 2020 05:00) (101/57 - 111/60)  BP(mean): --  RR: 18 (31 Aug 2020 05:00) (18 - 18)  SpO2: 94% (30 Aug 2020 20:00) (94% - 95%)    PHYSICAL EXAM:  Gen: NAD, resting in bed  HEENT: Normocephalic, atraumatic  Neck: supple, no lymphadenopathy  CV: Regular rate & regular rhythm  Lungs: decreased BS at bases, no fremitus  Abdomen: Soft, BS present  Ext: Warm, well perfused  Neuro: non focal, awake  Skin: no rash, no erythema  Lines: no phlebitis    FH: Non-contributory  Social Hx: Non-contributory    TESTS & MEASUREMENTS:                        8.5    8.88  )-----------( 102      ( 31 Aug 2020 05:28 )             26.2         130<L>  |  96<L>  |  9<L>  ----------------------------<  168<H>  4.3   |  22  |  <0.5<L>    Ca    7.8<L>      31 Aug 2020 05:28    TPro  4.1<L>  /  Alb  2.9<L>  /  TBili  1.1  /  DBili  x   /  AST  30  /  ALT  21  /  AlkPhos  250<H>      eGFR if Non African American: 103 mL/min/1.73M2 (20 @ 05:28)  eGFR if African American: 119 mL/min/1.73M2 (20 @ 05:28)    LIVER FUNCTIONS - ( 31 Aug 2020 05:28 )  Alb: 2.9 g/dL / Pro: 4.1 g/dL / ALK PHOS: 250 U/L / ALT: 21 U/L / AST: 30 U/L / GGT: x           Urinalysis Basic - ( 30 Aug 2020 05:00 )    Color: Yellow / Appearance: Slightly Turbid / S.019 / pH: x  Gluc: x / Ketone: Negative  / Bili: Negative / Urobili: <2 mg/dL   Blood: x / Protein: 30 mg/dL / Nitrite: Negative   Leuk Esterase: Negative / RBC: 4 /HPF / WBC 26 /HPF   Sq Epi: x / Non Sq Epi: 10 /HPF / Bacteria: Negative        Culture - Urine (collected 20 @ 19:40)  Source: .Urine Clean Catch (Midstream)  Final Report (20 @ 08:09):    >100,000 CFU/ml Enterococcus faecium (vancomycin resistant)    <10,000 CFU/ml Normal Urogenital anthony present  Organism: Enterococcus faecium (vancomycin resistant) (20 @ 08:09)  Organism: Enterococcus faecium (vancomycin resistant) (20 @ 08:09)      -  Ampicillin: R >8 Predicts results to ampicillin/sulbactam, amoxacillin-clavulanate and  piperacillin-tazobactam.      -  Ciprofloxacin: R >2      -  Daptomycin: SDD 4 The breakpoint for SDD (sensitive dose dependent)is based on a dosage regimen of 8-12 mg/kg administered every 24 h in adults and is intended for serious infections due to E. faecium. Consultation with an infectious diseases specialist is recommended.      -  Levofloxacin: R >4      -  Linezolid: S 2      -  Nitrofurantoin: S <=32 Should not be used to treat pyelonephritis.      -  Tetra/Doxy: S <=4      -  Vancomycin: R >16      Method Type: MAHENDRA    Culture - Blood (collected 20 @ 18:50)  Source: .Blood Blood-Peripheral  Final Report (20 @ 02:02):    No Growth Final    Culture - Blood (collected 20 @ 17:49)  Source: .Blood Blood-Peripheral  Final Report (20 @ 02:02):    No Growth Final            INFECTIOUS DISEASES TESTING  MRSA PCR Result.: Negative (20 @ 05:04)  COVID-19 PCR: NotDetec (20 @ 19:00)  Procalcitonin, Serum: 0.26 (20 @ 09:30)  COVID-19 PCR: NotDetec (20 @ 18:24)  COVID-19 PCR: NotDetec (06-10-20 @ 14:21)  COVID-19 PCR: NotDetec (20 @ 14:28)      INFLAMMATORY MARKERS  Sedimentation Rate, Erythrocyte: 33 mm/Hr (20 @ 09:30)  C-Reactive Protein, Serum: 0.77 mg/dL (20 @ 09:30)      RADIOLOGY & ADDITIONAL TESTS:  I have personally reviewed the last available Chest xray  CXR      CT      CARDIOLOGY TESTING  12 Lead ECG:   Ventricular Rate 134 BPM    Atrial Rate 134 BPM    P-R Interval 156 ms    QRS Duration 116 ms    Q-T Interval 284 ms    QTC Calculation(Bezet) 424 ms    P Axis 88 degrees    R Axis 90 degrees    T Axis 39 degrees    Diagnosis Line Sinus tachycardia  Rightward axis  Low voltage QRS  RSR' or QR pattern in V1 suggests right ventricular conduction delay  Borderline ECG    Confirmed by VERONIKA FOSTER MD ([id number]) on 2020 11:09:38 PM (20 @ 17:22)      MEDICATIONS  acyclovir   Oral Tab/Cap 400 Oral daily  aspirin  chewable 81 Oral daily  atorvastatin 80 Oral at bedtime  chlorhexidine 4% Liquid 1 Topical <User Schedule>  clopidogrel Tablet 75 Oral daily  dextrose 5%. 1000 IV Continuous <Continuous>  dextrose 50% Injectable 12.5 IV Push once  dextrose 50% Injectable 25 IV Push once  dextrose 50% Injectable 25 IV Push once  enoxaparin Injectable 40 SubCutaneous daily  fluconAZOLE   Tablet 200 Oral daily  folic acid 1 Oral daily  insulin lispro (HumaLOG) corrective regimen sliding scale  SubCutaneous three times a day before meals  linezolid  IVPB     linezolid  IVPB 600 IV Intermittent every 12 hours  metoprolol tartrate 25 Oral two times a day  pantoprazole    Tablet 40 Oral before breakfast  sodium chloride 0.9%. 1000 IV Continuous <Continuous>      WEIGHT  Weight (kg): 64 (20 @ 20:23)  Creatinine, Serum: <0.5 mg/dL (20 @ 05:28)      ANTIBIOTICS:  acyclovir   Oral Tab/Cap 400 milliGRAM(s) Oral daily  fluconAZOLE   Tablet 200 milliGRAM(s) Oral daily  linezolid  IVPB      linezolid  IVPB 600 milliGRAM(s) IV Intermittent every 12 hours      All available historical records have been reviewed AD BASURTO  74y, Female  Allergy: penicillin (Anaphylaxis; Hives)      LOS  7d    CHIEF COMPLAINT: Weakness, Abdominal Discomfort and   blood in urine (30 Aug 2020 17:28)      INTERVAL EVENTS/HPI  - No acute events overnight  - appears comfortable, sleeping; denies any abdominal pain, nausea, vomiting  - Feels 80% back to normal   - T(F): , Max: 97 (20 @ 13:16)  - Denies any worsening symptoms  - Tolerating medication  - WBC Count: 8.88 (20 @ 05:28)  WBC Count: 8.19 (20 @ 05:32)     - Creatinine, Serum: <0.5 (20 @ 05:28)  Creatinine, Serum: <0.5 (20 @ 05:32)       ROS  General: Denies rigors, nightsweats  HEENT: Denies headache, rhinorrhea, sore throat, eye pain  CV: Denies CP, palpitations  PULM: Denies wheezing, hemoptysis  GI: Denies hematemesis, hematochezia, melena  : Denies discharge, hematuria  MSK: Denies arthralgias, myalgias  SKIN: Denies rash, lesions  NEURO: Denies paresthesias, weakness  PSYCH: Denies depression, anxiety    VITALS:  T(F): 96.1, Max: 97 (20 @ 13:16)  HR: 74  BP: 111/60  RR: 18Vital Signs Last 24 Hrs  T(C): 35.6 (31 Aug 2020 05:00), Max: 36.1 (30 Aug 2020 13:16)  T(F): 96.1 (31 Aug 2020 05:00), Max: 97 (30 Aug 2020 13:16)  HR: 74 (31 Aug 2020 05:00) (71 - 75)  BP: 111/60 (31 Aug 2020 05:00) (101/57 - 111/60)  BP(mean): --  RR: 18 (31 Aug 2020 05:00) (18 - 18)  SpO2: 94% (30 Aug 2020 20:00) (94% - 95%)    PHYSICAL EXAM:  Gen: NAD, resting in bed  HEENT: Normocephalic, atraumatic  Neck: supple, no lymphadenopathy  CV: Regular rate & regular rhythm  Lungs: decreased BS at bases, no fremitus  Abdomen: Soft, BS present  Ext: Warm, well perfused  Neuro: non focal, awake  Skin: no rash, no erythema  Lines: no phlebitis    FH: Non-contributory  Social Hx: Non-contributory    TESTS & MEASUREMENTS:                        8.5    8.88  )-----------( 102      ( 31 Aug 2020 05:28 )             26.2         130<L>  |  96<L>  |  9<L>  ----------------------------<  168<H>  4.3   |  22  |  <0.5<L>    Ca    7.8<L>      31 Aug 2020 05:28    TPro  4.1<L>  /  Alb  2.9<L>  /  TBili  1.1  /  DBili  x   /  AST  30  /  ALT  21  /  AlkPhos  250<H>      eGFR if Non African American: 103 mL/min/1.73M2 (20 @ 05:28)  eGFR if African American: 119 mL/min/1.73M2 (20 @ 05:28)    LIVER FUNCTIONS - ( 31 Aug 2020 05:28 )  Alb: 2.9 g/dL / Pro: 4.1 g/dL / ALK PHOS: 250 U/L / ALT: 21 U/L / AST: 30 U/L / GGT: x           Urinalysis Basic - ( 30 Aug 2020 05:00 )    Color: Yellow / Appearance: Slightly Turbid / S.019 / pH: x  Gluc: x / Ketone: Negative  / Bili: Negative / Urobili: <2 mg/dL   Blood: x / Protein: 30 mg/dL / Nitrite: Negative   Leuk Esterase: Negative / RBC: 4 /HPF / WBC 26 /HPF   Sq Epi: x / Non Sq Epi: 10 /HPF / Bacteria: Negative        Culture - Urine (collected 20 @ 19:40)  Source: .Urine Clean Catch (Midstream)  Final Report (20 @ 08:09):    >100,000 CFU/ml Enterococcus faecium (vancomycin resistant)    <10,000 CFU/ml Normal Urogenital anthony present  Organism: Enterococcus faecium (vancomycin resistant) (20 @ 08:09)  Organism: Enterococcus faecium (vancomycin resistant) (20 @ 08:09)      -  Ampicillin: R >8 Predicts results to ampicillin/sulbactam, amoxacillin-clavulanate and  piperacillin-tazobactam.      -  Ciprofloxacin: R >2      -  Daptomycin: SDD 4 The breakpoint for SDD (sensitive dose dependent)is based on a dosage regimen of 8-12 mg/kg administered every 24 h in adults and is intended for serious infections due to E. faecium. Consultation with an infectious diseases specialist is recommended.      -  Levofloxacin: R >4      -  Linezolid: S 2      -  Nitrofurantoin: S <=32 Should not be used to treat pyelonephritis.      -  Tetra/Doxy: S <=4      -  Vancomycin: R >16      Method Type: MAHENDRA    Culture - Blood (collected 20 @ 18:50)  Source: .Blood Blood-Peripheral  Final Report (20 @ 02:02):    No Growth Final    Culture - Blood (collected 20 @ 17:49)  Source: .Blood Blood-Peripheral  Final Report (20 @ 02:02):    No Growth Final            INFECTIOUS DISEASES TESTING  MRSA PCR Result.: Negative (20 @ 05:04)  COVID-19 PCR: NotDetec (20 @ 19:00)  Procalcitonin, Serum: 0.26 (20 @ 09:30)  COVID-19 PCR: NotDetec (20 @ 18:24)  COVID-19 PCR: NotDetec (06-10-20 @ 14:21)  COVID-19 PCR: NotDetec (20 @ 14:28)      INFLAMMATORY MARKERS  Sedimentation Rate, Erythrocyte: 33 mm/Hr (20 @ 09:30)  C-Reactive Protein, Serum: 0.77 mg/dL (20 @ 09:30)      RADIOLOGY & ADDITIONAL TESTS:  I have personally reviewed the last available Chest xray  CXR      CT      CARDIOLOGY TESTING  12 Lead ECG:   Ventricular Rate 134 BPM    Atrial Rate 134 BPM    P-R Interval 156 ms    QRS Duration 116 ms    Q-T Interval 284 ms    QTC Calculation(Bezet) 424 ms    P Axis 88 degrees    R Axis 90 degrees    T Axis 39 degrees    Diagnosis Line Sinus tachycardia  Rightward axis  Low voltage QRS  RSR' or QR pattern in V1 suggests right ventricular conduction delay  Borderline ECG    Confirmed by VERONIKA FOSTER MD ([id number]) on 2020 11:09:38 PM (20 @ 17:22)      MEDICATIONS  acyclovir   Oral Tab/Cap 400 Oral daily  aspirin  chewable 81 Oral daily  atorvastatin 80 Oral at bedtime  chlorhexidine 4% Liquid 1 Topical <User Schedule>  clopidogrel Tablet 75 Oral daily  dextrose 5%. 1000 IV Continuous <Continuous>  dextrose 50% Injectable 12.5 IV Push once  dextrose 50% Injectable 25 IV Push once  dextrose 50% Injectable 25 IV Push once  enoxaparin Injectable 40 SubCutaneous daily  fluconAZOLE   Tablet 200 Oral daily  folic acid 1 Oral daily  insulin lispro (HumaLOG) corrective regimen sliding scale  SubCutaneous three times a day before meals  linezolid  IVPB     linezolid  IVPB 600 IV Intermittent every 12 hours  metoprolol tartrate 25 Oral two times a day  pantoprazole    Tablet 40 Oral before breakfast  sodium chloride 0.9%. 1000 IV Continuous <Continuous>      WEIGHT  Weight (kg): 64 (20 @ 20:23)  Creatinine, Serum: <0.5 mg/dL (20 @ 05:28)      ANTIBIOTICS:  acyclovir   Oral Tab/Cap 400 milliGRAM(s) Oral daily  fluconAZOLE   Tablet 200 milliGRAM(s) Oral daily  linezolid  IVPB      linezolid  IVPB 600 milliGRAM(s) IV Intermittent every 12 hours      All available historical records have been reviewed

## 2020-08-31 NOTE — PROGRESS NOTE ADULT - ASSESSMENT
Patient is a 73 y/o F with PMH of marginal zone lymphoma, recurrent UTI, CAD s/p stent, DLD, HTN  admitted for sepsis due to VRE UTI    #) Sepsis, resolved secondary to VRE in Urine: Clinically improving, counts stable  - Per ID, continue with linezolid  - BCx  negative    #) Marginal zone lymphoma with splenomegaly: Flow cytometry revealed Monotypic B cells (18% of cells), positive for Kappa, CD19, CD20, FMC-7, negative for CD5, CD10, and CD23. 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 Atypical cells were negative for cyclin D1, molecular studies negative MYD88, L265P and BRAF V600 mutations. ANNEXIN1 seemed to be positive on T Cells and myeloid cells. The negative BRAF V600 makes hairy cell leukemia less likely. BCL2 was negative.  - PET CT as of May 2020 showing diffuse FDG uptake in an enlarged spleen   - Patient with almost weekly transfusions of pRBCs as outpatient   - Patient started on Ibrutinib 3 tabs daily on 6/17/2020, dose reduced and eventually held due to cytopenias and infection  - Continue to hold ibrutinib	    #) Thrombocytopenia with elevated INR: Thrombocytopenia multifactorial including lymphoma/splenomegaly   - Received PO Vitamin K 2.5mg 8/26 (patient is on DAPT, had been getting ibrutinib which also increases risk of bleeding in the setting of thrombocytopenia)  - recheck inr (initially improved after Vit K, now rising again  - Monitor CBC  - Keep platelets >50K (greater than 100K on 8/30)  - If dropping switch linezolid to dapto per ID    #) 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

## 2020-08-31 NOTE — PROGRESS NOTE ADULT - SUBJECTIVE AND OBJECTIVE BOX
24H events:    No acut evenst overnight  Platelets stable  Doing well    PAST MEDICAL & SURGICAL HISTORY  Anemia  DM (diabetes mellitus)  High cholesterol  HTN (hypertension)  B-cell chronic lymphocytic leukemia variant  CAD (coronary artery disease): s/p stenting  H/O heart artery stent    SOCIAL HISTORY:  Negative for smoking/alcohol/drug use.     ALLERGIES:  penicillin (Anaphylaxis; Hives)    MEDICATIONS:  STANDING MEDICATIONS  acyclovir   Oral Tab/Cap 400 milliGRAM(s) Oral daily  aspirin  chewable 81 milliGRAM(s) Oral daily  atorvastatin 80 milliGRAM(s) Oral at bedtime  chlorhexidine 4% Liquid 1 Application(s) Topical <User Schedule>  clopidogrel Tablet 75 milliGRAM(s) Oral daily  dextrose 5%. 1000 milliLiter(s) IV Continuous <Continuous>  dextrose 50% Injectable 12.5 Gram(s) IV Push once  dextrose 50% Injectable 25 Gram(s) IV Push once  dextrose 50% Injectable 25 Gram(s) IV Push once  enoxaparin Injectable 40 milliGRAM(s) SubCutaneous daily  fluconAZOLE   Tablet 200 milliGRAM(s) Oral daily  folic acid 1 milliGRAM(s) Oral daily  insulin lispro (HumaLOG) corrective regimen sliding scale   SubCutaneous three times a day before meals  linezolid  IVPB      linezolid  IVPB 600 milliGRAM(s) IV Intermittent every 12 hours  metoprolol tartrate 25 milliGRAM(s) Oral two times a day  pantoprazole    Tablet 40 milliGRAM(s) Oral before breakfast  sodium chloride 0.9%. 1000 milliLiter(s) IV Continuous <Continuous>    PRN MEDICATIONS  dextrose 40% Gel 15 Gram(s) Oral once PRN  glucagon  Injectable 1 milliGRAM(s) IntraMuscular once PRN    VITALS:   T(F): 95.9  HR: 71  BP: 109/55  RR: 16  SpO2: 96%    LABS:                        8.5    8.88  )-----------( 102      ( 31 Aug 2020 05:28 )             26.2     08-31    130<L>  |  96<L>  |  9<L>  ----------------------------<  168<H>  4.3   |  22  |  <0.5<L>    Ca    7.8<L>      31 Aug 2020 05:28    TPro  4.1<L>  /  Alb  2.9<L>  /  TBili  1.1  /  DBili  x   /  AST  30  /  ALT  21  /  AlkPhos  250<H>  08-31    PT/INR - ( 30 Aug 2020 05:32 )   PT: 17.30 sec;   INR: 1.50 ratio         PTT - ( 30 Aug 2020 05:32 )  PTT:45.9 sec  Urinalysis Basic - ( 30 Aug 2020 05:00 )    Color: Yellow / Appearance: Slightly Turbid / S.019 / pH: x  Gluc: x / Ketone: Negative  / Bili: Negative / Urobili: <2 mg/dL   Blood: x / Protein: 30 mg/dL / Nitrite: Negative   Leuk Esterase: Negative / RBC: 4 /HPF / WBC 26 /HPF   Sq Epi: x / Non Sq Epi: 10 /HPF / Bacteria: Negative            Culture - Urine (collected 30 Aug 2020 05:00)  Source: .Urine Clean Catch (Midstream)  Final Report (31 Aug 2020 07:22):    No growth      CARDIAC MARKERS ( 30 Aug 2020 05:32 )  x     / x     / 22 U/L / x     / x          RADIOLOGY:    PHYSICAL EXAM:  GEN: No acute distress  HENT: NCAT, EOMI  LYMPH: No appreciable adenopathy  LUNGS: No respiratory distress, clear to auscultation bilaterally   HEART: regular rate and rhythm  ABD: Soft, non-tender, non-distended  SKIN: No rash  EXT: No edema  NEURO: AAOX3

## 2020-08-31 NOTE — CONSULT NOTE ADULT - SUBJECTIVE AND OBJECTIVE BOX
HPI:  74y F with PMH of CAD s/p stent, DLD, HTN, B josue lymphoma, recently diagnosed B cell lymphoproliferative disorder (follows Dr. Guardado), on Ibrutinib 140mg q48 presented to the ED with AMS, dehydration, and hematuria x1 day. Majority of the history obtained through daughter. Pt got lab work done today that showed a hemoglobin of 7.2. Pt normally AAOx2-3, but daughter noticed that pt was not acting per baseline today. Patient is  independent at baseline.  noticed blood in the urine x1 episode today. +abdominal discomfort . Normal BM. Daughter noticed dry lips on the pt and says that the last time patient had these symptoms was when she had a UTI a month ago. family also noticed decreased PO intake recently and  increased weakness.  Pt tested negative for COVID a few days ago. Daughter added  that  patient  recent completed  5 day course of bactrim  for recurrent UTI. At bedside the patient  denies fever  chills, abdominal pain,  but notes that she  had 3 ep of diarrhaea but cannot remember the character. (24 Aug 2020 23:49)      PAST MEDICAL & SURGICAL HISTORY:  Anemia  DM (diabetes mellitus)  High cholesterol  HTN (hypertension)  B-cell chronic lymphocytic leukemia variant  CAD (coronary artery disease): s/p stenting  H/O heart artery stent      Hospital Course:  Treated for sepsis. She is very decond and weak.  TODAY'S SUBJECTIVE & REVIEW OF SYMPTOMS:     Constitutional WNL   Cardio WNL   Resp WNL   GI WNL  Heme WNL  Endo WNL  Skin WNL  MSK WNL  Neuro WNL  Cognitive WNL  Psych WNL      MEDICATIONS  (STANDING):  acyclovir   Oral Tab/Cap 400 milliGRAM(s) Oral daily  aspirin  chewable 81 milliGRAM(s) Oral daily  atorvastatin 80 milliGRAM(s) Oral at bedtime  chlorhexidine 4% Liquid 1 Application(s) Topical <User Schedule>  clopidogrel Tablet 75 milliGRAM(s) Oral daily  dextrose 5%. 1000 milliLiter(s) (50 mL/Hr) IV Continuous <Continuous>  dextrose 50% Injectable 12.5 Gram(s) IV Push once  dextrose 50% Injectable 25 Gram(s) IV Push once  dextrose 50% Injectable 25 Gram(s) IV Push once  enoxaparin Injectable 40 milliGRAM(s) SubCutaneous daily  fluconAZOLE   Tablet 200 milliGRAM(s) Oral daily  folic acid 1 milliGRAM(s) Oral daily  insulin lispro (HumaLOG) corrective regimen sliding scale   SubCutaneous three times a day before meals  linezolid  IVPB      linezolid  IVPB 600 milliGRAM(s) IV Intermittent every 12 hours  metoprolol tartrate 25 milliGRAM(s) Oral two times a day  pantoprazole    Tablet 40 milliGRAM(s) Oral before breakfast  sodium chloride 0.9%. 1000 milliLiter(s) (50 mL/Hr) IV Continuous <Continuous>    MEDICATIONS  (PRN):  dextrose 40% Gel 15 Gram(s) Oral once PRN Blood Glucose LESS THAN 70 milliGRAM(s)/deciliter  glucagon  Injectable 1 milliGRAM(s) IntraMuscular once PRN Glucose LESS THAN 70 milligrams/deciliter      FAMILY HISTORY:  Family history of diabetes mellitus (DM)      Allergies    penicillin (Anaphylaxis; Hives)    Intolerances        SOCIAL HISTORY:    [  ] Etoh  [  ] Smoking  [  ] Substance abuse     Home Environment:  [  ] Home Alone  [x  ] Lives with Family  [  ] Home Health Aid    Dwelling:  [  ] Apartment  [  ] Private House  [  ] Adult Home  [  ] Skilled Nursing Facility      [  ] Short Term  [  ] Long Term  [  ] Stairs       Elevator [  ]    FUNCTIONAL STATUS PTA: (Check all that apply)  Ambulation: [  x ]Independent    [  ] Dependent     [  ] Non-Ambulatory  Assistive Device: [  ] SA Cane  [  ]  Q Cane  [ x ] Walker  [  ]  Wheelchair  ADL : [  ] Independent  [  ]  Dependent       Vital Signs Last 24 Hrs  T(C): 35.6 (31 Aug 2020 05:00), Max: 36.1 (30 Aug 2020 13:16)  T(F): 96.1 (31 Aug 2020 05:00), Max: 97 (30 Aug 2020 13:16)  HR: 74 (31 Aug 2020 05:00) (71 - 75)  BP: 111/60 (31 Aug 2020 05:00) (101/57 - 111/60)  BP(mean): --  RR: 18 (31 Aug 2020 05:00) (18 - 18)  SpO2: 96% (31 Aug 2020 07:41) (94% - 96%)      PHYSICAL EXAM: Alert & Oriented X3  GENERAL: NAD, well-groomed, well-developed  HEAD:  Atraumatic, Normocephalic  EYES: EOMI, PERRLA, conjunctiva and sclera clear  NECK: Supple, No JVD, Normal thyroid  CHEST/LUNG: Clearbilaterally; No rales, rhonchi, wheezing, or rubs  HEART: Regular rate and rhythm; No murmurs, rubs, or gallops  ABDOMEN: Soft, Nontender, Nondistended; Bowel sounds present  EXTREMITIES:  2+ Peripheral Pulses, No clubbing, cyanosis, or edema    NERVOUS SYSTEM:  Cranial Nerves 2-12 intact [  ] Abnormal  [  ]  ROM: WFL all extremities [  ]  Abnormal [  ]  Motor Strength: WFL all extremities  [  ]  Abnormal [ x ] 4/5  Sensation: intact to light touch [  ] Abnormal [  ]  Reflexes: Symmetric [  ]  Abnormal [  ]    FUNCTIONAL STATUS:  Bed Mobility: Independent [  ]  Supervision [  ]  Needs Assistance [  ]  N/A [  ]  Transfers: Independent [  ]  Supervision [  ]  Needs Assistance [  ]  N/A [  ]   Ambulation: Independent [  ]  Supervision [  ]  Needs Assistance [  ]  N/A [  ]  ADL: Independent [  ] Requires Assistance [  ] N/A [  ]      LABS:                        8.5    8.88  )-----------( 102      ( 31 Aug 2020 05:28 )             26.2         130<L>  |  96<L>  |  9<L>  ----------------------------<  168<H>  4.3   |  22  |  <0.5<L>    Ca    7.8<L>      31 Aug 2020 05:28    TPro  4.1<L>  /  Alb  2.9<L>  /  TBili  1.1  /  DBili  x   /  AST  30  /  ALT  21  /  AlkPhos  250<H>      PT/INR - ( 30 Aug 2020 05:32 )   PT: 17.30 sec;   INR: 1.50 ratio         PTT - ( 30 Aug 2020 05:32 )  PTT:45.9 sec  Urinalysis Basic - ( 30 Aug 2020 05:00 )    Color: Yellow / Appearance: Slightly Turbid / S.019 / pH: x  Gluc: x / Ketone: Negative  / Bili: Negative / Urobili: <2 mg/dL   Blood: x / Protein: 30 mg/dL / Nitrite: Negative   Leuk Esterase: Negative / RBC: 4 /HPF / WBC 26 /HPF   Sq Epi: x / Non Sq Epi: 10 /HPF / Bacteria: Negative        RADIOLOGY & ADDITIONAL STUDIES:    Assesment:

## 2020-09-01 NOTE — DISCHARGE NOTE PROVIDER - PROVIDER TOKENS
PROVIDER:[TOKEN:[[id number]],FOLLOWUP:[1 week],ESTABLISHEDPATIENT:[T]],PROVIDER:[TOKEN:[[id number]],FOLLOWUP:[1 week],ESTABLISHEDPATIENT:[T]]

## 2020-09-01 NOTE — DISCHARGE NOTE PROVIDER - NSDCCPCAREPLAN_GEN_ALL_CORE_FT
PRINCIPAL DISCHARGE DIAGNOSIS  Diagnosis: Septic shock  Assessment and Plan of Treatment: You had a urinary tract infection with Enterococcus that caused you to go into shock. This was the cause of your weakness , abdominal pain , and urinary symptoms. We treated you with IV linezolid , an antibiotic. We switched this to linezolid that can be taken orally for your dischare . You have one day left for your antibiotic . Please take as prescribed on the bottle.   You were unable to urinate after removal of the roblero, you will therefore be discharged with the roblero. Please follow up with urology in 1 week for them to take it out.   FU with your primary care provider and Dr. Hurst within 1 week to continue the management of your lymphoproliferative disorder and thrombocytopenia. Until You follow up with them , please do not take your imbruvica.      SECONDARY DISCHARGE DIAGNOSES  Diagnosis: UTI (urinary tract infection)  Assessment and Plan of Treatment: PRINCIPAL DISCHARGE DIAGNOSIS  Diagnosis: Septic shock  Assessment and Plan of Treatment: You had a urinary tract infection with Enterococcus that caused you to go into shock. This was the cause of your weakness , abdominal pain , and urinary symptoms. We treated you with IV linezolid , an antibiotic. We switched this to linezolid that can be taken orally for your dischare . You have one day left for your antibiotic . Please take as prescribed on the bottle.   You were  able  to urinate after removal of the roblero, you will therefore not need the roblero at home.   Follow up  with your primary care provider and Dr. Hurst within 1 week to continue the management of your lymphoproliferative disorder and thrombocytopenia. Until You follow up with them , please do not take your imbruvica.      SECONDARY DISCHARGE DIAGNOSES  Diagnosis: UTI (urinary tract infection)  Assessment and Plan of Treatment:

## 2020-09-01 NOTE — DISCHARGE NOTE NURSING/CASE MANAGEMENT/SOCIAL WORK - PATIENT PORTAL LINK FT
You can access the FollowMyHealth Patient Portal offered by [facility] by registering at the following website: [email or web address]. By joining [facility]’s FollowMyHealth portal, you will also be able to view your health information using other applications (apps) compatible with our system.

## 2020-09-01 NOTE — PROGRESS NOTE ADULT - ATTENDING COMMENTS
75 y/o F w PMHx of B cell lymphoproliferative disorder on Imbrutinib, DM, HTN, CAD s/p stent p/w AMS, dehydration, & hematuria x1 day admitted for septic shock due to UTI Enterococcus VRE    #Metabolic Encephalopathy &  # Septic shock (POA) : 2/2 VRE UTI  Improving on IV Linezolid.   c/w Linezolid for now. If platelets drop, switch to Daptomycin per ID note.   VSS now.   TLC out on 8/28.  Good urine output.  Encourage ambulation.     # Marginal B Cell Lymphoma:   Been on outpatient Ibrutinib.   Has splenomegaly (contributing to thrombocytopenia) with increased FDG uptake   Holding Ibrutinib for now per heme. OP f/u  On Fluconazole & Acyclovir ppx    #Thrombocytopenia:   d/t B cell Lymphoma/Splenomegaly and now Linezolid.   Monitor. STable for now.    #Anemia:   Chronic.   2/2 B cell lymphoma.  Receives weekly transfusions as outpatient  Monitor  Keep Hgb > 8.    #Hyponatremia:  ?Hypovolemic.  improved with NS @ 50.    #H/o CAD s/p stent  - Plavix 75 mg qd    #HTN:  Now BP better.   STarting to get tachy.  Reintroduce home dose metoprolol T slowly. Start with 25 BID.    #DM:  Does not use any meds at home  SSI here  Maybe PO on d/c.  A1c 7.4     FAiled TOV. will be dischargd with roblero.  #Diet: DASH  #DVT ppx: ASA; lovenox resumed after hematologist approval monitor platelets if < 50 stop  OOB to chair. GET HER TO WALK.   Anticipate d/c in AM. May need SNF.
Clinically improving, platelet count remains stable. PT eval.   Will plan to restart on every other day Ibrutinib shortly.
Clinically improving, platelets improved/stable.   Will follow.
Platelets are holding. Continue to hold Imbruvica for now. Continue with antibiotics.
Seen and examined with the pulmonary fellow at the bed side.  Impression and plan as outlined above.
Agree with fellow assessment and plan
Clinically improving, ion lower pressor requirement. Please transfuse 1 unit of PRBC.   Will follow.
Pt feeling better, downgraded to floor.   Case at length discussed with daughter Kathrine.  Observe platelet count on Linezolid.
patient seen and examined, Keep ABX, dc TLC, ID / Hemoc eval/ transfer to floor
Clinically improving, now on Linezolid, will need to monitor for worsening thrombocytopenia.   Continue to hold Imbruvica.   DVT ppx.   Will follow, Keep Hb close to 8, platelet count above 50K.

## 2020-09-01 NOTE — PROGRESS NOTE ADULT - SUBJECTIVE AND OBJECTIVE BOX
24H events:    Patient doing well today, no complaints  Jeanmarie was out during my exam  No events     PAST MEDICAL & SURGICAL HISTORY  Anemia  DM (diabetes mellitus)  High cholesterol  HTN (hypertension)  B-cell chronic lymphocytic leukemia variant  CAD (coronary artery disease): s/p stenting  H/O heart artery stent    SOCIAL HISTORY:  Negative for smoking/alcohol/drug use.     ALLERGIES:  penicillin (Anaphylaxis; Hives)    MEDICATIONS:  STANDING MEDICATIONS  acyclovir   Oral Tab/Cap 400 milliGRAM(s) Oral daily  aspirin  chewable 81 milliGRAM(s) Oral daily  atorvastatin 80 milliGRAM(s) Oral at bedtime  chlorhexidine 4% Liquid 1 Application(s) Topical <User Schedule>  clopidogrel Tablet 75 milliGRAM(s) Oral daily  dextrose 5%. 1000 milliLiter(s) IV Continuous <Continuous>  dextrose 50% Injectable 12.5 Gram(s) IV Push once  dextrose 50% Injectable 25 Gram(s) IV Push once  dextrose 50% Injectable 25 Gram(s) IV Push once  enoxaparin Injectable 40 milliGRAM(s) SubCutaneous daily  fluconAZOLE   Tablet 200 milliGRAM(s) Oral daily  folic acid 1 milliGRAM(s) Oral daily  insulin lispro (HumaLOG) corrective regimen sliding scale   SubCutaneous three times a day before meals  linezolid  IVPB      linezolid  IVPB 600 milliGRAM(s) IV Intermittent every 12 hours  metoprolol tartrate 25 milliGRAM(s) Oral two times a day  pantoprazole    Tablet 40 milliGRAM(s) Oral before breakfast  sodium chloride 0.9%. 1000 milliLiter(s) IV Continuous <Continuous>    PRN MEDICATIONS  dextrose 40% Gel 15 Gram(s) Oral once PRN  glucagon  Injectable 1 milliGRAM(s) IntraMuscular once PRN    VITALS:   T(F): 96.4  HR: 73  BP: 135/67  RR: 18  SpO2: 95%    LABS:                        8.6    10.66 )-----------( 102      ( 01 Sep 2020 06:46 )             26.8     09-01    130<L>  |  99  |  7<L>  ----------------------------<  201<H>  4.0   |  21  |  <0.5<L>    Ca    7.6<L>      01 Sep 2020 06:46  Mg     1.5     09-01    TPro  3.9<L>  /  Alb  2.6<L>  /  TBili  1.0  /  DBili  x   /  AST  30  /  ALT  21  /  AlkPhos  210<H>  09-01              Culture - Urine (collected 30 Aug 2020 05:00)  Source: .Urine Clean Catch (Midstream)  Final Report (31 Aug 2020 07:22):    No growth          RADIOLOGY:    PHYSICAL EXAM:  GEN: No acute distress  HENT: NCAT, EOMI  LYMPH: No appreciable adenopathy  LUNGS: No respiratory distress, clear to auscultation bilaterally   HEART: regular rate and rhythm  ABD: Soft, non-tender, non-distended  SKIN: No rash  EXT: No edema  NEURO: AAOX3

## 2020-09-01 NOTE — CHART NOTE - NSCHARTNOTEFT_GEN_A_CORE
73 y/o F w PMHx of B cell lymphoproliferative disorder on Imbrutinib, DM, HTN, CAD s/p stent p/w AMS, dehydration, & hematuria x1 day admitted for septic shock due to UTI Enterococcus VRE  Septic shock Improving on IV Linezolid. Will switch to PO  linezolid  on DC (end date 9/2)  as per ID  Needed roblero due to urinary retention , failed TOV , will DC with roblero . Outpt urology follow up.      OP f/u with Dr. Hurst for lymphoproloferative d/o - established patient

## 2020-09-01 NOTE — DISCHARGE NOTE PROVIDER - CARE PROVIDER_API CALL
Beatriz Guardado  HEMATOLOGY/ONCOLOGY  [address] [address]  Phone: [phone]  Fax: [phone]  Established Patient  Follow Up Time: 1 week    Jamarcus Barry  PEDIATRICS  [address] [place] [id number]  Phone: [phone]  Fax: [phone]  Established Patient  Follow Up Time: 1 week

## 2020-09-01 NOTE — PROGRESS NOTE ADULT - ASSESSMENT
Patient is a 75 y/o F with PMH of marginal zone lymphoma, recurrent UTI, CAD s/p stent, DLD, HTN  admitted for sepsis due to VRE UTI    #) Sepsis, resolved secondary to VRE enterococcus in Urine: Clinically improving, counts stable  - Per ID, continue with linezolid for 7 days  - BCx  negative    #) Marginal zone lymphoma with splenomegaly: Flow cytometry revealed Monotypic B cells (18% of cells), positive for Kappa, CD19, CD20, FMC-7, negative for CD5, CD10, and CD23. 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 Atypical cells were negative for cyclin D1, molecular studies negative MYD88, L265P and BRAF V600 mutations. ANNEXIN1 seemed to be positive on T Cells and myeloid cells. The negative BRAF V600 makes hairy cell leukemia less likely. BCL2 was negative.  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

## 2020-09-01 NOTE — PROGRESS NOTE ADULT - SUBJECTIVE AND OBJECTIVE BOX
Progress Note:  Provider Speciality                            Hospitalist      AD BASURTO [id number] 74y Female     CHIEF PRESENTING COMPLAINT:  Patient is a 74y old  Female who presents with a chief complaint of Septic shock (01 Sep 2020 11:07)        SUBJECTIVE:  Patient was seen and examined at bedside.   Reports doing ok/ no new complaints  did participate with PT today   No significant overnight events reported.     HISTORY OF PRESENTING ILLNESS:  HPI:  74y F with PMH of CAD s/p stent, DLD, HTN, B josue lymphoma, recently diagnosed B cell lymphoproliferative disorder (follows Dr. Guardado), on Ibrutinib 140mg q48 presented to the ED with AMS, dehydration, and hematuria x1 day. Majority of the history obtained through daughter. Pt got lab work done today that showed a hemoglobin of 7.2. Pt normally AAOx2-3, but daughter noticed that pt was not acting per baseline today. Patient is  independent at baseline.  noticed blood in the urine x1 episode today. +abdominal discomfort . Normal BM. Daughter noticed dry lips on the pt and says that the last time patient had these symptoms was when she had a UTI a month ago. family also noticed decreased PO intake recently and  increased weakness.  Pt tested negative for COVID a few days ago. Daughter added  that  patient  recent completed  5 day course of bactrim  for recurrent UTI. At bedside the patient  denies fever  chills, abdominal pain,  but notes that she  had 3 ep of diarrhaea but cannot remember the character. (24 Aug 2020 23:49)        REVIEW OF SYSTEMS:    At least 10 systems were reviewed in ROS. All systems reviewed  are within normal limits except for the complaints as described in Subjective.    PAST MEDICAL & SURGICAL HISTORY:  PAST MEDICAL & SURGICAL HISTORY:  Anemia  DM (diabetes mellitus)  High cholesterol  HTN (hypertension)  B-cell chronic lymphocytic leukemia variant  CAD (coronary artery disease): s/p stenting  H/O heart artery stent          VITAL SIGNS:  Vital Signs Last 24 Hrs  T(C): 35.8 (01 Sep 2020 13:21), Max: 36 (31 Aug 2020 20:30)  T(F): 96.4 (01 Sep 2020 13:21), Max: 96.8 (31 Aug 2020 20:30)  HR: 75 (01 Sep 2020 13:21) (75 - 83)  BP: 130/59 (01 Sep 2020 13:21) (123/63 - 150/67)  BP(mean): --  RR: 18 (01 Sep 2020 13:21) (17 - 18)  SpO2: 95% (01 Sep 2020 07:41) (94% - 95%)          PHYSICAL EXAMINATION:    General: Awake, alert, Not in acute distress  HEENT:   EOMI, atraumatic  Heart: S1+S2 audible, no murmur  Lungs: bilateral  fair air entry, no wheezing, no crepitations.  Abdomen: Soft, non-tender, non-distended   CNS: no focal deficits.  Extremities:  No edema            CONSULTS:  Consultant(s) Notes Reviewed by me.   Care Discussed with Consultants/Other Providers where required.        MEDICATIONS:  MEDICATIONS  (STANDING):  acyclovir   Oral Tab/Cap 400 milliGRAM(s) Oral daily  aspirin  chewable 81 milliGRAM(s) Oral daily  atorvastatin 80 milliGRAM(s) Oral at bedtime  chlorhexidine 4% Liquid 1 Application(s) Topical <User Schedule>  clopidogrel Tablet 75 milliGRAM(s) Oral daily  dextrose 5%. 1000 milliLiter(s) (50 mL/Hr) IV Continuous <Continuous>  dextrose 50% Injectable 12.5 Gram(s) IV Push once  dextrose 50% Injectable 25 Gram(s) IV Push once  dextrose 50% Injectable 25 Gram(s) IV Push once  enoxaparin Injectable 40 milliGRAM(s) SubCutaneous daily  fluconAZOLE   Tablet 200 milliGRAM(s) Oral daily  folic acid 1 milliGRAM(s) Oral daily  insulin lispro (HumaLOG) corrective regimen sliding scale   SubCutaneous three times a day before meals  linezolid  IVPB      linezolid  IVPB 600 milliGRAM(s) IV Intermittent every 12 hours  metoprolol tartrate 25 milliGRAM(s) Oral two times a day  pantoprazole    Tablet 40 milliGRAM(s) Oral before breakfast  sodium chloride 0.9%. 1000 milliLiter(s) (50 mL/Hr) IV Continuous <Continuous>    MEDICATIONS  (PRN):  dextrose 40% Gel 15 Gram(s) Oral once PRN Blood Glucose LESS THAN 70 milliGRAM(s)/deciliter  glucagon  Injectable 1 milliGRAM(s) IntraMuscular once PRN Glucose LESS THAN 70 milligrams/deciliter      LABOROTORY DATA/MICROBIOLOGY/I & O's:                        8.6    10.66 )-----------( 102      ( 01 Sep 2020 06:46 )             26.8     09-01    130<L>  |  99  |  7<L>  ----------------------------<  201<H>  4.0   |  21  |  <0.5<L>    Ca    7.6<L>      01 Sep 2020 06:46  Mg     1.5     09-01    TPro  3.9<L>  /  Alb  2.6<L>  /  TBili  1.0  /  DBili  x   /  AST  30  /  ALT  21  /  AlkPhos  210<H>  09-01        CAPILLARY BLOOD GLUCOSE      POCT Blood Glucose.: 196 mg/dL (01 Sep 2020 11:09)  POCT Blood Glucose.: 178 mg/dL (01 Sep 2020 07:19)  POCT Blood Glucose.: 180 mg/dL (31 Aug 2020 21:54)  POCT Blood Glucose.: 196 mg/dL (31 Aug 2020 18:10)  POCT Blood Glucose.: 187 mg/dL (31 Aug 2020 16:38)                08-31-20 @ 07:01  -  09-01-20 @ 07:00  --------------------------------------------------------  IN: 0 mL / OUT: 800 mL / NET: -800 mL              ASSESSMENT/Plan:    73 y/o F w PMHx of B cell lymphoproliferative disorder on Imbrutinib, DM, HTN, CAD s/p stent p/w AMS, dehydration, & hematuria x1 day admitted for septic shock due to UTI Enterococcus VRE    Metabolic Encephalopathy due to Septic shock (POA) 2/2 VRE UTI:  sepsis improved.  BP stable .   Improving on IV Linezolid.   c/w linezolid for one more day end date 9/2 per last ID note.   mentation at baseline.     # Marginal B Cell Lymphoma/thrombocytopenia:    Been on outpatient Ibrutinib.   Has splenomegaly (contributing to thrombocytopenia) with increased FDG uptake   Holding Ibrutinib for now per heme. OP f/u  platelet count stable >100.  On Fluconazole & Acyclovir ppx    urinary retention:   patient has failed TOV in the past but roblero was dced again today- monitor for TOV.  if retaining urine- insert roblero cath and DC with roblero with urology fu as OP.     chronic dz Anemia:   Hb stable >8.   Receives weekly transfusions as outpatient with hem/ onc.     Hyponatremia possible Hypovolemic.  Na stable at 130 sp IV hydration.     #H/o CAD s/p stent  - Plavix 75 mg qd/statin.     #HTN:  controlled  c/w bb.     #DM:  Does not use any meds at home  BS controlled here with ISS.   A1c 7.4/ can DC with metformin 500mg bid and endocrine fu as OP.     hypomagnesemia:   repleted Iv    PT eval         dvt ppx     Progress note handoff:   pending: TOV monitoring and PT eval for safe DC home vs need for STR.   disposition: unknown at this time.

## 2020-09-01 NOTE — DISCHARGE NOTE PROVIDER - NSDCMRMEDTOKEN_GEN_ALL_CORE_FT
acyclovir 400 mg oral tablet: 1 tab(s) orally once a day  amLODIPine 5 mg oral tablet: 1 tab(s) orally once a day  aspirin 81 mg oral tablet, chewable: 1 tab(s) orally once a day  atorvastatin 80 mg oral tablet: 1 tab(s) orally once a day  clopidogrel 75 mg oral tablet: 1 tab(s) orally once a day  fluconazole 200 mg oral tablet: 2 tab(s) orally once a day  folic acid 1 mg oral tablet: 1 tab(s) orally once a day  linezolid 600 mg oral tablet: 1 tab(s) orally every 12 hours tomorrow, 9/2/20 only .   metoprolol tartrate 50 mg oral tablet: 1 tab(s) orally 2 times a day acyclovir 400 mg oral tablet: 1 tab(s) orally once a day  amLODIPine 5 mg oral tablet: 1 tab(s) orally once a day  aspirin 81 mg oral tablet, chewable: 1 tab(s) orally once a day  atorvastatin 80 mg oral tablet: 1 tab(s) orally once a day  clopidogrel 75 mg oral tablet: 1 tab(s) orally once a day  fluconazole 200 mg oral tablet: 2 tab(s) orally once a day  folic acid 1 mg oral tablet: 1 tab(s) orally once a day  metoprolol tartrate 50 mg oral tablet: 1 tab(s) orally 2 times a day

## 2020-09-01 NOTE — DISCHARGE NOTE NURSING/CASE MANAGEMENT/SOCIAL WORK - NSDCFUADDAPPT_GEN_ALL_CORE_FT
[facility] - urology clinic   [address] [place] [address] [place]   [phone]    Please call to make an appointment in 1 week to remove the roblero catheter ** it is very dangerous to try and remove this yourself. May cause injury to the urethra and bleeding . Please do not attempt this.

## 2020-09-01 NOTE — CHART NOTE - NSCHARTNOTEFT_GEN_A_CORE
As per medical team , patient will remain in hospital today . Safe dispo tomorrow pending TOV and PT eval . Discussed with attending

## 2020-09-01 NOTE — DISCHARGE NOTE PROVIDER - HOSPITAL COURSE
73 y/o F w PMHx of B cell lymphoproliferative disorder on Imbrutinib, DM, HTN, CAD s/p stent p/w AMS, dehydration, & hematuria x1 day admitted for septic shock due to UTI Enterococcus VRE    Septic shock Improved on IV Linezolid. Will switch to PO  linezolid  on DC (end date 9/2)  as per ID    Needed roblero due to urinary retention , failed TOV , will DC with roblero . Outpt urology follow up.          OP f/u with Dr. Hurst for lymphoproloferative d/o - established patient.        Patient now medically stable for discharge

## 2020-09-01 NOTE — DISCHARGE NOTE PROVIDER - NSDCFUSCHEDAPPT_GEN_ALL_CORE_FT
AD BASURTO ; 09/02/2020 ; \[facility] [place] [facility]"" [facility] ; 09/02/2020 ; \[facility] [place] [facility]\"" [facility] AD Willis ; 09/08/2020 ; \[facility] [place] [facility]\"" [facility] N[facility] ; 09/09/2020 ; \[facility] [place] [facility]\"" [facility] Barron FARAH AD BASURTO ; 09/08/2020 ; [facility] AD Nunes ; 09/09/2020 ; NP [facility] Barron FARAH

## 2020-09-02 NOTE — CHART NOTE - NSCHARTNOTEFT_GEN_A_CORE
Discharge note .    74 y.o F past medical history of CAD, b cell lymphoma presented with pleuritic pain on deep inspiration  due to splenomegaly. She must maintain head of bed elevation to prevent atelectasis and pneumonia .      It is medically necessary for this patient to receive a hospital bed. This patient requires positioning of the body in ways not feasible with an ordinary bed in order to alleviate pain . Pillows and wedges have been considered and ruled out.     Patient was to be discharged yesterday , but Trial of void was to be done at 6pm and patient did not have safe dc at the time. Patient is medically stable and ready to be DC today.

## 2020-09-02 NOTE — PROGRESS NOTE ADULT - REASON FOR ADMISSION
Weakness, Abdominal Discomfort and   blood in urine

## 2020-09-02 NOTE — PROGRESS NOTE ADULT - PROVIDER SPECIALTY LIST ADULT
Critical Care
Heme/Onc
Hospitalist
Infectious Disease
Internal Medicine
Internal Medicine
Hospitalist
Heme/Onc

## 2020-09-30 NOTE — ASSESSMENT
[FreeTextEntry1] : Hypoproliferative  anemia and massive splenomegaly, flow consistent with B-cell lymphoproliferative disorder CD5 and CD10 negative, consistent with Marginal Zone Lymphoma of the Spleen, involving BM\par --BMBx on 5/20/2020 revealed Marginal Zone Lymphoma \par --S/p 3 units of PRBC inhouse at diagnosis, requiring frequent PRBC transfusions\par --Flow cytometry revealed Monotypic B-cells (18% of cells), positive for kappa, CD19, CD20, FMC-7; negative CD5, CD10, CD23.\par --No B symptoms, no evidence of overt bleeding\par --No over evidence of hemolysis, Keith negative \par --PET CT on 5/8/2020 reveals splenic uptake as well as splenomegaly \par --Unable to tolerate Rituxan\par --Developed thrombocytopenia and rash with 3 and 2 tabs of Ibrutinib, will plan to rechallenge, on 7/15/2020 still unable to restart on Ibrutinib 2/2 thrombocytopenia, which is improving \par --Continue with weekly CBC and type and cross with PRN transfusions \par --Keep Hb over 8.0, platelet count above 50K, patient is on antiplatelet therapy for h/o cardiac stent .\par --As of 8/12/20 patient received Ibrutinib one tab every other day. \par --CBC was reviewed on 9/29 and 9/30, recommend platelet transfusion today \par --Daughter increased dose of ibrutinib to 2 tabs daily last week, recommended to hold drug till Monday 2/2 worsening thrombocytopenia, then will consider restarting at 1 pill a day, daughter was instructed to please NOT escalate the dose without consulting with us\par --Continue with folic acid PO\par --Acyclovir 400 mg PO daily ppx\par --Fluconazole 200mg BID ppx\par \par UTI\par --renewed Bactrim\par --u/a and culture pending\par \par LOWER EXTREMITY SWELLING\par --R/O DVT\par --venous duplex ordered\par \par Follow up 1 month or sooner if needed.\par Case discussed and patient seen with Dr. Guardado.\par \par \par

## 2020-09-30 NOTE — PHYSICAL EXAM
[Capable of only limited self care, confined to bed or chair more than 50% of waking hours] : Status 3- Capable of only limited self care, confined to bed or chair more than 50% of waking hours [Normal] : clear to auscultation bilaterally, no dullness, no wheezing [de-identified] : 2+ edema bilaterally, left >right, large bruise over L ankle [de-identified] : enlarged spleen, difficult to examine, patient sitting in wheelchair

## 2020-09-30 NOTE — REVIEW OF SYSTEMS
[Fatigue] : fatigue [Joint Pain] : joint pain [Dysuria] : dysuria [Negative] : Gastrointestinal [Chills] : no chills [Fever] : no fever [Night Sweats] : no night sweats [Recent Change In Weight] : ~T no recent weight change [Constipation] : no constipation [de-identified] : left ankle bruising and swelling, generalized bruising

## 2020-09-30 NOTE — HISTORY OF PRESENT ILLNESS
[de-identified] : Kathrine is a savanna 74 year old female who presented to ER 04/28/2020 with weakness and lethargy. She went to see her cardiologist and she was referred to ER from there. \par She was found to have low HB of 5.0 on admission and she was transfused 3 units of PRBC. Also noted that her Absolute lymphocyte count was >5000. She has the following work up in hospital:\par CBC\par 11.61  High   \par  RBC Count 1.77        \par  Hemoglobin 5.0   \par  Hematocrit 14.0  Low %  37.0 - 47.0     \par  MCV 79.1  Low fL  81.0 - 99.0 Final      \par  MCH 28.2    pg  27.0 - 31.0 Final      \par  MCHC 35.7    g/dL  32.0 - 37.0 Final      \par  RCDW 26.5  High %  11.5 - 14.5 Final      \par  Platelet Count - Automated 255    K/uL  130 - 400 Final      \par  MPV 11.3  High fL  7.4 - 10.4 Final      \par  Auto Neutrophil % 33.6   \par  Auto Lymphocyte % 65.5  High %  20.5 - 51.1 Final      \par  Auto Monocyte % 0.9  Low %  1.7 - 9.3 Final      \par  Auto Eosinophil % 0.0    %  0.0 - 8.0 Final      \par  Auto Basophil % 0.0    %  0.0 - 1.0 Final      \par  Auto Neutrophil # 3.90    K/uL  1.40 - 6.50 Final      \par  Auto Lymphocyte # 7.60  High K/uL  1.20 - 3.40 Final      \par  Auto Monocyte # 0.10    \par  Auto Eosinophil # 0.00       \par  Auto Basophil # 0.00 \par M spike- unable to quantify. \par Weak IgG Kappa Band Identified. Immunoglobulins were normal.\par Reticulocyte -0.6\par Folate - 2.0\par Iron studies were done post transfusion. Ferritin -646.\par Keith- negative\par Flow cytometry -DIAGNOSIS:\par Peripheral blood:\par      - Monotypic B-cells (18% of cells), positive for kappa, CD19, CD20, FMC-7; negative CD5, CD10, CD23.\par      - The myeloid immunophenotypic findings show no increase in myeloid immaturity.\par \par Imaging: \par CT chest on 4/30/2020 revealed \par 1. No evidence for intrathoracic lymphadenopathy.\par 2. No suspicious mass or nodule.\par \par CT A/P on 4/30/2020 revealed \par Marked splenomegaly; differential considerations include neoplastic and myeloproliferative processes, amongst other etiologies.\par 2.  No enlarged abdominal or pelvic lymph nodes.\par 3.  Enlarged fibroid uterus.\par \par She was discharged few days later, as she preferred completing the work up as out patient.  She is an active smoker for more than 40 years. She never had colonoscopy. Mammogram was many years ago. \par  [de-identified] : 5/6/2020: She feels very tired and is wobbly. She is on wheelchair now, she lives with her  and her daughter. She denies B symptoms. Melena, bleeding per rectum. \par Images were personally reviewed by MD Debby and discussed with patient and family.\par \par 7/1/2020: Kathrine is here for follow up. Since last visit she has required almost weekly transfusions of PRBC, Hb was going down to 5-6 range in between the transfusions. 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 BCL2 was negative. These findings were previously discussed with patient's daughter Kathrine.\par Patient was also seen in second opinion at [facility] [place], clinical trial was offered, patient chose not to participate. \par On 6/10/2020 we have attempted to initiate Rituximab, Kathrine has developed a SEVERE allergic reaction to Rituxan, requiring overnight ER stay. \par Given the severity of reaction the plan is NOT to attempt rechallenging with Rituximab. \par She started on Ibrutinib 3 tabs daily on 6/17/2020, by 6/22/2020 she has started developing mild thrombocytopenia, but anemia improved, we asked her to decrease the pill to 2 a day, by 6/27/2020 she has developed a macular papular rash under her L breast and in her L groin, she was asked to hold the pill, the rash today looks fainter, stopped spreading and overall is improved. She will continue to hold Ibrutinib till next Monday on 7/6/2020 and then if rash has resolved will restart at 1 pill daily. \par US of the spleen to obtain new baseline on 6/27/2020 revealed Splenomegaly correlating with CT exam. Enlarged homogeneous \par spleen 22.5 cm x 9.3 cm in width with no focal mass or perisplenic fluid \par collection. Vascular flow, unremarkable.\par \par 7/15/2020: Kathrine was unable to restart on Ibrutibib yet, she sustained a fall at home and was brought to ED on 7/5/2020, she was noted to be febrile in ED, urine was positive and she was started on broad spectrum antibiotics, she was also noted to have worsening thrombocytopenia of admission, she required blood and platelet transfusions inhouse, platelets are improving since discharge, she is currently not on antibiotics. She reports she has not smoked for close to 3 weeks. She is in a wheelchair today and with home O2. She reports feeling well. \par \par 7/28/2020:  Pt came in today accompanied by daughter c/o diffuse, erythematous rash to left groin since last week.  She changed the brand of her diapers from "Depends" to "Always".  She was using Nystatin powder to her rash underneath left breast which was not helping.  Daughter is concerned because her platelets are still low (plt=65,000).\par Hb has been holding up, will defer restarting on Imbruvica for now. \par \par 8/4/2020; As per her daughter Kathrine has been feeling weaker than usual, complaining of urinary symptoms. We will check UA today and start on empiric antibiotics. CBC from today reveals Hb remains stable, transfusion is not necessary, platelets remain low at 57K. We will continue to hold Imbruvica at this point. We will also offer gentle hydration today. \par 8/18/20:\par Kathrine is 74 years old female came for a follow up visit for  B-cell lymphoproliferative disorder. Patient completed a full course of Antibiotics Bactrim.\par  Patient still have high frequency for urination.  She denies Fever, dysuria , or Hematuria. \par Patient ' daughter started Imbruvica   one tab every other day.  Patient tolerated well. \par We communicated CBC from 8/19/20 with HGB / HCT is 9.2/26.5 with platelet count is 102, 000. NO skin rash.\par \par \par 9/30/2020: Patient here for follow up visit for B cell lymphoproliferative disorder.  She is up to 2 tabs of Ibrutinib per day for about 1 week now, she was not instructed to escalate the dose per our instructions.  She is complaining of urinary frequency for the last several days.  Her daughter states there is a bad odor. She also has had left ankle swelling and bruising since Saturday.  They have noticed more and more bruising especially on her arms. Patient denies cough, shortness of breath, fever, chills, night sweats or bone pain.\par

## 2020-10-12 NOTE — H&P ADULT - HISTORY OF PRESENT ILLNESS
74 year old female with PMH of HTN, DM, CAD, B cell lymphoproliferative disorder on Ibrutinib last dose yesterday, recent VRE UTI  is here for evaluation of confusion and AMS since this morning found by daughter. collateral history obtained from the daughter who stated that pt was confused today, not her normal self, thinking she was in the hospital who promoted calling EMS. patient denies fever, chills, nausea, vomiting, abdominal pain or change in bowel habits. pt endorses urinary frequency and urgency.     in the ED pat was septic with BP 75/50, .  central line was placed.  received 2 L of LR, insulin, linezolid and aztreonam.  approved for ICU admission. 74 year old female with PMH of HTN, DM, CAD, B cell lymphoproliferative disorder on Ibrutinib last dose yesterday, recent VRE UTI  is here for evaluation of confusion and AMS since this morning found by daughter. collateral history obtained from the daughter who stated that pt was confused today, not her normal self, thinking she was in the hospital who promoted calling EMS. patient denies fever, chills, nausea, vomiting, abdominal pain or change in bowel habits. pt endorses urinary frequency and urgency.     in the ED pat was septic with BP 75/50, .  found have WBC 26 with Hb of 4.2.   received 3 units pRBC.   central line was placed.  received 2 L of LR, insulin, linezolid and aztreonam.  approved for ICU admission.

## 2020-10-12 NOTE — CONSULT NOTE ADULT - SUBJECTIVE AND OBJECTIVE BOX
Patient is a 74y old  Female who presents with a chief complaint of     HPI:  74y F with PMH of CAD s/p stent, DLD, HTN, B josue lymphoma, recently diagnosed B cell lymphoproliferative disorder (follows Dr. Guardado), on Ibrutinib 140mg q48 presented to the ED with AMS, dehydration x1 day. Majority of the history obtained through daughter.    PAST MEDICAL & SURGICAL HISTORY:  Anemia    DM (diabetes mellitus)    High cholesterol    HTN (hypertension)    B-cell chronic lymphocytic leukemia variant    CAD (coronary artery disease)  s/p stenting    H/O heart artery stent        SOCIAL HX:   Smoking        quit 2 months ago                 ETOH      denies                      Other    FAMILY HISTORY:  Family history of diabetes mellitus (DM)    :  No known cardiovacular family hisotry     Review Of Systems:     All ROS are negative except per HPI       Allergies    penicillin (Anaphylaxis; Hives)    Intolerances          PHYSICAL EXAM    ICU Vital Signs Last 24 Hrs  T(C): 36.4 (12 Oct 2020 09:12), Max: 36.4 (12 Oct 2020 09:12)  T(F): 97.6 (12 Oct 2020 09:12), Max: 97.6 (12 Oct 2020 09:12)  HR: 82 (12 Oct 2020 10:17) (82 - 111)  BP: 160/77 (12 Oct 2020 10:17) (55/30 - 160/77)  BP(mean): 47 (12 Oct 2020 09:42) (47 - 47)  ABP: --  ABP(mean): --  RR: 20 (12 Oct 2020 09:42) (20 - 22)  SpO2: 99% (12 Oct 2020 09:42) (97% - 99%)      CONSTITUTIONAL:  Well nourished.   NAD    ENT:   Airway patent,   Mouth with normal mucosa.   No thrush      CARDIAC:   Normal rate,   Regular rhythm.    No edema      Vascular:   normal systolic impulse  no bruits    RESPIRATORY:   No wheezing  Bilateral BS   Not tachypneic,  No use of accessory muscles    GASTROINTESTINAL:  Abdomen soft,   Non-tender,   No guarding,   + BS      NEUROLOGICAL:   Alert and oriented   No motor deficits.    SKIN:   Skin normal color for race,   No evidence of rash.              LABS:                          4.2    26.26 )-----------( 146      ( 12 Oct 2020 09:07 )             14.3                                               10-12    127<L>  |  95<L>  |  99<HH>  ----------------------------<  490<HH>  5.7<H>   |  21  |  0.9    Ca    8.8      12 Oct 2020 09:07    TPro  4.9<L>  /  Alb  3.3<L>  /  TBili  0.5  /  DBili  x   /  AST  22  /  ALT  25  /  AlkPhos  136<H>  10-12      PT/INR - ( 12 Oct 2020 09:07 )   PT: 15.80 sec;   INR: 1.37 ratio         PTT - ( 12 Oct 2020 09:07 )  PTT:30.5 sec                                       Urinalysis Basic - ( 12 Oct 2020 09:40 )    Color: Yellow / Appearance: Clear / S.016 / pH: x  Gluc: x / Ketone: Negative  / Bili: Negative / Urobili: <2 mg/dL   Blood: x / Protein: Trace / Nitrite: Negative   Leuk Esterase: Negative / RBC: x / WBC x   Sq Epi: x / Non Sq Epi: x / Bacteria: x        CARDIAC MARKERS ( 12 Oct 2020 09:07 )  x     / 0.02 ng/mL / x     / x     / x                                                LIVER FUNCTIONS - ( 12 Oct 2020 09:07 )  Alb: 3.3 g/dL / Pro: 4.9 g/dL / ALK PHOS: 136 U/L / ALT: 25 U/L / AST: 22 U/L / GGT: x                                                                                                                                       X-Rays reviewed                                                                                     ECHO    CXR interpreted by me     MEDICATIONS  (STANDING):  norepinephrine Infusion 0.1 MICROgram(s)/kG/Min (11.2 mL/Hr) IV Continuous <Continuous>    MEDICATIONS  (PRN):         Patient is a 74y old  Female who presents with a chief complaint of     HPI:  74 year old female with PMH of HTN, DM, CAD, B cell lymphoproliferative disorder on Ibrutinib last dose yesterday, recent VRE UTI  is here for evaluation of confusion and AMS since this morning found by daughter. collateral history obtained from the daughter who stated that pt was confused today, not her normal self, thinking she was in the hospital who promoted calling EMS. patient denies fever, chills, nausea, vomiting, abdominal pain or change in bowel habits. pt endorses urinary frequency and urgency.     in the ED pat was septic with BP 75/50, .  found have WBC 26 with Hb of 4.2.   received 3 units pRBC.   central line was placed.  received 2 L of LR, insulin, linezolid and aztreonam.    PAST MEDICAL & SURGICAL HISTORY:  Anemia    DM (diabetes mellitus)    High cholesterol    HTN (hypertension)    B-cell chronic lymphocytic leukemia variant    CAD (coronary artery disease)  s/p stenting    H/O heart artery stent        SOCIAL HX:   Smoking        quit 2 months ago (heavy smoker in the past)                ETOH      denies                      Other    FAMILY HISTORY:  Family history of diabetes mellitus (DM)    :  No known cardiovacular family hisotry     Review Of Systems:     All ROS are negative except per HPI       Allergies    penicillin (Anaphylaxis; Hives)    Intolerances          PHYSICAL EXAM    ICU Vital Signs Last 24 Hrs  T(C): 36.4 (12 Oct 2020 09:12), Max: 36.4 (12 Oct 2020 09:12)  T(F): 97.6 (12 Oct 2020 09:12), Max: 97.6 (12 Oct 2020 09:12)  HR: 82 (12 Oct 2020 10:17) (82 - 111)  BP: 160/77 (12 Oct 2020 10:17) (55/30 - 160/77)  BP(mean): 47 (12 Oct 2020 09:42) (47 - 47)  ABP: --  ABP(mean): --  RR: 20 (12 Oct 2020 09:42) (20 - 22)  SpO2: 99% (12 Oct 2020 09:42) (97% - 99%)      CONSTITUTIONAL:  Well nourished.   NAD    ENT:   Airway patent,   Mouth with normal mucosa.   No thrush      CARDIAC:   Normal rate,   Regular rhythm.    No edema      Vascular:   normal systolic impulse  no bruits    RESPIRATORY:   No wheezing  Bilateral BS   Not tachypneic,  No use of accessory muscles    GASTROINTESTINAL:  Abdomen soft,   Non-tender,   No guarding,   + BS      NEUROLOGICAL:   Alert and oriented   No motor deficits.    SKIN:   Skin normal color for race,   No evidence of rash.              LABS:                          4.2    26.26 )-----------( 146      ( 12 Oct 2020 09:07 )             14.3                                               10    127<L>  |  95<L>  |  99<HH>  ----------------------------<  490<HH>  5.7<H>   |  21  |  0.9    Ca    8.8      12 Oct 2020 09:07    TPro  4.9<L>  /  Alb  3.3<L>  /  TBili  0.5  /  DBili  x   /  AST  22  /  ALT  25  /  AlkPhos  136<H>  10-12      PT/INR - ( 12 Oct 2020 09:07 )   PT: 15.80 sec;   INR: 1.37 ratio         PTT - ( 12 Oct 2020 09:07 )  PTT:30.5 sec                                       Urinalysis Basic - ( 12 Oct 2020 09:40 )    Color: Yellow / Appearance: Clear / S.016 / pH: x  Gluc: x / Ketone: Negative  / Bili: Negative / Urobili: <2 mg/dL   Blood: x / Protein: Trace / Nitrite: Negative   Leuk Esterase: Negative / RBC: x / WBC x   Sq Epi: x / Non Sq Epi: x / Bacteria: x        CARDIAC MARKERS ( 12 Oct 2020 09:07 )  x     / 0.02 ng/mL / x     / x     / x                                                LIVER FUNCTIONS - ( 12 Oct 2020 09:07 )  Alb: 3.3 g/dL / Pro: 4.9 g/dL / ALK PHOS: 136 U/L / ALT: 25 U/L / AST: 22 U/L / GGT: x                                                                                                                                       X-Rays reviewed                                                                                     ECHO    CXR interpreted by me     MEDICATIONS  (STANDING):  norepinephrine Infusion 0.1 MICROgram(s)/kG/Min (11.2 mL/Hr) IV Continuous <Continuous>    MEDICATIONS  (PRN):         Patient is a 74y old  Female who presents with a chief complaint of     HPI:  74 year old female with PMH of HTN, DM, CAD, B cell lymphoproliferative disorder on Ibrutinib last dose yesterday, recent VRE UTI  is here for evaluation of confusion and AMS since this morning found by daughter. collateral history obtained from the daughter who stated that pt was confused today, not her normal self, thinking she was in the hospital who promoted calling EMS. patient denies fever, chills, nausea, vomiting, abdominal pain or change in bowel habits. pt endorses urinary frequency and urgency.     in the ED pat was septic with BP 75/50, .  found have WBC 26 with Hb of 4.2.   received 3 units pRBC.   central line was placed.  received 2 L of LR, insulin, linezolid and aztreonam.    PAST MEDICAL & SURGICAL HISTORY:  Anemia    DM (diabetes mellitus)    High cholesterol    HTN (hypertension)    B-cell chronic lymphocytic leukemia variant    CAD (coronary artery disease)  s/p stenting    H/O heart artery stent        SOCIAL HX:   Smoking        quit 2 months ago (heavy smoker in the past)                ETOH      denies                      Other    FAMILY HISTORY:  Family history of diabetes mellitus (DM)    :  No known cardiovacular family hisotry     Review Of Systems:     All ROS are negative except per HPI       Allergies    penicillin (Anaphylaxis; Hives)    Intolerances          PHYSICAL EXAM    ICU Vital Signs Last 24 Hrs  T(C): 36.4 (12 Oct 2020 09:12), Max: 36.4 (12 Oct 2020 09:12)  T(F): 97.6 (12 Oct 2020 09:12), Max: 97.6 (12 Oct 2020 09:12)  HR: 82 (12 Oct 2020 10:17) (82 - 111)  BP: 160/77 (12 Oct 2020 10:17) (55/30 - 160/77)  BP(mean): 47 (12 Oct 2020 09:42) (47 - 47)  ABP: --  ABP(mean): --  RR: 20 (12 Oct 2020 09:42) (20 - 22)  SpO2: 99% (12 Oct 2020 09:42) (97% - 99%)      CONSTITUTIONAL:  Well nourished.   NAD    ENT:   Airway patent,   Mouth with normal mucosa.   No thrush      CARDIAC:   Normal rate,   Regular rhythm.    No edema      Vascular:   normal systolic impulse  no bruits    RESPIRATORY:   No wheezing  Bilateral BS   Not tachypneic,  No use of accessory muscles    GASTROINTESTINAL:  Abdomen soft,   Non-tender,   No guarding,   + BS      NEUROLOGICAL:   Alert and oriented   No motor deficits.    SKIN:   Skin normal color for race,   No evidence of rash.              LABS:                          4.2    26.26 )-----------( 146      ( 12 Oct 2020 09:07 )             14.3                                               10    127<L>  |  95<L>  |  99<HH>  ----------------------------<  490<HH>  5.7<H>   |  21  |  0.9    Ca    8.8      12 Oct 2020 09:07    TPro  4.9<L>  /  Alb  3.3<L>  /  TBili  0.5  /  DBili  x   /  AST  22  /  ALT  25  /  AlkPhos  136<H>  10-12      PT/INR - ( 12 Oct 2020 09:07 )   PT: 15.80 sec;   INR: 1.37 ratio         PTT - ( 12 Oct 2020 09:07 )  PTT:30.5 sec                                       Urinalysis Basic - ( 12 Oct 2020 09:40 )    Color: Yellow / Appearance: Clear / S.016 / pH: x  Gluc: x / Ketone: Negative  / Bili: Negative / Urobili: <2 mg/dL   Blood: x / Protein: Trace / Nitrite: Negative   Leuk Esterase: Negative / RBC: x / WBC x   Sq Epi: x / Non Sq Epi: x / Bacteria: x        CARDIAC MARKERS ( 12 Oct 2020 09:07 )  x     / 0.02 ng/mL / x     / x     / x                                                LIVER FUNCTIONS - ( 12 Oct 2020 09:07 )  Alb: 3.3 g/dL / Pro: 4.9 g/dL / ALK PHOS: 136 U/L / ALT: 25 U/L / AST: 22 U/L / GGT: x                                                                                                                                       X-Rays reviewed                                                                                     ECHO    CXR interpreted by me Small left pleural effusion     MEDICATIONS  (STANDING):  norepinephrine Infusion 0.1 MICROgram(s)/kG/Min (11.2 mL/Hr) IV Continuous <Continuous>    MEDICATIONS  (PRN):

## 2020-10-12 NOTE — H&P ADULT - NSHPLABSRESULTS_GEN_ALL_CORE
Complete Blood Count + Automated Diff (10.12.20 @ 09:07)   WBC Count: 26.26 K/uL   RBC Count: 1.25 M/uL   Hemoglobin: 4.2: This result has been called to DR. RAMÍREZ by Joan Oliver on 10 12   2020 at 0935, and has been read back. Sent to pathologist for review   This result has been called to DR. RAMÍREZ by Joan Oliver on 10 12   2020 at 0935, and has been read back. Sent to pathologist for review   TYPE: HYPERCRITICAL RESULT   TESTS: Hbg   DATE/TIME CALLED: 10/12/20 09:36   CALLED TO: Dr. Ramírez   READ BACK (2 Patient Identifiers)(Y/N): y   READ BACK VALUES (Y/N): y   LICENSED STAFF AVAILABLE FOR IMMEDIATE TREATMENT (Y/N): y   RAPID RESPONSE TEAM NOTIFIED (Y/N): n   RRT CALLED TO:n/a   CALLED BY: shayna g/dL   Hematocrit: 14.3: This result has been called to DR. RAMÍREZ by Joan Oliver on 10 12   2020 at 0935, and has been read back. Sent to pathologist for review %   Mean Cell Volume: 114.4 fL   Mean Cell Hemoglobin: 33.6 pg   Mean Cell Hemoglobin Conc: 29.4 g/dL   Red Cell Distrib Width: 23.9 %   Platelet Count - Automated: 146: Smear Reviewed, Result Confirmed. Specimen integrity verified. K/uL   Auto Neutrophil #: 13.13 K/uL   Auto Lymphocyte #: 11.76 K/uL   Auto Monocyte #: 0.89 K/uL   Auto Eosinophil #: 0.00 K/uL   Auto Basophil #: 0.24 K/uL   Auto Neutrophil %: 49.1: Differential percentages must be correlated with absolute numbers for   clinical significance. %   Auto Lymphocyte %: 44.8 %   Auto Monocyte %: 3.4 %   Auto Eosinophil %: 0.0 %   Auto Basophil %: 0.9 %     Basic Metabolic Panel in AM (07.06.20 @ 09:30)   Sodium, Serum: 131 mmol/L   Potassium, Serum: 4.0 mmol/L   Chloride, Serum: 98 mmol/L   Carbon Dioxide, Serum: 22 mmol/L   Anion Gap, Serum: 11 mmol/L   Blood Urea Nitrogen, Serum: 12 mg/dL   Creatinine, Serum: 0.5 mg/dL   Glucose, Serum: 161 mg/dL   Calcium, Total Serum: 8.2 mg/dL   eGFR if Non : 95: Interpretative comment     < from: CT Abdomen and Pelvis w/ IV Cont (10.12.20 @ 14:52) >    IMPRESSION:    Evaluation limited by patient motion artifact. Within this limitation, no evidence of pulmonary embolism and no evidence of acute intra-abdominal pathology.    Small left pleural effusion.    Chronic and/or incidental findings as above.    < end of copied text >    < from: CT Angio Chest PE Protocol w/ IV Cont (10.12.20 @ 14:52) >    IMPRESSION:    Evaluation limited by patient motion artifact. Within this limitation, no evidence of pulmonary embolism and no evidence of acute intra-abdominal pathology.    Small left pleural effusion.    Chronic and/or incidental findings as above.    < end of copied text >

## 2020-10-12 NOTE — CONSULT NOTE ADULT - ASSESSMENT
IMPRESSION:  Shock likely septic  Acute drop in hemoglobin    PLAN:    CNS: avoid sedative as much as possible    HEENT: Oral care    PULMONARY:  HOB @ 45 degrees.  Aspiration precautions     CARDIOVASCULAR: Goal Map >60    GI: GI prophylaxis.  Feeding.  Bowel regimen     RENAL:  Follow up lytes.  Correct as needed    INFECTIOUS DISEASE: Follow up cultures    HEMATOLOGICAL:  DVT prophylaxis.    ENDOCRINE:  Follow up FS.  Insulin protocol if needed.    MUSCULOSKELETAL:         IMPRESSION:  Shock likely septic  Acute drop in hemoglobin, ?hemolysis vs UGIB (hgb 9.5 on 10/7/2020)  Lactic acidosis likely 2/2 shock  Hyperkalemia  DILAN, ATN vs obstructive (2.4 liters in roblero bag from today)  HO recurrent uti/recent VRE UTI  HO b cell lymphoma on chemo      PLAN:    CNS: avoid sedative as much as possible    HEENT: Oral care    PULMONARY:  HOB @ 45 degrees.  Aspiration precautions     CARDIOVASCULAR: Goal Map >60, I>o, CHEETAH when in ICU. wean levophed    GI: IV PPI BID. NPO, okay with ice chips.  Bowel regimen. GI eval     RENAL:  Follow up lytes.  Correct as needed. Start lokelma. kidney U/S    INFECTIOUS DISEASE: Follow up cultures. Meropenem and Zyvox for now    HEMATOLOGICAL:  DVT prophylaxis seq, CBC q8h, check hemolysis panel, oncology f/u    ENDOCRINE:  Follow up FS.  Insulin protocol if needed.    MUSCULOSKELETAL: Bedrest    Dispo: MICU         IMPRESSION:  Shock improving  Acute drop in hemoglobin possible UGIB   Lactic acidosis resolved   HO recurrent uti/recent VRE UTI  HO B cell lymphoma on chemo      PLAN:    CNS: Avoid depressants     HEENT: Oral care    PULMONARY:  HOB @ 45 degrees.  Aspiration precautions     CARDIOVASCULAR: Goal Map >60, Wean Levophed  (GDE Fluid responsive) LR bolus     GI: IV PPI BID. NPO,   Bowel regimen. GI eval     RENAL:  Follow up lytes.  Correct as needed.     INFECTIOUS DISEASE: Follow up cultures.  ABX per ID     HEMATOLOGICAL:  DVT prophylaxis seq, CBC q8h, oncology f/u    ENDOCRINE:  Follow up FS.  Insulin protocol if needed.    MUSCULOSKELETAL: Bedrest    Dispo: MICU for Now     Murry for possible retention

## 2020-10-12 NOTE — ED PROCEDURE NOTE - CPROC ED INDICATIONS1
emergency venous access/volume resuscitation emergency venous access/hypertonic/irritant infusion/volume resuscitation

## 2020-10-12 NOTE — ED PROVIDER NOTE - CLINICAL SUMMARY MEDICAL DECISION MAKING FREE TEXT BOX
74yoF with h/o B cell lymphoma on chemotherapy (last dose yest), anemia and pancytopenia with frequent transfusions, HTN, HLD, CAD s/p stent, uncontrolled DM not on medication, presents with confusion per daughter onset this AM. Daughter states this is identical to her past presentation with confusion 2/2 UTI. Recent admission for the same, with VRE requiring linezolid - culture reviewed and confirms this. Hb 9.5 on Wed, plts 80. Daughter denies black or bloody stool or any other symptoms. On assessment pt denies all symptoms including all pain/CP/abd pain, SOB. On exam, afebrile, hypotensive, saturating well on RA, NAD, uncomfortable with any movement, head NCAT, EOMI grossly, anicteric, conjunctival pallor, MM dry, pallor, no JVD, tachycardia, nml S1/S2, systolic murmur, lungs CTAB, no w/r/r, abd diffusely TTP with no guarding, ND, no rebound or guarding, AAOx2 (not to time), CN's 3-12 grossly intact, DUNBAR spontaneously, LLE circumference > RLE with no pitting edema, skin warm, dry, no rashes or hives. No fever and no meningeal signs. No e/o cellulitis, UTI, PNA. However noted leukocytosis and will treat this as sepsis. Noted anemia, no active bleeding by hx or currently, no free fluid on FAST exam, given 3u PRBC's. Given fluids, started on levophed drip. Central line placed. Hemodynamically stable since then. Admitted to ICU.

## 2020-10-12 NOTE — H&P ADULT - ASSESSMENT
IMPRESSION:  Shock likely septic  Acute drop in hemoglobin    PLAN:    CNS: avoid sedative as much as possible    HEENT: Oral care    PULMONARY:  HOB @ 45 degrees.  Aspiration precautions     CARDIOVASCULAR: Goal Map >60    GI: GI prophylaxis.  Feeding.  Bowel regimen     RENAL:  Follow up lytes.  Correct as needed    INFECTIOUS DISEASE: Follow up cultures    HEMATOLOGICAL:  DVT prophylaxis.    ENDOCRINE:  Follow up FS.  Insulin protocol if needed.    MUSCULOSKELETAL:   IMPRESSION:  Shock likely septic  Acute drop in hemoglobin  HO recurrent UTI's - recent VRE URTI  HO B cell lymphoproliferative disorder On Ibrutinib   HO Normocytic  Anemia       PLAN:    CNS: avoid sedative as much as possible    HEENT: Oral care    PULMONARY:  HOB @ 45 degrees.  Aspiration precautions     CARDIOVASCULAR: Goal Map >60, titrate down levo, BNP, Echo, duplex     GI: PPI BID, keep NPO for now    RENAL:  Follow up lytes.  Correct as needed    INFECTIOUS DISEASE: Follow up cultures, c/w acyclovir and fluconazole as ppx, start linezolid and meropenem, ID conult, follow up cultures, procalcitonin     HEMATOLOGICAL:  DVT prophylaxis with SCD, CBCq 8hrs, monitor for signs of bleed, hold Plavix for now, hematology consult      ENDOCRINE: start insulin drip, titrate with cautious as pt is NPO     MUSCULOSKELETAL: bedrest    Left IJ  Admit to MICU  Full code - confirmed with daughter who is HCP

## 2020-10-12 NOTE — H&P ADULT - NSHPPHYSICALEXAM_GEN_ALL_CORE
GENERAL: NAD, lying in bed comfortably  CHEST/LUNG: Clear to auscultation bilaterally; No wheezing   HEART: tachycardiac, regular rhythm   ABDOMEN: Bowel sounds present; Soft, Nontender, distended.    EXTREMITIES:  2+ Peripheral Pulses, brisk capillary refill. No edema  NERVOUS SYSTEM:  Alert & Oriented X3, speech clear.    MSK: FROM all 4 extremities, full and equal strength

## 2020-10-12 NOTE — ED PROVIDER NOTE - ATTENDING CONTRIBUTION TO CARE
74yoF with h/o B cell lymphoma on chemotherapy (last dose yest), anemia and pancytopenia with frequent transfusions, HTN, HLD, CAD s/p stent, uncontrolled DM not on medication, presents with confusion per daughter onset this AM. Daughter states this is identical to her past presentation with confusion 2/2 UTI. Recent admission for the same, with VRE requiring linezolid - culture reviewed and confirms this. Hb 9.5 on Wed, plts 80. Daughter denies black or bloody stool or any other symptoms. On assessment pt denies all symptoms including all pain/CP/abd pain, SOB. On exam, afebrile, hypotensive, saturating well on RA, NAD, uncomfortable with any movement, head NCAT, EOMI grossly, anicteric, conjunctival pallor, MM dry, pallor, no JVD, tachycardia, nml S1/S2, systolic murmur, lungs CTAB, no w/r/r, abd diffusely TTP with no guarding, ND, no rebound or guarding, AAOx2 (not to time), CN's 3-12 grossly intact, DUNBAR spontaneously, LLE circumference > RLE with no pitting edema, skin warm, dry, no rashes or hives. No fever and no meningeal signs. No e/o cellulitis. 74yoF with h/o B cell lymphoma on chemotherapy (last dose yest), anemia and pancytopenia with frequent transfusions, HTN, HLD, CAD s/p stent, uncontrolled DM not on medication, presents with confusion per daughter onset this AM. Daughter states this is identical to her past presentation with confusion 2/2 UTI. Recent admission for the same, with VRE requiring linezolid - culture reviewed and confirms this. Hb 9.5 on Wed, plts 80. Daughter denies black or bloody stool or any other symptoms. On assessment pt denies all symptoms including all pain/CP/abd pain, SOB. On exam, afebrile, hypotensive, saturating well on RA, NAD, uncomfortable with any movement, head NCAT, EOMI grossly, anicteric, conjunctival pallor, MM dry, pallor, no JVD, tachycardia, nml S1/S2, systolic murmur, lungs CTAB, no w/r/r, abd diffusely TTP with no guarding, ND, no rebound or guarding, AAOx2 (not to time), CN's 3-12 grossly intact, DUNBAR spontaneously, LLE circumference > RLE with no pitting edema, skin warm, dry, no rashes or hives. No fever and no meningeal signs. No e/o cellulitis, UTI, PNA. However noted leukocytosis and will treat this as sepsis. Noted anemia, no active bleeding by hx or currently, no free fluid on FAST exam, given 3u PRBC's. Given fluids, started on levophed drip. Central line placed. Hemodynamically stable since then. Admitted to ICU.

## 2020-10-12 NOTE — ED PROVIDER NOTE - PHYSICAL EXAMINATION
CONSTITUTIONAL: Well-developed; well-nourished; in no acute distress.   SKIN: warm, dry, pale  HEAD: Normocephalic; atraumatic.  EYES: PERRL, EOMI, normal sclera. +conjunctival pallor  ENT: No nasal discharge; airway clear.  NECK: Supple; non tender.  CARD: S1, S2 normal; no murmurs, gallops, or rubs. Regular rate and rhythm.   RESP: No wheezes, rales or rhonchi.  ABD: soft +epigastric tenderness, RUQ tenderness  EXT: Normal ROM.  No clubbing, cyanosis or edema.   LYMPH: No acute cervical adenopathy.  NEURO: Alert, oriented  x 2, grossly unremarkable  PSYCH: Cooperative, appropriate.

## 2020-10-12 NOTE — ED PROVIDER NOTE - CARE PLAN
Principal Discharge DX:	Septic shock  Secondary Diagnosis:	Anemia  Secondary Diagnosis:	Uremia  Secondary Diagnosis:	Hyperglycemia  Secondary Diagnosis:	Hyponatremia

## 2020-10-12 NOTE — ED PROVIDER NOTE - OBJECTIVE STATEMENT
pt is a 74 yof w/HTN, DM, stent on plavix B cell lymphoproliferative disorder on Ibrutinib last dose yesterday, hx of VRE UTI w/ admission to ICU 3 weeks ago here for AMS since this morning found by daughter. Daughter states presentation is similar to 3 weeks ago when she got UTI. no alleviating or exacerbating factors. no cp, sob, abd pain, fevers, n/v/d

## 2020-10-12 NOTE — PROVIDER CONTACT NOTE (OTHER) - BACKGROUND
pt admitted from home to ED with Hgb 4.5. When questioned in ED if pt had any s/s of GI Bleed, daughter said no but upon returning home and speaking to pt , pt did have one large black BM.

## 2020-10-12 NOTE — PROVIDER CONTACT NOTE (OTHER) - ASSESSMENT
as per daughter pt strained to have BM Sunday night, BM was formed and brown. prior to EMS arriving pt had one large black BM at home as per Daughter and pt .

## 2020-10-12 NOTE — ED PROCEDURE NOTE - PROCEDURE ADDITIONAL DETAILS
1st attempt in the right IJ, needle in carotid. Pressure applied, no expanding hematoma after 10 minutes. 2nd attempt in the left IJ successful. 1st attempt in the right IJ, needle in carotid, no wire put down and no dilation performed. Pressure applied, no expanding hematoma after 10 minutes and monitoring later during ED stay. 2nd attempt in the left IJ successful.

## 2020-10-12 NOTE — H&P ADULT - NSHPSOCIALHISTORY_GEN_ALL_CORE
Quit Smoking 5cigarets per day  6 weeks ago  Denies ETOH  Use  Denies  Illicit drug Use  Retired  [place]

## 2020-10-13 NOTE — CONSULT NOTE ADULT - SUBJECTIVE AND OBJECTIVE BOX
Patient is a 74y old  Female who presents with a chief complaint of septic shock (13 Oct 2020 12:37)      HPI:  74 year old female with PMH of HTN, DM, CAD, B cell lymphoproliferative disorder on Ibrutinib last dose yesterday, recent VRE UTI  is here for evaluation of confusion and AMS since this morning found by daughter. collateral history obtained from the daughter who stated that pt was confused today, not her normal self, thinking she was in the hospital who promoted calling EMS. patient denies fever, chills, nausea, vomiting, abdominal pain or change in bowel habits. pt endorses urinary frequency and urgency.     in the ED pat was septic with BP 75/50, .  found have WBC 26 with Hb of 4.2.   received 3 units pRBC.   central line was placed.  received 2 L of LR, insulin, linezolid and aztreonam.  approved for ICU admission.  (12 Oct 2020 16:31)       ROS:  Negative except for:    PAST MEDICAL & SURGICAL HISTORY:  Anemia    DM (diabetes mellitus)    High cholesterol    HTN (hypertension)    B-cell chronic lymphocytic leukemia variant    CAD (coronary artery disease)  s/p stenting    H/O heart artery stent        SOCIAL HISTORY:    FAMILY HISTORY:  Family history of diabetes mellitus (DM)        MEDICATIONS  (STANDING):  acyclovir   Oral Tab/Cap 400 milliGRAM(s) Oral daily  aspirin  chewable 81 milliGRAM(s) Oral daily  atorvastatin 80 milliGRAM(s) Oral at bedtime  cefepime   IVPB      cefepime   IVPB 2000 milliGRAM(s) IV Intermittent every 12 hours  chlorhexidine 4% Liquid 1 Application(s) Topical <User Schedule>  fluconAZOLE   Tablet 400 milliGRAM(s) Oral daily  folic acid 1 milliGRAM(s) Oral daily  influenza   Vaccine 0.5 milliLiter(s) IntraMuscular once  insulin regular Infusion 5 Unit(s)/Hr (5 mL/Hr) IV Continuous <Continuous>  linezolid  IVPB 600 milliGRAM(s) IV Intermittent every 12 hours  norepinephrine Infusion 0.1 MICROgram(s)/kG/Min (11.2 mL/Hr) IV Continuous <Continuous>  pantoprazole  Injectable 40 milliGRAM(s) IV Push two times a day    MEDICATIONS  (PRN):      Allergies    penicillin (Anaphylaxis; Hives)    Intolerances        Vital Signs Last 24 Hrs  T(C): 35.8 (13 Oct 2020 08:00), Max: 37.3 (12 Oct 2020 18:00)  T(F): 96.4 (13 Oct 2020 08:00), Max: 99.2 (13 Oct 2020 00:00)  HR: 104 (13 Oct 2020 10:00) (102 - 128)  BP: 105/46 (13 Oct 2020 10:00) (64/45 - 121/60)  BP(mean): 63 (13 Oct 2020 10:00) (51 - 94)  RR: 18 (13 Oct 2020 10:00) (16 - 28)  SpO2: 98% (13 Oct 2020 10:00) (94% - 100%)    PHYSICAL EXAM  General: NAD  HEENT: NCAT, EOMI  Neck: supple  CV: normal S1/S2 , RRR  Lungs: Clear to ascultation bilaterally, no respiratory distress  Abdomen: soft non-tender non-distended  Ext: No edema  Skin: no rashes and no petechiae  Neuro: alert and oriented X 4, no focal deficits      LABS:                          7.6    26.54 )-----------( 136      ( 13 Oct 2020 11:00 )             23.5         Mean Cell Volume : 99.2 fL  Mean Cell Hemoglobin : 32.1 pg  Mean Cell Hemoglobin Concentration : 32.3 g/dL  Auto Neutrophil # : x  Auto Lymphocyte # : x  Auto Monocyte # : x  Auto Eosinophil # : x  Auto Basophil # : x  Auto Neutrophil % : x  Auto Lymphocyte % : x  Auto Monocyte % : x  Auto Eosinophil % : x  Auto Basophil % : x      Serial CBC's  10-13 @ 11:00  Hct-23.5 / Hgb-7.6 / Plat-136 / RBC-2.37 / WBC-26.54  Serial CBC's  10-13 @ 04:08  Hct-24.7 / Hgb-8.0 / Plat-150 / RBC-2.56 / WBC-35.32  Serial CBC's  10-13 @ 01:25  Hct-24.2 / Hgb-8.1 / Plat-151 / RBC-2.53 / WBC-41.36  Serial CBC's  10-12 @ 20:34  Hct-26.3 / Hgb-8.7 / Plat-140 / RBC-2.77 / WBC-39.71  Serial CBC's  10-12 @ 09:07  Hct-14.3 / Hgb-4.2 / Plat-146 / RBC-1.25 / WBC-26.26      10-13    134<L>  |  101  |  87<HH>  ----------------------------<  262<H>  4.4   |  23  |  0.5<L>    Ca    8.4<L>      13 Oct 2020 04:08  Mg     2.1     10-13    TPro  4.9<L>  /  Alb  3.3<L>  /  TBili  0.5  /  DBili  x   /  AST  22  /  ALT  25  /  AlkPhos  136<H>  10-12      PT/INR - ( 12 Oct 2020 09:07 )   PT: 15.80 sec;   INR: 1.37 ratio         PTT - ( 12 Oct 2020 09:07 )  PTT:30.5 sec    Reticulocyte Percent: 15.1 % (10-13 @ 11:00)              BLOOD SMEAR INTERPRETATION:       RADIOLOGY & ADDITIONAL STUDIES:

## 2020-10-13 NOTE — CONSULT NOTE ADULT - ASSESSMENT
74 year old female with PMH of HTN, DM, CAD s/p PCI on DAPT, B cell lymphoproliferative disorder on Ibrutinib is here for AMS yesterday and GI is being called for acute drop in Hg to 4.         74 year old female with PMH of HTN, DM, CAD s/p PCI on DAPT, B cell lymphoproliferative disorder on Ibrutinib is here for AMS yesterday and GI is being called for acute drop in Hg to 4.      # Acute on chronic anemia: R/O UGIB  Patient has soft brown stools.  OBIE: brown stools  Hg dropped but responded appropriately to 3units  BUN high  No ALEJANDRA on iron studies Ferritin and serum is high  Never had a colonoscopy or EGD       Rec:  Can start clear liquid diet  Patient is on septic shock likely  in origin.  No signs of overt GIB, so endoscopy deferred at this point as patient is in septic shock.                   74 year old female with PMH of HTN, DM, CAD s/p PCI on DAPT, B cell lymphoproliferative disorder on Ibrutinib is here for AMS yesterday and GI is being called for acute drop in Hg to 4.      # Acute on chronic anemia: R/O UGIB  Patient has soft brown stools.  OBIE: brown stools  Hg dropped but responded appropriately to 3units  BUN high  No ALEJANDRA on iron studies Ferritin and serum is high  Never had a colonoscopy or EGD       Rec:  Can start clear liquid diet  Patient is on septic shock likely  in origin.  No signs of overt GIB, so endoscopy deferred at this point as patient is in septic shock.      # Elevated ALP:   HBV/HCV negative  previously elevated  CT abdomen: no CBD dilation, no intra or extra hepatic dilation  get ALP fractionation  Rec: f/u in liver clinic for further work up.                 74 year old female with PMH of HTN, DM, CAD s/p PCI on DAPT, B cell lymphoproliferative disorder on Ibrutinib is here for AMS yesterday and GI is being called for acute drop in Hg to 4.      # Acute on chronic anemia: R/O UGIB   Patient has soft brown stools.  OBIE: brown stools  Hg dropped but responded appropriately to 3units  BUN high  No ALEJANDRA on iron studies Ferritin and serum is high  Never had a colonoscopy or EGD       Rec:  Can start clear liquid diet, advance diet as tolerated  Patient is on septic shock likely  in origin according to ID  No signs of overt GIB, so endoscopy deferred at this point as patient is in septic shock.  Please call us back after patient is stabilized. Also hematology thinks anemia, thrombocytopeni  possible side effects of chemotherapy.      # Elevated ALP:   HBV/HCV negative  previously elevated  CT abdomen: no CBD dilation, no intra or extra hepatic dilation  get ALP fractionation  Rec: f/u in liver clinic for further work up.

## 2020-10-13 NOTE — PROGRESS NOTE ADULT - SUBJECTIVE AND OBJECTIVE BOX
Patient is a 74y old  Female who presents with a chief complaint of septic shock (14 Oct 2020 21:01)        Over Night Events:  Looks better.  On LEvophed         ROS:     All ROS are negative except HPI         PHYSICAL EXAM    ICU Vital Signs Last 24 Hrs  T(C): 36.7 (14 Oct 2020 19:47), Max: 36.7 (14 Oct 2020 19:47)  T(F): 98 (14 Oct 2020 19:47), Max: 98 (14 Oct 2020 19:47)  HR: 82 (14 Oct 2020 19:47) (82 - 98)  BP: 126/515 (14 Oct 2020 19:47) (109/50 - 145/70)  BP(mean): 738 (14 Oct 2020 19:47) (78 - 738)  ABP: --  ABP(mean): --  RR: 20 (14 Oct 2020 19:47) (18 - 20)  SpO2: 97% (14 Oct 2020 18:27) (96% - 98%)      CONSTITUTIONAL:  Well nourished.  NAD    ENT:   Airway patent,   Mouth with normal mucosa.   No thrush    EYES:   Pupils equal,   Round and reactive to light.    CARDIAC:   Normal rate,   Regular rhythm.    No edema      Vascular:  Normal systolic impulse  No Carotid bruits    RESPIRATORY:   No wheezing  Bilateral BS  Normal chest expansion  Not tachypneic,  No use of accessory muscles    GASTROINTESTINAL:  Abdomen soft,   Non-tender,   No guarding,   + BS    MUSCULOSKELETAL:   Range of motion is not limited,  No clubbing, cyanosis    NEUROLOGICAL:   Alert and oriented   No motor  deficits.    SKIN:   Skin normal color for race,   Warm and dry and intact.   No evidence of rash.    PSYCHIATRIC:   Normal mood and affect.   No apparent risk to self or others.    HEMATOLOGICAL:  No cervical  lymphadenopathy.  no inguinal lymphadenopathy      10-13-20 @ 07:01  -  10-14-20 @ 07:00  --------------------------------------------------------  IN:    Insulin: 24 mL    IV PiggyBack: 850 mL    Lactated Ringers Bolus: 500 mL    Norepinephrine: 40.5 mL    Norepinephrine: 110.9 mL    Norepinephrine: 175.5 mL    Oral Fluid: 100 mL  Total IN: 1800.9 mL    OUT:    Indwelling Catheter - Urethral (mL): 1155 mL  Total OUT: 1155 mL    Total NET: 645.9 mL      10-14-20 @ 07:01  -  10-14-20 @ 21:09  --------------------------------------------------------  IN:    Norepinephrine: 33.6 mL    Norepinephrine: 67.5 mL  Total IN: 101.1 mL    OUT:    Indwelling Catheter - Urethral (mL): 300 mL  Total OUT: 300 mL    Total NET: -198.9 mL          LABS:                            7.3    10.81 )-----------( 125      ( 14 Oct 2020 07:42 )             23.7                                               10-14    134<L>  |  105  |  40<H>  ----------------------------<  282<H>  4.1   |  21  |  <0.5<L>    Ca    8.0<L>      14 Oct 2020 07:42  Mg     1.9     10-14    TPro  4.1<L>  /  Alb  2.8<L>  /  TBili  0.9  /  DBili  x   /  AST  26  /  ALT  22  /  AlkPhos  83  10-14                                                                                           LIVER FUNCTIONS - ( 14 Oct 2020 07:42 )  Alb: 2.8 g/dL / Pro: 4.1 g/dL / ALK PHOS: 83 U/L / ALT: 22 U/L / AST: 26 U/L / GGT: x                                                  Culture - Urine (collected 12 Oct 2020 09:40)  Source: .Urine Clean Catch (Midstream)  Final Report (14 Oct 2020 17:20):    50,000 - 99,000 CFU/mL Enterobacter cloacae complex  Organism: Enterobacter cloacae complex (14 Oct 2020 17:20)  Organism: Enterobacter cloacae complex (14 Oct 2020 17:20)    Culture - Blood (collected 12 Oct 2020 09:07)  Source: .Blood Blood-Peripheral  Preliminary Report (13 Oct 2020 22:01):    No growth to date.    Culture - Blood (collected 12 Oct 2020 09:07)  Source: .Blood Blood-Peripheral  Preliminary Report (13 Oct 2020 22:01):    No growth to date.                                                                                           MEDICATIONS  (STANDING):  acyclovir   Oral Tab/Cap 400 milliGRAM(s) Oral daily  aspirin  chewable 81 milliGRAM(s) Oral daily  atorvastatin 80 milliGRAM(s) Oral at bedtime  cefepime   IVPB      cefepime   IVPB 2000 milliGRAM(s) IV Intermittent every 12 hours  chlorhexidine 4% Liquid 1 Application(s) Topical <User Schedule>  dextrose 5%. 1000 milliLiter(s) (50 mL/Hr) IV Continuous <Continuous>  dextrose 50% Injectable 12.5 Gram(s) IV Push once  dextrose 50% Injectable 25 Gram(s) IV Push once  dextrose 50% Injectable 25 Gram(s) IV Push once  fluconAZOLE   Tablet 400 milliGRAM(s) Oral daily  folic acid 1 milliGRAM(s) Oral daily  influenza   Vaccine 0.5 milliLiter(s) IntraMuscular once  insulin glargine Injectable (LANTUS) 15 Unit(s) SubCutaneous at bedtime  insulin lispro Injectable (HumaLOG) 5 Unit(s) SubCutaneous three times a day before meals  norepinephrine Infusion 0.05 MICROgram(s)/kG/Min (5.62 mL/Hr) IV Continuous <Continuous>  pantoprazole  Injectable 40 milliGRAM(s) IV Push two times a day    MEDICATIONS  (PRN):  dextrose 40% Gel 15 Gram(s) Oral once PRN Blood Glucose LESS THAN 70 milliGRAM(s)/deciliter  glucagon  Injectable 1 milliGRAM(s) IntraMuscular once PRN Glucose LESS THAN 70 milligrams/deciliter      New X-rays reviewed:                                                                                  ECHO    CXR interpreted by me:

## 2020-10-13 NOTE — CONSULT NOTE ADULT - RS GEN HX ROS MEA POS PC
[facility] NEPHROLOGY  Harish Valera M.D.  Kevin Cedeno D.O.  Rachele Mckeon M.D.  Christina Westfall, MSN, ANP-C    Telephone: [phone]  Facsimile: [phone]    [address] [place] [address] dyspnea

## 2020-10-13 NOTE — CONSULT NOTE ADULT - SUBJECTIVE AND OBJECTIVE BOX
AD BASURTO  74y, Female  Allergy: penicillin (Anaphylaxis; Hives)      All historical available data reviewed.    HPI:  74 year old female with PMH of HTN, DM, CAD, B cell lymphoproliferative disorder on Ibrutinib last dose yesterday, recent VRE UTI  is here for evaluation of confusion and AMS since this morning found by daughter. collateral history obtained from the daughter who stated that pt was confused today, not her normal self, thinking she was in the hospital who promoted calling EMS. patient denies fever, chills, nausea, vomiting, abdominal pain or change in bowel habits. pt endorses urinary frequency and urgency.     in the ED pat  with BP 75/50, .  found have WBC 26 with Hb of 4.2.   received 3 units pRBC.   central line was placed.  received 2 L of LR, insulin, linezolid and aztreonam.  approved for ICU admission.  (12 Oct 2020 16:31)    ID called for sepsis    FAMILY HISTORY:  Family history of diabetes mellitus (DM)      PAST MEDICAL & SURGICAL HISTORY:  Anemia    DM (diabetes mellitus)    High cholesterol    HTN (hypertension)    B-cell chronic lymphocytic leukemia variant    CAD (coronary artery disease)  s/p stenting    H/O heart artery stent          VITALS:  T(F): 96.4, Max: 99.2 (10-13-20 @ 00:00)  HR: 108  BP: 95/46  RR: 18Vital Signs Last 24 Hrs  T(C): 35.8 (13 Oct 2020 08:00), Max: 37.3 (12 Oct 2020 18:00)  T(F): 96.4 (13 Oct 2020 08:00), Max: 99.2 (13 Oct 2020 00:00)  HR: 108 (13 Oct 2020 08:45) (82 - 128)  BP: 95/46 (13 Oct 2020 08:45) (55/30 - 160/77)  BP(mean): 71 (13 Oct 2020 08:45) (47 - 94)  RR: 18 (13 Oct 2020 08:45) (16 - 28)  SpO2: 95% (13 Oct 2020 08:45) (94% - 100%)    TESTS & MEASUREMENTS:                        8.0    35.32 )-----------( 150      ( 13 Oct 2020 04:08 )             24.7     10-13    134<L>  |  101  |  87<HH>  ----------------------------<  262<H>  4.4   |  23  |  0.5<L>    Ca    8.4<L>      13 Oct 2020 04:08  Mg     2.1     10-13    TPro  4.9<L>  /  Alb  3.3<L>  /  TBili  0.5  /  DBili  x   /  AST  22  /  ALT  25  /  AlkPhos  136<H>  10-12    LIVER FUNCTIONS - ( 12 Oct 2020 09:07 )  Alb: 3.3 g/dL / Pro: 4.9 g/dL / ALK PHOS: 136 U/L / ALT: 25 U/L / AST: 22 U/L / GGT: x             Urinalysis Basic - ( 12 Oct 2020 09:40 )    Color: Yellow / Appearance: Clear / S.016 / pH: x  Gluc: x / Ketone: Negative  / Bili: Negative / Urobili: <2 mg/dL   Blood: x / Protein: Trace / Nitrite: Negative   Leuk Esterase: Negative / RBC: x / WBC x   Sq Epi: x / Non Sq Epi: x / Bacteria: x          RADIOLOGY & ADDITIONAL TESTS:  Personal review of radiological diagnostics performed  Echo and EKG results noted when applicable.     MEDICATIONS:  acyclovir   Oral Tab/Cap 400 milliGRAM(s) Oral daily  aspirin  chewable 81 milliGRAM(s) Oral daily  atorvastatin 80 milliGRAM(s) Oral at bedtime  chlorhexidine 4% Liquid 1 Application(s) Topical <User Schedule>  fluconAZOLE   Tablet 400 milliGRAM(s) Oral daily  folic acid 1 milliGRAM(s) Oral daily  influenza   Vaccine 0.5 milliLiter(s) IntraMuscular once  insulin regular Infusion 5 Unit(s)/Hr IV Continuous <Continuous>  linezolid  IVPB 600 milliGRAM(s) IV Intermittent every 12 hours  meropenem  IVPB 1000 milliGRAM(s) IV Intermittent every 8 hours  norepinephrine Infusion 0.1 MICROgram(s)/kG/Min IV Continuous <Continuous>  pantoprazole  Injectable 40 milliGRAM(s) IV Push two times a day      ANTIBIOTICS:  acyclovir   Oral Tab/Cap 400 milliGRAM(s) Oral daily  fluconAZOLE   Tablet 400 milliGRAM(s) Oral daily  linezolid  IVPB 600 milliGRAM(s) IV Intermittent every 12 hours  meropenem  IVPB 1000 milliGRAM(s) IV Intermittent every 8 hours

## 2020-10-13 NOTE — CONSULT NOTE ADULT - ASSESSMENT
74 year old female with PMH of HTN, DM, CAD, B cell lymphoproliferative disorder on Ibrutinib last dose yesterday, recent VRE UTI  is here for evaluation of confusion and AMS since this morning found by daughter.    IMPRESSION;  Shock ( on low dosis of levo,BP 75/50, /m, WBC 26.2 ) hypovolemic secondary to GI bleed ( Hg/HtC 4.2/14.3 ). Recent black stools  Not convinced of a septic shock. Only possible source is  which is unusual as no pyuria and clinically asymptomatic.  No PNA or intraabdominal infectious focus  CT abd : unremarkable    RECOMMENDATIONS;  BCx  Stool guiac  GI evaluation  Zyvox 600 mg iv q12h  Cefepime 2 gm iv q12h  d/c meropenem

## 2020-10-13 NOTE — CONSULT NOTE ADULT - ATTENDING COMMENTS
Severe anemia, multifactorial. Appropriate response to transfusions.   BUN is elevated, acutely, need to consider possibility of GI bleed, consider GI work up.   Elevated retic is suggestive of hemolytic component, with normal bili, likely extravascular, Keith and indirect Keith negative, maybe related to recent Bactim use.   Please rescan spleen to r/o sequestration.   Hold imbruvica.   Septic shock management per ICU.

## 2020-10-13 NOTE — PROGRESS NOTE ADULT - SUBJECTIVE AND OBJECTIVE BOX
DA BASURTO 74y Female  MRN#: [id number]   CODE STATUS:FULL      SUBJECTIVE    HPI and Hospital Course    Patient is a 74y old woman with PMH of HTN,d DM, CAD, B cell lymphoproliferative disorder (on Ibrutinib, last dose 10/11) and recent VRE UTI who presented for confusion and AMS. Patient daughter endorsed  dark stool weekend prior to presentation. Found to be septic in ED with BP 75/50, , and WBC of 26. Given 2L LR, linezolid, aztreonam in ED. Also transfused 3 units pRBC due to hemoglobin of 4.2 with response to 8.0.    Interval Events    Patient examined at bedside. Mental condition improved. No events overnight.    OBJECTIVE  PAST MEDICAL & SURGICAL HISTORY  Anemia    DM (diabetes mellitus)    High cholesterol    HTN (hypertension)    B-cell chronic lymphocytic leukemia variant    CAD (coronary artery disease)  s/p stenting    H/O heart artery stent      ALLERGIES:  penicillin (Anaphylaxis; Hives)    MEDICATIONS:  STANDING MEDICATIONS  acyclovir   Oral Tab/Cap 400 milliGRAM(s) Oral daily  aspirin  chewable 81 milliGRAM(s) Oral daily  atorvastatin 80 milliGRAM(s) Oral at bedtime  cefepime   IVPB      cefepime   IVPB 2000 milliGRAM(s) IV Intermittent every 12 hours  chlorhexidine 4% Liquid 1 Application(s) Topical <User Schedule>  fluconAZOLE   Tablet 400 milliGRAM(s) Oral daily  folic acid 1 milliGRAM(s) Oral daily  influenza   Vaccine 0.5 milliLiter(s) IntraMuscular once  insulin regular Infusion 5 Unit(s)/Hr IV Continuous <Continuous>  linezolid  IVPB 600 milliGRAM(s) IV Intermittent every 12 hours  norepinephrine Infusion 0.1 MICROgram(s)/kG/Min IV Continuous <Continuous>  pantoprazole  Injectable 40 milliGRAM(s) IV Push two times a day    PRN MEDICATIONS      VITAL SIGNS: Last 24 Hours  T(C): 35.8 (13 Oct 2020 08:00), Max: 37.3 (12 Oct 2020 18:00)  T(F): 96.4 (13 Oct 2020 08:00), Max: 99.2 (13 Oct 2020 00:00)  HR: 104 (13 Oct 2020 10:00) (102 - 128)  BP: 105/46 (13 Oct 2020 10:00) (64/45 - 121/60)  BP(mean): 63 (13 Oct 2020 10:00) (51 - 94)  RR: 18 (13 Oct 2020 10:00) (16 - 28)  SpO2: 98% (13 Oct 2020 10:00) (94% - 100%)    LABS:                        7.6    26.54 )-----------( 136      ( 13 Oct 2020 11:00 )             23.5     10-13    134<L>  |  101  |  87<HH>  ----------------------------<  262<H>  4.4   |  23  |  0.5<L>    Ca    8.4<L>      13 Oct 2020 04:08  Mg     2.1     10-13    TPro  4.9<L>  /  Alb  3.3<L>  /  TBili  0.5  /  DBili  x   /  AST  22  /  ALT  25  /  AlkPhos  136<H>  10-12    PT/INR - ( 12 Oct 2020 09:07 )   PT: 15.80 sec;   INR: 1.37 ratio         PTT - ( 12 Oct 2020 09:07 )  PTT:30.5 sec  Urinalysis Basic - ( 12 Oct 2020 09:40 )    Color: Yellow / Appearance: Clear / S.016 / pH: x  Gluc: x / Ketone: Negative  / Bili: Negative / Urobili: <2 mg/dL   Blood: x / Protein: Trace / Nitrite: Negative   Leuk Esterase: Negative / RBC: x / WBC x   Sq Epi: x / Non Sq Epi: x / Bacteria: x        Troponin T, Serum: 0.04 ng/mL <HH> (10-12-20 @ 20:34)      CARDIAC MARKERS ( 12 Oct 2020 20:34 )  x     / 0.04 ng/mL / x     / x     / x      CARDIAC MARKERS ( 12 Oct 2020 09:07 )  x     / 0.02 ng/mL / x     / x     / x          RADIOLOGY:      PHYSICAL EXAM:    GENERAL: NAD, well-developed, AAOx3  HEENT:  Atraumatic, Normocephalic. EOMI, PERRLA, conjunctiva and sclera clear, No JVD  PULMONARY: Clear to auscultation bilaterally; No wheeze  CARDIOVASCULAR: Regular rate and rhythm; No murmurs, rubs, or gallops  GASTROINTESTINAL: Soft, Nontender, Nondistended; Bowel sounds present  MUSCULOSKELETAL:  2+ Peripheral Pulses, No clubbing, cyanosis, or edema  NEUROLOGY: non-focal  SKIN: Ecchymoses throughout upper extremities       ASSESSMENT & PLAN    Patient is a 74y old woman with PMH of HTN, DM, CAD, B cell lymphoproliferative disorder (on Ibrutinib, last dose 10/11) and recent VRE UTI who presented for confusion and AMS. Being admitted to ICU for treatment of shock.     1. Shock  -More likely septic, less likely hemorrhagic  -WBC 26, , BP 75/50  -Endorsed 1 episode of dark melena over weekend; HgB 4.2 on presentation  -Given 3 units pRBC  -S/P 2 L LR in ED; given 500cc today  -ID consult appreciated  -Continue linezolid 600mg q12, cefepime 2gm q12  -GI consult appreciated; no intervention as of now  -PPI IV BID  -Wean pressors  -Keep active type and screen  -F/U CBC   -F/U BCX    2. HTN  -Anti-hypertensives held in light of shock    3. DM  -Insulin drip for now; POCT glucose q1hrs    4. B cell lymphoproliferative disorder  -Oncology consult appreciated    MISC:  -DVT prophylaxis: Sequentials  -GI prophylaxis: PPI  -Code: FULL  -Diet: Clears  -Activity: Bedrest

## 2020-10-13 NOTE — PROGRESS NOTE ADULT - ASSESSMENT
IMPRESSION:  Shock improving  Drop in Hg stable   UTI   Lactic acidosis resolved   HO recurrent UTI   HO B cell lymphoma on chemo      PLAN:    CNS: Avoid depressants     HEENT: Oral care    PULMONARY:  HOB @ 45 degrees.  Aspiration precautions     CARDIOVASCULAR: Goal Map >60, Wean Levophed      GI: GI prophylaxis  Feeding pe rGI.  FU with GI   Bowel regimen.     RENAL:  Follow up lytes.  Correct as needed.     INFECTIOUS DISEASE: Follow up cultures.  ABX per ID     HEMATOLOGICAL:  DVT prophylaxis seq, FU CBC  oncology f/u    ENDOCRINE:  Follow up FS.  Insulin protocol if needed.    MUSCULOSKELETAL: Bedrest    Dispo: Possible Step down pm

## 2020-10-13 NOTE — CONSULT NOTE ADULT - SUBJECTIVE AND OBJECTIVE BOX
Gastroenterology Consultation:    Patient is a 74y old  Female who presents with a chief complaint of septic shock (12 Oct 2020 16:31)      Admitted on: 10-12-20    HPI:    74 year old female with PMH of HTN, DM, CAD s/p PCI on DAPT, B cell lymphoproliferative disorder on Ibrutinib is here for AMS since this morning found by daughter. collateral history obtained from the daughter who stated that pt was confused today, not her normal self, thinking she was in the hospital who promoted calling EMS. patient denies fever, chills, nausea, vomiting, abdominal pain or change in bowel habits. pt endorses urinary frequency and urgency.     in the ED pat was septic with BP 75/50, .  found have WBC 26 with Hb of 4.2.   received 3 units pRBC.   central line was placed.  received 2 L of LR, insulin, linezolid and aztreonam.  approved for ICU admission.  (12 Oct 2020 16:31)      COVID 19: Not detected.    Prior records Reviewed (Y/N): Y  History obtained from person other than patient (Y/N): N    Prior EGD/colonoscopy: None in system        PAST MEDICAL & SURGICAL HISTORY:  Anemia    DM (diabetes mellitus)    High cholesterol    HTN (hypertension)    B-cell chronic lymphocytic leukemia variant    CAD (coronary artery disease)  s/p stenting    H/O heart artery stent        FAMILY HISTORY:  Family history of diabetes mellitus (DM)        Social History:  Tobacco:  Alcohol:  Drugs:    Home Medications:  acyclovir 400 mg oral tablet: 1 tab(s) orally once a day (25 Aug 2020 00:00)  amLODIPine 5 mg oral tablet: 1 tab(s) orally once a day (25 Aug 2020 00:00)  aspirin 81 mg oral tablet, chewable: 1 tab(s) orally once a day (25 Aug 2020 00:00)  atorvastatin 80 mg oral tablet: 1 tab(s) orally once a day (25 Aug 2020 00:00)  clopidogrel 75 mg oral tablet: 1 tab(s) orally once a day (25 Aug 2020 00:00)  fluconazole 200 mg oral tablet: 2 tab(s) orally once a day (25 Aug 2020 00:00)  metoprolol tartrate 50 mg oral tablet: 1 tab(s) orally 2 times a day (25 Aug 2020 00:00)    MEDICATIONS  (STANDING):  acyclovir   Oral Tab/Cap 400 milliGRAM(s) Oral daily  aspirin  chewable 81 milliGRAM(s) Oral daily  atorvastatin 80 milliGRAM(s) Oral at bedtime  chlorhexidine 4% Liquid 1 Application(s) Topical <User Schedule>  fluconAZOLE   Tablet 400 milliGRAM(s) Oral daily  folic acid 1 milliGRAM(s) Oral daily  influenza   Vaccine 0.5 milliLiter(s) IntraMuscular once  insulin regular Infusion 5 Unit(s)/Hr (5 mL/Hr) IV Continuous <Continuous>  linezolid  IVPB 600 milliGRAM(s) IV Intermittent every 12 hours  meropenem  IVPB 1000 milliGRAM(s) IV Intermittent every 8 hours  norepinephrine Infusion 0.1 MICROgram(s)/kG/Min (11.2 mL/Hr) IV Continuous <Continuous>  pantoprazole  Injectable 40 milliGRAM(s) IV Push two times a day    MEDICATIONS  (PRN):      Allergies  penicillin (Anaphylaxis; Hives)      Review of Systems:   Constitutional:  No Fever, No Chills  ENT/Mouth:  No Hearing Changes,  No Difficulty Swallowing  Eyes:  No Eye Pain, No Vision Changes  Cardiovascular:  No Chest Pain, No Palpitations  Respiratory:  No Cough, No Dyspnea  Gastrointestinal:  As described in HPI  Musculoskeletal:  No Joint Swelling, No Back Pain  Skin:  No Skin Lesions, No Jaundice  Neuro:  No Syncope, No Dizziness  Heme/Lymph:  No Bruising, No Bleeding.          Physical Examination:  T(C): 37.2 (10-13-20 @ 04:00), Max: 37.3 (10-12-20 @ 18:00)  HR: 108 (10-13-20 @ 06:45) (82 - 128)  BP: 92/45 (10-13-20 @ 06:45) (55/30 - 160/77)  RR: 21 (10-13-20 @ 06:45) (16 - 28)  SpO2: 97% (10-13-20 @ 06:45) (94% - 100%)  Height (cm): 160 (10-12-20 @ 09:08)  Weight (kg): 59.9 (10-12-20 @ 09:08)    10-12-20 @ 07:01  -  10-13-20 @ 07:00  --------------------------------------------------------  IN: 712.4 mL / OUT: 1725 mL / NET: -1012.6 mL        Constitutional: No acute distress.  Eyes:. Conjunctivae are clear, Sclera is non-icteric.  Ears Nose and Throat: The external ears are normal appearing,  Oral mucosa is pink and moist.  Respiratory:  No signs of respiratory distress. Lung sounds are clear bilaterally.  Cardiovascular:  S1 S2, Regular rate and rhythm.  GI: Abdomen is soft, symmetric, and non-tender without distention. There are no visible lesions. Bowel sounds are present and normoactive in all four quadrants. No masses, hepatomegaly, or splenomegaly are noted.   Neuro: No Tremor, No involuntary movements  Skin: No rashes, No Jaundice.          Data: (reviewed by attending)                        8.0    35.32 )-----------( 150      ( 13 Oct 2020 04:08 )             24.7     Hgb Trend:  8.0  10-13-20 @ 04:08  8.1  10-13-20 @ 01:25  8.7  10-12-20 @ 20:34  4.2  10-12-20 @ 09:07        10-13    134<L>  |  101  |  87<HH>  ----------------------------<  262<H>  4.4   |  23  |  0.5<L>    Ca    8.4<L>      13 Oct 2020 04:08  Mg     2.1     10-13    TPro  4.9<L>  /  Alb  3.3<L>  /  TBili  0.5  /  DBili  x   /  AST  22  /  ALT  25  /  AlkPhos  136<H>  10-12    Liver panel trend:  TBili 0.5   /   AST 22   /   ALT 25   /   AlkP 136   /   Tptn 4.9   /   Alb 3.3    /   DBili --      10-12      PT/INR - ( 12 Oct 2020 09:07 )   PT: 15.80 sec;   INR: 1.37 ratio         PTT - ( 12 Oct 2020 09:07 )  PTT:30.5 sec        Radiology:(reviewed by attending)  CT Abdomen and Pelvis w/ IV Cont:   EXAM:  CT ABDOMEN AND PELVIS IC        EXAM:  CT ANGIO CHEST PE PROTOCOL IC            PROCEDURE DATE:  10/12/2020            INTERPRETATION:  CLINICAL HISTORY/REASON FOR EXAM: Abdominal pain, hypotension, shortness of breath..    TECHNIQUE: CT images of the chest, pulmonary angiogram protocol, and CT images of the abdomen and pelvis portal venous protocol were obtained following administration of 100 cc Omnipaque 350. Reformatted images in the coronal and sagittal planes were acquired. Axial MIP images of the chest were obtained.    This CT study was performed using radiation dose reduction software that reduces mA or kVp according to the patient's size to obtain diagnostic image quality with patient exposure as low as reasonably achievable.    COMPARISON: None.    FINDINGS:    Evaluation limited by patient motion artifact.    CHEST:    LINES AND TUBES: Left IJ central venous catheter tip in the SVC.    PULMONARY ARTERIES: No evidence of pulmonary embolism.    LUNGS, PLEURA, AIRWAYS: Small left pleural effusion and adjacent subsegmental atelectasis. Severe upper lobe predominant emphysema. Bibasilar  atelectasis. No focal consolidation. No suspicious mass or nodule. There is no pneumothorax.    THORACIC NODES: No mediastinal, hilar, supraclavicular, or axillary lymphadenopathy.    MEDIASTINUM/GREAT VESSELS: No pericardial effusion. Heart size is within normal limits. The aorta and main pulmonary artery are of normal caliber.    BONES/SOFT TISSUES: Unremarkable.    ABDOMEN/PELVIS:    HEPATOBILIARY: The liver is normal in appearance. No evidence of intra or extrahepatic biliary dilatation. The gallbladder is suboptimally imaged.    SPLEEN: Interval decrease in splenomegaly. The dimensions cannot be accurately obtained due to motion artifact.    PANCREAS: Unremarkable.    ADRENAL GLANDS: Unremarkable.    KIDNEYS: Symmetric pattern of renal enhancement. No evidence of a mass, hydronephrosis or hydroureter. Stable right renal lower pole cyst measuring 2.5 cm. Other subcentimeter hypodensities, too small to further characterize.    ABDOMINOPELVIC NODES: No enlarged abdominal or pelvic lymph nodes.    PELVIC ORGANS: Stable, enlarged fibroid uterus. Urinary bladder contains a Murry catheter.    PERITONEUM/MESENTERY/BOWEL: Sigmoid diverticulosis. No obstruction, intraperitoneal free air or ascites.    BONES/SOFT TISSUES: Diffuse osteopenia. Scoliosis. Degenerative changes of thoracolumbar spine.    VASCULAR: Calcified and noncalcified atherosclerotic disease of the aorta.    IMPRESSION:    Evaluation limited by patient motion artifact. Within this limitation, no evidence of pulmonary embolism and no evidence of acute intra-abdominal pathology.    Small left pleural effusion.    Chronic and/or incidental findings as above.                WHITNEY MORTENSEN M.D., ATTENDING RADIOLOGIST  This document has been electronically signed. Oct 12 2020  3:05PM (10-12-20 @ 14:52)       Gastroenterology Consultation:    Patient is a 74y old  Female who presents with a chief complaint of septic shock (12 Oct 2020 16:31)      Admitted on: 10-12-20    HPI:    74 year old female with PMH of HTN, DM, CAD s/p PCI on DAPT, B cell lymphoproliferative disorder on Ibrutinib is here for AMS yesterday and GI is being called for acute drop in Hg to 4. collateral history obtained from the daughter who stated that pt was confused today, not her normal self, thinking she was in the hospital who promoted calling EMS. patient denies fever, chills, nausea, vomiting, abdominal pain or change in bowel habits. pt endorses urinary frequency and urgency. Patient says she is having brown stools, yesterday was liquid, no blood seen ever.    in the ED pat was septic with BP 75/50, .  found have WBC 26 with Hb of 4.2.   received 3 units pRBC.   central line was placed.  received 2 L of LR, insulin, linezolid and aztreonam.  approved for ICU admission.  (12 Oct 2020 16:31)      COVID 19: Not detected.    Prior records Reviewed (Y/N): Y  History obtained from person other than patient (Y/N): N    Prior EGD/colonoscopy: None in system (Never had one )        PAST MEDICAL & SURGICAL HISTORY:  Anemia    DM (diabetes mellitus)    High cholesterol    HTN (hypertension)    B-cell chronic lymphocytic leukemia variant    CAD (coronary artery disease)  s/p stenting    H/O heart artery stent        FAMILY HISTORY:  Family history of diabetes mellitus (DM)        Social History:  No alcohol abuse    Home Medications:  acyclovir 400 mg oral tablet: 1 tab(s) orally once a day (25 Aug 2020 00:00)  amLODIPine 5 mg oral tablet: 1 tab(s) orally once a day (25 Aug 2020 00:00)  aspirin 81 mg oral tablet, chewable: 1 tab(s) orally once a day (25 Aug 2020 00:00)  atorvastatin 80 mg oral tablet: 1 tab(s) orally once a day (25 Aug 2020 00:00)  clopidogrel 75 mg oral tablet: 1 tab(s) orally once a day (25 Aug 2020 00:00)  fluconazole 200 mg oral tablet: 2 tab(s) orally once a day (25 Aug 2020 00:00)  metoprolol tartrate 50 mg oral tablet: 1 tab(s) orally 2 times a day (25 Aug 2020 00:00)    MEDICATIONS  (STANDING):  acyclovir   Oral Tab/Cap 400 milliGRAM(s) Oral daily  aspirin  chewable 81 milliGRAM(s) Oral daily  atorvastatin 80 milliGRAM(s) Oral at bedtime  chlorhexidine 4% Liquid 1 Application(s) Topical <User Schedule>  fluconAZOLE   Tablet 400 milliGRAM(s) Oral daily  folic acid 1 milliGRAM(s) Oral daily  influenza   Vaccine 0.5 milliLiter(s) IntraMuscular once  insulin regular Infusion 5 Unit(s)/Hr (5 mL/Hr) IV Continuous <Continuous>  linezolid  IVPB 600 milliGRAM(s) IV Intermittent every 12 hours  meropenem  IVPB 1000 milliGRAM(s) IV Intermittent every 8 hours  norepinephrine Infusion 0.1 MICROgram(s)/kG/Min (11.2 mL/Hr) IV Continuous <Continuous>  pantoprazole  Injectable 40 milliGRAM(s) IV Push two times a day    MEDICATIONS  (PRN):      Allergies  penicillin (Anaphylaxis; Hives)      Review of Systems:   Constitutional:   Chills  ENT/Mouth:  No Hearing Changes,  No Difficulty Swallowing  Eyes:  No Eye Pain, No Vision Changes  Cardiovascular:  No Chest Pain, No Palpitations  Respiratory:  No Cough, No Dyspnea  Gastrointestinal:  As described in HPI  Musculoskeletal:  No Joint Swelling, No Back Pain  Skin:  No Skin Lesions, No Jaundice  Neuro:  No Syncope, No Dizziness  Heme/Lymph:  No Bruising, No Bleeding.          Physical Examination:  T(C): 37.2 (10-13-20 @ 04:00), Max: 37.3 (10-12-20 @ 18:00)  HR: 108 (10-13-20 @ 06:45) (82 - 128)  BP: 92/45 (10-13-20 @ 06:45) (55/30 - 160/77)  RR: 21 (10-13-20 @ 06:45) (16 - 28)  SpO2: 97% (10-13-20 @ 06:45) (94% - 100%)  Height (cm): 160 (10-12-20 @ 09:08)  Weight (kg): 59.9 (10-12-20 @ 09:08)    10-12-20 @ 07:01  -  10-13-20 @ 07:00  --------------------------------------------------------  IN: 712.4 mL / OUT: 1725 mL / NET: -1012.6 mL        Constitutional: No acute distress.  Eyes:. Conjunctivae are clear, Sclera is non-icteric.  Ears Nose and Throat: The external ears are normal appearing,  Oral mucosa is pink and moist.  Respiratory:  No signs of respiratory distress. Lung sounds are clear bilaterally.  Cardiovascular:  S1 S2, Regular rate and rhythm.  GI: Abdomen is soft, symmetric, and non-tender without distention.   Neuro: No Tremor, No involuntary movements  Skin: No rashes, No Jaundice.          Data: (reviewed by attending)                        8.0    35.32 )-----------( 150      ( 13 Oct 2020 04:08 )             24.7     Hgb Trend:  8.0  10-13-20 @ 04:08  8.1  10-13-20 @ 01:25  8.7  10-12-20 @ 20:34  4.2  10-12-20 @ 09:07        10-13    134<L>  |  101  |  87<HH>  ----------------------------<  262<H>  4.4   |  23  |  0.5<L>    Ca    8.4<L>      13 Oct 2020 04:08  Mg     2.1     10-13    TPro  4.9<L>  /  Alb  3.3<L>  /  TBili  0.5  /  DBili  x   /  AST  22  /  ALT  25  /  AlkPhos  136<H>  10-12    Liver panel trend:  TBili 0.5   /   AST 22   /   ALT 25   /   AlkP 136   /   Tptn 4.9   /   Alb 3.3    /   DBili --      10-12      PT/INR - ( 12 Oct 2020 09:07 )   PT: 15.80 sec;   INR: 1.37 ratio         PTT - ( 12 Oct 2020 09:07 )  PTT:30.5 sec        Radiology:(reviewed by attending)  CT Abdomen and Pelvis w/ IV Cont:   EXAM:  CT ABDOMEN AND PELVIS IC        EXAM:  CT ANGIO CHEST PE PROTOCOL IC            PROCEDURE DATE:  10/12/2020            INTERPRETATION:  CLINICAL HISTORY/REASON FOR EXAM: Abdominal pain, hypotension, shortness of breath..    TECHNIQUE: CT images of the chest, pulmonary angiogram protocol, and CT images of the abdomen and pelvis portal venous protocol were obtained following administration of 100 cc Omnipaque 350. Reformatted images in the coronal and sagittal planes were acquired. Axial MIP images of the chest were obtained.    This CT study was performed using radiation dose reduction software that reduces mA or kVp according to the patient's size to obtain diagnostic image quality with patient exposure as low as reasonably achievable.    COMPARISON: None.    FINDINGS:    Evaluation limited by patient motion artifact.    CHEST:    LINES AND TUBES: Left IJ central venous catheter tip in the SVC.    PULMONARY ARTERIES: No evidence of pulmonary embolism.    LUNGS, PLEURA, AIRWAYS: Small left pleural effusion and adjacent subsegmental atelectasis. Severe upper lobe predominant emphysema. Bibasilar  atelectasis. No focal consolidation. No suspicious mass or nodule. There is no pneumothorax.    THORACIC NODES: No mediastinal, hilar, supraclavicular, or axillary lymphadenopathy.    MEDIASTINUM/GREAT VESSELS: No pericardial effusion. Heart size is within normal limits. The aorta and main pulmonary artery are of normal caliber.    BONES/SOFT TISSUES: Unremarkable.    ABDOMEN/PELVIS:    HEPATOBILIARY: The liver is normal in appearance. No evidence of intra or extrahepatic biliary dilatation. The gallbladder is suboptimally imaged.    SPLEEN: Interval decrease in splenomegaly. The dimensions cannot be accurately obtained due to motion artifact.    PANCREAS: Unremarkable.    ADRENAL GLANDS: Unremarkable.    KIDNEYS: Symmetric pattern of renal enhancement. No evidence of a mass, hydronephrosis or hydroureter. Stable right renal lower pole cyst measuring 2.5 cm. Other subcentimeter hypodensities, too small to further characterize.    ABDOMINOPELVIC NODES: No enlarged abdominal or pelvic lymph nodes.    PELVIC ORGANS: Stable, enlarged fibroid uterus. Urinary bladder contains a Murry catheter.    PERITONEUM/MESENTERY/BOWEL: Sigmoid diverticulosis. No obstruction, intraperitoneal free air or ascites.    BONES/SOFT TISSUES: Diffuse osteopenia. Scoliosis. Degenerative changes of thoracolumbar spine.    VASCULAR: Calcified and noncalcified atherosclerotic disease of the aorta.    IMPRESSION:    Evaluation limited by patient motion artifact. Within this limitation, no evidence of pulmonary embolism and no evidence of acute intra-abdominal pathology.    Small left pleural effusion.    Chronic and/or incidental findings as above.                WHITNEY MORTENSEN M.D., ATTENDING RADIOLOGIST  This document has been electronically signed. Oct 12 2020  3:05PM (10-12-20 @ 14:52)

## 2020-10-13 NOTE — CONSULT NOTE ADULT - ASSESSMENT
Patient is a 75 y/o F with PMH of marginal zone lymphoma, recurrent UTI, CAD s/p stent, DLD, HTN  admitted for sepsis due to VRE UTI    #) Shock: Septic (hx of VRE urine however UA -, CXR neg) vs hypovolemic (reported dark stool, Hg dropped to 4.2)  - On levophed 0.15   - Hg stable after 3 units  - On Zyvox, cefepime   - Acyclovir and fluconazole ppx    #) Anemia: Patient is transfusion dependent at baseline, however this was an acute drop, need to r/o GIB vs hemolysis, can also be due to ibrutinib  - Check PALAK, LDH, hapto, retic  - Monitor for GIB  - s/p 3 units of PRBCs, now stable  - Keep Hg ~8    #) Thrombocytopenia: Resolved,  - Keep platelets greater than 50K    #) Marginal zone lymphoma with splenomegaly:  - PET CT as of May 2020 showing diffuse FDG uptake in an enlarged spleen   - Transfusion dependent  - Was recently restarted on ibrutinib, then held again due to thrombocytopenia   - Hold ibrutinib	         Patient is a 73 y/o F with PMH of marginal zone lymphoma, recurrent UTI, CAD s/p stent, DLD, HTN  admitted for sepsis due to VRE UTI    #) Shock: Septic (hx of VRE urine however UA -, CXR neg) vs hypovolemic (reported dark stool, Hg dropped to 4.2)  - On levophed 0.15   - Hg stable after 3 units  - On Zyvox, cefepime     #) Anemia: Patient is transfusion dependent at baseline, however this was an acute drop, suspect UGIB with the dramatic rise in BUN and acute drop, ddx also includes hemolysis, splenic sequestratin, AE from ibrutinib  - Check PALAK, LDH, hapto, retic (Bactrim can cause hemolysis, was on it as outpatient)  - Recall GI for possible EGD  - Spleen imaging  - s/p 3 units of PRBCs, now stable  - Keep Hg ~8  - Folic acid 1mg QD    #) Thrombocytopenia: Resolved,  - Keep platelets greater than 50K    #) Marginal zone lymphoma with splenomegaly:  - PET CT as of May 2020 showing diffuse FDG uptake in an enlarged spleen   - Transfusion dependent  - Was recently restarted on ibrutinib, then held again due to thrombocytopenia   - Hold ibrutinib	  - Acyclovir and fluconazole ppx

## 2020-10-13 NOTE — CHART NOTE - NSCHARTNOTEFT_GEN_A_CORE
Transfer Note    Transfer from: ICU    Transfer to: ( X ) Medicine    (  ) Telemetry    (  ) RCU      (  ) Palliative    (  ) Stroke Unit    (  ) MICU    (  ) __________________    HPI / CCU COURSE:    HPI and Hospital Course    Patient is a 74y old woman with PMH of HTN,d DM, CAD, B cell lymphoproliferative disorder (on Ibrutinib, last dose 10/11) and recent VRE UTI who presented for confusion and AMS. Patient daughter endorsed  dark stool weekend prior to presentation. Found to be septic in ED with BP 75/50, , and WBC of 26. Given 2L LR, linezolid, aztreonam in ED. Also transfused 3 units pRBC due to hemoglobin of 4.2 with response to 8.0.    Interval Events    Patient examined at bedside. Mental condition improved. No events overnight.        Vital Signs Last 24 Hrs  T(C): 36.2 (13 Oct 2020 16:00), Max: 37.3 (12 Oct 2020 18:00)  T(F): 97.1 (13 Oct 2020 16:00), Max: 99.2 (13 Oct 2020 00:00)  HR: 98 (13 Oct 2020 17:00) (88 - 128)  BP: 98/44 (13 Oct 2020 17:00) (64/45 - 129/54)  BP(mean): 62 (13 Oct 2020 17:00) (51 - 94)  RR: 19 (13 Oct 2020 17:00) (16 - 28)  SpO2: 99% (13 Oct 2020 17:00) (94% - 100%)    I&O's Summary    12 Oct 2020 07:01  -  13 Oct 2020 07:00  --------------------------------------------------------  IN: 712.4 mL / OUT: 1725 mL / NET: -1012.6 mL    13 Oct 2020 07:01  -  13 Oct 2020 17:40  --------------------------------------------------------  IN: 1625.4 mL / OUT: 755 mL / NET: 870.4 mL        Physical Exam:     GENERAL: NAD, well-developed, AAOx3  HEENT:  Atraumatic, Normocephalic. EOMI, PERRLA, conjunctiva and sclera clear, No JVD  PULMONARY: Clear to auscultation bilaterally; No wheeze  CARDIOVASCULAR: Regular rate and rhythm; No murmurs, rubs, or gallops  GASTROINTESTINAL: Soft, Nontender, Nondistended; Bowel sounds present  MUSCULOSKELETAL:  2+ Peripheral Pulses, No clubbing, cyanosis, or edema  NEUROLOGY: non-focal  SKIN: Ecchymoses throughout upper extremities     LABS:   CARDIAC MARKERS ( 12 Oct 2020 20:34 )  x     / 0.04 ng/mL / x     / x     / x      CARDIAC MARKERS ( 12 Oct 2020 09:07 )  x     / 0.02 ng/mL / x     / x     / x                                  7.6    26.54 )-----------( 136      ( 13 Oct 2020 11:00 )             23.5       10-13    134<L>  |  101  |  87<HH>  ----------------------------<  262<H>  4.4   |  23  |  0.5<L>    Ca    8.4<L>      13 Oct 2020 04:08  Mg     2.1     10-13    TPro  4.9<L>  /  Alb  3.3<L>  /  TBili  0.5  /  DBili  x   /  AST  22  /  ALT  25  /  AlkPhos  136<H>  10-12      PT/INR - ( 12 Oct 2020 09:07 )   PT: 15.80 sec;   INR: 1.37 ratio         PTT - ( 12 Oct 2020 09:07 )  PTT:30.5 sec      Imaging:    < from: Xray Chest 1 View- PORTABLE-Routine (Xray Chest 1 View- PORTABLE-Routine in AM.) (10.13.20 @ 05:22) >      Impression:    No radiographic evidence of acute cardiopulmonary disease.    < end of copied text >    < from: CT Abdomen and Pelvis w/ IV Cont (10.12.20 @ 14:52) >    IMPRESSION:    Evaluation limited by patient motion artifact. Within this limitation, no evidence of pulmonary embolism and no evidence of acute intra-abdominal pathology.    Small left pleural effusion.    Chronic and/or incidental findings as above.      < end of copied text >        ASSESSMENT & PLAN:     Patient is a 74y old woman with PMH of HTN, DM, CAD, B cell lymphoproliferative disorder (on Ibrutinib, last dose 10/11) and recent VRE UTI who presented for confusion and AMS. Being admitted to ICU for treatment of shock.     1. Shock  -More likely septic, less likely hemorrhagic  -WBC 26, , BP 75/50  -Endorsed 1 episode of dark melena over weekend; HgB 4.2 on presentation  -Given 3 units pRBC  -S/P 2 L LR in ED; given 500cc today  -ID consult appreciated  -Continue linezolid 600mg q12, cefepime 2gm q12  -GI consult appreciated; no intervention as of now  -PPI IV BID  -Wean pressors  -Keep active type and screen  -F/U CBC   -F/U BCX    2. HTN  -Anti-hypertensives held in light of shock    3. DM  -Insulin drip for now; POCT glucose q1hrs    4. B cell lymphoproliferative disorder  -Oncology consult appreciated    MISC:  -DVT prophylaxis: Sequentials  -GI prophylaxis: PPI  -Code: FULL  -Diet: Clears  -Activity: Bedrest      FOR FOLLOW UP:  [ ] CBC @ 8  [ ] BCX  [ ] Procal  [ ] Blood sugars  [ ]

## 2020-10-14 NOTE — PROGRESS NOTE ADULT - ASSESSMENT
74 year old female with PMH of HTN, DM, CAD, B cell lymphoproliferative disorder on Ibrutinib last dose yesterday, recent VRE UTI  is here for evaluation of confusion and AMS since this morning found by daughter.    IMPRESSION;  resolved shock  hypovolemic secondary to GI bleed ( Hg/HtC 4.2/14.3 ). Recent black stools  Not convinced of a septic shock. Only possible source is  which is unusual as no pyuria and clinically asymptomatic.  No PNA or intraabdominal infectious focus  CT abd : unremarkable  BCx 10/12 NGTD  UCx Enterobacter    RECOMMENDATIONS;  F/u final UCx  D/c Zyvox   Cefepime 2 gm iv q12h

## 2020-10-14 NOTE — PROGRESS NOTE ADULT - SUBJECTIVE AND OBJECTIVE BOX
Patient is a 74y old  Female who presents with a chief complaint of septic shock (14 Oct 2020 12:47)        Over Night Events:        ROS:     All ROS are negative except HPI         PHYSICAL EXAM    ICU Vital Signs Last 24 Hrs  T(C): 36.7 (14 Oct 2020 19:47), Max: 36.7 (14 Oct 2020 19:47)  T(F): 98 (14 Oct 2020 19:47), Max: 98 (14 Oct 2020 19:47)  HR: 82 (14 Oct 2020 19:47) (82 - 98)  BP: 126/515 (14 Oct 2020 19:47) (109/50 - 145/70)  BP(mean): 738 (14 Oct 2020 19:47) (78 - 738)  ABP: --  ABP(mean): --  RR: 20 (14 Oct 2020 19:47) (18 - 20)  SpO2: 97% (14 Oct 2020 18:27) (96% - 98%)      CONSTITUTIONAL:  Well nourished.  NAD    ENT:   Airway patent,   Mouth with normal mucosa.   No thrush    EYES:   Pupils equal,   Round and reactive to light.    CARDIAC:   Normal rate,   Regular rhythm.    No edema      Vascular:  Normal systolic impulse  No Carotid bruits    RESPIRATORY:   No wheezing  Bilateral BS  Normal chest expansion  Not tachypneic,  No use of accessory muscles    GASTROINTESTINAL:  Abdomen soft,   Non-tender,   No guarding,   + BS    MUSCULOSKELETAL:   Range of motion is not limited,  No clubbing, cyanosis    NEUROLOGICAL:   Alert and oriented   No motor  deficits.    SKIN:   Skin normal color for race,   Warm and dry and intact.   No evidence of rash.    PSYCHIATRIC:   Normal mood and affect.   No apparent risk to self or others.    HEMATOLOGICAL:  No cervical  lymphadenopathy.  no inguinal lymphadenopathy      10-13-20 @ 07:01  -  10-14-20 @ 07:00  --------------------------------------------------------  IN:    Insulin: 24 mL    IV PiggyBack: 850 mL    Lactated Ringers Bolus: 500 mL    Norepinephrine: 40.5 mL    Norepinephrine: 110.9 mL    Norepinephrine: 175.5 mL    Oral Fluid: 100 mL  Total IN: 1800.9 mL    OUT:    Indwelling Catheter - Urethral (mL): 1155 mL  Total OUT: 1155 mL    Total NET: 645.9 mL      10-14-20 @ 07:01  -  10-14-20 @ 21:01  --------------------------------------------------------  IN:    Norepinephrine: 33.6 mL    Norepinephrine: 67.5 mL  Total IN: 101.1 mL    OUT:    Indwelling Catheter - Urethral (mL): 300 mL  Total OUT: 300 mL    Total NET: -198.9 mL          LABS:                            7.3    10.81 )-----------( 125      ( 14 Oct 2020 07:42 )             23.7                                               10-14    134<L>  |  105  |  40<H>  ----------------------------<  282<H>  4.1   |  21  |  <0.5<L>    Ca    8.0<L>      14 Oct 2020 07:42  Mg     1.9     10-14    TPro  4.1<L>  /  Alb  2.8<L>  /  TBili  0.9  /  DBili  x   /  AST  26  /  ALT  22  /  AlkPhos  83  10-14                                                                                           LIVER FUNCTIONS - ( 14 Oct 2020 07:42 )  Alb: 2.8 g/dL / Pro: 4.1 g/dL / ALK PHOS: 83 U/L / ALT: 22 U/L / AST: 26 U/L / GGT: x                                                  Culture - Urine (collected 12 Oct 2020 09:40)  Source: .Urine Clean Catch (Midstream)  Final Report (14 Oct 2020 17:20):    50,000 - 99,000 CFU/mL Enterobacter cloacae complex  Organism: Enterobacter cloacae complex (14 Oct 2020 17:20)  Organism: Enterobacter cloacae complex (14 Oct 2020 17:20)    Culture - Blood (collected 12 Oct 2020 09:07)  Source: .Blood Blood-Peripheral  Preliminary Report (13 Oct 2020 22:01):    No growth to date.    Culture - Blood (collected 12 Oct 2020 09:07)  Source: .Blood Blood-Peripheral  Preliminary Report (13 Oct 2020 22:01):    No growth to date.                                                                                           MEDICATIONS  (STANDING):  acyclovir   Oral Tab/Cap 400 milliGRAM(s) Oral daily  aspirin  chewable 81 milliGRAM(s) Oral daily  atorvastatin 80 milliGRAM(s) Oral at bedtime  cefepime   IVPB      cefepime   IVPB 2000 milliGRAM(s) IV Intermittent every 12 hours  chlorhexidine 4% Liquid 1 Application(s) Topical <User Schedule>  dextrose 5%. 1000 milliLiter(s) (50 mL/Hr) IV Continuous <Continuous>  dextrose 50% Injectable 12.5 Gram(s) IV Push once  dextrose 50% Injectable 25 Gram(s) IV Push once  dextrose 50% Injectable 25 Gram(s) IV Push once  fluconAZOLE   Tablet 400 milliGRAM(s) Oral daily  folic acid 1 milliGRAM(s) Oral daily  influenza   Vaccine 0.5 milliLiter(s) IntraMuscular once  insulin glargine Injectable (LANTUS) 15 Unit(s) SubCutaneous at bedtime  insulin lispro Injectable (HumaLOG) 5 Unit(s) SubCutaneous three times a day before meals  norepinephrine Infusion 0.05 MICROgram(s)/kG/Min (5.62 mL/Hr) IV Continuous <Continuous>  pantoprazole  Injectable 40 milliGRAM(s) IV Push two times a day    MEDICATIONS  (PRN):  dextrose 40% Gel 15 Gram(s) Oral once PRN Blood Glucose LESS THAN 70 milliGRAM(s)/deciliter  glucagon  Injectable 1 milliGRAM(s) IntraMuscular once PRN Glucose LESS THAN 70 milligrams/deciliter      New X-rays reviewed:                                                                                  ECHO    CXR interpreted by me:       Patient is a 74y old  Female who presents with a chief complaint of septic shock (14 Oct 2020 12:47)        Over Night Events:  Looks and feels better.          ROS:     All ROS are negative except HPI         PHYSICAL EXAM    ICU Vital Signs Last 24 Hrs  T(C): 36.7 (14 Oct 2020 19:47), Max: 36.7 (14 Oct 2020 19:47)  T(F): 98 (14 Oct 2020 19:47), Max: 98 (14 Oct 2020 19:47)  HR: 82 (14 Oct 2020 19:47) (82 - 98)  BP: 126/515 (14 Oct 2020 19:47) (109/50 - 145/70)  BP(mean): 738 (14 Oct 2020 19:47) (78 - 738)  ABP: --  ABP(mean): --  RR: 20 (14 Oct 2020 19:47) (18 - 20)  SpO2: 97% (14 Oct 2020 18:27) (96% - 98%)      CONSTITUTIONAL:  in  NAD    ENT:   Airway patent,   Mouth with normal mucosa.   No thrush    EYES:   Pupils equal,   Round and reactive to light.    CARDIAC:   Normal rate,   Regular rhythm.    No edema      Vascular:  Normal systolic impulse  No Carotid bruits    RESPIRATORY:   No wheezing  Bilateral BS  Normal chest expansion  Not tachypneic,  No use of accessory muscles    GASTROINTESTINAL:  Abdomen soft,   Non-tender,   No guarding,   + BS    MUSCULOSKELETAL:   Range of motion is not limited,  No clubbing, cyanosis    NEUROLOGICAL:   Alert and oriented   No motor  deficits.    SKIN:   Skin normal color for race,   Warm and dry  No evidence of rash.    PSYCHIATRIC:   Normal mood and affect.   No apparent risk to self or others.    HEMATOLOGICAL:  No cervical  lymphadenopathy.  no inguinal lymphadenopathy      10-13-20 @ 07:01  -  10-14-20 @ 07:00  --------------------------------------------------------  IN:    Insulin: 24 mL    IV PiggyBack: 850 mL    Lactated Ringers Bolus: 500 mL    Norepinephrine: 40.5 mL    Norepinephrine: 110.9 mL    Norepinephrine: 175.5 mL    Oral Fluid: 100 mL  Total IN: 1800.9 mL    OUT:    Indwelling Catheter - Urethral (mL): 1155 mL  Total OUT: 1155 mL    Total NET: 645.9 mL      10-14-20 @ 07:01  -  10-14-20 @ 21:01  --------------------------------------------------------  IN:    Norepinephrine: 33.6 mL    Norepinephrine: 67.5 mL  Total IN: 101.1 mL    OUT:    Indwelling Catheter - Urethral (mL): 300 mL  Total OUT: 300 mL    Total NET: -198.9 mL          LABS:                            7.3    10.81 )-----------( 125      ( 14 Oct 2020 07:42 )             23.7                                               10-14    134<L>  |  105  |  40<H>  ----------------------------<  282<H>  4.1   |  21  |  <0.5<L>    Ca    8.0<L>      14 Oct 2020 07:42  Mg     1.9     10-14    TPro  4.1<L>  /  Alb  2.8<L>  /  TBili  0.9  /  DBili  x   /  AST  26  /  ALT  22  /  AlkPhos  83  10-14                                                                                           LIVER FUNCTIONS - ( 14 Oct 2020 07:42 )  Alb: 2.8 g/dL / Pro: 4.1 g/dL / ALK PHOS: 83 U/L / ALT: 22 U/L / AST: 26 U/L / GGT: x                                                  Culture - Urine (collected 12 Oct 2020 09:40)  Source: .Urine Clean Catch (Midstream)  Final Report (14 Oct 2020 17:20):    50,000 - 99,000 CFU/mL Enterobacter cloacae complex  Organism: Enterobacter cloacae complex (14 Oct 2020 17:20)  Organism: Enterobacter cloacae complex (14 Oct 2020 17:20)    Culture - Blood (collected 12 Oct 2020 09:07)  Source: .Blood Blood-Peripheral  Preliminary Report (13 Oct 2020 22:01):    No growth to date.    Culture - Blood (collected 12 Oct 2020 09:07)  Source: .Blood Blood-Peripheral  Preliminary Report (13 Oct 2020 22:01):    No growth to date.                                                                                           MEDICATIONS  (STANDING):  acyclovir   Oral Tab/Cap 400 milliGRAM(s) Oral daily  aspirin  chewable 81 milliGRAM(s) Oral daily  atorvastatin 80 milliGRAM(s) Oral at bedtime  cefepime   IVPB      cefepime   IVPB 2000 milliGRAM(s) IV Intermittent every 12 hours  chlorhexidine 4% Liquid 1 Application(s) Topical <User Schedule>  dextrose 5%. 1000 milliLiter(s) (50 mL/Hr) IV Continuous <Continuous>  dextrose 50% Injectable 12.5 Gram(s) IV Push once  dextrose 50% Injectable 25 Gram(s) IV Push once  dextrose 50% Injectable 25 Gram(s) IV Push once  fluconAZOLE   Tablet 400 milliGRAM(s) Oral daily  folic acid 1 milliGRAM(s) Oral daily  influenza   Vaccine 0.5 milliLiter(s) IntraMuscular once  insulin glargine Injectable (LANTUS) 15 Unit(s) SubCutaneous at bedtime  insulin lispro Injectable (HumaLOG) 5 Unit(s) SubCutaneous three times a day before meals  norepinephrine Infusion 0.05 MICROgram(s)/kG/Min (5.62 mL/Hr) IV Continuous <Continuous>  pantoprazole  Injectable 40 milliGRAM(s) IV Push two times a day    MEDICATIONS  (PRN):  dextrose 40% Gel 15 Gram(s) Oral once PRN Blood Glucose LESS THAN 70 milliGRAM(s)/deciliter  glucagon  Injectable 1 milliGRAM(s) IntraMuscular once PRN Glucose LESS THAN 70 milligrams/deciliter      New X-rays reviewed:                                                                                  ECHO    CXR interpreted by me:

## 2020-10-14 NOTE — PROGRESS NOTE ADULT - PROVIDER SPECIALTY LIST ADULT
Internal Medicine [Normal] : affect was normal and insight and judgment were intact [de-identified] : varicose veins bilaterally legs

## 2020-10-14 NOTE — PROGRESS NOTE ADULT - SUBJECTIVE AND OBJECTIVE BOX
SUBJECTIVE:    Patient is a 74y old Female who presents with a chief complaint of septic shock (14 Oct 2020 09:37)    Currently admitted to medicine with the primary diagnosis of Septic shock       Today is hospital day 2d. This morning she is resting comfortably in bed and reports no new issues or overnight events.     ROS:   CONSTITUTIONAL: No weakness, fevers or chills   EYES/ENT: No visual changes; No vertigo or throat pain   NECK: No pain or stiffness   RESPIRATORY: No cough, wheezing, hemoptysis; No shortness of breath   CARDIOVASCULAR: No chest pain or palpitations   GASTROINTESTINAL: No abdominal or epigastric pain. No nausea, vomiting, or hematemesis; No diarrhea or constipation. No melena or hematochezia.  GENITOURINARY: No dysuria, frequency or hematuria  NEUROLOGICAL: No numbness or weakness  SKIN: No itching, rashes      PAST MEDICAL & SURGICAL HISTORY  Anemia    DM (diabetes mellitus)    High cholesterol    HTN (hypertension)    B-cell chronic lymphocytic leukemia variant    CAD (coronary artery disease)  s/p stenting    H/O heart artery stent      ALLERGIES:  penicillin (Anaphylaxis; Hives)    MEDICATIONS:  STANDING MEDICATIONS  acyclovir   Oral Tab/Cap 400 milliGRAM(s) Oral daily  aspirin  chewable 81 milliGRAM(s) Oral daily  atorvastatin 80 milliGRAM(s) Oral at bedtime  cefepime   IVPB      cefepime   IVPB 2000 milliGRAM(s) IV Intermittent every 12 hours  chlorhexidine 4% Liquid 1 Application(s) Topical <User Schedule>  dextrose 5%. 1000 milliLiter(s) IV Continuous <Continuous>  dextrose 50% Injectable 12.5 Gram(s) IV Push once  dextrose 50% Injectable 25 Gram(s) IV Push once  dextrose 50% Injectable 25 Gram(s) IV Push once  fluconAZOLE   Tablet 400 milliGRAM(s) Oral daily  folic acid 1 milliGRAM(s) Oral daily  influenza   Vaccine 0.5 milliLiter(s) IntraMuscular once  insulin glargine Injectable (LANTUS) 15 Unit(s) SubCutaneous at bedtime  insulin lispro Injectable (HumaLOG) 5 Unit(s) SubCutaneous three times a day before meals  linezolid  IVPB 600 milliGRAM(s) IV Intermittent every 12 hours  norepinephrine Infusion 0.12 MICROgram(s)/kG/Min IV Continuous <Continuous>  pantoprazole  Injectable 40 milliGRAM(s) IV Push two times a day    PRN MEDICATIONS  dextrose 40% Gel 15 Gram(s) Oral once PRN  glucagon  Injectable 1 milliGRAM(s) IntraMuscular once PRN    VITALS:   T(F): 97  HR: 98  BP: 126/56  RR: 18  SpO2: 98%    LABS:               7.3    10.81 )-----------( 125      ( 14 Oct 2020 07:42 )             23.7     10-14    134<L>  |  105  |  40<H>  ----------------------------<  282<H>  4.1   |  21  |  <0.5<L>    Ca    8.0<L>      14 Oct 2020 07:42  Mg     1.9     10-14    TPro  4.1<L>  /  Alb  2.8<L>  /  TBili  0.9  /  DBili  x   /  AST  26  /  ALT  22  /  AlkPhos  83  10-14              Culture - Urine (collected 12 Oct 2020 09:40)  Source: .Urine Clean Catch (Midstream)  Preliminary Report (13 Oct 2020 21:27):    50,000 - 99,000 CFU/mL Enterobacter cloacae complex    Culture - Blood (collected 12 Oct 2020 09:07)  Source: .Blood Blood-Peripheral  Preliminary Report (13 Oct 2020 22:01):    No growth to date.    Culture - Blood (collected 12 Oct 2020 09:07)  Source: .Blood Blood-Peripheral  Preliminary Report (13 Oct 2020 22:01):    No growth to date.      CARDIAC MARKERS ( 12 Oct 2020 20:34 )  x     / 0.04 ng/mL / x     / x     / x          RADIOLOGY:    < from: Xray Chest 1 View- PORTABLE-Routine (Xray Chest 1 View- PORTABLE-Routine in AM.) (10.14.20 @ 06:19) >  Impression:    No evidence of focal consolidation, pleural effusion or pneumothorax.    < end of copied text >  PHYSICAL EXAM:  GEN: No acute distress  HEENT: normocephalic, atraumatic, aniceteric  LUNGS: Clear to auscultation bilaterally, no rales/wheezing/ rhonchi  HEART: S1/S2 present. RRR, no murmurs  ABD: Soft, non-tender, non-distended. Bowel sounds present  EXT: NC/NC/NE/2+PP/DUNBAR  NEURO: AAOX1, normal affect      ASSESSMENT AND PLAN: SUBJECTIVE:    Patient is a 74y old Female who presents with a chief complaint of septic shock (14 Oct 2020 09:37)    Currently admitted to medicine with the primary diagnosis of Septic shock       Today is hospital day 2d. This morning she is resting comfortably in bed and reports no new issues or overnight events.     ROS:   CONSTITUTIONAL: No weakness, fevers or chills   EYES/ENT: No visual changes; No vertigo or throat pain   NECK: No pain or stiffness   RESPIRATORY: No cough, wheezing, hemoptysis; No shortness of breath   CARDIOVASCULAR: No chest pain or palpitations   GASTROINTESTINAL: No abdominal or epigastric pain. No nausea, vomiting, or hematemesis; No diarrhea or constipation. No melena or hematochezia.  GENITOURINARY: No dysuria, frequency or hematuria  NEUROLOGICAL: No numbness or weakness  SKIN: No itching, rashes      PAST MEDICAL & SURGICAL HISTORY  Anemia    DM (diabetes mellitus)    High cholesterol    HTN (hypertension)    B-cell chronic lymphocytic leukemia variant    CAD (coronary artery disease)  s/p stenting    H/O heart artery stent      ALLERGIES:  penicillin (Anaphylaxis; Hives)    MEDICATIONS:  STANDING MEDICATIONS  acyclovir   Oral Tab/Cap 400 milliGRAM(s) Oral daily  aspirin  chewable 81 milliGRAM(s) Oral daily  atorvastatin 80 milliGRAM(s) Oral at bedtime  cefepime   IVPB      cefepime   IVPB 2000 milliGRAM(s) IV Intermittent every 12 hours  chlorhexidine 4% Liquid 1 Application(s) Topical <User Schedule>  dextrose 5%. 1000 milliLiter(s) IV Continuous <Continuous>  dextrose 50% Injectable 12.5 Gram(s) IV Push once  dextrose 50% Injectable 25 Gram(s) IV Push once  dextrose 50% Injectable 25 Gram(s) IV Push once  fluconAZOLE   Tablet 400 milliGRAM(s) Oral daily  folic acid 1 milliGRAM(s) Oral daily  influenza   Vaccine 0.5 milliLiter(s) IntraMuscular once  insulin glargine Injectable (LANTUS) 15 Unit(s) SubCutaneous at bedtime  insulin lispro Injectable (HumaLOG) 5 Unit(s) SubCutaneous three times a day before meals  linezolid  IVPB 600 milliGRAM(s) IV Intermittent every 12 hours  norepinephrine Infusion 0.12 MICROgram(s)/kG/Min IV Continuous <Continuous>  pantoprazole  Injectable 40 milliGRAM(s) IV Push two times a day    PRN MEDICATIONS  dextrose 40% Gel 15 Gram(s) Oral once PRN  glucagon  Injectable 1 milliGRAM(s) IntraMuscular once PRN    VITALS:   T(F): 97  HR: 98  BP: 126/56  RR: 18  SpO2: 98%    LABS:               7.3    10.81 )-----------( 125      ( 14 Oct 2020 07:42 )             23.7     10-14    134<L>  |  105  |  40<H>  ----------------------------<  282<H>  4.1   |  21  |  <0.5<L>    Ca    8.0<L>      14 Oct 2020 07:42  Mg     1.9     10-14    TPro  4.1<L>  /  Alb  2.8<L>  /  TBili  0.9  /  DBili  x   /  AST  26  /  ALT  22  /  AlkPhos  83  10-14              Culture - Urine (collected 12 Oct 2020 09:40)  Source: .Urine Clean Catch (Midstream)  Preliminary Report (13 Oct 2020 21:27):    50,000 - 99,000 CFU/mL Enterobacter cloacae complex    Culture - Blood (collected 12 Oct 2020 09:07)  Source: .Blood Blood-Peripheral  Preliminary Report (13 Oct 2020 22:01):    No growth to date.    Culture - Blood (collected 12 Oct 2020 09:07)  Source: .Blood Blood-Peripheral  Preliminary Report (13 Oct 2020 22:01):    No growth to date.      CARDIAC MARKERS ( 12 Oct 2020 20:34 )  x     / 0.04 ng/mL / x     / x     / x          RADIOLOGY:    < from: Xray Chest 1 View- PORTABLE-Routine (Xray Chest 1 View- PORTABLE-Routine in AM.) (10.14.20 @ 06:19) >  Impression:    No evidence of focal consolidation, pleural effusion or pneumothorax.    < end of copied text >  PHYSICAL EXAM:  GEN: No acute distress  HEENT: normocephalic, atraumatic, aniceteric  LUNGS: Clear to auscultation bilaterally, no rales/wheezing/ rhonchi  HEART: S1/S2 present. RRR, no murmurs  ABD: Soft, non-tender, non-distended. Bowel sounds present  EXT: NC/NC/NE/2+PP/DUNBAR  NEURO: AAOX1, normal affect      ASSESSMENT AND PLAN:    Patient is a 74y old woman with PMH of HTN, DM, CAD, B cell lymphoproliferative disorder (on Ibrutinib, last dose 10/11) and recent VRE UTI who presented for confusion and AMS. Being admitted to ICU for treatment of shock.     1. Shock - improving   septic ( unknown source?  ?) vs hemorrhagic or multifactorial ?  -WBC 26, , BP 75/50 on admission : blood pressure improved , wbc trending down (10.81 today)  -Endorsed 1 episode of dark melena over weekend ; HgB 4.2 on presentation , s./p 3 units  - UClx 50K-99K Enterobacter prelim ; CXE negative, CT AP negative  -Continue linezolid 600mg q12, cefepime 2gm q12, fluconazole, acyclovir ; ID following   -GI consult appreciated; no intervention as of now  -PPI IV BID  -Wean pressors  -Keep active type and screen  -F/U CBC   -F/U BCX (prelim negative x2)     2. HTN   -Anti-hypertensives held in light of shock    3. DM  -c/w insulin subq , adjust accordingly  - c/w asa, statin  - HbA1C 6.9 in august 2020    4. B cell lymphoproliferative disorder  -Oncology consult appreciated    MISC:  -DVT prophylaxis: Sequentials  -GI prophylaxis: PPI BID  -Code: FULL  -Diet: Clears  -Activity: as tolerated SUBJECTIVE:    Patient is a 74y old Female who presents with a chief complaint of septic shock (14 Oct 2020 09:37)    Currently admitted to medicine with the primary diagnosis of Septic shock       Today is hospital day 2d. This morning she is resting comfortably in bed and reports no new issues or overnight events.     ROS:   CONSTITUTIONAL: No weakness, fevers or chills   EYES/ENT: No visual changes; No vertigo or throat pain   NECK: No pain or stiffness   RESPIRATORY: No cough, wheezing, hemoptysis; No shortness of breath   CARDIOVASCULAR: No chest pain or palpitations   GASTROINTESTINAL: No abdominal or epigastric pain. No nausea, vomiting, or hematemesis; No diarrhea or constipation. No melena or hematochezia.  GENITOURINARY: No dysuria, frequency or hematuria  NEUROLOGICAL: No numbness or weakness  SKIN: No itching, rashes      PAST MEDICAL & SURGICAL HISTORY  Anemia    DM (diabetes mellitus)    High cholesterol    HTN (hypertension)    B-cell chronic lymphocytic leukemia variant    CAD (coronary artery disease)  s/p stenting    H/O heart artery stent      ALLERGIES:  penicillin (Anaphylaxis; Hives)    MEDICATIONS:  STANDING MEDICATIONS  acyclovir   Oral Tab/Cap 400 milliGRAM(s) Oral daily  aspirin  chewable 81 milliGRAM(s) Oral daily  atorvastatin 80 milliGRAM(s) Oral at bedtime  cefepime   IVPB      cefepime   IVPB 2000 milliGRAM(s) IV Intermittent every 12 hours  chlorhexidine 4% Liquid 1 Application(s) Topical <User Schedule>  dextrose 5%. 1000 milliLiter(s) IV Continuous <Continuous>  dextrose 50% Injectable 12.5 Gram(s) IV Push once  dextrose 50% Injectable 25 Gram(s) IV Push once  dextrose 50% Injectable 25 Gram(s) IV Push once  fluconAZOLE   Tablet 400 milliGRAM(s) Oral daily  folic acid 1 milliGRAM(s) Oral daily  influenza   Vaccine 0.5 milliLiter(s) IntraMuscular once  insulin glargine Injectable (LANTUS) 15 Unit(s) SubCutaneous at bedtime  insulin lispro Injectable (HumaLOG) 5 Unit(s) SubCutaneous three times a day before meals  linezolid  IVPB 600 milliGRAM(s) IV Intermittent every 12 hours  norepinephrine Infusion 0.12 MICROgram(s)/kG/Min IV Continuous <Continuous>  pantoprazole  Injectable 40 milliGRAM(s) IV Push two times a day    PRN MEDICATIONS  dextrose 40% Gel 15 Gram(s) Oral once PRN  glucagon  Injectable 1 milliGRAM(s) IntraMuscular once PRN    VITALS:   T(F): 97  HR: 98  BP: 126/56  RR: 18  SpO2: 98%    LABS:               7.3    10.81 )-----------( 125      ( 14 Oct 2020 07:42 )             23.7     10-14    134<L>  |  105  |  40<H>  ----------------------------<  282<H>  4.1   |  21  |  <0.5<L>    Ca    8.0<L>      14 Oct 2020 07:42  Mg     1.9     10-14    TPro  4.1<L>  /  Alb  2.8<L>  /  TBili  0.9  /  DBili  x   /  AST  26  /  ALT  22  /  AlkPhos  83  10-14              Culture - Urine (collected 12 Oct 2020 09:40)  Source: .Urine Clean Catch (Midstream)  Preliminary Report (13 Oct 2020 21:27):    50,000 - 99,000 CFU/mL Enterobacter cloacae complex    Culture - Blood (collected 12 Oct 2020 09:07)  Source: .Blood Blood-Peripheral  Preliminary Report (13 Oct 2020 22:01):    No growth to date.    Culture - Blood (collected 12 Oct 2020 09:07)  Source: .Blood Blood-Peripheral  Preliminary Report (13 Oct 2020 22:01):    No growth to date.      CARDIAC MARKERS ( 12 Oct 2020 20:34 )  x     / 0.04 ng/mL / x     / x     / x          RADIOLOGY:    < from: Xray Chest 1 View- PORTABLE-Routine (Xray Chest 1 View- PORTABLE-Routine in AM.) (10.14.20 @ 06:19) >  Impression:    No evidence of focal consolidation, pleural effusion or pneumothorax.    < end of copied text >  PHYSICAL EXAM:  GEN: No acute distress  HEENT: normocephalic, atraumatic, aniceteric  LUNGS: Clear to auscultation bilaterally, no rales/wheezing/ rhonchi  HEART: S1/S2 present. RRR, no murmurs  ABD: Soft, non-tender, non-distended. Bowel sounds present  EXT: NC/NC/NE/2+PP/DUNBAR  NEURO: AAOX1, normal affect      ASSESSMENT AND PLAN:    Patient is a 74y old woman with PMH of HTN, DM, CAD, B cell lymphoproliferative disorder (on Ibrutinib, last dose 10/11) and recent VRE UTI who presented for confusion and AMS. Being admitted to ICU for treatment of shock.     Shock - improving   septic ( unknown source?  ?) vs hemorrhagic or multifactorial ?  -WBC 26, , BP 75/50 on admission : blood pressure improved , wbc trending down (10.81 today)  - hemolysis work up negative   -Endorsed 1 episode of dark melena over weekend ; HgB 4.2 on presentation , s./p 3 units  -UClx 50K-99K Enterobacter prelim ; CXE negative, CT AP negative  -Continue linezolid 600mg q12, cefepime 2gm q12 ; ID following   -GI consult appreciated; no intervention as of now  -PPI IV BID  -Wean pressors  -Keep active type and screen  -F/U CBC   -F/U BCX (prelim negative x2)     HTN   -Anti-hypertensives held in light of shock    DM  -c/w insulin subq , adjust accordingly  - c/w asa, statin  - HbA1C 6.9 in august 2020  B cell lymphoproliferative disorder  -Oncology consult appreciated    MISC:  -DVT prophylaxis: Sequentials  -GI prophylaxis: PPI BID  -Code: FULL  -Diet: Clears  -Activity: as tolerated

## 2020-10-14 NOTE — PROGRESS NOTE ADULT - SUBJECTIVE AND OBJECTIVE BOX
24H events:    50-100K enterobacter in UCx  Downgraded from unit  Agitation resolved  Still on pressors  Hg 7.3, hapto wnl, ldh wnl    PAST MEDICAL & SURGICAL HISTORY  Anemia    DM (diabetes mellitus)    High cholesterol    HTN (hypertension)    B-cell chronic lymphocytic leukemia variant    CAD (coronary artery disease)  s/p stenting    H/O heart artery stent      SOCIAL HISTORY:  Negative for smoking/alcohol/drug use.     ALLERGIES:  penicillin (Anaphylaxis; Hives)    MEDICATIONS:  STANDING MEDICATIONS  acyclovir   Oral Tab/Cap 400 milliGRAM(s) Oral daily  aspirin  chewable 81 milliGRAM(s) Oral daily  atorvastatin 80 milliGRAM(s) Oral at bedtime  cefepime   IVPB      cefepime   IVPB 2000 milliGRAM(s) IV Intermittent every 12 hours  chlorhexidine 4% Liquid 1 Application(s) Topical <User Schedule>  dextrose 5%. 1000 milliLiter(s) IV Continuous <Continuous>  dextrose 50% Injectable 12.5 Gram(s) IV Push once  dextrose 50% Injectable 25 Gram(s) IV Push once  dextrose 50% Injectable 25 Gram(s) IV Push once  fluconAZOLE   Tablet 400 milliGRAM(s) Oral daily  folic acid 1 milliGRAM(s) Oral daily  influenza   Vaccine 0.5 milliLiter(s) IntraMuscular once  insulin glargine Injectable (LANTUS) 15 Unit(s) SubCutaneous at bedtime  insulin lispro Injectable (HumaLOG) 5 Unit(s) SubCutaneous three times a day before meals  linezolid  IVPB 600 milliGRAM(s) IV Intermittent every 12 hours  norepinephrine Infusion 0.12 MICROgram(s)/kG/Min IV Continuous <Continuous>  pantoprazole  Injectable 40 milliGRAM(s) IV Push two times a day    PRN MEDICATIONS  dextrose 40% Gel 15 Gram(s) Oral once PRN  glucagon  Injectable 1 milliGRAM(s) IntraMuscular once PRN    VITALS:   T(F): 97  HR: 98  BP: 126/56  RR: 18  SpO2: 98%    LABS:                        7.3    10.81 )-----------( 125      ( 14 Oct 2020 07:42 )             23.7     10-14    134<L>  |  105  |  40<H>  ----------------------------<  282<H>  4.1   |  21  |  <0.5<L>    Ca    8.0<L>      14 Oct 2020 07:42  Mg     1.9     10-14    TPro  4.1<L>  /  Alb  2.8<L>  /  TBili  0.9  /  DBili  x   /  AST  26  /  ALT  22  /  AlkPhos  83  10-14      Urinalysis Basic - ( 12 Oct 2020 09:40 )    Color: Yellow / Appearance: Clear / S.016 / pH: x  Gluc: x / Ketone: Negative  / Bili: Negative / Urobili: <2 mg/dL   Blood: x / Protein: Trace / Nitrite: Negative   Leuk Esterase: Negative / RBC: x / WBC x   Sq Epi: x / Non Sq Epi: x / Bacteria: x            Culture - Urine (collected 12 Oct 2020 09:40)  Source: .Urine Clean Catch (Midstream)  Preliminary Report (13 Oct 2020 21:27):    50,000 - 99,000 CFU/mL Enterobacter cloacae complex    Culture - Blood (collected 12 Oct 2020 09:07)  Source: .Blood Blood-Peripheral  Preliminary Report (13 Oct 2020 22:01):    No growth to date.    Culture - Blood (collected 12 Oct 2020 09:07)  Source: .Blood Blood-Peripheral  Preliminary Report (13 Oct 2020 22:01):    No growth to date.      CARDIAC MARKERS ( 12 Oct 2020 20:34 )  x     / 0.04 ng/mL / x     / x     / x          RADIOLOGY:    PHYSICAL EXAM:  GEN: No acute distress  HENT: NCAT, EOMI  LYMPH: No appreciable adenopathy  LUNGS: No respiratory distress, clear to auscultation bilaterally   HEART: regular rate and rhythm  ABD: Soft, non-tender, non-distended  SKIN: Ecchymoses on arms,  EXT: No edema  NEURO: AAOX3

## 2020-10-14 NOTE — PROGRESS NOTE ADULT - ASSESSMENT
IMPRESSION:  Shock improving  Acute drop in hemoglobin possible UGIB   Lactic acidosis resolved   HO recurrent uti/recent VRE UTI  HO B cell lymphoma on chemo      PLAN:    CNS: Avoid depressants     HEENT: Oral care    PULMONARY:  HOB @ 45 degrees.  Aspiration precautions     CARDIOVASCULAR: Goal Map >60, Wean Levophed  (GDE Fluid responsive) LR bolus     GI: IV PPI BID. NPO,   Bowel regimen. GI eval     RENAL:  Follow up lytes.  Correct as needed.     INFECTIOUS DISEASE: Follow up cultures.  ABX per ID     HEMATOLOGICAL:  DVT prophylaxis seq, CBC q8h, oncology f/u    ENDOCRINE:  Follow up FS.  Insulin protocol if needed.    MUSCULOSKELETAL: Bedrest    Dispo: MICU for Now     Murry for possible retention      IMPRESSION:  Shock improved  UTI   Lactic acidosis resolved   HO recurrent UTI   HO B cell lymphoma on chemo      PLAN:    CNS: Avoid depressants     HEENT: Oral care    PULMONARY:  HOB @ 45 degrees.  Aspiration precautions     CARDIOVASCULAR: Goal Map >60, Wean Levophed      GI: GI prophylaxis  Feeding   Bowel regimen. GI eval     RENAL:  Follow up lytes.  Correct as needed.     INFECTIOUS DISEASE: Follow up cultures.  ABX per ID     HEMATOLOGICAL:  DVT prophylaxis seq, FU CBC  oncology f/u    ENDOCRINE:  Follow up FS.  Insulin protocol if needed.    MUSCULOSKELETAL: Bedrest    Dispo: Step down for now     Murry for possible retention

## 2020-10-14 NOTE — PROGRESS NOTE ADULT - ASSESSMENT
Patient is a 73 y/o F with PMH of marginal zone lymphoma, recurrent UTI, CAD s/p stent, DLD, HTN  admitted for sepsis due to VRE UTI    #) Shock: Septic (hx of VRE urine however UA -, CXR neg) vs hypovolemic (reported dark stool, Hg dropped to 4.2)  - On levophed 0.15   - Hg stable after 3 units  - On Zyvox, cefepime     #) Anemia: Patient is transfusion dependent at baseline, however this was an acute drop, suspect UGIB with the dramatic rise in BUN and acute drop, ddx also includes hemolysis, splenic sequestratin, AE from ibrutinib  - Check PALAK, LDH, hapto, retic (Bactrim can cause hemolysis, was on it as outpatient)  - Recall GI for possible EGD  - Spleen imaging  - s/p 3 units of PRBCs, now stable  - Keep Hg ~8  - Folic acid 1mg QD    #) Thrombocytopenia: Resolved,  - Keep platelets greater than 50K    #) Marginal zone lymphoma with splenomegaly:  - PET CT as of May 2020 showing diffuse FDG uptake in an enlarged spleen   - Transfusion dependent  - Was recently restarted on ibrutinib, then held again due to thrombocytopenia   - Hold ibrutinib	  - Acyclovir and fluconazole ppx         Patient is a 73 y/o F with PMH of marginal zone lymphoma, recurrent UTI, CAD s/p stent, DLD, HTN  admitted for sepsis due to VRE UTI    #) Shock: Septic (hx of VRE urine however UA -, CXR neg) vs hypovolemic (reported dark stool, Hg dropped to 4.2)  - Levophed decreased to 1.2  - Hg 7.3 today, received 3 units  - On Zyvox, cefepime (UCx enterobacter 50-100K)     #) Anemia: Patient is transfusion dependent at baseline, however this was an acute drop, suspect UGIB with the dramatic rise in BUN and acute drop, ddx also includes hemolysis, splenic sequestratin, AE from ibrutinib  - Hemolysis workup negative (PALAK, LDH, hapto), reticulocytes elevated   - Recall GI for possible EGD  - Repeat spleen imaging to get accurate size, CT AP on 12th unable to to give dimensions (25.8c, in Aug  - s/p 3 units of PRBCs, now stable  - Keep Hg ~8  - Folic acid 1mg QD    #) Thrombocytopenia: Resolved,  - Keep platelets greater than 50K    #) Marginal zone lymphoma with splenomegaly:  - PET CT as of May 2020 showing diffuse FDG uptake in an enlarged spleen   - Transfusion dependent  - Was recently restarted on ibrutinib, then held again due to thrombocytopenia   - Hold ibrutinib	  - Acyclovir and fluconazole ppx

## 2020-10-14 NOTE — PROGRESS NOTE ADULT - SUBJECTIVE AND OBJECTIVE BOX
AD BASURTO  74y, Female    All available historical data reviewed    OVERNIGHT EVENTS:  no fevers  feels well and has no complaints   ROS:  General: Denies rigors, nightsweats  HEENT: Denies headache, rhinorrhea, sore throat, eye pain  CV: Denies CP, palpitations  PULM: Denies wheezing, hemoptysis  GI: Denies hematemesis, hematochezia, melena  : Denies discharge, hematuria  MSK: Denies arthralgias, myalgias  SKIN: Denies rash, lesions  NEURO: Denies paresthesias, weakness  PSYCH: Denies depression, anxiety    VITALS:  T(F): 97, Max: 97.2 (10-14-20 @ 05:06)  HR: 98  BP: 145/70  RR: 18Vital Signs Last 24 Hrs  T(C): 36.1 (14 Oct 2020 07:58), Max: 36.2 (13 Oct 2020 16:00)  T(F): 97 (14 Oct 2020 07:58), Max: 97.2 (14 Oct 2020 05:06)  HR: 98 (14 Oct 2020 12:26) (88 - 98)  BP: 145/70 (14 Oct 2020 12:26) (96/47 - 145/70)  BP(mean): 96 (14 Oct 2020 07:58) (55 - 96)  RR: 18 (14 Oct 2020 12:26) (15 - 23)  SpO2: 98% (14 Oct 2020 12:26) (96% - 100%)    TESTS & MEASUREMENTS:                        7.3    10.81 )-----------( 125      ( 14 Oct 2020 07:42 )             23.7     10-14    134<L>  |  105  |  40<H>  ----------------------------<  282<H>  4.1   |  21  |  <0.5<L>    Ca    8.0<L>      14 Oct 2020 07:42  Mg     1.9     10-14    TPro  4.1<L>  /  Alb  2.8<L>  /  TBili  0.9  /  DBili  x   /  AST  26  /  ALT  22  /  AlkPhos  83  10-14    LIVER FUNCTIONS - ( 14 Oct 2020 07:42 )  Alb: 2.8 g/dL / Pro: 4.1 g/dL / ALK PHOS: 83 U/L / ALT: 22 U/L / AST: 26 U/L / GGT: x             Culture - Urine (collected 10-12-20 @ 09:40)  Source: .Urine Clean Catch (Midstream)  Preliminary Report (10-13-20 @ 21:27):    50,000 - 99,000 CFU/mL Enterobacter cloacae complex    Culture - Blood (collected 10-12-20 @ 09:07)  Source: .Blood Blood-Peripheral  Preliminary Report (10-13-20 @ 22:01):    No growth to date.    Culture - Blood (collected 10-12-20 @ 09:07)  Source: .Blood Blood-Peripheral  Preliminary Report (10-13-20 @ 22:01):    No growth to date.            RADIOLOGY & ADDITIONAL TESTS:  Personal review of radiological diagnostics performed  Echo and EKG results noted when applicable.     MEDICATIONS:  acyclovir   Oral Tab/Cap 400 milliGRAM(s) Oral daily  aspirin  chewable 81 milliGRAM(s) Oral daily  atorvastatin 80 milliGRAM(s) Oral at bedtime  cefepime   IVPB      cefepime   IVPB 2000 milliGRAM(s) IV Intermittent every 12 hours  chlorhexidine 4% Liquid 1 Application(s) Topical <User Schedule>  dextrose 40% Gel 15 Gram(s) Oral once PRN  dextrose 5%. 1000 milliLiter(s) IV Continuous <Continuous>  dextrose 50% Injectable 12.5 Gram(s) IV Push once  dextrose 50% Injectable 25 Gram(s) IV Push once  dextrose 50% Injectable 25 Gram(s) IV Push once  fluconAZOLE   Tablet 400 milliGRAM(s) Oral daily  folic acid 1 milliGRAM(s) Oral daily  glucagon  Injectable 1 milliGRAM(s) IntraMuscular once PRN  influenza   Vaccine 0.5 milliLiter(s) IntraMuscular once  insulin glargine Injectable (LANTUS) 15 Unit(s) SubCutaneous at bedtime  insulin lispro Injectable (HumaLOG) 5 Unit(s) SubCutaneous three times a day before meals  linezolid  IVPB 600 milliGRAM(s) IV Intermittent every 12 hours  norepinephrine Infusion 0.05 MICROgram(s)/kG/Min IV Continuous <Continuous>  pantoprazole  Injectable 40 milliGRAM(s) IV Push two times a day      ANTIBIOTICS:  acyclovir   Oral Tab/Cap 400 milliGRAM(s) Oral daily  cefepime   IVPB      cefepime   IVPB 2000 milliGRAM(s) IV Intermittent every 12 hours  fluconAZOLE   Tablet 400 milliGRAM(s) Oral daily  linezolid  IVPB 600 milliGRAM(s) IV Intermittent every 12 hours

## 2020-10-15 NOTE — PROGRESS NOTE ADULT - SUBJECTIVE AND OBJECTIVE BOX
AD BASURTO  74y, Female    All available historical data reviewed    OVERNIGHT EVENTS:  no fevers  alert  feels well and has no complaints  ROS:  General: Denies rigors, nightsweats  HEENT: Denies headache, rhinorrhea, sore throat, eye pain  CV: Denies CP, palpitations  PULM: Denies wheezing, hemoptysis  GI: Denies hematemesis, hematochezia, melena  : Denies discharge, hematuria  MSK: Denies arthralgias, myalgias  SKIN: Denies rash, lesions  NEURO: Denies paresthesias, weakness  PSYCH: Denies depression, anxiety    VITALS:  T(F): 97, Max: 98 (10-14-20 @ 19:47)  HR: 82  BP: 124/57  RR: 20Vital Signs Last 24 Hrs  T(C): 36.1 (15 Oct 2020 08:18), Max: 36.7 (14 Oct 2020 19:47)  T(F): 97 (15 Oct 2020 08:18), Max: 98 (14 Oct 2020 19:47)  HR: 82 (15 Oct 2020 08:18) (82 - 98)  BP: 124/57 (15 Oct 2020 08:18) (109/50 - 145/70)  BP(mean): 90 (14 Oct 2020 23:37) (78 - 738)  RR: 20 (15 Oct 2020 08:18) (18 - 20)  SpO2: 100% (15 Oct 2020 08:18) (97% - 100%)    TESTS & MEASUREMENTS:                        8.8    5.75  )-----------( 104      ( 15 Oct 2020 07:12 )             28.3     10-15    136  |  106  |  21<H>  ----------------------------<  125<H>  4.3   |  23  |  <0.5<L>    Ca    8.1<L>      15 Oct 2020 07:12  Mg     1.9     10-14    TPro  4.4<L>  /  Alb  3.0<L>  /  TBili  1.0  /  DBili  x   /  AST  30  /  ALT  24  /  AlkPhos  107  10-15    LIVER FUNCTIONS - ( 15 Oct 2020 07:12 )  Alb: 3.0 g/dL / Pro: 4.4 g/dL / ALK PHOS: 107 U/L / ALT: 24 U/L / AST: 30 U/L / GGT: x             Culture - Urine (collected 10-12-20 @ 09:40)  Source: .Urine Clean Catch (Midstream)  Final Report (10-14-20 @ 17:20):    50,000 - 99,000 CFU/mL Enterobacter cloacae complex  Organism: Enterobacter cloacae complex (10-14-20 @ 17:20)  Organism: Enterobacter cloacae complex (10-14-20 @ 17:20)      -  Amikacin: S <=16      -  Amoxicillin/Clavulanic Acid: R >16/8      -  Ampicillin: R >16 These ampicillin results predict results for amoxicillin      -  Ampicillin/Sulbactam: R >16/8 Enterobacter, Citrobacter, and Serratia may develop resistance during prolonged therapy (3-4 days)      -  Aztreonam: R >16      -  Cefazolin: R >16      -  Cefepime: R >16      -  Cefoxitin: R >16      -  Ceftriaxone: R >32 Enterobacter, Citrobacter, and Serratia may develop resistance during prolonged therapy      -  Ciprofloxacin: R 1      -  Ertapenem: S <=0.5      -  Gentamicin: R >8      -  Imipenem: S <=1      -  Levofloxacin: S <=0.5      -  Meropenem: S <=1      -  Nitrofurantoin: R >64 Should not be used to treat pyelonephritis      -  Piperacillin/Tazobactam: S <=8      -  Tigecycline: S <=2      -  Tobramycin: R >8      -  Trimethoprim/Sulfamethoxazole: R >2/38      Method Type: MAHENDRA    Culture - Blood (collected 10-12-20 @ 09:07)  Source: .Blood Blood-Peripheral  Preliminary Report (10-13-20 @ 22:01):    No growth to date.    Culture - Blood (collected 10-12-20 @ 09:07)  Source: .Blood Blood-Peripheral  Preliminary Report (10-13-20 @ 22:01):    No growth to date.            RADIOLOGY & ADDITIONAL TESTS:  Personal review of radiological diagnostics performed  Echo and EKG results noted when applicable.     MEDICATIONS:  acyclovir   Oral Tab/Cap 400 milliGRAM(s) Oral daily  aspirin  chewable 81 milliGRAM(s) Oral daily  atorvastatin 80 milliGRAM(s) Oral at bedtime  cefepime   IVPB      cefepime   IVPB 2000 milliGRAM(s) IV Intermittent every 12 hours  chlorhexidine 4% Liquid 1 Application(s) Topical <User Schedule>  dextrose 40% Gel 15 Gram(s) Oral once PRN  dextrose 5%. 1000 milliLiter(s) IV Continuous <Continuous>  dextrose 50% Injectable 12.5 Gram(s) IV Push once  dextrose 50% Injectable 25 Gram(s) IV Push once  dextrose 50% Injectable 25 Gram(s) IV Push once  fluconAZOLE   Tablet 400 milliGRAM(s) Oral daily  folic acid 1 milliGRAM(s) Oral daily  glucagon  Injectable 1 milliGRAM(s) IntraMuscular once PRN  influenza   Vaccine 0.5 milliLiter(s) IntraMuscular once  insulin glargine Injectable (LANTUS) 15 Unit(s) SubCutaneous at bedtime  insulin lispro Injectable (HumaLOG) 5 Unit(s) SubCutaneous three times a day before meals  norepinephrine Infusion 0.05 MICROgram(s)/kG/Min IV Continuous <Continuous>  pantoprazole  Injectable 40 milliGRAM(s) IV Push two times a day      ANTIBIOTICS:  acyclovir   Oral Tab/Cap 400 milliGRAM(s) Oral daily  cefepime   IVPB      cefepime   IVPB 2000 milliGRAM(s) IV Intermittent every 12 hours  fluconAZOLE   Tablet 400 milliGRAM(s) Oral daily

## 2020-10-15 NOTE — PROGRESS NOTE ADULT - ASSESSMENT
74 year old female with PMH of HTN, DM, CAD s/p PCI on DAPT, B cell lymphoproliferative disorder on Ibrutinib is here for AMS yesterday and GI is being called for acute drop in Hg to 4.      # Acute on chronic anemia: R/O UGIB   H/O melena, acute drop in HG  Hg dropped but responded appropriately to 3units  BUN high  Never had a colonoscopy or EGD       Rec:  Resume regular diet per routine  Hematology wants to R/O UGIB, H/O melena so will require EGD  NPO after midnight.       # Elevated ALP:   HBV/HCV negative  previously elevated  CT abdomen: no CBD dilation, no intra or extra hepatic dilation  get ALP fractionation  Rec: f/u in liver clinic for further work up.   74 year old female with PMH of HTN, DM, CAD s/p PCI on DAPT, B cell lymphoproliferative disorder on Ibrutinib is here for AMS yesterday and GI is being called for acute drop in Hg to 4.      # Acute on chronic anemia: R/O UGIB   H/O melena, acute drop in HG  Hg dropped but responded appropriately to 3units  BUN high  Never had a colonoscopy or EGD   Patient was on plavix, last dose 3 days ago. please revisit the indication for Plavix      Rec:  Resume regular diet per routine  Hematology wants to R/O UGIB, H/O melena so will require EGD  NPO after midnight.       # Elevated ALP:   HBV/HCV negative  previously elevated  CT abdomen: no CBD dilation, no intra or extra hepatic dilation  get ALP fractionation  Rec: f/u in liver clinic for further work up.   74 year old female with PMH of HTN, DM, CAD s/p PCI on DAPT, B cell lymphoproliferative disorder on Ibrutinib is here for AMS yesterday and GI is being called for acute drop in Hg to 4.      # Acute on chronic anemia: R/O UGIB   H/O melena.  acute drop in HG  Hg dropped but responded appropriately to 3units  BUN high  Never had a colonoscopy or EGD.  Patient was on plavix, last dose 3 days ago. please revisit the indication for Plavix      Rec:  Resume regular diet per routine  Hematology wants to R/O UGIB, H/O melena so will require EGD  NPO after midnight.       # Elevated ALP:   HBV/HCV negative  previously elevated  CT abdomen: no CBD dilation, no intra or extra hepatic dilation  get ALP fractionation  Rec: f/u in liver clinic for further work up.   74 year old female with PMH of HTN, DM, CAD s/p PCI on DAPT, B cell lymphoproliferative disorder on Ibrutinib is here for AMS yesterday and GI is being called for acute drop in Hg to 4.      # Acute on chronic anemia: R/O UGIB   H/O melena.  acute drop in HG  Hg dropped but responded appropriately to 3units  BUN high  Never had a colonoscopy or EGD.  Patient was on plavix, last dose 3 days ago. please revisit the indication for Plavix      Rec:  Resume regular diet per routine  R/O UGIB, H/O melena so will require EGD  NPO after midnight.       # Elevated ALP:   HBV/HCV negative  previously elevated  CT abdomen: no CBD dilation, no intra or extra hepatic dilation  get ALP fractionation  Rec: f/u in liver clinic for further work up.

## 2020-10-15 NOTE — PROGRESS NOTE ADULT - SUBJECTIVE AND OBJECTIVE BOX
24H events:    50-100K enterobacter in UCx  Downgraded from unit  Agitation resolved  Still on pressors  Hg 7.3, hapto wnl, ldh wnl    PAST MEDICAL & SURGICAL HISTORY  Anemia    DM (diabetes mellitus)    High cholesterol    HTN (hypertension)    B-cell chronic lymphocytic leukemia variant    CAD (coronary artery disease)  s/p stenting    H/O heart artery stent      SOCIAL HISTORY:  Negative for smoking/alcohol/drug use.     ALLERGIES:  penicillin (Anaphylaxis; Hives)    MEDICATIONS:  STANDING MEDICATIONS  acyclovir   Oral Tab/Cap 400 milliGRAM(s) Oral daily  aspirin  chewable 81 milliGRAM(s) Oral daily  atorvastatin 80 milliGRAM(s) Oral at bedtime  cefepime   IVPB      cefepime   IVPB 2000 milliGRAM(s) IV Intermittent every 12 hours  chlorhexidine 4% Liquid 1 Application(s) Topical <User Schedule>  dextrose 5%. 1000 milliLiter(s) IV Continuous <Continuous>  dextrose 50% Injectable 12.5 Gram(s) IV Push once  dextrose 50% Injectable 25 Gram(s) IV Push once  dextrose 50% Injectable 25 Gram(s) IV Push once  fluconAZOLE   Tablet 400 milliGRAM(s) Oral daily  folic acid 1 milliGRAM(s) Oral daily  influenza   Vaccine 0.5 milliLiter(s) IntraMuscular once  insulin glargine Injectable (LANTUS) 15 Unit(s) SubCutaneous at bedtime  insulin lispro Injectable (HumaLOG) 5 Unit(s) SubCutaneous three times a day before meals  linezolid  IVPB 600 milliGRAM(s) IV Intermittent every 12 hours  norepinephrine Infusion 0.12 MICROgram(s)/kG/Min IV Continuous <Continuous>  pantoprazole  Injectable 40 milliGRAM(s) IV Push two times a day    PRN MEDICATIONS  dextrose 40% Gel 15 Gram(s) Oral once PRN  glucagon  Injectable 1 milliGRAM(s) IntraMuscular once PRN    VITALS:   ICU Vital Signs Last 24 Hrs  T(C): 35.8 (15 Oct 2020 16:00), Max: 36.7 (15 Oct 2020 04:58)  T(F): 96.5 (15 Oct 2020 16:00), Max: 98 (15 Oct 2020 04:58)  HR: 90 (15 Oct 2020 16:00) (82 - 98)  BP: 135/60 (15 Oct 2020 16:00) (98/49 - 146/67)  BP(mean): 87 (15 Oct 2020 16:00) (68 - 90)  ABP: --  ABP(mean): --  RR: 20 (15 Oct 2020 16:00) (20 - 20)  SpO2: 98% (15 Oct 2020 14:39) (97% - 100%)      LABS:             CBC Full  -  ( 15 Oct 2020 17:07 )  WBC Count : 5.26 K/uL  RBC Count : 2.87 M/uL  Hemoglobin : 8.9 g/dL  Hematocrit : 28.1 %  Platelet Count - Automated : 74 K/uL  Mean Cell Volume : 97.9 fL  Mean Cell Hemoglobin : 31.0 pg  Mean Cell Hemoglobin Concentration : 31.7 g/dL  Auto Neutrophil # : x  Auto Lymphocyte # : x  Auto Monocyte # : x  Auto Eosinophil # : x  Auto Basophil # : x  Auto Neutrophil % : x  Auto Lymphocyte % : x  Auto Monocyte % : x  Auto Eosinophil % : x  Auto Basophil % : x    10-15    136  |  106  |  21<H>  ----------------------------<  125<H>  4.3   |  23  |  <0.5<L>    Ca    8.1<L>      15 Oct 2020 07:12  Mg     1.9     10-14    TPro  4.4<L>  /  Alb  3.0<L>  /  TBili  1.0  /  DBili  x   /  AST  30  /  ALT  24  /  AlkPhos  107  10-15        Urinalysis Basic - ( 12 Oct 2020 09:40 )    Color: Yellow / Appearance: Clear / S.016 / pH: x  Gluc: x / Ketone: Negative  / Bili: Negative / Urobili: <2 mg/dL   Blood: x / Protein: Trace / Nitrite: Negative   Leuk Esterase: Negative / RBC: x / WBC x   Sq Epi: x / Non Sq Epi: x / Bacteria: x        Culture - Urine (collected 12 Oct 2020 09:40)  Source: .Urine Clean Catch (Midstream)  Preliminary Report (13 Oct 2020 21:27):    50,000 - 99,000 CFU/mL Enterobacter cloacae complex    Culture - Blood (collected 12 Oct 2020 09:07)  Source: .Blood Blood-Peripheral  Preliminary Report (13 Oct 2020 22:01):    No growth to date.    Culture - Blood (collected 12 Oct 2020 09:07)  Source: .Blood Blood-Peripheral  Preliminary Report (13 Oct 2020 22:01):    No growth to date.      CARDIAC MARKERS ( 12 Oct 2020 20:34 )  x     / 0.04 ng/mL / x     / x     / x          RADIOLOGY:    PHYSICAL EXAM:  GEN: No acute distress  HENT: NCAT, EOMI  LYMPH: No appreciable adenopathy  LUNGS: No respiratory distress, clear to auscultation bilaterally   HEART: regular rate and rhythm  ABD: Soft, non-tender, non-distended  SKIN: Ecchymoses on arms,  EXT: No edema  NEURO: [id number]

## 2020-10-15 NOTE — PROGRESS NOTE ADULT - SUBJECTIVE AND OBJECTIVE BOX
Patient is a 74y old  Female who presents with a chief complaint of septic shock (15 Oct 2020 18:58)        Over Night Events:        ROS:     All ROS are negative except HPI         PHYSICAL EXAM    ICU Vital Signs Last 24 Hrs  T(C): 35.8 (15 Oct 2020 16:00), Max: 36.7 (15 Oct 2020 04:58)  T(F): 96.5 (15 Oct 2020 16:00), Max: 98 (15 Oct 2020 04:58)  HR: 90 (15 Oct 2020 16:00) (82 - 98)  BP: 135/60 (15 Oct 2020 16:00) (98/49 - 146/67)  BP(mean): 87 (15 Oct 2020 16:00) (68 - 90)  ABP: --  ABP(mean): --  RR: 20 (15 Oct 2020 16:00) (20 - 20)  SpO2: 98% (15 Oct 2020 14:39) (97% - 100%)      CONSTITUTIONAL:  Well nourished.  NAD    ENT:   Airway patent,   Mouth with normal mucosa.   No thrush    EYES:   Pupils equal,   Round and reactive to light.    CARDIAC:   Normal rate,   Regular rhythm.    No edema      Vascular:  Normal systolic impulse  No Carotid bruits    RESPIRATORY:   No wheezing  Bilateral BS  Normal chest expansion  Not tachypneic,  No use of accessory muscles    GASTROINTESTINAL:  Abdomen soft,   Non-tender,   No guarding,   + BS    MUSCULOSKELETAL:   Range of motion is not limited,  No clubbing, cyanosis    NEUROLOGICAL:   Alert and oriented   No motor  deficits.    SKIN:   Skin normal color for race,   Warm and dry and intact.   No evidence of rash.    PSYCHIATRIC:   Normal mood and affect.   No apparent risk to self or others.    HEMATOLOGICAL:  No cervical  lymphadenopathy.  no inguinal lymphadenopathy      10-14-20 @ 07:01  -  10-15-20 @ 07:00  --------------------------------------------------------  IN:    Norepinephrine: 100.8 mL    Norepinephrine: 67.5 mL    PRBCs (Packed Red Blood Cells): 295 mL  Total IN: 463.3 mL    OUT:    Indwelling Catheter - Urethral (mL): 1025 mL  Total OUT: 1025 mL    Total NET: -561.7 mL      10-15-20 @ 07:01  -  10-15-20 @ 20:49  --------------------------------------------------------  IN:    Norepinephrine: 16.8 mL  Total IN: 16.8 mL    OUT:    Voided (mL): 150 mL  Total OUT: 150 mL    Total NET: -133.2 mL          LABS:                            8.9    5.26  )-----------( 74       ( 15 Oct 2020 17:07 )             28.1                   8.9    5.26  )-----------( 74       ( 10-15 @ 17:07 )             28.1                8.8    5.75  )-----------( 104      ( 10-15 @ 07:12 )             28.3                8.7    6.87  )-----------( 104      ( 10-14 @ 20:00 )             27.1                7.3    10.81 )-----------( 125      ( 10-14 @ 07:42 )             23.7                7.8    15.25 )-----------( 127      ( 10-14 @ 00:47 )             24.6                7.7    14.89 )-----------( 127      ( 10-13 @ 20:00 )             24.4                7.6    26.54 )-----------( 136      ( 10-13 @ 11:00 )             23.5                8.0    35.32 )-----------( 150      ( 10-13 @ 04:08 )             24.7                8.1    41.36 )-----------( 151      ( 10-13 @ 01:25 )             24.2                                                10-15    136  |  106  |  21<H>  ----------------------------<  125<H>  4.3   |  23  |  <0.5<L>    Ca    8.1<L>      15 Oct 2020 07:12  Mg     1.9     10-14    TPro  4.4<L>  /  Alb  3.0<L>  /  TBili  1.0  /  DBili  x   /  AST  30  /  ALT  24  /  AlkPhos  107  10-15                                                                                           LIVER FUNCTIONS - ( 15 Oct 2020 07:12 )  Alb: 3.0 g/dL / Pro: 4.4 g/dL / ALK PHOS: 107 U/L / ALT: 24 U/L / AST: 30 U/L / GGT: x                                                                                                                                       MEDICATIONS  (STANDING):  acyclovir   Oral Tab/Cap 400 milliGRAM(s) Oral daily  aspirin  chewable 81 milliGRAM(s) Oral daily  atorvastatin 80 milliGRAM(s) Oral at bedtime  chlorhexidine 4% Liquid 1 Application(s) Topical <User Schedule>  dextrose 5%. 1000 milliLiter(s) (50 mL/Hr) IV Continuous <Continuous>  dextrose 50% Injectable 12.5 Gram(s) IV Push once  dextrose 50% Injectable 25 Gram(s) IV Push once  dextrose 50% Injectable 25 Gram(s) IV Push once  fluconAZOLE   Tablet 400 milliGRAM(s) Oral daily  folic acid 1 milliGRAM(s) Oral daily  influenza   Vaccine 0.5 milliLiter(s) IntraMuscular once  insulin glargine Injectable (LANTUS) 15 Unit(s) SubCutaneous at bedtime  insulin lispro Injectable (HumaLOG) 5 Unit(s) SubCutaneous three times a day before meals  midodrine 10 milliGRAM(s) Oral every 8 hours  norepinephrine Infusion 0.05 MICROgram(s)/kG/Min (5.62 mL/Hr) IV Continuous <Continuous>  pantoprazole  Injectable 40 milliGRAM(s) IV Push two times a day    MEDICATIONS  (PRN):  dextrose 40% Gel 15 Gram(s) Oral once PRN Blood Glucose LESS THAN 70 milliGRAM(s)/deciliter  glucagon  Injectable 1 milliGRAM(s) IntraMuscular once PRN Glucose LESS THAN 70 milligrams/deciliter      New X-rays reviewed:                                                                                  ECHO    CXR interpreted by me:       Patient is a 74y old  Female who presents with a chief complaint of septic shock (15 Oct 2020 18:58)        Over Night Events:  Resting in bed.  Reamins on low dose levophed         ROS:     All ROS are negative except HPI         PHYSICAL EXAM    ICU Vital Signs Last 24 Hrs  T(C): 35.8 (15 Oct 2020 16:00), Max: 36.7 (15 Oct 2020 04:58)  T(F): 96.5 (15 Oct 2020 16:00), Max: 98 (15 Oct 2020 04:58)  HR: 90 (15 Oct 2020 16:00) (82 - 98)  BP: 135/60 (15 Oct 2020 16:00) (98/49 - 146/67)  BP(mean): 87 (15 Oct 2020 16:00) (68 - 90)  ABP: --  ABP(mean): --  RR: 20 (15 Oct 2020 16:00) (20 - 20)  SpO2: 98% (15 Oct 2020 14:39) (97% - 100%)      CONSTITUTIONAL:  Pleasant lady in NAD    ENT:   Airway patent,   Mouth with normal mucosa.   No thrush    EYES:   Pupils equal,   Round and reactive to light.    CARDIAC:   Normal rate,   Regular rhythm.    No edema      Vascular:  Normal systolic impulse  No Carotid bruits    RESPIRATORY:   No wheezing  Bilateral BS  Normal chest expansion  Not tachypneic,  No use of accessory muscles    GASTROINTESTINAL:  Abdomen soft,   Non-tender,   No guarding,   + BS    MUSCULOSKELETAL:   Range of motion is not limited,  No clubbing, cyanosis    NEUROLOGICAL:   Alert and oriented   No motor  deficits.    SKIN:   Skin normal color for race,   Warm and dry and intact.   No evidence of rash.    PSYCHIATRIC:   Normal mood and affect.   No apparent risk to self or others.    HEMATOLOGICAL:  No cervical  lymphadenopathy.  no inguinal lymphadenopathy      10-14-20 @ 07:01  -  10-15-20 @ 07:00  --------------------------------------------------------  IN:    Norepinephrine: 100.8 mL    Norepinephrine: 67.5 mL    PRBCs (Packed Red Blood Cells): 295 mL  Total IN: 463.3 mL    OUT:    Indwelling Catheter - Urethral (mL): 1025 mL  Total OUT: 1025 mL    Total NET: -561.7 mL      10-15-20 @ 07:01  -  10-15-20 @ 20:49  --------------------------------------------------------  IN:    Norepinephrine: 16.8 mL  Total IN: 16.8 mL    OUT:    Voided (mL): 150 mL  Total OUT: 150 mL    Total NET: -133.2 mL          LABS:                            8.9    5.26  )-----------( 74       ( 15 Oct 2020 17:07 )             28.1                   8.9    5.26  )-----------( 74       ( 10-15 @ 17:07 )             28.1                8.8    5.75  )-----------( 104      ( 10-15 @ 07:12 )             28.3                8.7    6.87  )-----------( 104      ( 10-14 @ 20:00 )             27.1                7.3    10.81 )-----------( 125      ( 10-14 @ 07:42 )             23.7                7.8    15.25 )-----------( 127      ( 10-14 @ 00:47 )             24.6                7.7    14.89 )-----------( 127      ( 10-13 @ 20:00 )             24.4                7.6    26.54 )-----------( 136      ( 10-13 @ 11:00 )             23.5                8.0    35.32 )-----------( 150      ( 10-13 @ 04:08 )             24.7                8.1    41.36 )-----------( 151      ( 10-13 @ 01:25 )             24.2                                                10-15    136  |  106  |  21<H>  ----------------------------<  125<H>  4.3   |  23  |  <0.5<L>    Ca    8.1<L>      15 Oct 2020 07:12  Mg     1.9     10-14    TPro  4.4<L>  /  Alb  3.0<L>  /  TBili  1.0  /  DBili  x   /  AST  30  /  ALT  24  /  AlkPhos  107  10-15                                                                                           LIVER FUNCTIONS - ( 15 Oct 2020 07:12 )  Alb: 3.0 g/dL / Pro: 4.4 g/dL / ALK PHOS: 107 U/L / ALT: 24 U/L / AST: 30 U/L / GGT: x                                                                                                                                       MEDICATIONS  (STANDING):  acyclovir   Oral Tab/Cap 400 milliGRAM(s) Oral daily  aspirin  chewable 81 milliGRAM(s) Oral daily  atorvastatin 80 milliGRAM(s) Oral at bedtime  chlorhexidine 4% Liquid 1 Application(s) Topical <User Schedule>  dextrose 5%. 1000 milliLiter(s) (50 mL/Hr) IV Continuous <Continuous>  dextrose 50% Injectable 12.5 Gram(s) IV Push once  dextrose 50% Injectable 25 Gram(s) IV Push once  dextrose 50% Injectable 25 Gram(s) IV Push once  fluconAZOLE   Tablet 400 milliGRAM(s) Oral daily  folic acid 1 milliGRAM(s) Oral daily  influenza   Vaccine 0.5 milliLiter(s) IntraMuscular once  insulin glargine Injectable (LANTUS) 15 Unit(s) SubCutaneous at bedtime  insulin lispro Injectable (HumaLOG) 5 Unit(s) SubCutaneous three times a day before meals  midodrine 10 milliGRAM(s) Oral every 8 hours  norepinephrine Infusion 0.05 MICROgram(s)/kG/Min (5.62 mL/Hr) IV Continuous <Continuous>  pantoprazole  Injectable 40 milliGRAM(s) IV Push two times a day    MEDICATIONS  (PRN):  dextrose 40% Gel 15 Gram(s) Oral once PRN Blood Glucose LESS THAN 70 milliGRAM(s)/deciliter  glucagon  Injectable 1 milliGRAM(s) IntraMuscular once PRN Glucose LESS THAN 70 milligrams/deciliter      New X-rays reviewed:                                                                                  ECHO    CXR interpreted by me:

## 2020-10-15 NOTE — PROGRESS NOTE ADULT - SUBJECTIVE AND OBJECTIVE BOX
SUBJECTIVE:    Patient is a 74y old Female who presents with a chief complaint of septic shock (15 Oct 2020 10:40)    Currently admitted to medicine with the primary diagnosis of Septic shock       Today is hospital day 3d. This morning she is resting comfortably in bed and reports no new issues or overnight events.     The patient had a large black bowel movement this morning. Hemodynamically stable. Otherwise asymptomatic    ROS:   CONSTITUTIONAL: No weakness, fevers or chills   EYES/ENT: No visual changes; No vertigo or throat pain   NECK: No pain or stiffness   RESPIRATORY: No cough, wheezing, hemoptysis; No shortness of breath   CARDIOVASCULAR: No chest pain or palpitations   GASTROINTESTINAL: No abdominal or epigastric pain. No nausea, vomiting, or hematemesis; No diarrhea or constipation. No melena or hematochezia.  GENITOURINARY: No dysuria, frequency or hematuria  NEUROLOGICAL: No numbness or weakness  SKIN: No itching, rashes      PAST MEDICAL & SURGICAL HISTORY  Anemia    DM (diabetes mellitus)    High cholesterol    HTN (hypertension)    B-cell chronic lymphocytic leukemia variant    CAD (coronary artery disease)  s/p stenting    H/O heart artery stent      ALLERGIES:  penicillin (Anaphylaxis; Hives)    MEDICATIONS:  STANDING MEDICATIONS  acetaminophen   Tablet .. 650 milliGRAM(s) Oral once  acyclovir   Oral Tab/Cap 400 milliGRAM(s) Oral daily  aspirin  chewable 81 milliGRAM(s) Oral daily  atorvastatin 80 milliGRAM(s) Oral at bedtime  chlorhexidine 4% Liquid 1 Application(s) Topical <User Schedule>  dextrose 5%. 1000 milliLiter(s) IV Continuous <Continuous>  dextrose 50% Injectable 12.5 Gram(s) IV Push once  dextrose 50% Injectable 25 Gram(s) IV Push once  dextrose 50% Injectable 25 Gram(s) IV Push once  fluconAZOLE   Tablet 400 milliGRAM(s) Oral daily  folic acid 1 milliGRAM(s) Oral daily  influenza   Vaccine 0.5 milliLiter(s) IntraMuscular once  insulin glargine Injectable (LANTUS) 15 Unit(s) SubCutaneous at bedtime  insulin lispro Injectable (HumaLOG) 5 Unit(s) SubCutaneous three times a day before meals  lactated ringers Bolus 1000 milliLiter(s) IV Bolus once  midodrine 10 milliGRAM(s) Oral every 8 hours  norepinephrine Infusion 0.05 MICROgram(s)/kG/Min IV Continuous <Continuous>  pantoprazole  Injectable 40 milliGRAM(s) IV Push two times a day    PRN MEDICATIONS  dextrose 40% Gel 15 Gram(s) Oral once PRN  glucagon  Injectable 1 milliGRAM(s) IntraMuscular once PRN    VITALS:   T(F): 97  HR: 82  BP: 124/57  RR: 20  SpO2: 100%    LABS:                          8.8    5.75  )-----------( 104      ( 15 Oct 2020 07:12 )             28.3     10-15    136  |  106  |  21<H>  ----------------------------<  125<H>  4.3   |  23  |  <0.5<L>    Ca    8.1<L>      15 Oct 2020 07:12  Mg     1.9     10-14    TPro  4.4<L>  /  Alb  3.0<L>  /  TBili  1.0  /  DBili  x   /  AST  30  /  ALT  24  /  AlkPhos  107  10-15      RADIOLOGY:  no new today  PHYSICAL EXAM:  GEN: No acute distress  HEENT: normocephalic, atraumatic, aniceteric ; left ij present with no erythema around it   LUNGS: Clear to auscultation bilaterally, no rales/wheezing/ rhonchi  HEART: S1/S2 present. RRR, no murmurs  ABD: Soft, non-tender, non-distended. Bowel sounds present  EXT: NC/NC/NE/2+PP/DUNBAR  NEURO: AAOX3, normal affect      ASSESSMENT AND PLAN:    Patient is a 74y old woman with PMH of HTN, DM, CAD, B cell lymphoproliferative disorder (on Ibrutinib, last dose 10/11) and recent VRE UTI who presented for confusion and AMS. Being admitted to ICU for treatment of shock.     Shock - improving   likely hemorrhagic in nature  -WBC 26, , BP 75/50 on admission : blood pressure improved , wbc trending down (5 today)  - hemolysis work up negative   -Endorsed 1 episode of dark melena over weekend ; HgB 4.2 on presentation , s./p 3 units - another large black BM today , GI f/u  -UClx 50K-99K Enterobacter prelim ; CXE negative, CT AP negative, asymptomatic - was on cefepime ( resistant) - will d/c as the patient clinically improving  -PPI IV BID  -Wean pressors  -Keep active type and screen  - CBC stable, s/p 1 unit yesterday  -F/U BCX (prelim negative x2)   - GI f/u ? EGD   - titrate levopeh down , will give a bolus of LR and start midodrine     HTN   -Anti-hypertensives held in light of shock    DM  -c/w insulin subq , adjust accordingly  - c/w asa, statin  - HbA1C 6.9 in august 2020  B cell lymphoproliferative disorder  -Oncology consult appreciated    MISC:  -DVT prophylaxis: Sequentials  -GI prophylaxis: PPI BID  -Code: FULL  -Diet: Clears  -Activity: as tolerated

## 2020-10-15 NOTE — PROGRESS NOTE ADULT - ASSESSMENT
Patient is a 75 y/o F with PMH of marginal zone lymphoma, recurrent UTI, CAD s/p stent, DLD, HTN  admitted for sepsis due to VRE UTI    #) Shock: Septic (hx of VRE urine however UA -, CXR neg) vs hypovolemic (reported dark stool, Hg dropped to 4.2)  - Levophed off  - Hg stable  - On Zyvox, cefepime (UCx enterobacter 50-100K), monitor platelet count closely     #) Anemia: Patient is transfusion dependent at baseline, however this was an acute drop, suspect UGIB with the dramatic rise in BUN and acute drop, ddx also includes hemolysis, splenic sequestratin, AE from ibrutinib  - Hemolysis workup negative (PALAK, LDH, hapto), reticulocytes elevated   - Discussed case with Dr. Whitaker, on schedule foe scopes tomorrow, NPO after MN   - Repeat spleen imaging to get accurate size, CT AP on 12th unable to to give dimensions (25.8c, in Aug  - s/p 3 units of PRBCs, now stable  - Keep Hg ~8  - Folic acid 1mg QD    #) Thrombocytopenia  - Keep platelets greater than 50K    #) Marginal zone lymphoma with splenomegaly:  - PET CT as of May 2020 showing diffuse FDG uptake in an enlarged spleen   - Transfusion dependent  - Was recently restarted on ibrutinib, then held again due to thrombocytopenia   - Hold ibrutinib	  - Acyclovir and fluconazole ppx

## 2020-10-15 NOTE — PROGRESS NOTE ADULT - ASSESSMENT
IMPRESSION:  Shock improving   UTI   Lactic acidosis resolved   HO recurrent UTI   HO B cell lymphoma on chemo      PLAN:    CNS: Avoid depressants     HEENT: Oral care    PULMONARY:  HOB @ 45 degrees.  Aspiration precautions     CARDIOVASCULAR: Goal Map >60, Wean Levophed.  Midodrine  LR bolus     GI: GI prophylaxis  Feeding   Bowel regimen. GI eval     RENAL:  Follow up lytes.  Correct as needed.     INFECTIOUS DISEASE: Follow up cultures.  ABX per ID     HEMATOLOGICAL:  DVT prophylaxis seq, FU CBC  Hem / oncology f/u    ENDOCRINE:  Follow up FS.  Insulin protocol if needed.    MUSCULOSKELETAL: Bedrest    Dispo: Step down for now

## 2020-10-15 NOTE — CHART NOTE - NSCHARTNOTEFT_GEN_A_CORE
Called daughter Kathrine to update at  2:48 pm on 10/15/2020 , however went to voicemail. Called the number in the chart

## 2020-10-15 NOTE — PROGRESS NOTE ADULT - SUBJECTIVE AND OBJECTIVE BOX
Gastroenterology progress note:     Patient is a 74y old  Female who presents with a chief complaint of septic shock (15 Oct 2020 10:54)       Admitted on: 10-12-20    We are following the patient for melena    Interval History: Patient was downgraded and levophed was stopped. She is on regular diet. Team reported one episode of melena in am.       PAST MEDICAL & SURGICAL HISTORY:  Anemia    DM (diabetes mellitus)    High cholesterol    HTN (hypertension)    B-cell chronic lymphocytic leukemia variant    CAD (coronary artery disease)  s/p stenting    H/O heart artery stent          Allergies  penicillin (Anaphylaxis; Hives)        FH: No significant FH    Social history: NO IV drug abuse    MEDICATIONS  (STANDING):  acyclovir   Oral Tab/Cap 400 milliGRAM(s) Oral daily  aspirin  chewable 81 milliGRAM(s) Oral daily  atorvastatin 80 milliGRAM(s) Oral at bedtime  chlorhexidine 4% Liquid 1 Application(s) Topical <User Schedule>  dextrose 5%. 1000 milliLiter(s) (50 mL/Hr) IV Continuous <Continuous>  dextrose 50% Injectable 12.5 Gram(s) IV Push once  dextrose 50% Injectable 25 Gram(s) IV Push once  dextrose 50% Injectable 25 Gram(s) IV Push once  fluconAZOLE   Tablet 400 milliGRAM(s) Oral daily  folic acid 1 milliGRAM(s) Oral daily  influenza   Vaccine 0.5 milliLiter(s) IntraMuscular once  insulin glargine Injectable (LANTUS) 15 Unit(s) SubCutaneous at bedtime  insulin lispro Injectable (HumaLOG) 5 Unit(s) SubCutaneous three times a day before meals  midodrine 10 milliGRAM(s) Oral every 8 hours  norepinephrine Infusion 0.05 MICROgram(s)/kG/Min (5.62 mL/Hr) IV Continuous <Continuous>  pantoprazole  Injectable 40 milliGRAM(s) IV Push two times a day    MEDICATIONS  (PRN):  dextrose 40% Gel 15 Gram(s) Oral once PRN Blood Glucose LESS THAN 70 milliGRAM(s)/deciliter  glucagon  Injectable 1 milliGRAM(s) IntraMuscular once PRN Glucose LESS THAN 70 milligrams/deciliter        Review of Systems:   Constitutional: No acute distress  Eyes: No redness or jaundice  Skin: No rash or bruises  Cardiovascular:  No Chest Pain, No Palpitations  Respiratory:  No Cough, No Dyspnea  Gastrointestinal:  As described in HPI  Extremities: No leg swelling or pain  Neuro: No tremors or seizures      Physical Examination:  T(C): 35.8 (10-15-20 @ 16:00), Max: 36.7 (10-14-20 @ 19:47)  HR: 90 (10-15-20 @ 16:00) (82 - 98)  BP: 135/60 (10-15-20 @ 16:00) (98/49 - 146/67)  RR: 20 (10-15-20 @ 16:00) (20 - 20)  SpO2: 98% (10-15-20 @ 14:39) (97% - 100%)      10-14-20 @ 07:01  -  10-15-20 @ 07:00  --------------------------------------------------------  IN: 463.3 mL / OUT: 1025 mL / NET: -561.7 mL    10-15-20 @ 07:01  -  10-15-20 @ 18:59  --------------------------------------------------------  IN: 16.8 mL / OUT: 150 mL / NET: -133.2 mL      Constitutional: Lying in bed, no acute distress  Neck: No thyromegaly or mass noted.  Respiratory:  No signs of respiratory distress. Lung sounds are clear bilaterally.  Cardiovascular:  S1 S2, Regular rate and rhythm.  Abdominal: Abdomen is soft, symmetric, and non-tender without distention.  Extremities: No pedal edema or cyanosis  Neuro: AAO X 3, no tremors          Data: (reviewed by attending)                        8.9    5.26  )-----------( 74       ( 15 Oct 2020 17:07 )             28.1     Hgb trend:  8.9  10-15-20 @ 17:07  8.8  10-15-20 @ 07:12  8.7  10-14-20 @ 20:00  7.3  10-14-20 @ 07:42  7.8  10-14-20 @ 00:47  7.7  10-13-20 @ 20:00  7.6  10-13-20 @ 11:00  8.0  10-13-20 @ 04:08  8.1  10-13-20 @ 01:25  8.7  10-12-20 @ 20:34      10-14-20 @ 07:01  -  10-15-20 @ 07:00  --------------------------------------------------------  IN: 295 mL      10-15    136  |  106  |  21<H>  ----------------------------<  125<H>  4.3   |  23  |  <0.5<L>    Ca    8.1<L>      15 Oct 2020 07:12  Mg     1.9     10-14    TPro  4.4<L>  /  Alb  3.0<L>  /  TBili  1.0  /  DBili  x   /  AST  30  /  ALT  24  /  AlkPhos  107  10-15    Liver panel trend:  TBili 1.0   /   AST 30   /   ALT 24   /   AlkP 107   /   Tptn 4.4   /   Alb 3.0    /   DBili --      10-15  TBili 0.9   /   AST 26   /   ALT 22   /   AlkP 83   /   Tptn 4.1   /   Alb 2.8    /   DBili --      10-14  TBili 0.5   /   AST 22   /   ALT 25   /   AlkP 136   /   Tptn 4.9   /   Alb 3.3    /   DBili --      10-12             Radiology: (reviewed by attending)

## 2020-10-15 NOTE — PROGRESS NOTE ADULT - ASSESSMENT
N74 year old female with PMH of HTN, DM, CAD, B cell lymphoproliferative disorder on Ibrutinib last dose yesterday, recent VRE UTI  is here for evaluation of confusion and AMS since this morning found by daughter.    IMPRESSION;  resolved shock  hypovolemic secondary to GI bleed ( Hg/HtC 4.2/14.3 ). Recent black stools  Not septic shock. No pyuria and clinically asymptomatic and has been on Cefepime against which the Enterobacter is resistant  No PNA or intraabdominal infectious focus  CT abd : unremarkable  BCx 10/12 NGTD  UCx Enterobacter R to Cefepime    RECOMMENDATIONS;  D/c Cefepime   Observe off ABx

## 2020-10-16 NOTE — CHART NOTE - NSCHARTNOTEFT_GEN_A_CORE
PACU ANESTHESIA ADMISSION NOTE      Procedure: EGD  Post op diagnosis:      ____  Intubated  TV:______       Rate: ______      FiO2: ______    _x___  Patent Airway    _x___  Full return of protective reflexes    _x___  Full recovery from anesthesia / back to baseline status    Vitals:            T:    n/a            BP :    150/64            R:   13           Sat:100              P:83      Mental Status:  _x___ Awake   _____ Alert   _____ Drowsy   _____ Sedated    Nausea/Vomiting:  _x___  NO       ______Yes,   See Post - Op Orders         Pain Scale (0-10):  __0___    Treatment: _x___ None    ____ See Post - Op/PCA Orders    Post - Operative Fluids:   __x__ Oral   ____ See Post - Op Orders    Plan: Discharge:   __Home       ____x_Floor     _____Critical Care    _____  Other:_________________    Comments:  No anesthesia issues or complications noted.  Discharge when criteria met.

## 2020-10-16 NOTE — PROGRESS NOTE ADULT - SUBJECTIVE AND OBJECTIVE BOX
SUBJECTIVE:    Patient is a 74y old Female who presents with a chief complaint of septic shock (16 Oct 2020 09:51)    Currently admitted to medicine with the primary diagnosis of Septic shock       Today is hospital day 4d. This morning she is resting comfortably in bed and reports no new issues or overnight events.     No black bowel movements overnight.   1 episode of black large bowel movement yesterday afternoon ( hemodynamically stable)  Asymptomatic    ROS:   CONSTITUTIONAL: No weakness, fevers or chills   EYES/ENT: No visual changes; No vertigo or throat pain   NECK: No pain or stiffness   RESPIRATORY: No cough, wheezing, hemoptysis; No shortness of breath   CARDIOVASCULAR: No chest pain or palpitations   GASTROINTESTINAL: No abdominal or epigastric pain. No nausea, vomiting, or hematemesis; No diarrhea or constipation. No melena or hematochezia.  GENITOURINARY: No dysuria, frequency or hematuria  NEUROLOGICAL: No numbness or weakness  SKIN: No itching, rashes      PAST MEDICAL & SURGICAL HISTORY  Anemia    DM (diabetes mellitus)    High cholesterol    HTN (hypertension)    B-cell chronic lymphocytic leukemia variant    CAD (coronary artery disease)  s/p stenting    H/O heart artery stent      ALLERGIES:  penicillin (Anaphylaxis; Hives)    MEDICATIONS:  STANDING MEDICATIONS  acyclovir   Oral Tab/Cap 400 milliGRAM(s) Oral daily  aspirin  chewable 81 milliGRAM(s) Oral daily  atorvastatin 80 milliGRAM(s) Oral at bedtime  chlorhexidine 4% Liquid 1 Application(s) Topical <User Schedule>  dextrose 5%. 1000 milliLiter(s) IV Continuous <Continuous>  dextrose 50% Injectable 12.5 Gram(s) IV Push once  dextrose 50% Injectable 25 Gram(s) IV Push once  dextrose 50% Injectable 25 Gram(s) IV Push once  fluconAZOLE   Tablet 400 milliGRAM(s) Oral daily  folic acid 1 milliGRAM(s) Oral daily  influenza   Vaccine 0.5 milliLiter(s) IntraMuscular once  insulin glargine Injectable (LANTUS) 15 Unit(s) SubCutaneous at bedtime  insulin lispro Injectable (HumaLOG) 5 Unit(s) SubCutaneous three times a day before meals  midodrine 10 milliGRAM(s) Oral every 8 hours  pantoprazole  Injectable 40 milliGRAM(s) IV Push two times a day    PRN MEDICATIONS  dextrose 40% Gel 15 Gram(s) Oral once PRN  glucagon  Injectable 1 milliGRAM(s) IntraMuscular once PRN    VITALS:   T(F): 98.4  HR: 74  BP: 158/65  RR: 18  SpO2: 97%    LABS:                          8.9    5.26  )-----------( 74       ( 15 Oct 2020 17:07 )             28.1     10-15    136  |  106  |  21<H>  ----------------------------<  125<H>  4.3   |  23  |  <0.5<L>    Ca    8.1<L>      15 Oct 2020 07:12    TPro  4.4<L>  /  Alb  3.0<L>  /  TBili  1.0  /  DBili  x   /  AST  30  /  ALT  24  /  AlkPhos  107  10-15      Culture - Blood (collected 14 Oct 2020 12:13)  Source: .Blood Blood-Peripheral  Preliminary Report (15 Oct 2020 23:01):    No growth to date.      RADIOLOGY:  no new today   PHYSICAL EXAM:    GEN: No acute distress  HEENT: normocephalic, atraumatic, aniceteric  LUNGS: Clear to auscultation bilaterally, no rales/wheezing/ rhonchi  HEART: S1/S2 present. RRR, no murmurs  ABD: Soft, non-tender, non-distended. Bowel sounds present  EXT: NC/NC/NE/2+PP/DUNBAR  NEURO: AAOX3, normal affect      ASSESSMENT AND PLAN:      Patient is a 74y old woman with PMH of HTN, DM, CAD, B cell lymphoproliferative disorder (on Ibrutinib, last dose 10/11) and recent VRE UTI who presented for confusion and AMS. Being admitted to ICU for treatment of shock.     # Hemorrhagic shock - resolved   likely secondary to UGIB , endoscopy today   - shock unlikely caused by sepsis as the patient improved clinically with transfusions and the abx which is resistant to   -WBC 26, , BP 75/50 on admission : blood pressure improved , wbc trended down  - hemolysis work up negative   -Endorsed 1 episode of dark melena over weekend ; HgB 4.2 on presentation , s./p 3 units - another large black BM yesterday afternoon   -UClx 50K-99K Enterobacter prelim ; CXE negative, CT AP negative, asymptomatic - was on cefepime ( resistant) - will d/c as the patient clinically improving  -PPI IV BID  -Keep active type and screen  -F/U BCX (prelim negative x3)   -off pressors     HTN   -Anti-hypertensives held in light of shock    DM  -c/w insulin subq , adjust accordingly  - c/w asa, statin  - HbA1C 6.9 in august 2020    B cell lymphoproliferative disorder  -Oncology consult appreciated    MISC:  -DVT prophylaxis: Sequentials  -GI prophylaxis: PPI BID  -Code: FULL  -Diet: Clears  -Activity: as tolerated

## 2020-10-16 NOTE — PROGRESS NOTE ADULT - SUBJECTIVE AND OBJECTIVE BOX
AD BASURTO  74y, Female    All available historical data reviewed    OVERNIGHT EVENTS:  no fevers  feels well and has no complaints  ROS:  General: Denies rigors, nightsweats  HEENT: Denies headache, rhinorrhea, sore throat, eye pain  CV: Denies CP, palpitations  PULM: Denies wheezing, hemoptysis  GI: Denies hematemesis, hematochezia, melena  : Denies discharge, hematuria  MSK: Denies arthralgias, myalgias  SKIN: Denies rash, lesions  NEURO: Denies paresthesias, weakness  PSYCH: Denies depression, anxiety    VITALS:  T(F): 98.4, Max: 99.4 (10-16-20 @ 00:17)  HR: 74  BP: 158/65  RR: 18Vital Signs Last 24 Hrs  T(C): 36.9 (16 Oct 2020 09:35), Max: 37.4 (16 Oct 2020 00:17)  T(F): 98.4 (16 Oct 2020 09:35), Max: 99.4 (16 Oct 2020 00:17)  HR: 74 (16 Oct 2020 09:35) (73 - 103)  BP: 158/65 (16 Oct 2020 09:35) (98/49 - 158/65)  BP(mean): 80 (16 Oct 2020 00:17) (68 - 87)  RR: 18 (16 Oct 2020 09:35) (18 - 20)  SpO2: 97% (16 Oct 2020 09:21) (95% - 98%)    TESTS & MEASUREMENTS:                        8.9    5.26  )-----------( 74       ( 15 Oct 2020 17:07 )             28.1     10-15    136  |  106  |  21<H>  ----------------------------<  125<H>  4.3   |  23  |  <0.5<L>    Ca    8.1<L>      15 Oct 2020 07:12    TPro  4.4<L>  /  Alb  3.0<L>  /  TBili  1.0  /  DBili  x   /  AST  30  /  ALT  24  /  AlkPhos  107  10-15    LIVER FUNCTIONS - ( 15 Oct 2020 07:12 )  Alb: 3.0 g/dL / Pro: 4.4 g/dL / ALK PHOS: 107 U/L / ALT: 24 U/L / AST: 30 U/L / GGT: x             Culture - Blood (collected 10-14-20 @ 12:13)  Source: .Blood Blood-Peripheral  Preliminary Report (10-15-20 @ 23:01):    No growth to date.    Culture - Urine (collected 10-12-20 @ 09:40)  Source: .Urine Clean Catch (Midstream)  Final Report (10-14-20 @ 17:20):    50,000 - 99,000 CFU/mL Enterobacter cloacae complex  Organism: Enterobacter cloacae complex (10-14-20 @ 17:20)  Organism: Enterobacter cloacae complex (10-14-20 @ 17:20)      -  Amikacin: S <=16      -  Amoxicillin/Clavulanic Acid: R >16/8      -  Ampicillin: R >16 These ampicillin results predict results for amoxicillin      -  Ampicillin/Sulbactam: R >16/8 Enterobacter, Citrobacter, and Serratia may develop resistance during prolonged therapy (3-4 days)      -  Aztreonam: R >16      -  Cefazolin: R >16      -  Cefepime: R >16      -  Cefoxitin: R >16      -  Ceftriaxone: R >32 Enterobacter, Citrobacter, and Serratia may develop resistance during prolonged therapy      -  Ciprofloxacin: R 1      -  Ertapenem: S <=0.5      -  Gentamicin: R >8      -  Imipenem: S <=1      -  Levofloxacin: S <=0.5      -  Meropenem: S <=1      -  Nitrofurantoin: R >64 Should not be used to treat pyelonephritis      -  Piperacillin/Tazobactam: S <=8      -  Tigecycline: S <=2      -  Tobramycin: R >8      -  Trimethoprim/Sulfamethoxazole: R >2/38      Method Type: MAHENDRA    Culture - Blood (collected 10-12-20 @ 09:07)  Source: .Blood Blood-Peripheral  Preliminary Report (10-13-20 @ 22:01):    No growth to date.    Culture - Blood (collected 10-12-20 @ 09:07)  Source: .Blood Blood-Peripheral  Preliminary Report (10-13-20 @ 22:01):    No growth to date.            RADIOLOGY & ADDITIONAL TESTS:  Personal review of radiological diagnostics performed  Echo and EKG results noted when applicable.     MEDICATIONS:  acyclovir   Oral Tab/Cap 400 milliGRAM(s) Oral daily  aspirin  chewable 81 milliGRAM(s) Oral daily  atorvastatin 80 milliGRAM(s) Oral at bedtime  chlorhexidine 4% Liquid 1 Application(s) Topical <User Schedule>  dextrose 40% Gel 15 Gram(s) Oral once PRN  dextrose 5%. 1000 milliLiter(s) IV Continuous <Continuous>  dextrose 50% Injectable 12.5 Gram(s) IV Push once  dextrose 50% Injectable 25 Gram(s) IV Push once  dextrose 50% Injectable 25 Gram(s) IV Push once  fluconAZOLE   Tablet 400 milliGRAM(s) Oral daily  folic acid 1 milliGRAM(s) Oral daily  glucagon  Injectable 1 milliGRAM(s) IntraMuscular once PRN  influenza   Vaccine 0.5 milliLiter(s) IntraMuscular once  insulin glargine Injectable (LANTUS) 15 Unit(s) SubCutaneous at bedtime  insulin lispro Injectable (HumaLOG) 5 Unit(s) SubCutaneous three times a day before meals  midodrine 10 milliGRAM(s) Oral every 8 hours  pantoprazole  Injectable 40 milliGRAM(s) IV Push two times a day      ANTIBIOTICS:  acyclovir   Oral Tab/Cap 400 milliGRAM(s) Oral daily  fluconAZOLE   Tablet 400 milliGRAM(s) Oral daily    no

## 2020-10-16 NOTE — PROGRESS NOTE ADULT - GASTROINTESTINAL DETAILS
no rigidity/no guarding/no rebound tenderness/nontender/soft
no guarding/soft/nontender/no rebound tenderness/no rigidity
nontender/no rebound tenderness/no rigidity/soft/no guarding

## 2020-10-16 NOTE — PROGRESS NOTE ADULT - SUBJECTIVE AND OBJECTIVE BOX
Patient is a 74y old  Female who presents with a chief complaint of septic shock (16 Oct 2020 10:12)        Over Night Events:  SP EGD.  NO active bleeding.  No high risk finding.  Off pressors         ROS:     All ROS are negative except HPI         PHYSICAL EXAM    ICU Vital Signs Last 24 Hrs  T(C): 36.4 (16 Oct 2020 16:40), Max: 37.4 (16 Oct 2020 00:17)  T(F): 97.5 (16 Oct 2020 16:40), Max: 99.4 (16 Oct 2020 00:17)  HR: 77 (16 Oct 2020 16:40) (72 - 103)  BP: 163/71 (16 Oct 2020 16:40) (93/50 - 163/71)  BP(mean): 87 (16 Oct 2020 16:00) (77 - 87)  ABP: --  ABP(mean): --  RR: 18 (16 Oct 2020 16:40) (18 - 20)  SpO2: 96% (16 Oct 2020 14:42) (95% - 100%)      CONSTITUTIONAL:  Well nourished.  NAD    ENT:   Airway patent,   Mouth with normal mucosa.   No thrush    EYES:   Pupils equal,   Round and reactive to light.    CARDIAC:   Normal rate,   Regular rhythm.    No edema      Vascular:  Normal systolic impulse  No Carotid bruits    RESPIRATORY:   No wheezing  Bilateral BS  Normal chest expansion  Not tachypneic,  No use of accessory muscles    GASTROINTESTINAL:  Abdomen soft,   Non-tender,   No guarding,   + BS    MUSCULOSKELETAL:   Range of motion is not limited,  No clubbing, cyanosis    NEUROLOGICAL:   Alert and oriented   No motor  deficits.    SKIN:   Skin normal color for race,   Warm and dry and intact.   No evidence of rash.    PSYCHIATRIC:   Normal mood and affect.   No apparent risk to self or others.    HEMATOLOGICAL:  No cervical  lymphadenopathy.  no inguinal lymphadenopathy      10-15-20 @ 07:01  -  10-16-20 @ 07:00  --------------------------------------------------------  IN:    Norepinephrine: 16.8 mL  Total IN: 16.8 mL    OUT:    Voided (mL): 150 mL  Total OUT: 150 mL    Total NET: -133.2 mL      10-16-20 @ 07:01  -  10-16-20 @ 18:46  --------------------------------------------------------  IN:    Oral Fluid: 720 mL  Total IN: 720 mL    OUT:    Voided (mL): 300 mL  Total OUT: 300 mL    Total NET: 420 mL          LABS:                            8.9    5.26  )-----------( 74       ( 15 Oct 2020 17:07 )             28.1                                               10-15    136  |  106  |  21<H>  ----------------------------<  125<H>  4.3   |  23  |  <0.5<L>    Ca    8.1<L>      15 Oct 2020 07:12    TPro  4.4<L>  /  Alb  3.0<L>  /  TBili  1.0  /  DBili  x   /  AST  30  /  ALT  24  /  AlkPhos  107  10-15                                                                                           LIVER FUNCTIONS - ( 15 Oct 2020 07:12 )  Alb: 3.0 g/dL / Pro: 4.4 g/dL / ALK PHOS: 107 U/L / ALT: 24 U/L / AST: 30 U/L / GGT: x                                                  Culture - Blood (collected 14 Oct 2020 12:13)  Source: .Blood Blood-Peripheral  Preliminary Report (15 Oct 2020 23:01):    No growth to date.                                                                                           MEDICATIONS  (STANDING):  acyclovir   Oral Tab/Cap 400 milliGRAM(s) Oral daily  aspirin  chewable 81 milliGRAM(s) Oral daily  atorvastatin 80 milliGRAM(s) Oral at bedtime  chlorhexidine 4% Liquid 1 Application(s) Topical <User Schedule>  dextrose 5%. 1000 milliLiter(s) (50 mL/Hr) IV Continuous <Continuous>  dextrose 50% Injectable 12.5 Gram(s) IV Push once  dextrose 50% Injectable 25 Gram(s) IV Push once  dextrose 50% Injectable 25 Gram(s) IV Push once  fluconAZOLE   Tablet 400 milliGRAM(s) Oral daily  folic acid 1 milliGRAM(s) Oral daily  influenza   Vaccine 0.5 milliLiter(s) IntraMuscular once  insulin glargine Injectable (LANTUS) 15 Unit(s) SubCutaneous at bedtime  insulin lispro Injectable (HumaLOG) 5 Unit(s) SubCutaneous three times a day before meals  midodrine 10 milliGRAM(s) Oral every 8 hours  pantoprazole  Injectable 40 milliGRAM(s) IV Push two times a day    MEDICATIONS  (PRN):  dextrose 40% Gel 15 Gram(s) Oral once PRN Blood Glucose LESS THAN 70 milliGRAM(s)/deciliter  glucagon  Injectable 1 milliGRAM(s) IntraMuscular once PRN Glucose LESS THAN 70 milligrams/deciliter      New X-rays reviewed:                                                                                  ECHO    CXR interpreted by me:

## 2020-10-16 NOTE — PROGRESS NOTE ADULT - ASSESSMENT
IMPRESSION:  Shock resolved  UTI   Lactic acidosis resolved   HO recurrent UTI   HO B cell lymphoma on chemo      PLAN:    CNS: Avoid depressants     HEENT: Oral care    PULMONARY:  HOB @ 45 degrees.  Aspiration precautions     CARDIOVASCULAR: Goal Map >60,  Midodrine     GI: GI prophylaxis  Feeding   Bowel regimen.    RENAL:  Follow up lytes.  Correct as needed.     INFECTIOUS DISEASE: Follow up cultures.  ABX per ID     HEMATOLOGICAL:  DVT prophylaxis seq, FU CBC  Hem / oncology f/u    ENDOCRINE:  Follow up FS.      MUSCULOSKELETAL: PT OT     Dispo: Floor

## 2020-10-16 NOTE — PROGRESS NOTE ADULT - ASSESSMENT
74 year old female with PMH of HTN, DM, CAD, B cell lymphoproliferative disorder on Ibrutinib last dose yesterday, recent VRE UTI  is here for evaluation of confusion and AMS since this morning found by daughter.    IMPRESSION;  resolved shock  hypovolemic secondary to GI bleed ( Hg/HtC 4.2/14.3 ).   Not septic shock. No pyuria and clinically asymptomatic and has been on Cefepime against which the Enterobacter is resistant  No PNA or intraabdominal infectious focus  CT abd : unremarkable  BCx 10/12,14 NGTD  UCx Enterobacter R to Cefepime  WBC 5.7    RECOMMENDATIONS;  Observe off ABx  recall prn please

## 2020-10-16 NOTE — CHART NOTE - NSCHARTNOTEFT_GEN_A_CORE
HPI    74 year old female with PMH of HTN, DM, CAD, B cell lymphoproliferative disorder on Ibrutinib last dose yesterday, recent VRE UTI  is here for evaluation of confusion and AMS since this morning found by daughter. collateral history obtained from the daughter who stated that pt was confused today, not her normal self, thinking she was in the hospital who promoted calling EMS. patient denies fever, chills, nausea, vomiting, abdominal pain or change in bowel habits. pt endorses urinary frequency and urgency.     in the ED pat was septic with BP 75/50, .  found have WBC 26 with Hb of 4.2.   received 3 units pRBC.   central line was placed.  received 2 L of LR, insulin, linezolid and aztreonam.  approved for ICU admission.       The patient was in the unit treated for septic vs hemorrhagic shock. The patient was never intubated. Blood pressure stabilized and the patient was downgraded to step down unit for continued care.      STEP DOWN UNIT COURSE HPI    74 year old female with PMH of HTN, DM, CAD, B cell lymphoproliferative disorder on Ibrutinib last dose yesterday, recent VRE UTI  is here for evaluation of confusion and AMS since this morning found by daughter. collateral history obtained from the daughter who stated that pt was confused today, not her normal self, thinking she was in the hospital who promoted calling EMS. patient denies fever, chills, nausea, vomiting, abdominal pain or change in bowel habits. pt endorses urinary frequency and urgency.     in the ED pat was septic with BP 75/50, .  found have WBC 26 with Hb of 4.2.   received 3 units pRBC.   central line was placed.  received 2 L of LR, insulin, linezolid and aztreonam.  approved for ICU admission.       The patient was in the unit treated for septic vs hemorrhagic shock. The patient was never intubated. Blood pressure stabilized and the patient was downgraded to step down unit for continued care.      STEP DOWN UNIT COURSE    The patient was on low dose levophed which was titrated down. She was started on midodrine for blood pressure control. She went for EGD which showed      Grade B esophagitis compatible with non-erosive esophagitis.    -Multiple small petechiae noticed in the entire stomach, non bleeding most  likely secondary to thrombocytopenia. Multiple random biopsies were taken to R/O  HP.    Erythema in the duodenum compatible with duodenitis. (Biopsy).    Esophageal hiatal hernia.     The patient was hemodynamically stable to be downgraded to medical floor    PLAN:    # Hemorrhagic shock - resolved   likely secondary to UGIB , endoscopy today   - shock unlikely caused by sepsis as the patient improved clinically with transfusions and the abx which is resistant to   -WBC 26, , BP 75/50 on admission : blood pressure improved , wbc trended down  - hemolysis work up negative   -Endorsed 1 episode of dark melena over weekend ; HgB 4.2 on presentation , s./p 3 units - another large black BM yesterday afternoon   -UClx 50K-99K Enterobacter prelim ; CXE negative, CT AP negative, asymptomatic - was on cefepime ( resistant) - will d/c as the patient clinically improving  -PPI IV BID  -Keep active type and screen  -F/U BCX (prelim negative x3)   -off pressors     HTN   -Anti-hypertensives held in light of shock    DM  -c/w insulin subq , adjust accordingly  - c/w asa, statin  - HbA1C 6.9 in august 2020    B cell lymphoproliferative disorder  -Oncology consult appreciated    MISC:  -DVT prophylaxis: Sequentials  -GI prophylaxis: PPI BID  -Code: FULL  -Diet: Clears  -Activity: as tolerated     FOLLOW UP :  [] PT   [] CBC   [] HEME/ONC

## 2020-10-17 NOTE — PROGRESS NOTE ADULT - SUBJECTIVE AND OBJECTIVE BOX
73 y/o F with PMH of marginal zone lymphoma, recurrent UTI, CAD s/p stent, DLD, HTN  admitted for shock likely 2' to GIB, also treated for UTI.    10/17:  Patient seen at bedside, I explained her current treatment plan, she states "I don't want nothin done to my spleen".          REVIEW OF SYSTEMS:  No new complaints      MEDICATIONS  (STANDING):  acyclovir   Oral Tab/Cap 400 milliGRAM(s) Oral daily  amLODIPine   Tablet 5 milliGRAM(s) Oral daily  aspirin  chewable 81 milliGRAM(s) Oral daily  atorvastatin 80 milliGRAM(s) Oral at bedtime  chlorhexidine 4% Liquid 1 Application(s) Topical <User Schedule>  dextrose 5%. 1000 milliLiter(s) (50 mL/Hr) IV Continuous <Continuous>  dextrose 50% Injectable 12.5 Gram(s) IV Push once  dextrose 50% Injectable 25 Gram(s) IV Push once  dextrose 50% Injectable 25 Gram(s) IV Push once  fluconAZOLE   Tablet 400 milliGRAM(s) Oral daily  folic acid 1 milliGRAM(s) Oral daily  influenza   Vaccine 0.5 milliLiter(s) IntraMuscular once  insulin glargine Injectable (LANTUS) 15 Unit(s) SubCutaneous at bedtime  insulin lispro Injectable (HumaLOG) 5 Unit(s) SubCutaneous three times a day before meals  metoprolol tartrate 50 milliGRAM(s) Oral two times a day  pantoprazole  Injectable 40 milliGRAM(s) IV Push two times a day    MEDICATIONS  (PRN):  dextrose 40% Gel 15 Gram(s) Oral once PRN Blood Glucose LESS THAN 70 milliGRAM(s)/deciliter  glucagon  Injectable 1 milliGRAM(s) IntraMuscular once PRN Glucose LESS THAN 70 milligrams/deciliter      Allergies  penicillin (Anaphylaxis; Hives)        FAMILY HISTORY:  Family history of diabetes mellitus (DM)        Vital Signs Last 24 Hrs  T(C): 36.2 (17 Oct 2020 14:41), Max: 36.4 (16 Oct 2020 16:40)  T(F): 97.2 (17 Oct 2020 14:41), Max: 97.5 (16 Oct 2020 16:40)  HR: 102 (17 Oct 2020 14:41) (77 - 102)  BP: 139/56 (17 Oct 2020 14:41) (129/58 - 183/87)  RR: 18 (17 Oct 2020 14:41) (18 - 18)      PHYSICAL EXAM:    GENERAL: NAD, well-groomed, well-developed  HEAD:  Atraumatic, Normocephalic  EYES: EOMI, PERRLA, conjunctiva and sclera clear  NECK: Supple, No JVD, Normal thyroid  NERVOUS SYSTEM:  Alert & Oriented X3, Good concentration  CHEST/LUNG: Clear to percussion bilaterally; No rales, rhonchi, wheezing, or rubs  HEART: Regular rate and rhythm; No murmurs, rubs, or gallops  ABDOMEN: Soft, Nontender, Nondistended; Bowel sounds present  SKIN: ecchymoses      LABS:                        9.4    4.33  )-----------( 68       ( 17 Oct 2020 06:12 )             30.5     10-17    136  |  105  |  7<L>  ----------------------------<  88  3.9   |  24  |  <0.5<L>    Ca    8.4<L>      17 Oct 2020 06:12    TPro  4.4<L>  /  Alb  3.0<L>  /  TBili  1.0  /  DBili  x   /  AST  41  /  ALT  30  /  AlkPhos  129<H>  10-17

## 2020-10-17 NOTE — MEDICAL STUDENT PROGRESS NOTE(EDUCATION) - NS MD HP STUD ASPLAN PLAN FT
# Shock, improving (septic v. hemorrhagic), h/o of transfusion dep anemia    - WBC 26, , BP 75/50 on admission : blood pressure improved , wbc trending down (5 today)  - Endorsed 1 episode of dark melena over weekend; HgB 4.2 on presentation, s/p 3 units pRBCs in ED + 1 unit in CCU  - EGD done 10/16 shows nonerosive esophagitis, duodenitis, and petechiae  - UCx +ve for enterobacter, sensitive to linezolid. Was on cefepime resistant ASx and clinically improved so CCU d/c'd cefepime    - BCx -ve, Hemolysis work up negative   - CXR negative, CT AP negative  - Keep active type and screen, CBC stable   - PPI IV BID  - Heme/onc following, recommends for anemia thrombocytopenia :   - Keep Hb about 8, platelets >50K   - Folic acid 1mg QD  - ID rec: follow off abx  - GI following, pending recommendations     # HTN   - Restart norvasc 5mg PO qd  - d/c midodrine     # CAD  - Restart plavix 75mg qd  - Restart metoprolol tartrate 50mg PO BID   - c/w ASA 81mg qd  - c/w Atorvastatin 80mg PO qhs    #) Marginal zone lymphoma with splenomegaly:  - Heme/onc following, recommends:   - Repeat spleen imaging  - Hold ibrutinib	  - Acyclovir and fluconazole ppx    # Elevated ALP:   - GI Rec: f/u in liver clinic for further work up    # DM  - c/w insulin subq , adjust accordingly  - c/w asa, statin  - HbA1C 6.9 in august 2020    #DVT prophylaxis: Sequentials  #GI prophylaxis: PPI BID  #Code: FULL  #Diet: Consistent carb clears  #Activity: as tolerated # Shock, improving (septic v. hemorrhagic), h/o of transfusion dep anemia    - WBC 26, , BP 75/50 on admission : blood pressure improved , wbc trending down (5 today)  - Endorsed 1 episode of dark melena over weekend; HgB 4.2 on presentation, s/p 3 units pRBCs in ED + 1 unit in CCU  - EGD done 10/16 shows nonerosive esophagitis, duodenitis, and petechiae  - UCx +ve for enterobacter, sensitive to linezolid. Was on cefepime resistant ASx and clinically improved so CCU d/c'd cefepime    - BCx -ve, Hemolysis work up negative   - CXR negative, CT AP negative  - Keep active type and screen, CBC stable   - PPI IV BID  - Heme/onc following, recommends for anemia and thrombocytopenia :   - Keep Hb about 8, platelets >50K   - Folic acid 1mg QD  - ID rec: follow off abx  - GI following, pending recommendations     # HTN   - Restart norvasc 5mg PO qd and metoprolol tartrate 50mg PO BID   - d/c midodrine     # CAD  - hold plavix 75mg qd  - Restart metoprolol tartrate 50mg PO BID   - c/w ASA 81mg qd  - c/w Atorvastatin 80mg PO qhs    #) Marginal zone lymphoma with splenomegaly:  - Heme/onc following, recommends:   - Repeat spleen imaging  - Hold ibrutinib	  - Acyclovir and fluconazole ppx    # Elevated ALP:   - GI Rec: f/u in liver clinic for further work up    # DM  - c/w insulin subq , adjust accordingly  - c/w asa, statin  - HbA1C 6.9 in august 2020    #DVT prophylaxis: Sequentials  #GI prophylaxis: PPI BID  #Code: FULL  #Diet: Consistent carb clears  #Activity: as tolerated # Shock, improving (septic v. hemorrhagic), h/o of transfusion dep anemia    - WBC 26, , BP 75/50 on admission : blood pressure improved , wbc trending down (5 today)  - Endorsed 1 episode of dark melena over weekend; HgB 4.2 on presentation, s/p 3 units pRBCs in ED + 1 unit in CCU  - EGD done 10/16 shows nonerosive esophagitis, duodenitis, and petechiae  - UCx +ve for enterobacter, sensitive to linezolid. Was on cefepime resistant ASx and clinically improved so CCU d/c'd cefepime    - BCx -ve, Hemolysis work up negative   - CXR negative, CT AP negative  - Keep active type and screen, CBC stable   - PPI IV BID  - Heme/onc following, recommends for anemia and thrombocytopenia :   - Keep Hb about 8, platelets >50K   - Folic acid 1mg QD  - ID rec: follow off abx  - GI following, pending recommendations     # HTN   - Restart norvasc 5mg PO qd and metoprolol tartrate 50mg PO BID   - d/c midodrine     # CAD  - hold plavix 75mg qd  - Restart metoprolol tartrate 50mg PO BID   - c/w ASA 81mg qd  - c/w Atorvastatin 80mg PO qhs    #) Marginal zone lymphoma with splenomegaly:  - Heme/onc following, recommends:   - Repeat spleen imaging shows enlarged spleen on US  - Hold ibrutinib	  - Acyclovir and fluconazole ppx    # Elevated ALP:   - GI Rec: f/u in liver clinic for further work up    # DM  - c/w insulin subq , adjust accordingly  - c/w asa, statin  - HbA1C 6.9 in august 2020    #DVT prophylaxis: Sequentials  #GI prophylaxis: PPI BID  #Code: FULL  #Diet: Consistent carb clears  #Activity: as tolerated

## 2020-10-17 NOTE — MEDICAL STUDENT PROGRESS NOTE(EDUCATION) - NS MD HP STUD ASPLAN ASSES FT
74 y.o W with PMH marginal zone lymphoma w/ splenomegaly, on ibrutinib (last dose 10/11), HTN, DM, CAD, and anemia (transfusion dependent), and recent admission to [facility] ICU (aug 2020) for VRE UTI, presented to the Ed with confusion & AMS. In ED was found to be in shock, started on pressors and sent to ICU with clinical improvement. When hemodynamically stable, off pressors, she was downgraded to the floor for monitoring.

## 2020-10-17 NOTE — PROGRESS NOTE ADULT - ASSESSMENT
Patient is a 75 y/o F with PMH of marginal zone lymphoma, recurrent UTI, CAD s/p stent, DLD, HTN  admitted for sepsis due to VRE UTI    #) Shock: Septic (hx of VRE urine however UA -, CXR neg) vs hypovolemic (reported dark stool, Hg dropped to 4.2)  - Levophed off  - Hg stable  - Off abx  -EGD performed 10/16, showed non-erosive esophagitis, duodenitis and petechiae in stomach    #) Anemia: Patient is transfusion dependent at baseline, however this was an acute drop, suspect UGIB with the dramatic rise in BUN and acute drop, ddx also includes hemolysis, splenic sequestratin, AE from ibrutinib  - Hemolysis workup negative (PALAK, LDH, hapto), reticulocytes elevated    -EGD performed 10/16, showed non-erosive esophagitis, duodenitis and petechiae in stomach  - Repeat spleen imaging to get accurate size, CT AP on 12th unable to to give dimensions (25.8c, in Aug  - s/p 3 units of PRBCs, now stable  - Keep Hg ~8  - Folic acid 1mg QD    #) Thrombocytopenia  - Keep platelets greater than 50K    #) Marginal zone lymphoma with splenomegaly:  - PET CT as of May 2020 showing diffuse FDG uptake in an enlarged spleen   - Transfusion dependent  - Was recently restarted on ibrutinib, then held again due to thrombocytopenia   - Hold ibrutinib	  - Acyclovir and fluconazole ppx         Patient is a 73 y/o F with PMH of marginal zone lymphoma, recurrent UTI, CAD s/p stent, DLD, HTN  admitted for sepsis due to VRE UTI    #) Shock: Resolved, septic (hx of VRE urine however UA -, CXR neg) vs hypovolemic (reported dark stool, Hg dropped to 4.2)  - Levophed off  - Hg stable  - Off abx  - EGD performed 10/16, showed non-erosive esophagitis, duodenitis and petechiae in stomach    #) Anemia: Patient is transfusion dependent at baseline, however this was an acute drop, suspect UGIB with the dramatic rise in BUN and acute drop, ddx also includes hemolysis, splenic sequestratin, AE from ibrutinib  - Hemolysis workup negative (PALAK, LDH, hapto), reticulocytes elevated    -EGD performed 10/16, showed non-erosive esophagitis, duodenitis and petechiae in stomach  - Repeat spleen imaging to get accurate size, CT AP on 12th unable to to give dimensions (25.8c, in Aug  - s/p 3 units of PRBCs, now stable  - Keep Hg ~8  - Folic acid 1mg QD    #) Thrombocytopenia  - Keep platelets greater than 50K    #) Marginal zone lymphoma with splenomegaly:  - PET CT as of May 2020 showing diffuse FDG uptake in an enlarged spleen   - Transfusion dependent  - Was recently restarted on ibrutinib, then held again due to thrombocytopenia   - Hold ibrutinib	  - Acyclovir and fluconazole ppx

## 2020-10-17 NOTE — PROGRESS NOTE ADULT - ASSESSMENT
75 y/o F with PMH of marginal zone lymphoma, recurrent UTI, CAD s/p stent, DLD, HTN  admitted for shock 2' to GIB, also treated for UTI.    # Hypovolemic shock, h/o of transfusion dep anemia-unlikely Septic as per ID    - WBC 26, , BP 75/50 on admission : blood pressure improved , wbc trending down  - Endorsed 1 episode of dark melena over weekend; HgB 4.2 on presentation, s/p 3 units pRBCs in ED + 1 unit in CCU  - EGD done 10/16 shows nonerosive esophagitis, duodenitis, and petechiae  - UCx +ve for enterobacter, sensitive to linezolid. Was on cefepime resistant ASx and clinically improved so CCU d/c'd cefepime  ID consult appreciated; observe off abx.  - BCx -ve, Hemolysis work up negative   - CXR negative  - Keep active type and screen, CBC stable   - PPI IV BID  - GI following, pending recommendations     #Anemia GIB?  splenic sequestration?:  - Heme/onc following, recommends for anemia and thrombocytopenia :   - Keep Hb about 8, platelets >50K   - Folic acid 1mg QD  - ID rec: follow off abx  -Spenic US shows enlarged spleen-20.4 cm in length x 7.4 cm, follow with heme/onc for recommendations    # HTN   - norvasc 5mg PO qd and metoprolol tartrate 50mg PO BID     # CAD  - hold plavix 75mg qd  - metoprolol tartrate 50mg PO BID   - c/w ASA 81mg qd  - c/w Atorvastatin 80mg PO qhs    #) Marginal zone lymphoma with splenomegaly:  - Heme/onc following, recommends:   - Hold ibrutinib	  - Acyclovir and fluconazole ppx    # Elevated ALP:   - GI Rec: f/u in liver clinic for further work up    # DM  - c/w insulin subq , adjust accordingly  - c/w asa, statin  - HbA1C 6.9 in august 2020

## 2020-10-17 NOTE — MEDICAL STUDENT PROGRESS NOTE(EDUCATION) - SUBJECTIVE AND OBJECTIVE BOX
AD BASURTO, female, 74y ([birth year]),   MRN-[id number]  Admit Date: 10-12-20 (5d)    Chief Complaint:  Patient is a 74y old  Female who presents with a chief complaint of septic shock (17 Oct 2020 06:24)    Course:  Patient is a 74y old woman with PMH of HTN, DM, CAD, B cell lymphoproliferative disorder (on Ibrutinib, last dose 10/11) and recent VRE UTI who presented for confusion and AMS. Patient daughter endorsed  dark stool weekend prior to presentation. Found to be septic in ED with BP 75/50, , and WBC of 26. Given 2L LR, linezolid, aztreonam in ED. Also transfused 3 units pRBC due to hemoglobin of 4.2 with response to 8.0. Pt is transfusion dependent at baseline.   Admitted to CCU for shock (septic v. hemorrhagic). In CCU she was never intubated, was on levophed which was titrated down, and started on midodrine for BP control. Was on cefepime which was d/c'd in CCU after clinical improvement.    She was downgraded 10/16, off pressors. HgB uptrending since admission, WBC wnl.   EGD done 10/16 shows nonerosive esophagitis, duodenitis, and petechiae. Heme/onc, GI, and ID are following.     Past Medical and Surgical History:  PAST MEDICAL & SURGICAL HISTORY:  Anemia    DM (diabetes mellitus)    High cholesterol    HTN (hypertension)    B-cell chronic lymphocytic leukemia variant    CAD (coronary artery disease)  s/p stenting    H/O heart artery stent        Current Medications:  MEDICATIONS  (STANDING):  acyclovir   Oral Tab/Cap 400 milliGRAM(s) Oral daily  aspirin  chewable 81 milliGRAM(s) Oral daily  atorvastatin 80 milliGRAM(s) Oral at bedtime  chlorhexidine 4% Liquid 1 Application(s) Topical <User Schedule>  dextrose 5%. 1000 milliLiter(s) (50 mL/Hr) IV Continuous <Continuous>  dextrose 50% Injectable 12.5 Gram(s) IV Push once  dextrose 50% Injectable 25 Gram(s) IV Push once  dextrose 50% Injectable 25 Gram(s) IV Push once  fluconAZOLE   Tablet 400 milliGRAM(s) Oral daily  folic acid 1 milliGRAM(s) Oral daily  influenza   Vaccine 0.5 milliLiter(s) IntraMuscular once  insulin glargine Injectable (LANTUS) 15 Unit(s) SubCutaneous at bedtime  insulin lispro Injectable (HumaLOG) 5 Unit(s) SubCutaneous three times a day before meals  midodrine 10 milliGRAM(s) Oral every 8 hours  pantoprazole  Injectable 40 milliGRAM(s) IV Push two times a day    MEDICATIONS  (PRN):  dextrose 40% Gel 15 Gram(s) Oral once PRN Blood Glucose LESS THAN 70 milliGRAM(s)/deciliter  glucagon  Injectable 1 milliGRAM(s) IntraMuscular once PRN Glucose LESS THAN 70 milligrams/deciliter      Vital Signs:  T(F): 97.2 (10-17-20 @ 05:57), Max: 99.4 (10-16-20 @ 00:17)  HR: 100 (10-17-20 @ 05:57) (72 - 103)  BP: 183/87 (10-17-20 @ 05:57) (93/50 - 183/87)  RR: 18 (10-17-20 @ 05:57) (18 - 20)  SpO2: 96% (10-16-20 @ 14:42) (95% - 100%)  CAPILLARY BLOOD GLUCOSE      POCT Blood Glucose.: 98 mg/dL (17 Oct 2020 07:30)  POCT Blood Glucose.: 113 mg/dL (16 Oct 2020 22:34)  POCT Blood Glucose.: 83 mg/dL (16 Oct 2020 21:18)  POCT Blood Glucose.: 109 mg/dL (16 Oct 2020 17:31)  POCT Blood Glucose.: 89 mg/dL (16 Oct 2020 11:41)      Physical Exam:  General: Not in distress. Sitting up in bed and eating well.  HEENT: Bruising on left anterior neck from central line placement. Moist mucus membranes. PERRLA.  Cardio: Ejection-type murmur best hear in left upper sternal border. Regular rate and rhythm, S1, S2, no rub, or gallop.  Pulm: Clear to auscultation bilaterally. No wheezing, rales, or rhonchi.  Abdomen: No suprapubic tenderness. Palpable splenomegaly. Non-tender, non-distended. Normoactive bowel sounds.  Extremities: No cyanosis or edema bilaterally. No calf tenderness to palpation.  Neuro: A&O x3. No focal deficits.     Labs and Imaging:  CBC Full  -  ( 17 Oct 2020 06:12 )  WBC Count : 4.33 K/uL  RBC Count : 3.07 M/uL  Hemoglobin : 9.4 g/dL  Hematocrit : 30.5 %  Platelet Count - Automated : 68 K/uL  Mean Cell Volume : 99.3 fL  Mean Cell Hemoglobin : 30.6 pg  Mean Cell Hemoglobin Concentration : 30.8 g/dL  Auto Neutrophil # : 2.45 K/uL  Auto Lymphocyte # : 1.40 K/uL  Auto Monocyte # : 0.42 K/uL  Auto Eosinophil # : 0.03 K/uL  Auto Basophil # : 0.02 K/uL  Auto Neutrophil % : 56.6 %  Auto Lymphocyte % : 32.3 %  Auto Monocyte % : 9.7 %  Auto Eosinophil % : 0.7 %  Auto Basophil % : 0.5 %    RDW: 20.5  21.2      BMP: 10-17-20 @ 06:12  136 | 105 | 7    -----------------< 88  3.9  | 24 | <0.5  eGFR(AA): 131, eGFR (non-AA): 113  Ca 8.4, Mg --, P --    LFTs: 10-17-20 @ 06:12  TP  4.4  | 3.0 Alb   ---------------  TB  1.0  | --  DB   ---------------  ALT 30  | 41  AST            ^          129 ALK      LFTs: 10-17-20 @ 06:12  Ca  8.4  | 41 AST   -----------------  TP  4.4  | 30 ALT  -----------------  Alb 3.0  | 129 ALK          ^        1.0         TB      Cultures:     (collected 10-14-20 @ 12:13)  Source: .Blood Blood-Peripheral  Preliminary Report:    No growth to date.     (collected 10-12-20 @ 09:40)  Source: .Urine Clean Catch (Midstream)  Final Report:    50,000 - 99,000 CFU/mL Enterobacter cloacae complex  Organism: Enterobacter cloacae complex  Organism: Enterobacter cloacae complex     (collected 10-12-20 @ 09:07)  Source: .Blood Blood-Peripheral  Preliminary Report:    No growth to date.     (collected 10-12-20 @ 09:07)  Source: .Blood Blood-Peripheral  Preliminary Report:    No growth to date.        Home Medications:  Home Medications:  acyclovir 400 mg oral tablet: 1 tab(s) orally once a day (25 Aug 2020 00:00)  amLODIPine 5 mg oral tablet: 1 tab(s) orally once a day (25 Aug 2020 00:00)  aspirin 81 mg oral tablet, chewable: 1 tab(s) orally once a day (25 Aug 2020 00:00)  atorvastatin 80 mg oral tablet: 1 tab(s) orally once a day (25 Aug 2020 00:00)  clopidogrel 75 mg oral tablet: 1 tab(s) orally once a day (25 Aug 2020 00:00)  fluconazole 200 mg oral tablet: 2 tab(s) orally once a day (25 Aug 2020 00:00)  metoprolol tartrate 50 mg oral tablet: 1 tab(s) orally 2 times a day (25 Aug 2020 00:00)   AD BASURTO, female, 74y ([birth year]),   MRN-[id number]  Admit Date: 10-12-20 (5d)    Chief Complaint:  Patient is a 74y old  Female who presents with a chief complaint of septic shock (17 Oct 2020 06:24)    Course:  Patient is a 74y old woman with PMH of HTN, DM, CAD, B cell lymphoproliferative disorder (on Ibrutinib, last dose 10/11) and recent VRE UTI who presented for confusion and AMS. Patient daughter endorsed  dark stool weekend prior to presentation. Found to be septic in ED with BP 75/50, , and WBC of 26. Given 2L LR, linezolid, aztreonam in ED. Also transfused 3 units pRBC due to hemoglobin of 4.2 with response to 8.0. Pt is transfusion dependent at baseline.   Admitted to CCU for shock (septic v. hemorrhagic). In CCU she was never intubated, was on levophed which was titrated down, and started on midodrine for BP control. Was on cefepime which was d/c'd in CCU after clinical improvement.    She was downgraded 10/16, off pressors. HgB uptrending since admission, WBC wnl.   EGD done 10/16 shows nonerosive esophagitis, duodenitis, and petechiae. Heme/onc, GI, and ID are following.     Past Medical and Surgical History:  PAST MEDICAL & SURGICAL HISTORY:  Anemia    DM (diabetes mellitus)    High cholesterol    HTN (hypertension)    B-cell chronic lymphocytic leukemia variant    CAD (coronary artery disease)  s/p stenting    H/O heart artery stent        Current Medications:  MEDICATIONS  (STANDING):  acyclovir   Oral Tab/Cap 400 milliGRAM(s) Oral daily  aspirin  chewable 81 milliGRAM(s) Oral daily  atorvastatin 80 milliGRAM(s) Oral at bedtime  chlorhexidine 4% Liquid 1 Application(s) Topical <User Schedule>  dextrose 5%. 1000 milliLiter(s) (50 mL/Hr) IV Continuous <Continuous>  dextrose 50% Injectable 12.5 Gram(s) IV Push once  dextrose 50% Injectable 25 Gram(s) IV Push once  dextrose 50% Injectable 25 Gram(s) IV Push once  fluconAZOLE   Tablet 400 milliGRAM(s) Oral daily  folic acid 1 milliGRAM(s) Oral daily  influenza   Vaccine 0.5 milliLiter(s) IntraMuscular once  insulin glargine Injectable (LANTUS) 15 Unit(s) SubCutaneous at bedtime  insulin lispro Injectable (HumaLOG) 5 Unit(s) SubCutaneous three times a day before meals  midodrine 10 milliGRAM(s) Oral every 8 hours  pantoprazole  Injectable 40 milliGRAM(s) IV Push two times a day    MEDICATIONS  (PRN):  dextrose 40% Gel 15 Gram(s) Oral once PRN Blood Glucose LESS THAN 70 milliGRAM(s)/deciliter  glucagon  Injectable 1 milliGRAM(s) IntraMuscular once PRN Glucose LESS THAN 70 milligrams/deciliter      Vital Signs:  T(F): 97.2 (10-17-20 @ 05:57), Max: 99.4 (10-16-20 @ 00:17)  HR: 100 (10-17-20 @ 05:57) (72 - 103)  BP: 183/87 (10-17-20 @ 05:57) (93/50 - 183/87)  RR: 18 (10-17-20 @ 05:57) (18 - 20)  SpO2: 96% (10-16-20 @ 14:42) (95% - 100%)  CAPILLARY BLOOD GLUCOSE      POCT Blood Glucose.: 98 mg/dL (17 Oct 2020 07:30)  POCT Blood Glucose.: 113 mg/dL (16 Oct 2020 22:34)  POCT Blood Glucose.: 83 mg/dL (16 Oct 2020 21:18)  POCT Blood Glucose.: 109 mg/dL (16 Oct 2020 17:31)  POCT Blood Glucose.: 89 mg/dL (16 Oct 2020 11:41)      Physical Exam:  General: Not in distress. Sitting up in bed and eating well.  HEENT: Bruising on left anterior neck from central line placement. Moist mucus membranes. PERRLA.  Cardio: Ejection-type murmur best hear in left upper sternal border. Regular rate and rhythm, S1, S2, no rub, or gallop.  Pulm: Clear to auscultation bilaterally. No wheezing, rales, or rhonchi.  Abdomen: No suprapubic tenderness. Palpable splenomegaly. Non-tender, non-distended. Normoactive bowel sounds.  Extremities: No cyanosis or edema bilaterally. No calf tenderness to palpation.  Neuro: A&O x3. No focal deficits.     Labs and Imaging:  CBC Full  -  ( 17 Oct 2020 06:12 )  WBC Count : 4.33 K/uL  RBC Count : 3.07 M/uL  Hemoglobin : 9.4 g/dL  Hematocrit : 30.5 %  Platelet Count - Automated : 68 K/uL  Mean Cell Volume : 99.3 fL  Mean Cell Hemoglobin : 30.6 pg  Mean Cell Hemoglobin Concentration : 30.8 g/dL  Auto Neutrophil # : 2.45 K/uL  Auto Lymphocyte # : 1.40 K/uL  Auto Monocyte # : 0.42 K/uL  Auto Eosinophil # : 0.03 K/uL  Auto Basophil # : 0.02 K/uL  Auto Neutrophil % : 56.6 %  Auto Lymphocyte % : 32.3 %  Auto Monocyte % : 9.7 %  Auto Eosinophil % : 0.7 %  Auto Basophil % : 0.5 %    RDW: 20.5  21.2      BMP: 10-17-20 @ 06:12  136 | 105 | 7    -----------------< 88  3.9  | 24 | <0.5  eGFR(AA): 131, eGFR (non-AA): 113  Ca 8.4, Mg --, P --    LFTs: 10-17-20 @ 06:12  TP  4.4  | 3.0 Alb   ---------------  TB  1.0  | --  DB   ---------------  ALT 30  | 41  AST            ^          129 ALK      LFTs: 10-17-20 @ 06:12  Ca  8.4  | 41 AST   -----------------  TP  4.4  | 30 ALT  -----------------  Alb 3.0  | 129 ALK          ^        1.0         TB      Cultures:     (collected 10-14-20 @ 12:13)  Source: .Blood Blood-Peripheral  Preliminary Report:    No growth to date.     (collected 10-12-20 @ 09:40)  Source: .Urine Clean Catch (Midstream)  Final Report:    50,000 - 99,000 CFU/mL Enterobacter cloacae complex  Organism: Enterobacter cloacae complex  Organism: Enterobacter cloacae complex     (collected 10-12-20 @ 09:07)  Source: .Blood Blood-Peripheral  Preliminary Report:    No growth to date.     (collected 10-12-20 @ 09:07)  Source: .Blood Blood-Peripheral  Preliminary Report:    No growth to date.        Home Medications:  Home Medications:  acyclovir 400 mg oral tablet: 1 tab(s) orally once a day (25 Aug 2020 00:00)  amLODIPine 5 mg oral tablet: 1 tab(s) orally once a day (25 Aug 2020 00:00)  aspirin 81 mg oral tablet, chewable: 1 tab(s) orally once a day (25 Aug 2020 00:00)  atorvastatin 80 mg oral tablet: 1 tab(s) orally once a day (25 Aug 2020 00:00)  clopidogrel 75 mg oral tablet: 1 tab(s) orally once a day (25 Aug 2020 00:00)  fluconazole 200 mg oral tablet: 2 tab(s) orally once a day (25 Aug 2020 00:00)  metoprolol tartrate 50 mg oral tablet: 1 tab(s) orally 2 times a day (25 Aug 2020 00:00)   AD BASURTO, female, 74y ([birth year]),   MRN-[id number]  Admit Date: 10-12-20 (5d)    Chief Complaint:  Patient is a 74y old  Female who presents with a chief complaint of septic shock (17 Oct 2020 06:24)    Course:  Patient is a 74y old woman with PMH of HTN, DM, CAD, B cell lymphoproliferative disorder (on Ibrutinib, last dose 10/11) and recent VRE UTI who presented for confusion and AMS. Patient daughter endorsed  dark stool weekend prior to presentation. Found to be septic in ED with BP 75/50, , and WBC of 26. Given 2L LR, linezolid, aztreonam in ED. Also transfused 3 units pRBC due to hemoglobin of 4.2 with response to 8.0. Pt is transfusion dependent at baseline.   Admitted to CCU for shock (septic v. hemorrhagic). In CCU she was never intubated, was on levophed which was titrated down, and started on midodrine for BP control. Was on cefepime which was d/c'd in CCU after clinical improvement.    She was downgraded 10/16, off pressors. HgB uptrending since admission, WBC wnl.   EGD done 10/16 shows nonerosive esophagitis, duodenitis, and petechiae. Heme/onc, GI, and ID are following.   US 10/17 shows Enlarged spleen.     Past Medical and Surgical History:  PAST MEDICAL & SURGICAL HISTORY:  Anemia    DM (diabetes mellitus)    High cholesterol    HTN (hypertension)    B-cell chronic lymphocytic leukemia variant    CAD (coronary artery disease)  s/p stenting    H/O heart artery stent        Current Medications:  MEDICATIONS  (STANDING):  acyclovir   Oral Tab/Cap 400 milliGRAM(s) Oral daily  aspirin  chewable 81 milliGRAM(s) Oral daily  atorvastatin 80 milliGRAM(s) Oral at bedtime  chlorhexidine 4% Liquid 1 Application(s) Topical <User Schedule>  dextrose 5%. 1000 milliLiter(s) (50 mL/Hr) IV Continuous <Continuous>  dextrose 50% Injectable 12.5 Gram(s) IV Push once  dextrose 50% Injectable 25 Gram(s) IV Push once  dextrose 50% Injectable 25 Gram(s) IV Push once  fluconAZOLE   Tablet 400 milliGRAM(s) Oral daily  folic acid 1 milliGRAM(s) Oral daily  influenza   Vaccine 0.5 milliLiter(s) IntraMuscular once  insulin glargine Injectable (LANTUS) 15 Unit(s) SubCutaneous at bedtime  insulin lispro Injectable (HumaLOG) 5 Unit(s) SubCutaneous three times a day before meals  midodrine 10 milliGRAM(s) Oral every 8 hours  pantoprazole  Injectable 40 milliGRAM(s) IV Push two times a day    MEDICATIONS  (PRN):  dextrose 40% Gel 15 Gram(s) Oral once PRN Blood Glucose LESS THAN 70 milliGRAM(s)/deciliter  glucagon  Injectable 1 milliGRAM(s) IntraMuscular once PRN Glucose LESS THAN 70 milligrams/deciliter      Vital Signs:  T(F): 97.2 (10-17-20 @ 05:57), Max: 99.4 (10-16-20 @ 00:17)  HR: 100 (10-17-20 @ 05:57) (72 - 103)  BP: 183/87 (10-17-20 @ 05:57) (93/50 - 183/87)  RR: 18 (10-17-20 @ 05:57) (18 - 20)  SpO2: 96% (10-16-20 @ 14:42) (95% - 100%)  CAPILLARY BLOOD GLUCOSE      POCT Blood Glucose.: 98 mg/dL (17 Oct 2020 07:30)  POCT Blood Glucose.: 113 mg/dL (16 Oct 2020 22:34)  POCT Blood Glucose.: 83 mg/dL (16 Oct 2020 21:18)  POCT Blood Glucose.: 109 mg/dL (16 Oct 2020 17:31)  POCT Blood Glucose.: 89 mg/dL (16 Oct 2020 11:41)      Physical Exam:  General: Not in distress. Sitting up in bed and eating well.  HEENT: Bruising on left anterior neck from central line placement. Moist mucus membranes. PERRLA.  Cardio: Ejection-type murmur best hear in left upper sternal border. Regular rate and rhythm, S1, S2, no rub, or gallop.  Pulm: Clear to auscultation bilaterally. No wheezing, rales, or rhonchi.  Abdomen: No suprapubic tenderness. Palpable splenomegaly. Non-tender, non-distended. Normoactive bowel sounds.  Extremities: No cyanosis or edema bilaterally. No calf tenderness to palpation.  Neuro: A&O x3. No focal deficits.     Labs and Imaging:  CBC Full  -  ( 17 Oct 2020 06:12 )  WBC Count : 4.33 K/uL  RBC Count : 3.07 M/uL  Hemoglobin : 9.4 g/dL  Hematocrit : 30.5 %  Platelet Count - Automated : 68 K/uL  Mean Cell Volume : 99.3 fL  Mean Cell Hemoglobin : 30.6 pg  Mean Cell Hemoglobin Concentration : 30.8 g/dL  Auto Neutrophil # : 2.45 K/uL  Auto Lymphocyte # : 1.40 K/uL  Auto Monocyte # : 0.42 K/uL  Auto Eosinophil # : 0.03 K/uL  Auto Basophil # : 0.02 K/uL  Auto Neutrophil % : 56.6 %  Auto Lymphocyte % : 32.3 %  Auto Monocyte % : 9.7 %  Auto Eosinophil % : 0.7 %  Auto Basophil % : 0.5 %    RDW: 20.5  21.2      BMP: 10-17-20 @ 06:12  136 | 105 | 7    -----------------< 88  3.9  | 24 | <0.5  eGFR(AA): 131, eGFR (non-AA): 113  Ca 8.4, Mg --, P --    LFTs: 10-17-20 @ 06:12  TP  4.4  | 3.0 Alb   ---------------  TB  1.0  | --  DB   ---------------  ALT 30  | 41  AST            ^          129 ALK      LFTs: 10-17-20 @ 06:12  Ca  8.4  | 41 AST   -----------------  TP  4.4  | 30 ALT  -----------------  Alb 3.0  | 129 ALK          ^        1.0         TB      Cultures:     (collected 10-14-20 @ 12:13)  Source: .Blood Blood-Peripheral  Preliminary Report:    No growth to date.     (collected 10-12-20 @ 09:40)  Source: .Urine Clean Catch (Midstream)  Final Report:    50,000 - 99,000 CFU/mL Enterobacter cloacae complex  Organism: Enterobacter cloacae complex  Organism: Enterobacter cloacae complex     (collected 10-12-20 @ 09:07)  Source: .Blood Blood-Peripheral  Preliminary Report:    No growth to date.     (collected 10-12-20 @ 09:07)  Source: .Blood Blood-Peripheral  Preliminary Report:    No growth to date.     Imaging:  < from: US Spleen (10.17.20 @ 09:59) >  Impression: Enlarged spleen measures 20.4 cm in length x 7.4 cm in width with no focal mass or perisplenic fluid collection. Left pleural effusion.  < end of copied text >      Home Medications:  Home Medications:  acyclovir 400 mg oral tablet: 1 tab(s) orally once a day (25 Aug 2020 00:00)  amLODIPine 5 mg oral tablet: 1 tab(s) orally once a day (25 Aug 2020 00:00)  aspirin 81 mg oral tablet, chewable: 1 tab(s) orally once a day (25 Aug 2020 00:00)  atorvastatin 80 mg oral tablet: 1 tab(s) orally once a day (25 Aug 2020 00:00)  clopidogrel 75 mg oral tablet: 1 tab(s) orally once a day (25 Aug 2020 00:00)  fluconazole 200 mg oral tablet: 2 tab(s) orally once a day (25 Aug 2020 00:00)  metoprolol tartrate 50 mg oral tablet: 1 tab(s) orally 2 times a day (25 Aug 2020 00:00)

## 2020-10-17 NOTE — PROGRESS NOTE ADULT - SUBJECTIVE AND OBJECTIVE BOX
24H events:    EGD yesterday, showed non erosive gastritis, duodenitis, and petechiae in stomach.  Platelets stable yesterday, CBC this morning pending    PAST MEDICAL & SURGICAL HISTORY  Anemia    DM (diabetes mellitus)    High cholesterol    HTN (hypertension)    B-cell chronic lymphocytic leukemia variant    CAD (coronary artery disease)  s/p stenting    H/O heart artery stent      SOCIAL HISTORY:  Negative for smoking/alcohol/drug use.     ALLERGIES:  penicillin (Anaphylaxis; Hives)    MEDICATIONS:  STANDING MEDICATIONS  acyclovir   Oral Tab/Cap 400 milliGRAM(s) Oral daily  aspirin  chewable 81 milliGRAM(s) Oral daily  atorvastatin 80 milliGRAM(s) Oral at bedtime  chlorhexidine 4% Liquid 1 Application(s) Topical <User Schedule>  dextrose 5%. 1000 milliLiter(s) IV Continuous <Continuous>  dextrose 50% Injectable 12.5 Gram(s) IV Push once  dextrose 50% Injectable 25 Gram(s) IV Push once  dextrose 50% Injectable 25 Gram(s) IV Push once  fluconAZOLE   Tablet 400 milliGRAM(s) Oral daily  folic acid 1 milliGRAM(s) Oral daily  influenza   Vaccine 0.5 milliLiter(s) IntraMuscular once  insulin glargine Injectable (LANTUS) 15 Unit(s) SubCutaneous at bedtime  insulin lispro Injectable (HumaLOG) 5 Unit(s) SubCutaneous three times a day before meals  midodrine 10 milliGRAM(s) Oral every 8 hours  pantoprazole  Injectable 40 milliGRAM(s) IV Push two times a day    PRN MEDICATIONS  dextrose 40% Gel 15 Gram(s) Oral once PRN  glucagon  Injectable 1 milliGRAM(s) IntraMuscular once PRN    VITALS:   T(F): 97.2  HR: 100  BP: 183/87  RR: 18  SpO2: 96%    LABS:                        9.0    6.35  )-----------( 79       ( 16 Oct 2020 20:00 )             29.3     10-15    136  |  106  |  21<H>  ----------------------------<  125<H>  4.3   |  23  |  <0.5<L>    Ca    8.1<L>      15 Oct 2020 07:12    TPro  4.4<L>  /  Alb  3.0<L>  /  TBili  1.0  /  DBili  x   /  AST  30  /  ALT  24  /  AlkPhos  107  10-15              Culture - Blood (collected 14 Oct 2020 12:13)  Source: .Blood Blood-Peripheral  Preliminary Report (15 Oct 2020 23:01):    No growth to date.          RADIOLOGY:    PHYSICAL EXAM:  GEN: No acute distress  HENT: NCAT, EOMI  LYMPH: No appreciable adenopathy  LUNGS: No respiratory distress, clear to auscultation bilaterally   HEART: regular rate and rhythm  ABD: Soft, non-tender, non-distended  SKIN: No rash  EXT: No edema  NEURO: AAOX3     24H events:    EGD yesterday, showed non erosive gastritis, duodenitis, and petechiae in stomach.  Platelets stable yesterday, slightly down today with increase inHg    PAST MEDICAL & SURGICAL HISTORY  Anemia    DM (diabetes mellitus)    High cholesterol    HTN (hypertension)    B-cell chronic lymphocytic leukemia variant    CAD (coronary artery disease)  s/p stenting    H/O heart artery stent      SOCIAL HISTORY:  Negative for smoking/alcohol/drug use.     ALLERGIES:  penicillin (Anaphylaxis; Hives)    MEDICATIONS:  STANDING MEDICATIONS  acyclovir   Oral Tab/Cap 400 milliGRAM(s) Oral daily  aspirin  chewable 81 milliGRAM(s) Oral daily  atorvastatin 80 milliGRAM(s) Oral at bedtime  chlorhexidine 4% Liquid 1 Application(s) Topical <User Schedule>  dextrose 5%. 1000 milliLiter(s) IV Continuous <Continuous>  dextrose 50% Injectable 12.5 Gram(s) IV Push once  dextrose 50% Injectable 25 Gram(s) IV Push once  dextrose 50% Injectable 25 Gram(s) IV Push once  fluconAZOLE   Tablet 400 milliGRAM(s) Oral daily  folic acid 1 milliGRAM(s) Oral daily  influenza   Vaccine 0.5 milliLiter(s) IntraMuscular once  insulin glargine Injectable (LANTUS) 15 Unit(s) SubCutaneous at bedtime  insulin lispro Injectable (HumaLOG) 5 Unit(s) SubCutaneous three times a day before meals  midodrine 10 milliGRAM(s) Oral every 8 hours  pantoprazole  Injectable 40 milliGRAM(s) IV Push two times a day    PRN MEDICATIONS  dextrose 40% Gel 15 Gram(s) Oral once PRN  glucagon  Injectable 1 milliGRAM(s) IntraMuscular once PRN    VITALS:   T(F): 97.2  HR: 100  BP: 183/87  RR: 18  SpO2: 96%    LABS:                        9.0    6.35  )-----------( 79       ( 16 Oct 2020 20:00 )             29.3     10-15    136  |  106  |  21<H>  ----------------------------<  125<H>  4.3   |  23  |  <0.5<L>    Ca    8.1<L>      15 Oct 2020 07:12    TPro  4.4<L>  /  Alb  3.0<L>  /  TBili  1.0  /  DBili  x   /  AST  30  /  ALT  24  /  AlkPhos  107  10-15              Culture - Blood (collected 14 Oct 2020 12:13)  Source: .Blood Blood-Peripheral  Preliminary Report (15 Oct 2020 23:01):    No growth to date.          RADIOLOGY:    PHYSICAL EXAM:  GEN: No acute distress  HENT: NCAT, EOMI  LYMPH: No appreciable adenopathy  LUNGS: No respiratory distress, clear to auscultation bilaterally   HEART: regular rate and rhythm  ABD: Soft, non-tender, non-distended  SKIN: No rash  EXT: No edema  NEURO: AAOX3

## 2020-10-18 NOTE — PROGRESS NOTE ADULT - ASSESSMENT
73 y/o F with PMH of marginal zone lymphoma, recurrent UTI, CAD s/p stent, DLD, HTN  admitted for shock 2' to GIB, also treated for UTI.    # Hypovolemic shock, h/o of transfusion dep anemia-unlikely Septic as per ID    - WBC 26, , BP 75/50 on admission : blood pressure improved , wbc trending down  - Endorsed 1 episode of dark melena over weekend; HgB 4.2 on presentation, s/p 3 units pRBCs in ED + 1 unit in CCU  - EGD done 10/16 shows nonerosive esophagitis, duodenitis, and petechiae  - UCx +ve for enterobacter, sensitive to linezolid. Was on cefepime resistant ASx and clinically improved so CCU d/c'd cefepime  ID consult appreciated; observe off abx.  - BCx -ve, Hemolysis work up negative   - CXR negative  - Keep active type and screen, CBC stable   - PPI IV BID  - GI following, pending recommendations     #Anemia GIB?  splenic sequestration?:  - Heme/onc following, recommends for anemia and thrombocytopenia :   - Keep Hb about 8, platelets >50K   - Folic acid 1mg QD  - ID rec: follow off abx  -Spenic US shows enlarged spleen-20.4 cm in length x 7.4 cm, follow with heme/onc for recommendations    # HTN   - norvasc 5mg PO qd and metoprolol tartrate 50mg PO BID     # CAD  - hold plavix 75mg qd  - metoprolol tartrate 50mg PO BID   - c/w ASA 81mg qd  - c/w Atorvastatin 80mg PO qhs    #) Marginal zone lymphoma with splenomegaly:  - Heme/onc following, recommends:   - Hold ibrutinib	  - Acyclovir and fluconazole ppx    # Elevated ALP:   - GI Rec: f/u in liver clinic for further work up    # DM  - c/w insulin subq , adjust accordingly  - c/w asa, statin  - HbA1C 6.9 in august 2020

## 2020-10-18 NOTE — PROGRESS NOTE ADULT - SUBJECTIVE AND OBJECTIVE BOX
75 y/o F with PMH of marginal zone lymphoma, recurrent UTI, CAD s/p stent, DLD, HTN  admitted for shock likely 2' to GIB, also treated for UTI.    10/17:  Patient seen at bedside, I explained her current treatment plan, she states "I don't want nothin done to my spleen".  10/18:  No new complaints, no events overnight.         REVIEW OF SYSTEMS:  No new complaints.      MEDICATIONS  (STANDING):  acyclovir   Oral Tab/Cap 400 milliGRAM(s) Oral daily  amLODIPine   Tablet 5 milliGRAM(s) Oral daily  aspirin  chewable 81 milliGRAM(s) Oral daily  atorvastatin 80 milliGRAM(s) Oral at bedtime  chlorhexidine 4% Liquid 1 Application(s) Topical <User Schedule>  dextrose 5%. 1000 milliLiter(s) (50 mL/Hr) IV Continuous <Continuous>  dextrose 50% Injectable 12.5 Gram(s) IV Push once  dextrose 50% Injectable 25 Gram(s) IV Push once  dextrose 50% Injectable 25 Gram(s) IV Push once  fluconAZOLE   Tablet 400 milliGRAM(s) Oral daily  folic acid 1 milliGRAM(s) Oral daily  influenza   Vaccine 0.5 milliLiter(s) IntraMuscular once  insulin glargine Injectable (LANTUS) 15 Unit(s) SubCutaneous at bedtime  insulin lispro Injectable (HumaLOG) 5 Unit(s) SubCutaneous three times a day before meals  metoprolol tartrate 50 milliGRAM(s) Oral two times a day  pantoprazole  Injectable 40 milliGRAM(s) IV Push two times a day    MEDICATIONS  (PRN):  dextrose 40% Gel 15 Gram(s) Oral once PRN Blood Glucose LESS THAN 70 milliGRAM(s)/deciliter  glucagon  Injectable 1 milliGRAM(s) IntraMuscular once PRN Glucose LESS THAN 70 milligrams/deciliter      Allergies  penicillin (Anaphylaxis; Hives)      FAMILY HISTORY:  Family history of diabetes mellitus (DM)        Vital Signs Last 24 Hrs  T(C): 36.6 (18 Oct 2020 06:39), Max: 37.3 (17 Oct 2020 17:37)  T(F): 97.9 (18 Oct 2020 06:39), Max: 99.1 (17 Oct 2020 17:37)  HR: 77 (18 Oct 2020 06:39) (77 - 111)  BP: 144/65 (18 Oct 2020 06:39) (131/58 - 144/65)  RR: 18 (18 Oct 2020 06:39) (18 - 18)      PHYSICAL EXAM:  GENERAL: NAD, well-groomed, well-developed  HEAD:  Atraumatic, Normocephalic  EYES: EOMI, PERRLA, conjunctiva and sclera clear  NECK: Supple, No JVD, Normal thyroid  NERVOUS SYSTEM:  Alert & Oriented X3, Good concentration  CHEST/LUNG: Clear to percussion bilaterally; No rales, rhonchi, wheezing, or rubs  HEART: Regular rate and rhythm; No murmurs, rubs, or gallops  ABDOMEN: Soft, Nontender, Nondistended; Bowel sounds present  SKIN: ecchymoses      LABS:                        9.1    5.27  )-----------( 58       ( 18 Oct 2020 08:37 )             30.1     10-18    136  |  104  |  7<L>  ----------------------------<  121<H>  3.8   |  24  |  <0.5<L>    Ca    8.3<L>      18 Oct 2020 08:37  Mg     1.6     10-18    TPro  4.3<L>  /  Alb  3.0<L>  /  TBili  0.9  /  DBili  x   /  AST  41  /  ALT  27  /  AlkPhos  133<H>  10-18

## 2020-10-19 NOTE — DIETITIAN INITIAL EVALUATION ADULT. - OTHER CALCULATIONS
Fluid; 1mL/kcal or LIP Energy: 1291-1507kcal/day (MSJ 1.2-1.4 ABW (new bedscale wt) AF - considering lymphoma??); Protein: 57- 70g/kg ABW (new bedscale wt); Fluid; 1mL/kcal or LIP Energy: 1291-1507kcal/day (MSJ 1.2-1.4 AF ABW (new bedscale wt) AF - considering lymphoma??); Protein: 57- 70g/kg ABW (new bedscale wt); Fluid; 1mL/kcal or LIP

## 2020-10-19 NOTE — DIETITIAN INITIAL EVALUATION ADULT. - OTHER INFO
pertinent medical information: presents with a chief complaint of septic shock  Shock; Hypovolemic shock, h/o of transfusion dep anemia-unlikely Septic as per ID; Anemia GIB? splenic sequestration?- Heme/onc following, recommends for anemia and thrombocytopenia; Marginal zone lymphoma with splenomegaly - Heme/onc following per progress note 10/19. PMH of HTN, DM, CAD,  anemia (transfusion dependent)  per progress note 10/18.     pertinent subjective information: Pt reports PO/appetite >75%. No chewing/swallowing difficulty. Pt did not show interest in education at this - eager to go back to sleep. Bedscale wt taken by RD: 58.6kg - Reports UBW of 60.4kg. Older wt in EMR 52.2kg (10/16) - discrepancy noted. Will continue to monitor wts.  However, pt reports she hasn't seen any wt loss or experience loss of appetite. pertinent medical information: p/w septic shock - Hypovolemic shock, h/o of transfusion dep anemia-unlikely Septic as per ID; Anemia GIB? splenic sequestration?- Heme/onc following, recommends for anemia and thrombocytopenia; Marginal zone lymphoma with splenomegaly - Heme/onc following per progress note 10/19. PMH of HTN, DM, CAD, anemia (transfusion dependent)  per progress note 10/18.     pertinent subjective information: Pt reports PO/appetite >75%. Observed pt eating snacks (cheetos/apple juice). No chewing/swallowing difficulty. Pt did not show interest in education at this - eager to go back to sleep. Bedscale wt taken by RD: 58.6kg - Reports UBW of 60.4kg. Older wt in EMR 52.2kg (10/16) - discrepancy noted. prev visit wt: Will continue to monitor wts. However, pt reports she hasn't seen any wt loss or experience loss of appetite. % wt change 2.9%. Not enough evidence for PCM at this time. Conducted NFPF - no findings. pertinent medical information: p/w septic shock - Hypovolemic shock, h/o of transfusion dep anemia-unlikely Septic as per ID; Anemia GIB? splenic sequestration?- Heme/onc following, recommends for anemia and thrombocytopenia; Marginal zone lymphoma with splenomegaly - Heme/onc following per progress note 10/19. PMH of HTN, DM, CAD, anemia (transfusion dependent)  per progress note 10/18.     pertinent subjective information: Pt reports PO/appetite >75%. Observed pt eating snacks (cheetos/apple juice after lunch). No chewing/swallowing difficulty. Pt did not show interest in education at this time - eager to go back to sleep. Bedscale wt taken by RD: 58.6kg - Reports UBW of 60.4kg-- % wt change 2.9%.. Other wt in EMR 52.2kg (10/16) - discrepancy noted. Compared w/ wts at prev visit (8/27) 64kg & 59kg (wt discrepancy noted - unable to confirm wt loss - Will continue to monitor wts. However, pt reports she hasn't seen any wt loss or experienced loss of appetite. Conducted NFPF - no findings. Not enough evidence for PCM at this time. Pt also, did not emphasize on her lymphoma dx - and if that caused any appetite/wt loss issues. Encouraged continued PO intake.

## 2020-10-19 NOTE — PROGRESS NOTE ADULT - ASSESSMENT
74 year old female with PMH of HTN, DM, CAD, B cell lymphoproliferative disorder on Ibrutinib last dose yesterday, recent VRE UTI  is here for evaluation of confusion and AMS  Pt was admitted to MICU and transferred to medicib    # Hypovolemic shock sec to GI bleed  -  s/p 4 units of  pRBCs  - EGD revealed --> nonerosive esophagitis, duodenitis, and petechiae  - Switch to PO protonix  - HB/HCT stable.    # Pancytopenia, H/o  Marginal zone lymphoma with splenomegaly:  - US Spleen (10.17.20 @ 09:59) >Enlarged spleen measures 20.4 cm in length x 7.4 cm in width with no focal mass or perisplenic fluid collection. Left pleural effusion.  - As per hematology: DDx for anemia includes hemolysis, splenic sequestratin, AE from ibrutinib  -Patient is transfusion dependent at baseline  - Hemolysis workup negative (PALAK, LDH, hapto), reticulocytes elevated   - PET CT as of May 2020 showing diffuse FDG uptake in an enlarged spleen   - Was recently restarted on ibrutinib, then held again due to thrombocytopenia   - Hold ibrutinib	  - Acyclovir and fluconazole prophylaxis    # Hypertension  - c/w norvasc, metoprolol    #  H/o CAD  - c/w ASA, metoprolol, statin     # DM type 2  - monitor FS  - c/w lantus, lispro    # Hypomagnesemia  - replete mg    # DVT prophylaxis  - scd    # Full code    #Pending: anticipate for discharge  # Discussed plan of care with patient  # Disposition: Home in AM

## 2020-10-19 NOTE — MEDICAL STUDENT PROGRESS NOTE(EDUCATION) - SUBJECTIVE AND OBJECTIVE BOX
AD BASURTO, female, 74y ([birth year]),   MRN-[id number]  Admit Date: 10-12-20 (7d)    Chief Complaint:  Patient is a 74y old  Female who presents with a chief complaint of septic shock (18 Oct 2020 12:08)    Hospital Course:  Patient is a 74y old woman with PMH of HTN, DM, CAD, B cell lymphoproliferative disorder (on Ibrutinib, last dose 10/11) and recent VRE UTI who presented for confusion and AMS. Patient daughter endorsed  dark stool weekend prior to presentation. Found to be septic in ED with BP 75/50, , and WBC of 26. Given 2L LR, linezolid, aztreonam in ED. Also transfused 3 units pRBC due to hemoglobin of 4.2 with response to 8.0. Pt is transfusion dependent at baseline.   Admitted to CCU for shock (septic v. hemorrhagic). In CCU she was never intubated, was on levophed which was titrated down, and started on midodrine for BP control. Was on cefepime which was d/c'd in CCU after clinical improvement.    She was downgraded 10/16, off pressors. HgB uptrending since admission, WBC wnl.   EGD done 10/16 shows nonerosive esophagitis, duodenitis, and petechiae. Heme/onc, GI, and ID are following.   US 10/17 shows Enlarged spleen.    Overnight:  Pt had no events overnight. Reporting no complaints at this time.     Past Medical and Surgical History:  PAST MEDICAL & SURGICAL HISTORY:  Anemia  DM (diabetes mellitus)  High cholesterol  HTN (hypertension)  B-cell chronic lymphocytic leukemia variant  CAD (coronary artery disease) s/p stenting  H/O heart artery stent      Current Medications:  MEDICATIONS  (STANDING):  acyclovir   Oral Tab/Cap 400 milliGRAM(s) Oral daily  amLODIPine   Tablet 5 milliGRAM(s) Oral daily  aspirin  chewable 81 milliGRAM(s) Oral daily  atorvastatin 80 milliGRAM(s) Oral at bedtime  chlorhexidine 4% Liquid 1 Application(s) Topical <User Schedule>  dextrose 5%. 1000 milliLiter(s) (50 mL/Hr) IV Continuous <Continuous>  dextrose 50% Injectable 12.5 Gram(s) IV Push once  dextrose 50% Injectable 25 Gram(s) IV Push once  dextrose 50% Injectable 25 Gram(s) IV Push once  fluconAZOLE   Tablet 400 milliGRAM(s) Oral daily  folic acid 1 milliGRAM(s) Oral daily  influenza   Vaccine 0.5 milliLiter(s) IntraMuscular once  insulin glargine Injectable (LANTUS) 15 Unit(s) SubCutaneous at bedtime  insulin lispro Injectable (HumaLOG) 5 Unit(s) SubCutaneous three times a day before meals  metoprolol tartrate 50 milliGRAM(s) Oral two times a day  pantoprazole    Tablet 40 milliGRAM(s) Oral two times a day    MEDICATIONS  (PRN):  acetaminophen   Tablet .. 650 milliGRAM(s) Oral every 6 hours PRN Temp greater or equal to 38C (100.4F), Mild Pain (1 - 3)  dextrose 40% Gel 15 Gram(s) Oral once PRN Blood Glucose LESS THAN 70 milliGRAM(s)/deciliter  glucagon  Injectable 1 milliGRAM(s) IntraMuscular once PRN Glucose LESS THAN 70 milligrams/deciliter    Vital Signs:  T(F): 97.1 (10-19-20 @ 05:17), Max: 99.1 (10-17-20 @ 17:37)  HR: 77 (10-19-20 @ 05:17) (77 - 111)  BP: 138/63 (10-19-20 @ 05:17) (116/55 - 144/65)  RR: 18 (10-19-20 @ 05:17) (18 - 18)  SpO2: --  CAPILLARY BLOOD GLUCOSE      POCT Blood Glucose.: 95 mg/dL (19 Oct 2020 11:02)  POCT Blood Glucose.: 94 mg/dL (19 Oct 2020 08:09)  POCT Blood Glucose.: 124 mg/dL (18 Oct 2020 21:46)  POCT Blood Glucose.: 142 mg/dL (18 Oct 2020 16:37)      Physical Exam:  General: Not in distress. Sitting up in bed and eating well.  HEENT: Bruising on left anterior neck from central line placement. Moist mucus membranes. PERRLA.  Cardio: Ejection-type murmur best hear in left upper sternal border. Regular rate and rhythm, S1, S2, no rub, or gallop.  Pulm: Clear to auscultation bilaterally. No wheezing, rales, or rhonchi.  Abdomen: No suprapubic tenderness. Palpable splenomegaly. Non-tender, non-distended. Normoactive bowel sounds.  Extremities: No cyanosis or edema bilaterally. No calf tenderness to palpation.  Neuro: A&O x3. No focal deficits.     Labs and Imaging:  CBC Full  -  ( 19 Oct 2020 06:15 )  WBC Count : 5.97 K/uL  RBC Count : 2.87 M/uL  Hemoglobin : 8.9 g/dL  Hematocrit : 28.7 %  Platelet Count - Automated : 80 K/uL  Mean Cell Volume : 100.0 fL  Mean Cell Hemoglobin : 31.0 pg  Mean Cell Hemoglobin Concentration : 31.0 g/dL  Auto Neutrophil # : 3.72 K/uL  Auto Lymphocyte # : 1.65 K/uL  Auto Monocyte # : 0.55 K/uL  Auto Eosinophil # : 0.03 K/uL  Auto Basophil # : 0.01 K/uL  Auto Neutrophil % : 62.3 %  Auto Lymphocyte % : 27.6 %  Auto Monocyte % : 9.2 %  Auto Eosinophil % : 0.5 %  Auto Basophil % : 0.2 %    RDW: 19.9      BMP: 10-19-20 @ 06:15  139 | 106 | 8    -----------------< 66  3.8  | 26 | <0.5  eGFR(AA): 119, eGFR (non-AA): 103  Ca 7.9, Mg --, P --    LFTs: 10-19-20 @ 06:15  TP  4.1  | 2.9 Alb   ---------------  TB  0.9  | --  DB   ---------------  ALT 29  | 43  AST            ^          160 ALK  LFTs: 10-18-20 @ 08:37  TP  4.3  | 3.0 Alb   ---------------  TB  0.9  | --  DB   ---------------  ALT 27  | 41  AST            ^          133 ALK      LFTs: 10-19-20 @ 06:15  Ca  7.9  | 43 AST   -----------------  TP  4.1  | 29 ALT  -----------------  Alb 2.9  | 160 ALK          ^        0.9         TB        Cultures:     (collected 10-14-20 @ 12:13)  Source: .Blood Blood-Peripheral  Preliminary Report:    No growth to date.     (collected 10-12-20 @ 09:40)  Source: .Urine Clean Catch (Midstream)  Final Report:    50,000 - 99,000 CFU/mL Enterobacter cloacae complex  Organism: Enterobacter cloacae complex  Organism: Enterobacter cloacae complex     (collected 10-12-20 @ 09:07)  Source: .Blood Blood-Peripheral  Final Report:    No Growth Final     (collected 10-12-20 @ 09:07)  Source: .Blood Blood-Peripheral  Final Report:    No Growth Final        Home Medications:  Home Medications:  acyclovir 400 mg oral tablet: 1 tab(s) orally once a day (25 Aug 2020 00:00)  amLODIPine 5 mg oral tablet: 1 tab(s) orally once a day (25 Aug 2020 00:00)  aspirin 81 mg oral tablet, chewable: 1 tab(s) orally once a day (25 Aug 2020 00:00)  atorvastatin 80 mg oral tablet: 1 tab(s) orally once a day (25 Aug 2020 00:00)  clopidogrel 75 mg oral tablet: 1 tab(s) orally once a day (25 Aug 2020 00:00)  fluconazole 200 mg oral tablet: 2 tab(s) orally once a day (25 Aug 2020 00:00)  metoprolol tartrate 50 mg oral tablet: 1 tab(s) orally 2 times a day (25 Aug 2020 00:00)

## 2020-10-19 NOTE — DIETITIAN INITIAL EVALUATION ADULT. - NAME AND PHONE
Nutrition Intervention: meals and snacks; Nutrition Monitoring: diet order,energy intake,body composition,NFPF, glucose profile, anemia profile

## 2020-10-19 NOTE — DIETITIAN INITIAL EVALUATION ADULT. - NSPROEDAREADYLEARN_GEN_A_NUR
motivation to learn/interest in learning/Pt eager to sleep/ did not want to engage in long conversation.

## 2020-10-19 NOTE — PROGRESS NOTE ADULT - SUBJECTIVE AND OBJECTIVE BOX
Patient is a 74y old  Female who presents with a chief complaint of septic shock (18 Oct 2020 12:08)    Patient was seen and examined.  Pt awake, alert  Denies any  complaints.  All systems reviewed positive history as mentioned above.    PAST MEDICAL & SURGICAL HISTORY:  Anemia  DM (diabetes mellitus)  High cholesterol  HTN (hypertension)  B-cell chronic lymphocytic leukemia variant  CAD (coronary artery disease), s/p stenting    Allergies  penicillin (Anaphylaxis; Hives)    MEDICATIONS  (STANDING):  acyclovir   Oral Tab/Cap 400 milliGRAM(s) Oral daily  amLODIPine   Tablet 5 milliGRAM(s) Oral daily  aspirin  chewable 81 milliGRAM(s) Oral daily  atorvastatin 80 milliGRAM(s) Oral at bedtime  fluconAZOLE   Tablet 400 milliGRAM(s) Oral daily  folic acid 1 milliGRAM(s) Oral daily  influenza   Vaccine 0.5 milliLiter(s) IntraMuscular once  insulin glargine Injectable (LANTUS) 15 Unit(s) SubCutaneous at bedtime  insulin lispro Injectable (HumaLOG) 5 Unit(s) SubCutaneous three times a day before meals  metoprolol tartrate 50 milliGRAM(s) Oral two times a day  pantoprazole    Tablet 40 milliGRAM(s) Oral two times a day    MEDICATIONS  (PRN):  acetaminophen   Tablet .. 650 milliGRAM(s) Oral every 6 hours PRN Temp greater or equal to 38C (100.4F), Mild Pain (1 - 3)  dextrose 40% Gel 15 Gram(s) Oral once PRN Blood Glucose LESS THAN 70 milliGRAM(s)/deciliter  glucagon  Injectable 1 milliGRAM(s) IntraMuscular once PRN Glucose LESS THAN 70 milligrams/deciliter    Vital Signs Last 24 Hrs  T(C): 36.2  T(F): 97.1  HR: 77  BP: 138/63  BP(mean): --  RR: 18  SpO2: --    O/E:  Awake, alert, not in distress.  HEENT: atraumatic, EOMI.  Chest: clear.  CVS: SIS2 +, no murmur.  P/A: Soft, BS+, splenomegaly  CNS: non focal.  Ext: no edema feet.  Skin: no rash, no ulcers.  All systems reviewed positive findings as above.    POCT Blood Glucose.: 95 mg/dL (19 Oct 2020 11:02)  POCT Blood Glucose.: 94 mg/dL (19 Oct 2020 08:09)  POCT Blood Glucose.: 124 mg/dL (18 Oct 2020 21:46)  POCT Blood Glucose.: 142 mg/dL (18 Oct 2020 16:37)                          8.9<L>  5.97  )-----------( 80<L>    ( 19 Oct 2020 06:15 )             28.7<L>                        9.1<L>  5.27  )-----------( 58<L>    ( 18 Oct 2020 08:37 )             30.1<L>    10-19    139  |  106  |  8<L>  ----------------------------<  66<L>  3.8   |  26  |  <0.5<L>  10-18    136  |  104  |  7<L>  ----------------------------<  121<H>  3.8   |  24  |  <0.5<L>    Ca    7.9<L>      19 Oct 2020 06:15  Ca    8.3<L>      18 Oct 2020 08:37  Mg     1.6     10-18    TPro  4.1<L>  /  Alb  2.9<L>  /  TBili  0.9  /  DBili  x   /  AST  43<H>  /  ALT  29  /  AlkPhos  160<H>  10-19  TPro  4.3<L>  /  Alb  3.0<L>  /  TBili  0.9  /  DBili  x   /  AST  41  /  ALT  27  /  AlkPhos  133<H>  10-18

## 2020-10-19 NOTE — MEDICAL STUDENT PROGRESS NOTE(EDUCATION) - NS MD HP STUD ASPLAN PLAN FT
# Shock (likely hemorrhagic), resolved     - WBC 26, , BP 75/50 on admission : blood pressure improved , wbc trending down (5 today)  - Endorsed 1 episode of dark melena over weekend; HgB 4.2 on presentation, s/p 3 units pRBCs in ED + 1 unit in CCU  - EGD done 10/16 shows nonerosive esophagitis, duodenitis, and petechiae  - UCx +ve for enterobacter, sensitive to linezolid. Was on cefepime resistant ASx and clinically improved so CCU d/c'd cefepime    - BCx -ve, Hemolysis work up negative   - CXR negative, CT AP negative  - HgB is stable  - c/w PPI PO bid  - ID rec: follow off abx    # Transfusion dependent anemia, stable   - Hemoglobin: 8.9 g/dL (10.19.20 @ 06:15)   - Platelet Count: 80 K/uL (10.19.20 @ 06:15)   - Keep active type and screen, CBC stable   - Heme/onc following, recommends for anemia and thrombocytopenia :   - Keep Hb about 8, platelets >50K    - Folic acid 1mg QD    # HTN   - c/w norvasc 5mg PO qd and metoprolol tartrate 50mg PO BID     # CAD  - hold plavix 75mg qd  - c/w metoprolol tartrate 50mg PO BID   - c/w ASA 81mg qd  - c/w Atorvastatin 80mg PO qhs    #) Marginal zone lymphoma with splenomegaly:  - Heme/onc following, recommends:   - Repeat spleen imaging shows enlarged spleen on US  - Hold ibrutinib	  - Acyclovir and fluconazole ppx    # Elevated ALP:   - GI Rec: f/u in liver clinic for further work up    # DM  - c/w insulin subq , adjust accordingly  - HbA1C 6.9 in august 2020    #DVT prophylaxis: Sequentials  #GI prophylaxis: PPI BID  #Code: FULL  #Diet: Consistent carb/ no snack   #Activity: as tolerated # Shock (likely hemorrhagic), resolved     - WBC 26, , BP 75/50 on admission : blood pressure improved , wbc trending down (5 today)  - Endorsed 1 episode of dark melena over weekend; HgB 4.2 on presentation, s/p 3 units pRBCs in ED + 1 unit in CCU  - EGD done 10/16 shows nonerosive esophagitis, duodenitis, and petechiae  - UCx +ve for enterobacter, sensitive to linezolid. Was on cefepime resistant ASx and clinically improved so CCU d/c'd cefepime    - BCx -ve, Hemolysis work up negative   - CXR negative, CT AP negative  - HgB is stable  - c/w PPI PO bid  - ID rec: follow off abx    # Transfusion dependent anemia, stable   - Hemoglobin: 8.9 g/dL (10.19.20 @ 06:15)   - Platelet Count: 80 K/uL (10.19.20 @ 06:15)   - Keep active type and screen, CBC stable   - Heme/onc following, recommends for anemia and thrombocytopenia :   - Keep Hb about 8, platelets >50K    - Folic acid 1mg QD    # HTN   - c/w norvasc 5mg PO qd and metoprolol tartrate 50mg PO BID     # CAD  - hold plavix 75mg qd  - c/w metoprolol tartrate 50mg PO BID   - c/w ASA 81mg qd  - c/w Atorvastatin 80mg PO qhs    #) Marginal zone lymphoma with splenomegaly:  - Heme/onc following, recommends:   - enlarged spleen on US  - Hold ibrutinib	  - Acyclovir and fluconazole ppx    # Elevated ALP:   - GI Rec: f/u in liver clinic for further work up    # DM  - c/w lantus and lispro  - HbA1C 6.9 in august 2020    #DVT prophylaxis: Sequentials  #GI prophylaxis: PPI BID  #Code: FULL  #Diet: Consistent carb/ no snack   #Activity: as tolerated   #Dispo- home with hc, anticipate for tomorrow,     - FU PCP- needs cbc and cmp and tranfuse to keep above Hgb above 8  - FU heme/onc - possible restart ibrutinib  - FU GI- follow up elevated alk phos

## 2020-10-19 NOTE — DIETITIAN INITIAL EVALUATION ADULT. - PERTINENT MEDS FT
aspirin, dextrose 5%, dextrose 50% injectable, insulin glargine, insulin lispro, protonix, norvasc, lopressor, glucagon injectable, lipitor, folic acid

## 2020-10-19 NOTE — DIETITIAN INITIAL EVALUATION ADULT. - PHYSCIAL ASSESSMENT
alert, confused at times per RN flowsheets; GI; No discomfort, LBM 10/18; Skin: Intact, bruised ecchymotic; Edema: none per RN flowsheets

## 2020-10-19 NOTE — DIETITIAN INITIAL EVALUATION ADULT. - PERTINENT LABORATORY DATA
(10/19) H/H: 8.9/28.7, BUN: 8, Cr: 0.5, Corrected Ca: 8.8, ALP: 160, AST: 43, (8/30) 6.9  POCT Blood Glucose.: 95 mg/dL (19 Oct 2020 11:02)  POCT Blood Glucose.: 94 mg/dL (19 Oct 2020 08:09)  POCT Blood Glucose.: 124 mg/dL (18 Oct 2020 21:46)  POCT Blood Glucose.: 142 mg/dL (18 Oct 2020 16:37)

## 2020-10-19 NOTE — MEDICAL STUDENT PROGRESS NOTE(EDUCATION) - NS MD HP STUD ASPLAN ASSES FT
74 y.o W with PMH marginal zone lymphoma w/ splenomegaly, on ibrutinib (last dose 10/11), HTN, DM, CAD, and anemia (transfusion dependent), and recent admission to [facility] ICU (aug 2020) for VRE UTI, presented to the Ed with confusion & AMS. In ED was found to be in shock, started on pressors and sent to ICU with clinical improvement. When hemodynamically stable, off pressors, she is on the medicine floor for monitoring.

## 2020-10-19 NOTE — DIETITIAN INITIAL EVALUATION ADULT. - ORAL INTAKE PTA/DIET HISTORY
PTA pt reports good PO/appetite. Agitated/sleepy during assessment and did not go into depth of hx. No cultural/Hinduism food preferences. No personal food preferences. NKFA or MVI at home. Did not emphasize on diet at home. PTA pt reports good PO/appetite. Agitated/sleepy during assessment and did not go into depth of hx. No cultural/Alevism food preferences. No personal food preferences. NKFA or MVI at home. Did not emphasize on diet at home or any health related diet restrictions.

## 2020-10-20 NOTE — MEDICAL STUDENT PROGRESS NOTE(EDUCATION) - NS MD HP STUD ASPLAN ASSES FT
74 y.o W with PMH marginal zone lymphoma w/ splenomegaly, on ibrutinib (last dose 10/11), HTN, DM, CAD, and anemia (transfusion dependent), and recent admission to [facility] ICU (aug 2020) for VRE UTI, presented to the Ed with confusion & AMS. In ED was found to be in shock, started on pressors and sent to ICU with clinical improvement. Hemodynamically stable, off pressors, she is on the medicine floor for monitoring.

## 2020-10-20 NOTE — PROGRESS NOTE ADULT - ASSESSMENT
74 year old female with PMH of HTN, DM, CAD, B cell lymphoproliferative disorder on Ibrutinib last dose yesterday, recent VRE UTI  is here for evaluation of confusion and AMS  Pt was admitted to MICU and transferred to medicib    # Hypovolemic shock sec to GI bleed  -  s/p 4 units of  pRBCs  - EGD revealed --> nonerosive esophagitis, duodenitis, and petechiae  - c/w  protonix  - HB/HCT stable.    # Pancytopenia, H/o  Marginal zone lymphoma with splenomegaly:  - US Spleen (10.17.20 @ 09:59) >Enlarged spleen measures 20.4 cm in length x 7.4 cm in width with no focal mass or perisplenic fluid collection. Left pleural effusion.  - As per hematology: DDx for anemia includes hemolysis, splenic sequestratin, AE from ibrutinib  -Patient is transfusion dependent at baseline  - Hemolysis workup negative (PALAK, LDH, hapto), reticulocytes elevated   - PET CT as of May 2020 showing diffuse FDG uptake in an enlarged spleen   - Was recently restarted on ibrutinib, then held again due to thrombocytopenia   -restart  ibrutinib	  - Acyclovir and fluconazole prophylaxis    # Hypertension  - c/w norvasc, metoprolol    #  H/o CAD  - c/w ASA, metoprolol, statin     # DM type 2  - monitor FS  - c/w lantus, lispro    # Hypomagnesemia  - replete mg    # DVT prophylaxis  - scd    # Full code    #Pending: PT F/u   # Discussed plan of care with patient  # Disposition: Home in AM

## 2020-10-20 NOTE — PROGRESS NOTE ADULT - SUBJECTIVE AND OBJECTIVE BOX
24H events:  Platelets and hemoglobin rising  VSS    PAST MEDICAL & SURGICAL HISTORY  Anemia    DM (diabetes mellitus)    High cholesterol    HTN (hypertension)    B-cell chronic lymphocytic leukemia variant    CAD (coronary artery disease)  s/p stenting    H/O heart artery stent      SOCIAL HISTORY:  Negative for smoking/alcohol/drug use.     ALLERGIES:  penicillin (Anaphylaxis; Hives)    MEDICATIONS:  STANDING MEDICATIONS  acyclovir   Oral Tab/Cap 400 milliGRAM(s) Oral daily  amLODIPine   Tablet 5 milliGRAM(s) Oral daily  aspirin  chewable 81 milliGRAM(s) Oral daily  atorvastatin 80 milliGRAM(s) Oral at bedtime  chlorhexidine 4% Liquid 1 Application(s) Topical <User Schedule>  dextrose 5%. 1000 milliLiter(s) IV Continuous <Continuous>  dextrose 50% Injectable 12.5 Gram(s) IV Push once  dextrose 50% Injectable 25 Gram(s) IV Push once  dextrose 50% Injectable 25 Gram(s) IV Push once  fluconAZOLE   Tablet 400 milliGRAM(s) Oral daily  folic acid 1 milliGRAM(s) Oral daily  influenza   Vaccine 0.5 milliLiter(s) IntraMuscular once  insulin glargine Injectable (LANTUS) 15 Unit(s) SubCutaneous at bedtime  insulin lispro Injectable (HumaLOG) 5 Unit(s) SubCutaneous three times a day before meals  metoprolol tartrate 50 milliGRAM(s) Oral two times a day  pantoprazole    Tablet 40 milliGRAM(s) Oral two times a day    PRN MEDICATIONS  acetaminophen   Tablet .. 650 milliGRAM(s) Oral every 6 hours PRN  dextrose 40% Gel 15 Gram(s) Oral once PRN  glucagon  Injectable 1 milliGRAM(s) IntraMuscular once PRN    VITALS:   T(F): 97.9  HR: 73  BP: 136/61  RR: 19  SpO2: --    LABS:                        9.3    5.63  )-----------( 87       ( 20 Oct 2020 06:28 )             29.5     10-20    139  |  106  |  8<L>  ----------------------------<  73  4.2   |  24  |  <0.5<L>    Ca    8.0<L>      20 Oct 2020 06:28  Mg     1.5     10-20    TPro  4.3<L>  /  Alb  2.9<L>  /  TBili  1.1  /  DBili  x   /  AST  47<H>  /  ALT  29  /  AlkPhos  166<H>  10-20    RADIOLOGY:  < from: US Spleen (10.17.20 @ 09:59) >  Enlarged spleen measures 20.4 cm in length x 7.4 cm in width with no focal mass or perisplenic fluid collection. Left pleural effusion.    < end of copied text >        PHYSICAL EXAM:  GEN: No acute distress  HENT: NCAT, EOMI  LYMPH: No appreciable adenopathy  LUNGS: No respiratory distress, clear to auscultation bilaterally   HEART: regular rate and rhythm  ABD: Soft, non-tender, non-distended  SKIN: old ecchymoses  EXT: No edema  NEURO: AAOX3

## 2020-10-20 NOTE — DISCHARGE NOTE PROVIDER - HOSPITAL COURSE
Patient is a 74y old woman with PMH of HTN, DM, CAD, B cell lymphoproliferative disorder (on Ibrutinib, last dose 10/11) and recent VRE UTI who presented for confusion and AMS. Patient daughter endorsed  dark stool weekend prior to presentation. Found to be septic in ED with BP 75/50, , and WBC of 26. Given 2L LR, linezolid, aztreonam in ED. Also transfused 3 units pRBC due to hemoglobin of 4.2 with response to 8.0. Pt is transfusion dependent at baseline.   Admitted to CCU for shock (septic v. hemorrhagic). In CCU she was never intubated, was on levophed which was titrated down, and started on midodrine for BP control. Was on cefepime which was d/c'd in CCU after clinical improvement.    She was downgraded 10/16, off pressors. HgB uptrending since admission, WBC wnl.   EGD done 10/16 shows nonerosive esophagitis, duodenitis, and petechiae.    US 10/17 shows Enlarged spleen.  During her hospital course her ibrutinib has been held.   Pt has been seen by heme/onc and GI and can continue to follow with them outpatient.    Patient is a 74y old woman with PMH of HTN, DM, CAD, B cell lymphoproliferative disorder (on Ibrutinib, last dose 10/11) and recent VRE UTI who presented for confusion and AMS. Patient daughter endorsed  dark stool weekend prior to presentation. Found to be septic in ED with BP 75/50, , and WBC of 26. Given 2L LR, linezolid, aztreonam in ED. Also transfused 3 units pRBC due to hemoglobin of 4.2 with response to 8.0. Pt is transfusion dependent at baseline.   Admitted to CCU for shock (septic v. hemorrhagic). In CCU she was never intubated, was on levophed which was titrated down, and started on midodrine for BP control. Was on cefepime which was d/c'd in CCU after clinical improvement.    She was downgraded 10/16, off pressors. HgB uptrending since admission, WBC wnl.   EGD done 10/16 shows nonerosive esophagitis, duodenitis, and petechiae.    US 10/17 shows Enlarged spleen.  During her hospital course her ibrutinib had been held, restarted on 10/20/20.   Pt has been seen by heme/onc and GI and can continue to follow with them outpatient.    Patient is a 74y old woman with PMH of HTN, DM, CAD, B cell lymphoproliferative disorder (on Ibrutinib, last dose 10/11) and recent VRE UTI who presented for confusion and AMS. Patient daughter endorsed  dark stool weekend prior to presentation. Found to be septic in ED with BP 75/50, , and WBC of 26. Given 2L LR, linezolid, aztreonam in ED. Also transfused 3 units pRBC due to hemoglobin of 4.2 with response to 8.0. Pt is transfusion dependent at baseline.   Admitted to CCU for shock (septic v. hemorrhagic). In CCU she was never intubated, was on levophed which was titrated down, and started on midodrine for BP control. Was on cefepime which was d/c'd in CCU after clinical improvement.    She was downgraded 10/16, off pressors. HgB uptrending since admission, WBC wnl.   EGD done 10/16 shows nonerosive esophagitis, duodenitis, and petechiae.    US 10/17 shows Enlarged spleen.  During her hospital course her ibrutinib had been held, restarted on discharge  Pt has been seen by heme/onc and GI and can continue to follow with them outpatient.

## 2020-10-20 NOTE — DISCHARGE NOTE PROVIDER - NSDCCPCAREPLAN_GEN_ALL_CORE_FT
PRINCIPAL DISCHARGE DIAGNOSIS  Diagnosis: Septic shock  Assessment and Plan of Treatment: During your stay you were treated for sepsis caused by a UTI.  Sepsis is a serious condition that occurs when the body overreacts to an infection. It is also called systemic inflammatory response syndrome (SIRS) with infection. An infection is usually caused by bacteria that attack the body. The body's defense system normally fights off infection within the affected body part. With sepsis, the body overreacts and causes symptoms to occur throughout the body. This leads to uncontrolled and widespread inflammation and clotting in small blood vessels. Blood flow to different body parts decreases and may lead to organ failure. Sepsis requires immediate treatment.  You were treated in the hospital with IV antibiotics, and your symptoms resolved.   Please seek immediate medical attention if you develop fever >100.4 F, feel dizzy, have nausea or vomiting, coughing blood, have sudden trouble breathing or chest pain, severe weakness, or your skin or lips are turning blue.        SECONDARY DISCHARGE DIAGNOSES  Diagnosis: UTI (urinary tract infection) due to Enterococcus  Assessment and Plan of Treatment: You were noted either on arrival or during this hospitalization to have a Urinary Tract Infection. You may have already been treated and completed the antibiotics, please refer to the list of medications present on your discharge paperwork. If you notice that there are antibiotics listed, these may be to treat your infection, be sure to complete taking the full course, whether you have symptoms or not, as prescribed.  While taking antibiotics, you may benefit from taking a probiotic such as florastore to help to try and prevent an infectious type of diarrhea known as C Diff. If you notice that you begin having severe watery diarrhea, more than 4-5 episodes a day, please see your Primary Care Doctor or come to the ER to have your stool tested for this infection.   It is not necessary to repeat a urine test to see if the infection is gone, it is assumed that after treatment it should have resolved. However, if you continue to have symptoms, please see your Primary Care doctor or return to the ER.      Diagnosis: Hyponatremia  Assessment and Plan of Treatment: Hyponatremia occurs when the concentration of sodium in your blood is abnormally low. Sodium is an electrolyte, and it helps regulate the amount of water that's in and around your cells.  In hyponatremia, one or more factors — ranging from an underlying medical condition to drinking too much water — cause the sodium in your body to become diluted. When this happens, your body's water levels rise, and your cells begin to swell. This swelling can cause many health problems, from mild to life-threatening.  Sodium plays a key role in your body. It helps maintain normal blood pressure, supports the work of your nerves and muscles, and regulates your body's fluid balance.  A normal blood sodium level is between 135 and 145 milliequivalents per liter (mEq/L). Hyponatremia occurs when the sodium in your blood falls below 135 mEq/L.   Many possible conditions and lifestyle factors can lead to hyponatremia, including medications, Heart, kidney and liver problems, hormonal changes, Drinking too much water, Chronic, severe vomiting or diarrhea and other causes of dehydration, or Syndrome of inappropriate anti-diuretic hormone (SIADH).  Hyponatremia treatment is aimed at resolving the underlying condition. Depending on the cause of hyponatremia, you may simply need to cut back on how much you drink. In other cases of hyponatremia, you may need intravenous electrolyte solutions and medications.  Seek emergency care for anyone who develops severe signs and symptoms of hyponatremia, such as nausea and vomiting, confusion, seizures, or lost consciousness. Call 911 and return to the emergency room if you have chest pain, difficulty breathing, high fevers, worsening of your symptoms, feel unwell or have nausea and vomiting.      Diagnosis: Hyperglycemia  Assessment and Plan of Treatment: Hyperglycemia  Hyperglycemia occurs when the glucose (sugar) level in your blood is too high. Symptoms include increased urination, increased thirst, a dry mouth, or changes in appetite. If started on a medication, take exactly as prescribed by your health care professional. Maintain a healthy lifestyle and follow up with your primary care physician.  SEEK IMMEDIATE MEDICAL CARE IF YOU HAVE ANY OF THE FOLLOWING SYMPTOMS: shortness of breath, change in mental status, nausea or vomiting, fruity smell to your breath, or any signs of dehydration.      Diagnosis: Anemia  Assessment and Plan of Treatment: Anemia  Anemia is a condition in which the concentration of red blood cells or hemoglobin in the blood is below normal. Hemoglobin is a substance in red blood cells that carries oxygen to the tissues of the body. Anemia results in not enough oxygen reaching these tissues which can cause symptoms such as weakness, dizziness/lightheadedness, shortness of breath, chest pain, paleness, or nausea. The cause of your anemia may or may not be determined immediately. If your hemoglobin was dangerously low, you may have received a blood transfusion. Usually reactions to transfusions occur immediately but monitor yourself for any fevers, rash, or shortness of breath.  SEEK IMMEDIATE MEDICAL CARE IF YOU HAVE ANY OF THE FOLLOWING SYMPTOMS: extreme weakness/chest pain/shortness of breath, black or bloody stools, vomiting blood, fainting, fever, or any signs of dehydration.       PRINCIPAL DISCHARGE DIAGNOSIS  Diagnosis: Upper GI bleed  Assessment and Plan of Treatment: You were presented with low hemoglobin level and hemorrhagic shock. Your Hb level on presentation was 4.2. Total 3 units of blood was transfused during  hospital stay after which your Hb level stayed stable. EGD was done which was suggestive of non erosive gastritis - likely cause of bleeding. You were started on protonix 40 mg twice a day - continue medication on discharge. You were on dual antiplatelet therapy for your previous CAD. Due to recurrent episode of bleeding will stop plavix at this time. Follow up with your PCP on discharge.      SECONDARY DISCHARGE DIAGNOSES  Diagnosis: UTI (urinary tract infection) due to Enterococcus  Assessment and Plan of Treatment: You were noted either on arrival or during this hospitalization to have a Urinary Tract Infection. You may have already been treated and completed the antibiotics, please refer to the list of medications present on your discharge paperwork. If you notice that there are antibiotics listed, these may be to treat your infection, be sure to complete taking the full course, whether you have symptoms or not, as prescribed.  While taking antibiotics, you may benefit from taking a probiotic such as florastore to help to try and prevent an infectious type of diarrhea known as C Diff. If you notice that you begin having severe watery diarrhea, more than 4-5 episodes a day, please see your Primary Care Doctor or come to the ER to have your stool tested for this infection.   It is not necessary to repeat a urine test to see if the infection is gone, it is assumed that after treatment it should have resolved. However, if you continue to have symptoms, please see your Primary Care doctor or return to the ER.      Diagnosis: Hyponatremia  Assessment and Plan of Treatment: Hyponatremia occurs when the concentration of sodium in your blood is abnormally low. Sodium is an electrolyte, and it helps regulate the amount of water that's in and around your cells.  In hyponatremia, one or more factors — ranging from an underlying medical condition to drinking too much water — cause the sodium in your body to become diluted. When this happens, your body's water levels rise, and your cells begin to swell. This swelling can cause many health problems, from mild to life-threatening.  Sodium plays a key role in your body. It helps maintain normal blood pressure, supports the work of your nerves and muscles, and regulates your body's fluid balance.  A normal blood sodium level is between 135 and 145 milliequivalents per liter (mEq/L). Hyponatremia occurs when the sodium in your blood falls below 135 mEq/L.   Many possible conditions and lifestyle factors can lead to hyponatremia, including medications, Heart, kidney and liver problems, hormonal changes, Drinking too much water, Chronic, severe vomiting or diarrhea and other causes of dehydration, or Syndrome of inappropriate anti-diuretic hormone (SIADH).  Hyponatremia treatment is aimed at resolving the underlying condition. Depending on the cause of hyponatremia, you may simply need to cut back on how much you drink. In other cases of hyponatremia, you may need intravenous electrolyte solutions and medications.  Seek emergency care for anyone who develops severe signs and symptoms of hyponatremia, such as nausea and vomiting, confusion, seizures, or lost consciousness. Call 911 and return to the emergency room if you have chest pain, difficulty breathing, high fevers, worsening of your symptoms, feel unwell or have nausea and vomiting.      Diagnosis: Hyperglycemia  Assessment and Plan of Treatment: Hyperglycemia  Hyperglycemia occurs when the glucose (sugar) level in your blood is too high. Symptoms include increased urination, increased thirst, a dry mouth, or changes in appetite. If started on a medication, take exactly as prescribed by your health care professional. Maintain a healthy lifestyle and follow up with your primary care physician.  SEEK IMMEDIATE MEDICAL CARE IF YOU HAVE ANY OF THE FOLLOWING SYMPTOMS: shortness of breath, change in mental status, nausea or vomiting, fruity smell to your breath, or any signs of dehydration.      Diagnosis: Anemia  Assessment and Plan of Treatment: Anemia  Anemia is a condition in which the concentration of red blood cells or hemoglobin in the blood is below normal. Hemoglobin is a substance in red blood cells that carries oxygen to the tissues of the body. Anemia results in not enough oxygen reaching these tissues which can cause symptoms such as weakness, dizziness/lightheadedness, shortness of breath, chest pain, paleness, or nausea. The cause of your anemia may or may not be determined immediately. If your hemoglobin was dangerously low, you may have received a blood transfusion. Usually reactions to transfusions occur immediately but monitor yourself for any fevers, rash, or shortness of breath.  SEEK IMMEDIATE MEDICAL CARE IF YOU HAVE ANY OF THE FOLLOWING SYMPTOMS: extreme weakness/chest pain/shortness of breath, black or bloody stools, vomiting blood, fainting, fever, or any signs of dehydration.

## 2020-10-20 NOTE — DISCHARGE NOTE PROVIDER - CARE PROVIDERS DIRECT ADDRESSES
[email or web address]

## 2020-10-20 NOTE — DISCHARGE NOTE NURSING/CASE MANAGEMENT/SOCIAL WORK - PATIENT PORTAL LINK FT
You can access the FollowMyHealth Patient Portal offered by [facility] by registering at the following website: [email or web address]. By joining [employer]’s FollowMyHealth portal, you will also be able to view your health information using other applications (apps) compatible with our system.

## 2020-10-20 NOTE — MEDICAL STUDENT PROGRESS NOTE(EDUCATION) - SUBJECTIVE AND OBJECTIVE BOX
AD BASURTO, female, 74y ([birth year]),   [id number]  Admit Date: 10-12-20 (8d)    Chief Complaint:  Patient is a 74y old  Female who presents with a chief complaint of septic shock (20 Oct 2020 14:24)    Interval:  No acute events overnight, no complaints at this time. Patient is anxious when working with PT.     Past Medical and Surgical History:  PAST MEDICAL & SURGICAL HISTORY:  Anemia  DM (diabetes mellitus)  High cholesterol  HTN (hypertension)  B-cell chronic lymphocytic leukemia variant  CAD (coronary artery disease)  s/p stenting  H/O heart artery stent        Current Medications:  MEDICATIONS  (STANDING):  acyclovir   Oral Tab/Cap 400 milliGRAM(s) Oral daily  amLODIPine   Tablet 5 milliGRAM(s) Oral daily  aspirin  chewable 81 milliGRAM(s) Oral daily  atorvastatin 80 milliGRAM(s) Oral at bedtime  chlorhexidine 4% Liquid 1 Application(s) Topical <User Schedule>  dextrose 5%. 1000 milliLiter(s) (50 mL/Hr) IV Continuous <Continuous>  dextrose 50% Injectable 12.5 Gram(s) IV Push once  dextrose 50% Injectable 25 Gram(s) IV Push once  dextrose 50% Injectable 25 Gram(s) IV Push once  fluconAZOLE   Tablet 400 milliGRAM(s) Oral daily  folic acid 1 milliGRAM(s) Oral daily  insulin glargine Injectable (LANTUS) 15 Unit(s) SubCutaneous at bedtime  insulin lispro Injectable (HumaLOG) 5 Unit(s) SubCutaneous three times a day before meals  metoprolol tartrate 50 milliGRAM(s) Oral two times a day  pantoprazole    Tablet 40 milliGRAM(s) Oral two times a day    MEDICATIONS  (PRN):  acetaminophen   Tablet .. 650 milliGRAM(s) Oral every 6 hours PRN Temp greater or equal to 38C (100.4F), Mild Pain (1 - 3)  dextrose 40% Gel 15 Gram(s) Oral once PRN Blood Glucose LESS THAN 70 milliGRAM(s)/deciliter  glucagon  Injectable 1 milliGRAM(s) IntraMuscular once PRN Glucose LESS THAN 70 milligrams/deciliter      Vital Signs:  T(F): 96.4 (10-20-20 @ 13:38), Max: 98.3 (10-18-20 @ 20:21)  HR: 71 (10-20-20 @ 13:38) (71 - 84)  BP: 108/54 (10-20-20 @ 13:38) (108/54 - 138/63)  RR: 18 (10-20-20 @ 13:38) (18 - 19)  SpO2: --  CAPILLARY BLOOD GLUCOSE      POCT Blood Glucose.: 144 mg/dL (20 Oct 2020 11:11)  POCT Blood Glucose.: 105 mg/dL (20 Oct 2020 07:42)  POCT Blood Glucose.: 96 mg/dL (19 Oct 2020 21:20)  POCT Blood Glucose.: 94 mg/dL (19 Oct 2020 16:27)      Physical Exam:  General: Not in distress. Sitting up in bed and eating well.  HEENT: Bruising on left anterior neck from central line placement. Moist mucus membranes. PERRLA.  Cardio: Ejection-type murmur best hear in left upper sternal border. Regular rate and rhythm, S1, S2, no rub, or gallop.  Pulm: Clear to auscultation bilaterally. No wheezing, rales, or rhonchi.  Abdomen: No suprapubic tenderness. Palpable splenomegaly. Non-tender, non-distended. Normoactive bowel sounds.  Extremities: No cyanosis or edema bilaterally. No calf tenderness to palpation.  Neuro: A&O x3. No focal deficits.     Labs and Imaging:  CBC Full  -  ( 20 Oct 2020 06:28 )  WBC Count : 5.63 K/uL  RBC Count : 2.98 M/uL  Hemoglobin : 9.3 g/dL  Hematocrit : 29.5 %  Platelet Count - Automated : 87 K/uL  Mean Cell Volume : 99.0 fL  Mean Cell Hemoglobin : 31.2 pg  Mean Cell Hemoglobin Concentration : 31.5 g/dL  Auto Neutrophil # : 3.36 K/uL  Auto Lymphocyte # : 1.68 K/uL  Auto Monocyte # : 0.30 K/uL  Auto Eosinophil # : 0.00 K/uL  Auto Basophil # : 0.00 K/uL  Auto Neutrophil % : 59.6 %  Auto Lymphocyte % : 29.8 %  Auto Monocyte % : 5.3 %  Auto Eosinophil % : 0.0 %  Auto Basophil % : 0.0 %    RDW: 19.8      BMP: 10-20-20 @ 06:28  139 | 106 | 8    -----------------< 73  4.2  | 24 | <0.5  eGFR(AA): 131, eGFR (non-AA): 113  Ca 8.0, Mg 1.5, P --    LFTs: 10-20-20 @ 06:28  TP  4.3  | 2.9 Alb   ---------------  TB  1.1  | --  DB   ---------------  ALT 29  | 47  AST            ^          166 ALK  LFTs: 10-19-20 @ 06:15  TP  4.1  | 2.9 Alb   ---------------  TB  0.9  | --  DB   ---------------  ALT 29  | 43  AST            ^          160 ALK      LFTs: 10-20-20 @ 06:28  Ca  8.0  | 47 AST   -----------------  TP  4.3  | 29 ALT  -----------------  Alb 2.9  | 166 ALK          ^        1.1         TB      Cardiac Enzymes:    Urinalysis:    Cultures:     (collected 10-14-20 @ 12:13)  Source: .Blood Blood-Peripheral  Final Report:    No Growth Final     (collected 10-12-20 @ 09:40)  Source: .Urine Clean Catch (Midstream)  Final Report:    50,000 - 99,000 CFU/mL Enterobacter cloacae complex  Organism: Enterobacter cloacae complex  Organism: Enterobacter cloacae complex     (collected 10-12-20 @ 09:07)  Source: .Blood Blood-Peripheral  Final Report:    No Growth Final     (collected 10-12-20 @ 09:07)  Source: .Blood Blood-Peripheral  Final Report:    No Growth Final        Home Medications:  Home Medications:  acyclovir 400 mg oral tablet: 1 tab(s) orally once a day (25 Aug 2020 00:00)  amLODIPine 5 mg oral tablet: 1 tab(s) orally once a day (25 Aug 2020 00:00)  aspirin 81 mg oral tablet, chewable: 1 tab(s) orally once a day (25 Aug 2020 00:00)  atorvastatin 80 mg oral tablet: 1 tab(s) orally once a day (25 Aug 2020 00:00)  clopidogrel 75 mg oral tablet: 1 tab(s) orally once a day (25 Aug 2020 00:00)  fluconazole 200 mg oral tablet: 2 tab(s) orally once a day (25 Aug 2020 00:00)  metoprolol tartrate 50 mg oral tablet: 1 tab(s) orally 2 times a day (25 Aug 2020 00:00)

## 2020-10-20 NOTE — DISCHARGE NOTE PROVIDER - PROVIDER TOKENS
PROVIDER:[TOKEN:[[id number]],ESTABLISHEDPATIENT:[T]],PROVIDER:[TOKEN:[[id number]],FOLLOWUP:[2 weeks]]

## 2020-10-20 NOTE — DISCHARGE NOTE PROVIDER - CARE PROVIDER_API CALL
Beatriz Guardado  HEMATOLOGY/ONCOLOGY  [address], [place] [address]  Phone: [phone]  Fax: [phone]  Established Patient  Follow Up Time:     Lali Whitaker)  Gastroenterology  [address] [place] [id number]  Phone: [phone]  Fax: [phone]  Follow Up Time: 2 weeks

## 2020-10-20 NOTE — PROGRESS NOTE ADULT - ASSESSMENT
Patient is a 75 y/o F with PMH of marginal zone lymphoma, recurrent UTI, CAD s/p stent, DLD, HTN  admitted for sepsis due to VRE UTI    #) Marginal zone lymphoma with splenomegaly:  - PET CT as of May 2020 showing diffuse FDG uptake in an enlarged spleen   - Transfusion dependent  - Acyclovir and fluconazole ppx  - Restart ibrutinib, 1 pill QD  - Spleen US showed 20.4x7.4cm this admission (spleen 25.8 on 8/24 by CT, ~[id number] on US 8/26)    #) Anemia: Patient is transfusion dependent at baseline, however this was an acute drop, suspect UGIB with the dramatic rise in BUN and acute drop  - Hemolysis workup negative (PALAK, LDH, hapto), reticulocytes elevated    -EGD performed 10/16, showed non-erosive esophagitis, duodenitis and petechiae in stomach  - s/p multiple units of PRBCs at start of admission  - Keep Hg ~8  - Folic acid 1mg QD    #) Thrombocytopenia: Improving  - Keep platelets greater than 50K     Shock: Resolved, septic (hx of VRE urine however UA -, CXR neg) vs hypovolemic (reported dark stool, Hg dropped to 4.2)  - Off abx  - EGD completed

## 2020-10-20 NOTE — DISCHARGE NOTE PROVIDER - NSDCMRMEDTOKEN_GEN_ALL_CORE_FT
acyclovir 400 mg oral tablet: 1 tab(s) orally once a day  amLODIPine 5 mg oral tablet: 1 tab(s) orally once a day  aspirin 81 mg oral tablet, chewable: 1 tab(s) orally once a day  atorvastatin 80 mg oral tablet: 1 tab(s) orally once a day  clopidogrel 75 mg oral tablet: 1 tab(s) orally once a day  fluconazole 200 mg oral tablet: 2 tab(s) orally once a day  folic acid 1 mg oral tablet: 1 tab(s) orally once a day  metoprolol tartrate 50 mg oral tablet: 1 tab(s) orally 2 times a day   acyclovir 400 mg oral tablet: 1 tab(s) orally once a day  alcohol swabs : Apply topically to affected area 4 times a day   amLODIPine 5 mg oral tablet: 1 tab(s) orally once a day  aspirin 81 mg oral tablet, chewable: 1 tab(s) orally once a day  atorvastatin 80 mg oral tablet: 1 tab(s) orally once a day (at bedtime)  fluconazole 200 mg oral tablet: 2 tab(s) orally once a day  folic acid 1 mg oral tablet: 1 tab(s) orally once a day  glucometer (per patient&#x27;s insurance): Test blood sugars four times a day. Dispense #1 glucometer.  lancets: 1 application subcutaneously 4 times a day   metFORMIN 500 mg oral tablet: 1 tab(s) orally 2 times a day   metoprolol tartrate 50 mg oral tablet: 1 tab(s) orally 2 times a day  pantoprazole 40 mg oral delayed release tablet: 1 tab(s) orally 2 times a day  test strips (per patient&#x27;s insurance): 1 application subcutaneously 4 times a day. ** Compatible with patient&#x27;s glucometer **

## 2020-10-20 NOTE — DISCHARGE NOTE PROVIDER - NSDCFUADDINST_GEN_ALL_CORE_FT
- FU PCP- needs cbc and cmp and transfuse to keep above Hgb above 8  - FU heme/onc - restart ibrutinib  - FU GI- follow up elevated alk phos   - FU endocrinology for diabetes management.

## 2020-10-20 NOTE — MEDICAL STUDENT PROGRESS NOTE(EDUCATION) - NS MD HP STUD ASPLAN PLAN FT
# Shock (likely hemorrhagic), resolved     - WBC 26, , BP 75/50 on admission : blood pressure improved , wbc trending down (5 today)  - Endorsed 1 episode of dark melena over weekend; HgB 4.2 on presentation, s/p 3 units pRBCs in ED + 1 unit in CCU  - EGD done 10/16 shows nonerosive esophagitis, duodenitis, and petechiae  - UCx +ve for enterobacter, sensitive to linezolid. Was on cefepime resistant ASx and clinically improved so CCU d/c'd cefepime    - BCx -ve, Hemolysis work up negative   - CXR negative, CT AP negative  - HgB is stable  - c/w PPI PO bid  - ID rec: follow off abx    # Transfusion dependent anemia, stable   - Hemoglobin: 8.9 g/dL (10.19.20 @ 06:15)   - Platelet Count: 80 K/uL (10.19.20 @ 06:15)   - Keep active type and screen, CBC stable   - Heme/onc following, recommends for anemia and thrombocytopenia :   - Keep Hb about 8, platelets >50K    - Folic acid 1mg QD    # HTN   - c/w norvasc 5mg PO qd and metoprolol tartrate 50mg PO BID     # CAD  - hold plavix 75mg qd  - c/w metoprolol tartrate 50mg PO BID   - c/w ASA 81mg qd  - c/w Atorvastatin 80mg PO qhs    #) Marginal zone lymphoma with splenomegaly:  - Heme/onc following, recommends:   - enlarged spleen on US	  - Acyclovir and fluconazole ppx  - Restart ibrutinib     # Elevated ALP:   - GI Rec: f/u in liver clinic for further work up    # DM  - c/w lantus and lispro  - HbA1C 6.9 in august 2020    #DVT prophylaxis: Sequentials  #GI prophylaxis: PPI BID  #Code: FULL  #Diet: Consistent carb/ no snack   #Activity: as tolerated   #Dispo: Going home with home care and PT, set up by case-management and ready for next day start. Patient is walking but anxious and requiring PT assist, daughter says this is worsened from baseline. PT will reevaluate tomorrow. Discussed with daughter about considering STR but she wishes to take patient home.      - FU PCP- needs cbc and cmp and transfuse to keep above Hgb above 8  - FU heme/onc - possible restart ibrutinib  - FU GI- follow up elevated alk phos    # Shock (likely hemorrhagic), resolved     - WBC 26, , BP 75/50 on admission : blood pressure improved , wbc trending down (5 today)  - Endorsed 1 episode of dark melena over weekend; HgB 4.2 on presentation, s/p 3 units pRBCs in ED + 1 unit in CCU  - EGD done 10/16 shows nonerosive esophagitis, duodenitis, and petechiae  - UCx +ve for enterobacter, sensitive to linezolid. Was on cefepime resistant ASx and clinically improved so CCU d/c'd cefepime    - BCx -ve, Hemolysis work up negative   - CXR negative, CT AP negative  - HgB is stable  - c/w PPI PO bid  - ID rec: follow off abx    # Transfusion dependent anemia, stable   - Hemoglobin: 8.9 g/dL (10.19.20 @ 06:15)   - Platelet Count: 80 K/uL (10.19.20 @ 06:15)   - Keep active type and screen, CBC stable   - Heme/onc following, recommends for anemia and thrombocytopenia :   - Keep Hb about 8, platelets >50K    - Folic acid 1mg QD  - restart ibrutinib     # HTN   - c/w norvasc 5mg PO qd and metoprolol tartrate 50mg PO BID     # CAD  - hold plavix 75mg qd  - c/w metoprolol tartrate 50mg PO BID   - c/w ASA 81mg qd  - c/w Atorvastatin 80mg PO qhs    #) Marginal zone lymphoma with splenomegaly:  - Heme/onc following, recommends:   - enlarged spleen on US	  - Acyclovir and fluconazole ppx  - Restart ibrutinib     # Elevated ALP:   - GI Rec: f/u in liver clinic for further work up    # DM  - c/w lantus and lispro  - HbA1C 6.9 in august 2020    #DVT prophylaxis: Sequentials  #GI prophylaxis: PPI BID  #Code: FULL  #Diet: Consistent carb/ no snack   #Activity: as tolerated   #Dispo: Going home with home care and PT, set up by case-management and ready for next day start. Patient is walking but anxious and requiring PT assist, daughter says this is worsened from baseline. PT will reevaluate tomorrow. Discussed with daughter about considering STR but she wishes to take patient home.      - FU PCP- needs cbc and cmp and transfuse to keep above Hgb above 8  - FU heme/onc - restart ibrutinib  - FU GI- follow up elevated alk phos

## 2020-10-21 NOTE — PROGRESS NOTE ADULT - SUBJECTIVE AND OBJECTIVE BOX
SUBJECTIVE:    Patient is a 74y old Female who presents with a chief complaint of septic shock (20 Oct 2020 14:24)    Currently admitted to medicine with the primary diagnosis of: septic shock    Today is hospital day 9d.     Overnight Events:     No acute overnight events    PAST MEDICAL & SURGICAL HISTORY  Anemia    DM (diabetes mellitus)    High cholesterol    HTN (hypertension)    B-cell chronic lymphocytic leukemia variant    CAD (coronary artery disease)  s/p stenting    H/O heart artery stent      ALLERGIES:  penicillin (Anaphylaxis; Hives)    MEDICATIONS:  STANDING MEDICATIONS  acyclovir   Oral Tab/Cap 400 milliGRAM(s) Oral daily  amLODIPine   Tablet 5 milliGRAM(s) Oral daily  aspirin  chewable 81 milliGRAM(s) Oral daily  atorvastatin 80 milliGRAM(s) Oral at bedtime  chlorhexidine 4% Liquid 1 Application(s) Topical <User Schedule>  fluconAZOLE   Tablet 400 milliGRAM(s) Oral daily  folic acid 1 milliGRAM(s) Oral daily  insulin glargine Injectable (LANTUS) 15 Unit(s) SubCutaneous at bedtime  insulin lispro Injectable (HumaLOG) 5 Unit(s) SubCutaneous three times a day before meals  metoprolol tartrate 50 milliGRAM(s) Oral two times a day  pantoprazole    Tablet 40 milliGRAM(s) Oral two times a day    PRN MEDICATIONS  acetaminophen   Tablet .. 650 milliGRAM(s) Oral every 6 hours PRN    VITALS:   ICU Vital Signs Last 24 Hrs  T(C): 36.5 (21 Oct 2020 14:22), Max: 37.4 (20 Oct 2020 22:09)  T(F): 97.7 (21 Oct 2020 14:22), Max: 99.4 (20 Oct 2020 22:09)  HR: 72 (21 Oct 2020 14:22) (72 - 85)  BP: 99/49 (21 Oct 2020 14:22) (99/49 - 136/63)  BP(mean): --  ABP: --  ABP(mean): --  RR: 18 (21 Oct 2020 14:22) (18 - 18)  SpO2: --      LABS:                        9.2    5.93  )-----------( 98       ( 21 Oct 2020 06:55 )             29.6     10-21    137  |  105  |  10  ----------------------------<  124<H>  3.8   |  25  |  <0.5<L>    Ca    7.9<L>      21 Oct 2020 06:55  Mg     1.5     10-21    TPro  4.2<L>  /  Alb  3.0<L>  /  TBili  1.1  /  DBili  x   /  AST  57<H>  /  ALT  30  /  AlkPhos  197<H>  10-21        PHYSICAL EXAM:    General: Not in distress.   HEENT: Bruising on left anterior neck from central line placement. Moist mucus membranes. PERRLA.  Cardio: Ejection-type murmur best hear in left upper sternal border. Regular rate and rhythm, S1, S2, no rub, or gallop.  Pulm: Clear to auscultation bilaterally. No wheezing, rales, or rhonchi.  Abdomen: No suprapubic tenderness. Palpable splenomegaly. Non-tender, non-distended. Normoactive bowel sounds.  Extremities: No cyanosis or edema bilaterally. No calf tenderness to palpation.  Neuro: A&O x3. No focal deficits.

## 2020-10-21 NOTE — PROGRESS NOTE ADULT - ASSESSMENT
74 y.o W with PMH marginal zone lymphoma w/ splenomegaly, on ibrutinib (last dose 10/11), HTN, DM, CAD, and anemia (transfusion dependent), and recent admission to [facility] (aug 2020) for VRE UTI, presented to the Ed with confusion & AMS. In ED was found to be in shock, started on pressors and sent to ICU with clinical improvement. Hemodynamically stable, off pressors, she is on the medicine floor for monitoring.    # Shock (likely hemorrhagic), resolved     - WBC 26, , BP 75/50 on admission : blood pressure improved , wbc trending down (5 today)  - Endorsed 1 episode of dark melena over weekend; HgB 4.2 on presentation, s/p 3 units pRBCs in ED + 1 unit in CCU  - EGD done 10/16 shows nonerosive esophagitis, duodenitis, and petechiae  - UCx +ve for enterobacter, sensitive to linezolid. Was on cefepime resistant ASx and clinically improved so CCU d/c'd cefepime    - BCx -ve, Hemolysis work up negative   - CXR negative, CT AP negative  - HgB is stable  - c/w PPI PO bid  - ID rec: follow off abx    # Transfusion dependent anemia, stable   - Hemoglobin: 8.9 g/dL (10.19.20 @ 06:15)   - Platelet Count: 80 K/uL (10.19.20 @ 06:15)   - Keep active type and screen, CBC stable   - Heme/onc following, recommends for anemia and thrombocytopenia :   - Keep Hb about 8, platelets >50K    - Folic acid 1mg QD  - restart ibrutinib on discharge    # HTN   - c/w norvasc 5mg PO qd and metoprolol tartrate 50mg PO BID     # CAD  - hold plavix 75mg qd  - c/w metoprolol tartrate 50mg PO BID   - c/w ASA 81mg qd  - c/w Atorvastatin 80mg PO qhs    #) Marginal zone lymphoma with splenomegaly:  - Heme/onc following, recommends:   - enlarged spleen on US	  - Acyclovir and fluconazole ppx  - Restart ibrutinib on discharge    # Elevated ALP:   - GI Rec: f/u in liver clinic for further work up    # DM  - c/w lantus and lispro  - HbA1C 6.9 in august 2020    #DVT prophylaxis: Sequentials  #GI prophylaxis: PPI BID  #Code: FULL  #Diet: Consistent carb/ no snack   #Activity: as tolerated   #Dispo: Going home with home care and PT, set up by case-management and ready for next day start. Patient is walking but anxious and requiring PT assist, daughter says this is worsened from baseline. PT will reevaluate tomorrow. Discussed with daughter about considering STR but she wishes to take patient home.      - FU PCP- needs cbc and cmp and transfuse to keep above Hgb above 8  - FU heme/onc - restart ibrutinib  - FU GI- follow up elevated alk phos

## 2020-10-21 NOTE — PROGRESS NOTE ADULT - ATTENDING COMMENTS
Ximena Rosenthal MD  Hospitalist   Spectra: [phone]    Patient is a 74y old  Female who presents with a chief complaint of septic shock (21 Oct 2020 15:35)      INTERVAL HPI/OVERNIGHT EVENTS: no acute overnight events noted     REVIEW OF SYSTEMS: negative  Vital Signs Last 24 Hrs  T(C): 36.5 (21 Oct 2020 14:22), Max: 37.4 (20 Oct 2020 22:09)  T(F): 97.7 (21 Oct 2020 14:22), Max: 99.4 (20 Oct 2020 22:09)  HR: 72 (21 Oct 2020 14:22) (72 - 85)  BP: 99/49 (21 Oct 2020 14:22) (99/49 - 136/63)  BP(mean): --  RR: 18 (21 Oct 2020 14:22) (18 - 18)  SpO2: --    PHYSICAL EXAM:   NAD; Normocephalic;   LUNGS - no wheezing  HEART: S1 S2+   ABDOMEN: Soft, Nontender, non distended  EXTREMITIES: no cyanosis; no edema  NERVOUS SYSTEM:  Awake and alert; no focal neuro deficits appreciated    LABS:                        9.2    5.93  )-----------( 98       ( 21 Oct 2020 06:55 )             29.6     10-21    137  |  105  |  10  ----------------------------<  124<H>  3.8   |  25  |  <0.5<L>    Ca    7.9<L>      21 Oct 2020 06:55  Mg     1.5     10-21    TPro  4.2<L>  /  Alb  3.0<L>  /  TBili  1.1  /  DBili  x   /  AST  57<H>  /  ALT  30  /  AlkPhos  197<H>  10-21        CAPILLARY BLOOD GLUCOSE      POCT Blood Glucose.: 156 mg/dL (21 Oct 2020 11:13)  POCT Blood Glucose.: 130 mg/dL (21 Oct 2020 07:32)  POCT Blood Glucose.: 145 mg/dL (20 Oct 2020 21:38)      A/P:-  Pt is seen and evaluated by bedside.   # Hypovolemic shock sec to GI bleed  -  s/p 3 units of  pRBCs  - EGD revealed --> nonerosive esophagitis, duodenitis, and petechiae  - c/w  protonix bid   - HB/HCT stable.    # Pancytopenia, H/o  Marginal zone lymphoma with splenomegaly:  - US Spleen (10.17.20 @ 09:59) >Enlarged spleen measures 20.4 cm in length x 7.4 cm in width with no focal mass or perisplenic fluid collection. Left pleural effusion.  - As per hematology: DDx for anemia includes hemolysis, splenic sequestratin, AE from ibrutinib  -Patient is transfusion dependent at baseline  - Hemolysis workup negative (PALAK, LDH, hapto), reticulocytes elevated   - PET CT as of May 2020 showing diffuse FDG uptake in an enlarged spleen   - Was recently restarted on ibrutinib, then held again due to thrombocytopenia   -restart  ibrutinib	  - Acyclovir and fluconazole prophylaxis    # Hypertension  - c/w norvasc, metoprolol    #  H/o CAD- 14 years ago   - c/w ASA, metoprolol, statin   - will dc plavix     # DM type 2  - monitor FS  - c/w lantus, lispro    patient is stable for discharge home      Plan of care was discussed with patient ; all questions and concerns were addressed and care was aligned with patient's wishes.
Case discussed with fellow agree with above.
The patient was seen. Agree with above.  Continue current care.

## 2020-10-28 PROBLEM — D47.9 B-CELL LYMPHOPROLIFERATIVE DISORDER: Status: ACTIVE | Noted: 2020-01-01

## 2020-11-03 NOTE — REVIEW OF SYSTEMS
[Fatigue] : fatigue [Dysuria] : dysuria [Joint Pain] : joint pain [Negative] : Heme/Lymph [Fever] : no fever [Chills] : no chills [Night Sweats] : no night sweats [Recent Change In Weight] : ~T no recent weight change [Constipation] : no constipation [de-identified] : left ankle bruising and swelling, generalized bruising

## 2020-11-03 NOTE — ASSESSMENT
[FreeTextEntry1] : Hypoproliferative  anemia and massive splenomegaly, flow consistent with B-cell lymphoproliferative disorder CD5 and CD10 negative, consistent with Marginal Zone Lymphoma of the Spleen, involving BM\par --BMBx on 5/20/2020 revealed Marginal Zone Lymphoma \par --S/p 3 units of PRBC inhouse at diagnosis, requiring frequent PRBC transfusions\par --Flow cytometry revealed Monotypic B-cells (18% of cells), positive for kappa, CD19, CD20, FMC-7; negative CD5, CD10, CD23.\par --No B symptoms, no evidence of overt bleeding\par --No over evidence of hemolysis, Keith negative \par --PET CT on 5/8/2020 reveals splenic uptake as well as splenomegaly \par --Unable to tolerate Rituxan\par --Developed thrombocytopenia and rash with 3 and 2 tabs of Ibrutinib, will plan to rechallenge, on 7/15/2020 still unable to restart on Ibrutinib 2/2 thrombocytopenia, which is improving \par --Continue with weekly CBC and type and cross with PRN transfusions \par --Keep Hb over 8.0, platelet count above 50K, patient is on antiplatelet therapy for h/o cardiac stent .\par --As of 8/12/20 patient received Ibrutinib one tab every other day. \par --CBC was reviewed on 9/29 and 9/30, recommend platelet transfusion today \par --On 10/28/20 Daughter notified to keep ibrutinib on hold for now. \par --Continue with folic acid PO\par --Acyclovir 400 mg PO daily ppx\par --Fluconazole 200mg BID ppx\par \par UTI\par --Patient no longer on Bactrim\par --u/a and culture pending.\par --On 10/28/20 start patient on Levaquin \par \par LOWER EXTREMITY SWELLING\par --No evidence of DVT\par \par Follow up in 2 weeks or sooner if needed.\par Case discussed and patient seen with Dr. Guardado.\par \par \par

## 2020-11-03 NOTE — HISTORY OF PRESENT ILLNESS
[de-identified] : 5/6/2020: She feels very tired and is wobbly. She is on wheelchair now, she lives with her  and her daughter. She denies B symptoms. Melena, bleeding per rectum. \par Images were personally reviewed by MD Debby and discussed with patient and family.\par \par 7/1/2020: Kathrine is here for follow up. Since last visit she has required almost weekly transfusions of PRBC, Hb was going down to 5-6 range in between the transfusions. 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 BCL2 was negative. These findings were previously discussed with patient's daughter Kathrine.\par Patient was also seen in second opinion at [facility] in [place], clinical trial was offered, patient chose not to participate. \par On 6/10/2020 we have attempted to initiate Rituximab, Kathrine has developed a SEVERE allergic reaction to Rituxan, requiring overnight ER stay. \par Given the severity of reaction the plan is NOT to attempt rechallenging with Rituximab. \par She started on Ibrutinib 3 tabs daily on 6/17/2020, by 6/22/2020 she has started developing mild thrombocytopenia, but anemia improved, we asked her to decrease the pill to 2 a day, by 6/27/2020 she has developed a macular papular rash under her L breast and in her L groin, she was asked to hold the pill, the rash today looks fainter, stopped spreading and overall is improved. She will continue to hold Ibrutinib till next Monday on 7/6/2020 and then if rash has resolved will restart at 1 pill daily. \par US of the spleen to obtain new baseline on 6/27/2020 revealed Splenomegaly correlating with CT exam. Enlarged homogeneous \par spleen 22.5 cm x 9.3 cm in width with no focal mass or perisplenic fluid \par collection. Vascular flow, unremarkable.\par \par 7/15/2020: Kathrine was unable to restart on Ibrutibib yet, she sustained a fall at home and was brought to ED on 7/5/2020, she was noted to be febrile in ED, urine was positive and she was started on broad spectrum antibiotics, she was also noted to have worsening thrombocytopenia of admission, she required blood and platelet transfusions inhouse, platelets are improving since discharge, she is currently not on antibiotics. She reports she has not smoked for close to 3 weeks. She is in a wheelchair today and with home O2. She reports feeling well. \par \par 7/28/2020:  Pt came in today accompanied by daughter c/o diffuse, erythematous rash to left groin since last week.  She changed the brand of her diapers from "Depends" to "Always".  She was using Nystatin powder to her rash underneath left breast which was not helping.  Daughter is concerned because her platelets are still low (plt=65,000).\par Hb has been holding up, will defer restarting on Imbruvica for now. \par \par 8/4/2020; As per her daughter Kathrine has been feeling weaker than usual, complaining of urinary symptoms. We will check UA today and start on empiric antibiotics. CBC from today reveals Hb remains stable, transfusion is not necessary, platelets remain low at 57K. We will continue to hold Imbruvica at this point. We will also offer gentle hydration today. \par 8/18/20:\par Kathrine is 74 years old female came for a follow up visit for  B-cell lymphoproliferative disorder. Patient completed a full course of Antibiotics Bactrim.\par  Patient still have high frequency for urination.  She denies Fever, dysuria , or Hematuria. \par Patient ' daughter started Imbruvica   one tab every other day.  Patient tolerated well. \par We communicated CBC from 8/19/20 with HGB / HCT is 9.2/26.5 with platelet count is 102, 000. NO skin rash.\par \par \par 9/30/2020: Patient here for follow up visit for B cell lymphoproliferative disorder.  She is up to 2 tabs of Ibrutinib per day for about 1 week now, she was not instructed to escalate the dose per our instructions.  She is complaining of urinary frequency for the last several days.  Her daughter states there is a bad odor. She also has had left ankle swelling and bruising since Saturday.  They have noticed more and more bruising especially on her arms. Patient denies cough, shortness of breath, fever, chills, night sweats or bone pain.\par \par 10/28/2020: Kathrine is here for follow up visit for B cell lymphoproliferative disorder.  She  just had recent hospitalization from 10/9-10/21/2020, requiring ICU admission for septic shock,  UTI and Hemolytic Anemia, likely in the setting of Bacrim, dir and indir Keith was negative. EGD on 10/16/2020 no active bleeding. \par Today patient still feeling weak, tired.  She reports urinary frequency for the last 2 days. She denies fever, SOB. No bleeding or bruising. \par We communicated Blood work from today with HGB is 11.0/37.2., Platelet is 50,000. No need for blood transfusion or Platelet transfusion.    \par We will keep Ibrutinib on hold for now. \par We will send for Urine analysis and Culture.\par We reviewed Urine culture from the hospital.  [de-identified] : Kathrine is a savanna 74 year old female who presented to ER 04/28/2020 with weakness and lethargy. She went to see her cardiologist and she was referred to ER from there. \par She was found to have low HB of 5.0 on admission and she was transfused 3 units of PRBC. Also noted that her Absolute lymphocyte count was >5000. She has the following work up in hospital:\par CBC\par 11.61  High   \par  RBC Count 1.77        \par  Hemoglobin 5.0   \par  Hematocrit 14.0  Low %  37.0 - 47.0     \par  MCV 79.1  Low fL  81.0 - 99.0 Final      \par  MCH 28.2    pg  27.0 - 31.0 Final      \par  MCHC 35.7    g/dL  32.0 - 37.0 Final      \par  RCDW 26.5  High %  11.5 - 14.5 Final      \par  Platelet Count - Automated 255    K/uL  130 - 400 Final      \par  MPV 11.3  High fL  7.4 - 10.4 Final      \par  Auto Neutrophil % 33.6   \par  Auto Lymphocyte % 65.5  High %  20.5 - 51.1 Final      \par  Auto Monocyte % 0.9  Low %  1.7 - 9.3 Final      \par  Auto Eosinophil % 0.0    %  0.0 - 8.0 Final      \par  Auto Basophil % 0.0    %  0.0 - 1.0 Final      \par  Auto Neutrophil # 3.90    K/uL  1.40 - 6.50 Final      \par  Auto Lymphocyte # 7.60  High K/uL  1.20 - 3.40 Final      \par  Auto Monocyte # 0.10    \par  Auto Eosinophil # 0.00       \par  Auto Basophil # 0.00 \par M spike- unable to quantify. \par Weak IgG Kappa Band Identified. Immunoglobulins were normal.\par Reticulocyte -0.6\par Folate - 2.0\par Iron studies were done post transfusion. Ferritin -646.\par Keith- negative\par Flow cytometry -DIAGNOSIS:\par Peripheral blood:\par      - Monotypic B-cells (18% of cells), positive for kappa, CD19, CD20, FMC-7; negative CD5, CD10, CD23.\par      - The myeloid immunophenotypic findings show no increase in myeloid immaturity.\par \par Imaging: \par CT chest on 4/30/2020 revealed \par 1. No evidence for intrathoracic lymphadenopathy.\par 2. No suspicious mass or nodule.\par \par CT A/P on 4/30/2020 revealed \par Marked splenomegaly; differential considerations include neoplastic and myeloproliferative processes, amongst other etiologies.\par 2.  No enlarged abdominal or pelvic lymph nodes.\par 3.  Enlarged fibroid uterus.\par \par She was discharged few days later, as she preferred completing the work up as out patient.  She is an active smoker for more than 40 years. She never had colonoscopy. Mammogram was many years ago. \par

## 2020-11-03 NOTE — END OF VISIT
[FreeTextEntry3] : Patient was seen seen examined with NP Easton, agree with above plan of care.\par

## 2020-11-03 NOTE — PHYSICAL EXAM
[Capable of only limited self care, confined to bed or chair more than 50% of waking hours] : Status 3- Capable of only limited self care, confined to bed or chair more than 50% of waking hours [Normal] : grossly intact [de-identified] : 2+ edema bilaterally, left >right, large bruise over L ankle [de-identified] : enlarged spleen, difficult to examine, patient sitting in wheelchair

## 2020-11-11 NOTE — PHYSICAL EXAM
[Capable of only limited self care, confined to bed or chair more than 50% of waking hours] : Status 3- Capable of only limited self care, confined to bed or chair more than 50% of waking hours [Normal] : grossly intact [de-identified] : 2+ edema bilaterally, left >right, large bruise over L ankle [de-identified] : enlarged spleen, difficult to examine, patient sitting in wheelchair

## 2020-11-11 NOTE — ASSESSMENT
[FreeTextEntry1] : Hypoproliferative  anemia and massive splenomegaly, flow consistent with B-cell lymphoproliferative disorder CD5 and CD10 negative, consistent with Marginal Zone Lymphoma of the Spleen, involving BM\par --BMBx on 5/20/2020 revealed Marginal Zone Lymphoma \par --S/p 3 units of PRBC inhouse at diagnosis, requiring frequent PRBC transfusions\par --Flow cytometry revealed Monotypic B-cells (18% of cells), positive for kappa, CD19, CD20, FMC-7; negative CD5, CD10, CD23.\par --No B symptoms, no evidence of overt bleeding\par --No over evidence of hemolysis, Keith negative \par --PET CT on 5/8/2020 reveals splenic uptake as well as splenomegaly \par --Unable to tolerate Rituxan\par --Developed thrombocytopenia and rash with 3 and 2 tabs of Ibrutinib, will plan to rechallenge, on 7/15/2020 still unable to restart on Ibrutinib 2/2 thrombocytopenia, which is improving \par --Continue with weekly CBC and type and cross with PRN transfusions \par --Keep Hb over 8.0, platelet count above 50K, patient is on antiplatelet therapy for h/o cardiac stent .\par --As of 8/12/20 patient received Ibrutinib one tab every other day. \par --CBC was reviewed on 9/29 and 9/30, recommend platelet transfusion today \par --On 11/11/2020 Kathrine is taking 1 pill a day of ibrutinib \par --Continue with folic acid PO\par --Acyclovir 400 mg PO daily ppx\par --Fluconazole 200mg BID ppx, was not tolerated, it is off \par --Started on Levaquin 500mg daily for bacterial ppx on 11/11/2020 \par \par UTI\par --Multiple episodes of UTI, requiring ICU stays with septic shock\par --Will start on Levaquin ppx on 11/11/2020, risks were discussed \par \par LOWER EXTREMITY SWELLING\par --No evidence of DVT\par \par Follow up in 2 weeks or sooner if needed\par \par \par

## 2020-11-11 NOTE — HISTORY OF PRESENT ILLNESS
[de-identified] : Kathrine is a savanna 74 year old female who presented to ER 04/28/2020 with weakness and lethargy. She went to see her cardiologist and she was referred to ER from there. \par She was found to have low HB of 5.0 on admission and she was transfused 3 units of PRBC. Also noted that her Absolute lymphocyte count was >5000. She has the following work up in hospital:\par CBC\par 11.61  High   \par  RBC Count 1.77        \par  Hemoglobin 5.0   \par  Hematocrit 14.0  Low %  37.0 - 47.0     \par  MCV 79.1  Low fL  81.0 - 99.0 Final      \par  MCH 28.2    pg  27.0 - 31.0 Final      \par  MCHC 35.7    g/dL  32.0 - 37.0 Final      \par  RCDW 26.5  High %  11.5 - 14.5 Final      \par  Platelet Count - Automated 255    K/uL  130 - 400 Final      \par  MPV 11.3  High fL  7.4 - 10.4 Final      \par  Auto Neutrophil % 33.6   \par  Auto Lymphocyte % 65.5  High %  20.5 - 51.1 Final      \par  Auto Monocyte % 0.9  Low %  1.7 - 9.3 Final      \par  Auto Eosinophil % 0.0    %  0.0 - 8.0 Final      \par  Auto Basophil % 0.0    %  0.0 - 1.0 Final      \par  Auto Neutrophil # 3.90    K/uL  1.40 - 6.50 Final      \par  Auto Lymphocyte # 7.60  High K/uL  1.20 - 3.40 Final      \par  Auto Monocyte # 0.10    \par  Auto Eosinophil # 0.00       \par  Auto Basophil # 0.00 \par M spike- unable to quantify. \par Weak IgG Kappa Band Identified. Immunoglobulins were normal.\par Reticulocyte -0.6\par Folate - 2.0\par Iron studies were done post transfusion. Ferritin -646.\par Keith- negative\par Flow cytometry -DIAGNOSIS:\par Peripheral blood:\par      - Monotypic B-cells (18% of cells), positive for kappa, CD19, CD20, FMC-7; negative CD5, CD10, CD23.\par      - The myeloid immunophenotypic findings show no increase in myeloid immaturity.\par \par Imaging: \par CT chest on 4/30/2020 revealed \par 1. No evidence for intrathoracic lymphadenopathy.\par 2. No suspicious mass or nodule.\par \par CT A/P on 4/30/2020 revealed \par Marked splenomegaly; differential considerations include neoplastic and myeloproliferative processes, amongst other etiologies.\par 2.  No enlarged abdominal or pelvic lymph nodes.\par 3.  Enlarged fibroid uterus.\par \par She was discharged few days later, as she preferred completing the work up as out patient.  She is an active smoker for more than 40 years. She never had colonoscopy. Mammogram was many years ago. \par  [de-identified] : 5/6/2020: She feels very tired and is wobbly. She is on wheelchair now, she lives with her  and her daughter. She denies B symptoms. Melena, bleeding per rectum. \par Images were personally reviewed by MD Debby and discussed with patient and family.\par \par 7/1/2020: Kathrine is here for follow up. Since last visit she has required almost weekly transfusions of PRBC, Hb was going down to 5-6 range in between the transfusions. 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 BCL2 was negative. These findings were previously discussed with patient's daughter Kathrine.\par Patient was also seen in second opinion at [facility] [place], clinical trial was offered, patient chose not to participate. \par On 6/10/2020 we have attempted to initiate Rituximab, Kathrine has developed a SEVERE allergic reaction to Rituxan, requiring overnight ER stay. \par Given the severity of reaction the plan is NOT to attempt rechallenging with Rituximab. \par She started on Ibrutinib 3 tabs daily on 6/17/2020, by 6/22/2020 she has started developing mild thrombocytopenia, but anemia improved, we asked her to decrease the pill to 2 a day, by 6/27/2020 she has developed a macular papular rash under her L breast and in her L groin, she was asked to hold the pill, the rash today looks fainter, stopped spreading and overall is improved. She will continue to hold Ibrutinib till next Monday on 7/6/2020 and then if rash has resolved will restart at 1 pill daily. \par US of the spleen to obtain new baseline on 6/27/2020 revealed Splenomegaly correlating with CT exam. Enlarged homogeneous \par spleen 22.5 cm x 9.3 cm in width with no focal mass or perisplenic fluid \par collection. Vascular flow, unremarkable.\par \par 7/15/2020: Kathrine was unable to restart on Ibrutibib yet, she sustained a fall at home and was brought to ED on 7/5/2020, she was noted to be febrile in ED, urine was positive and she was started on broad spectrum antibiotics, she was also noted to have worsening thrombocytopenia of admission, she required blood and platelet transfusions inhouse, platelets are improving since discharge, she is currently not on antibiotics. She reports she has not smoked for close to 3 weeks. She is in a wheelchair today and with home O2. She reports feeling well. \par \par 7/28/2020:  Pt came in today accompanied by daughter c/o diffuse, erythematous rash to left groin since last week.  She changed the brand of her diapers from "Depends" to "Always".  She was using Nystatin powder to her rash underneath left breast which was not helping.  Daughter is concerned because her platelets are still low (plt=65,000).\par Hb has been holding up, will defer restarting on Imbruvica for now. \par \par 8/4/2020; As per her daughter Kathrine has been feeling weaker than usual, complaining of urinary symptoms. We will check UA today and start on empiric antibiotics. CBC from today reveals Hb remains stable, transfusion is not necessary, platelets remain low at 57K. We will continue to hold Imbruvica at this point. We will also offer gentle hydration today. \par 8/18/20:\par Kathrine is 74 years old female came for a follow up visit for  B-cell lymphoproliferative disorder. Patient completed a full course of Antibiotics Bactrim.\par  Patient still have high frequency for urination.  She denies Fever, dysuria , or Hematuria. \par Patient ' daughter started Imbruvica   one tab every other day.  Patient tolerated well. \par We communicated CBC from 8/19/20 with HGB / HCT is 9.2/26.5 with platelet count is 102, 000. NO skin rash.\par \par \par 9/30/2020: Patient here for follow up visit for B cell lymphoproliferative disorder.  She is up to 2 tabs of Ibrutinib per day for about 1 week now, she was not instructed to escalate the dose per our instructions.  She is complaining of urinary frequency for the last several days.  Her daughter states there is a bad odor. She also has had left ankle swelling and bruising since Saturday.  They have noticed more and more bruising especially on her arms. Patient denies cough, shortness of breath, fever, chills, night sweats or bone pain.\par \par 10/28/2020: Kathrine is here for follow up visit for B cell lymphoproliferative disorder.  She  just had recent hospitalization from 10/9-10/21/2020, requiring ICU admission for septic shock,  UTI and Hemolytic Anemia, likely in the setting of Bacrim, dir and indir Keith was negative. EGD on 10/16/2020 no active bleeding. \par Today patient still feeling weak, tired.  She reports urinary frequency for the last 2 days. She denies fever, SOB. No bleeding or bruising. \par We communicated Blood work from today with HGB is 11.0/37.2., Platelet is 50,000. No need for blood transfusion or Platelet transfusion.    \par We will keep Ibrutinib on hold for now. \par We will send for Urine analysis and Culture.\par We reviewed Urine culture from the hospital. \par \par 11/11/2020: Kathrine is here for follow up, since last visit she has been taking Levaquin and managed to stay UTI-free, we have at length discussed the risks and benefits on continuing on Levaquin ppx, including MDR infections, daughter Kathrine wishes to continue with long term ppx, she understands that MDR infections are a substation threat that may lead to future hospitalizations and even death. \par Kathrine continues on Imbruvica 1 pill a day with significant increment to Hb, no evidence of hemolysis, platelets remain stable.

## 2020-11-11 NOTE — REVIEW OF SYSTEMS
[Fatigue] : fatigue [Dysuria] : dysuria [Joint Pain] : joint pain [Negative] : Heme/Lymph [Fever] : no fever [Chills] : no chills [Night Sweats] : no night sweats [Recent Change In Weight] : ~T no recent weight change [Constipation] : no constipation [de-identified] : left ankle bruising and swelling, generalized bruising

## 2020-12-21 NOTE — ED ADULT TRIAGE NOTE - SPO2 (%)
1. Please obtain fasting labs and bone age. We will be touch after labs return.   2. Please follow up with us in clinic in 1-2 months.    Thank you for choosing [facility].     It was a pleasure to see you today.      Providers:       [place]:   Justino Martínez MD PhD    [facility]    Care Coordinators (non urgent calls) Mon- Fri:  Arlette Lal MS RN  [phone]       Carmela Mejia BSN RN PHN  [phone]  Care Coordinator fax: [phone]  Growth Hormone: Wen Gates CMA   [phone]     Please leave a message on one line only. Calls will be returned as soon as possible once your physician has reviewed the results or questions.   Medication renewal requests must be faxed to the main office by your pharmacy.  Allow 3-4 days for completion.   Fax: [phone]    Mailing Address:  Pediatric Endocrinology  [address]  [address]    Test results may be available via [employer] prior to your provider reviewing them. Your provider will review results as soon as possible once all labs are resulted.   Abnormal results will be communicated to you via [employer], telephone call or letter.  Please allow 2 -3 weeks for processing/interpretation of most lab work.  If you live in the [facility] area and need labs, we request that the [facility].  [facility] locations are listed on the [email or web address] website. Please call that site for a lab time.   For urgent issues that cannot wait until the next business day, call [phone] and ask for the Pediatric Endocrinologist on call.    Scheduling:    Pediatric Call Center: [phone] for  Explorer - 12th floor [facility]  and [facility] - 3rd floor [facility] 9th floor [facility]: [phone] (for  stimulation tests)  Radiology/ [phone]   Services:   [phone]     Please sign up for [facility] for easy and HIPAA compliant confidential communication.  Sign up at the clinic  or go to [email or web address]   Patients must be seen in clinic annually to continue to receive prescriptions and test results.   Patients on growth hormone must be seen twice yearly.     Your child has been seen in the [facility] End[facility]inology Specialty Clinic.  Our goal is to co-manage your child's medical care along with their primary care physician.  We manage care needs related to the endocrine diagnosis but primary care issues including preventative care or acute illness visits, COVID concerns, camp forms, etc must be managed by your local primary care physician.  Please inform our coordinators if the patient has any emergency department visits or hospitalizations related to their endocrine diagnosis.      Please refer to the CDC and state department of health websites for information regarding precautions surrounding COVID-19.  At this time, there is no evidence to suggest that your child's endocrine diagnosis increases risk for skyler COVID-19.  This is an ongoing area of research, however,and we will update you as further research becomes available.         100

## 2020-12-23 PROBLEM — N39.0 URINARY TRACT INFECTION, ACUTE: Status: RESOLVED | Noted: 2020-01-01 | Resolved: 2020-01-01

## 2021-01-01 ENCOUNTER — LABORATORY RESULT (OUTPATIENT)
Age: 76
End: 2021-01-01

## 2021-01-01 ENCOUNTER — OUTPATIENT (OUTPATIENT)
Dept: OUTPATIENT SERVICES | Facility: HOSPITAL | Age: 76
LOS: 1 days | Discharge: HOME | End: 2021-01-01

## 2021-01-01 ENCOUNTER — APPOINTMENT (OUTPATIENT)
Dept: HEMATOLOGY ONCOLOGY | Facility: CLINIC | Age: 76
End: 2021-01-01

## 2021-01-01 ENCOUNTER — INPATIENT (INPATIENT)
Facility: HOSPITAL | Age: 76
LOS: 27 days | End: 2021-03-24
Attending: INTERNAL MEDICINE | Admitting: INTERNAL MEDICINE
Payer: MEDICARE

## 2021-01-01 ENCOUNTER — RESULT REVIEW (OUTPATIENT)
Age: 76
End: 2021-01-01

## 2021-01-01 ENCOUNTER — TRANSCRIPTION ENCOUNTER (OUTPATIENT)
Age: 76
End: 2021-01-01

## 2021-01-01 ENCOUNTER — APPOINTMENT (OUTPATIENT)
Dept: HEMATOLOGY ONCOLOGY | Facility: CLINIC | Age: 76
End: 2021-01-01
Payer: MEDICARE

## 2021-01-01 VITALS
RESPIRATION RATE: 30 BRPM | WEIGHT: 229.94 LBS | OXYGEN SATURATION: 60 % | HEART RATE: 116 BPM | HEIGHT: 63 IN | SYSTOLIC BLOOD PRESSURE: 254 MMHG | DIASTOLIC BLOOD PRESSURE: 132 MMHG | TEMPERATURE: 100 F

## 2021-01-01 VITALS
HEIGHT: 62 IN | WEIGHT: 118 LBS | RESPIRATION RATE: 14 BRPM | HEART RATE: 89 BPM | BODY MASS INDEX: 21.71 KG/M2 | DIASTOLIC BLOOD PRESSURE: 61 MMHG | SYSTOLIC BLOOD PRESSURE: 137 MMHG | TEMPERATURE: 97.8 F

## 2021-01-01 VITALS — OXYGEN SATURATION: 98 % | HEART RATE: 78 BPM

## 2021-01-01 DIAGNOSIS — C85.10 UNSPECIFIED B-CELL LYMPHOMA, UNSPECIFIED SITE: ICD-10-CM

## 2021-01-01 DIAGNOSIS — R06.00 DYSPNEA, UNSPECIFIED: ICD-10-CM

## 2021-01-01 DIAGNOSIS — C85.80 OTHER SPECIFIED TYPES OF NON-HODGKIN LYMPHOMA, UNSPECIFIED SITE: ICD-10-CM

## 2021-01-01 DIAGNOSIS — Z71.6 TOBACCO ABUSE COUNSELING: ICD-10-CM

## 2021-01-01 DIAGNOSIS — Z51.11 ENCOUNTER FOR ANTINEOPLASTIC CHEMOTHERAPY: ICD-10-CM

## 2021-01-01 DIAGNOSIS — I62.9 NONTRAUMATIC INTRACRANIAL HEMORRHAGE, UNSPECIFIED: ICD-10-CM

## 2021-01-01 DIAGNOSIS — R45.1 RESTLESSNESS AND AGITATION: ICD-10-CM

## 2021-01-01 DIAGNOSIS — Z95.5 PRESENCE OF CORONARY ANGIOPLASTY IMPLANT AND GRAFT: Chronic | ICD-10-CM

## 2021-01-01 DIAGNOSIS — R41.82 ALTERED MENTAL STATUS, UNSPECIFIED: ICD-10-CM

## 2021-01-01 DIAGNOSIS — Z51.5 ENCOUNTER FOR PALLIATIVE CARE: ICD-10-CM

## 2021-01-01 DIAGNOSIS — D64.9 ANEMIA, UNSPECIFIED: ICD-10-CM

## 2021-01-01 DIAGNOSIS — D69.6 THROMBOCYTOPENIA, UNSPECIFIED: ICD-10-CM

## 2021-01-01 DIAGNOSIS — R52 PAIN, UNSPECIFIED: ICD-10-CM
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
## 2021-01-01 DIAGNOSIS — R16.1 SPLENOMEGALY, NOT ELSEWHERE CLASSIFIED: ICD-10-CM

## 2021-01-01 LAB
-  AMIKACIN: SIGNIFICANT CHANGE UP
-  AZTREONAM: SIGNIFICANT CHANGE UP
-  CEFEPIME: SIGNIFICANT CHANGE UP
-  CEFTAZIDIME: SIGNIFICANT CHANGE UP
-  CIPROFLOXACIN: SIGNIFICANT CHANGE UP
-  GENTAMICIN: SIGNIFICANT CHANGE UP
-  IMIPENEM: SIGNIFICANT CHANGE UP
-  LEVOFLOXACIN: SIGNIFICANT CHANGE UP
-  MEROPENEM: SIGNIFICANT CHANGE UP
-  PIPERACILLIN/TAZOBACTAM: SIGNIFICANT CHANGE UP
-  TOBRAMYCIN: SIGNIFICANT CHANGE UP
A1C WITH ESTIMATED AVERAGE GLUCOSE RESULT: 6.3 % — HIGH (ref 4–5.6)
ALBUMIN SERPL ELPH-MCNC: 2.7 G/DL — LOW (ref 3.5–5.2)
ALBUMIN SERPL ELPH-MCNC: 2.8 G/DL — LOW (ref 3.5–5.2)
ALBUMIN SERPL ELPH-MCNC: 2.9 G/DL — LOW (ref 3.5–5.2)
ALBUMIN SERPL ELPH-MCNC: 3 G/DL — LOW (ref 3.5–5.2)
ALBUMIN SERPL ELPH-MCNC: 3.1 G/DL — LOW (ref 3.5–5.2)
ALBUMIN SERPL ELPH-MCNC: 3.2 G/DL — LOW (ref 3.5–5.2)
ALBUMIN SERPL ELPH-MCNC: 3.2 G/DL — LOW (ref 3.5–5.2)
ALBUMIN SERPL ELPH-MCNC: 3.3 G/DL — LOW (ref 3.5–5.2)
ALBUMIN SERPL ELPH-MCNC: 3.9 G/DL
ALBUMIN SERPL ELPH-MCNC: 4.4 G/DL — SIGNIFICANT CHANGE UP (ref 3.5–5.2)
ALP BLD-CCNC: 221 U/L
ALP SERPL-CCNC: 163 U/L — HIGH (ref 30–115)
ALP SERPL-CCNC: 165 U/L — HIGH (ref 30–115)
ALP SERPL-CCNC: 181 U/L — HIGH (ref 30–115)
ALP SERPL-CCNC: 184 U/L — HIGH (ref 30–115)
ALP SERPL-CCNC: 195 U/L — HIGH (ref 30–115)
ALP SERPL-CCNC: 219 U/L — HIGH (ref 30–115)
ALP SERPL-CCNC: 221 U/L — HIGH (ref 30–115)
ALP SERPL-CCNC: 222 U/L — HIGH (ref 30–115)
ALP SERPL-CCNC: 227 U/L — HIGH (ref 30–115)
ALP SERPL-CCNC: 238 U/L — HIGH (ref 30–115)
ALP SERPL-CCNC: 280 U/L — HIGH (ref 30–115)
ALP SERPL-CCNC: 280 U/L — HIGH (ref 30–115)
ALP SERPL-CCNC: 290 U/L — HIGH (ref 30–115)
ALP SERPL-CCNC: 296 U/L — HIGH (ref 30–115)
ALT FLD-CCNC: 11 U/L — SIGNIFICANT CHANGE UP (ref 0–41)
ALT FLD-CCNC: 12 U/L — SIGNIFICANT CHANGE UP (ref 0–41)
ALT FLD-CCNC: 13 U/L — SIGNIFICANT CHANGE UP (ref 0–41)
ALT FLD-CCNC: 15 U/L — SIGNIFICANT CHANGE UP (ref 0–41)
ALT FLD-CCNC: 18 U/L — SIGNIFICANT CHANGE UP (ref 0–41)
ALT FLD-CCNC: 19 U/L — SIGNIFICANT CHANGE UP (ref 0–41)
ALT FLD-CCNC: 20 U/L — SIGNIFICANT CHANGE UP (ref 0–41)
ALT FLD-CCNC: 33 U/L — SIGNIFICANT CHANGE UP (ref 0–41)
ALT FLD-CCNC: 35 U/L — SIGNIFICANT CHANGE UP (ref 0–41)
ALT FLD-CCNC: 39 U/L — SIGNIFICANT CHANGE UP (ref 0–41)
ALT FLD-CCNC: 40 U/L — SIGNIFICANT CHANGE UP (ref 0–41)
ALT FLD-CCNC: 43 U/L — HIGH (ref 0–41)
ALT SERPL-CCNC: 37 U/L
ANION GAP SERPL CALC-SCNC: 10 MMOL/L — SIGNIFICANT CHANGE UP (ref 7–14)
ANION GAP SERPL CALC-SCNC: 11 MMOL/L
ANION GAP SERPL CALC-SCNC: 11 MMOL/L — SIGNIFICANT CHANGE UP (ref 7–14)
ANION GAP SERPL CALC-SCNC: 12 MMOL/L — SIGNIFICANT CHANGE UP (ref 7–14)
ANION GAP SERPL CALC-SCNC: 12 MMOL/L — SIGNIFICANT CHANGE UP (ref 7–14)
ANION GAP SERPL CALC-SCNC: 16 MMOL/L — HIGH (ref 7–14)
ANION GAP SERPL CALC-SCNC: 6 MMOL/L — LOW (ref 7–14)
ANION GAP SERPL CALC-SCNC: 7 MMOL/L — SIGNIFICANT CHANGE UP (ref 7–14)
ANION GAP SERPL CALC-SCNC: 8 MMOL/L — SIGNIFICANT CHANGE UP (ref 7–14)
ANION GAP SERPL CALC-SCNC: 9 MMOL/L — SIGNIFICANT CHANGE UP (ref 7–14)
ANISOCYTOSIS BLD QL: SIGNIFICANT CHANGE UP
ANISOCYTOSIS BLD QL: SLIGHT — SIGNIFICANT CHANGE UP
APPEARANCE CSF: ABNORMAL
APPEARANCE CSF: ABNORMAL
APPEARANCE SPUN FLD: ABNORMAL
APPEARANCE SPUN FLD: ABNORMAL
APPEARANCE UR: ABNORMAL
APPEARANCE UR: CLEAR — SIGNIFICANT CHANGE UP
APPEARANCE: CLEAR
APTT BLD: 24 SEC — LOW (ref 27–39.2)
APTT BLD: 31.8 SEC — SIGNIFICANT CHANGE UP (ref 27–39.2)
APTT BLD: 34.9 SEC — SIGNIFICANT CHANGE UP (ref 27–39.2)
APTT BLD: 35.1 SEC — SIGNIFICANT CHANGE UP (ref 27–39.2)
APTT BLD: 35.6 SEC — SIGNIFICANT CHANGE UP (ref 27–39.2)
APTT BLD: 36.5 SEC — SIGNIFICANT CHANGE UP (ref 27–39.2)
APTT BLD: 36.8 SEC — SIGNIFICANT CHANGE UP (ref 27–39.2)
APTT BLD: 37.2 SEC — SIGNIFICANT CHANGE UP (ref 27–39.2)
APTT BLD: 37.8 SEC — SIGNIFICANT CHANGE UP (ref 27–39.2)
APTT BLD: 38.3 SEC — SIGNIFICANT CHANGE UP (ref 27–39.2)
APTT BLD: 38.3 SEC — SIGNIFICANT CHANGE UP (ref 27–39.2)
APTT BLD: 39.5 SEC — HIGH (ref 27–39.2)
APTT BLD: 40.4 SEC — HIGH (ref 27–39.2)
APTT BLD: 45.2 SEC — HIGH (ref 27–39.2)
AST SERPL-CCNC: 15 U/L — SIGNIFICANT CHANGE UP (ref 0–41)
AST SERPL-CCNC: 20 U/L — SIGNIFICANT CHANGE UP (ref 0–41)
AST SERPL-CCNC: 23 U/L — SIGNIFICANT CHANGE UP (ref 0–41)
AST SERPL-CCNC: 25 U/L — SIGNIFICANT CHANGE UP (ref 0–41)
AST SERPL-CCNC: 25 U/L — SIGNIFICANT CHANGE UP (ref 0–41)
AST SERPL-CCNC: 26 U/L — SIGNIFICANT CHANGE UP (ref 0–41)
AST SERPL-CCNC: 28 U/L — SIGNIFICANT CHANGE UP (ref 0–41)
AST SERPL-CCNC: 28 U/L — SIGNIFICANT CHANGE UP (ref 0–41)
AST SERPL-CCNC: 31 U/L — SIGNIFICANT CHANGE UP (ref 0–41)
AST SERPL-CCNC: 46 U/L — HIGH (ref 0–41)
AST SERPL-CCNC: 46 U/L — HIGH (ref 0–41)
AST SERPL-CCNC: 52 U/L
AST SERPL-CCNC: 52 U/L — HIGH (ref 0–41)
AST SERPL-CCNC: 58 U/L — HIGH (ref 0–41)
AST SERPL-CCNC: 58 U/L — HIGH (ref 0–41)
BACTERIA # UR AUTO: ABNORMAL
BACTERIA # UR AUTO: NEGATIVE — SIGNIFICANT CHANGE UP
BACTERIA UR CULT: NORMAL
BACTERIAL AG PNL SER: 4 % — SIGNIFICANT CHANGE UP
BASE EXCESS BLDA CALC-SCNC: -0.3 MMOL/L — SIGNIFICANT CHANGE UP (ref -2–2)
BASE EXCESS BLDA CALC-SCNC: -0.7 MMOL/L — SIGNIFICANT CHANGE UP (ref -2–2)
BASE EXCESS BLDA CALC-SCNC: -1 MMOL/L — SIGNIFICANT CHANGE UP (ref -2–2)
BASE EXCESS BLDA CALC-SCNC: -1.3 MMOL/L — SIGNIFICANT CHANGE UP (ref -2–2)
BASE EXCESS BLDA CALC-SCNC: -2.5 MMOL/L — LOW (ref -2–2)
BASE EXCESS BLDA CALC-SCNC: 0.1 MMOL/L — SIGNIFICANT CHANGE UP (ref -2–2)
BASE EXCESS BLDA CALC-SCNC: 1.3 MMOL/L — SIGNIFICANT CHANGE UP (ref -2–2)
BASE EXCESS BLDA CALC-SCNC: 1.4 MMOL/L — SIGNIFICANT CHANGE UP (ref -2–2)
BASE EXCESS BLDA CALC-SCNC: 1.4 MMOL/L — SIGNIFICANT CHANGE UP (ref -2–2)
BASE EXCESS BLDA CALC-SCNC: 1.8 MMOL/L — SIGNIFICANT CHANGE UP (ref -2–2)
BASE EXCESS BLDA CALC-SCNC: 2.9 MMOL/L — HIGH (ref -2–2)
BASE EXCESS BLDA CALC-SCNC: 3 MMOL/L — HIGH (ref -2–2)
BASE EXCESS BLDA CALC-SCNC: 3.2 MMOL/L — HIGH (ref -2–2)
BASE EXCESS BLDA CALC-SCNC: 3.9 MMOL/L — HIGH (ref -2–2)
BASE EXCESS BLDA CALC-SCNC: 6.2 MMOL/L — HIGH (ref -2–2)
BASE EXCESS BLDA CALC-SCNC: 6.3 MMOL/L — HIGH (ref -2–2)
BASE EXCESS BLDA CALC-SCNC: 8.3 MMOL/L — HIGH (ref -2–2)
BASE EXCESS BLDV CALC-SCNC: -0.9 MMOL/L — SIGNIFICANT CHANGE UP (ref -2–2)
BASOPHILS # BLD AUTO: 0.02 K/UL — SIGNIFICANT CHANGE UP (ref 0–0.2)
BASOPHILS # BLD AUTO: 0.03 K/UL — SIGNIFICANT CHANGE UP (ref 0–0.2)
BASOPHILS # BLD AUTO: 0.03 K/UL — SIGNIFICANT CHANGE UP (ref 0–0.2)
BASOPHILS # BLD AUTO: 0.04 K/UL — SIGNIFICANT CHANGE UP (ref 0–0.2)
BASOPHILS # BLD AUTO: 0.04 K/UL — SIGNIFICANT CHANGE UP (ref 0–0.2)
BASOPHILS # BLD AUTO: 0.05 K/UL — SIGNIFICANT CHANGE UP (ref 0–0.2)
BASOPHILS # BLD AUTO: 0.06 K/UL — SIGNIFICANT CHANGE UP (ref 0–0.2)
BASOPHILS # BLD AUTO: 0.07 K/UL — SIGNIFICANT CHANGE UP (ref 0–0.2)
BASOPHILS # BLD AUTO: 0.13 K/UL — SIGNIFICANT CHANGE UP (ref 0–0.2)
BASOPHILS NFR BLD AUTO: 0.3 % — SIGNIFICANT CHANGE UP (ref 0–1)
BASOPHILS NFR BLD AUTO: 0.3 % — SIGNIFICANT CHANGE UP (ref 0–1)
BASOPHILS NFR BLD AUTO: 0.4 % — SIGNIFICANT CHANGE UP (ref 0–1)
BASOPHILS NFR BLD AUTO: 0.4 % — SIGNIFICANT CHANGE UP (ref 0–1)
BASOPHILS NFR BLD AUTO: 0.5 % — SIGNIFICANT CHANGE UP (ref 0–1)
BASOPHILS NFR BLD AUTO: 0.7 % — SIGNIFICANT CHANGE UP (ref 0–1)
BASOPHILS NFR BLD AUTO: 0.8 % — SIGNIFICANT CHANGE UP (ref 0–1)
BASOPHILS NFR BLD AUTO: 0.9 % — SIGNIFICANT CHANGE UP (ref 0–1)
BASOPHILS NFR BLD AUTO: 1.4 % — HIGH (ref 0–1)
BASOPHILS NFR BLD AUTO: 1.4 % — HIGH (ref 0–1)
BILIRUB DIRECT SERPL-MCNC: 0.2 MG/DL — SIGNIFICANT CHANGE UP (ref 0–0.2)
BILIRUB DIRECT SERPL-MCNC: 0.4 MG/DL — HIGH (ref 0–0.2)
BILIRUB INDIRECT FLD-MCNC: 0.1 MG/DL — LOW (ref 0.2–1.2)
BILIRUB INDIRECT FLD-MCNC: 0.1 MG/DL — LOW (ref 0.2–1.2)
BILIRUB INDIRECT FLD-MCNC: 0.2 MG/DL — SIGNIFICANT CHANGE UP (ref 0.2–1.2)
BILIRUB INDIRECT FLD-MCNC: 0.4 MG/DL — SIGNIFICANT CHANGE UP (ref 0.2–1.2)
BILIRUB SERPL-MCNC: 0.3 MG/DL — SIGNIFICANT CHANGE UP (ref 0.2–1.2)
BILIRUB SERPL-MCNC: 0.4 MG/DL — SIGNIFICANT CHANGE UP (ref 0.2–1.2)
BILIRUB SERPL-MCNC: 0.4 MG/DL — SIGNIFICANT CHANGE UP (ref 0.2–1.2)
BILIRUB SERPL-MCNC: 0.6 MG/DL — SIGNIFICANT CHANGE UP (ref 0.2–1.2)
BILIRUB SERPL-MCNC: 0.6 MG/DL — SIGNIFICANT CHANGE UP (ref 0.2–1.2)
BILIRUB SERPL-MCNC: 0.8 MG/DL
BILIRUB SERPL-MCNC: 0.8 MG/DL — SIGNIFICANT CHANGE UP (ref 0.2–1.2)
BILIRUB UR-MCNC: NEGATIVE — SIGNIFICANT CHANGE UP
BILIRUBIN URINE: NEGATIVE
BLD GP AB SCN SERPL QL: SIGNIFICANT CHANGE UP
BLOOD URINE: NEGATIVE
BUN SERPL-MCNC: 12 MG/DL — SIGNIFICANT CHANGE UP (ref 10–20)
BUN SERPL-MCNC: 13 MG/DL — SIGNIFICANT CHANGE UP (ref 10–20)
BUN SERPL-MCNC: 14 MG/DL — SIGNIFICANT CHANGE UP (ref 10–20)
BUN SERPL-MCNC: 15 MG/DL — SIGNIFICANT CHANGE UP (ref 10–20)
BUN SERPL-MCNC: 16 MG/DL — SIGNIFICANT CHANGE UP (ref 10–20)
BUN SERPL-MCNC: 17 MG/DL — SIGNIFICANT CHANGE UP (ref 10–20)
BUN SERPL-MCNC: 18 MG/DL — SIGNIFICANT CHANGE UP (ref 10–20)
BUN SERPL-MCNC: 19 MG/DL — SIGNIFICANT CHANGE UP (ref 10–20)
BUN SERPL-MCNC: 20 MG/DL — SIGNIFICANT CHANGE UP (ref 10–20)
BUN SERPL-MCNC: 21 MG/DL
BUN SERPL-MCNC: 21 MG/DL — HIGH (ref 10–20)
BUN SERPL-MCNC: 21 MG/DL — HIGH (ref 10–20)
BUN SERPL-MCNC: 22 MG/DL — HIGH (ref 10–20)
BUN SERPL-MCNC: 23 MG/DL — HIGH (ref 10–20)
C DIFF BY PCR RESULT: NEGATIVE — SIGNIFICANT CHANGE UP
C DIFF TOX GENS STL QL NAA+PROBE: SIGNIFICANT CHANGE UP
CALCIUM SERPL-MCNC: 7.3 MG/DL — LOW (ref 8.5–10.1)
CALCIUM SERPL-MCNC: 7.7 MG/DL — LOW (ref 8.5–10.1)
CALCIUM SERPL-MCNC: 7.8 MG/DL — LOW (ref 8.5–10.1)
CALCIUM SERPL-MCNC: 7.8 MG/DL — LOW (ref 8.5–10.1)
CALCIUM SERPL-MCNC: 7.9 MG/DL — LOW (ref 8.5–10.1)
CALCIUM SERPL-MCNC: 8 MG/DL — LOW (ref 8.5–10.1)
CALCIUM SERPL-MCNC: 8.1 MG/DL — LOW (ref 8.5–10.1)
CALCIUM SERPL-MCNC: 8.2 MG/DL — LOW (ref 8.5–10.1)
CALCIUM SERPL-MCNC: 8.3 MG/DL — LOW (ref 8.5–10.1)
CALCIUM SERPL-MCNC: 8.4 MG/DL — LOW (ref 8.5–10.1)
CALCIUM SERPL-MCNC: 8.5 MG/DL — SIGNIFICANT CHANGE UP (ref 8.5–10.1)
CALCIUM SERPL-MCNC: 8.6 MG/DL — SIGNIFICANT CHANGE UP (ref 8.5–10.1)
CALCIUM SERPL-MCNC: 8.7 MG/DL — SIGNIFICANT CHANGE UP (ref 8.5–10.1)
CALCIUM SERPL-MCNC: 8.7 MG/DL — SIGNIFICANT CHANGE UP (ref 8.5–10.1)
CALCIUM SERPL-MCNC: 8.8 MG/DL — SIGNIFICANT CHANGE UP (ref 8.5–10.1)
CALCIUM SERPL-MCNC: 9.2 MG/DL
CALCIUM SERPL-MCNC: 9.2 MG/DL — SIGNIFICANT CHANGE UP (ref 8.5–10.1)
CHLORIDE SERPL-SCNC: 100 MMOL/L — SIGNIFICANT CHANGE UP (ref 98–110)
CHLORIDE SERPL-SCNC: 101 MMOL/L — SIGNIFICANT CHANGE UP (ref 98–110)
CHLORIDE SERPL-SCNC: 102 MMOL/L — SIGNIFICANT CHANGE UP (ref 98–110)
CHLORIDE SERPL-SCNC: 103 MMOL/L — SIGNIFICANT CHANGE UP (ref 98–110)
CHLORIDE SERPL-SCNC: 103 MMOL/L — SIGNIFICANT CHANGE UP (ref 98–110)
CHLORIDE SERPL-SCNC: 104 MMOL/L — SIGNIFICANT CHANGE UP (ref 98–110)
CHLORIDE SERPL-SCNC: 104 MMOL/L — SIGNIFICANT CHANGE UP (ref 98–110)
CHLORIDE SERPL-SCNC: 105 MMOL/L — SIGNIFICANT CHANGE UP (ref 98–110)
CHLORIDE SERPL-SCNC: 106 MMOL/L — SIGNIFICANT CHANGE UP (ref 98–110)
CHLORIDE SERPL-SCNC: 107 MMOL/L — SIGNIFICANT CHANGE UP (ref 98–110)
CHLORIDE SERPL-SCNC: 107 MMOL/L — SIGNIFICANT CHANGE UP (ref 98–110)
CHLORIDE SERPL-SCNC: 108 MMOL/L — SIGNIFICANT CHANGE UP (ref 98–110)
CHLORIDE SERPL-SCNC: 108 MMOL/L — SIGNIFICANT CHANGE UP (ref 98–110)
CHLORIDE SERPL-SCNC: 109 MMOL/L — SIGNIFICANT CHANGE UP (ref 98–110)
CHLORIDE SERPL-SCNC: 109 MMOL/L — SIGNIFICANT CHANGE UP (ref 98–110)
CHLORIDE SERPL-SCNC: 110 MMOL/L — SIGNIFICANT CHANGE UP (ref 98–110)
CHLORIDE SERPL-SCNC: 111 MMOL/L — HIGH (ref 98–110)
CHLORIDE SERPL-SCNC: 111 MMOL/L — HIGH (ref 98–110)
CHLORIDE SERPL-SCNC: 112 MMOL/L — HIGH (ref 98–110)
CHLORIDE SERPL-SCNC: 113 MMOL/L — HIGH (ref 98–110)
CHLORIDE SERPL-SCNC: 114 MMOL/L — HIGH (ref 98–110)
CHLORIDE SERPL-SCNC: 94 MMOL/L — LOW (ref 98–110)
CHLORIDE SERPL-SCNC: 95 MMOL/L
CHLORIDE SERPL-SCNC: 96 MMOL/L — LOW (ref 98–110)
CHLORIDE SERPL-SCNC: 96 MMOL/L — LOW (ref 98–110)
CHLORIDE SERPL-SCNC: 97 MMOL/L — LOW (ref 98–110)
CHLORIDE SERPL-SCNC: 98 MMOL/L — SIGNIFICANT CHANGE UP (ref 98–110)
CHLORIDE SERPL-SCNC: 99 MMOL/L — SIGNIFICANT CHANGE UP (ref 98–110)
CHLORIDE SERPL-SCNC: 99 MMOL/L — SIGNIFICANT CHANGE UP (ref 98–110)
CK MB CFR SERPL CALC: 4.1 NG/ML — SIGNIFICANT CHANGE UP (ref 0.6–6.3)
CK MB CFR SERPL CALC: 4.5 NG/ML — SIGNIFICANT CHANGE UP (ref 0.6–6.3)
CK MB CFR SERPL CALC: 5.4 NG/ML — SIGNIFICANT CHANGE UP (ref 0.6–6.3)
CK SERPL-CCNC: 107 U/L — SIGNIFICANT CHANGE UP (ref 0–225)
CK SERPL-CCNC: 115 U/L — SIGNIFICANT CHANGE UP (ref 0–225)
CK SERPL-CCNC: 73 U/L — SIGNIFICANT CHANGE UP (ref 0–225)
CO2 SERPL-SCNC: 20 MMOL/L — SIGNIFICANT CHANGE UP (ref 17–32)
CO2 SERPL-SCNC: 21 MMOL/L — SIGNIFICANT CHANGE UP (ref 17–32)
CO2 SERPL-SCNC: 22 MMOL/L — SIGNIFICANT CHANGE UP (ref 17–32)
CO2 SERPL-SCNC: 23 MMOL/L — SIGNIFICANT CHANGE UP (ref 17–32)
CO2 SERPL-SCNC: 24 MMOL/L — SIGNIFICANT CHANGE UP (ref 17–32)
CO2 SERPL-SCNC: 25 MMOL/L — SIGNIFICANT CHANGE UP (ref 17–32)
CO2 SERPL-SCNC: 26 MMOL/L — SIGNIFICANT CHANGE UP (ref 17–32)
CO2 SERPL-SCNC: 27 MMOL/L
CO2 SERPL-SCNC: 28 MMOL/L — SIGNIFICANT CHANGE UP (ref 17–32)
CO2 SERPL-SCNC: 29 MMOL/L — SIGNIFICANT CHANGE UP (ref 17–32)
CO2 SERPL-SCNC: 29 MMOL/L — SIGNIFICANT CHANGE UP (ref 17–32)
CO2 SERPL-SCNC: 30 MMOL/L — SIGNIFICANT CHANGE UP (ref 17–32)
CO2 SERPL-SCNC: 31 MMOL/L — SIGNIFICANT CHANGE UP (ref 17–32)
CO2 SERPL-SCNC: 32 MMOL/L — SIGNIFICANT CHANGE UP (ref 17–32)
COLOR CSF: ABNORMAL
COLOR CSF: SIGNIFICANT CHANGE UP
COLOR SPEC: SIGNIFICANT CHANGE UP
COLOR SPEC: YELLOW — SIGNIFICANT CHANGE UP
COLOR: YELLOW
CREAT SERPL-MCNC: 0.5 MG/DL — LOW (ref 0.7–1.5)
CREAT SERPL-MCNC: 0.6 MG/DL — LOW (ref 0.7–1.5)
CREAT SERPL-MCNC: <0.5 MG/DL
CREAT SERPL-MCNC: <0.5 MG/DL — LOW (ref 0.7–1.5)
CRYPTOC AG CSF-ACNC: NEGATIVE — SIGNIFICANT CHANGE UP
CSF PCR RESULT: SIGNIFICANT CHANGE UP
CULTURE RESULTS: SIGNIFICANT CHANGE UP
DIFF PNL FLD: ABNORMAL
DIFF PNL FLD: NEGATIVE — SIGNIFICANT CHANGE UP
EOSINOPHIL # BLD AUTO: 0 K/UL — SIGNIFICANT CHANGE UP (ref 0–0.7)
EOSINOPHIL # BLD AUTO: 0.02 K/UL — SIGNIFICANT CHANGE UP (ref 0–0.7)
EOSINOPHIL # BLD AUTO: 0.03 K/UL — SIGNIFICANT CHANGE UP (ref 0–0.7)
EOSINOPHIL # BLD AUTO: 0.04 K/UL — SIGNIFICANT CHANGE UP (ref 0–0.7)
EOSINOPHIL # BLD AUTO: 0.04 K/UL — SIGNIFICANT CHANGE UP (ref 0–0.7)
EOSINOPHIL # BLD AUTO: 0.06 K/UL — SIGNIFICANT CHANGE UP (ref 0–0.7)
EOSINOPHIL # BLD AUTO: 0.06 K/UL — SIGNIFICANT CHANGE UP (ref 0–0.7)
EOSINOPHIL # BLD AUTO: 0.08 K/UL — SIGNIFICANT CHANGE UP (ref 0–0.7)
EOSINOPHIL # BLD AUTO: 0.09 K/UL — SIGNIFICANT CHANGE UP (ref 0–0.7)
EOSINOPHIL # BLD AUTO: 0.11 K/UL — SIGNIFICANT CHANGE UP (ref 0–0.7)
EOSINOPHIL # BLD AUTO: 0.12 K/UL — SIGNIFICANT CHANGE UP (ref 0–0.7)
EOSINOPHIL # BLD AUTO: 0.17 K/UL — SIGNIFICANT CHANGE UP (ref 0–0.7)
EOSINOPHIL NFR BLD AUTO: 0 % — SIGNIFICANT CHANGE UP (ref 0–8)
EOSINOPHIL NFR BLD AUTO: 0.1 % — SIGNIFICANT CHANGE UP (ref 0–8)
EOSINOPHIL NFR BLD AUTO: 0.5 % — SIGNIFICANT CHANGE UP (ref 0–8)
EOSINOPHIL NFR BLD AUTO: 0.5 % — SIGNIFICANT CHANGE UP (ref 0–8)
EOSINOPHIL NFR BLD AUTO: 0.6 % — SIGNIFICANT CHANGE UP (ref 0–8)
EOSINOPHIL NFR BLD AUTO: 0.8 % — SIGNIFICANT CHANGE UP (ref 0–8)
EOSINOPHIL NFR BLD AUTO: 0.9 % — SIGNIFICANT CHANGE UP (ref 0–8)
EOSINOPHIL NFR BLD AUTO: 1.4 % — SIGNIFICANT CHANGE UP (ref 0–8)
EOSINOPHIL NFR BLD AUTO: 1.7 % — SIGNIFICANT CHANGE UP (ref 0–8)
EOSINOPHIL NFR BLD AUTO: 1.9 % — SIGNIFICANT CHANGE UP (ref 0–8)
EOSINOPHIL NFR BLD AUTO: 2.4 % — SIGNIFICANT CHANGE UP (ref 0–8)
EOSINOPHIL NFR BLD AUTO: 2.7 % — SIGNIFICANT CHANGE UP (ref 0–8)
EPI CELLS # UR: 0 /HPF — SIGNIFICANT CHANGE UP (ref 0–5)
EPI CELLS # UR: 1 /HPF — SIGNIFICANT CHANGE UP (ref 0–5)
EPI CELLS # UR: 1 /HPF — SIGNIFICANT CHANGE UP (ref 0–5)
EPI CELLS # UR: 4 /HPF — SIGNIFICANT CHANGE UP (ref 0–5)
ESTIMATED AVERAGE GLUCOSE: 134 MG/DL — HIGH (ref 68–114)
FUNGITELL: <31 PG/ML — SIGNIFICANT CHANGE UP
GALACTOMANNAN AG SERPL-ACNC: <0.5 INDEX — SIGNIFICANT CHANGE UP
GALACTOMANNAN AG SERPL-ACNC: <0.5 INDEX — SIGNIFICANT CHANGE UP
GAS PNL BLDA: SIGNIFICANT CHANGE UP
GIANT PLATELETS BLD QL SMEAR: PRESENT — SIGNIFICANT CHANGE UP
GIANT PLATELETS BLD QL SMEAR: PRESENT — SIGNIFICANT CHANGE UP
GLUCOSE BLDC GLUCOMTR-MCNC: 103 MG/DL — HIGH (ref 70–99)
GLUCOSE BLDC GLUCOMTR-MCNC: 109 MG/DL — HIGH (ref 70–99)
GLUCOSE BLDC GLUCOMTR-MCNC: 110 MG/DL — HIGH (ref 70–99)
GLUCOSE BLDC GLUCOMTR-MCNC: 112 MG/DL — HIGH (ref 70–99)
GLUCOSE BLDC GLUCOMTR-MCNC: 112 MG/DL — HIGH (ref 70–99)
GLUCOSE BLDC GLUCOMTR-MCNC: 115 MG/DL — HIGH (ref 70–99)
GLUCOSE BLDC GLUCOMTR-MCNC: 117 MG/DL — HIGH (ref 70–99)
GLUCOSE BLDC GLUCOMTR-MCNC: 118 MG/DL — HIGH (ref 70–99)
GLUCOSE BLDC GLUCOMTR-MCNC: 119 MG/DL — HIGH (ref 70–99)
GLUCOSE BLDC GLUCOMTR-MCNC: 121 MG/DL — HIGH (ref 70–99)
GLUCOSE BLDC GLUCOMTR-MCNC: 122 MG/DL — HIGH (ref 70–99)
GLUCOSE BLDC GLUCOMTR-MCNC: 124 MG/DL — HIGH (ref 70–99)
GLUCOSE BLDC GLUCOMTR-MCNC: 125 MG/DL — HIGH (ref 70–99)
GLUCOSE BLDC GLUCOMTR-MCNC: 125 MG/DL — HIGH (ref 70–99)
GLUCOSE BLDC GLUCOMTR-MCNC: 126 MG/DL — HIGH (ref 70–99)
GLUCOSE BLDC GLUCOMTR-MCNC: 127 MG/DL — HIGH (ref 70–99)
GLUCOSE BLDC GLUCOMTR-MCNC: 128 MG/DL — HIGH (ref 70–99)
GLUCOSE BLDC GLUCOMTR-MCNC: 129 MG/DL — HIGH (ref 70–99)
GLUCOSE BLDC GLUCOMTR-MCNC: 131 MG/DL — HIGH (ref 70–99)
GLUCOSE BLDC GLUCOMTR-MCNC: 131 MG/DL — HIGH (ref 70–99)
GLUCOSE BLDC GLUCOMTR-MCNC: 132 MG/DL — HIGH (ref 70–99)
GLUCOSE BLDC GLUCOMTR-MCNC: 132 MG/DL — HIGH (ref 70–99)
GLUCOSE BLDC GLUCOMTR-MCNC: 133 MG/DL — HIGH (ref 70–99)
GLUCOSE BLDC GLUCOMTR-MCNC: 134 MG/DL — HIGH (ref 70–99)
GLUCOSE BLDC GLUCOMTR-MCNC: 135 MG/DL — HIGH (ref 70–99)
GLUCOSE BLDC GLUCOMTR-MCNC: 136 MG/DL — HIGH (ref 70–99)
GLUCOSE BLDC GLUCOMTR-MCNC: 137 MG/DL — HIGH (ref 70–99)
GLUCOSE BLDC GLUCOMTR-MCNC: 138 MG/DL — HIGH (ref 70–99)
GLUCOSE BLDC GLUCOMTR-MCNC: 139 MG/DL — HIGH (ref 70–99)
GLUCOSE BLDC GLUCOMTR-MCNC: 139 MG/DL — HIGH (ref 70–99)
GLUCOSE BLDC GLUCOMTR-MCNC: 140 MG/DL — HIGH (ref 70–99)
GLUCOSE BLDC GLUCOMTR-MCNC: 141 MG/DL — HIGH (ref 70–99)
GLUCOSE BLDC GLUCOMTR-MCNC: 142 MG/DL — HIGH (ref 70–99)
GLUCOSE BLDC GLUCOMTR-MCNC: 142 MG/DL — HIGH (ref 70–99)
GLUCOSE BLDC GLUCOMTR-MCNC: 143 MG/DL — HIGH (ref 70–99)
GLUCOSE BLDC GLUCOMTR-MCNC: 144 MG/DL — HIGH (ref 70–99)
GLUCOSE BLDC GLUCOMTR-MCNC: 144 MG/DL — HIGH (ref 70–99)
GLUCOSE BLDC GLUCOMTR-MCNC: 145 MG/DL — HIGH (ref 70–99)
GLUCOSE BLDC GLUCOMTR-MCNC: 146 MG/DL — HIGH (ref 70–99)
GLUCOSE BLDC GLUCOMTR-MCNC: 147 MG/DL — HIGH (ref 70–99)
GLUCOSE BLDC GLUCOMTR-MCNC: 147 MG/DL — HIGH (ref 70–99)
GLUCOSE BLDC GLUCOMTR-MCNC: 149 MG/DL — HIGH (ref 70–99)
GLUCOSE BLDC GLUCOMTR-MCNC: 151 MG/DL — HIGH (ref 70–99)
GLUCOSE BLDC GLUCOMTR-MCNC: 152 MG/DL — HIGH (ref 70–99)
GLUCOSE BLDC GLUCOMTR-MCNC: 152 MG/DL — HIGH (ref 70–99)
GLUCOSE BLDC GLUCOMTR-MCNC: 154 MG/DL — HIGH (ref 70–99)
GLUCOSE BLDC GLUCOMTR-MCNC: 155 MG/DL — HIGH (ref 70–99)
GLUCOSE BLDC GLUCOMTR-MCNC: 156 MG/DL — HIGH (ref 70–99)
GLUCOSE BLDC GLUCOMTR-MCNC: 157 MG/DL — HIGH (ref 70–99)
GLUCOSE BLDC GLUCOMTR-MCNC: 157 MG/DL — HIGH (ref 70–99)
GLUCOSE BLDC GLUCOMTR-MCNC: 158 MG/DL — HIGH (ref 70–99)
GLUCOSE BLDC GLUCOMTR-MCNC: 159 MG/DL — HIGH (ref 70–99)
GLUCOSE BLDC GLUCOMTR-MCNC: 160 MG/DL — HIGH (ref 70–99)
GLUCOSE BLDC GLUCOMTR-MCNC: 160 MG/DL — HIGH (ref 70–99)
GLUCOSE BLDC GLUCOMTR-MCNC: 161 MG/DL — HIGH (ref 70–99)
GLUCOSE BLDC GLUCOMTR-MCNC: 162 MG/DL — HIGH (ref 70–99)
GLUCOSE BLDC GLUCOMTR-MCNC: 162 MG/DL — HIGH (ref 70–99)
GLUCOSE BLDC GLUCOMTR-MCNC: 163 MG/DL — HIGH (ref 70–99)
GLUCOSE BLDC GLUCOMTR-MCNC: 164 MG/DL — HIGH (ref 70–99)
GLUCOSE BLDC GLUCOMTR-MCNC: 164 MG/DL — HIGH (ref 70–99)
GLUCOSE BLDC GLUCOMTR-MCNC: 166 MG/DL — HIGH (ref 70–99)
GLUCOSE BLDC GLUCOMTR-MCNC: 167 MG/DL — HIGH (ref 70–99)
GLUCOSE BLDC GLUCOMTR-MCNC: 167 MG/DL — HIGH (ref 70–99)
GLUCOSE BLDC GLUCOMTR-MCNC: 168 MG/DL — HIGH (ref 70–99)
GLUCOSE BLDC GLUCOMTR-MCNC: 171 MG/DL — HIGH (ref 70–99)
GLUCOSE BLDC GLUCOMTR-MCNC: 172 MG/DL — HIGH (ref 70–99)
GLUCOSE BLDC GLUCOMTR-MCNC: 173 MG/DL — HIGH (ref 70–99)
GLUCOSE BLDC GLUCOMTR-MCNC: 173 MG/DL — HIGH (ref 70–99)
GLUCOSE BLDC GLUCOMTR-MCNC: 174 MG/DL — HIGH (ref 70–99)
GLUCOSE BLDC GLUCOMTR-MCNC: 174 MG/DL — HIGH (ref 70–99)
GLUCOSE BLDC GLUCOMTR-MCNC: 177 MG/DL — HIGH (ref 70–99)
GLUCOSE BLDC GLUCOMTR-MCNC: 177 MG/DL — HIGH (ref 70–99)
GLUCOSE BLDC GLUCOMTR-MCNC: 178 MG/DL — HIGH (ref 70–99)
GLUCOSE BLDC GLUCOMTR-MCNC: 178 MG/DL — HIGH (ref 70–99)
GLUCOSE BLDC GLUCOMTR-MCNC: 180 MG/DL — HIGH (ref 70–99)
GLUCOSE BLDC GLUCOMTR-MCNC: 181 MG/DL — HIGH (ref 70–99)
GLUCOSE BLDC GLUCOMTR-MCNC: 181 MG/DL — HIGH (ref 70–99)
GLUCOSE BLDC GLUCOMTR-MCNC: 182 MG/DL — HIGH (ref 70–99)
GLUCOSE BLDC GLUCOMTR-MCNC: 182 MG/DL — HIGH (ref 70–99)
GLUCOSE BLDC GLUCOMTR-MCNC: 183 MG/DL — HIGH (ref 70–99)
GLUCOSE BLDC GLUCOMTR-MCNC: 183 MG/DL — HIGH (ref 70–99)
GLUCOSE BLDC GLUCOMTR-MCNC: 184 MG/DL — HIGH (ref 70–99)
GLUCOSE BLDC GLUCOMTR-MCNC: 184 MG/DL — HIGH (ref 70–99)
GLUCOSE BLDC GLUCOMTR-MCNC: 185 MG/DL — HIGH (ref 70–99)
GLUCOSE BLDC GLUCOMTR-MCNC: 186 MG/DL — HIGH (ref 70–99)
GLUCOSE BLDC GLUCOMTR-MCNC: 187 MG/DL — HIGH (ref 70–99)
GLUCOSE BLDC GLUCOMTR-MCNC: 188 MG/DL — HIGH (ref 70–99)
GLUCOSE BLDC GLUCOMTR-MCNC: 189 MG/DL — HIGH (ref 70–99)
GLUCOSE BLDC GLUCOMTR-MCNC: 190 MG/DL — HIGH (ref 70–99)
GLUCOSE BLDC GLUCOMTR-MCNC: 192 MG/DL — HIGH (ref 70–99)
GLUCOSE BLDC GLUCOMTR-MCNC: 192 MG/DL — HIGH (ref 70–99)
GLUCOSE BLDC GLUCOMTR-MCNC: 193 MG/DL — HIGH (ref 70–99)
GLUCOSE BLDC GLUCOMTR-MCNC: 196 MG/DL — HIGH (ref 70–99)
GLUCOSE BLDC GLUCOMTR-MCNC: 199 MG/DL — HIGH (ref 70–99)
GLUCOSE BLDC GLUCOMTR-MCNC: 199 MG/DL — HIGH (ref 70–99)
GLUCOSE BLDC GLUCOMTR-MCNC: 200 MG/DL — HIGH (ref 70–99)
GLUCOSE BLDC GLUCOMTR-MCNC: 203 MG/DL — HIGH (ref 70–99)
GLUCOSE BLDC GLUCOMTR-MCNC: 204 MG/DL — HIGH (ref 70–99)
GLUCOSE BLDC GLUCOMTR-MCNC: 208 MG/DL — HIGH (ref 70–99)
GLUCOSE BLDC GLUCOMTR-MCNC: 209 MG/DL — HIGH (ref 70–99)
GLUCOSE BLDC GLUCOMTR-MCNC: 211 MG/DL — HIGH (ref 70–99)
GLUCOSE BLDC GLUCOMTR-MCNC: 211 MG/DL — HIGH (ref 70–99)
GLUCOSE BLDC GLUCOMTR-MCNC: 213 MG/DL — HIGH (ref 70–99)
GLUCOSE BLDC GLUCOMTR-MCNC: 225 MG/DL — HIGH (ref 70–99)
GLUCOSE BLDC GLUCOMTR-MCNC: 225 MG/DL — HIGH (ref 70–99)
GLUCOSE BLDC GLUCOMTR-MCNC: 226 MG/DL — HIGH (ref 70–99)
GLUCOSE BLDC GLUCOMTR-MCNC: 226 MG/DL — HIGH (ref 70–99)
GLUCOSE BLDC GLUCOMTR-MCNC: 227 MG/DL — HIGH (ref 70–99)
GLUCOSE BLDC GLUCOMTR-MCNC: 234 MG/DL — HIGH (ref 70–99)
GLUCOSE BLDC GLUCOMTR-MCNC: 236 MG/DL — HIGH (ref 70–99)
GLUCOSE BLDC GLUCOMTR-MCNC: 236 MG/DL — HIGH (ref 70–99)
GLUCOSE BLDC GLUCOMTR-MCNC: 260 MG/DL — HIGH (ref 70–99)
GLUCOSE BLDC GLUCOMTR-MCNC: 98 MG/DL — SIGNIFICANT CHANGE UP (ref 70–99)
GLUCOSE BLDC GLUCOMTR-MCNC: 98 MG/DL — SIGNIFICANT CHANGE UP (ref 70–99)
GLUCOSE BLDC GLUCOMTR-MCNC: 99 MG/DL — SIGNIFICANT CHANGE UP (ref 70–99)
GLUCOSE CSF-MCNC: 81 MG/DL — HIGH (ref 45–75)
GLUCOSE CSF-MCNC: 88 MG/DL — HIGH (ref 45–75)
GLUCOSE QUALITATIVE U: NEGATIVE
GLUCOSE SERPL-MCNC: 100 MG/DL — HIGH (ref 70–99)
GLUCOSE SERPL-MCNC: 125 MG/DL — HIGH (ref 70–99)
GLUCOSE SERPL-MCNC: 127 MG/DL — HIGH (ref 70–99)
GLUCOSE SERPL-MCNC: 127 MG/DL — HIGH (ref 70–99)
GLUCOSE SERPL-MCNC: 129 MG/DL — HIGH (ref 70–99)
GLUCOSE SERPL-MCNC: 130 MG/DL — HIGH (ref 70–99)
GLUCOSE SERPL-MCNC: 133 MG/DL — HIGH (ref 70–99)
GLUCOSE SERPL-MCNC: 135 MG/DL — HIGH (ref 70–99)
GLUCOSE SERPL-MCNC: 139 MG/DL — HIGH (ref 70–99)
GLUCOSE SERPL-MCNC: 142 MG/DL — HIGH (ref 70–99)
GLUCOSE SERPL-MCNC: 144 MG/DL
GLUCOSE SERPL-MCNC: 145 MG/DL — HIGH (ref 70–99)
GLUCOSE SERPL-MCNC: 145 MG/DL — HIGH (ref 70–99)
GLUCOSE SERPL-MCNC: 147 MG/DL — HIGH (ref 70–99)
GLUCOSE SERPL-MCNC: 148 MG/DL — HIGH (ref 70–99)
GLUCOSE SERPL-MCNC: 150 MG/DL — HIGH (ref 70–99)
GLUCOSE SERPL-MCNC: 150 MG/DL — HIGH (ref 70–99)
GLUCOSE SERPL-MCNC: 151 MG/DL — HIGH (ref 70–99)
GLUCOSE SERPL-MCNC: 152 MG/DL — HIGH (ref 70–99)
GLUCOSE SERPL-MCNC: 153 MG/DL — HIGH (ref 70–99)
GLUCOSE SERPL-MCNC: 159 MG/DL — HIGH (ref 70–99)
GLUCOSE SERPL-MCNC: 167 MG/DL — HIGH (ref 70–99)
GLUCOSE SERPL-MCNC: 168 MG/DL — HIGH (ref 70–99)
GLUCOSE SERPL-MCNC: 170 MG/DL — HIGH (ref 70–99)
GLUCOSE SERPL-MCNC: 176 MG/DL — HIGH (ref 70–99)
GLUCOSE SERPL-MCNC: 176 MG/DL — HIGH (ref 70–99)
GLUCOSE SERPL-MCNC: 178 MG/DL — HIGH (ref 70–99)
GLUCOSE SERPL-MCNC: 178 MG/DL — HIGH (ref 70–99)
GLUCOSE SERPL-MCNC: 180 MG/DL — HIGH (ref 70–99)
GLUCOSE SERPL-MCNC: 183 MG/DL — HIGH (ref 70–99)
GLUCOSE SERPL-MCNC: 186 MG/DL — HIGH (ref 70–99)
GLUCOSE SERPL-MCNC: 186 MG/DL — HIGH (ref 70–99)
GLUCOSE SERPL-MCNC: 187 MG/DL — HIGH (ref 70–99)
GLUCOSE SERPL-MCNC: 187 MG/DL — HIGH (ref 70–99)
GLUCOSE SERPL-MCNC: 189 MG/DL — HIGH (ref 70–99)
GLUCOSE SERPL-MCNC: 192 MG/DL — HIGH (ref 70–99)
GLUCOSE SERPL-MCNC: 196 MG/DL — HIGH (ref 70–99)
GLUCOSE SERPL-MCNC: 197 MG/DL — HIGH (ref 70–99)
GLUCOSE SERPL-MCNC: 197 MG/DL — HIGH (ref 70–99)
GLUCOSE SERPL-MCNC: 202 MG/DL — HIGH (ref 70–99)
GLUCOSE SERPL-MCNC: 208 MG/DL — HIGH (ref 70–99)
GLUCOSE SERPL-MCNC: 224 MG/DL — HIGH (ref 70–99)
GLUCOSE SERPL-MCNC: 233 MG/DL — HIGH (ref 70–99)
GLUCOSE SERPL-MCNC: 237 MG/DL — HIGH (ref 70–99)
GLUCOSE SERPL-MCNC: 245 MG/DL — HIGH (ref 70–99)
GLUCOSE UR QL: ABNORMAL
GLUCOSE UR QL: NEGATIVE — SIGNIFICANT CHANGE UP
GRAM STN FLD: SIGNIFICANT CHANGE UP
HCO3 BLDA-SCNC: 22 MMOL/L — LOW (ref 23–27)
HCO3 BLDA-SCNC: 22 MMOL/L — SIGNIFICANT CHANGE UP (ref 21–29)
HCO3 BLDA-SCNC: 23 MMOL/L — SIGNIFICANT CHANGE UP (ref 23–27)
HCO3 BLDA-SCNC: 23 MMOL/L — SIGNIFICANT CHANGE UP (ref 23–27)
HCO3 BLDA-SCNC: 24 MMOL/L — SIGNIFICANT CHANGE UP (ref 23–27)
HCO3 BLDA-SCNC: 24 MMOL/L — SIGNIFICANT CHANGE UP (ref 23–27)
HCO3 BLDA-SCNC: 25 MMOL/L — SIGNIFICANT CHANGE UP (ref 23–27)
HCO3 BLDA-SCNC: 25 MMOL/L — SIGNIFICANT CHANGE UP (ref 23–27)
HCO3 BLDA-SCNC: 26 MMOL/L — SIGNIFICANT CHANGE UP (ref 23–27)
HCO3 BLDA-SCNC: 26 MMOL/L — SIGNIFICANT CHANGE UP (ref 23–27)
HCO3 BLDA-SCNC: 27 MMOL/L — SIGNIFICANT CHANGE UP (ref 23–27)
HCO3 BLDA-SCNC: 28 MMOL/L — HIGH (ref 23–27)
HCO3 BLDA-SCNC: 30 MMOL/L — HIGH (ref 23–27)
HCO3 BLDA-SCNC: 31 MMOL/L — HIGH (ref 23–27)
HCO3 BLDA-SCNC: 33 MMOL/L — HIGH (ref 23–27)
HCO3 BLDV-SCNC: 29 MMOL/L — SIGNIFICANT CHANGE UP (ref 22–29)
HCT VFR BLD CALC: 20.3 % — LOW (ref 37–47)
HCT VFR BLD CALC: 23.8 % — LOW (ref 37–47)
HCT VFR BLD CALC: 24.9 % — LOW (ref 37–47)
HCT VFR BLD CALC: 25.3 % — LOW (ref 37–47)
HCT VFR BLD CALC: 25.4 % — LOW (ref 37–47)
HCT VFR BLD CALC: 25.4 % — LOW (ref 37–47)
HCT VFR BLD CALC: 25.6 % — LOW (ref 37–47)
HCT VFR BLD CALC: 26 % — LOW (ref 37–47)
HCT VFR BLD CALC: 26.2 % — LOW (ref 37–47)
HCT VFR BLD CALC: 26.8 % — LOW (ref 37–47)
HCT VFR BLD CALC: 27 % — LOW (ref 37–47)
HCT VFR BLD CALC: 27.1 % — LOW (ref 37–47)
HCT VFR BLD CALC: 27.2 % — LOW (ref 37–47)
HCT VFR BLD CALC: 27.2 % — LOW (ref 37–47)
HCT VFR BLD CALC: 27.7 % — LOW (ref 37–47)
HCT VFR BLD CALC: 27.7 % — LOW (ref 37–47)
HCT VFR BLD CALC: 27.8 % — LOW (ref 37–47)
HCT VFR BLD CALC: 27.8 % — LOW (ref 37–47)
HCT VFR BLD CALC: 27.9 % — LOW (ref 37–47)
HCT VFR BLD CALC: 28 % — LOW (ref 37–47)
HCT VFR BLD CALC: 28.1 % — LOW (ref 37–47)
HCT VFR BLD CALC: 28.2 % — LOW (ref 37–47)
HCT VFR BLD CALC: 28.5 % — LOW (ref 37–47)
HCT VFR BLD CALC: 28.6 % — LOW (ref 37–47)
HCT VFR BLD CALC: 29.3 % — LOW (ref 37–47)
HCT VFR BLD CALC: 29.4 % — LOW (ref 37–47)
HCT VFR BLD CALC: 31 % — LOW (ref 37–47)
HCT VFR BLD CALC: 40.5 %
HCT VFR BLD CALC: 40.7 %
HCT VFR BLD CALC: 44.3 % — SIGNIFICANT CHANGE UP (ref 37–47)
HGB BLD-MCNC: 13 G/DL
HGB BLD-MCNC: 13.6 G/DL
HGB BLD-MCNC: 13.7 G/DL — SIGNIFICANT CHANGE UP (ref 12–16)
HGB BLD-MCNC: 6.2 G/DL — CRITICAL LOW (ref 12–16)
HGB BLD-MCNC: 7.8 G/DL — LOW (ref 12–16)
HGB BLD-MCNC: 7.9 G/DL — LOW (ref 12–16)
HGB BLD-MCNC: 8 G/DL — LOW (ref 12–16)
HGB BLD-MCNC: 8 G/DL — LOW (ref 12–16)
HGB BLD-MCNC: 8.1 G/DL — LOW (ref 12–16)
HGB BLD-MCNC: 8.2 G/DL — LOW (ref 12–16)
HGB BLD-MCNC: 8.2 G/DL — LOW (ref 12–16)
HGB BLD-MCNC: 8.4 G/DL — LOW (ref 12–16)
HGB BLD-MCNC: 8.5 G/DL — LOW (ref 12–16)
HGB BLD-MCNC: 8.5 G/DL — LOW (ref 12–16)
HGB BLD-MCNC: 8.6 G/DL — LOW (ref 12–16)
HGB BLD-MCNC: 8.6 G/DL — LOW (ref 12–16)
HGB BLD-MCNC: 8.8 G/DL — LOW (ref 12–16)
HGB BLD-MCNC: 9 G/DL — LOW (ref 12–16)
HGB BLD-MCNC: 9 G/DL — LOW (ref 12–16)
HGB BLD-MCNC: 9.5 G/DL — LOW (ref 12–16)
HOROWITZ INDEX BLDA+IHG-RTO: 100 — SIGNIFICANT CHANGE UP
HOROWITZ INDEX BLDA+IHG-RTO: 21 — SIGNIFICANT CHANGE UP
HOROWITZ INDEX BLDA+IHG-RTO: 25 — SIGNIFICANT CHANGE UP
HOROWITZ INDEX BLDA+IHG-RTO: 30 — SIGNIFICANT CHANGE UP
HOROWITZ INDEX BLDA+IHG-RTO: 35 — SIGNIFICANT CHANGE UP
HOROWITZ INDEX BLDA+IHG-RTO: 50 — SIGNIFICANT CHANGE UP
HYALINE CASTS # UR AUTO: 0 /LPF — SIGNIFICANT CHANGE UP (ref 0–7)
HYALINE CASTS # UR AUTO: 2 /LPF — SIGNIFICANT CHANGE UP (ref 0–7)
HYALINE CASTS # UR AUTO: 22 /LPF — HIGH (ref 0–7)
HYALINE CASTS # UR AUTO: 7 /LPF — SIGNIFICANT CHANGE UP (ref 0–7)
IMM GRANULOCYTES NFR BLD AUTO: 0.5 % — HIGH (ref 0.1–0.3)
IMM GRANULOCYTES NFR BLD AUTO: 0.6 % — HIGH (ref 0.1–0.3)
IMM GRANULOCYTES NFR BLD AUTO: 0.7 % — HIGH (ref 0.1–0.3)
IMM GRANULOCYTES NFR BLD AUTO: 0.7 % — HIGH (ref 0.1–0.3)
IMM GRANULOCYTES NFR BLD AUTO: 0.9 % — HIGH (ref 0.1–0.3)
IMM GRANULOCYTES NFR BLD AUTO: 1 % — HIGH (ref 0.1–0.3)
IMM GRANULOCYTES NFR BLD AUTO: 12.1 % — HIGH (ref 0.1–0.3)
IMM GRANULOCYTES NFR BLD AUTO: 7 % — HIGH (ref 0.1–0.3)
INR BLD: 1.24 RATIO — SIGNIFICANT CHANGE UP (ref 0.65–1.3)
INR BLD: 1.25 RATIO — SIGNIFICANT CHANGE UP (ref 0.65–1.3)
INR BLD: 1.26 RATIO — SIGNIFICANT CHANGE UP (ref 0.65–1.3)
INR BLD: 1.27 RATIO — SIGNIFICANT CHANGE UP (ref 0.65–1.3)
INR BLD: 1.28 RATIO — SIGNIFICANT CHANGE UP (ref 0.65–1.3)
INR BLD: 1.32 RATIO — HIGH (ref 0.65–1.3)
INR BLD: 1.34 RATIO — HIGH (ref 0.65–1.3)
INR BLD: 1.43 RATIO — HIGH (ref 0.65–1.3)
INR BLD: 1.43 RATIO — HIGH (ref 0.65–1.3)
INR BLD: 1.49 RATIO — HIGH (ref 0.65–1.3)
INR BLD: 1.52 RATIO — HIGH (ref 0.65–1.3)
INR BLD: 1.55 RATIO — HIGH (ref 0.65–1.3)
INR BLD: 1.56 RATIO — HIGH (ref 0.65–1.3)
INR BLD: 1.78 RATIO — HIGH (ref 0.65–1.3)
INR BLD: 1.85 RATIO — HIGH (ref 0.65–1.3)
KETONES UR-MCNC: NEGATIVE — SIGNIFICANT CHANGE UP
KETONES URINE: NEGATIVE
LACTATE BLDV-MCNC: 2.4 MMOL/L — HIGH (ref 0.5–1.6)
LACTATE SERPL-SCNC: 2.6 MMOL/L — HIGH (ref 0.7–2)
LDH SERPL-CCNC: 213
LEUKOCYTE ESTERASE UR-ACNC: ABNORMAL
LEUKOCYTE ESTERASE UR-ACNC: ABNORMAL
LEUKOCYTE ESTERASE UR-ACNC: NEGATIVE — SIGNIFICANT CHANGE UP
LEUKOCYTE ESTERASE URINE: NEGATIVE
LYMPHOCYTES # BLD AUTO: 0.48 K/UL — LOW (ref 1.2–3.4)
LYMPHOCYTES # BLD AUTO: 0.64 K/UL — LOW (ref 1.2–3.4)
LYMPHOCYTES # BLD AUTO: 0.65 K/UL — LOW (ref 1.2–3.4)
LYMPHOCYTES # BLD AUTO: 0.68 K/UL — LOW (ref 1.2–3.4)
LYMPHOCYTES # BLD AUTO: 0.69 K/UL — LOW (ref 1.2–3.4)
LYMPHOCYTES # BLD AUTO: 0.71 K/UL — LOW (ref 1.2–3.4)
LYMPHOCYTES # BLD AUTO: 0.82 K/UL — LOW (ref 1.2–3.4)
LYMPHOCYTES # BLD AUTO: 0.83 K/UL — LOW (ref 1.2–3.4)
LYMPHOCYTES # BLD AUTO: 0.87 K/UL — LOW (ref 1.2–3.4)
LYMPHOCYTES # BLD AUTO: 0.94 K/UL — LOW (ref 1.2–3.4)
LYMPHOCYTES # BLD AUTO: 0.99 K/UL — LOW (ref 1.2–3.4)
LYMPHOCYTES # BLD AUTO: 1 K/UL — LOW (ref 1.2–3.4)
LYMPHOCYTES # BLD AUTO: 1.35 K/UL — SIGNIFICANT CHANGE UP (ref 1.2–3.4)
LYMPHOCYTES # BLD AUTO: 11.2 % — LOW (ref 20.5–51.1)
LYMPHOCYTES # BLD AUTO: 11.8 % — LOW (ref 20.5–51.1)
LYMPHOCYTES # BLD AUTO: 12.6 % — LOW (ref 20.5–51.1)
LYMPHOCYTES # BLD AUTO: 13.2 % — LOW (ref 20.5–51.1)
LYMPHOCYTES # BLD AUTO: 14.6 % — LOW (ref 20.5–51.1)
LYMPHOCYTES # BLD AUTO: 14.8 % — LOW (ref 20.5–51.1)
LYMPHOCYTES # BLD AUTO: 15.4 % — LOW (ref 20.5–51.1)
LYMPHOCYTES # BLD AUTO: 19.8 % — LOW (ref 20.5–51.1)
LYMPHOCYTES # BLD AUTO: 2.15 K/UL — SIGNIFICANT CHANGE UP (ref 1.2–3.4)
LYMPHOCYTES # BLD AUTO: 20.8 % — SIGNIFICANT CHANGE UP (ref 20.5–51.1)
LYMPHOCYTES # BLD AUTO: 20.9 % — SIGNIFICANT CHANGE UP (ref 20.5–51.1)
LYMPHOCYTES # BLD AUTO: 4.8 % — LOW (ref 20.5–51.1)
LYMPHOCYTES # BLD AUTO: 6.6 % — LOW (ref 20.5–51.1)
LYMPHOCYTES # BLD AUTO: 6.6 % — LOW (ref 20.5–51.1)
LYMPHOCYTES # BLD AUTO: 7.5 % — LOW (ref 20.5–51.1)
LYMPHOCYTES # CSF: 13 % — SIGNIFICANT CHANGE UP (ref 40–80)
LYMPHOCYTES # CSF: 5 % — SIGNIFICANT CHANGE UP (ref 40–80)
MAGNESIUM SERPL-MCNC: 1.1 MG/DL — LOW (ref 1.8–2.4)
MAGNESIUM SERPL-MCNC: 1.5 MG/DL — LOW (ref 1.8–2.4)
MAGNESIUM SERPL-MCNC: 1.6 MG/DL — LOW (ref 1.8–2.4)
MAGNESIUM SERPL-MCNC: 1.7 MG/DL — LOW (ref 1.8–2.4)
MAGNESIUM SERPL-MCNC: 1.8 MG/DL — SIGNIFICANT CHANGE UP (ref 1.8–2.4)
MAGNESIUM SERPL-MCNC: 1.9 MG/DL — SIGNIFICANT CHANGE UP (ref 1.8–2.4)
MAGNESIUM SERPL-MCNC: 2 MG/DL — SIGNIFICANT CHANGE UP (ref 1.8–2.4)
MANUAL SMEAR VERIFICATION: SIGNIFICANT CHANGE UP
MANUAL SMEAR VERIFICATION: SIGNIFICANT CHANGE UP
MCHC RBC-ENTMCNC: 25.6 PG — LOW (ref 27–31)
MCHC RBC-ENTMCNC: 25.6 PG — LOW (ref 27–31)
MCHC RBC-ENTMCNC: 25.7 PG — LOW (ref 27–31)
MCHC RBC-ENTMCNC: 25.8 PG — LOW (ref 27–31)
MCHC RBC-ENTMCNC: 25.8 PG — LOW (ref 27–31)
MCHC RBC-ENTMCNC: 25.9 PG — LOW (ref 27–31)
MCHC RBC-ENTMCNC: 25.9 PG — LOW (ref 27–31)
MCHC RBC-ENTMCNC: 26 PG — LOW (ref 27–31)
MCHC RBC-ENTMCNC: 26.1 PG — LOW (ref 27–31)
MCHC RBC-ENTMCNC: 26.2 PG — LOW (ref 27–31)
MCHC RBC-ENTMCNC: 26.2 PG — LOW (ref 27–31)
MCHC RBC-ENTMCNC: 26.3 PG — LOW (ref 27–31)
MCHC RBC-ENTMCNC: 26.4 PG
MCHC RBC-ENTMCNC: 26.4 PG — LOW (ref 27–31)
MCHC RBC-ENTMCNC: 26.4 PG — LOW (ref 27–31)
MCHC RBC-ENTMCNC: 26.5 PG — LOW (ref 27–31)
MCHC RBC-ENTMCNC: 26.5 PG — LOW (ref 27–31)
MCHC RBC-ENTMCNC: 26.7 PG — LOW (ref 27–31)
MCHC RBC-ENTMCNC: 26.7 PG — LOW (ref 27–31)
MCHC RBC-ENTMCNC: 26.8 PG — LOW (ref 27–31)
MCHC RBC-ENTMCNC: 28.6 PG
MCHC RBC-ENTMCNC: 29.9 G/DL — LOW (ref 32–37)
MCHC RBC-ENTMCNC: 30.2 G/DL — LOW (ref 32–37)
MCHC RBC-ENTMCNC: 30.3 G/DL — LOW (ref 32–37)
MCHC RBC-ENTMCNC: 30.4 G/DL — LOW (ref 32–37)
MCHC RBC-ENTMCNC: 30.5 G/DL — LOW (ref 32–37)
MCHC RBC-ENTMCNC: 30.6 G/DL — LOW (ref 32–37)
MCHC RBC-ENTMCNC: 30.6 G/DL — LOW (ref 32–37)
MCHC RBC-ENTMCNC: 30.7 G/DL — LOW (ref 32–37)
MCHC RBC-ENTMCNC: 30.7 G/DL — LOW (ref 32–37)
MCHC RBC-ENTMCNC: 30.8 G/DL — LOW (ref 32–37)
MCHC RBC-ENTMCNC: 30.8 G/DL — LOW (ref 32–37)
MCHC RBC-ENTMCNC: 30.9 G/DL — LOW (ref 32–37)
MCHC RBC-ENTMCNC: 31 G/DL — LOW (ref 32–37)
MCHC RBC-ENTMCNC: 31.2 G/DL — LOW (ref 32–37)
MCHC RBC-ENTMCNC: 31.3 G/DL — LOW (ref 32–37)
MCHC RBC-ENTMCNC: 31.5 G/DL — LOW (ref 32–37)
MCHC RBC-ENTMCNC: 31.7 G/DL — LOW (ref 32–37)
MCHC RBC-ENTMCNC: 32 G/DL — SIGNIFICANT CHANGE UP (ref 32–37)
MCHC RBC-ENTMCNC: 32.1 G/DL
MCHC RBC-ENTMCNC: 32.1 G/DL — SIGNIFICANT CHANGE UP (ref 32–37)
MCHC RBC-ENTMCNC: 32.3 G/DL — SIGNIFICANT CHANGE UP (ref 32–37)
MCHC RBC-ENTMCNC: 32.3 G/DL — SIGNIFICANT CHANGE UP (ref 32–37)
MCHC RBC-ENTMCNC: 32.5 G/DL — SIGNIFICANT CHANGE UP (ref 32–37)
MCHC RBC-ENTMCNC: 32.8 G/DL — SIGNIFICANT CHANGE UP (ref 32–37)
MCHC RBC-ENTMCNC: 33.4 G/DL
MCV RBC AUTO: 80 FL — LOW (ref 81–99)
MCV RBC AUTO: 80.7 FL — LOW (ref 81–99)
MCV RBC AUTO: 81 FL — SIGNIFICANT CHANGE UP (ref 81–99)
MCV RBC AUTO: 81.4 FL — SIGNIFICANT CHANGE UP (ref 81–99)
MCV RBC AUTO: 81.9 FL — SIGNIFICANT CHANGE UP (ref 81–99)
MCV RBC AUTO: 82.2 FL
MCV RBC AUTO: 82.7 FL — SIGNIFICANT CHANGE UP (ref 81–99)
MCV RBC AUTO: 83 FL — SIGNIFICANT CHANGE UP (ref 81–99)
MCV RBC AUTO: 83.2 FL — SIGNIFICANT CHANGE UP (ref 81–99)
MCV RBC AUTO: 83.4 FL — SIGNIFICANT CHANGE UP (ref 81–99)
MCV RBC AUTO: 83.4 FL — SIGNIFICANT CHANGE UP (ref 81–99)
MCV RBC AUTO: 83.5 FL — SIGNIFICANT CHANGE UP (ref 81–99)
MCV RBC AUTO: 83.6 FL — SIGNIFICANT CHANGE UP (ref 81–99)
MCV RBC AUTO: 84.1 FL — SIGNIFICANT CHANGE UP (ref 81–99)
MCV RBC AUTO: 84.3 FL — SIGNIFICANT CHANGE UP (ref 81–99)
MCV RBC AUTO: 84.4 FL — SIGNIFICANT CHANGE UP (ref 81–99)
MCV RBC AUTO: 84.5 FL — SIGNIFICANT CHANGE UP (ref 81–99)
MCV RBC AUTO: 84.5 FL — SIGNIFICANT CHANGE UP (ref 81–99)
MCV RBC AUTO: 84.8 FL — SIGNIFICANT CHANGE UP (ref 81–99)
MCV RBC AUTO: 85 FL — SIGNIFICANT CHANGE UP (ref 81–99)
MCV RBC AUTO: 85.2 FL — SIGNIFICANT CHANGE UP (ref 81–99)
MCV RBC AUTO: 85.3 FL — SIGNIFICANT CHANGE UP (ref 81–99)
MCV RBC AUTO: 85.4 FL — SIGNIFICANT CHANGE UP (ref 81–99)
MCV RBC AUTO: 85.7 FL
MCV RBC AUTO: 85.8 FL — SIGNIFICANT CHANGE UP (ref 81–99)
MCV RBC AUTO: 85.8 FL — SIGNIFICANT CHANGE UP (ref 81–99)
MCV RBC AUTO: 86.3 FL — SIGNIFICANT CHANGE UP (ref 81–99)
MCV RBC AUTO: 87.8 FL — SIGNIFICANT CHANGE UP (ref 81–99)
METAMYELOCYTES # FLD: 0.9 % — HIGH (ref 0–0)
METHOD TYPE: SIGNIFICANT CHANGE UP
MICROCYTES BLD QL: SIGNIFICANT CHANGE UP
MICROCYTES BLD QL: SLIGHT — SIGNIFICANT CHANGE UP
MONOCYTES # BLD AUTO: 0.38 K/UL — SIGNIFICANT CHANGE UP (ref 0.1–0.6)
MONOCYTES # BLD AUTO: 0.7 K/UL — HIGH (ref 0.1–0.6)
MONOCYTES # BLD AUTO: 0.73 K/UL — HIGH (ref 0.1–0.6)
MONOCYTES # BLD AUTO: 0.73 K/UL — HIGH (ref 0.1–0.6)
MONOCYTES # BLD AUTO: 0.75 K/UL — HIGH (ref 0.1–0.6)
MONOCYTES # BLD AUTO: 0.76 K/UL — HIGH (ref 0.1–0.6)
MONOCYTES # BLD AUTO: 0.77 K/UL — HIGH (ref 0.1–0.6)
MONOCYTES # BLD AUTO: 0.91 K/UL — HIGH (ref 0.1–0.6)
MONOCYTES # BLD AUTO: 1.01 K/UL — HIGH (ref 0.1–0.6)
MONOCYTES # BLD AUTO: 1.06 K/UL — HIGH (ref 0.1–0.6)
MONOCYTES # BLD AUTO: 1.2 K/UL — HIGH (ref 0.1–0.6)
MONOCYTES # BLD AUTO: 1.23 K/UL — HIGH (ref 0.1–0.6)
MONOCYTES # BLD AUTO: 1.38 K/UL — HIGH (ref 0.1–0.6)
MONOCYTES # BLD AUTO: 1.77 K/UL — HIGH (ref 0.1–0.6)
MONOCYTES NFR BLD AUTO: 10.4 % — HIGH (ref 1.7–9.3)
MONOCYTES NFR BLD AUTO: 11 % — HIGH (ref 1.7–9.3)
MONOCYTES NFR BLD AUTO: 11 % — HIGH (ref 1.7–9.3)
MONOCYTES NFR BLD AUTO: 11.5 % — HIGH (ref 1.7–9.3)
MONOCYTES NFR BLD AUTO: 11.5 % — HIGH (ref 1.7–9.3)
MONOCYTES NFR BLD AUTO: 12.2 % — HIGH (ref 1.7–9.3)
MONOCYTES NFR BLD AUTO: 13.1 % — HIGH (ref 1.7–9.3)
MONOCYTES NFR BLD AUTO: 14.5 % — HIGH (ref 1.7–9.3)
MONOCYTES NFR BLD AUTO: 15.7 % — HIGH (ref 1.7–9.3)
MONOCYTES NFR BLD AUTO: 16.1 % — HIGH (ref 1.7–9.3)
MONOCYTES NFR BLD AUTO: 16.6 % — HIGH (ref 1.7–9.3)
MONOCYTES NFR BLD AUTO: 19.1 % — HIGH (ref 1.7–9.3)
MONOCYTES NFR BLD AUTO: 7.5 % — SIGNIFICANT CHANGE UP (ref 1.7–9.3)
MONOCYTES NFR BLD AUTO: 8.9 % — SIGNIFICANT CHANGE UP (ref 1.7–9.3)
MONOS+MACROS NFR CSF: 5 % — LOW (ref 15–45)
MYELOCYTES NFR BLD: 0.9 % — HIGH (ref 0–0)
NEUTROPHILS # BLD AUTO: 1.73 K/UL — SIGNIFICANT CHANGE UP (ref 1.4–6.5)
NEUTROPHILS # BLD AUTO: 10.19 K/UL — HIGH (ref 1.4–6.5)
NEUTROPHILS # BLD AUTO: 12.28 K/UL — HIGH (ref 1.4–6.5)
NEUTROPHILS # BLD AUTO: 2.66 K/UL — SIGNIFICANT CHANGE UP (ref 1.4–6.5)
NEUTROPHILS # BLD AUTO: 3.29 K/UL — SIGNIFICANT CHANGE UP (ref 1.4–6.5)
NEUTROPHILS # BLD AUTO: 3.7 K/UL — SIGNIFICANT CHANGE UP (ref 1.4–6.5)
NEUTROPHILS # BLD AUTO: 4.33 K/UL — SIGNIFICANT CHANGE UP (ref 1.4–6.5)
NEUTROPHILS # BLD AUTO: 4.65 K/UL — SIGNIFICANT CHANGE UP (ref 1.4–6.5)
NEUTROPHILS # BLD AUTO: 4.95 K/UL — SIGNIFICANT CHANGE UP (ref 1.4–6.5)
NEUTROPHILS # BLD AUTO: 5.07 K/UL — SIGNIFICANT CHANGE UP (ref 1.4–6.5)
NEUTROPHILS # BLD AUTO: 5.21 K/UL — SIGNIFICANT CHANGE UP (ref 1.4–6.5)
NEUTROPHILS # BLD AUTO: 5.54 K/UL — SIGNIFICANT CHANGE UP (ref 1.4–6.5)
NEUTROPHILS # BLD AUTO: 7.12 K/UL — HIGH (ref 1.4–6.5)
NEUTROPHILS # BLD AUTO: 9.09 K/UL — HIGH (ref 1.4–6.5)
NEUTROPHILS # CSF: 82 % — HIGH (ref 0–6)
NEUTROPHILS # CSF: 91 % — HIGH (ref 0–6)
NEUTROPHILS NFR BLD AUTO: 52.3 % — SIGNIFICANT CHANGE UP (ref 42.2–75.2)
NEUTROPHILS NFR BLD AUTO: 57.4 % — SIGNIFICANT CHANGE UP (ref 42.2–75.2)
NEUTROPHILS NFR BLD AUTO: 60.3 % — SIGNIFICANT CHANGE UP (ref 42.2–75.2)
NEUTROPHILS NFR BLD AUTO: 62.6 % — SIGNIFICANT CHANGE UP (ref 42.2–75.2)
NEUTROPHILS NFR BLD AUTO: 67.1 % — SIGNIFICANT CHANGE UP (ref 42.2–75.2)
NEUTROPHILS NFR BLD AUTO: 68 % — SIGNIFICANT CHANGE UP (ref 42.2–75.2)
NEUTROPHILS NFR BLD AUTO: 70.1 % — SIGNIFICANT CHANGE UP (ref 42.2–75.2)
NEUTROPHILS NFR BLD AUTO: 72.5 % — SIGNIFICANT CHANGE UP (ref 42.2–75.2)
NEUTROPHILS NFR BLD AUTO: 72.9 % — SIGNIFICANT CHANGE UP (ref 42.2–75.2)
NEUTROPHILS NFR BLD AUTO: 73.4 % — SIGNIFICANT CHANGE UP (ref 42.2–75.2)
NEUTROPHILS NFR BLD AUTO: 75.6 % — HIGH (ref 42.2–75.2)
NEUTROPHILS NFR BLD AUTO: 81.4 % — HIGH (ref 42.2–75.2)
NEUTROPHILS NFR BLD AUTO: 81.9 % — HIGH (ref 42.2–75.2)
NEUTROPHILS NFR BLD AUTO: 86.6 % — HIGH (ref 42.2–75.2)
NIGHT BLUE STAIN TISS: SIGNIFICANT CHANGE UP
NITRITE UR-MCNC: NEGATIVE — SIGNIFICANT CHANGE UP
NITRITE URINE: NEGATIVE
NON-GYNECOLOGICAL CYTOLOGY STUDY: SIGNIFICANT CHANGE UP
NRBC # BLD: 0 /100 WBCS — SIGNIFICANT CHANGE UP (ref 0–0)
NRBC NFR CSF: 30 /UL — HIGH (ref 0–5)
NRBC NFR CSF: 373 /UL — HIGH (ref 0–5)
ORGANISM # SPEC MICROSCOPIC CNT: SIGNIFICANT CHANGE UP
ORGANISM # SPEC MICROSCOPIC CNT: SIGNIFICANT CHANGE UP
OSMOLALITY SERPL: 282 MOS/KG — SIGNIFICANT CHANGE UP (ref 280–301)
OSMOLALITY SERPL: 283 MOS/KG — SIGNIFICANT CHANGE UP (ref 280–301)
OSMOLALITY SERPL: 285 MOS/KG — SIGNIFICANT CHANGE UP (ref 280–301)
OSMOLALITY SERPL: 287 MOS/KG — SIGNIFICANT CHANGE UP (ref 280–301)
OSMOLALITY SERPL: 290 MOS/KG — SIGNIFICANT CHANGE UP (ref 280–301)
OSMOLALITY SERPL: 293 MOS/KG — SIGNIFICANT CHANGE UP (ref 280–301)
OSMOLALITY SERPL: 296 MOS/KG — SIGNIFICANT CHANGE UP (ref 280–301)
OSMOLALITY SERPL: 297 MOS/KG — SIGNIFICANT CHANGE UP (ref 280–301)
OSMOLALITY SERPL: 297 MOS/KG — SIGNIFICANT CHANGE UP (ref 280–301)
OSMOLALITY SERPL: 298 MOS/KG — SIGNIFICANT CHANGE UP (ref 280–301)
OSMOLALITY SERPL: 300 MOS/KG — SIGNIFICANT CHANGE UP (ref 280–301)
OSMOLALITY SERPL: 303 MOS/KG — HIGH (ref 280–301)
OSMOLALITY SERPL: 304 MOS/KG — HIGH (ref 280–301)
OSMOLALITY SERPL: 305 MOS/KG — HIGH (ref 280–301)
OVALOCYTES BLD QL SMEAR: SLIGHT — SIGNIFICANT CHANGE UP
PCO2 BLDA: 31 MMHG — LOW (ref 38–42)
PCO2 BLDA: 33 MMHG — LOW (ref 38–42)
PCO2 BLDA: 35 MMHG — LOW (ref 38–42)
PCO2 BLDA: 36 MMHG — LOW (ref 38–42)
PCO2 BLDA: 36 MMHG — LOW (ref 38–42)
PCO2 BLDA: 37 MMHG — LOW (ref 38–42)
PCO2 BLDA: 38 MMHG — SIGNIFICANT CHANGE UP (ref 38–42)
PCO2 BLDA: 39 MMHG — SIGNIFICANT CHANGE UP (ref 38–42)
PCO2 BLDA: 43 MMHG — HIGH (ref 38–42)
PCO2 BLDA: 45 MMHG — HIGH (ref 38–42)
PCO2 BLDV: 72 MMHG — HIGH (ref 41–51)
PH BLDA: 7.17 — CRITICAL LOW (ref 7.38–7.42)
PH BLDA: 7.38 — SIGNIFICANT CHANGE UP (ref 7.38–7.42)
PH BLDA: 7.4 — SIGNIFICANT CHANGE UP (ref 7.38–7.42)
PH BLDA: 7.42 — SIGNIFICANT CHANGE UP (ref 7.38–7.42)
PH BLDA: 7.43 — HIGH (ref 7.38–7.42)
PH BLDA: 7.43 — HIGH (ref 7.38–7.42)
PH BLDA: 7.44 — HIGH (ref 7.38–7.42)
PH BLDA: 7.44 — HIGH (ref 7.38–7.42)
PH BLDA: 7.45 — HIGH (ref 7.38–7.42)
PH BLDA: 7.46 — HIGH (ref 7.38–7.42)
PH BLDA: 7.47 — HIGH (ref 7.38–7.42)
PH BLDA: 7.48 — HIGH (ref 7.38–7.42)
PH BLDA: 7.49 — HIGH (ref 7.38–7.42)
PH BLDV: 7.21 — LOW (ref 7.26–7.43)
PH UR: 5.5 — SIGNIFICANT CHANGE UP (ref 5–8)
PH UR: 6 — SIGNIFICANT CHANGE UP (ref 5–8)
PH UR: 6 — SIGNIFICANT CHANGE UP (ref 5–8)
PH UR: 6.5 — SIGNIFICANT CHANGE UP (ref 5–8)
PH UR: 7 — SIGNIFICANT CHANGE UP (ref 5–8)
PH URINE: 6
PHOSPHATE SERPL-MCNC: 2.1 MG/DL — SIGNIFICANT CHANGE UP (ref 2.1–4.9)
PHOSPHATE SERPL-MCNC: 2.5 MG/DL — SIGNIFICANT CHANGE UP (ref 2.1–4.9)
PHOSPHATE SERPL-MCNC: 3 MG/DL — SIGNIFICANT CHANGE UP (ref 2.1–4.9)
PHOSPHATE SERPL-MCNC: 3 MG/DL — SIGNIFICANT CHANGE UP (ref 2.1–4.9)
PHOSPHATE SERPL-MCNC: 3.1 MG/DL — SIGNIFICANT CHANGE UP (ref 2.1–4.9)
PHOSPHATE SERPL-MCNC: 3.1 MG/DL — SIGNIFICANT CHANGE UP (ref 2.1–4.9)
PHOSPHATE SERPL-MCNC: 3.2 MG/DL — SIGNIFICANT CHANGE UP (ref 2.1–4.9)
PHOSPHATE SERPL-MCNC: 3.4 MG/DL — SIGNIFICANT CHANGE UP (ref 2.1–4.9)
PHOSPHATE SERPL-MCNC: 3.5 MG/DL — SIGNIFICANT CHANGE UP (ref 2.1–4.9)
PHOSPHATE SERPL-MCNC: 3.6 MG/DL — SIGNIFICANT CHANGE UP (ref 2.1–4.9)
PHOSPHATE SERPL-MCNC: 3.8 MG/DL — SIGNIFICANT CHANGE UP (ref 2.1–4.9)
PHOSPHATE SERPL-MCNC: 3.8 MG/DL — SIGNIFICANT CHANGE UP (ref 2.1–4.9)
PHOSPHATE SERPL-MCNC: 3.9 MG/DL — SIGNIFICANT CHANGE UP (ref 2.1–4.9)
PHOSPHATE SERPL-MCNC: 3.9 MG/DL — SIGNIFICANT CHANGE UP (ref 2.1–4.9)
PHOSPHATE SERPL-MCNC: 4 MG/DL — SIGNIFICANT CHANGE UP (ref 2.1–4.9)
PHOSPHATE SERPL-MCNC: 4.1 MG/DL — SIGNIFICANT CHANGE UP (ref 2.1–4.9)
PLAT MORPH BLD: NORMAL — SIGNIFICANT CHANGE UP
PLAT MORPH BLD: NORMAL — SIGNIFICANT CHANGE UP
PLATELET # BLD AUTO: 112 K/UL — LOW (ref 130–400)
PLATELET # BLD AUTO: 114 K/UL — LOW (ref 130–400)
PLATELET # BLD AUTO: 116 K/UL — LOW (ref 130–400)
PLATELET # BLD AUTO: 119 K/UL — LOW (ref 130–400)
PLATELET # BLD AUTO: 120 K/UL — LOW (ref 130–400)
PLATELET # BLD AUTO: 120 K/UL — LOW (ref 130–400)
PLATELET # BLD AUTO: 121 K/UL — LOW (ref 130–400)
PLATELET # BLD AUTO: 124 K/UL — LOW (ref 130–400)
PLATELET # BLD AUTO: 125 K/UL — LOW (ref 130–400)
PLATELET # BLD AUTO: 128 K/UL — LOW (ref 130–400)
PLATELET # BLD AUTO: 129 K/UL
PLATELET # BLD AUTO: 140 K/UL — SIGNIFICANT CHANGE UP (ref 130–400)
PLATELET # BLD AUTO: 141 K/UL — SIGNIFICANT CHANGE UP (ref 130–400)
PLATELET # BLD AUTO: 144 K/UL — SIGNIFICANT CHANGE UP (ref 130–400)
PLATELET # BLD AUTO: 147 K/UL — SIGNIFICANT CHANGE UP (ref 130–400)
PLATELET # BLD AUTO: 153 K/UL — SIGNIFICANT CHANGE UP (ref 130–400)
PLATELET # BLD AUTO: 154 K/UL — SIGNIFICANT CHANGE UP (ref 130–400)
PLATELET # BLD AUTO: 158 K/UL — SIGNIFICANT CHANGE UP (ref 130–400)
PLATELET # BLD AUTO: 159 K/UL — SIGNIFICANT CHANGE UP (ref 130–400)
PLATELET # BLD AUTO: 160 K/UL — SIGNIFICANT CHANGE UP (ref 130–400)
PLATELET # BLD AUTO: 171 K/UL — SIGNIFICANT CHANGE UP (ref 130–400)
PLATELET # BLD AUTO: 171 K/UL — SIGNIFICANT CHANGE UP (ref 130–400)
PLATELET # BLD AUTO: 180 K/UL — SIGNIFICANT CHANGE UP (ref 130–400)
PLATELET # BLD AUTO: 183 K/UL — SIGNIFICANT CHANGE UP (ref 130–400)
PLATELET # BLD AUTO: 187 K/UL — SIGNIFICANT CHANGE UP (ref 130–400)
PLATELET # BLD AUTO: 193 K/UL — SIGNIFICANT CHANGE UP (ref 130–400)
PLATELET # BLD AUTO: 197 K/UL — SIGNIFICANT CHANGE UP (ref 130–400)
PLATELET # BLD AUTO: 198 K/UL — SIGNIFICANT CHANGE UP (ref 130–400)
PLATELET # BLD AUTO: 199 K/UL — SIGNIFICANT CHANGE UP (ref 130–400)
PLATELET # BLD AUTO: 68 K/UL
PMV BLD: 11.4 FL
PMV BLD: 12.8 FL
PO2 BLDA: 100 MMHG — HIGH (ref 78–95)
PO2 BLDA: 100 MMHG — HIGH (ref 78–95)
PO2 BLDA: 104 MMHG — HIGH (ref 78–95)
PO2 BLDA: 112 MMHG — HIGH (ref 78–95)
PO2 BLDA: 118 MMHG — HIGH (ref 78–95)
PO2 BLDA: 128 MMHG — HIGH (ref 78–95)
PO2 BLDA: 159 MMHG — HIGH (ref 78–95)
PO2 BLDA: 188 MMHG — HIGH (ref 78–95)
PO2 BLDA: 64 MMHG — LOW (ref 78–95)
PO2 BLDA: 71 MMHG — LOW (ref 78–95)
PO2 BLDA: 74 MMHG — LOW (ref 78–95)
PO2 BLDA: 75 MMHG — LOW (ref 78–95)
PO2 BLDA: 80 MMHG — SIGNIFICANT CHANGE UP (ref 78–95)
PO2 BLDA: 80 MMHG — SIGNIFICANT CHANGE UP (ref 78–95)
PO2 BLDA: 89 MMHG — SIGNIFICANT CHANGE UP (ref 78–95)
PO2 BLDA: 91 MMHG — SIGNIFICANT CHANGE UP (ref 78–95)
PO2 BLDA: 94 MMHG — SIGNIFICANT CHANGE UP (ref 78–95)
PO2 BLDV: 38 MMHG — SIGNIFICANT CHANGE UP (ref 20–40)
POIKILOCYTOSIS BLD QL AUTO: SLIGHT — SIGNIFICANT CHANGE UP
POIKILOCYTOSIS BLD QL AUTO: SLIGHT — SIGNIFICANT CHANGE UP
POLYCHROMASIA BLD QL SMEAR: SIGNIFICANT CHANGE UP
POTASSIUM SERPL-MCNC: 3.4 MMOL/L — LOW (ref 3.5–5)
POTASSIUM SERPL-MCNC: 3.4 MMOL/L — LOW (ref 3.5–5)
POTASSIUM SERPL-MCNC: 3.5 MMOL/L — SIGNIFICANT CHANGE UP (ref 3.5–5)
POTASSIUM SERPL-MCNC: 3.6 MMOL/L — SIGNIFICANT CHANGE UP (ref 3.5–5)
POTASSIUM SERPL-MCNC: 3.7 MMOL/L — SIGNIFICANT CHANGE UP (ref 3.5–5)
POTASSIUM SERPL-MCNC: 3.8 MMOL/L — SIGNIFICANT CHANGE UP (ref 3.5–5)
POTASSIUM SERPL-MCNC: 3.9 MMOL/L — SIGNIFICANT CHANGE UP (ref 3.5–5)
POTASSIUM SERPL-MCNC: 4 MMOL/L — SIGNIFICANT CHANGE UP (ref 3.5–5)
POTASSIUM SERPL-MCNC: 4.1 MMOL/L — SIGNIFICANT CHANGE UP (ref 3.5–5)
POTASSIUM SERPL-MCNC: 4.2 MMOL/L — SIGNIFICANT CHANGE UP (ref 3.5–5)
POTASSIUM SERPL-MCNC: 4.3 MMOL/L — SIGNIFICANT CHANGE UP (ref 3.5–5)
POTASSIUM SERPL-MCNC: 4.4 MMOL/L — SIGNIFICANT CHANGE UP (ref 3.5–5)
POTASSIUM SERPL-MCNC: 4.5 MMOL/L — SIGNIFICANT CHANGE UP (ref 3.5–5)
POTASSIUM SERPL-MCNC: 4.6 MMOL/L — SIGNIFICANT CHANGE UP (ref 3.5–5)
POTASSIUM SERPL-MCNC: 4.7 MMOL/L — SIGNIFICANT CHANGE UP (ref 3.5–5)
POTASSIUM SERPL-MCNC: 4.8 MMOL/L — SIGNIFICANT CHANGE UP (ref 3.5–5)
POTASSIUM SERPL-SCNC: 3.4 MMOL/L — LOW (ref 3.5–5)
POTASSIUM SERPL-SCNC: 3.4 MMOL/L — LOW (ref 3.5–5)
POTASSIUM SERPL-SCNC: 3.5 MMOL/L — SIGNIFICANT CHANGE UP (ref 3.5–5)
POTASSIUM SERPL-SCNC: 3.6 MMOL/L — SIGNIFICANT CHANGE UP (ref 3.5–5)
POTASSIUM SERPL-SCNC: 3.7 MMOL/L — SIGNIFICANT CHANGE UP (ref 3.5–5)
POTASSIUM SERPL-SCNC: 3.8 MMOL/L — SIGNIFICANT CHANGE UP (ref 3.5–5)
POTASSIUM SERPL-SCNC: 3.9 MMOL/L — SIGNIFICANT CHANGE UP (ref 3.5–5)
POTASSIUM SERPL-SCNC: 4 MMOL/L — SIGNIFICANT CHANGE UP (ref 3.5–5)
POTASSIUM SERPL-SCNC: 4.1 MMOL/L
POTASSIUM SERPL-SCNC: 4.1 MMOL/L — SIGNIFICANT CHANGE UP (ref 3.5–5)
POTASSIUM SERPL-SCNC: 4.2 MMOL/L — SIGNIFICANT CHANGE UP (ref 3.5–5)
POTASSIUM SERPL-SCNC: 4.3 MMOL/L — SIGNIFICANT CHANGE UP (ref 3.5–5)
POTASSIUM SERPL-SCNC: 4.4 MMOL/L — SIGNIFICANT CHANGE UP (ref 3.5–5)
POTASSIUM SERPL-SCNC: 4.5 MMOL/L — SIGNIFICANT CHANGE UP (ref 3.5–5)
POTASSIUM SERPL-SCNC: 4.6 MMOL/L — SIGNIFICANT CHANGE UP (ref 3.5–5)
POTASSIUM SERPL-SCNC: 4.7 MMOL/L — SIGNIFICANT CHANGE UP (ref 3.5–5)
POTASSIUM SERPL-SCNC: 4.8 MMOL/L — SIGNIFICANT CHANGE UP (ref 3.5–5)
PROCALCITONIN SERPL-MCNC: 0.09 NG/ML — SIGNIFICANT CHANGE UP (ref 0.02–0.1)
PROT CSF-MCNC: 136 MG/DL — HIGH (ref 15–45)
PROT CSF-MCNC: 161 MG/DL — HIGH (ref 15–45)
PROT SERPL-MCNC: 4.5 G/DL — LOW (ref 6–8)
PROT SERPL-MCNC: 4.9 G/DL — LOW (ref 6–8)
PROT SERPL-MCNC: 4.9 G/DL — LOW (ref 6–8)
PROT SERPL-MCNC: 5 G/DL — LOW (ref 6–8)
PROT SERPL-MCNC: 5.1 G/DL — LOW (ref 6–8)
PROT SERPL-MCNC: 5.4 G/DL — LOW (ref 6–8)
PROT SERPL-MCNC: 5.4 G/DL — LOW (ref 6–8)
PROT SERPL-MCNC: 5.5 G/DL — LOW (ref 6–8)
PROT SERPL-MCNC: 5.5 G/DL — LOW (ref 6–8)
PROT SERPL-MCNC: 6.1 G/DL
PROT SERPL-MCNC: 7 G/DL — SIGNIFICANT CHANGE UP (ref 6–8)
PROT UR-MCNC: ABNORMAL
PROT UR-MCNC: SIGNIFICANT CHANGE UP
PROTEIN URINE: NORMAL
PROTHROM AB SERPL-ACNC: 14.3 SEC — HIGH (ref 9.95–12.87)
PROTHROM AB SERPL-ACNC: 14.4 SEC — HIGH (ref 9.95–12.87)
PROTHROM AB SERPL-ACNC: 14.5 SEC — HIGH (ref 9.95–12.87)
PROTHROM AB SERPL-ACNC: 14.6 SEC — HIGH (ref 9.95–12.87)
PROTHROM AB SERPL-ACNC: 14.7 SEC — HIGH (ref 9.95–12.87)
PROTHROM AB SERPL-ACNC: 15.2 SEC — HIGH (ref 9.95–12.87)
PROTHROM AB SERPL-ACNC: 15.4 SEC — HIGH (ref 9.95–12.87)
PROTHROM AB SERPL-ACNC: 16.4 SEC — HIGH (ref 9.95–12.87)
PROTHROM AB SERPL-ACNC: 16.5 SEC — HIGH (ref 9.95–12.87)
PROTHROM AB SERPL-ACNC: 17.1 SEC — HIGH (ref 9.95–12.87)
PROTHROM AB SERPL-ACNC: 17.5 SEC — HIGH (ref 9.95–12.87)
PROTHROM AB SERPL-ACNC: 17.8 SEC — HIGH (ref 9.95–12.87)
PROTHROM AB SERPL-ACNC: 17.9 SEC — HIGH (ref 9.95–12.87)
PROTHROM AB SERPL-ACNC: 20.5 SEC — HIGH (ref 9.95–12.87)
PROTHROM AB SERPL-ACNC: 21.3 SEC — HIGH (ref 9.95–12.87)
RAPID RVP RESULT: SIGNIFICANT CHANGE UP
RAPID RVP RESULT: SIGNIFICANT CHANGE UP
RBC # BLD: 2.38 M/UL — LOW (ref 4.2–5.4)
RBC # BLD: 2.95 M/UL — LOW (ref 4.2–5.4)
RBC # BLD: 3.04 M/UL — LOW (ref 4.2–5.4)
RBC # BLD: 3.04 M/UL — LOW (ref 4.2–5.4)
RBC # BLD: 3.06 M/UL — LOW (ref 4.2–5.4)
RBC # BLD: 3.07 M/UL — LOW (ref 4.2–5.4)
RBC # BLD: 3.12 M/UL — LOW (ref 4.2–5.4)
RBC # BLD: 3.15 M/UL — LOW (ref 4.2–5.4)
RBC # BLD: 3.17 M/UL — LOW (ref 4.2–5.4)
RBC # BLD: 3.2 M/UL — LOW (ref 4.2–5.4)
RBC # BLD: 3.21 M/UL — LOW (ref 4.2–5.4)
RBC # BLD: 3.22 M/UL — LOW (ref 4.2–5.4)
RBC # BLD: 3.22 M/UL — LOW (ref 4.2–5.4)
RBC # BLD: 3.23 M/UL — LOW (ref 4.2–5.4)
RBC # BLD: 3.25 M/UL — LOW (ref 4.2–5.4)
RBC # BLD: 3.26 M/UL — LOW (ref 4.2–5.4)
RBC # BLD: 3.28 M/UL — LOW (ref 4.2–5.4)
RBC # BLD: 3.3 M/UL — LOW (ref 4.2–5.4)
RBC # BLD: 3.32 M/UL — LOW (ref 4.2–5.4)
RBC # BLD: 3.32 M/UL — LOW (ref 4.2–5.4)
RBC # BLD: 3.34 M/UL — LOW (ref 4.2–5.4)
RBC # BLD: 3.35 M/UL — LOW (ref 4.2–5.4)
RBC # BLD: 3.38 M/UL — LOW (ref 4.2–5.4)
RBC # BLD: 3.39 M/UL — LOW (ref 4.2–5.4)
RBC # BLD: 3.42 M/UL — LOW (ref 4.2–5.4)
RBC # BLD: 3.43 M/UL — LOW (ref 4.2–5.4)
RBC # BLD: 3.67 M/UL — LOW (ref 4.2–5.4)
RBC # BLD: 4.75 M/UL
RBC # BLD: 4.93 M/UL
RBC # BLD: 5.27 M/UL — SIGNIFICANT CHANGE UP (ref 4.2–5.4)
RBC # CSF: 3640 /UL — SIGNIFICANT CHANGE UP (ref 0–0)
RBC # CSF: 5150 /UL — SIGNIFICANT CHANGE UP (ref 0–0)
RBC # FLD: 13.8 %
RBC # FLD: 15.4 %
RBC # FLD: 16.6 % — HIGH (ref 11.5–14.5)
RBC # FLD: 16.7 % — HIGH (ref 11.5–14.5)
RBC # FLD: 16.7 % — HIGH (ref 11.5–14.5)
RBC # FLD: 16.9 % — HIGH (ref 11.5–14.5)
RBC # FLD: 16.9 % — HIGH (ref 11.5–14.5)
RBC # FLD: 17 % — HIGH (ref 11.5–14.5)
RBC # FLD: 17.1 % — HIGH (ref 11.5–14.5)
RBC # FLD: 17.2 % — HIGH (ref 11.5–14.5)
RBC # FLD: 17.3 % — HIGH (ref 11.5–14.5)
RBC # FLD: 17.4 % — HIGH (ref 11.5–14.5)
RBC # FLD: 17.5 % — HIGH (ref 11.5–14.5)
RBC # FLD: 17.5 % — HIGH (ref 11.5–14.5)
RBC # FLD: 17.8 % — HIGH (ref 11.5–14.5)
RBC # FLD: 17.8 % — HIGH (ref 11.5–14.5)
RBC # FLD: 17.9 % — HIGH (ref 11.5–14.5)
RBC # FLD: 18.6 % — HIGH (ref 11.5–14.5)
RBC # FLD: 19 % — HIGH (ref 11.5–14.5)
RBC # FLD: 19.1 % — HIGH (ref 11.5–14.5)
RBC # FLD: 19.3 % — HIGH (ref 11.5–14.5)
RBC # FLD: 19.3 % — HIGH (ref 11.5–14.5)
RBC # FLD: 19.5 % — HIGH (ref 11.5–14.5)
RBC # FLD: 19.5 % — HIGH (ref 11.5–14.5)
RBC BLD AUTO: ABNORMAL
RBC BLD AUTO: NORMAL — SIGNIFICANT CHANGE UP
RBC CASTS # UR COMP ASSIST: 1 /HPF — SIGNIFICANT CHANGE UP (ref 0–4)
RBC CASTS # UR COMP ASSIST: 1 /HPF — SIGNIFICANT CHANGE UP (ref 0–4)
RBC CASTS # UR COMP ASSIST: 12 /HPF — HIGH (ref 0–4)
RBC CASTS # UR COMP ASSIST: 21 /HPF — HIGH (ref 0–4)
SAO2 % BLDA: 100 % — HIGH (ref 94–98)
SAO2 % BLDA: 100 % — HIGH (ref 94–98)
SAO2 % BLDA: 94 % — SIGNIFICANT CHANGE UP (ref 94–98)
SAO2 % BLDA: 96 % — SIGNIFICANT CHANGE UP (ref 94–98)
SAO2 % BLDA: 96 % — SIGNIFICANT CHANGE UP (ref 94–98)
SAO2 % BLDA: 97 % — SIGNIFICANT CHANGE UP (ref 94–98)
SAO2 % BLDA: 98 % — SIGNIFICANT CHANGE UP (ref 94–98)
SAO2 % BLDA: 99 % — HIGH (ref 92–96)
SAO2 % BLDA: 99 % — HIGH (ref 94–98)
SAO2 % BLDV: 57 % — SIGNIFICANT CHANGE UP
SARS-COV-2 RNA SPEC QL NAA+PROBE: SIGNIFICANT CHANGE UP
SODIUM SERPL-SCNC: 130 MMOL/L — LOW (ref 135–146)
SODIUM SERPL-SCNC: 131 MMOL/L — LOW (ref 135–146)
SODIUM SERPL-SCNC: 131 MMOL/L — LOW (ref 135–146)
SODIUM SERPL-SCNC: 132 MMOL/L — LOW (ref 135–146)
SODIUM SERPL-SCNC: 133 MMOL/L
SODIUM SERPL-SCNC: 133 MMOL/L — LOW (ref 135–146)
SODIUM SERPL-SCNC: 133 MMOL/L — LOW (ref 135–146)
SODIUM SERPL-SCNC: 135 MMOL/L — SIGNIFICANT CHANGE UP (ref 135–146)
SODIUM SERPL-SCNC: 136 MMOL/L — SIGNIFICANT CHANGE UP (ref 135–146)
SODIUM SERPL-SCNC: 137 MMOL/L — SIGNIFICANT CHANGE UP (ref 135–146)
SODIUM SERPL-SCNC: 138 MMOL/L — SIGNIFICANT CHANGE UP (ref 135–146)
SODIUM SERPL-SCNC: 139 MMOL/L — SIGNIFICANT CHANGE UP (ref 135–146)
SODIUM SERPL-SCNC: 140 MMOL/L — SIGNIFICANT CHANGE UP (ref 135–146)
SODIUM SERPL-SCNC: 141 MMOL/L — SIGNIFICANT CHANGE UP (ref 135–146)
SODIUM SERPL-SCNC: 142 MMOL/L — SIGNIFICANT CHANGE UP (ref 135–146)
SODIUM SERPL-SCNC: 143 MMOL/L — SIGNIFICANT CHANGE UP (ref 135–146)
SODIUM SERPL-SCNC: 144 MMOL/L — SIGNIFICANT CHANGE UP (ref 135–146)
SODIUM SERPL-SCNC: 145 MMOL/L — SIGNIFICANT CHANGE UP (ref 135–146)
SODIUM SERPL-SCNC: 145 MMOL/L — SIGNIFICANT CHANGE UP (ref 135–146)
SODIUM SERPL-SCNC: 146 MMOL/L — SIGNIFICANT CHANGE UP (ref 135–146)
SP GR SPEC: 1.02 — SIGNIFICANT CHANGE UP (ref 1.01–1.03)
SP GR SPEC: 1.03 — HIGH (ref 1.01–1.03)
SPECIFIC GRAVITY URINE: 1.02
SPECIMEN SOURCE: SIGNIFICANT CHANGE UP
SURGICAL PATHOLOGY STUDY: SIGNIFICANT CHANGE UP
TM INTERPRETATION: SIGNIFICANT CHANGE UP
TROPONIN T SERPL-MCNC: 0.15 NG/ML — CRITICAL HIGH
TROPONIN T SERPL-MCNC: 0.21 NG/ML — CRITICAL HIGH
TROPONIN T SERPL-MCNC: 0.24 NG/ML — CRITICAL HIGH
TROPONIN T SERPL-MCNC: <0.01 NG/ML — SIGNIFICANT CHANGE UP
TROPONIN T SERPL-MCNC: <0.01 NG/ML — SIGNIFICANT CHANGE UP
TUBE TYPE: SIGNIFICANT CHANGE UP
TUBE TYPE: SIGNIFICANT CHANGE UP
UROBILINOGEN FLD QL: ABNORMAL
UROBILINOGEN FLD QL: SIGNIFICANT CHANGE UP
UROBILINOGEN URINE: NORMAL
VANCOMYCIN FLD-MCNC: 15.2 UG/ML — HIGH (ref 5–10)
VANCOMYCIN FLD-MCNC: 9.8 UG/ML — SIGNIFICANT CHANGE UP (ref 5–10)
VANCOMYCIN TROUGH SERPL-MCNC: 10.5 UG/ML — HIGH (ref 5–10)
VANCOMYCIN TROUGH SERPL-MCNC: 16.7 UG/ML — HIGH (ref 5–10)
VANCOMYCIN TROUGH SERPL-MCNC: 17.4 UG/ML — HIGH (ref 5–10)
VANCOMYCIN TROUGH SERPL-MCNC: 24.4 UG/ML — HIGH (ref 5–10)
VANCOMYCIN TROUGH SERPL-MCNC: 7.8 UG/ML — SIGNIFICANT CHANGE UP (ref 5–10)
VARIANT LYMPHS # BLD: 6.9 % — HIGH (ref 0–5)
VORICONAZOLE SERPL-MCNC: 1.8 UG/ML — SIGNIFICANT CHANGE UP
WBC # BLD: 12.53 K/UL — HIGH (ref 4.8–10.8)
WBC # BLD: 14.19 K/UL — HIGH (ref 4.8–10.8)
WBC # BLD: 14.52 K/UL — HIGH (ref 4.8–10.8)
WBC # BLD: 3.31 K/UL — LOW (ref 4.8–10.8)
WBC # BLD: 3.57 K/UL — LOW (ref 4.8–10.8)
WBC # BLD: 3.79 K/UL — LOW (ref 4.8–10.8)
WBC # BLD: 4.39 K/UL — LOW (ref 4.8–10.8)
WBC # BLD: 4.4 K/UL — LOW (ref 4.8–10.8)
WBC # BLD: 4.67 K/UL — LOW (ref 4.8–10.8)
WBC # BLD: 4.74 K/UL — LOW (ref 4.8–10.8)
WBC # BLD: 4.84 K/UL — SIGNIFICANT CHANGE UP (ref 4.8–10.8)
WBC # BLD: 5 K/UL — SIGNIFICANT CHANGE UP (ref 4.8–10.8)
WBC # BLD: 5.17 K/UL — SIGNIFICANT CHANGE UP (ref 4.8–10.8)
WBC # BLD: 5.53 K/UL — SIGNIFICANT CHANGE UP (ref 4.8–10.8)
WBC # BLD: 6.34 K/UL — SIGNIFICANT CHANGE UP (ref 4.8–10.8)
WBC # BLD: 6.44 K/UL — SIGNIFICANT CHANGE UP (ref 4.8–10.8)
WBC # BLD: 6.44 K/UL — SIGNIFICANT CHANGE UP (ref 4.8–10.8)
WBC # BLD: 6.59 K/UL — SIGNIFICANT CHANGE UP (ref 4.8–10.8)
WBC # BLD: 6.78 K/UL — SIGNIFICANT CHANGE UP (ref 4.8–10.8)
WBC # BLD: 6.83 K/UL — SIGNIFICANT CHANGE UP (ref 4.8–10.8)
WBC # BLD: 6.85 K/UL — SIGNIFICANT CHANGE UP (ref 4.8–10.8)
WBC # BLD: 6.94 K/UL — SIGNIFICANT CHANGE UP (ref 4.8–10.8)
WBC # BLD: 6.95 K/UL — SIGNIFICANT CHANGE UP (ref 4.8–10.8)
WBC # BLD: 7.19 K/UL — SIGNIFICANT CHANGE UP (ref 4.8–10.8)
WBC # BLD: 7.27 K/UL — SIGNIFICANT CHANGE UP (ref 4.8–10.8)
WBC # BLD: 7.32 K/UL — SIGNIFICANT CHANGE UP (ref 4.8–10.8)
WBC # BLD: 7.44 K/UL — SIGNIFICANT CHANGE UP (ref 4.8–10.8)
WBC # BLD: 8.68 K/UL — SIGNIFICANT CHANGE UP (ref 4.8–10.8)
WBC # FLD AUTO: 10.55 K/UL
WBC # FLD AUTO: 12.53 K/UL — HIGH (ref 4.8–10.8)
WBC # FLD AUTO: 14.19 K/UL — HIGH (ref 4.8–10.8)
WBC # FLD AUTO: 14.52 K/UL — HIGH (ref 4.8–10.8)
WBC # FLD AUTO: 3.31 K/UL — LOW (ref 4.8–10.8)
WBC # FLD AUTO: 3.57 K/UL — LOW (ref 4.8–10.8)
WBC # FLD AUTO: 3.79 K/UL — LOW (ref 4.8–10.8)
WBC # FLD AUTO: 4.39 K/UL — LOW (ref 4.8–10.8)
WBC # FLD AUTO: 4.4 K/UL — LOW (ref 4.8–10.8)
WBC # FLD AUTO: 4.67 K/UL — LOW (ref 4.8–10.8)
WBC # FLD AUTO: 4.74 K/UL — LOW (ref 4.8–10.8)
WBC # FLD AUTO: 4.84 K/UL — SIGNIFICANT CHANGE UP (ref 4.8–10.8)
WBC # FLD AUTO: 5 K/UL — SIGNIFICANT CHANGE UP (ref 4.8–10.8)
WBC # FLD AUTO: 5.17 K/UL — SIGNIFICANT CHANGE UP (ref 4.8–10.8)
WBC # FLD AUTO: 5.53 K/UL — SIGNIFICANT CHANGE UP (ref 4.8–10.8)
WBC # FLD AUTO: 5.66 K/UL
WBC # FLD AUTO: 6.34 K/UL — SIGNIFICANT CHANGE UP (ref 4.8–10.8)
WBC # FLD AUTO: 6.44 K/UL — SIGNIFICANT CHANGE UP (ref 4.8–10.8)
WBC # FLD AUTO: 6.44 K/UL — SIGNIFICANT CHANGE UP (ref 4.8–10.8)
WBC # FLD AUTO: 6.59 K/UL — SIGNIFICANT CHANGE UP (ref 4.8–10.8)
WBC # FLD AUTO: 6.78 K/UL — SIGNIFICANT CHANGE UP (ref 4.8–10.8)
WBC # FLD AUTO: 6.83 K/UL — SIGNIFICANT CHANGE UP (ref 4.8–10.8)
WBC # FLD AUTO: 6.85 K/UL — SIGNIFICANT CHANGE UP (ref 4.8–10.8)
WBC # FLD AUTO: 6.94 K/UL — SIGNIFICANT CHANGE UP (ref 4.8–10.8)
WBC # FLD AUTO: 6.95 K/UL — SIGNIFICANT CHANGE UP (ref 4.8–10.8)
WBC # FLD AUTO: 7.19 K/UL — SIGNIFICANT CHANGE UP (ref 4.8–10.8)
WBC # FLD AUTO: 7.27 K/UL — SIGNIFICANT CHANGE UP (ref 4.8–10.8)
WBC # FLD AUTO: 7.32 K/UL — SIGNIFICANT CHANGE UP (ref 4.8–10.8)
WBC # FLD AUTO: 7.44 K/UL — SIGNIFICANT CHANGE UP (ref 4.8–10.8)
WBC # FLD AUTO: 8.68 K/UL — SIGNIFICANT CHANGE UP (ref 4.8–10.8)
WBC UR QL: 245 /HPF — HIGH (ref 0–5)
WBC UR QL: 4 /HPF — SIGNIFICANT CHANGE UP (ref 0–5)
WBC UR QL: 4 /HPF — SIGNIFICANT CHANGE UP (ref 0–5)
WBC UR QL: 9 /HPF — HIGH (ref 0–5)

## 2021-01-01 PROCEDURE — 99232 SBSQ HOSP IP/OBS MODERATE 35: CPT

## 2021-01-01 PROCEDURE — 71045 X-RAY EXAM CHEST 1 VIEW: CPT | Mod: 26

## 2021-01-01 PROCEDURE — 43246 EGD PLACE GASTROSTOMY TUBE: CPT

## 2021-01-01 PROCEDURE — 61313 CRNEC/CRNOT STTL ICERE: CPT

## 2021-01-01 PROCEDURE — 95720 EEG PHY/QHP EA INCR W/VEEG: CPT

## 2021-01-01 PROCEDURE — 99233 SBSQ HOSP IP/OBS HIGH 50: CPT

## 2021-01-01 PROCEDURE — 93971 EXTREMITY STUDY: CPT | Mod: 26,RT

## 2021-01-01 PROCEDURE — 61781 SCAN PROC CRANIAL INTRA: CPT

## 2021-01-01 PROCEDURE — 99291 CRITICAL CARE FIRST HOUR: CPT

## 2021-01-01 PROCEDURE — 36573 INSJ PICC RS&I 5 YR+: CPT

## 2021-01-01 PROCEDURE — 70487 CT MAXILLOFACIAL W/DYE: CPT | Mod: 26

## 2021-01-01 PROCEDURE — 99497 ADVNCD CARE PLAN 30 MIN: CPT

## 2021-01-01 PROCEDURE — 93971 EXTREMITY STUDY: CPT | Mod: 26,LT

## 2021-01-01 PROCEDURE — 71045 X-RAY EXAM CHEST 1 VIEW: CPT | Mod: 26,77

## 2021-01-01 PROCEDURE — 70450 CT HEAD/BRAIN W/O DYE: CPT | Mod: 26,77,59

## 2021-01-01 PROCEDURE — 99291 CRITICAL CARE FIRST HOUR: CPT | Mod: 25

## 2021-01-01 PROCEDURE — 70498 CT ANGIOGRAPHY NECK: CPT | Mod: 26

## 2021-01-01 PROCEDURE — 99222 1ST HOSP IP/OBS MODERATE 55: CPT

## 2021-01-01 PROCEDURE — 88188 FLOWCYTOMETRY/READ 9-15: CPT

## 2021-01-01 PROCEDURE — 99239 HOSP IP/OBS DSCHRG MGMT >30: CPT

## 2021-01-01 PROCEDURE — 61313 CRNEC/CRNOT STTL ICERE: CPT | Mod: 80

## 2021-01-01 PROCEDURE — 88108 CYTOPATH CONCENTRATE TECH: CPT | Mod: 26

## 2021-01-01 PROCEDURE — 31730 INTRO WINDPIPE WIRE/TUBE: CPT

## 2021-01-01 PROCEDURE — 51702 INSERT TEMP BLADDER CATH: CPT

## 2021-01-01 PROCEDURE — 70496 CT ANGIOGRAPHY HEAD: CPT | Mod: 26

## 2021-01-01 PROCEDURE — 76705 ECHO EXAM OF ABDOMEN: CPT | Mod: 26

## 2021-01-01 PROCEDURE — 99497 ADVNCD CARE PLAN 30 MIN: CPT | Mod: 25

## 2021-01-01 PROCEDURE — 99223 1ST HOSP IP/OBS HIGH 75: CPT

## 2021-01-01 PROCEDURE — 70450 CT HEAD/BRAIN W/O DYE: CPT | Mod: 26,59

## 2021-01-01 PROCEDURE — 99221 1ST HOSP IP/OBS SF/LOW 40: CPT | Mod: 57

## 2021-01-01 PROCEDURE — 99214 OFFICE O/P EST MOD 30 MIN: CPT

## 2021-01-01 PROCEDURE — 88304 TISSUE EXAM BY PATHOLOGIST: CPT | Mod: 26

## 2021-01-01 PROCEDURE — 99291 CRITICAL CARE FIRST HOUR: CPT | Mod: 24

## 2021-01-01 PROCEDURE — 70450 CT HEAD/BRAIN W/O DYE: CPT | Mod: 26

## 2021-01-01 PROCEDURE — 93010 ELECTROCARDIOGRAM REPORT: CPT

## 2021-01-01 PROCEDURE — 36620 INSERTION CATHETER ARTERY: CPT

## 2021-01-01 PROCEDURE — 31500 INSERT EMERGENCY AIRWAY: CPT

## 2021-01-01 PROCEDURE — 71045 X-RAY EXAM CHEST 1 VIEW: CPT | Mod: 26,76

## 2021-01-01 PROCEDURE — 71260 CT THORAX DX C+: CPT | Mod: 26

## 2021-01-01 RX ORDER — LEVETIRACETAM 250 MG/1
1000 TABLET, FILM COATED ORAL ONCE
Refills: 0 | Status: COMPLETED | OUTPATIENT
Start: 2021-01-01 | End: 2021-01-01

## 2021-01-01 RX ORDER — SODIUM CHLORIDE 9 MG/ML
1000 INJECTION INTRAMUSCULAR; INTRAVENOUS; SUBCUTANEOUS
Refills: 0 | Status: DISCONTINUED | OUTPATIENT
Start: 2021-01-01 | End: 2021-01-01

## 2021-01-01 RX ORDER — PANTOPRAZOLE SODIUM 20 MG/1
40 TABLET, DELAYED RELEASE ORAL DAILY
Refills: 0 | Status: DISCONTINUED | OUTPATIENT
Start: 2021-01-01 | End: 2021-01-01

## 2021-01-01 RX ORDER — MAGNESIUM SULFATE 500 MG/ML
1 VIAL (ML) INJECTION ONCE
Refills: 0 | Status: COMPLETED | OUTPATIENT
Start: 2021-01-01 | End: 2021-01-01

## 2021-01-01 RX ORDER — LISINOPRIL 2.5 MG/1
20 TABLET ORAL DAILY
Refills: 0 | Status: DISCONTINUED | OUTPATIENT
Start: 2021-01-01 | End: 2021-01-01

## 2021-01-01 RX ORDER — CISATRACURIUM BESYLATE 2 MG/ML
20 INJECTION INTRAVENOUS ONCE
Refills: 0 | Status: COMPLETED | OUTPATIENT
Start: 2021-01-01 | End: 2021-01-01

## 2021-01-01 RX ORDER — LABETALOL HCL 100 MG
300 TABLET ORAL EVERY 8 HOURS
Refills: 0 | Status: DISCONTINUED | OUTPATIENT
Start: 2021-01-01 | End: 2021-01-01

## 2021-01-01 RX ORDER — DEXMEDETOMIDINE HYDROCHLORIDE IN 0.9% SODIUM CHLORIDE 4 UG/ML
0.2 INJECTION INTRAVENOUS
Qty: 200 | Refills: 0 | Status: DISCONTINUED | OUTPATIENT
Start: 2021-01-01 | End: 2021-01-01

## 2021-01-01 RX ORDER — ACETAMINOPHEN 500 MG
1000 TABLET ORAL ONCE
Refills: 0 | Status: COMPLETED | OUTPATIENT
Start: 2021-01-01 | End: 2021-01-01

## 2021-01-01 RX ORDER — PANTOPRAZOLE SODIUM 20 MG/1
40 TABLET, DELAYED RELEASE ORAL
Refills: 0 | Status: DISCONTINUED | OUTPATIENT
Start: 2021-01-01 | End: 2021-01-01

## 2021-01-01 RX ORDER — SODIUM CHLORIDE 5 G/100ML
500 INJECTION, SOLUTION INTRAVENOUS
Refills: 0 | Status: DISCONTINUED | OUTPATIENT
Start: 2021-01-01 | End: 2021-01-01

## 2021-01-01 RX ORDER — ATORVASTATIN CALCIUM 80 MG/1
80 TABLET, FILM COATED ORAL AT BEDTIME
Refills: 0 | Status: DISCONTINUED | OUTPATIENT
Start: 2021-01-01 | End: 2021-01-01

## 2021-01-01 RX ORDER — POTASSIUM CHLORIDE 20 MEQ
20 PACKET (EA) ORAL ONCE
Refills: 0 | Status: COMPLETED | OUTPATIENT
Start: 2021-01-01 | End: 2021-01-01

## 2021-01-01 RX ORDER — DEXTROSE 50 % IN WATER 50 %
25 SYRINGE (ML) INTRAVENOUS ONCE
Refills: 0 | Status: DISCONTINUED | OUTPATIENT
Start: 2021-01-01 | End: 2021-01-01

## 2021-01-01 RX ORDER — LABETALOL HCL 100 MG
5 TABLET ORAL ONCE
Refills: 0 | Status: COMPLETED | OUTPATIENT
Start: 2021-01-01 | End: 2021-01-01

## 2021-01-01 RX ORDER — LISINOPRIL 2.5 MG/1
10 TABLET ORAL DAILY
Refills: 0 | Status: DISCONTINUED | OUTPATIENT
Start: 2021-01-01 | End: 2021-01-01

## 2021-01-01 RX ORDER — GLUCAGON INJECTION, SOLUTION 0.5 MG/.1ML
1 INJECTION, SOLUTION SUBCUTANEOUS ONCE
Refills: 0 | Status: DISCONTINUED | OUTPATIENT
Start: 2021-01-01 | End: 2021-01-01

## 2021-01-01 RX ORDER — DEXMEDETOMIDINE HYDROCHLORIDE IN 0.9% SODIUM CHLORIDE 4 UG/ML
0.01 INJECTION INTRAVENOUS
Qty: 400 | Refills: 0 | Status: DISCONTINUED | OUTPATIENT
Start: 2021-01-01 | End: 2021-01-01

## 2021-01-01 RX ORDER — INSULIN HUMAN 100 [IU]/ML
INJECTION, SOLUTION SUBCUTANEOUS EVERY 4 HOURS
Refills: 0 | Status: DISCONTINUED | OUTPATIENT
Start: 2021-01-01 | End: 2021-01-01

## 2021-01-01 RX ORDER — MAGNESIUM SULFATE 500 MG/ML
2 VIAL (ML) INJECTION ONCE
Refills: 0 | Status: COMPLETED | OUTPATIENT
Start: 2021-01-01 | End: 2021-01-01

## 2021-01-01 RX ORDER — VORICONAZOLE 10 MG/ML
300 INJECTION, POWDER, LYOPHILIZED, FOR SOLUTION INTRAVENOUS EVERY 12 HOURS
Refills: 0 | Status: DISCONTINUED | OUTPATIENT
Start: 2021-01-01 | End: 2021-01-01

## 2021-01-01 RX ORDER — DEXTROSE 50 % IN WATER 50 %
12.5 SYRINGE (ML) INTRAVENOUS ONCE
Refills: 0 | Status: DISCONTINUED | OUTPATIENT
Start: 2021-01-01 | End: 2021-01-01

## 2021-01-01 RX ORDER — SODIUM CHLORIDE 9 MG/ML
1000 INJECTION, SOLUTION INTRAVENOUS
Refills: 0 | Status: DISCONTINUED | OUTPATIENT
Start: 2021-01-01 | End: 2021-01-01

## 2021-01-01 RX ORDER — LABETALOL HCL 100 MG
10 TABLET ORAL ONCE
Refills: 0 | Status: COMPLETED | OUTPATIENT
Start: 2021-01-01 | End: 2021-01-01

## 2021-01-01 RX ORDER — LEVETIRACETAM 250 MG/1
750 TABLET, FILM COATED ORAL EVERY 12 HOURS
Refills: 0 | Status: DISCONTINUED | OUTPATIENT
Start: 2021-01-01 | End: 2021-01-01

## 2021-01-01 RX ORDER — MEROPENEM 1 G/30ML
1000 INJECTION INTRAVENOUS EVERY 8 HOURS
Refills: 0 | Status: DISCONTINUED | OUTPATIENT
Start: 2021-01-01 | End: 2021-01-01

## 2021-01-01 RX ORDER — NICARDIPINE HYDROCHLORIDE 30 MG/1
5 CAPSULE, EXTENDED RELEASE ORAL
Qty: 40 | Refills: 0 | Status: DISCONTINUED | OUTPATIENT
Start: 2021-01-01 | End: 2021-01-01

## 2021-01-01 RX ORDER — ALVIMOPAN 12 MG/1
12 CAPSULE ORAL ONCE
Refills: 0 | Status: DISCONTINUED | OUTPATIENT
Start: 2021-01-01 | End: 2021-01-01

## 2021-01-01 RX ORDER — LACTULOSE 10 G/15ML
10 SOLUTION ORAL DAILY
Refills: 0 | Status: DISCONTINUED | OUTPATIENT
Start: 2021-01-01 | End: 2021-01-01

## 2021-01-01 RX ORDER — PROTAMINE SULFATE 10 MG/ML
35 AMPUL (ML) INTRAVENOUS ONCE
Refills: 0 | Status: DISCONTINUED | OUTPATIENT
Start: 2021-01-01 | End: 2021-01-01

## 2021-01-01 RX ORDER — NOREPINEPHRINE BITARTRATE/D5W 8 MG/250ML
0.05 PLASTIC BAG, INJECTION (ML) INTRAVENOUS
Qty: 16 | Refills: 0 | Status: DISCONTINUED | OUTPATIENT
Start: 2021-01-01 | End: 2021-01-01

## 2021-01-01 RX ORDER — LISINOPRIL 2.5 MG/1
20 TABLET ORAL
Refills: 0 | Status: DISCONTINUED | OUTPATIENT
Start: 2021-01-01 | End: 2021-01-01

## 2021-01-01 RX ORDER — POTASSIUM CHLORIDE 20 MEQ
20 PACKET (EA) ORAL
Refills: 0 | Status: COMPLETED | OUTPATIENT
Start: 2021-01-01 | End: 2021-01-01

## 2021-01-01 RX ORDER — NOREPINEPHRINE BITARTRATE/D5W 8 MG/250ML
0.01 PLASTIC BAG, INJECTION (ML) INTRAVENOUS
Qty: 16 | Refills: 0 | Status: DISCONTINUED | OUTPATIENT
Start: 2021-01-01 | End: 2021-01-01

## 2021-01-01 RX ORDER — LABETALOL HCL 100 MG
200 TABLET ORAL EVERY 8 HOURS
Refills: 0 | Status: DISCONTINUED | OUTPATIENT
Start: 2021-01-01 | End: 2021-01-01

## 2021-01-01 RX ORDER — MEROPENEM 1 G/30ML
INJECTION INTRAVENOUS
Refills: 0 | Status: DISCONTINUED | OUTPATIENT
Start: 2021-01-01 | End: 2021-01-01

## 2021-01-01 RX ORDER — FENTANYL CITRATE 50 UG/ML
0.5 INJECTION INTRAVENOUS
Qty: 2500 | Refills: 0 | Status: DISCONTINUED | OUTPATIENT
Start: 2021-01-01 | End: 2021-01-01

## 2021-01-01 RX ORDER — ACETAMINOPHEN 500 MG
650 TABLET ORAL ONCE
Refills: 0 | Status: COMPLETED | OUTPATIENT
Start: 2021-01-01 | End: 2021-01-01

## 2021-01-01 RX ORDER — METFORMIN HYDROCHLORIDE 850 MG/1
500 TABLET ORAL DAILY
Refills: 0 | Status: DISCONTINUED | OUTPATIENT
Start: 2021-01-01 | End: 2021-01-01

## 2021-01-01 RX ORDER — SODIUM CHLORIDE 9 MG/ML
500 INJECTION INTRAMUSCULAR; INTRAVENOUS; SUBCUTANEOUS ONCE
Refills: 0 | Status: COMPLETED | OUTPATIENT
Start: 2021-01-01 | End: 2021-01-01

## 2021-01-01 RX ORDER — POTASSIUM PHOSPHATE, MONOBASIC POTASSIUM PHOSPHATE, DIBASIC 236; 224 MG/ML; MG/ML
9 INJECTION, SOLUTION INTRAVENOUS ONCE
Refills: 0 | Status: COMPLETED | OUTPATIENT
Start: 2021-01-01 | End: 2021-01-01

## 2021-01-01 RX ORDER — FENTANYL CITRATE 50 UG/ML
25 INJECTION INTRAVENOUS ONCE
Refills: 0 | Status: DISCONTINUED | OUTPATIENT
Start: 2021-01-01 | End: 2021-01-01

## 2021-01-01 RX ORDER — LEVETIRACETAM 250 MG/1
500 TABLET, FILM COATED ORAL EVERY 12 HOURS
Refills: 0 | Status: DISCONTINUED | OUTPATIENT
Start: 2021-01-01 | End: 2021-01-01

## 2021-01-01 RX ORDER — VANCOMYCIN HCL 1 G
1000 VIAL (EA) INTRAVENOUS EVERY 12 HOURS
Refills: 0 | Status: DISCONTINUED | OUTPATIENT
Start: 2021-01-01 | End: 2021-01-01

## 2021-01-01 RX ORDER — LISINOPRIL 2.5 MG/1
1 TABLET ORAL
Qty: 0 | Refills: 0 | DISCHARGE

## 2021-01-01 RX ORDER — MAGNESIUM OXIDE 400 MG ORAL TABLET 241.3 MG
400 TABLET ORAL EVERY 8 HOURS
Refills: 0 | Status: DISCONTINUED | OUTPATIENT
Start: 2021-01-01 | End: 2021-01-01

## 2021-01-01 RX ORDER — POTASSIUM CHLORIDE 20 MEQ
20 PACKET (EA) ORAL ONCE
Refills: 0 | Status: DISCONTINUED | OUTPATIENT
Start: 2021-01-01 | End: 2021-01-01

## 2021-01-01 RX ORDER — ROCURONIUM BROMIDE 10 MG/ML
100 VIAL (ML) INTRAVENOUS ONCE
Refills: 0 | Status: COMPLETED | OUTPATIENT
Start: 2021-01-01 | End: 2021-01-01

## 2021-01-01 RX ORDER — PHYTONADIONE (VIT K1) 5 MG
10 TABLET ORAL ONCE
Refills: 0 | Status: COMPLETED | OUTPATIENT
Start: 2021-01-01 | End: 2021-01-01

## 2021-01-01 RX ORDER — SODIUM CHLORIDE 9 MG/ML
1000 INJECTION INTRAMUSCULAR; INTRAVENOUS; SUBCUTANEOUS ONCE
Refills: 0 | Status: COMPLETED | OUTPATIENT
Start: 2021-01-01 | End: 2021-01-01

## 2021-01-01 RX ORDER — DEXMEDETOMIDINE HYDROCHLORIDE IN 0.9% SODIUM CHLORIDE 4 UG/ML
0.1 INJECTION INTRAVENOUS
Qty: 400 | Refills: 0 | Status: DISCONTINUED | OUTPATIENT
Start: 2021-01-01 | End: 2021-01-01

## 2021-01-01 RX ORDER — MAGNESIUM OXIDE 400 MG ORAL TABLET 241.3 MG
400 TABLET ORAL ONCE
Refills: 0 | Status: COMPLETED | OUTPATIENT
Start: 2021-01-01 | End: 2021-01-01

## 2021-01-01 RX ORDER — METFORMIN HYDROCHLORIDE 850 MG/1
500 TABLET ORAL EVERY 12 HOURS
Refills: 0 | Status: DISCONTINUED | OUTPATIENT
Start: 2021-01-01 | End: 2021-01-01

## 2021-01-01 RX ORDER — FUROSEMIDE 40 MG
40 TABLET ORAL ONCE
Refills: 0 | Status: COMPLETED | OUTPATIENT
Start: 2021-01-01 | End: 2021-01-01

## 2021-01-01 RX ORDER — FENTANYL CITRATE 50 UG/ML
25 INJECTION INTRAVENOUS EVERY 4 HOURS
Refills: 0 | Status: DISCONTINUED | OUTPATIENT
Start: 2021-01-01 | End: 2021-01-01

## 2021-01-01 RX ORDER — SENNA PLUS 8.6 MG/1
2 TABLET ORAL AT BEDTIME
Refills: 0 | Status: DISCONTINUED | OUTPATIENT
Start: 2021-01-01 | End: 2021-01-01

## 2021-01-01 RX ORDER — VANCOMYCIN HCL 1 G
1000 VIAL (EA) INTRAVENOUS EVERY 8 HOURS
Refills: 0 | Status: DISCONTINUED | OUTPATIENT
Start: 2021-01-01 | End: 2021-01-01

## 2021-01-01 RX ORDER — ONDANSETRON 8 MG/1
4 TABLET, FILM COATED ORAL ONCE
Refills: 0 | Status: COMPLETED | OUTPATIENT
Start: 2021-01-01 | End: 2021-01-01

## 2021-01-01 RX ORDER — PROTAMINE SULFATE 10 MG/ML
40 AMPUL (ML) INTRAVENOUS ONCE
Refills: 0 | Status: DISCONTINUED | OUTPATIENT
Start: 2021-01-01 | End: 2021-01-01

## 2021-01-01 RX ORDER — CIPROFLOXACIN LACTATE 400MG/40ML
1 VIAL (ML) INTRAVENOUS
Qty: 0 | Refills: 0 | DISCHARGE

## 2021-01-01 RX ORDER — LISINOPRIL 2.5 MG/1
10 TABLET ORAL ONCE
Refills: 0 | Status: COMPLETED | OUTPATIENT
Start: 2021-01-01 | End: 2021-01-01

## 2021-01-01 RX ORDER — CEFEPIME 1 G/1
1000 INJECTION, POWDER, FOR SOLUTION INTRAMUSCULAR; INTRAVENOUS EVERY 12 HOURS
Refills: 0 | Status: DISCONTINUED | OUTPATIENT
Start: 2021-01-01 | End: 2021-01-01

## 2021-01-01 RX ORDER — VORICONAZOLE 10 MG/ML
200 INJECTION, POWDER, LYOPHILIZED, FOR SOLUTION INTRAVENOUS EVERY 12 HOURS
Refills: 0 | Status: DISCONTINUED | OUTPATIENT
Start: 2021-01-01 | End: 2021-01-01

## 2021-01-01 RX ORDER — AMPHOTERICIN B 50 MG/12.5ML
370 INJECTION, POWDER, LYOPHILIZED, FOR SOLUTION INTRAVENOUS EVERY 24 HOURS
Refills: 0 | Status: DISCONTINUED | OUTPATIENT
Start: 2021-01-01 | End: 2021-01-01

## 2021-01-01 RX ORDER — LEVOFLOXACIN 500 MG/1
500 TABLET, FILM COATED ORAL
Qty: 30 | Refills: 2 | Status: ACTIVE | COMMUNITY
Start: 2020-01-01 | End: 1900-01-01

## 2021-01-01 RX ORDER — SODIUM CHLORIDE 9 MG/ML
500 INJECTION, SOLUTION INTRAVENOUS ONCE
Refills: 0 | Status: COMPLETED | OUTPATIENT
Start: 2021-01-01 | End: 2021-01-01

## 2021-01-01 RX ORDER — INSULIN HUMAN 100 [IU]/ML
INJECTION, SOLUTION SUBCUTANEOUS EVERY 6 HOURS
Refills: 0 | Status: DISCONTINUED | OUTPATIENT
Start: 2021-01-01 | End: 2021-01-01

## 2021-01-01 RX ORDER — LABETALOL HCL 100 MG
100 TABLET ORAL EVERY 8 HOURS
Refills: 0 | Status: DISCONTINUED | OUTPATIENT
Start: 2021-01-01 | End: 2021-01-01

## 2021-01-01 RX ORDER — SODIUM CHLORIDE 9 MG/ML
250 INJECTION INTRAMUSCULAR; INTRAVENOUS; SUBCUTANEOUS ONCE
Refills: 0 | Status: COMPLETED | OUTPATIENT
Start: 2021-01-01 | End: 2021-01-01

## 2021-01-01 RX ORDER — PROPOFOL 10 MG/ML
20 INJECTION, EMULSION INTRAVENOUS
Qty: 500 | Refills: 0 | Status: DISCONTINUED | OUTPATIENT
Start: 2021-01-01 | End: 2021-01-01

## 2021-01-01 RX ORDER — IBRUTINIB 140 MG/1
140 CAPSULE ORAL DAILY
Qty: 90 | Refills: 1 | Status: ACTIVE | COMMUNITY
Start: 2020-01-01 | End: 1900-01-01

## 2021-01-01 RX ORDER — MIDAZOLAM HYDROCHLORIDE 1 MG/ML
2 INJECTION, SOLUTION INTRAMUSCULAR; INTRAVENOUS ONCE
Refills: 0 | Status: DISCONTINUED | OUTPATIENT
Start: 2021-01-01 | End: 2021-01-01

## 2021-01-01 RX ORDER — ACETAMINOPHEN 500 MG
650 TABLET ORAL EVERY 6 HOURS
Refills: 0 | Status: DISCONTINUED | OUTPATIENT
Start: 2021-01-01 | End: 2021-01-01

## 2021-01-01 RX ORDER — CHLORHEXIDINE GLUCONATE 213 G/1000ML
1 SOLUTION TOPICAL DAILY
Refills: 0 | Status: DISCONTINUED | OUTPATIENT
Start: 2021-01-01 | End: 2021-01-01

## 2021-01-01 RX ORDER — MAGNESIUM SULFATE 500 MG/ML
2 VIAL (ML) INJECTION
Refills: 0 | Status: COMPLETED | OUTPATIENT
Start: 2021-01-01 | End: 2021-01-01

## 2021-01-01 RX ORDER — CEFEPIME 1 G/1
INJECTION, POWDER, FOR SOLUTION INTRAMUSCULAR; INTRAVENOUS
Refills: 0 | Status: DISCONTINUED | OUTPATIENT
Start: 2021-01-01 | End: 2021-01-01

## 2021-01-01 RX ORDER — ETOMIDATE 2 MG/ML
20 INJECTION INTRAVENOUS ONCE
Refills: 0 | Status: COMPLETED | OUTPATIENT
Start: 2021-01-01 | End: 2021-01-01

## 2021-01-01 RX ORDER — ENOXAPARIN SODIUM 100 MG/ML
75 INJECTION SUBCUTANEOUS EVERY 12 HOURS
Refills: 0 | Status: DISCONTINUED | OUTPATIENT
Start: 2021-01-01 | End: 2021-01-01

## 2021-01-01 RX ORDER — AZTREONAM 2 G
2000 VIAL (EA) INJECTION EVERY 8 HOURS
Refills: 0 | Status: DISCONTINUED | OUTPATIENT
Start: 2021-01-01 | End: 2021-01-01

## 2021-01-01 RX ORDER — LABETALOL HCL 100 MG
400 TABLET ORAL EVERY 8 HOURS
Refills: 0 | Status: DISCONTINUED | OUTPATIENT
Start: 2021-01-01 | End: 2021-01-01

## 2021-01-01 RX ORDER — CEFEPIME 1 G/1
1000 INJECTION, POWDER, FOR SOLUTION INTRAMUSCULAR; INTRAVENOUS ONCE
Refills: 0 | Status: COMPLETED | OUTPATIENT
Start: 2021-01-01 | End: 2021-01-01

## 2021-01-01 RX ORDER — AZTREONAM 2 G
VIAL (EA) INJECTION
Refills: 0 | Status: DISCONTINUED | OUTPATIENT
Start: 2021-01-01 | End: 2021-01-01

## 2021-01-01 RX ORDER — CHLORHEXIDINE GLUCONATE 213 G/1000ML
15 SOLUTION TOPICAL EVERY 12 HOURS
Refills: 0 | Status: DISCONTINUED | OUTPATIENT
Start: 2021-01-01 | End: 2021-01-01

## 2021-01-01 RX ORDER — ASPIRIN/CALCIUM CARB/MAGNESIUM 324 MG
81 TABLET ORAL DAILY
Refills: 0 | Status: DISCONTINUED | OUTPATIENT
Start: 2021-01-01 | End: 2021-01-01

## 2021-01-01 RX ORDER — INSULIN HUMAN 100 [IU]/ML
1 INJECTION, SOLUTION SUBCUTANEOUS
Qty: 100 | Refills: 0 | Status: DISCONTINUED | OUTPATIENT
Start: 2021-01-01 | End: 2021-01-01

## 2021-01-01 RX ORDER — IBRUTINIB 140 MG/1
2 TABLET, FILM COATED ORAL
Qty: 0 | Refills: 0 | DISCHARGE

## 2021-01-01 RX ORDER — WARFARIN SODIUM 2.5 MG/1
3 TABLET ORAL ONCE
Refills: 0 | Status: COMPLETED | OUTPATIENT
Start: 2021-01-01 | End: 2021-01-01

## 2021-01-01 RX ORDER — AZTREONAM 2 G
2000 VIAL (EA) INJECTION ONCE
Refills: 0 | Status: COMPLETED | OUTPATIENT
Start: 2021-01-01 | End: 2021-01-01

## 2021-01-01 RX ORDER — LABETALOL HCL 100 MG
100 TABLET ORAL ONCE
Refills: 0 | Status: COMPLETED | OUTPATIENT
Start: 2021-01-01 | End: 2021-01-01

## 2021-01-01 RX ORDER — MIDAZOLAM HYDROCHLORIDE 1 MG/ML
2 INJECTION, SOLUTION INTRAMUSCULAR; INTRAVENOUS
Refills: 0 | Status: DISCONTINUED | OUTPATIENT
Start: 2021-01-01 | End: 2021-01-01

## 2021-01-01 RX ORDER — BACITRACIN ZINC 500 UNIT/G
1 OINTMENT IN PACKET (EA) TOPICAL
Refills: 0 | Status: DISCONTINUED | OUTPATIENT
Start: 2021-01-01 | End: 2021-01-01

## 2021-01-01 RX ORDER — LISINOPRIL 2.5 MG/1
40 TABLET ORAL DAILY
Refills: 0 | Status: DISCONTINUED | OUTPATIENT
Start: 2021-01-01 | End: 2021-01-01

## 2021-01-01 RX ORDER — MEROPENEM 1 G/30ML
1000 INJECTION INTRAVENOUS ONCE
Refills: 0 | Status: COMPLETED | OUTPATIENT
Start: 2021-01-01 | End: 2021-01-01

## 2021-01-01 RX ORDER — HEPARIN SODIUM 5000 [USP'U]/ML
5000 INJECTION INTRAVENOUS; SUBCUTANEOUS EVERY 8 HOURS
Refills: 0 | Status: DISCONTINUED | OUTPATIENT
Start: 2021-01-01 | End: 2021-01-01

## 2021-01-01 RX ORDER — INSULIN HUMAN 100 [IU]/ML
7 INJECTION, SOLUTION SUBCUTANEOUS ONCE
Refills: 0 | Status: COMPLETED | OUTPATIENT
Start: 2021-01-01 | End: 2021-01-01

## 2021-01-01 RX ORDER — CLEVIDIPINE BUTYRATE 50MG/100ML
2 VIAL (ML) INTRAVENOUS
Qty: 25 | Refills: 0 | Status: DISCONTINUED | OUTPATIENT
Start: 2021-01-01 | End: 2021-01-01

## 2021-01-01 RX ORDER — DEXMEDETOMIDINE HYDROCHLORIDE IN 0.9% SODIUM CHLORIDE 4 UG/ML
0.5 INJECTION INTRAVENOUS
Qty: 200 | Refills: 0 | Status: DISCONTINUED | OUTPATIENT
Start: 2021-01-01 | End: 2021-01-01

## 2021-01-01 RX ORDER — METOPROLOL TARTRATE 50 MG
1 TABLET ORAL
Qty: 0 | Refills: 0 | DISCHARGE

## 2021-01-01 RX ORDER — LISINOPRIL 2.5 MG/1
20 TABLET ORAL ONCE
Refills: 0 | Status: COMPLETED | OUTPATIENT
Start: 2021-01-01 | End: 2021-01-01

## 2021-01-01 RX ORDER — PROTAMINE SULFATE 10 MG/ML
50 AMPUL (ML) INTRAVENOUS ONCE
Refills: 0 | Status: COMPLETED | OUTPATIENT
Start: 2021-01-01 | End: 2021-01-01

## 2021-01-01 RX ORDER — DEXTROSE 50 % IN WATER 50 %
15 SYRINGE (ML) INTRAVENOUS ONCE
Refills: 0 | Status: DISCONTINUED | OUTPATIENT
Start: 2021-01-01 | End: 2021-01-01

## 2021-01-01 RX ORDER — AMLODIPINE BESYLATE 2.5 MG/1
1 TABLET ORAL
Qty: 0 | Refills: 0 | DISCHARGE

## 2021-01-01 RX ORDER — VORICONAZOLE 10 MG/ML
438 INJECTION, POWDER, LYOPHILIZED, FOR SOLUTION INTRAVENOUS EVERY 12 HOURS
Refills: 0 | Status: COMPLETED | OUTPATIENT
Start: 2021-01-01 | End: 2021-01-01

## 2021-01-01 RX ORDER — ACETAMINOPHEN 500 MG
1000 TABLET ORAL ONCE
Refills: 0 | Status: DISCONTINUED | OUTPATIENT
Start: 2021-01-01 | End: 2021-01-01

## 2021-01-01 RX ORDER — MORPHINE SULFATE 50 MG/1
2 CAPSULE, EXTENDED RELEASE ORAL EVERY 4 HOURS
Refills: 0 | Status: DISCONTINUED | OUTPATIENT
Start: 2021-01-01 | End: 2021-01-01

## 2021-01-01 RX ORDER — CHLORHEXIDINE GLUCONATE 213 G/1000ML
1 SOLUTION TOPICAL
Refills: 0 | Status: DISCONTINUED | OUTPATIENT
Start: 2021-01-01 | End: 2021-01-01

## 2021-01-01 RX ORDER — MAGNESIUM SULFATE 500 MG/ML
2 VIAL (ML) INJECTION ONCE
Refills: 0 | Status: DISCONTINUED | OUTPATIENT
Start: 2021-01-01 | End: 2021-01-01

## 2021-01-01 RX ORDER — MORPHINE SULFATE 50 MG/1
4 CAPSULE, EXTENDED RELEASE ORAL
Refills: 0 | Status: DISCONTINUED | OUTPATIENT
Start: 2021-01-01 | End: 2021-01-01

## 2021-01-01 RX ORDER — NYSTATIN CREAM 100000 [USP'U]/G
1 CREAM TOPICAL
Refills: 0 | Status: DISCONTINUED | OUTPATIENT
Start: 2021-01-01 | End: 2021-01-01

## 2021-01-01 RX ORDER — CHLORHEXIDINE GLUCONATE 213 G/1000ML
15 SOLUTION TOPICAL
Refills: 0 | Status: DISCONTINUED | OUTPATIENT
Start: 2021-01-01 | End: 2021-01-01

## 2021-01-01 RX ORDER — LEVETIRACETAM 250 MG/1
750 TABLET, FILM COATED ORAL
Refills: 0 | Status: DISCONTINUED | OUTPATIENT
Start: 2021-01-01 | End: 2021-01-01

## 2021-01-01 RX ORDER — PROPOFOL 10 MG/ML
50 INJECTION, EMULSION INTRAVENOUS ONCE
Refills: 0 | Status: COMPLETED | OUTPATIENT
Start: 2021-01-01 | End: 2021-01-01

## 2021-01-01 RX ADMIN — LEVETIRACETAM 400 MILLIGRAM(S): 250 TABLET, FILM COATED ORAL at 06:53

## 2021-01-01 RX ADMIN — CHLORHEXIDINE GLUCONATE 15 MILLILITER(S): 213 SOLUTION TOPICAL at 05:03

## 2021-01-01 RX ADMIN — LISINOPRIL 20 MILLIGRAM(S): 2.5 TABLET ORAL at 05:16

## 2021-01-01 RX ADMIN — INSULIN HUMAN 3: 100 INJECTION, SOLUTION SUBCUTANEOUS at 11:16

## 2021-01-01 RX ADMIN — SODIUM CHLORIDE 20 MILLILITER(S): 5 INJECTION, SOLUTION INTRAVENOUS at 12:30

## 2021-01-01 RX ADMIN — MAGNESIUM OXIDE 400 MG ORAL TABLET 400 MILLIGRAM(S): 241.3 TABLET ORAL at 22:01

## 2021-01-01 RX ADMIN — LISINOPRIL 20 MILLIGRAM(S): 2.5 TABLET ORAL at 09:42

## 2021-01-01 RX ADMIN — Medication 650 MILLIGRAM(S): at 12:29

## 2021-01-01 RX ADMIN — LISINOPRIL 20 MILLIGRAM(S): 2.5 TABLET ORAL at 06:14

## 2021-01-01 RX ADMIN — Medication 650 MILLIGRAM(S): at 17:19

## 2021-01-01 RX ADMIN — HEPARIN SODIUM 5000 UNIT(S): 5000 INJECTION INTRAVENOUS; SUBCUTANEOUS at 13:45

## 2021-01-01 RX ADMIN — Medication 10 MILLIGRAM(S): at 21:28

## 2021-01-01 RX ADMIN — CHLORHEXIDINE GLUCONATE 15 MILLILITER(S): 213 SOLUTION TOPICAL at 17:18

## 2021-01-01 RX ADMIN — Medication 100 MILLIEQUIVALENT(S): at 22:41

## 2021-01-01 RX ADMIN — ATORVASTATIN CALCIUM 80 MILLIGRAM(S): 80 TABLET, FILM COATED ORAL at 22:14

## 2021-01-01 RX ADMIN — Medication 650 MILLIGRAM(S): at 17:00

## 2021-01-01 RX ADMIN — SODIUM CHLORIDE 40 MILLILITER(S): 5 INJECTION, SOLUTION INTRAVENOUS at 16:41

## 2021-01-01 RX ADMIN — INSULIN HUMAN 7: 100 INJECTION, SOLUTION SUBCUTANEOUS at 10:01

## 2021-01-01 RX ADMIN — Medication 400 MILLIGRAM(S): at 22:01

## 2021-01-01 RX ADMIN — FENTANYL CITRATE 25 MICROGRAM(S): 50 INJECTION INTRAVENOUS at 03:18

## 2021-01-01 RX ADMIN — CHLORHEXIDINE GLUCONATE 15 MILLILITER(S): 213 SOLUTION TOPICAL at 05:28

## 2021-01-01 RX ADMIN — Medication 50 MILLIEQUIVALENT(S): at 21:29

## 2021-01-01 RX ADMIN — VORICONAZOLE 125 MILLIGRAM(S): 10 INJECTION, POWDER, LYOPHILIZED, FOR SOLUTION INTRAVENOUS at 17:29

## 2021-01-01 RX ADMIN — Medication 650 MILLIGRAM(S): at 12:21

## 2021-01-01 RX ADMIN — MAGNESIUM OXIDE 400 MG ORAL TABLET 400 MILLIGRAM(S): 241.3 TABLET ORAL at 05:25

## 2021-01-01 RX ADMIN — INSULIN HUMAN 3: 100 INJECTION, SOLUTION SUBCUTANEOUS at 05:40

## 2021-01-01 RX ADMIN — CHLORHEXIDINE GLUCONATE 1 APPLICATION(S): 213 SOLUTION TOPICAL at 13:18

## 2021-01-01 RX ADMIN — LISINOPRIL 40 MILLIGRAM(S): 2.5 TABLET ORAL at 05:28

## 2021-01-01 RX ADMIN — INSULIN HUMAN 5: 100 INJECTION, SOLUTION SUBCUTANEOUS at 11:13

## 2021-01-01 RX ADMIN — MIDAZOLAM HYDROCHLORIDE 2 MILLIGRAM(S): 1 INJECTION, SOLUTION INTRAMUSCULAR; INTRAVENOUS at 12:22

## 2021-01-01 RX ADMIN — INSULIN HUMAN 5: 100 INJECTION, SOLUTION SUBCUTANEOUS at 06:55

## 2021-01-01 RX ADMIN — INSULIN HUMAN 3: 100 INJECTION, SOLUTION SUBCUTANEOUS at 05:11

## 2021-01-01 RX ADMIN — SENNA PLUS 2 TABLET(S): 8.6 TABLET ORAL at 21:27

## 2021-01-01 RX ADMIN — INSULIN HUMAN 3: 100 INJECTION, SOLUTION SUBCUTANEOUS at 06:19

## 2021-01-01 RX ADMIN — ATORVASTATIN CALCIUM 80 MILLIGRAM(S): 80 TABLET, FILM COATED ORAL at 22:25

## 2021-01-01 RX ADMIN — Medication 650 MILLIGRAM(S): at 17:25

## 2021-01-01 RX ADMIN — Medication 650 MILLIGRAM(S): at 02:36

## 2021-01-01 RX ADMIN — Medication 300 MILLIGRAM(S): at 13:18

## 2021-01-01 RX ADMIN — LEVETIRACETAM 400 MILLIGRAM(S): 250 TABLET, FILM COATED ORAL at 17:25

## 2021-01-01 RX ADMIN — DEXMEDETOMIDINE HYDROCHLORIDE IN 0.9% SODIUM CHLORIDE 0.18 MICROGRAM(S)/KG/HR: 4 INJECTION INTRAVENOUS at 03:42

## 2021-01-01 RX ADMIN — Medication 400 MILLIGRAM(S): at 06:11

## 2021-01-01 RX ADMIN — HEPARIN SODIUM 5000 UNIT(S): 5000 INJECTION INTRAVENOUS; SUBCUTANEOUS at 21:56

## 2021-01-01 RX ADMIN — Medication 400 MILLIGRAM(S): at 21:19

## 2021-01-01 RX ADMIN — PANTOPRAZOLE SODIUM 40 MILLIGRAM(S): 20 TABLET, DELAYED RELEASE ORAL at 12:53

## 2021-01-01 RX ADMIN — Medication 400 MILLIGRAM(S): at 21:49

## 2021-01-01 RX ADMIN — Medication 650 MILLIGRAM(S): at 16:00

## 2021-01-01 RX ADMIN — CHLORHEXIDINE GLUCONATE 15 MILLILITER(S): 213 SOLUTION TOPICAL at 05:33

## 2021-01-01 RX ADMIN — HEPARIN SODIUM 5000 UNIT(S): 5000 INJECTION INTRAVENOUS; SUBCUTANEOUS at 14:15

## 2021-01-01 RX ADMIN — Medication 650 MILLIGRAM(S): at 13:54

## 2021-01-01 RX ADMIN — LEVETIRACETAM 400 MILLIGRAM(S): 250 TABLET, FILM COATED ORAL at 16:54

## 2021-01-01 RX ADMIN — LISINOPRIL 10 MILLIGRAM(S): 2.5 TABLET ORAL at 16:04

## 2021-01-01 RX ADMIN — METFORMIN HYDROCHLORIDE 500 MILLIGRAM(S): 850 TABLET ORAL at 17:22

## 2021-01-01 RX ADMIN — NYSTATIN CREAM 1 APPLICATION(S): 100000 CREAM TOPICAL at 06:11

## 2021-01-01 RX ADMIN — CHLORHEXIDINE GLUCONATE 15 MILLILITER(S): 213 SOLUTION TOPICAL at 17:01

## 2021-01-01 RX ADMIN — LACTULOSE 10 GRAM(S): 10 SOLUTION ORAL at 11:14

## 2021-01-01 RX ADMIN — VORICONAZOLE 125 MILLIGRAM(S): 10 INJECTION, POWDER, LYOPHILIZED, FOR SOLUTION INTRAVENOUS at 05:41

## 2021-01-01 RX ADMIN — Medication 50 MILLIEQUIVALENT(S): at 21:23

## 2021-01-01 RX ADMIN — CHLORHEXIDINE GLUCONATE 1 APPLICATION(S): 213 SOLUTION TOPICAL at 12:22

## 2021-01-01 RX ADMIN — CHLORHEXIDINE GLUCONATE 15 MILLILITER(S): 213 SOLUTION TOPICAL at 04:56

## 2021-01-01 RX ADMIN — Medication 650 MILLIGRAM(S): at 17:29

## 2021-01-01 RX ADMIN — NYSTATIN CREAM 1 APPLICATION(S): 100000 CREAM TOPICAL at 05:28

## 2021-01-01 RX ADMIN — VORICONAZOLE 200 MILLIGRAM(S): 10 INJECTION, POWDER, LYOPHILIZED, FOR SOLUTION INTRAVENOUS at 05:28

## 2021-01-01 RX ADMIN — LISINOPRIL 20 MILLIGRAM(S): 2.5 TABLET ORAL at 05:05

## 2021-01-01 RX ADMIN — NYSTATIN CREAM 1 APPLICATION(S): 100000 CREAM TOPICAL at 17:22

## 2021-01-01 RX ADMIN — INSULIN HUMAN 3: 100 INJECTION, SOLUTION SUBCUTANEOUS at 19:11

## 2021-01-01 RX ADMIN — Medication 300 MILLIGRAM(S): at 21:27

## 2021-01-01 RX ADMIN — Medication 62.5 MILLIMOLE(S): at 02:52

## 2021-01-01 RX ADMIN — LEVETIRACETAM 400 MILLIGRAM(S): 250 TABLET, FILM COATED ORAL at 05:07

## 2021-01-01 RX ADMIN — CHLORHEXIDINE GLUCONATE 15 MILLILITER(S): 213 SOLUTION TOPICAL at 05:09

## 2021-01-01 RX ADMIN — INSULIN HUMAN 3: 100 INJECTION, SOLUTION SUBCUTANEOUS at 05:49

## 2021-01-01 RX ADMIN — ATORVASTATIN CALCIUM 80 MILLIGRAM(S): 80 TABLET, FILM COATED ORAL at 22:56

## 2021-01-01 RX ADMIN — INSULIN HUMAN 7: 100 INJECTION, SOLUTION SUBCUTANEOUS at 05:18

## 2021-01-01 RX ADMIN — SODIUM CHLORIDE 20 MILLILITER(S): 5 INJECTION, SOLUTION INTRAVENOUS at 13:18

## 2021-01-01 RX ADMIN — Medication 1 APPLICATION(S): at 05:27

## 2021-01-01 RX ADMIN — ATORVASTATIN CALCIUM 80 MILLIGRAM(S): 80 TABLET, FILM COATED ORAL at 21:20

## 2021-01-01 RX ADMIN — CHLORHEXIDINE GLUCONATE 1 APPLICATION(S): 213 SOLUTION TOPICAL at 11:04

## 2021-01-01 RX ADMIN — MAGNESIUM OXIDE 400 MG ORAL TABLET 400 MILLIGRAM(S): 241.3 TABLET ORAL at 21:35

## 2021-01-01 RX ADMIN — CHLORHEXIDINE GLUCONATE 1 APPLICATION(S): 213 SOLUTION TOPICAL at 11:41

## 2021-01-01 RX ADMIN — HEPARIN SODIUM 5000 UNIT(S): 5000 INJECTION INTRAVENOUS; SUBCUTANEOUS at 21:21

## 2021-01-01 RX ADMIN — VORICONAZOLE 125 MILLIGRAM(S): 10 INJECTION, POWDER, LYOPHILIZED, FOR SOLUTION INTRAVENOUS at 17:17

## 2021-01-01 RX ADMIN — CHLORHEXIDINE GLUCONATE 15 MILLILITER(S): 213 SOLUTION TOPICAL at 05:05

## 2021-01-01 RX ADMIN — INSULIN HUMAN 3: 100 INJECTION, SOLUTION SUBCUTANEOUS at 21:28

## 2021-01-01 RX ADMIN — Medication 400 MILLIGRAM(S): at 05:35

## 2021-01-01 RX ADMIN — PANTOPRAZOLE SODIUM 40 MILLIGRAM(S): 20 TABLET, DELAYED RELEASE ORAL at 11:14

## 2021-01-01 RX ADMIN — Medication 650 MILLIGRAM(S): at 18:03

## 2021-01-01 RX ADMIN — INSULIN HUMAN 5: 100 INJECTION, SOLUTION SUBCUTANEOUS at 05:59

## 2021-01-01 RX ADMIN — Medication 400 MILLIGRAM(S): at 23:00

## 2021-01-01 RX ADMIN — INSULIN HUMAN 3: 100 INJECTION, SOLUTION SUBCUTANEOUS at 17:50

## 2021-01-01 RX ADMIN — Medication 400 MILLIGRAM(S): at 13:28

## 2021-01-01 RX ADMIN — ATORVASTATIN CALCIUM 80 MILLIGRAM(S): 80 TABLET, FILM COATED ORAL at 21:30

## 2021-01-01 RX ADMIN — SODIUM CHLORIDE 100 MILLILITER(S): 9 INJECTION INTRAMUSCULAR; INTRAVENOUS; SUBCUTANEOUS at 16:08

## 2021-01-01 RX ADMIN — PANTOPRAZOLE SODIUM 40 MILLIGRAM(S): 20 TABLET, DELAYED RELEASE ORAL at 11:24

## 2021-01-01 RX ADMIN — MORPHINE SULFATE 4 MILLIGRAM(S): 50 CAPSULE, EXTENDED RELEASE ORAL at 12:01

## 2021-01-01 RX ADMIN — Medication 100 GRAM(S): at 03:15

## 2021-01-01 RX ADMIN — Medication 650 MILLIGRAM(S): at 11:38

## 2021-01-01 RX ADMIN — Medication 400 MILLIGRAM(S): at 15:49

## 2021-01-01 RX ADMIN — ATORVASTATIN CALCIUM 80 MILLIGRAM(S): 80 TABLET, FILM COATED ORAL at 22:03

## 2021-01-01 RX ADMIN — Medication 400 MILLIGRAM(S): at 21:21

## 2021-01-01 RX ADMIN — SODIUM CHLORIDE 30 MILLILITER(S): 5 INJECTION, SOLUTION INTRAVENOUS at 15:26

## 2021-01-01 RX ADMIN — CHLORHEXIDINE GLUCONATE 15 MILLILITER(S): 213 SOLUTION TOPICAL at 17:25

## 2021-01-01 RX ADMIN — Medication 100 GRAM(S): at 00:45

## 2021-01-01 RX ADMIN — METFORMIN HYDROCHLORIDE 500 MILLIGRAM(S): 850 TABLET ORAL at 10:38

## 2021-01-01 RX ADMIN — LISINOPRIL 20 MILLIGRAM(S): 2.5 TABLET ORAL at 05:35

## 2021-01-01 RX ADMIN — INSULIN HUMAN 3: 100 INJECTION, SOLUTION SUBCUTANEOUS at 18:00

## 2021-01-01 RX ADMIN — Medication 400 MILLIGRAM(S): at 16:08

## 2021-01-01 RX ADMIN — INSULIN HUMAN 7: 100 INJECTION, SOLUTION SUBCUTANEOUS at 05:29

## 2021-01-01 RX ADMIN — Medication 50 MILLIEQUIVALENT(S): at 13:29

## 2021-01-01 RX ADMIN — CHLORHEXIDINE GLUCONATE 15 MILLILITER(S): 213 SOLUTION TOPICAL at 16:00

## 2021-01-01 RX ADMIN — ATORVASTATIN CALCIUM 80 MILLIGRAM(S): 80 TABLET, FILM COATED ORAL at 21:19

## 2021-01-01 RX ADMIN — Medication 10 MILLIGRAM(S): at 21:25

## 2021-01-01 RX ADMIN — NYSTATIN CREAM 1 APPLICATION(S): 100000 CREAM TOPICAL at 17:52

## 2021-01-01 RX ADMIN — VORICONAZOLE 200 MILLIGRAM(S): 10 INJECTION, POWDER, LYOPHILIZED, FOR SOLUTION INTRAVENOUS at 17:08

## 2021-01-01 RX ADMIN — LEVETIRACETAM 400 MILLIGRAM(S): 250 TABLET, FILM COATED ORAL at 17:36

## 2021-01-01 RX ADMIN — VORICONAZOLE 125 MILLIGRAM(S): 10 INJECTION, POWDER, LYOPHILIZED, FOR SOLUTION INTRAVENOUS at 05:09

## 2021-01-01 RX ADMIN — PANTOPRAZOLE SODIUM 40 MILLIGRAM(S): 20 TABLET, DELAYED RELEASE ORAL at 11:37

## 2021-01-01 RX ADMIN — ATORVASTATIN CALCIUM 80 MILLIGRAM(S): 80 TABLET, FILM COATED ORAL at 22:01

## 2021-01-01 RX ADMIN — METFORMIN HYDROCHLORIDE 500 MILLIGRAM(S): 850 TABLET ORAL at 17:25

## 2021-01-01 RX ADMIN — INSULIN HUMAN 5: 100 INJECTION, SOLUTION SUBCUTANEOUS at 02:25

## 2021-01-01 RX ADMIN — LEVETIRACETAM 400 MILLIGRAM(S): 250 TABLET, FILM COATED ORAL at 17:17

## 2021-01-01 RX ADMIN — ENOXAPARIN SODIUM 75 MILLIGRAM(S): 100 INJECTION SUBCUTANEOUS at 05:24

## 2021-01-01 RX ADMIN — Medication 400 MILLIGRAM(S): at 06:52

## 2021-01-01 RX ADMIN — CHLORHEXIDINE GLUCONATE 1 APPLICATION(S): 213 SOLUTION TOPICAL at 12:53

## 2021-01-01 RX ADMIN — LEVETIRACETAM 400 MILLIGRAM(S): 250 TABLET, FILM COATED ORAL at 17:26

## 2021-01-01 RX ADMIN — DEXMEDETOMIDINE HYDROCHLORIDE IN 0.9% SODIUM CHLORIDE 1.83 MICROGRAM(S)/KG/HR: 4 INJECTION INTRAVENOUS at 17:25

## 2021-01-01 RX ADMIN — INSULIN HUMAN 3: 100 INJECTION, SOLUTION SUBCUTANEOUS at 11:14

## 2021-01-01 RX ADMIN — HEPARIN SODIUM 5000 UNIT(S): 5000 INJECTION INTRAVENOUS; SUBCUTANEOUS at 13:24

## 2021-01-01 RX ADMIN — Medication 50 GRAM(S): at 04:32

## 2021-01-01 RX ADMIN — LISINOPRIL 20 MILLIGRAM(S): 2.5 TABLET ORAL at 09:15

## 2021-01-01 RX ADMIN — LEVETIRACETAM 400 MILLIGRAM(S): 250 TABLET, FILM COATED ORAL at 17:19

## 2021-01-01 RX ADMIN — Medication 300 MILLIGRAM(S): at 05:09

## 2021-01-01 RX ADMIN — HEPARIN SODIUM 5000 UNIT(S): 5000 INJECTION INTRAVENOUS; SUBCUTANEOUS at 05:10

## 2021-01-01 RX ADMIN — ATORVASTATIN CALCIUM 80 MILLIGRAM(S): 80 TABLET, FILM COATED ORAL at 21:51

## 2021-01-01 RX ADMIN — CHLORHEXIDINE GLUCONATE 15 MILLILITER(S): 213 SOLUTION TOPICAL at 18:58

## 2021-01-01 RX ADMIN — Medication 650 MILLIGRAM(S): at 17:37

## 2021-01-01 RX ADMIN — Medication 650 MILLIGRAM(S): at 05:06

## 2021-01-01 RX ADMIN — METFORMIN HYDROCHLORIDE 500 MILLIGRAM(S): 850 TABLET ORAL at 17:11

## 2021-01-01 RX ADMIN — Medication 1 APPLICATION(S): at 17:19

## 2021-01-01 RX ADMIN — Medication 300 MILLIGRAM(S): at 21:03

## 2021-01-01 RX ADMIN — CHLORHEXIDINE GLUCONATE 15 MILLILITER(S): 213 SOLUTION TOPICAL at 17:37

## 2021-01-01 RX ADMIN — Medication 400 MILLIGRAM(S): at 14:31

## 2021-01-01 RX ADMIN — INSULIN HUMAN 3: 100 INJECTION, SOLUTION SUBCUTANEOUS at 12:26

## 2021-01-01 RX ADMIN — LEVETIRACETAM 400 MILLIGRAM(S): 250 TABLET, FILM COATED ORAL at 05:24

## 2021-01-01 RX ADMIN — Medication 650 MILLIGRAM(S): at 11:09

## 2021-01-01 RX ADMIN — Medication 250 MILLIGRAM(S): at 17:11

## 2021-01-01 RX ADMIN — HEPARIN SODIUM 5000 UNIT(S): 5000 INJECTION INTRAVENOUS; SUBCUTANEOUS at 05:40

## 2021-01-01 RX ADMIN — HEPARIN SODIUM 5000 UNIT(S): 5000 INJECTION INTRAVENOUS; SUBCUTANEOUS at 05:02

## 2021-01-01 RX ADMIN — Medication 81 MILLIGRAM(S): at 12:55

## 2021-01-01 RX ADMIN — Medication 650 MILLIGRAM(S): at 05:33

## 2021-01-01 RX ADMIN — HEPARIN SODIUM 5000 UNIT(S): 5000 INJECTION INTRAVENOUS; SUBCUTANEOUS at 13:18

## 2021-01-01 RX ADMIN — ATORVASTATIN CALCIUM 80 MILLIGRAM(S): 80 TABLET, FILM COATED ORAL at 21:28

## 2021-01-01 RX ADMIN — Medication 100 MILLIEQUIVALENT(S): at 03:20

## 2021-01-01 RX ADMIN — Medication 400 MILLIGRAM(S): at 05:06

## 2021-01-01 RX ADMIN — ENOXAPARIN SODIUM 75 MILLIGRAM(S): 100 INJECTION SUBCUTANEOUS at 05:33

## 2021-01-01 RX ADMIN — Medication 10 MILLIGRAM(S): at 12:00

## 2021-01-01 RX ADMIN — CHLORHEXIDINE GLUCONATE 1 APPLICATION(S): 213 SOLUTION TOPICAL at 12:21

## 2021-01-01 RX ADMIN — LEVETIRACETAM 400 MILLIGRAM(S): 250 TABLET, FILM COATED ORAL at 11:46

## 2021-01-01 RX ADMIN — NYSTATIN CREAM 1 APPLICATION(S): 100000 CREAM TOPICAL at 06:54

## 2021-01-01 RX ADMIN — CHLORHEXIDINE GLUCONATE 15 MILLILITER(S): 213 SOLUTION TOPICAL at 17:30

## 2021-01-01 RX ADMIN — HEPARIN SODIUM 5000 UNIT(S): 5000 INJECTION INTRAVENOUS; SUBCUTANEOUS at 05:09

## 2021-01-01 RX ADMIN — CHLORHEXIDINE GLUCONATE 1 APPLICATION(S): 213 SOLUTION TOPICAL at 11:14

## 2021-01-01 RX ADMIN — Medication 250 MILLIGRAM(S): at 18:50

## 2021-01-01 RX ADMIN — METFORMIN HYDROCHLORIDE 500 MILLIGRAM(S): 850 TABLET ORAL at 05:26

## 2021-01-01 RX ADMIN — LEVETIRACETAM 400 MILLIGRAM(S): 250 TABLET, FILM COATED ORAL at 05:17

## 2021-01-01 RX ADMIN — VORICONAZOLE 125 MILLIGRAM(S): 10 INJECTION, POWDER, LYOPHILIZED, FOR SOLUTION INTRAVENOUS at 05:04

## 2021-01-01 RX ADMIN — Medication 650 MILLIGRAM(S): at 23:44

## 2021-01-01 RX ADMIN — Medication 650 MILLIGRAM(S): at 18:06

## 2021-01-01 RX ADMIN — INSULIN HUMAN 5: 100 INJECTION, SOLUTION SUBCUTANEOUS at 03:50

## 2021-01-01 RX ADMIN — Medication 650 MILLIGRAM(S): at 21:30

## 2021-01-01 RX ADMIN — INSULIN HUMAN 5: 100 INJECTION, SOLUTION SUBCUTANEOUS at 15:08

## 2021-01-01 RX ADMIN — CHLORHEXIDINE GLUCONATE 15 MILLILITER(S): 213 SOLUTION TOPICAL at 17:13

## 2021-01-01 RX ADMIN — PANTOPRAZOLE SODIUM 40 MILLIGRAM(S): 20 TABLET, DELAYED RELEASE ORAL at 12:23

## 2021-01-01 RX ADMIN — CHLORHEXIDINE GLUCONATE 1 APPLICATION(S): 213 SOLUTION TOPICAL at 05:26

## 2021-01-01 RX ADMIN — ATORVASTATIN CALCIUM 80 MILLIGRAM(S): 80 TABLET, FILM COATED ORAL at 22:05

## 2021-01-01 RX ADMIN — LEVETIRACETAM 750 MILLIGRAM(S): 250 TABLET, FILM COATED ORAL at 05:28

## 2021-01-01 RX ADMIN — METFORMIN HYDROCHLORIDE 500 MILLIGRAM(S): 850 TABLET ORAL at 05:06

## 2021-01-01 RX ADMIN — INSULIN HUMAN 3: 100 INJECTION, SOLUTION SUBCUTANEOUS at 13:15

## 2021-01-01 RX ADMIN — MEROPENEM 100 MILLIGRAM(S): 1 INJECTION INTRAVENOUS at 21:51

## 2021-01-01 RX ADMIN — Medication 650 MILLIGRAM(S): at 00:30

## 2021-01-01 RX ADMIN — HEPARIN SODIUM 5000 UNIT(S): 5000 INJECTION INTRAVENOUS; SUBCUTANEOUS at 05:13

## 2021-01-01 RX ADMIN — PANTOPRAZOLE SODIUM 40 MILLIGRAM(S): 20 TABLET, DELAYED RELEASE ORAL at 12:55

## 2021-01-01 RX ADMIN — INSULIN HUMAN 7: 100 INJECTION, SOLUTION SUBCUTANEOUS at 23:34

## 2021-01-01 RX ADMIN — SENNA PLUS 2 TABLET(S): 8.6 TABLET ORAL at 21:24

## 2021-01-01 RX ADMIN — LEVETIRACETAM 400 MILLIGRAM(S): 250 TABLET, FILM COATED ORAL at 17:20

## 2021-01-01 RX ADMIN — Medication 200 MILLIGRAM(S): at 05:03

## 2021-01-01 RX ADMIN — INSULIN HUMAN 5: 100 INJECTION, SOLUTION SUBCUTANEOUS at 14:09

## 2021-01-01 RX ADMIN — Medication 400 MILLIGRAM(S): at 13:41

## 2021-01-01 RX ADMIN — LEVETIRACETAM 400 MILLIGRAM(S): 250 TABLET, FILM COATED ORAL at 05:36

## 2021-01-01 RX ADMIN — Medication 100 MILLIGRAM(S): at 13:23

## 2021-01-01 RX ADMIN — MEROPENEM 100 MILLIGRAM(S): 1 INJECTION INTRAVENOUS at 21:41

## 2021-01-01 RX ADMIN — VORICONAZOLE 125 MILLIGRAM(S): 10 INJECTION, POWDER, LYOPHILIZED, FOR SOLUTION INTRAVENOUS at 16:28

## 2021-01-01 RX ADMIN — Medication 102 MILLIGRAM(S): at 00:36

## 2021-01-01 RX ADMIN — Medication 250 MILLIGRAM(S): at 04:56

## 2021-01-01 RX ADMIN — METFORMIN HYDROCHLORIDE 500 MILLIGRAM(S): 850 TABLET ORAL at 17:29

## 2021-01-01 RX ADMIN — PANTOPRAZOLE SODIUM 40 MILLIGRAM(S): 20 TABLET, DELAYED RELEASE ORAL at 12:21

## 2021-01-01 RX ADMIN — Medication 100 MILLIEQUIVALENT(S): at 03:05

## 2021-01-01 RX ADMIN — Medication 250 MILLIGRAM(S): at 22:02

## 2021-01-01 RX ADMIN — Medication 1 APPLICATION(S): at 17:18

## 2021-01-01 RX ADMIN — Medication 650 MILLIGRAM(S): at 05:12

## 2021-01-01 RX ADMIN — MEROPENEM 100 MILLIGRAM(S): 1 INJECTION INTRAVENOUS at 21:29

## 2021-01-01 RX ADMIN — Medication 250 MILLIGRAM(S): at 15:20

## 2021-01-01 RX ADMIN — LEVETIRACETAM 400 MILLIGRAM(S): 250 TABLET, FILM COATED ORAL at 17:35

## 2021-01-01 RX ADMIN — Medication 100 MILLIGRAM(S): at 14:47

## 2021-01-01 RX ADMIN — HEPARIN SODIUM 5000 UNIT(S): 5000 INJECTION INTRAVENOUS; SUBCUTANEOUS at 21:49

## 2021-01-01 RX ADMIN — PANTOPRAZOLE SODIUM 40 MILLIGRAM(S): 20 TABLET, DELAYED RELEASE ORAL at 12:29

## 2021-01-01 RX ADMIN — LEVETIRACETAM 400 MILLIGRAM(S): 250 TABLET, FILM COATED ORAL at 05:26

## 2021-01-01 RX ADMIN — CHLORHEXIDINE GLUCONATE 15 MILLILITER(S): 213 SOLUTION TOPICAL at 05:17

## 2021-01-01 RX ADMIN — Medication 400 MILLIGRAM(S): at 08:10

## 2021-01-01 RX ADMIN — INSULIN HUMAN 5: 100 INJECTION, SOLUTION SUBCUTANEOUS at 03:46

## 2021-01-01 RX ADMIN — LEVETIRACETAM 400 MILLIGRAM(S): 250 TABLET, FILM COATED ORAL at 18:46

## 2021-01-01 RX ADMIN — Medication 650 MILLIGRAM(S): at 17:59

## 2021-01-01 RX ADMIN — LISINOPRIL 20 MILLIGRAM(S): 2.5 TABLET ORAL at 06:07

## 2021-01-01 RX ADMIN — VORICONAZOLE 125 MILLIGRAM(S): 10 INJECTION, POWDER, LYOPHILIZED, FOR SOLUTION INTRAVENOUS at 05:40

## 2021-01-01 RX ADMIN — INSULIN HUMAN 3: 100 INJECTION, SOLUTION SUBCUTANEOUS at 00:06

## 2021-01-01 RX ADMIN — Medication 400 MILLIGRAM(S): at 21:35

## 2021-01-01 RX ADMIN — CHLORHEXIDINE GLUCONATE 15 MILLILITER(S): 213 SOLUTION TOPICAL at 17:19

## 2021-01-01 RX ADMIN — Medication 100 MILLIGRAM(S): at 13:24

## 2021-01-01 RX ADMIN — PANTOPRAZOLE SODIUM 40 MILLIGRAM(S): 20 TABLET, DELAYED RELEASE ORAL at 12:51

## 2021-01-01 RX ADMIN — Medication 650 MILLIGRAM(S): at 13:26

## 2021-01-01 RX ADMIN — INSULIN HUMAN 5: 100 INJECTION, SOLUTION SUBCUTANEOUS at 22:48

## 2021-01-01 RX ADMIN — CHLORHEXIDINE GLUCONATE 15 MILLILITER(S): 213 SOLUTION TOPICAL at 18:32

## 2021-01-01 RX ADMIN — VORICONAZOLE 125 MILLIGRAM(S): 10 INJECTION, POWDER, LYOPHILIZED, FOR SOLUTION INTRAVENOUS at 06:03

## 2021-01-01 RX ADMIN — ENOXAPARIN SODIUM 75 MILLIGRAM(S): 100 INJECTION SUBCUTANEOUS at 05:26

## 2021-01-01 RX ADMIN — SENNA PLUS 2 TABLET(S): 8.6 TABLET ORAL at 21:49

## 2021-01-01 RX ADMIN — Medication 81 MILLIGRAM(S): at 11:36

## 2021-01-01 RX ADMIN — LEVETIRACETAM 400 MILLIGRAM(S): 250 TABLET, FILM COATED ORAL at 19:50

## 2021-01-01 RX ADMIN — LEVETIRACETAM 400 MILLIGRAM(S): 250 TABLET, FILM COATED ORAL at 04:58

## 2021-01-01 RX ADMIN — INSULIN HUMAN 5: 100 INJECTION, SOLUTION SUBCUTANEOUS at 22:42

## 2021-01-01 RX ADMIN — Medication 4 MG/HR: at 23:07

## 2021-01-01 RX ADMIN — HEPARIN SODIUM 5000 UNIT(S): 5000 INJECTION INTRAVENOUS; SUBCUTANEOUS at 05:07

## 2021-01-01 RX ADMIN — VORICONAZOLE 125 MILLIGRAM(S): 10 INJECTION, POWDER, LYOPHILIZED, FOR SOLUTION INTRAVENOUS at 05:05

## 2021-01-01 RX ADMIN — Medication 650 MILLIGRAM(S): at 05:07

## 2021-01-01 RX ADMIN — ATORVASTATIN CALCIUM 80 MILLIGRAM(S): 80 TABLET, FILM COATED ORAL at 21:24

## 2021-01-01 RX ADMIN — CHLORHEXIDINE GLUCONATE 15 MILLILITER(S): 213 SOLUTION TOPICAL at 05:10

## 2021-01-01 RX ADMIN — Medication 650 MILLIGRAM(S): at 11:28

## 2021-01-01 RX ADMIN — CHLORHEXIDINE GLUCONATE 15 MILLILITER(S): 213 SOLUTION TOPICAL at 18:06

## 2021-01-01 RX ADMIN — MEROPENEM 100 MILLIGRAM(S): 1 INJECTION INTRAVENOUS at 15:54

## 2021-01-01 RX ADMIN — CHLORHEXIDINE GLUCONATE 15 MILLILITER(S): 213 SOLUTION TOPICAL at 17:11

## 2021-01-01 RX ADMIN — VORICONAZOLE 125 MILLIGRAM(S): 10 INJECTION, POWDER, LYOPHILIZED, FOR SOLUTION INTRAVENOUS at 17:03

## 2021-01-01 RX ADMIN — INSULIN HUMAN 5: 100 INJECTION, SOLUTION SUBCUTANEOUS at 06:05

## 2021-01-01 RX ADMIN — METFORMIN HYDROCHLORIDE 500 MILLIGRAM(S): 850 TABLET ORAL at 17:19

## 2021-01-01 RX ADMIN — HEPARIN SODIUM 5000 UNIT(S): 5000 INJECTION INTRAVENOUS; SUBCUTANEOUS at 13:16

## 2021-01-01 RX ADMIN — LEVETIRACETAM 400 MILLIGRAM(S): 250 TABLET, FILM COATED ORAL at 05:12

## 2021-01-01 RX ADMIN — Medication 250 MILLIGRAM(S): at 18:00

## 2021-01-01 RX ADMIN — Medication 650 MILLIGRAM(S): at 05:00

## 2021-01-01 RX ADMIN — Medication 400 MILLIGRAM(S): at 05:07

## 2021-01-01 RX ADMIN — CHLORHEXIDINE GLUCONATE 1 APPLICATION(S): 213 SOLUTION TOPICAL at 12:56

## 2021-01-01 RX ADMIN — NICARDIPINE HYDROCHLORIDE 25 MG/HR: 30 CAPSULE, EXTENDED RELEASE ORAL at 12:20

## 2021-01-01 RX ADMIN — FENTANYL CITRATE 25 MICROGRAM(S): 50 INJECTION INTRAVENOUS at 12:00

## 2021-01-01 RX ADMIN — Medication 250 MILLIGRAM(S): at 13:23

## 2021-01-01 RX ADMIN — MEROPENEM 100 MILLIGRAM(S): 1 INJECTION INTRAVENOUS at 21:55

## 2021-01-01 RX ADMIN — INSULIN HUMAN 3: 100 INJECTION, SOLUTION SUBCUTANEOUS at 23:37

## 2021-01-01 RX ADMIN — VORICONAZOLE 200 MILLIGRAM(S): 10 INJECTION, POWDER, LYOPHILIZED, FOR SOLUTION INTRAVENOUS at 06:56

## 2021-01-01 RX ADMIN — Medication 10 MILLIGRAM(S): at 19:19

## 2021-01-01 RX ADMIN — Medication 400 MILLIGRAM(S): at 05:03

## 2021-01-01 RX ADMIN — METFORMIN HYDROCHLORIDE 500 MILLIGRAM(S): 850 TABLET ORAL at 05:05

## 2021-01-01 RX ADMIN — Medication 250 MILLIGRAM(S): at 05:09

## 2021-01-01 RX ADMIN — Medication 100 MILLIEQUIVALENT(S): at 14:46

## 2021-01-01 RX ADMIN — LISINOPRIL 20 MILLIGRAM(S): 2.5 TABLET ORAL at 05:04

## 2021-01-01 RX ADMIN — CEFEPIME 100 MILLIGRAM(S): 1 INJECTION, POWDER, FOR SOLUTION INTRAMUSCULAR; INTRAVENOUS at 12:36

## 2021-01-01 RX ADMIN — Medication 400 MILLIGRAM(S): at 22:14

## 2021-01-01 RX ADMIN — Medication 4 MG/HR: at 04:56

## 2021-01-01 RX ADMIN — VORICONAZOLE 125 MILLIGRAM(S): 10 INJECTION, POWDER, LYOPHILIZED, FOR SOLUTION INTRAVENOUS at 17:32

## 2021-01-01 RX ADMIN — Medication 650 MILLIGRAM(S): at 05:09

## 2021-01-01 RX ADMIN — Medication 300 MILLIGRAM(S): at 15:09

## 2021-01-01 RX ADMIN — Medication 100 MILLIEQUIVALENT(S): at 03:15

## 2021-01-01 RX ADMIN — Medication 650 MILLIGRAM(S): at 18:08

## 2021-01-01 RX ADMIN — Medication 650 MILLIGRAM(S): at 06:11

## 2021-01-01 RX ADMIN — Medication 81 MILLIGRAM(S): at 13:17

## 2021-01-01 RX ADMIN — Medication 650 MILLIGRAM(S): at 07:45

## 2021-01-01 RX ADMIN — VORICONAZOLE 125 MILLIGRAM(S): 10 INJECTION, POWDER, LYOPHILIZED, FOR SOLUTION INTRAVENOUS at 05:33

## 2021-01-01 RX ADMIN — Medication 650 MILLIGRAM(S): at 22:05

## 2021-01-01 RX ADMIN — Medication 400 MILLIGRAM(S): at 21:28

## 2021-01-01 RX ADMIN — Medication 650 MILLIGRAM(S): at 13:20

## 2021-01-01 RX ADMIN — Medication 650 MILLIGRAM(S): at 07:15

## 2021-01-01 RX ADMIN — Medication 400 MILLIGRAM(S): at 05:10

## 2021-01-01 RX ADMIN — Medication 250 MILLIGRAM(S): at 18:08

## 2021-01-01 RX ADMIN — INSULIN HUMAN 1 UNIT(S)/HR: 100 INJECTION, SOLUTION SUBCUTANEOUS at 22:30

## 2021-01-01 RX ADMIN — Medication 650 MILLIGRAM(S): at 13:30

## 2021-01-01 RX ADMIN — Medication 1 APPLICATION(S): at 18:58

## 2021-01-01 RX ADMIN — VORICONAZOLE 125 MILLIGRAM(S): 10 INJECTION, POWDER, LYOPHILIZED, FOR SOLUTION INTRAVENOUS at 05:45

## 2021-01-01 RX ADMIN — INSULIN HUMAN 5: 100 INJECTION, SOLUTION SUBCUTANEOUS at 10:45

## 2021-01-01 RX ADMIN — Medication 250 MILLIGRAM(S): at 13:22

## 2021-01-01 RX ADMIN — VORICONAZOLE 125 MILLIGRAM(S): 10 INJECTION, POWDER, LYOPHILIZED, FOR SOLUTION INTRAVENOUS at 18:33

## 2021-01-01 RX ADMIN — INSULIN HUMAN 5: 100 INJECTION, SOLUTION SUBCUTANEOUS at 21:27

## 2021-01-01 RX ADMIN — LISINOPRIL 20 MILLIGRAM(S): 2.5 TABLET ORAL at 09:33

## 2021-01-01 RX ADMIN — CHLORHEXIDINE GLUCONATE 1 APPLICATION(S): 213 SOLUTION TOPICAL at 12:34

## 2021-01-01 RX ADMIN — SENNA PLUS 2 TABLET(S): 8.6 TABLET ORAL at 21:35

## 2021-01-01 RX ADMIN — LEVETIRACETAM 400 MILLIGRAM(S): 250 TABLET, FILM COATED ORAL at 18:06

## 2021-01-01 RX ADMIN — Medication 650 MILLIGRAM(S): at 07:11

## 2021-01-01 RX ADMIN — HEPARIN SODIUM 5000 UNIT(S): 5000 INJECTION INTRAVENOUS; SUBCUTANEOUS at 12:23

## 2021-01-01 RX ADMIN — ATORVASTATIN CALCIUM 80 MILLIGRAM(S): 80 TABLET, FILM COATED ORAL at 21:06

## 2021-01-01 RX ADMIN — Medication 650 MILLIGRAM(S): at 22:49

## 2021-01-01 RX ADMIN — Medication 40 MILLIGRAM(S): at 09:58

## 2021-01-01 RX ADMIN — MAGNESIUM OXIDE 400 MG ORAL TABLET 400 MILLIGRAM(S): 241.3 TABLET ORAL at 14:30

## 2021-01-01 RX ADMIN — Medication 250 MILLIGRAM(S): at 22:06

## 2021-01-01 RX ADMIN — Medication 50 GRAM(S): at 00:51

## 2021-01-01 RX ADMIN — LEVETIRACETAM 400 MILLIGRAM(S): 250 TABLET, FILM COATED ORAL at 05:06

## 2021-01-01 RX ADMIN — ATORVASTATIN CALCIUM 80 MILLIGRAM(S): 80 TABLET, FILM COATED ORAL at 21:41

## 2021-01-01 RX ADMIN — Medication 1 APPLICATION(S): at 17:01

## 2021-01-01 RX ADMIN — NYSTATIN CREAM 1 APPLICATION(S): 100000 CREAM TOPICAL at 05:35

## 2021-01-01 RX ADMIN — HEPARIN SODIUM 5000 UNIT(S): 5000 INJECTION INTRAVENOUS; SUBCUTANEOUS at 22:32

## 2021-01-01 RX ADMIN — Medication 650 MILLIGRAM(S): at 05:25

## 2021-01-01 RX ADMIN — VORICONAZOLE 125 MILLIGRAM(S): 10 INJECTION, POWDER, LYOPHILIZED, FOR SOLUTION INTRAVENOUS at 05:06

## 2021-01-01 RX ADMIN — CHLORHEXIDINE GLUCONATE 1 APPLICATION(S): 213 SOLUTION TOPICAL at 11:08

## 2021-01-01 RX ADMIN — Medication 4 MG/HR: at 12:43

## 2021-01-01 RX ADMIN — Medication 650 MILLIGRAM(S): at 22:57

## 2021-01-01 RX ADMIN — INSULIN HUMAN 3: 100 INJECTION, SOLUTION SUBCUTANEOUS at 06:22

## 2021-01-01 RX ADMIN — ONDANSETRON 4 MILLIGRAM(S): 8 TABLET, FILM COATED ORAL at 13:44

## 2021-01-01 RX ADMIN — Medication 400 MILLIGRAM(S): at 06:14

## 2021-01-01 RX ADMIN — Medication 50 GRAM(S): at 03:49

## 2021-01-01 RX ADMIN — Medication 100 MILLIGRAM(S): at 05:14

## 2021-01-01 RX ADMIN — Medication 650 MILLIGRAM(S): at 22:01

## 2021-01-01 RX ADMIN — LEVETIRACETAM 400 MILLIGRAM(S): 250 TABLET, FILM COATED ORAL at 19:03

## 2021-01-01 RX ADMIN — DEXMEDETOMIDINE HYDROCHLORIDE IN 0.9% SODIUM CHLORIDE 0.18 MICROGRAM(S)/KG/HR: 4 INJECTION INTRAVENOUS at 00:30

## 2021-01-01 RX ADMIN — CHLORHEXIDINE GLUCONATE 15 MILLILITER(S): 213 SOLUTION TOPICAL at 18:46

## 2021-01-01 RX ADMIN — ATORVASTATIN CALCIUM 80 MILLIGRAM(S): 80 TABLET, FILM COATED ORAL at 21:35

## 2021-01-01 RX ADMIN — Medication 650 MILLIGRAM(S): at 23:54

## 2021-01-01 RX ADMIN — SENNA PLUS 2 TABLET(S): 8.6 TABLET ORAL at 21:00

## 2021-01-01 RX ADMIN — INSULIN HUMAN 5: 100 INJECTION, SOLUTION SUBCUTANEOUS at 05:31

## 2021-01-01 RX ADMIN — LISINOPRIL 20 MILLIGRAM(S): 2.5 TABLET ORAL at 06:52

## 2021-01-01 RX ADMIN — MEROPENEM 100 MILLIGRAM(S): 1 INJECTION INTRAVENOUS at 13:26

## 2021-01-01 RX ADMIN — Medication 650 MILLIGRAM(S): at 12:30

## 2021-01-01 RX ADMIN — Medication 4 MG/HR: at 09:27

## 2021-01-01 RX ADMIN — LEVETIRACETAM 400 MILLIGRAM(S): 250 TABLET, FILM COATED ORAL at 17:11

## 2021-01-01 RX ADMIN — VORICONAZOLE 125 MILLIGRAM(S): 10 INJECTION, POWDER, LYOPHILIZED, FOR SOLUTION INTRAVENOUS at 17:25

## 2021-01-01 RX ADMIN — Medication 650 MILLIGRAM(S): at 17:30

## 2021-01-01 RX ADMIN — LEVETIRACETAM 400 MILLIGRAM(S): 250 TABLET, FILM COATED ORAL at 05:03

## 2021-01-01 RX ADMIN — INSULIN HUMAN 3: 100 INJECTION, SOLUTION SUBCUTANEOUS at 14:29

## 2021-01-01 RX ADMIN — LISINOPRIL 20 MILLIGRAM(S): 2.5 TABLET ORAL at 10:45

## 2021-01-01 RX ADMIN — SENNA PLUS 2 TABLET(S): 8.6 TABLET ORAL at 21:19

## 2021-01-01 RX ADMIN — INSULIN HUMAN 3: 100 INJECTION, SOLUTION SUBCUTANEOUS at 17:35

## 2021-01-01 RX ADMIN — LEVETIRACETAM 400 MILLIGRAM(S): 250 TABLET, FILM COATED ORAL at 05:00

## 2021-01-01 RX ADMIN — Medication 300 MILLIGRAM(S): at 05:01

## 2021-01-01 RX ADMIN — Medication 25 GRAM(S): at 02:00

## 2021-01-01 RX ADMIN — METFORMIN HYDROCHLORIDE 500 MILLIGRAM(S): 850 TABLET ORAL at 17:13

## 2021-01-01 RX ADMIN — Medication 650 MILLIGRAM(S): at 11:04

## 2021-01-01 RX ADMIN — Medication 400 MILLIGRAM(S): at 21:06

## 2021-01-01 RX ADMIN — CHLORHEXIDINE GLUCONATE 15 MILLILITER(S): 213 SOLUTION TOPICAL at 05:07

## 2021-01-01 RX ADMIN — Medication 100 GRAM(S): at 03:00

## 2021-01-01 RX ADMIN — Medication 300 MILLIGRAM(S): at 13:00

## 2021-01-01 RX ADMIN — METFORMIN HYDROCHLORIDE 500 MILLIGRAM(S): 850 TABLET ORAL at 05:07

## 2021-01-01 RX ADMIN — Medication 1 APPLICATION(S): at 05:32

## 2021-01-01 RX ADMIN — Medication 25 GRAM(S): at 01:00

## 2021-01-01 RX ADMIN — HEPARIN SODIUM 5000 UNIT(S): 5000 INJECTION INTRAVENOUS; SUBCUTANEOUS at 21:01

## 2021-01-01 RX ADMIN — INSULIN HUMAN 5: 100 INJECTION, SOLUTION SUBCUTANEOUS at 12:20

## 2021-01-01 RX ADMIN — HEPARIN SODIUM 5000 UNIT(S): 5000 INJECTION INTRAVENOUS; SUBCUTANEOUS at 15:47

## 2021-01-01 RX ADMIN — Medication 400 MILLIGRAM(S): at 21:20

## 2021-01-01 RX ADMIN — CHLORHEXIDINE GLUCONATE 15 MILLILITER(S): 213 SOLUTION TOPICAL at 05:02

## 2021-01-01 RX ADMIN — Medication 81 MILLIGRAM(S): at 11:14

## 2021-01-01 RX ADMIN — Medication 100 MILLIEQUIVALENT(S): at 21:00

## 2021-01-01 RX ADMIN — Medication 650 MILLIGRAM(S): at 23:07

## 2021-01-01 RX ADMIN — Medication 250 MILLIGRAM(S): at 05:05

## 2021-01-01 RX ADMIN — MAGNESIUM OXIDE 400 MG ORAL TABLET 400 MILLIGRAM(S): 241.3 TABLET ORAL at 17:51

## 2021-01-01 RX ADMIN — VORICONAZOLE 125 MILLIGRAM(S): 10 INJECTION, POWDER, LYOPHILIZED, FOR SOLUTION INTRAVENOUS at 17:18

## 2021-01-01 RX ADMIN — LEVETIRACETAM 400 MILLIGRAM(S): 250 TABLET, FILM COATED ORAL at 05:33

## 2021-01-01 RX ADMIN — Medication 4 MG/HR: at 14:48

## 2021-01-01 RX ADMIN — MEROPENEM 100 MILLIGRAM(S): 1 INJECTION INTRAVENOUS at 05:03

## 2021-01-01 RX ADMIN — CHLORHEXIDINE GLUCONATE 15 MILLILITER(S): 213 SOLUTION TOPICAL at 06:52

## 2021-01-01 RX ADMIN — METFORMIN HYDROCHLORIDE 500 MILLIGRAM(S): 850 TABLET ORAL at 18:36

## 2021-01-01 RX ADMIN — PANTOPRAZOLE SODIUM 40 MILLIGRAM(S): 20 TABLET, DELAYED RELEASE ORAL at 12:03

## 2021-01-01 RX ADMIN — HEPARIN SODIUM 5000 UNIT(S): 5000 INJECTION INTRAVENOUS; SUBCUTANEOUS at 05:33

## 2021-01-01 RX ADMIN — HEPARIN SODIUM 5000 UNIT(S): 5000 INJECTION INTRAVENOUS; SUBCUTANEOUS at 05:26

## 2021-01-01 RX ADMIN — LEVETIRACETAM 750 MILLIGRAM(S): 250 TABLET, FILM COATED ORAL at 17:17

## 2021-01-01 RX ADMIN — HEPARIN SODIUM 5000 UNIT(S): 5000 INJECTION INTRAVENOUS; SUBCUTANEOUS at 04:57

## 2021-01-01 RX ADMIN — Medication 650 MILLIGRAM(S): at 17:28

## 2021-01-01 RX ADMIN — Medication 250 MILLIGRAM(S): at 08:00

## 2021-01-01 RX ADMIN — ATORVASTATIN CALCIUM 80 MILLIGRAM(S): 80 TABLET, FILM COATED ORAL at 21:00

## 2021-01-01 RX ADMIN — CHLORHEXIDINE GLUCONATE 15 MILLILITER(S): 213 SOLUTION TOPICAL at 17:10

## 2021-01-01 RX ADMIN — Medication 650 MILLIGRAM(S): at 13:19

## 2021-01-01 RX ADMIN — Medication 100 MILLIGRAM(S): at 13:20

## 2021-01-01 RX ADMIN — DEXMEDETOMIDINE HYDROCHLORIDE IN 0.9% SODIUM CHLORIDE 1.83 MICROGRAM(S)/KG/HR: 4 INJECTION INTRAVENOUS at 14:16

## 2021-01-01 RX ADMIN — Medication 400 MILLIGRAM(S): at 05:26

## 2021-01-01 RX ADMIN — LISINOPRIL 20 MILLIGRAM(S): 2.5 TABLET ORAL at 05:32

## 2021-01-01 RX ADMIN — LEVETIRACETAM 400 MILLIGRAM(S): 250 TABLET, FILM COATED ORAL at 05:16

## 2021-01-01 RX ADMIN — MAGNESIUM OXIDE 400 MG ORAL TABLET 400 MILLIGRAM(S): 241.3 TABLET ORAL at 14:41

## 2021-01-01 RX ADMIN — INSULIN HUMAN 5: 100 INJECTION, SOLUTION SUBCUTANEOUS at 17:23

## 2021-01-01 RX ADMIN — Medication 100 MILLIGRAM(S): at 05:09

## 2021-01-01 RX ADMIN — INSULIN HUMAN 5: 100 INJECTION, SOLUTION SUBCUTANEOUS at 17:47

## 2021-01-01 RX ADMIN — LISINOPRIL 20 MILLIGRAM(S): 2.5 TABLET ORAL at 10:38

## 2021-01-01 RX ADMIN — SODIUM CHLORIDE 6000 MILLILITER(S): 9 INJECTION INTRAMUSCULAR; INTRAVENOUS; SUBCUTANEOUS at 17:31

## 2021-01-01 RX ADMIN — SODIUM CHLORIDE 500 MILLILITER(S): 9 INJECTION, SOLUTION INTRAVENOUS at 00:55

## 2021-01-01 RX ADMIN — PANTOPRAZOLE SODIUM 40 MILLIGRAM(S): 20 TABLET, DELAYED RELEASE ORAL at 11:41

## 2021-01-01 RX ADMIN — SODIUM CHLORIDE 100 MILLILITER(S): 9 INJECTION INTRAMUSCULAR; INTRAVENOUS; SUBCUTANEOUS at 22:01

## 2021-01-01 RX ADMIN — Medication 100 GRAM(S): at 11:22

## 2021-01-01 RX ADMIN — LEVETIRACETAM 400 MILLIGRAM(S): 250 TABLET, FILM COATED ORAL at 05:08

## 2021-01-01 RX ADMIN — INSULIN HUMAN 7 UNIT(S): 100 INJECTION, SOLUTION SUBCUTANEOUS at 02:00

## 2021-01-01 RX ADMIN — LEVETIRACETAM 400 MILLIGRAM(S): 250 TABLET, FILM COATED ORAL at 04:56

## 2021-01-01 RX ADMIN — ENOXAPARIN SODIUM 75 MILLIGRAM(S): 100 INJECTION SUBCUTANEOUS at 06:53

## 2021-01-01 RX ADMIN — HEPARIN SODIUM 5000 UNIT(S): 5000 INJECTION INTRAVENOUS; SUBCUTANEOUS at 22:03

## 2021-01-01 RX ADMIN — HEPARIN SODIUM 5000 UNIT(S): 5000 INJECTION INTRAVENOUS; SUBCUTANEOUS at 21:28

## 2021-01-01 RX ADMIN — INSULIN HUMAN 5: 100 INJECTION, SOLUTION SUBCUTANEOUS at 21:40

## 2021-01-01 RX ADMIN — HEPARIN SODIUM 5000 UNIT(S): 5000 INJECTION INTRAVENOUS; SUBCUTANEOUS at 13:22

## 2021-01-01 RX ADMIN — CHLORHEXIDINE GLUCONATE 15 MILLILITER(S): 213 SOLUTION TOPICAL at 16:53

## 2021-01-01 RX ADMIN — Medication 400 MILLIGRAM(S): at 13:00

## 2021-01-01 RX ADMIN — Medication 650 MILLIGRAM(S): at 12:00

## 2021-01-01 RX ADMIN — CHLORHEXIDINE GLUCONATE 15 MILLILITER(S): 213 SOLUTION TOPICAL at 17:36

## 2021-01-01 RX ADMIN — LEVETIRACETAM 400 MILLIGRAM(S): 250 TABLET, FILM COATED ORAL at 19:16

## 2021-01-01 RX ADMIN — VORICONAZOLE 125 MILLIGRAM(S): 10 INJECTION, POWDER, LYOPHILIZED, FOR SOLUTION INTRAVENOUS at 18:25

## 2021-01-01 RX ADMIN — VORICONAZOLE 125 MILLIGRAM(S): 10 INJECTION, POWDER, LYOPHILIZED, FOR SOLUTION INTRAVENOUS at 18:36

## 2021-01-01 RX ADMIN — Medication 650 MILLIGRAM(S): at 23:40

## 2021-01-01 RX ADMIN — Medication 81 MILLIGRAM(S): at 11:42

## 2021-01-01 RX ADMIN — Medication 1 APPLICATION(S): at 17:51

## 2021-01-01 RX ADMIN — FENTANYL CITRATE 25 MICROGRAM(S): 50 INJECTION INTRAVENOUS at 00:10

## 2021-01-01 RX ADMIN — CHLORHEXIDINE GLUCONATE 1 APPLICATION(S): 213 SOLUTION TOPICAL at 12:55

## 2021-01-01 RX ADMIN — PANTOPRAZOLE SODIUM 40 MILLIGRAM(S): 20 TABLET, DELAYED RELEASE ORAL at 13:09

## 2021-01-01 RX ADMIN — Medication 650 MILLIGRAM(S): at 23:16

## 2021-01-01 RX ADMIN — ENOXAPARIN SODIUM 75 MILLIGRAM(S): 100 INJECTION SUBCUTANEOUS at 18:41

## 2021-01-01 RX ADMIN — LISINOPRIL 20 MILLIGRAM(S): 2.5 TABLET ORAL at 04:56

## 2021-01-01 RX ADMIN — INSULIN HUMAN 5: 100 INJECTION, SOLUTION SUBCUTANEOUS at 06:27

## 2021-01-01 RX ADMIN — Medication 650 MILLIGRAM(S): at 10:05

## 2021-01-01 RX ADMIN — CHLORHEXIDINE GLUCONATE 15 MILLILITER(S): 213 SOLUTION TOPICAL at 06:15

## 2021-01-01 RX ADMIN — INSULIN HUMAN 5: 100 INJECTION, SOLUTION SUBCUTANEOUS at 14:13

## 2021-01-01 RX ADMIN — METFORMIN HYDROCHLORIDE 500 MILLIGRAM(S): 850 TABLET ORAL at 17:27

## 2021-01-01 RX ADMIN — Medication 250 MILLIGRAM(S): at 21:02

## 2021-01-01 RX ADMIN — HEPARIN SODIUM 5000 UNIT(S): 5000 INJECTION INTRAVENOUS; SUBCUTANEOUS at 15:49

## 2021-01-01 RX ADMIN — PANTOPRAZOLE SODIUM 40 MILLIGRAM(S): 20 TABLET, DELAYED RELEASE ORAL at 12:33

## 2021-01-01 RX ADMIN — HEPARIN SODIUM 5000 UNIT(S): 5000 INJECTION INTRAVENOUS; SUBCUTANEOUS at 14:29

## 2021-01-01 RX ADMIN — Medication 650 MILLIGRAM(S): at 17:35

## 2021-01-01 RX ADMIN — Medication 81 MILLIGRAM(S): at 11:40

## 2021-01-01 RX ADMIN — HEPARIN SODIUM 5000 UNIT(S): 5000 INJECTION INTRAVENOUS; SUBCUTANEOUS at 05:16

## 2021-01-01 RX ADMIN — Medication 100 MILLIGRAM(S): at 11:14

## 2021-01-01 RX ADMIN — VORICONAZOLE 200 MILLIGRAM(S): 10 INJECTION, POWDER, LYOPHILIZED, FOR SOLUTION INTRAVENOUS at 17:22

## 2021-01-01 RX ADMIN — LISINOPRIL 20 MILLIGRAM(S): 2.5 TABLET ORAL at 05:09

## 2021-01-01 RX ADMIN — Medication 300 MILLIGRAM(S): at 05:05

## 2021-01-01 RX ADMIN — Medication 81 MILLIGRAM(S): at 11:27

## 2021-01-01 RX ADMIN — ENOXAPARIN SODIUM 75 MILLIGRAM(S): 100 INJECTION SUBCUTANEOUS at 17:51

## 2021-01-01 RX ADMIN — Medication 400 MILLIGRAM(S): at 14:41

## 2021-01-01 RX ADMIN — VORICONAZOLE 125 MILLIGRAM(S): 10 INJECTION, POWDER, LYOPHILIZED, FOR SOLUTION INTRAVENOUS at 05:08

## 2021-01-01 RX ADMIN — MEROPENEM 100 MILLIGRAM(S): 1 INJECTION INTRAVENOUS at 05:06

## 2021-01-01 RX ADMIN — SODIUM CHLORIDE 50 MILLILITER(S): 5 INJECTION, SOLUTION INTRAVENOUS at 12:46

## 2021-01-01 RX ADMIN — Medication 250 MILLIGRAM(S): at 05:13

## 2021-01-01 RX ADMIN — MEROPENEM 100 MILLIGRAM(S): 1 INJECTION INTRAVENOUS at 15:08

## 2021-01-01 RX ADMIN — LISINOPRIL 20 MILLIGRAM(S): 2.5 TABLET ORAL at 12:19

## 2021-01-01 RX ADMIN — INSULIN HUMAN 7: 100 INJECTION, SOLUTION SUBCUTANEOUS at 23:18

## 2021-01-01 RX ADMIN — Medication 81 MILLIGRAM(S): at 12:53

## 2021-01-01 RX ADMIN — CHLORHEXIDINE GLUCONATE 1 APPLICATION(S): 213 SOLUTION TOPICAL at 11:05

## 2021-01-01 RX ADMIN — LISINOPRIL 10 MILLIGRAM(S): 2.5 TABLET ORAL at 22:01

## 2021-01-01 RX ADMIN — Medication 100 MILLIEQUIVALENT(S): at 17:29

## 2021-01-01 RX ADMIN — INSULIN HUMAN 5: 100 INJECTION, SOLUTION SUBCUTANEOUS at 23:50

## 2021-01-01 RX ADMIN — INSULIN HUMAN 5: 100 INJECTION, SOLUTION SUBCUTANEOUS at 17:19

## 2021-01-01 RX ADMIN — Medication 400 MILLIGRAM(S): at 15:47

## 2021-01-01 RX ADMIN — INSULIN HUMAN 3: 100 INJECTION, SOLUTION SUBCUTANEOUS at 12:56

## 2021-01-01 RX ADMIN — Medication 81 MILLIGRAM(S): at 11:04

## 2021-01-01 RX ADMIN — Medication 100 MILLIGRAM(S): at 13:22

## 2021-01-01 RX ADMIN — Medication 81 MILLIGRAM(S): at 11:43

## 2021-01-01 RX ADMIN — Medication 250 MILLIGRAM(S): at 17:22

## 2021-01-01 RX ADMIN — MAGNESIUM OXIDE 400 MG ORAL TABLET 400 MILLIGRAM(S): 241.3 TABLET ORAL at 14:12

## 2021-01-01 RX ADMIN — METFORMIN HYDROCHLORIDE 500 MILLIGRAM(S): 850 TABLET ORAL at 17:01

## 2021-01-01 RX ADMIN — Medication 650 MILLIGRAM(S): at 04:57

## 2021-01-01 RX ADMIN — Medication 250 MILLIGRAM(S): at 04:55

## 2021-01-01 RX ADMIN — Medication 250 MILLIGRAM(S): at 17:28

## 2021-01-01 RX ADMIN — Medication 50 GRAM(S): at 14:23

## 2021-01-01 RX ADMIN — Medication 50 GRAM(S): at 02:21

## 2021-01-01 RX ADMIN — HEPARIN SODIUM 5000 UNIT(S): 5000 INJECTION INTRAVENOUS; SUBCUTANEOUS at 15:09

## 2021-01-01 RX ADMIN — Medication 650 MILLIGRAM(S): at 12:22

## 2021-01-01 RX ADMIN — LEVETIRACETAM 400 MILLIGRAM(S): 250 TABLET, FILM COATED ORAL at 05:13

## 2021-01-01 RX ADMIN — Medication 400 MILLIGRAM(S): at 05:16

## 2021-01-01 RX ADMIN — Medication 100 MILLIEQUIVALENT(S): at 01:00

## 2021-01-01 RX ADMIN — CHLORHEXIDINE GLUCONATE 1 APPLICATION(S): 213 SOLUTION TOPICAL at 11:42

## 2021-01-01 RX ADMIN — METFORMIN HYDROCHLORIDE 500 MILLIGRAM(S): 850 TABLET ORAL at 05:03

## 2021-01-01 RX ADMIN — LEVETIRACETAM 400 MILLIGRAM(S): 250 TABLET, FILM COATED ORAL at 06:10

## 2021-01-01 RX ADMIN — HEPARIN SODIUM 5000 UNIT(S): 5000 INJECTION INTRAVENOUS; SUBCUTANEOUS at 21:24

## 2021-01-01 RX ADMIN — HEPARIN SODIUM 5000 UNIT(S): 5000 INJECTION INTRAVENOUS; SUBCUTANEOUS at 13:28

## 2021-01-01 RX ADMIN — Medication 400 MILLIGRAM(S): at 21:17

## 2021-01-01 RX ADMIN — INSULIN HUMAN 3: 100 INJECTION, SOLUTION SUBCUTANEOUS at 09:53

## 2021-01-01 RX ADMIN — NYSTATIN CREAM 1 APPLICATION(S): 100000 CREAM TOPICAL at 17:19

## 2021-01-01 RX ADMIN — MEROPENEM 100 MILLIGRAM(S): 1 INJECTION INTRAVENOUS at 00:07

## 2021-01-01 RX ADMIN — ATORVASTATIN CALCIUM 80 MILLIGRAM(S): 80 TABLET, FILM COATED ORAL at 21:26

## 2021-01-01 RX ADMIN — CHLORHEXIDINE GLUCONATE 1 APPLICATION(S): 213 SOLUTION TOPICAL at 13:20

## 2021-01-01 RX ADMIN — PANTOPRAZOLE SODIUM 40 MILLIGRAM(S): 20 TABLET, DELAYED RELEASE ORAL at 13:18

## 2021-01-01 RX ADMIN — HEPARIN SODIUM 5000 UNIT(S): 5000 INJECTION INTRAVENOUS; SUBCUTANEOUS at 06:15

## 2021-01-01 RX ADMIN — Medication 100 MILLIEQUIVALENT(S): at 15:17

## 2021-01-01 RX ADMIN — LEVETIRACETAM 750 MILLIGRAM(S): 250 TABLET, FILM COATED ORAL at 05:06

## 2021-01-01 RX ADMIN — Medication 400 MILLIGRAM(S): at 05:05

## 2021-01-01 RX ADMIN — Medication 81 MILLIGRAM(S): at 12:03

## 2021-01-01 RX ADMIN — ENOXAPARIN SODIUM 75 MILLIGRAM(S): 100 INJECTION SUBCUTANEOUS at 18:59

## 2021-01-01 RX ADMIN — Medication 400 MILLIGRAM(S): at 05:27

## 2021-01-01 RX ADMIN — CHLORHEXIDINE GLUCONATE 1 APPLICATION(S): 213 SOLUTION TOPICAL at 11:24

## 2021-01-01 RX ADMIN — INSULIN HUMAN 5: 100 INJECTION, SOLUTION SUBCUTANEOUS at 18:31

## 2021-01-01 RX ADMIN — VORICONAZOLE 125 MILLIGRAM(S): 10 INJECTION, POWDER, LYOPHILIZED, FOR SOLUTION INTRAVENOUS at 17:59

## 2021-01-01 RX ADMIN — Medication 300 MILLIGRAM(S): at 05:06

## 2021-01-01 RX ADMIN — PANTOPRAZOLE SODIUM 40 MILLIGRAM(S): 20 TABLET, DELAYED RELEASE ORAL at 13:17

## 2021-01-01 RX ADMIN — LEVETIRACETAM 400 MILLIGRAM(S): 250 TABLET, FILM COATED ORAL at 05:09

## 2021-01-01 RX ADMIN — CHLORHEXIDINE GLUCONATE 1 APPLICATION(S): 213 SOLUTION TOPICAL at 12:19

## 2021-01-01 RX ADMIN — Medication 400 MILLIGRAM(S): at 13:54

## 2021-01-01 RX ADMIN — Medication 250 MILLIGRAM(S): at 06:07

## 2021-01-01 RX ADMIN — Medication 100 MILLIEQUIVALENT(S): at 19:50

## 2021-01-01 RX ADMIN — Medication 62.5 MILLIMOLE(S): at 04:56

## 2021-01-01 RX ADMIN — VORICONAZOLE 125 MILLIGRAM(S): 10 INJECTION, POWDER, LYOPHILIZED, FOR SOLUTION INTRAVENOUS at 17:37

## 2021-01-01 RX ADMIN — SENNA PLUS 2 TABLET(S): 8.6 TABLET ORAL at 22:01

## 2021-01-01 RX ADMIN — INSULIN HUMAN 0: 100 INJECTION, SOLUTION SUBCUTANEOUS at 14:32

## 2021-01-01 RX ADMIN — SODIUM CHLORIDE 100 MILLILITER(S): 9 INJECTION INTRAMUSCULAR; INTRAVENOUS; SUBCUTANEOUS at 06:02

## 2021-01-01 RX ADMIN — Medication 62.5 MILLIMOLE(S): at 03:01

## 2021-01-01 RX ADMIN — METFORMIN HYDROCHLORIDE 500 MILLIGRAM(S): 850 TABLET ORAL at 18:06

## 2021-01-01 RX ADMIN — ENOXAPARIN SODIUM 75 MILLIGRAM(S): 100 INJECTION SUBCUTANEOUS at 17:18

## 2021-01-01 RX ADMIN — NYSTATIN CREAM 1 APPLICATION(S): 100000 CREAM TOPICAL at 17:05

## 2021-01-01 RX ADMIN — PANTOPRAZOLE SODIUM 40 MILLIGRAM(S): 20 TABLET, DELAYED RELEASE ORAL at 13:44

## 2021-01-01 RX ADMIN — LISINOPRIL 20 MILLIGRAM(S): 2.5 TABLET ORAL at 05:40

## 2021-01-01 RX ADMIN — Medication 650 MILLIGRAM(S): at 00:44

## 2021-01-01 RX ADMIN — NYSTATIN CREAM 1 APPLICATION(S): 100000 CREAM TOPICAL at 18:59

## 2021-01-01 RX ADMIN — SODIUM CHLORIDE 30 MILLILITER(S): 5 INJECTION, SOLUTION INTRAVENOUS at 22:44

## 2021-01-01 RX ADMIN — CISATRACURIUM BESYLATE 20 MILLIGRAM(S): 2 INJECTION INTRAVENOUS at 13:53

## 2021-01-01 RX ADMIN — FENTANYL CITRATE 2.61 MICROGRAM(S)/KG/HR: 50 INJECTION INTRAVENOUS at 14:30

## 2021-01-01 RX ADMIN — Medication 10 MILLIGRAM(S): at 23:06

## 2021-01-01 RX ADMIN — DEXMEDETOMIDINE HYDROCHLORIDE IN 0.9% SODIUM CHLORIDE 1.83 MICROGRAM(S)/KG/HR: 4 INJECTION INTRAVENOUS at 19:50

## 2021-01-01 RX ADMIN — Medication 250 MILLIGRAM(S): at 05:08

## 2021-01-01 RX ADMIN — LISINOPRIL 40 MILLIGRAM(S): 2.5 TABLET ORAL at 05:25

## 2021-01-01 RX ADMIN — Medication 50 GRAM(S): at 17:17

## 2021-01-01 RX ADMIN — INSULIN HUMAN 5: 100 INJECTION, SOLUTION SUBCUTANEOUS at 06:14

## 2021-01-01 RX ADMIN — Medication 650 MILLIGRAM(S): at 23:43

## 2021-01-01 RX ADMIN — Medication 330 MILLIGRAM(S): at 13:46

## 2021-01-01 RX ADMIN — Medication 250 MILLIGRAM(S): at 18:47

## 2021-01-01 RX ADMIN — METFORMIN HYDROCHLORIDE 500 MILLIGRAM(S): 850 TABLET ORAL at 05:32

## 2021-01-01 RX ADMIN — ENOXAPARIN SODIUM 75 MILLIGRAM(S): 100 INJECTION SUBCUTANEOUS at 17:01

## 2021-01-01 RX ADMIN — DEXMEDETOMIDINE HYDROCHLORIDE IN 0.9% SODIUM CHLORIDE 1.83 MICROGRAM(S)/KG/HR: 4 INJECTION INTRAVENOUS at 00:45

## 2021-01-01 RX ADMIN — Medication 100 MILLIGRAM(S): at 08:52

## 2021-01-01 RX ADMIN — HEPARIN SODIUM 5000 UNIT(S): 5000 INJECTION INTRAVENOUS; SUBCUTANEOUS at 14:41

## 2021-01-01 RX ADMIN — Medication 650 MILLIGRAM(S): at 06:00

## 2021-01-01 RX ADMIN — Medication 25 GRAM(S): at 00:55

## 2021-01-01 RX ADMIN — Medication 400 MILLIGRAM(S): at 14:40

## 2021-01-01 RX ADMIN — PANTOPRAZOLE SODIUM 40 MILLIGRAM(S): 20 TABLET, DELAYED RELEASE ORAL at 11:58

## 2021-01-01 RX ADMIN — MEROPENEM 100 MILLIGRAM(S): 1 INJECTION INTRAVENOUS at 22:54

## 2021-01-01 RX ADMIN — NYSTATIN CREAM 1 APPLICATION(S): 100000 CREAM TOPICAL at 05:33

## 2021-01-01 RX ADMIN — INSULIN HUMAN 3: 100 INJECTION, SOLUTION SUBCUTANEOUS at 19:05

## 2021-01-01 RX ADMIN — CHLORHEXIDINE GLUCONATE 15 MILLILITER(S): 213 SOLUTION TOPICAL at 17:52

## 2021-01-01 RX ADMIN — INSULIN HUMAN 5: 100 INJECTION, SOLUTION SUBCUTANEOUS at 21:59

## 2021-01-01 RX ADMIN — Medication 300 MILLIGRAM(S): at 22:56

## 2021-01-01 RX ADMIN — HEPARIN SODIUM 5000 UNIT(S): 5000 INJECTION INTRAVENOUS; SUBCUTANEOUS at 21:41

## 2021-01-01 RX ADMIN — INSULIN HUMAN 7: 100 INJECTION, SOLUTION SUBCUTANEOUS at 05:05

## 2021-01-01 RX ADMIN — Medication 300 MILLIGRAM(S): at 21:40

## 2021-01-01 RX ADMIN — VORICONAZOLE 125 MILLIGRAM(S): 10 INJECTION, POWDER, LYOPHILIZED, FOR SOLUTION INTRAVENOUS at 05:03

## 2021-01-01 RX ADMIN — Medication 650 MILLIGRAM(S): at 12:31

## 2021-01-01 RX ADMIN — METFORMIN HYDROCHLORIDE 500 MILLIGRAM(S): 850 TABLET ORAL at 17:28

## 2021-01-01 RX ADMIN — Medication 10 MILLIGRAM(S): at 11:05

## 2021-01-01 RX ADMIN — Medication 50 MILLIEQUIVALENT(S): at 22:59

## 2021-01-01 RX ADMIN — Medication 650 MILLIGRAM(S): at 00:02

## 2021-01-01 RX ADMIN — HEPARIN SODIUM 5000 UNIT(S): 5000 INJECTION INTRAVENOUS; SUBCUTANEOUS at 05:06

## 2021-01-01 RX ADMIN — Medication 100 MILLIGRAM(S): at 10:25

## 2021-01-01 RX ADMIN — Medication 400 MILLIGRAM(S): at 21:26

## 2021-01-01 RX ADMIN — VORICONAZOLE 125 MILLIGRAM(S): 10 INJECTION, POWDER, LYOPHILIZED, FOR SOLUTION INTRAVENOUS at 17:35

## 2021-01-01 RX ADMIN — Medication 400 MILLIGRAM(S): at 13:24

## 2021-01-01 RX ADMIN — Medication 50 GRAM(S): at 02:14

## 2021-01-01 RX ADMIN — Medication 650 MILLIGRAM(S): at 18:01

## 2021-01-01 RX ADMIN — Medication 650 MILLIGRAM(S): at 11:14

## 2021-01-01 RX ADMIN — INSULIN HUMAN 5: 100 INJECTION, SOLUTION SUBCUTANEOUS at 23:53

## 2021-01-01 RX ADMIN — Medication 1 APPLICATION(S): at 06:52

## 2021-01-01 RX ADMIN — HEPARIN SODIUM 5000 UNIT(S): 5000 INJECTION INTRAVENOUS; SUBCUTANEOUS at 13:25

## 2021-01-01 RX ADMIN — Medication 650 MILLIGRAM(S): at 05:04

## 2021-01-01 RX ADMIN — WARFARIN SODIUM 3 MILLIGRAM(S): 2.5 TABLET ORAL at 22:01

## 2021-01-01 RX ADMIN — Medication 1 APPLICATION(S): at 06:10

## 2021-01-01 RX ADMIN — INSULIN HUMAN 6: 100 INJECTION, SOLUTION SUBCUTANEOUS at 02:42

## 2021-01-01 RX ADMIN — LEVETIRACETAM 400 MILLIGRAM(S): 250 TABLET, FILM COATED ORAL at 17:12

## 2021-01-01 RX ADMIN — HEPARIN SODIUM 5000 UNIT(S): 5000 INJECTION INTRAVENOUS; SUBCUTANEOUS at 13:40

## 2021-01-01 RX ADMIN — CHLORHEXIDINE GLUCONATE 1 APPLICATION(S): 213 SOLUTION TOPICAL at 11:43

## 2021-01-01 RX ADMIN — Medication 5 MILLIGRAM(S): at 15:10

## 2021-01-01 RX ADMIN — METFORMIN HYDROCHLORIDE 500 MILLIGRAM(S): 850 TABLET ORAL at 17:10

## 2021-01-01 RX ADMIN — PANTOPRAZOLE SODIUM 40 MILLIGRAM(S): 20 TABLET, DELAYED RELEASE ORAL at 11:43

## 2021-01-01 RX ADMIN — MEROPENEM 100 MILLIGRAM(S): 1 INJECTION INTRAVENOUS at 14:16

## 2021-01-01 RX ADMIN — NYSTATIN CREAM 1 APPLICATION(S): 100000 CREAM TOPICAL at 17:20

## 2021-01-01 RX ADMIN — SENNA PLUS 2 TABLET(S): 8.6 TABLET ORAL at 21:21

## 2021-01-01 RX ADMIN — Medication 650 MILLIGRAM(S): at 22:40

## 2021-01-01 RX ADMIN — VORICONAZOLE 200 MILLIGRAM(S): 10 INJECTION, POWDER, LYOPHILIZED, FOR SOLUTION INTRAVENOUS at 06:12

## 2021-01-01 RX ADMIN — HEPARIN SODIUM 5000 UNIT(S): 5000 INJECTION INTRAVENOUS; SUBCUTANEOUS at 21:18

## 2021-01-01 RX ADMIN — Medication 100 MILLIGRAM(S): at 18:40

## 2021-01-01 RX ADMIN — CHLORHEXIDINE GLUCONATE 1 APPLICATION(S): 213 SOLUTION TOPICAL at 12:43

## 2021-01-01 RX ADMIN — Medication 50 GRAM(S): at 02:53

## 2021-01-01 RX ADMIN — INSULIN HUMAN 3: 100 INJECTION, SOLUTION SUBCUTANEOUS at 23:47

## 2021-01-01 RX ADMIN — Medication 650 MILLIGRAM(S): at 00:15

## 2021-01-01 RX ADMIN — Medication 300 MILLIGRAM(S): at 21:51

## 2021-01-01 RX ADMIN — PANTOPRAZOLE SODIUM 40 MILLIGRAM(S): 20 TABLET, DELAYED RELEASE ORAL at 12:02

## 2021-01-01 RX ADMIN — Medication 250 MILLIGRAM(S): at 16:53

## 2021-01-01 RX ADMIN — LISINOPRIL 20 MILLIGRAM(S): 2.5 TABLET ORAL at 05:02

## 2021-01-01 RX ADMIN — HEPARIN SODIUM 5000 UNIT(S): 5000 INJECTION INTRAVENOUS; SUBCUTANEOUS at 21:06

## 2021-01-01 RX ADMIN — CHLORHEXIDINE GLUCONATE 15 MILLILITER(S): 213 SOLUTION TOPICAL at 05:13

## 2021-01-01 RX ADMIN — Medication 650 MILLIGRAM(S): at 23:34

## 2021-01-01 RX ADMIN — LEVETIRACETAM 400 MILLIGRAM(S): 250 TABLET, FILM COATED ORAL at 05:32

## 2021-01-01 RX ADMIN — SENNA PLUS 2 TABLET(S): 8.6 TABLET ORAL at 21:20

## 2021-01-01 RX ADMIN — Medication 400 MILLIGRAM(S): at 14:16

## 2021-01-01 RX ADMIN — Medication 650 MILLIGRAM(S): at 11:57

## 2021-01-01 RX ADMIN — Medication 62.5 MILLIMOLE(S): at 05:13

## 2021-01-01 RX ADMIN — Medication 1 APPLICATION(S): at 05:34

## 2021-01-01 RX ADMIN — Medication 650 MILLIGRAM(S): at 23:29

## 2021-01-01 RX ADMIN — SENNA PLUS 2 TABLET(S): 8.6 TABLET ORAL at 21:28

## 2021-01-01 RX ADMIN — CHLORHEXIDINE GLUCONATE 15 MILLILITER(S): 213 SOLUTION TOPICAL at 06:12

## 2021-01-01 RX ADMIN — HEPARIN SODIUM 5000 UNIT(S): 5000 INJECTION INTRAVENOUS; SUBCUTANEOUS at 14:55

## 2021-01-01 RX ADMIN — INSULIN HUMAN 5: 100 INJECTION, SOLUTION SUBCUTANEOUS at 05:32

## 2021-01-01 RX ADMIN — Medication 100 MILLIGRAM(S): at 05:03

## 2021-01-01 RX ADMIN — Medication 81 MILLIGRAM(S): at 12:23

## 2021-01-01 RX ADMIN — CHLORHEXIDINE GLUCONATE 15 MILLILITER(S): 213 SOLUTION TOPICAL at 05:26

## 2021-01-01 RX ADMIN — Medication 650 MILLIGRAM(S): at 12:23

## 2021-01-01 RX ADMIN — INSULIN HUMAN 7: 100 INJECTION, SOLUTION SUBCUTANEOUS at 02:20

## 2021-01-01 RX ADMIN — Medication 100 MILLIEQUIVALENT(S): at 15:30

## 2021-01-01 RX ADMIN — CHLORHEXIDINE GLUCONATE 1 APPLICATION(S): 213 SOLUTION TOPICAL at 10:02

## 2021-01-01 RX ADMIN — MAGNESIUM OXIDE 400 MG ORAL TABLET 400 MILLIGRAM(S): 241.3 TABLET ORAL at 05:27

## 2021-01-01 RX ADMIN — LEVETIRACETAM 400 MILLIGRAM(S): 250 TABLET, FILM COATED ORAL at 17:01

## 2021-01-01 RX ADMIN — Medication 81 MILLIGRAM(S): at 12:33

## 2021-01-01 RX ADMIN — Medication 81 MILLIGRAM(S): at 12:02

## 2021-01-01 RX ADMIN — PANTOPRAZOLE SODIUM 40 MILLIGRAM(S): 20 TABLET, DELAYED RELEASE ORAL at 11:42

## 2021-01-01 RX ADMIN — Medication 650 MILLIGRAM(S): at 17:18

## 2021-01-01 RX ADMIN — LEVETIRACETAM 400 MILLIGRAM(S): 250 TABLET, FILM COATED ORAL at 05:05

## 2021-01-01 RX ADMIN — VORICONAZOLE 200 MILLIGRAM(S): 10 INJECTION, POWDER, LYOPHILIZED, FOR SOLUTION INTRAVENOUS at 19:03

## 2021-01-01 RX ADMIN — LISINOPRIL 20 MILLIGRAM(S): 2.5 TABLET ORAL at 10:37

## 2021-01-01 RX ADMIN — HEPARIN SODIUM 5000 UNIT(S): 5000 INJECTION INTRAVENOUS; SUBCUTANEOUS at 05:04

## 2021-01-01 RX ADMIN — Medication 650 MILLIGRAM(S): at 05:10

## 2021-01-01 RX ADMIN — LEVETIRACETAM 400 MILLIGRAM(S): 250 TABLET, FILM COATED ORAL at 17:28

## 2021-01-01 RX ADMIN — Medication 650 MILLIGRAM(S): at 06:50

## 2021-01-01 RX ADMIN — SODIUM CHLORIDE 75 MILLILITER(S): 9 INJECTION, SOLUTION INTRAVENOUS at 05:18

## 2021-01-01 RX ADMIN — Medication 100 MILLIGRAM(S): at 22:06

## 2021-01-01 RX ADMIN — INSULIN HUMAN 3: 100 INJECTION, SOLUTION SUBCUTANEOUS at 12:41

## 2021-01-01 RX ADMIN — Medication 300 MILLIGRAM(S): at 22:25

## 2021-01-01 RX ADMIN — Medication 25 GRAM(S): at 05:13

## 2021-01-01 RX ADMIN — CHLORHEXIDINE GLUCONATE 15 MILLILITER(S): 213 SOLUTION TOPICAL at 17:28

## 2021-01-01 RX ADMIN — LISINOPRIL 20 MILLIGRAM(S): 2.5 TABLET ORAL at 05:06

## 2021-01-01 RX ADMIN — ATORVASTATIN CALCIUM 80 MILLIGRAM(S): 80 TABLET, FILM COATED ORAL at 21:10

## 2021-01-01 RX ADMIN — HEPARIN SODIUM 5000 UNIT(S): 5000 INJECTION INTRAVENOUS; SUBCUTANEOUS at 13:01

## 2021-01-01 RX ADMIN — Medication 250 MILLIGRAM(S): at 18:41

## 2021-01-01 RX ADMIN — Medication 650 MILLIGRAM(S): at 12:53

## 2021-01-01 RX ADMIN — SENNA PLUS 2 TABLET(S): 8.6 TABLET ORAL at 21:26

## 2021-01-01 RX ADMIN — Medication 1.25 MILLIGRAM(S): at 16:53

## 2021-01-01 RX ADMIN — DEXMEDETOMIDINE HYDROCHLORIDE IN 0.9% SODIUM CHLORIDE 13 MICROGRAM(S)/KG/HR: 4 INJECTION INTRAVENOUS at 15:43

## 2021-01-01 RX ADMIN — Medication 100 MILLIGRAM(S): at 21:00

## 2021-01-01 RX ADMIN — INSULIN HUMAN 3: 100 INJECTION, SOLUTION SUBCUTANEOUS at 04:55

## 2021-01-01 RX ADMIN — Medication 650 MILLIGRAM(S): at 17:11

## 2021-01-01 RX ADMIN — Medication 650 MILLIGRAM(S): at 11:41

## 2021-01-01 RX ADMIN — Medication 250 MILLIGRAM(S): at 21:25

## 2021-01-01 RX ADMIN — ETOMIDATE 20 MILLIGRAM(S): 2 INJECTION INTRAVENOUS at 10:35

## 2021-01-01 RX ADMIN — LEVETIRACETAM 400 MILLIGRAM(S): 250 TABLET, FILM COATED ORAL at 17:29

## 2021-01-01 RX ADMIN — Medication 400 MILLIGRAM(S): at 05:32

## 2021-01-01 RX ADMIN — SENNA PLUS 2 TABLET(S): 8.6 TABLET ORAL at 21:41

## 2021-01-01 RX ADMIN — MEROPENEM 100 MILLIGRAM(S): 1 INJECTION INTRAVENOUS at 05:08

## 2021-01-01 RX ADMIN — Medication 650 MILLIGRAM(S): at 16:53

## 2021-01-01 RX ADMIN — Medication 200 MILLIGRAM(S): at 21:24

## 2021-01-01 RX ADMIN — ATORVASTATIN CALCIUM 80 MILLIGRAM(S): 80 TABLET, FILM COATED ORAL at 21:21

## 2021-01-01 RX ADMIN — MEROPENEM 100 MILLIGRAM(S): 1 INJECTION INTRAVENOUS at 07:49

## 2021-01-01 RX ADMIN — Medication 650 MILLIGRAM(S): at 08:52

## 2021-01-01 RX ADMIN — LISINOPRIL 20 MILLIGRAM(S): 2.5 TABLET ORAL at 06:11

## 2021-01-01 RX ADMIN — Medication 100 MILLIGRAM(S): at 10:35

## 2021-01-01 RX ADMIN — Medication 100 MILLIGRAM(S): at 21:26

## 2021-01-01 RX ADMIN — Medication 250 MILLIGRAM(S): at 05:03

## 2021-01-01 RX ADMIN — METFORMIN HYDROCHLORIDE 500 MILLIGRAM(S): 850 TABLET ORAL at 07:12

## 2021-01-01 RX ADMIN — Medication 650 MILLIGRAM(S): at 12:55

## 2021-01-01 RX ADMIN — INSULIN HUMAN 3: 100 INJECTION, SOLUTION SUBCUTANEOUS at 02:42

## 2021-01-01 RX ADMIN — METFORMIN HYDROCHLORIDE 500 MILLIGRAM(S): 850 TABLET ORAL at 06:14

## 2021-01-01 RX ADMIN — CHLORHEXIDINE GLUCONATE 15 MILLILITER(S): 213 SOLUTION TOPICAL at 05:24

## 2021-01-01 RX ADMIN — Medication 300 MILLIGRAM(S): at 21:00

## 2021-01-01 RX ADMIN — Medication 650 MILLIGRAM(S): at 18:32

## 2021-01-01 RX ADMIN — CHLORHEXIDINE GLUCONATE 15 MILLILITER(S): 213 SOLUTION TOPICAL at 05:32

## 2021-01-01 RX ADMIN — Medication 650 MILLIGRAM(S): at 05:01

## 2021-01-01 RX ADMIN — Medication 300 MILLIGRAM(S): at 05:25

## 2021-01-01 RX ADMIN — Medication 250 MILLIGRAM(S): at 05:04

## 2021-01-01 RX ADMIN — HEPARIN SODIUM 5000 UNIT(S): 5000 INJECTION INTRAVENOUS; SUBCUTANEOUS at 21:19

## 2021-01-01 RX ADMIN — INSULIN HUMAN 7: 100 INJECTION, SOLUTION SUBCUTANEOUS at 02:00

## 2021-01-01 RX ADMIN — METFORMIN HYDROCHLORIDE 500 MILLIGRAM(S): 850 TABLET ORAL at 05:16

## 2021-01-01 RX ADMIN — AMPHOTERICIN B 100 MILLIGRAM(S): 50 INJECTION, POWDER, LYOPHILIZED, FOR SOLUTION INTRAVENOUS at 23:01

## 2021-01-01 RX ADMIN — VORICONAZOLE 125 MILLIGRAM(S): 10 INJECTION, POWDER, LYOPHILIZED, FOR SOLUTION INTRAVENOUS at 05:14

## 2021-01-01 RX ADMIN — VORICONAZOLE 125 MILLIGRAM(S): 10 INJECTION, POWDER, LYOPHILIZED, FOR SOLUTION INTRAVENOUS at 06:18

## 2021-01-01 RX ADMIN — Medication 650 MILLIGRAM(S): at 23:35

## 2021-01-01 RX ADMIN — CHLORHEXIDINE GLUCONATE 1 APPLICATION(S): 213 SOLUTION TOPICAL at 12:05

## 2021-01-01 RX ADMIN — Medication 650 MILLIGRAM(S): at 22:59

## 2021-01-01 RX ADMIN — VORICONAZOLE 125 MILLIGRAM(S): 10 INJECTION, POWDER, LYOPHILIZED, FOR SOLUTION INTRAVENOUS at 15:55

## 2021-01-01 RX ADMIN — CHLORHEXIDINE GLUCONATE 1 APPLICATION(S): 213 SOLUTION TOPICAL at 12:30

## 2021-01-01 RX ADMIN — NYSTATIN CREAM 1 APPLICATION(S): 100000 CREAM TOPICAL at 05:29

## 2021-01-01 RX ADMIN — SODIUM CHLORIDE 1000 MILLILITER(S): 9 INJECTION INTRAMUSCULAR; INTRAVENOUS; SUBCUTANEOUS at 23:40

## 2021-01-01 RX ADMIN — LEVETIRACETAM 400 MILLIGRAM(S): 250 TABLET, FILM COATED ORAL at 06:17

## 2021-01-01 RX ADMIN — CHLORHEXIDINE GLUCONATE 15 MILLILITER(S): 213 SOLUTION TOPICAL at 05:53

## 2021-01-01 RX ADMIN — INSULIN HUMAN 5: 100 INJECTION, SOLUTION SUBCUTANEOUS at 17:11

## 2021-01-01 RX ADMIN — Medication 100 MILLIGRAM(S): at 21:24

## 2021-01-01 RX ADMIN — Medication 4 MG/HR: at 02:00

## 2021-01-01 RX ADMIN — HEPARIN SODIUM 5000 UNIT(S): 5000 INJECTION INTRAVENOUS; SUBCUTANEOUS at 21:51

## 2021-01-01 RX ADMIN — VORICONAZOLE 125 MILLIGRAM(S): 10 INJECTION, POWDER, LYOPHILIZED, FOR SOLUTION INTRAVENOUS at 15:21

## 2021-01-01 RX ADMIN — Medication 400 MILLIGRAM(S): at 13:16

## 2021-01-01 RX ADMIN — MEROPENEM 100 MILLIGRAM(S): 1 INJECTION INTRAVENOUS at 13:00

## 2021-01-01 RX ADMIN — Medication 300 MILLIGRAM(S): at 13:45

## 2021-01-01 RX ADMIN — Medication 650 MILLIGRAM(S): at 13:01

## 2021-01-01 RX ADMIN — ENOXAPARIN SODIUM 75 MILLIGRAM(S): 100 INJECTION SUBCUTANEOUS at 06:12

## 2021-01-01 RX ADMIN — VORICONAZOLE 125 MILLIGRAM(S): 10 INJECTION, POWDER, LYOPHILIZED, FOR SOLUTION INTRAVENOUS at 05:16

## 2021-01-01 RX ADMIN — Medication 250 MILLIGRAM(S): at 07:00

## 2021-01-01 RX ADMIN — CHLORHEXIDINE GLUCONATE 15 MILLILITER(S): 213 SOLUTION TOPICAL at 05:41

## 2021-01-01 RX ADMIN — ATORVASTATIN CALCIUM 80 MILLIGRAM(S): 80 TABLET, FILM COATED ORAL at 21:49

## 2021-01-01 RX ADMIN — MIDAZOLAM HYDROCHLORIDE 2 MILLIGRAM(S): 1 INJECTION, SOLUTION INTRAMUSCULAR; INTRAVENOUS at 13:54

## 2021-01-01 RX ADMIN — MEROPENEM 100 MILLIGRAM(S): 1 INJECTION INTRAVENOUS at 15:17

## 2021-01-01 RX ADMIN — Medication 100 MILLIEQUIVALENT(S): at 02:00

## 2021-01-01 RX ADMIN — PANTOPRAZOLE SODIUM 40 MILLIGRAM(S): 20 TABLET, DELAYED RELEASE ORAL at 11:07

## 2021-01-01 RX ADMIN — INSULIN HUMAN 3: 100 INJECTION, SOLUTION SUBCUTANEOUS at 11:30

## 2021-01-01 RX ADMIN — Medication 650 MILLIGRAM(S): at 05:02

## 2021-01-01 RX ADMIN — METFORMIN HYDROCHLORIDE 500 MILLIGRAM(S): 850 TABLET ORAL at 05:40

## 2021-01-01 RX ADMIN — CHLORHEXIDINE GLUCONATE 15 MILLILITER(S): 213 SOLUTION TOPICAL at 05:06

## 2021-01-01 RX ADMIN — SENNA PLUS 2 TABLET(S): 8.6 TABLET ORAL at 21:51

## 2021-01-01 RX ADMIN — Medication 100 GRAM(S): at 03:01

## 2021-01-01 RX ADMIN — INSULIN HUMAN 5: 100 INJECTION, SOLUTION SUBCUTANEOUS at 17:18

## 2021-01-01 RX ADMIN — Medication 400 MILLIGRAM(S): at 16:51

## 2021-01-01 RX ADMIN — Medication 400 MILLIGRAM(S): at 13:21

## 2021-01-01 RX ADMIN — Medication 650 MILLIGRAM(S): at 13:09

## 2021-01-01 RX ADMIN — MEROPENEM 100 MILLIGRAM(S): 1 INJECTION INTRAVENOUS at 13:18

## 2021-01-01 RX ADMIN — LEVETIRACETAM 750 MILLIGRAM(S): 250 TABLET, FILM COATED ORAL at 17:51

## 2021-01-01 RX ADMIN — Medication 50 MILLIEQUIVALENT(S): at 15:52

## 2021-01-01 RX ADMIN — PANTOPRAZOLE SODIUM 40 MILLIGRAM(S): 20 TABLET, DELAYED RELEASE ORAL at 11:05

## 2021-01-01 RX ADMIN — Medication 400 MILLIGRAM(S): at 21:29

## 2021-01-01 RX ADMIN — Medication 300 MILLIGRAM(S): at 14:29

## 2021-01-01 RX ADMIN — LEVETIRACETAM 400 MILLIGRAM(S): 250 TABLET, FILM COATED ORAL at 17:37

## 2021-01-01 RX ADMIN — DEXMEDETOMIDINE HYDROCHLORIDE IN 0.9% SODIUM CHLORIDE 0.18 MICROGRAM(S)/KG/HR: 4 INJECTION INTRAVENOUS at 13:00

## 2021-01-01 RX ADMIN — HEPARIN SODIUM 5000 UNIT(S): 5000 INJECTION INTRAVENOUS; SUBCUTANEOUS at 05:25

## 2021-01-01 RX ADMIN — LEVETIRACETAM 400 MILLIGRAM(S): 250 TABLET, FILM COATED ORAL at 17:05

## 2021-01-01 RX ADMIN — CHLORHEXIDINE GLUCONATE 1 APPLICATION(S): 213 SOLUTION TOPICAL at 11:37

## 2021-01-01 RX ADMIN — Medication 250 MILLIGRAM(S): at 06:01

## 2021-01-01 RX ADMIN — VORICONAZOLE 200 MILLIGRAM(S): 10 INJECTION, POWDER, LYOPHILIZED, FOR SOLUTION INTRAVENOUS at 17:53

## 2021-01-01 RX ADMIN — Medication 300 MILLIGRAM(S): at 13:24

## 2021-01-01 RX ADMIN — FENTANYL CITRATE 25 MICROGRAM(S): 50 INJECTION INTRAVENOUS at 22:26

## 2021-01-01 RX ADMIN — LISINOPRIL 20 MILLIGRAM(S): 2.5 TABLET ORAL at 05:26

## 2021-01-01 RX ADMIN — Medication 5 MILLIGRAM(S): at 00:31

## 2021-01-01 RX ADMIN — Medication 0.69 MICROGRAM(S)/KG/MIN: at 23:46

## 2021-01-01 RX ADMIN — Medication 400 MILLIGRAM(S): at 14:55

## 2021-01-01 RX ADMIN — LEVETIRACETAM 400 MILLIGRAM(S): 250 TABLET, FILM COATED ORAL at 05:40

## 2021-01-01 RX ADMIN — FENTANYL CITRATE 25 MICROGRAM(S): 50 INJECTION INTRAVENOUS at 15:41

## 2021-01-01 RX ADMIN — HEPARIN SODIUM 5000 UNIT(S): 5000 INJECTION INTRAVENOUS; SUBCUTANEOUS at 22:06

## 2021-01-01 RX ADMIN — CHLORHEXIDINE GLUCONATE 15 MILLILITER(S): 213 SOLUTION TOPICAL at 05:01

## 2021-01-01 RX ADMIN — Medication 250 MILLIGRAM(S): at 06:20

## 2021-01-01 RX ADMIN — INSULIN HUMAN 9: 100 INJECTION, SOLUTION SUBCUTANEOUS at 07:11

## 2021-01-01 RX ADMIN — INSULIN HUMAN 5: 100 INJECTION, SOLUTION SUBCUTANEOUS at 06:52

## 2021-01-01 RX ADMIN — Medication 100 MILLIGRAM(S): at 04:58

## 2021-01-01 RX ADMIN — HEPARIN SODIUM 5000 UNIT(S): 5000 INJECTION INTRAVENOUS; SUBCUTANEOUS at 05:01

## 2021-01-01 RX ADMIN — FENTANYL CITRATE 25 MICROGRAM(S): 50 INJECTION INTRAVENOUS at 13:53

## 2021-01-01 RX ADMIN — Medication 100 GRAM(S): at 03:20

## 2021-01-01 RX ADMIN — SODIUM CHLORIDE 3000 MILLILITER(S): 9 INJECTION INTRAMUSCULAR; INTRAVENOUS; SUBCUTANEOUS at 23:00

## 2021-01-01 RX ADMIN — Medication 650 MILLIGRAM(S): at 12:27

## 2021-01-01 RX ADMIN — POTASSIUM PHOSPHATE, MONOBASIC POTASSIUM PHOSPHATE, DIBASIC 62.5 MILLIMOLE(S): 236; 224 INJECTION, SOLUTION INTRAVENOUS at 04:13

## 2021-01-01 RX ADMIN — SODIUM CHLORIDE 3000 MILLILITER(S): 9 INJECTION INTRAMUSCULAR; INTRAVENOUS; SUBCUTANEOUS at 17:35

## 2021-01-01 RX ADMIN — Medication 650 MILLIGRAM(S): at 11:43

## 2021-01-01 RX ADMIN — ENOXAPARIN SODIUM 75 MILLIGRAM(S): 100 INJECTION SUBCUTANEOUS at 05:34

## 2021-01-01 RX ADMIN — ATORVASTATIN CALCIUM 80 MILLIGRAM(S): 80 TABLET, FILM COATED ORAL at 21:27

## 2021-01-01 RX ADMIN — Medication 10 MILLIGRAM(S): at 16:03

## 2021-01-01 RX ADMIN — INSULIN HUMAN 3: 100 INJECTION, SOLUTION SUBCUTANEOUS at 05:14

## 2021-01-01 RX ADMIN — SODIUM CHLORIDE 75 MILLILITER(S): 9 INJECTION, SOLUTION INTRAVENOUS at 12:40

## 2021-01-01 RX ADMIN — VORICONAZOLE 125 MILLIGRAM(S): 10 INJECTION, POWDER, LYOPHILIZED, FOR SOLUTION INTRAVENOUS at 17:10

## 2021-01-27 PROBLEM — C85.10 B-CELL LYMPHOMA: Status: ACTIVE | Noted: 2020-01-01

## 2021-01-28 PROBLEM — Z51.11 ENCOUNTER FOR ANTINEOPLASTIC CHEMOTHERAPY: Status: ACTIVE | Noted: 2020-01-01

## 2021-01-28 PROBLEM — R16.1 SPLENOMEGALY: Status: ACTIVE | Noted: 2020-01-01

## 2021-01-28 PROBLEM — D69.6 THROMBOCYTOPENIA: Status: ACTIVE | Noted: 2020-01-01

## 2021-01-28 PROBLEM — C85.80 MARGINAL ZONE LYMPHOMA: Status: ACTIVE | Noted: 2020-01-01

## 2021-01-28 PROBLEM — D64.9 ANEMIA: Status: ACTIVE | Noted: 2020-01-01

## 2021-01-28 PROBLEM — Z71.6 ENCOUNTER FOR SMOKING CESSATION COUNSELING: Status: ACTIVE | Noted: 2020-01-01

## 2021-01-28 NOTE — ASSESSMENT
[FreeTextEntry1] : Hypoproliferative  anemia and massive splenomegaly, flow consistent with B-cell lymphoproliferative disorder CD5 and CD10 negative, consistent with Marginal Zone Lymphoma of the Spleen, involving BM\par --BMBx on 5/20/2020 revealed Marginal Zone Lymphoma \par --S/p 3 units of PRBC inhouse at diagnosis, requiring frequent PRBC transfusions\par --Flow cytometry revealed Monotypic B-cells (18% of cells), positive for kappa, CD19, CD20, FMC-7; negative CD5, CD10, CD23.\par --No B symptoms, no evidence of overt bleeding\par --No over evidence of hemolysis, Keith negative \par --PET CT on 5/8/2020 reveals splenic uptake as well as splenomegaly \par --Unable to tolerate Rituxan\par --Developed thrombocytopenia and rash with 3 and 2 tabs of Ibrutinib, will plan to rechallenge, on 7/15/2020 still unable to restart on Ibrutinib 2/2 thrombocytopenia, which is improving \par --Continue with weekly CBC and type and cross with PRN transfusions \par --Keep Hb over 8.0, platelet count above 50K, patient is on antiplatelet therapy for h/o cardiac stent .\par --As of 8/12/20 patient received Ibrutinib one tab every other day. \par --CBC was reviewed on 9/29 and 9/30, recommend platelet transfusion today \par --On 11/11/2020 Kathrine is taking 1 pill a day of ibrutinib \par --On 1/28/2021 Kathrine is tolerating 2 tabs of ibrutinib \par --Continue with folic acid PO\par --Acyclovir 400 mg PO daily ppx\par --Fluconazole 200mg BID ppx, was not tolerated, it is off \par --Started on Levaquin 500mg daily for bacterial ppx on 11/11/2020, continue with it\par --Suggest stopping Celexa to avoid bleeding complications, consider switching to Remeron in a couple of weeks \par --US sleen ordered on 1/28/2021, may pascale to repeat PET CT is weight loss is persistent\par \par UTI\par --Multiple episodes of UTI, requiring ICU stays with septic shock\par --Will start on Levaquin ppx on 11/11/2020, risks were discussed \par \par LOWER EXTREMITY SWELLING\par --No evidence of DVT\par --Swelling much improved on 1/28/2021\par \par Smoking cessation was advised \par \par Follow up in 2 weeks or sooner if needed\par \par \par

## 2021-01-28 NOTE — HISTORY OF PRESENT ILLNESS
[de-identified] : Kathrine is a savanna 74 year old female who presented to ER 04/28/2020 with weakness and lethargy. She went to see her cardiologist and she was referred to ER from there. \par She was found to have low HB of 5.0 on admission and she was transfused 3 units of PRBC. Also noted that her Absolute lymphocyte count was >5000. She has the following work up in hospital:\par CBC\par 11.61  High   \par  RBC Count 1.77        \par  Hemoglobin 5.0   \par  Hematocrit 14.0  Low %  37.0 - 47.0     \par  MCV 79.1  Low fL  81.0 - 99.0 Final      \par  MCH 28.2    pg  27.0 - 31.0 Final      \par  MCHC 35.7    g/dL  32.0 - 37.0 Final      \par  RCDW 26.5  High %  11.5 - 14.5 Final      \par  Platelet Count - Automated 255    K/uL  130 - 400 Final      \par  MPV 11.3  High fL  7.4 - 10.4 Final      \par  Auto Neutrophil % 33.6   \par  Auto Lymphocyte % 65.5  High %  20.5 - 51.1 Final      \par  Auto Monocyte % 0.9  Low %  1.7 - 9.3 Final      \par  Auto Eosinophil % 0.0    %  0.0 - 8.0 Final      \par  Auto Basophil % 0.0    %  0.0 - 1.0 Final      \par  Auto Neutrophil # 3.90    K/uL  1.40 - 6.50 Final      \par  Auto Lymphocyte # 7.60  High K/uL  1.20 - 3.40 Final      \par  Auto Monocyte # 0.10    \par  Auto Eosinophil # 0.00       \par  Auto Basophil # 0.00 \par M spike- unable to quantify. \par Weak IgG Kappa Band Identified. Immunoglobulins were normal.\par Reticulocyte -0.6\par Folate - 2.0\par Iron studies were done post transfusion. Ferritin -646.\par Keith- negative\par Flow cytometry -DIAGNOSIS:\par Peripheral blood:\par      - Monotypic B-cells (18% of cells), positive for kappa, CD19, CD20, FMC-7; negative CD5, CD10, CD23.\par      - The myeloid immunophenotypic findings show no increase in myeloid immaturity.\par \par Imaging: \par CT chest on 4/30/2020 revealed \par 1. No evidence for intrathoracic lymphadenopathy.\par 2. No suspicious mass or nodule.\par \par CT A/P on 4/30/2020 revealed \par Marked splenomegaly; differential considerations include neoplastic and myeloproliferative processes, amongst other etiologies.\par 2.  No enlarged abdominal or pelvic lymph nodes.\par 3.  Enlarged fibroid uterus.\par \par She was discharged few days later, as she preferred completing the work up as out patient.  She is an active smoker for more than 40 years. She never had colonoscopy. Mammogram was many years ago. \par  [de-identified] : 5/6/2020: She feels very tired and is wobbly. She is on wheelchair now, she lives with her  and her daughter. She denies B symptoms. Melena, bleeding per rectum. \par Images were personally reviewed by MD Debby and discussed with patient and family.\par \par 7/1/2020: Kathrine is here for follow up. Since last visit she has required almost weekly transfusions of PRBC, Hb was going down to 5-6 range in between the transfusions. 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 BCL2 was negative. These findings were previously discussed with patient's daughter Kathrine.\par Patient was also seen in second opinion at [facility] in [place], clinical trial was offered, patient chose not to participate. \par On 6/10/2020 we have attempted to initiate Rituximab, Kathrine has developed a SEVERE allergic reaction to Rituxan, requiring overnight ER stay. \par Given the severity of reaction the plan is NOT to attempt rechallenging with Rituximab. \par She started on Ibrutinib 3 tabs daily on 6/17/2020, by 6/22/2020 she has started developing mild thrombocytopenia, but anemia improved, we asked her to decrease the pill to 2 a day, by 6/27/2020 she has developed a macular papular rash under her L breast and in her L groin, she was asked to hold the pill, the rash today looks fainter, stopped spreading and overall is improved. She will continue to hold Ibrutinib till next Monday on 7/6/2020 and then if rash has resolved will restart at 1 pill daily. \par US of the spleen to obtain new baseline on 6/27/2020 revealed Splenomegaly correlating with CT exam. Enlarged homogeneous \par spleen 22.5 cm x 9.3 cm in width with no focal mass or perisplenic fluid \par collection. Vascular flow, unremarkable.\par \par 7/15/2020: Kathrine was unable to restart on Ibrutibib yet, she sustained a fall at home and was brought to ED on 7/5/2020, she was noted to be febrile in ED, urine was positive and she was started on broad spectrum antibiotics, she was also noted to have worsening thrombocytopenia of admission, she required blood and platelet transfusions inhouse, platelets are improving since discharge, she is currently not on antibiotics. She reports she has not smoked for close to 3 weeks. She is in a wheelchair today and with home O2. She reports feeling well. \par \par 7/28/2020:  Pt came in today accompanied by daughter c/o diffuse, erythematous rash to left groin since last week.  She changed the brand of her diapers from "Depends" to "Always".  She was using Nystatin powder to her rash underneath left breast which was not helping.  Daughter is concerned because her platelets are still low (plt=65,000).\par Hb has been holding up, will defer restarting on Imbruvica for now. \par \par 8/4/2020; As per her daughter Kathrine has been feeling weaker than usual, complaining of urinary symptoms. We will check UA today and start on empiric antibiotics. CBC from today reveals Hb remains stable, transfusion is not necessary, platelets remain low at 57K. We will continue to hold Imbruvica at this point. We will also offer gentle hydration today. \par 8/18/20:\par Kathrine is 74 years old female came for a follow up visit for  B-cell lymphoproliferative disorder. Patient completed a full course of Antibiotics Bactrim.\par  Patient still have high frequency for urination.  She denies Fever, dysuria , or Hematuria. \par Patient ' daughter started Imbruvica   one tab every other day.  Patient tolerated well. \par We communicated CBC from 8/19/20 with HGB / HCT is 9.2/26.5 with platelet count is 102, 000. NO skin rash.\par \par \par 9/30/2020: Patient here for follow up visit for B cell lymphoproliferative disorder.  She is up to 2 tabs of Ibrutinib per day for about 1 week now, she was not instructed to escalate the dose per our instructions.  She is complaining of urinary frequency for the last several days.  Her daughter states there is a bad odor. She also has had left ankle swelling and bruising since Saturday.  They have noticed more and more bruising especially on her arms. Patient denies cough, shortness of breath, fever, chills, night sweats or bone pain.\par \par 10/28/2020: Kathrine is here for follow up visit for B cell lymphoproliferative disorder.  She  just had recent hospitalization from 10/9-10/21/2020, requiring ICU admission for septic shock,  UTI and Hemolytic Anemia, likely in the setting of Bacrim, dir and indir Keith was negative. EGD on 10/16/2020 no active bleeding. \par Today patient still feeling weak, tired.  She reports urinary frequency for the last 2 days. She denies fever, SOB. No bleeding or bruising. \par We communicated Blood work from today with HGB is 11.0/37.2., Platelet is 50,000. No need for blood transfusion or Platelet transfusion.    \par We will keep Ibrutinib on hold for now. \par We will send for Urine analysis and Culture.\par We reviewed Urine culture from the hospital. \par \par 11/11/2020: Kathrine is here for follow up, since last visit she has been taking Levaquin and managed to stay UTI-free, we have at length discussed the risks and benefits on continuing on Levaquin ppx, including MDR infections, daughter Kathrine wishes to continue with long term ppx, she understands that MDR infections are a substation threat that may lead to future hospitalizations and even death. \par Kathrine continues on Imbruvica 1 pill a day with significant increment to Hb, no evidence of hemolysis, platelets remain stable. \par \par 1/27/2021: Kathrine presents for follow up, denies any fevers, night sweats, new pain, rashes, pulmonary or GI symptoms.\par She was been tolerating 280mg of Ibrutinib with no problem, CBC has been stable/improved. she also continues on ppx Acyclovir and Levaquin, no recent admissions with UTI. \par She has switched primary care providers and now is seeing Dr. Perea. She was started on celexa 10mg 2 weeks ago and daughter reports much better mood since. Unfortunately Celexa has an interaction with Ibrutinib, the combination may precipitate bleeding complications. suggest stopping Celexa and in a couple of weeks switching to Remeron, starting at low dose. Daughter will stop Celexa today. \par  Kathrine reposts some weight loss which may be related to depression, Remeron will help with this as well. \par We will repeat US of the spleen.

## 2021-01-28 NOTE — PHYSICAL EXAM
[Capable of only limited self care, confined to bed or chair more than 50% of waking hours] : Status 3- Capable of only limited self care, confined to bed or chair more than 50% of waking hours [Normal] : grossly intact [de-identified] : trace LE edema, much improved from prior  [de-identified] : enlarged spleen, difficult to examine, patient sitting in wheelchair

## 2021-01-28 NOTE — REVIEW OF SYSTEMS
[Fatigue] : fatigue [Dysuria] : dysuria [Joint Pain] : joint pain [Negative] : Heme/Lymph [Recent Change In Weight] : ~T recent weight change [Fever] : no fever [Chills] : no chills [Night Sweats] : no night sweats [Constipation] : no constipation [de-identified] : generalized bruising much improved

## 2021-01-28 NOTE — CONSULT LETTER
[Dear  ___] : Dear  [unfilled], [Consult Letter:] : I had the pleasure of evaluating your patient, [unfilled]. [( Thank you for referring [unfilled] for consultation for _____ )] : Thank you for referring [unfilled] for consultation for [unfilled] [Please see my note below.] : Please see my note below. [Consult Closing:] : Thank you very much for allowing me to participate in the care of this patient.  If you have any questions, please do not hesitate to contact me. [Sincerely,] : Sincerely, [[email or web address]] : Beatriz Guardado MD

## 2021-02-24 NOTE — CONSULT NOTE ADULT - SUBJECTIVE AND OBJECTIVE BOX
SICU Consultation Note  ======================================================================================================  AD BASURTO  MRN-[id number]      75y Female    HPI:  75 years old female from home with PMHx of B cell lymphoma on Imbruvica (since 10/2020), CAD s/p stents 15 years ago, DM, chronic UTI, HTN, DLD, brought in by ambulance due to altered mental status and hypertension. Patient was AAO x4 and fully functional at baseline. Last known normal was 11:00pm  before beds. On  at 8:45AM, patient was found to be lethargy in bed by his son, was AAO x 4 and able to talk to his son, but started coughing and had one episode of nose bleed. At 9:15, her physical therapist noticed her becoming more lethargic, but vitals were normal. Soon at 9:30, patient was unresponsive and had a blank stare. EMS was called and her BP was found to be around 230/120. She was rushed to ED as Code Stroke. NIHSS 23. CTH showed Large left frontal intraparenchymal hematoma with intraventricular extension into left lateral ventricle, associated with marked mass effect resulting in 0.9 cm midline shift to the right anteriorly. Patient was intubated for airway protection. S/p 2 units of platelet for ASA reversal. Neurosurgery was planning for emergent OR resection of hematoma. OR course uncomplicated, hematoma evacuated and EVD placed, s/p 2U platelets given in OR. Pt currently intubated and sedated. SICU admission for hemodynamic and neurovascular monitoring.      Consults-  Consult Note Adult-Critical Care Fellow/Attending [SAQIB Nugent] (21)  Consult Note Adult-Neurology Physician Assistant/Attending [SAQIB Lopez] (21)  Consult Note Adult-Neurosurgery Physician Assistant/Attendin [BOB Browning] (21)  Consult Note Adult-Heme/Onc Resident/Attending [CHAZ Nunez] (10-13-20)  Consult Note Adult-Infectious Disease Attending [WILDER Wang] (10-13-20)  Consult Note Adult-Gastroenterology Fellow/Attending [PANDA Cunningham] (10-13-20)  Consult Note Adult-Critical Care Attending [JEANNE Brand] (10-12-20)  Consult Note Adult-Physiatry Attending [ISACC Weber] (20)  Consult Note Adult-Infectious Disease Attending [YANG Lerner] (20)  Consult Note Adult-Heme/Onc Resident/Attending [CHAZ Nunez] (20)  Consult Note Adult-Critical Care Attending [JEANNE Karimi] (20)  Consult Note Adult-Physiatry Attending [DAYAMI Morrison] (20)  Consult Note Adult-Infectious Disease Attending [WILDER Wang] (20)  Consult Note Adult-Heme/Onc Resident/Attending [CHAZ Singh] (20)  Consult Note Adult-Heme/Onc Resident [DAMIR Nunez] (20)  Consult Note Adult-Physiatry Attending [PANDA Tran] (20)  Consult Note Adult-Heme/Onc Fellow/Attending [JIM Nunez] (20)  Consult Note Adult-Vascular Surgery Resident/Attending [LEX Shaver] (20)      Procedure:  OR Stats  OR Time:               EBL:    500cc      IV Fluids:       Blood Products: 2U Platelets                UOP:      Findings-    PMH  PAST MEDICAL & SURGICAL HISTORY:  Anemia    DM (diabetes mellitus)    High cholesterol    HTN (hypertension)    B-cell chronic lymphocytic leukemia variant    CAD (coronary artery disease)  s/p stenting    H/O heart artery stent        Home Meds:  Home Medications:  aspirin 81 mg oral tablet, chewable: 1 tab(s) orally once a day (2021 15:11)  atorvastatin 80 mg oral tablet: 1 tab(s) orally once a day (at bedtime) (2021 15:11)  Imbruvica 70 mg oral capsule: 2 cap(s) orally once a day (2021 15:11)  Levaquin 500 mg oral tablet: 1 tab(s) orally every 24 hours (2021 15:11)  lisinopril 10 mg oral tablet: 1 tab(s) orally once a day (2021 15:11)         Allergies  Allergies    penicillin (Anaphylaxis; Hives)    Intolerances         Current Medications:  chlorhexidine 0.12% Liquid 15 milliLiter(s) Oral Mucosa every 12 hours  dexMEDEtomidine Infusion 0.2 MICROgram(s)/kG/Hr (5.22 mL/Hr) IV Continuous <Continuous>  propofol Infusion 20 MICROgram(s)/kG/Min (12.5 mL/Hr) IV Continuous <Continuous>  sodium chloride 0.9%. 1000 milliLiter(s) (100 mL/Hr) IV Continuous <Continuous>      Advanced Directives: Presumed Full Code    VITAL SIGNS, INS/OUTS (Last 24hours):    ICU Vital Signs Last 24 Hrs  T(C): 37.3 (2021 17:20), Max: 37.7 (2021 10:26)  T(F): 99 (2021 17:00), Max: 99.8 (2021 10:26)  HR: 108 (2021 17:20) (106 - 155)  BP: 130/45 (2021 17:20) (130/45 - 254/132)  BP(mean): --  ABP: 102/52 (2021 17:00) (98/36 - 185/62)  ABP(mean): 84 (2021 16:00) (66 - 84)  RR: 19 (2021 17:20) (18 - 30)  SpO2: 100% (2021 17:20) (60% - 100%)    I&O's Summary    2021 07:01  -  2021 21:47  --------------------------------------------------------  IN: 108.8 mL / OUT: 1100 mL / NET: -991.2 mL        Height (cm): 160 (21)  Weight (kg): 104.3 (21)  BMI (kg/m2): 40.7 (21)  BSA (m2): 2.05 (21)    Physical Exam:   ---------------------------------------------------------------------------------------  Neuro: Intubated and sedated, EVD in place    RESPIRATORY:  Normal expansion/effort  Mechanical Ventilation: Mode: AC/ CMV (Assist Control/ Continuous Mandatory Ventilation)  RR (machine): 20  TV (machine): 400  FiO2: 100  PEEP: 5  ITime: 1.26  MAP: 10  PIP: 27      CARDIOVASCULAR:   S1/S2.  RRR    GASTROINTESTINAL:  Abdomen soft, non-tender, non-distended    MUSCULOSKELETAL:  Extremities warm, pink, well-perfused.    DERM:  No skin breakdown     :   Exam: Murry catheter in place.       Tubes/Lines/Drains   ----------------------------------------------------------------------------------------------------------  [x] Peripheral IV  [X] Central Venous Line          IJ/Femoral             Date Placed:    [X] Arterial Line		      Radial/Femoral    Date Placed:   [X] Urinary Catheter Murry                                             Date Placed:       LABS  --------------------------------------------------------------------------------------  LFTs  LIVER FUNCTIONS - ( 2021 10:33 )  Alb: 4.4 g/dL / Pro: 7.0 g/dL / ALK PHOS: 165 U/L / ALT: 43 U/L / AST: 58 U/L / GGT: x             Cardiac Markers  CARDIAC MARKERS ( 2021 10:33 )  x     / <0.01 ng/mL / x     / x     / x            Coagulation  PT/INR - ( 2021 10:33 )   PT: 14.30 sec;   INR: 1.24 ratio         PTT - ( 2021 10:33 )  PTT:45.2 sec    Arterial Blood Gas  ABG - ( 2021 12:25 )  pH, Arterial: 7.17  pH, Blood: x     /  pCO2: x     /  pO2: x     / HCO3: x     / Base Excess: x     /  SaO2: x                   Blood Sugar  CAPILLARY BLOOD GLUCOSE          Urinalysis  Urinalysis Basic - ( 2021 14:54 )    Color: Yellow / Appearance: Clear / S.025 / pH: x  Gluc: x / Ketone: Negative  / Bili: Negative / Urobili: <2 mg/dL   Blood: x / Protein: 30 mg/dL / Nitrite: Negative   Leuk Esterase: Small / RBC: 1 /HPF / WBC 9 /HPF   Sq Epi: x / Non Sq Epi: 1 /HPF / Bacteria: Negative            CT/XRAY/ECHO/TCD/EEG  ----------------------------------------------------------------------------------------------      Transthoracic Echocardiogram:  (20)        --------------------------------------------------------------------------------------       SICU Consultation Note  ======================================================================================================  AD BASURTO  MRN-[id number]      75y Female    HPI:  75 years old female from home with PMHx of B cell lymphoma on Imbruvica (since 10/2020), CAD s/p stents 15 years ago, DM, chronic UTI, HTN, DLD, brought in by ambulance due to altered mental status and hypertension. Patient was AAO x4 and fully functional at baseline. Last known normal was 11:00pm  before beds. On  at 8:45AM, patient was found to be lethargy in bed by his son, was AAO x 4 and able to talk to his son, but started coughing and had one episode of nose bleed. At 9:15, her physical therapist noticed her becoming more lethargic, but vitals were normal. Soon at 9:30, patient was unresponsive and had a blank stare. EMS was called and her BP was found to be around 230/120. She was rushed to ED as Code Stroke. NIHSS 23. CTH showed Large left frontal intraparenchymal hematoma with intraventricular extension into left lateral ventricle, associated with marked mass effect resulting in 0.9 cm midline shift to the right anteriorly. Patient was intubated for airway protection. S/p 2 units of platelet for ASA reversal. Neurosurgery was planning for emergent OR resection of hematoma. OR course uncomplicated, hematoma evacuated and EVD placed, s/p 2U platelets given in OR. Pt currently intubated and sedated. SICU admission for hemodynamic and neurovascular monitoring.      Consults-  Consult Note Adult-Critical Care Fellow/Attending [SAQIB Nugent] (21)  Consult Note Adult-Neurology Physician Assistant/Attending [SAQIB Lopez] (21)  Consult Note Adult-Neurosurgery Physician Assistant/Attendin [BOB Browning] (21)  Consult Note Adult-Heme/Onc Resident/Attending [CHAZ Nunez] (10-13-20)  Consult Note Adult-Infectious Disease Attending [WILDER Wang] (10-13-20)  Consult Note Adult-Gastroenterology Fellow/Attending [PANDA Cunningham] (10-13-20)  Consult Note Adult-Critical Care Attending [JEANNE Brand] (10-12-20)  Consult Note Adult-Physiatry Attending [ISACC Weber] (20)  Consult Note Adult-Infectious Disease Attending [YANG Lerner] (20)  Consult Note Adult-Heme/Onc Resident/Attending [CHAZ Nunez] (20)  Consult Note Adult-Critical Care Attending [JEANNE Karimi] (20)  Consult Note Adult-Physiatry Attending [DAYAMI Morrison] (20)  Consult Note Adult-Infectious Disease Attending [WILDER Wang] (20)  Consult Note Adult-Heme/Onc Resident/Attending [CHAZ Singh] (20)  Consult Note Adult-Heme/Onc Resident [DAMIR Nunez] (20)  Consult Note Adult-Physiatry Attending [PANDA Tran] (20)  Consult Note Adult-Heme/Onc Fellow/Attending [JIM Nunez] (20)  Consult Note Adult-Vascular Surgery Resident/Attending [LEX Shaver] (20)      Procedure:  OR Stats  OR Time:               EBL:    500cc      IV Fluids:       Blood Products: 2U Platelets                UOP:      Findings-    PMH  PAST MEDICAL & SURGICAL HISTORY:  Anemia    DM (diabetes mellitus)    High cholesterol    HTN (hypertension)    B-cell chronic lymphocytic leukemia variant    CAD (coronary artery disease)  s/p stenting    H/O heart artery stent        Home Meds:  Home Medications:  aspirin 81 mg oral tablet, chewable: 1 tab(s) orally once a day (2021 15:11)  atorvastatin 80 mg oral tablet: 1 tab(s) orally once a day (at bedtime) (2021 15:11)  Imbruvica 70 mg oral capsule: 2 cap(s) orally once a day (2021 15:11)  Levaquin 500 mg oral tablet: 1 tab(s) orally every 24 hours (2021 15:11)  lisinopril 10 mg oral tablet: 1 tab(s) orally once a day (2021 15:11)         Allergies  Allergies    penicillin (Anaphylaxis; Hives)    Intolerances         Current Medications:  chlorhexidine 0.12% Liquid 15 milliLiter(s) Oral Mucosa every 12 hours  dexMEDEtomidine Infusion 0.2 MICROgram(s)/kG/Hr (5.22 mL/Hr) IV Continuous <Continuous>  propofol Infusion 20 MICROgram(s)/kG/Min (12.5 mL/Hr) IV Continuous <Continuous>  sodium chloride 0.9%. 1000 milliLiter(s) (100 mL/Hr) IV Continuous <Continuous>      Advanced Directives: Presumed Full Code    VITAL SIGNS, INS/OUTS (Last 24hours):    ICU Vital Signs Last 24 Hrs  T(C): 37.3 (2021 17:20), Max: 37.7 (2021 10:26)  T(F): 99 (2021 17:00), Max: 99.8 (2021 10:26)  HR: 108 (2021 17:20) (106 - 155)  BP: 130/45 (2021 17:20) (130/45 - 254/132)  BP(mean): --  ABP: 102/52 (2021 17:00) (98/36 - 185/62)  ABP(mean): 84 (2021 16:00) (66 - 84)  RR: 19 (2021 17:20) (18 - 30)  SpO2: 100% (2021 17:20) (60% - 100%)    I&O's Summary    2021 07:01  -  2021 21:47  --------------------------------------------------------  IN: 108.8 mL / OUT: 1100 mL / NET: -991.2 mL        Height (cm): 160 (21)  Weight (kg): 104.3 (21)  BMI (kg/m2): 40.7 (21)  BSA (m2): 2.05 (21)    Physical Exam:   ---------------------------------------------------------------------------------------  Neuro: Intubated and sedated, EVD in place    RESPIRATORY:  Normal expansion/effort  Mechanical Ventilation: Mode: AC/ CMV (Assist Control/ Continuous Mandatory Ventilation)  RR (machine): 20  TV (machine): 400  FiO2: 100  PEEP: 5  ITime: 1.26  MAP: 10  PIP: 27      CARDIOVASCULAR:   S1/S2.  RRR    GASTROINTESTINAL:  Abdomen soft, non-tender, non-distended    MUSCULOSKELETAL:  Extremities warm, pink, well-perfused.    DERM:  No skin breakdown     :   Exam: Murry catheter in place.       Tubes/Lines/Drains   ----------------------------------------------------------------------------------------------------------  [x] Peripheral IV  [X] Central Venous Line         RIJ             Date Placed:    [X] Arterial Line		      Radial   Date Placed:   [X] Urinary Catheter Murry                                             Date Placed:        LABS  --------------------------------------------------------------------------------------  LFTs  LIVER FUNCTIONS - ( 2021 10:33 )  Alb: 4.4 g/dL / Pro: 7.0 g/dL / ALK PHOS: 165 U/L / ALT: 43 U/L / AST: 58 U/L / GGT: x             Cardiac Markers  CARDIAC MARKERS ( 2021 10:33 )  x     / <0.01 ng/mL / x     / x     / x            Coagulation  PT/INR - ( 2021 10:33 )   PT: 14.30 sec;   INR: 1.24 ratio         PTT - ( 2021 10:33 )  PTT:45.2 sec    Arterial Blood Gas  ABG - ( 2021 12:25 )  pH, Arterial: 7.17  pH, Blood: x     /  pCO2: x     /  pO2: x     / HCO3: x     / Base Excess: x     /  SaO2: x                   Blood Sugar  CAPILLARY BLOOD GLUCOSE          Urinalysis  Urinalysis Basic - ( 2021 14:54 )    Color: Yellow / Appearance: Clear / S.025 / pH: x  Gluc: x / Ketone: Negative  / Bili: Negative / Urobili: <2 mg/dL   Blood: x / Protein: 30 mg/dL / Nitrite: Negative   Leuk Esterase: Small / RBC: 1 /HPF / WBC 9 /HPF   Sq Epi: x / Non Sq Epi: 1 /HPF / Bacteria: Negative            CT/XRAY/ECHO/TCD/EEG  ----------------------------------------------------------------------------------------------      Transthoracic Echocardiogram:  (20)        --------------------------------------------------------------------------------------       SICU Consultation Note  ======================================================================================================  AD BASURTO  MRN-[id number]      75y Female    HPI:  75 years old female from home with PMHx of B cell lymphoma on Imbruvica (since 10/2020), CAD s/p stents 15 years ago, DM, chronic UTI, HTN, DLD, brought in by ambulance due to altered mental status and hypertension. Patient was AAO x4 and fully functional at baseline. Last known normal was 11:00pm  before beds. On  at 8:45AM, patient was found to be lethargy in bed by his son, was AAO x 4 and able to talk to his son, but started coughing and had one episode of nose bleed. At 9:15, her physical therapist noticed her becoming more lethargic, but vitals were normal. Soon at 9:30, patient was unresponsive and had a blank stare. EMS was called and her BP was found to be around 230/120. She was rushed to ED as Code Stroke. NIHSS 23. CTH showed Large left frontal intraparenchymal hematoma with intraventricular extension into left lateral ventricle, associated with marked mass effect resulting in 0.9 cm midline shift to the right anteriorly. Patient was intubated in ED for airway protection. S/p 2 units of platelet for ASA reversal. Neurosurgery was planning for emergent OR resection of hematoma. OR course uncomplicated, hematoma evacuated and EVD placed, s/p 2U platelets given in OR. Pt currently intubated and sedated. SICU admission for hemodynamic and neurovascular monitoring.      Consults-  Consult Note Adult-Critical Care Fellow/Attending [SAQIB Nugent] (21)  Consult Note Adult-Neurology Physician Assistant/Attending [SAQIB Lopez] (21)  Consult Note Adult-Neurosurgery Physician Assistant/Attendin [BOB Browning] (21)  Consult Note Adult-Heme/Onc Resident/Attending [CHAZ Nunez] (10-13-20)  Consult Note Adult-Infectious Disease Attending [WILDER Wang] (10-13-20)  Consult Note Adult-Gastroenterology Fellow/Attending [PANDA Cunningham] (10-13-20)  Consult Note Adult-Critical Care Attending [JEANNE Brand] (10-12-20)  Consult Note Adult-Physiatry Attending [ISACC Weber] (20)  Consult Note Adult-Infectious Disease Attending [YANG Lerner] (20)  Consult Note Adult-Heme/Onc Resident/Attending [CHAZ Nunez] (20)  Consult Note Adult-Critical Care Attending [JEANNE Karimi] (20)  Consult Note Adult-Physiatry Attending [DAYAMI Morrison] (20)  Consult Note Adult-Infectious Disease Attending [WILDER Wang] (20)  Consult Note Adult-Heme/Onc Resident/Attending [CHAZ Singh] (20)  Consult Note Adult-Heme/Onc Resident [DAMIR Nunez] (20)  Consult Note Adult-Physiatry Attending [PANDA Tran] (20)  Consult Note Adult-Heme/Onc Fellow/Attending [JIM Nunez] (20)  Consult Note Adult-Vascular Surgery Resident/Attending [LEX Shaver] (20)      Procedure:  OR Stats  OR Time:   2.5hrs          EBL:    500cc      IV Fluids:   2L     Blood Products: 2U Platelets      UOP:  400cc    Findings- large hematoma    PMH  PAST MEDICAL & SURGICAL HISTORY:  Anemia    DM (diabetes mellitus)    High cholesterol    HTN (hypertension)    B-cell chronic lymphocytic leukemia variant    CAD (coronary artery disease)  s/p stenting    H/O heart artery stent        Home Meds:  Home Medications:  aspirin 81 mg oral tablet, chewable: 1 tab(s) orally once a day (2021 15:11)  atorvastatin 80 mg oral tablet: 1 tab(s) orally once a day (at bedtime) (2021 15:11)  Imbruvica 70 mg oral capsule: 2 cap(s) orally once a day (2021 15:11)  Levaquin 500 mg oral tablet: 1 tab(s) orally every 24 hours (2021 15:11)  lisinopril 10 mg oral tablet: 1 tab(s) orally once a day (2021 15:11)         Allergies  Allergies    penicillin (Anaphylaxis; Hives)    Intolerances         Current Medications:  chlorhexidine 0.12% Liquid 15 milliLiter(s) Oral Mucosa every 12 hours  dexMEDEtomidine Infusion 0.2 MICROgram(s)/kG/Hr (5.22 mL/Hr) IV Continuous <Continuous>  propofol Infusion 20 MICROgram(s)/kG/Min (12.5 mL/Hr) IV Continuous <Continuous>  sodium chloride 0.9%. 1000 milliLiter(s) (100 mL/Hr) IV Continuous <Continuous>      Advanced Directives: Presumed Full Code    VITAL SIGNS, INS/OUTS (Last 24hours):    ICU Vital Signs Last 24 Hrs  T(C): 37.3 (2021 17:20), Max: 37.7 (2021 10:26)  T(F): 99 (2021 17:00), Max: 99.8 (2021 10:26)  HR: 108 (2021 17:20) (106 - 155)  BP: 130/45 (2021 17:20) (130/45 - 254/132)  BP(mean): --  ABP: 102/52 (2021 17:00) (98/36 - 185/62)  ABP(mean): 84 (2021 16:00) (66 - 84)  RR: 19 (2021 17:20) (18 - 30)  SpO2: 100% (2021 17:20) (60% - 100%)    I&O's Summary    2021 07:01  -  2021 21:47  --------------------------------------------------------  IN: 108.8 mL / OUT: 1100 mL / NET: -991.2 mL        Height (cm): 160 (21)  Weight (kg): 104.3 (21)  BMI (kg/m2): 40.7 (21)  BSA (m2): 2.05 (21)    Physical Exam:   ---------------------------------------------------------------------------------------  Neuro: Intubated and sedated, EVD in place    RESPIRATORY:  Normal expansion/effort  Mechanical Ventilation: Mode: AC/ CMV (Assist Control/ Continuous Mandatory Ventilation)  RR (machine): 20  TV (machine): 400  FiO2: 100  PEEP: 5  ITime: 1.26  MAP: 10  PIP: 27      CARDIOVASCULAR:   S1/S2.  RRR    GASTROINTESTINAL:  Abdomen soft, non-tender, non-distended    MUSCULOSKELETAL:  Extremities warm, pink, well-perfused.    DERM:  No skin breakdown     :   Exam: Murry catheter in place.       Tubes/Lines/Drains   ----------------------------------------------------------------------------------------------------------  [x] Peripheral IV  [X] Central Venous Line         RIJ             Date Placed:    [X] Arterial Line		      Radial   Date Placed:   [X] Urinary Catheter Murry                                             Date Placed:        LABS  --------------------------------------------------------------------------------------  LFTs  LIVER FUNCTIONS - ( 2021 10:33 )  Alb: 4.4 g/dL / Pro: 7.0 g/dL / ALK PHOS: 165 U/L / ALT: 43 U/L / AST: 58 U/L / GGT: x             Cardiac Markers  CARDIAC MARKERS ( 2021 10:33 )  x     / <0.01 ng/mL / x     / x     / x            Coagulation  PT/INR - ( 2021 10:33 )   PT: 14.30 sec;   INR: 1.24 ratio         PTT - ( 2021 10:33 )  PTT:45.2 sec    Arterial Blood Gas  ABG - ( 2021 12:25 )  pH, Arterial: 7.17  pH, Blood: x     /  pCO2: x     /  pO2: x     / HCO3: x     / Base Excess: x     /  SaO2: x                   Blood Sugar  CAPILLARY BLOOD GLUCOSE          Urinalysis  Urinalysis Basic - ( 2021 14:54 )    Color: Yellow / Appearance: Clear / S.025 / pH: x  Gluc: x / Ketone: Negative  / Bili: Negative / Urobili: <2 mg/dL   Blood: x / Protein: 30 mg/dL / Nitrite: Negative   Leuk Esterase: Small / RBC: 1 /HPF / WBC 9 /HPF   Sq Epi: x / Non Sq Epi: 1 /HPF / Bacteria: Negative            CT/XRAY/ECHO/TCD/EEG  ----------------------------------------------------------------------------------------------      Transthoracic Echocardiogram:  (20)        --------------------------------------------------------------------------------------       SICU Consultation Note  ======================================================================================================  AD BASURTO  MRN-[id number]      75y Female    HPI:  75 years old female from home with PMHx of B cell lymphoma on Imbruvica (since 10/2020), CAD s/p stents 15 years ago, DM, chronic UTI, HTN, DLD, brought in by ambulance due to altered mental status and hypertension. Patient was AAO x4 and fully functional at baseline. Last known normal was 11:00pm  before beds. On  at 8:45AM, patient was found to be lethargy in bed by his son, was AAO x 4 and able to talk to his son, but started coughing and had one episode of nose bleed. At 9:15, her physical therapist noticed her becoming more lethargic, but vitals were normal. Soon at 9:30, patient was unresponsive and had a blank stare. EMS was called and her BP was found to be around 230/120. She was rushed to ED as Code Stroke. NIHSS 23. CTH showed Large left frontal intraparenchymal hematoma with intraventricular extension into left lateral ventricle, associated with marked mass effect resulting in 0.9 cm midline shift to the right anteriorly. Patient was intubated in ED for airway protection. S/p 2 units of platelet for ASA reversal. Neurosurgery was planning for emergent OR resection of hematoma. OR course uncomplicated, hematoma evacuated and EVD placed, s/p 2U platelets given in OR. Pt currently intubated and sedated. SICU admission for hemodynamic and neurovascular monitoring.      Consults-  Consult Note Adult-Critical Care Fellow/Attending [SAQIB Nugent] (21)  Consult Note Adult-Neurology Physician Assistant/Attending [SAQIB Lopez] (21)  Consult Note Adult-Neurosurgery Physician Assistant/Attendin [BOB Browning] (21)  Consult Note Adult-Heme/Onc Resident/Attending [CHAZ Nunez] (10-13-20)  Consult Note Adult-Infectious Disease Attending [WILDER Wang] (10-13-20)  Consult Note Adult-Gastroenterology Fellow/Attending [PADNA Cunningham] (10-13-20)  Consult Note Adult-Critical Care Attending [JEANNE Brand] (10-12-20)  Consult Note Adult-Physiatry Attending [ISACC Weber] (20)  Consult Note Adult-Infectious Disease Attending [YANG Lerner] (20)  Consult Note Adult-Heme/Onc Resident/Attending [CHAZ Nunez] (20)  Consult Note Adult-Critical Care Attending [JEANNE Karimi] (20)  Consult Note Adult-Physiatry Attending [DAYAMI Morrison] (20)  Consult Note Adult-Infectious Disease Attending [WILDER Wang] (20)  Consult Note Adult-Heme/Onc Resident/Attending [CHAZ Singh] (20)  Consult Note Adult-Heme/Onc Resident [DAMIR Nunez] (20)  Consult Note Adult-Physiatry Attending [PANDA Tran] (20)  Consult Note Adult-Heme/Onc Fellow/Attending [JIM Nunez] (20)  Consult Note Adult-Vascular Surgery Resident/Attending [LEX Shaver] (20)      Procedure:  OR Stats  OR Time:   2.5hrs          EBL:    500cc      IV Fluids:   2L     Blood Products: 2U Platelets      UOP:  400cc    Findings- large hematoma    PMH  PAST MEDICAL & SURGICAL HISTORY:  Anemia    DM (diabetes mellitus)    High cholesterol    HTN (hypertension)    B-cell chronic lymphocytic leukemia variant    CAD (coronary artery disease)  s/p stenting    H/O heart artery stent        Home Meds:  Home Medications:  aspirin 81 mg oral tablet, chewable: 1 tab(s) orally once a day (2021 15:11)  atorvastatin 80 mg oral tablet: 1 tab(s) orally once a day (at bedtime) (2021 15:11)  Imbruvica 70 mg oral capsule: 2 cap(s) orally once a day (2021 15:11)  Levaquin 500 mg oral tablet: 1 tab(s) orally every 24 hours (2021 15:11)  lisinopril 10 mg oral tablet: 1 tab(s) orally once a day (2021 15:11)         Allergies  Allergies    penicillin (Anaphylaxis; Hives)    Intolerances         Current Medications:  chlorhexidine 0.12% Liquid 15 milliLiter(s) Oral Mucosa every 12 hours  dexMEDEtomidine Infusion 0.2 MICROgram(s)/kG/Hr (5.22 mL/Hr) IV Continuous <Continuous>  propofol Infusion 20 MICROgram(s)/kG/Min (12.5 mL/Hr) IV Continuous <Continuous>  sodium chloride 0.9%. 1000 milliLiter(s) (100 mL/Hr) IV Continuous <Continuous>      Advanced Directives: Presumed Full Code    VITAL SIGNS, INS/OUTS (Last 24hours):    ICU Vital Signs Last 24 Hrs  T(C): 37.3 (2021 17:20), Max: 37.7 (2021 10:26)  T(F): 99 (2021 17:00), Max: 99.8 (2021 10:26)  HR: 108 (2021 17:20) (106 - 155)  BP: 130/45 (2021 17:20) (130/45 - 254/132)  BP(mean): --  ABP: 102/52 (2021 17:00) (98/36 - 185/62)  ABP(mean): 84 (2021 16:00) (66 - 84)  RR: 19 (2021 17:20) (18 - 30)  SpO2: 100% (2021 17:20) (60% - 100%)    I&O's Summary    2021 07:01  -  2021 21:47  --------------------------------------------------------  IN: 108.8 mL / OUT: 1100 mL / NET: -991.2 mL        Height (cm): 160 (21)  Weight (kg): 104.3 (21)  BMI (kg/m2): 40.7 (21)  BSA (m2): 2.05 (21)    Physical Exam:   ---------------------------------------------------------------------------------------  Neuro: Intubated and sedated, dressing dry/intact,  EVD in place. PERRL    RESPIRATORY:  Normal expansion/effort  Mechanical Ventilation: Mode: AC/ CMV (Assist Control/ Continuous Mandatory Ventilation)  RR (machine): 20  TV (machine): 400  FiO2: 100  PEEP: 5  ITime: 1.26  MAP: 10  PIP: 27    CARDIOVASCULAR:   S1/S2.  RRR    GASTROINTESTINAL:  Abdomen soft, non-distended. OGT     MUSCULOSKELETAL:  Extremities warm, pink, well-perfused.    DERM:  No skin breakdown     :   Exam: Murry catheter in place.       Tubes/Lines/Drains   ----------------------------------------------------------------------------------------------------------  [x] Peripheral IV  [X] Central Venous Line         RIJ             Date Placed:    [X] Arterial Line		      Radial   Date Placed:   [X] Urinary Catheter Murry                                             Date Placed:        LABS  --------------------------------------------------------------------------------------  LFTs  LIVER FUNCTIONS - ( 2021 10:33 )  Alb: 4.4 g/dL / Pro: 7.0 g/dL / ALK PHOS: 165 U/L / ALT: 43 U/L / AST: 58 U/L / GGT: x             Cardiac Markers  CARDIAC MARKERS ( 2021 10:33 )  x     / <0.01 ng/mL / x     / x     / x            Coagulation  PT/INR - ( 2021 10:33 )   PT: 14.30 sec;   INR: 1.24 ratio         PTT - ( 2021 10:33 )  PTT:45.2 sec    Arterial Blood Gas  ABG - ( 2021 12:25 )  pH, Arterial: 7.17  pH, Blood: x     /  pCO2: x     /  pO2: x     / HCO3: x     / Base Excess: x     /  SaO2: x                   Blood Sugar  CAPILLARY BLOOD GLUCOSE          Urinalysis  Urinalysis Basic - ( 2021 14:54 )    Color: Yellow / Appearance: Clear / S.025 / pH: x  Gluc: x / Ketone: Negative  / Bili: Negative / Urobili: <2 mg/dL   Blood: x / Protein: 30 mg/dL / Nitrite: Negative   Leuk Esterase: Small / RBC: 1 /HPF / WBC 9 /HPF   Sq Epi: x / Non Sq Epi: 1 /HPF / Bacteria: Negative            CT/XRAY/ECHO/TCD/EEG  ----------------------------------------------------------------------------------------------      Transthoracic Echocardiogram:  (20)        --------------------------------------------------------------------------------------       SICU Consultation Note  ======================================================================================================  AD BASURTO  MRN-[id number]      75y Female    HPI:  75 years old female from home with PMHx of B cell lymphoma on Imbruvica (since 10/2020), CAD s/p stents 15 years ago, DM, chronic UTI, HTN, DLD, brought in by ambulance due to altered mental status and hypertension. Patient was AAO x4 and fully functional at baseline. Last known normal was 11:00pm  before beds. On  at 8:45AM, patient was found to be lethargy in bed by his son, was AAO x 4 and able to talk to his son, but started coughing and had one episode of nose bleed. At 9:15, her physical therapist noticed her becoming more lethargic, but vitals were normal. Soon at 9:30, patient was unresponsive and had a blank stare. EMS was called and her BP was found to be around 230/120. She was rushed to ED as Code Stroke. NIHSS 23. CTH showed Large left frontal intraparenchymal hematoma with intraventricular extension into left lateral ventricle, associated with marked mass effect resulting in 0.9 cm midline shift to the right anteriorly. Patient was intubated in ED for airway protection. S/p 2 units of platelet for ASA reversal. Neurosurgery was planning for emergent OR resection of hematoma. OR course uncomplicated, hematoma evacuated and EVD placed, s/p 2U platelets given in OR. Pt currently intubated and sedated. SICU admission for hemodynamic and neurovascular monitoring.      Consults-  Consult Note Adult-Critical Care Fellow/Attending [SAQIB Nugent] (21)  Consult Note Adult-Neurology Physician Assistant/Attending [SAQIB Lopez] (21)  Consult Note Adult-Neurosurgery Physician Assistant/Attendin [BOB Browning] (21)  Consult Note Adult-Heme/Onc Resident/Attending [CHAZ Nunez] (10-13-20)  Consult Note Adult-Infectious Disease Attending [WILDER Wang] (10-13-20)  Consult Note Adult-Gastroenterology Fellow/Attending [PANDA Cunningham] (10-13-20)  Consult Note Adult-Critical Care Attending [JEANNE Brand] (10-12-20)  Consult Note Adult-Physiatry Attending [ISACC Weber] (20)  Consult Note Adult-Infectious Disease Attending [YANG Lerner] (20)  Consult Note Adult-Heme/Onc Resident/Attending [CHAZ Nunez] (20)  Consult Note Adult-Critical Care Attending [JEANNE Karimi] (20)  Consult Note Adult-Physiatry Attending [DAYAMI Morrison] (20)  Consult Note Adult-Infectious Disease Attending [WILDER Wang] (20)  Consult Note Adult-Heme/Onc Resident/Attending [CHAZ Singh] (20)  Consult Note Adult-Heme/Onc Resident [DAMIR Nunez] (20)  Consult Note Adult-Physiatry Attending [PANDA Tran] (20)  Consult Note Adult-Heme/Onc Fellow/Attending [JIM Nunez] (20)  Consult Note Adult-Vascular Surgery Resident/Attending [LEX Shaver] (20)      Procedure:  OR Stats  OR Time:   2.5hrs          EBL:    500cc      IV Fluids:   2L     Blood Products: 2U Platelets      UOP:  400cc    Findings- large hematoma    PMH  PAST MEDICAL & SURGICAL HISTORY:  Anemia    DM (diabetes mellitus)    High cholesterol    HTN (hypertension)    B-cell chronic lymphocytic leukemia variant    CAD (coronary artery disease)  s/p stenting    H/O heart artery stent        Home Meds:  Home Medications:  aspirin 81 mg oral tablet, chewable: 1 tab(s) orally once a day (2021 15:11)  atorvastatin 80 mg oral tablet: 1 tab(s) orally once a day (at bedtime) (2021 15:11)  Imbruvica 70 mg oral capsule: 2 cap(s) orally once a day (2021 15:11)  Levaquin 500 mg oral tablet: 1 tab(s) orally every 24 hours (2021 15:11)  lisinopril 10 mg oral tablet: 1 tab(s) orally once a day (2021 15:11)         Allergies  Allergies    penicillin (Anaphylaxis; Hives)    Intolerances         Current Medications:  chlorhexidine 0.12% Liquid 15 milliLiter(s) Oral Mucosa every 12 hours  dexMEDEtomidine Infusion 0.2 MICROgram(s)/kG/Hr (5.22 mL/Hr) IV Continuous <Continuous>  propofol Infusion 20 MICROgram(s)/kG/Min (12.5 mL/Hr) IV Continuous <Continuous>  sodium chloride 0.9%. 1000 milliLiter(s) (100 mL/Hr) IV Continuous <Continuous>      Advanced Directives: Presumed Full Code    VITAL SIGNS, INS/OUTS (Last 24hours):    ICU Vital Signs Last 24 Hrs  T(C): 37.3 (2021 17:20), Max: 37.7 (2021 10:26)  T(F): 99 (2021 17:00), Max: 99.8 (2021 10:26)  HR: 108 (2021 17:20) (106 - 155)  BP: 130/45 (2021 17:20) (130/45 - 254/132)  BP(mean): --  ABP: 102/52 (2021 17:00) (98/36 - 185/62)  ABP(mean): 84 (2021 16:00) (66 - 84)  RR: 19 (2021 17:20) (18 - 30)  SpO2: 100% (2021 17:20) (60% - 100%)    I&O's Summary    2021 07:01  -  2021 21:47  --------------------------------------------------------  IN: 108.8 mL / OUT: 1100 mL / NET: -991.2 mL        Height (cm): 160 (21)  Weight (kg): 104.3 (21)  BMI (kg/m2): 40.7 (21)  BSA (m2): 2.05 (21)    Physical Exam:   ---------------------------------------------------------------------------------------  Neuro: Intubated and sedated, dressing dry/intact, EVD in place. PERRL, painfully responsive    RESPIRATORY:  Normal expansion/effort  Mechanical Ventilation: Mode: AC/ CMV (Assist Control/ Continuous Mandatory Ventilation)  RR (machine): 20  TV (machine): 400  FiO2: 100  PEEP: 5  ITime: 1.26  MAP: 10  PIP: 27    CARDIOVASCULAR:   S1/S2.  RRR    GASTROINTESTINAL:  Abdomen soft, non-distended. OGT     MUSCULOSKELETAL:  Extremities warm, pink, well-perfused.    DERM:  No skin breakdown     :   Exam: Murry catheter in place.       Tubes/Lines/Drains   ----------------------------------------------------------------------------------------------------------  [x] Peripheral IV  [X] Central Venous Line         RIJ             Date Placed:    [X] Arterial Line		      Radial   Date Placed:   [X] Urinary Catheter Murry                                             Date Placed:        LABS  --------------------------------------------------------------------------------------  LFTs  LIVER FUNCTIONS - ( 2021 10:33 )  Alb: 4.4 g/dL / Pro: 7.0 g/dL / ALK PHOS: 165 U/L / ALT: 43 U/L / AST: 58 U/L / GGT: x             Cardiac Markers  CARDIAC MARKERS ( 2021 10:33 )  x     / <0.01 ng/mL / x     / x     / x            Coagulation  PT/INR - ( 2021 10:33 )   PT: 14.30 sec;   INR: 1.24 ratio         PTT - ( 2021 10:33 )  PTT:45.2 sec    Arterial Blood Gas  ABG - ( 2021 12:25 )  pH, Arterial: 7.17  pH, Blood: x     /  pCO2: x     /  pO2: x     / HCO3: x     / Base Excess: x     /  SaO2: x                   Blood Sugar  CAPILLARY BLOOD GLUCOSE          Urinalysis  Urinalysis Basic - ( 2021 14:54 )    Color: Yellow / Appearance: Clear / S.025 / pH: x  Gluc: x / Ketone: Negative  / Bili: Negative / Urobili: <2 mg/dL   Blood: x / Protein: 30 mg/dL / Nitrite: Negative   Leuk Esterase: Small / RBC: 1 /HPF / WBC 9 /HPF   Sq Epi: x / Non Sq Epi: 1 /HPF / Bacteria: Negative            CT/XRAY/ECHO/TCD/EEG  ----------------------------------------------------------------------------------------------      Transthoracic Echocardiogram:  (20)        --------------------------------------------------------------------------------------       SICU Consultation Note  ======================================================================================================  AD BASURTO  MRN-[id number]      75y Female    HPI:  75 years old female from home with PMHx of B cell lymphoma on Imbruvica (since 10/2020), CAD s/p stents 15 years ago, DM, chronic UTI, HTN, DLD, brought in by ambulance due to altered mental status and hypertension. Patient was AAO x4 and fully functional at baseline. Last known normal was 11:00pm  before beds. On  at 8:45AM, patient was found to be lethargy in bed by his son, was AAO x 4 and able to talk to his son, but started coughing and had one episode of nose bleed. At 9:15, her physical therapist noticed her becoming more lethargic, but vitals were normal. Soon at 9:30, patient was unresponsive and had a blank stare. EMS was called and her BP was found to be around 230/120. She was rushed to ED as Code Stroke. NIHSS 23. CTH showed Large left frontal intraparenchymal hematoma with intraventricular extension into left lateral ventricle, associated with marked mass effect resulting in 0.9 cm midline shift to the right anteriorly. Patient was intubated in ED for airway protection. S/p 2 units of platelet for ASA reversal. Neurosurgery was planning for emergent OR resection of hematoma. OR course uncomplicated, hematoma evacuated and EVD placed, s/p 2U platelets given in OR. Pt currently intubated and sedated. SICU admission for hemodynamic and neurovascular monitoring.      Consults-  Consult Note Adult-Critical Care Fellow/Attending [SAQIB Nugent] (21)  Consult Note Adult-Neurology Physician Assistant/Attending [SAQIB Lopez] (21)  Consult Note Adult-Neurosurgery Physician Assistant/Attendin [BOB Browning] (21)  Consult Note Adult-Heme/Onc Resident/Attending [CHAZ Nunez] (10-13-20)  Consult Note Adult-Infectious Disease Attending [WILDER Wang] (10-13-20)  Consult Note Adult-Gastroenterology Fellow/Attending [PANDA Cunningham] (10-13-20)  Consult Note Adult-Critical Care Attending [JEANNE Brand] (10-12-20)  Consult Note Adult-Physiatry Attending [ISACC Weber] (20)  Consult Note Adult-Infectious Disease Attending [YANG Lerner] (20)  Consult Note Adult-Heme/Onc Resident/Attending [CHAZ Nunez] (20)  Consult Note Adult-Critical Care Attending [JEANNE Karimi] (20)  Consult Note Adult-Physiatry Attending [DAYAMI Morrison] (20)  Consult Note Adult-Infectious Disease Attending [WILDER Wang] (20)  Consult Note Adult-Heme/Onc Resident/Attending [CHAZ Singh] (20)  Consult Note Adult-Heme/Onc Resident [DAMIR Nunez] (20)  Consult Note Adult-Physiatry Attending [PANDA Tran] (20)  Consult Note Adult-Heme/Onc Fellow/Attending [JIM Nunez] (20)  Consult Note Adult-Vascular Surgery Resident/Attending [LEX Shaver] (20)      Procedure:  OR Stats  OR Time:   2.5hrs          EBL:    500cc      IV Fluids:   2L     Blood Products: none      UOP:  400cc    Findings- large hematoma    PMH  PAST MEDICAL & SURGICAL HISTORY:  Anemia    DM (diabetes mellitus)    High cholesterol    HTN (hypertension)    B-cell chronic lymphocytic leukemia variant    CAD (coronary artery disease)  s/p stenting    H/O heart artery stent        Home Meds:  Home Medications:  aspirin 81 mg oral tablet, chewable: 1 tab(s) orally once a day (2021 15:11)  atorvastatin 80 mg oral tablet: 1 tab(s) orally once a day (at bedtime) (2021 15:11)  Imbruvica 70 mg oral capsule: 2 cap(s) orally once a day (2021 15:11)  Levaquin 500 mg oral tablet: 1 tab(s) orally every 24 hours (2021 15:11)  lisinopril 10 mg oral tablet: 1 tab(s) orally once a day (2021 15:11)         Allergies  Allergies    penicillin (Anaphylaxis; Hives)    Intolerances         Current Medications:  chlorhexidine 0.12% Liquid 15 milliLiter(s) Oral Mucosa every 12 hours  dexMEDEtomidine Infusion 0.2 MICROgram(s)/kG/Hr (5.22 mL/Hr) IV Continuous <Continuous>  propofol Infusion 20 MICROgram(s)/kG/Min (12.5 mL/Hr) IV Continuous <Continuous>  sodium chloride 0.9%. 1000 milliLiter(s) (100 mL/Hr) IV Continuous <Continuous>      Advanced Directives: Presumed Full Code    VITAL SIGNS, INS/OUTS (Last 24hours):    ICU Vital Signs Last 24 Hrs  T(C): 37.3 (2021 17:20), Max: 37.7 (2021 10:26)  T(F): 99 (2021 17:00), Max: 99.8 (2021 10:26)  HR: 108 (2021 17:20) (106 - 155)  BP: 130/45 (2021 17:20) (130/45 - 254/132)  BP(mean): --  ABP: 102/52 (2021 17:00) (98/36 - 185/62)  ABP(mean): 84 (2021 16:00) (66 - 84)  RR: 19 (2021 17:20) (18 - 30)  SpO2: 100% (2021 17:20) (60% - 100%)    I&O's Summary    2021 07:01  -  2021 21:47  --------------------------------------------------------  IN: 108.8 mL / OUT: 1100 mL / NET: -991.2 mL        Height (cm): 160 (21)  Weight (kg): 104.3 (21)  BMI (kg/m2): 40.7 (21)  BSA (m2): 2.05 (21)    Physical Exam:   ---------------------------------------------------------------------------------------  Neuro: Intubated and sedated, dressing dry/intact, EVD in place. PERRL, painfully responsive    RESPIRATORY:  Normal expansion/effort  Mechanical Ventilation: Mode: AC/ CMV (Assist Control/ Continuous Mandatory Ventilation)  RR (machine): 20  TV (machine): 400  FiO2: 100  PEEP: 5  ITime: 1.26  MAP: 10  PIP: 27    CARDIOVASCULAR:   S1/S2.  RRR    GASTROINTESTINAL:  Abdomen soft, non-distended. OGT     MUSCULOSKELETAL:  Extremities warm, pink, well-perfused.    DERM:  No skin breakdown     :   Exam: Murry catheter in place.       Tubes/Lines/Drains   ----------------------------------------------------------------------------------------------------------  [x] Peripheral IV  [X] Central Venous Line         RIJ             Date Placed:    [X] Arterial Line		      Radial   Date Placed:   [X] Urinary Catheter Murry                                             Date Placed:        LABS  --------------------------------------------------------------------------------------  LFTs  LIVER FUNCTIONS - ( 2021 10:33 )  Alb: 4.4 g/dL / Pro: 7.0 g/dL / ALK PHOS: 165 U/L / ALT: 43 U/L / AST: 58 U/L / GGT: x             Cardiac Markers  CARDIAC MARKERS ( 2021 10:33 )  x     / <0.01 ng/mL / x     / x     / x            Coagulation  PT/INR - ( 2021 10:33 )   PT: 14.30 sec;   INR: 1.24 ratio         PTT - ( 2021 10:33 )  PTT:45.2 sec    Arterial Blood Gas  ABG - ( 2021 12:25 )  pH, Arterial: 7.17  pH, Blood: x     /  pCO2: x     /  pO2: x     / HCO3: x     / Base Excess: x     /  SaO2: x                   Blood Sugar  CAPILLARY BLOOD GLUCOSE          Urinalysis  Urinalysis Basic - ( 2021 14:54 )    Color: Yellow / Appearance: Clear / S.025 / pH: x  Gluc: x / Ketone: Negative  / Bili: Negative / Urobili: <2 mg/dL   Blood: x / Protein: 30 mg/dL / Nitrite: Negative   Leuk Esterase: Small / RBC: 1 /HPF / WBC 9 /HPF   Sq Epi: x / Non Sq Epi: 1 /HPF / Bacteria: Negative            CT/XRAY/ECHO/TCD/EEG  ----------------------------------------------------------------------------------------------      Transthoracic Echocardiogram:  (20)        --------------------------------------------------------------------------------------       SICU Consultation Note  ======================================================================================================  AD BASURTO  MRN-[id number]      75y Female    HPI:  75 years old female from home with PMHx of B cell lymphoma on Imbruvica (since 10/2020), CAD s/p stents 15 years ago, DM, chronic UTI, HTN, DLD, brought in by ambulance due to altered mental status and hypertension. Patient was AAO x4 and fully functional at baseline. Last known normal was 11:00pm  before beds. On  at 8:45AM, patient was found to be lethargy in bed by his son, was AAO x 4 and able to talk to his son, but started coughing and had one episode of nose bleed. At 9:15, her physical therapist noticed her becoming more lethargic, but vitals were normal. Soon at 9:30, patient was unresponsive and had a blank stare. EMS was called and her BP was found to be around 230/120. She was rushed to ED as Code Stroke. NIHSS 23. CTH showed Large left frontal intraparenchymal hematoma with intraventricular extension into left lateral ventricle, associated with marked mass effect resulting in 0.9 cm midline shift to the right anteriorly. Patient was intubated in ED for airway protection. S/p 2 units of platelet for ASA reversal. Neurosurgery was planning for emergent OR resection of hematoma. OR course uncomplicated, hematoma evacuated and EVD placed, s/p 2U platelets given in OR. Pt currently intubated and sedated. SICU admission for hemodynamic and neurovascular monitoring.      Consults-  Consult Note Adult-Critical Care Fellow/Attending [SAQIB Nugent] (21)  Consult Note Adult-Neurology Physician Assistant/Attending [SAQIB Lopez] (21)  Consult Note Adult-Neurosurgery Physician Assistant/Attendin [BOB Browning] (21)  Consult Note Adult-Heme/Onc Resident/Attending [CHAZ Nunez] (10-13-20)  Consult Note Adult-Infectious Disease Attending [WILDER Wang] (10-13-20)  Consult Note Adult-Gastroenterology Fellow/Attending [PANDA Cunningham] (10-13-20)  Consult Note Adult-Critical Care Attending [JEANNE Brand] (10-12-20)  Consult Note Adult-Physiatry Attending [ISACC Weber] (20)  Consult Note Adult-Infectious Disease Attending [YANG Lerner] (20)  Consult Note Adult-Heme/Onc Resident/Attending [CHAZ Nunez] (20)  Consult Note Adult-Critical Care Attending [JEANNE Karimi] (20)  Consult Note Adult-Physiatry Attending [DAYAMI Morrison] (20)  Consult Note Adult-Infectious Disease Attending [WILDER Wang] (20)  Consult Note Adult-Heme/Onc Resident/Attending [CHAZ Singh] (20)  Consult Note Adult-Heme/Onc Resident [DAMIR Nunez] (20)  Consult Note Adult-Physiatry Attending [PANDA Tran] (20)  Consult Note Adult-Heme/Onc Fellow/Attending [JIM Nunez] (20)  Consult Note Adult-Vascular Surgery Resident/Attending [LEX Shaver] (20)      Procedure:  OR Stats  OR Time:   2.5hrs          EBL:    500cc      IV Fluids:   2L     Blood Products: none      UOP:  400cc    Findings- large hematoma    PMH  PAST MEDICAL & SURGICAL HISTORY:  Anemia    DM (diabetes mellitus)    High cholesterol    HTN (hypertension)    B-cell chronic lymphocytic leukemia variant    CAD (coronary artery disease)  s/p stenting    H/O heart artery stent        Home Meds:  Home Medications:  aspirin 81 mg oral tablet, chewable: 1 tab(s) orally once a day (2021 15:11)  atorvastatin 80 mg oral tablet: 1 tab(s) orally once a day (at bedtime) (2021 15:11)  Imbruvica 70 mg oral capsule: 2 cap(s) orally once a day (2021 15:11)  Levaquin 500 mg oral tablet: 1 tab(s) orally every 24 hours (2021 15:11)  lisinopril 10 mg oral tablet: 1 tab(s) orally once a day (2021 15:11)         Allergies  Allergies    penicillin (Anaphylaxis; Hives)    Intolerances         Current Medications:  chlorhexidine 0.12% Liquid 15 milliLiter(s) Oral Mucosa every 12 hours  dexMEDEtomidine Infusion 0.2 MICROgram(s)/kG/Hr (5.22 mL/Hr) IV Continuous <Continuous>  propofol Infusion 20 MICROgram(s)/kG/Min (12.5 mL/Hr) IV Continuous <Continuous>  sodium chloride 0.9%. 1000 milliLiter(s) (100 mL/Hr) IV Continuous <Continuous>      Advanced Directives: Presumed Full Code    VITAL SIGNS, INS/OUTS (Last 24hours):    ICU Vital Signs Last 24 Hrs  T(C): 37.3 (2021 17:20), Max: 37.7 (2021 10:26)  T(F): 99 (2021 17:00), Max: 99.8 (2021 10:26)  HR: 108 (2021 17:20) (106 - 155)  BP: 130/45 (2021 17:20) (130/45 - 254/132)  BP(mean): --  ABP: 102/52 (2021 17:00) (98/36 - 185/62)  ABP(mean): 84 (2021 16:00) (66 - 84)  RR: 19 (2021 17:20) (18 - 30)  SpO2: 100% (2021 17:20) (60% - 100%)    I&O's Summary    2021 07:01  -  2021 21:47  --------------------------------------------------------  IN: 108.8 mL / OUT: 1100 mL / NET: -991.2 mL        Height (cm): 160 (21)  Weight (kg): 104.3 (21)  BMI (kg/m2): 40.7 (21)  BSA (m2): 2.05 (21)    Physical Exam:   ---------------------------------------------------------------------------------------  Neuro: Intubated and sedated, dressing dry/intact, EVD in place. PERRL, painfully responsive    RESPIRATORY:  Normal expansion/effort  Mechanical Ventilation: Mode: AC/ CMV (Assist Control/ Continuous Mandatory Ventilation)  RR (machine): 20  TV (machine): 400  FiO2: 100  PEEP: 5  ITime: 1.26  MAP: 10  PIP: 27    CARDIOVASCULAR:   S1/S2.  RRR    GASTROINTESTINAL:  Abdomen soft, non-distended. OGT     MUSCULOSKELETAL:  Extremities warm, pink, well-perfused.    DERM:  No skin breakdown     :   Exam: Murry catheter in place.       Tubes/Lines/Drains   ----------------------------------------------------------------------------------------------------------  [x] Peripheral IV  [X] Central Venous Line         RIJ             Date Placed:    [X] Arterial Line		      Radial   Date Placed:   [X] Urinary Catheter Murry                                             Date Placed:        LABS  --------------------------------------------------------------------------------------  LFTs  LIVER FUNCTIONS - ( 2021 10:33 )  Alb: 4.4 g/dL / Pro: 7.0 g/dL / ALK PHOS: 165 U/L / ALT: 43 U/L / AST: 58 U/L / GGT: x             Cardiac Markers  CARDIAC MARKERS ( 2021 10:33 )  x     / <0.01 ng/mL / x     / x     / x            Coagulation  PT/INR - ( 2021 10:33 )   PT: 14.30 sec;   INR: 1.24 ratio         PTT - ( 2021 10:33 )  PTT:45.2 sec    Arterial Blood Gas  ABG - ( 2021 12:25 )  pH, Arterial: 7.17  pH, Blood: x     /  pCO2: x     /  pO2: x     / HCO3: x     / Base Excess: x     /  SaO2: x                   Blood Sugar  CAPILLARY BLOOD GLUCOSE          Urinalysis  Urinalysis Basic - ( 2021 14:54 )    Color: Yellow / Appearance: Clear / S.025 / pH: x  Gluc: x / Ketone: Negative  / Bili: Negative / Urobili: <2 mg/dL   Blood: x / Protein: 30 mg/dL / Nitrite: Negative   Leuk Esterase: Small / RBC: 1 /HPF / WBC 9 /HPF   Sq Epi: x / Non Sq Epi: 1 /HPF / Bacteria: Negative            CT/XRAY/ECHO/TCD/EEG  ----------------------------------------------------------------------------------------------      CTH: Large acute intraparenchymal hematoma measuring 6.5 x 5.1 x 5.2 cm in the left frontal lobe with marked mass effect and mild surrounding edema. There is 0.9 cm midline shift to  right with rightward deviation of the anterior falx. Trace SAH adjacent left frontal sulci as well as IV extension into left lateral ventricle.

## 2021-02-24 NOTE — ED ADULT NURSE NOTE - OBJECTIVE STATEMENT
Pt BIBA from home for ams. On arrival to ED, ems stated that pt was disoriented at home, able to follow commands and maintaining her airway but was still placed on non rebreather. En route to ED, pt vomited once and began to desat, pt arrived to ED on non rebreather, low 02 sat, elevated BP, elevated HR, pt cyanotic and mouth full of secretions and vomit. Pt was able to follow commands in ED, but lethargic and disoriented, unable to speak due to lethargy and secretions. Stroke code activated on arrival to critical care area, pt intubated, and taken to ct scan. Skin intact, daughter in WR. Tube size 7.5, LL 23, intubation performed at 1035 am. Pt placed on antihypertensive.

## 2021-02-24 NOTE — H&P ADULT - NSHPLABSRESULTS_GEN_ALL_CORE
13.7   14.52 )-----------( 159      ( 2021 10:33 )             44.3           142  |  101  |  18  ----------------------------<  224<H>  3.7   |  25  |  0.5<L>    Ca    9.2      2021 10:33    TPro  7.0  /  Alb  4.4  /  TBili  0.6  /  DBili  x   /  AST  58<H>  /  ALT  43<H>  /  AlkPhos  165<H>            ABG - ( 2021 12:25 )  pH, Arterial: 7.17  pH, Blood: x     /  pCO2: x     /  pO2: x     / HCO3: x     / Base Excess: x     /  SaO2: x           Urinalysis Basic - ( 2021 14:54 )    Color: Yellow / Appearance: Clear / S.025 / pH: x  Gluc: x / Ketone: Negative  / Bili: Negative / Urobili: <2 mg/dL   Blood: x / Protein: 30 mg/dL / Nitrite: Negative   Leuk Esterase: Small / RBC: x / WBC x   Sq Epi: x / Non Sq Epi: x / Bacteria: x      PT/INR - ( 2021 10:33 )   PT: 14.30 sec;   INR: 1.24 ratio         PTT - ( 2021 10:33 )  PTT:45.2 sec    Lactate Trend   @ 10:33 Lactate:2.6       CARDIAC MARKERS ( 2021 10:33 )  x     / <0.01 ng/mL / x     / x     / x

## 2021-02-24 NOTE — CONSULT NOTE ADULT - ASSESSMENT
IMPRESSION:  Acute hypoxemic respiratory failure  ICH    PLAN:    CNS: Avoid CNS depressants. Precedex if needed for sedation. Neuro checks q1h. STAT CT H if any changes in MS. FU NeuroSx.     HEENT: Oral care    PULMONARY:  HOB @ 45 degrees. Target SpO2>94%, wean oxygen as tolerated. Repeat ABG.    CARDIOVASCULAR: Avoid volume overload. BP control, target -160.     GI: GI prophylaxis. OG feeding.  Bowel regimen.    RENAL:  Follow up lytes. Correct as needed    INFECTIOUS DISEASE: Follow up cultures. No abx.     HEMATOLOGICAL:  DVT prophylaxis.    ENDOCRINE:  Follow up FS.  Insulin protocol if needed.    MUSCULOSKELETAL: PT/ OT eval.     MICU monitoring for now

## 2021-02-24 NOTE — ED PROVIDER NOTE - OBJECTIVE STATEMENT
74 yo female, pmh of cad on asa, htn, hld, dm, b cell lymphoma, presents to ed for sob and ams. Pt was found by family this morning to be altered, at baseline walks independently and normal speech, pt nonresponsive and vomiting; as per ems sat is in 60s.

## 2021-02-24 NOTE — CONSULT NOTE ADULT - ASSESSMENT
Assessment & Plan    75y Female s/p craniotomy for left frontal hematoma with evacuation and EVD placement. Intubated and sedated. SICU admission for hemodynamic monitoring.     NEURO:  Sedated on Precedex and Propofol  Keppra 500 mg q 12  q 1 hr neurocheck  f/u post-op CTH    RESP:   RR (machine): 20, TV (machine): 400, FiO2: 100, PEEP: 5, ITime: 1.26  02-24 @ 12:25--7.17 / -- / -- / -- / --   HOB elevated at 45    CARDS:   BP goal < 160  H/o CAD s/p stents and HTN - hold ASA and lisinopril    GI/NUTR:   NPO    OGT  GI Prophylaxis- PPI  Bowel regimen- Senna    /RENAL:   Monitor UO-roblero in place  BUN/Cr- 18/0.5  -->,  20/0.5  -->  Electrolytes-Na 144 // K 3.9 // Mg -- //  Phos -- 02-24 @ 18:20  Na 142 // K 3.7 // Mg -- //  Phos -- 02-24 @ 10:33    HEME/ONC:   DVT propylaxis- hold in light of ICH  Hb/Hct:  13.7/44.3  -->  Plts:  159  -->  T&S:ABO RH Interpretation:  (02-24)  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    ID:  Leukocytosis- 14.52  --->>  Temp trend- 24hrs T(F): 99 (02-24 @ 17:00), Max: 99.8 (02-24 @ 10:26)  Antibiotics- Vancomycin  Cultures:       ENDO:  h/o DM on Metformin    Glucose, Serum: 197 (02-24 @ 18:20)  FS q 4  ISS  HA1C pending     LINES/DRAINS:  PIV, CVC, VeronicaJeanmarie piedra     DISPO:    SICU Assessment & Plan    75y Female s/p craniotomy for left frontal hematoma with evacuation and EVD placement. Intubated and sedated. SICU admission for hemodynamic monitoring.     NEURO:  Sedated on Precedex and Propofol  Pain controlled with IV Tylenol fentanyl prn   Keppra 500 mg q 12  EVD clamped  CPP >70  q 1 hr neurocheck  f/u post-op CTH    RESP:   RR (machine): 20, TV (machine): 400, FiO2: 100, PEEP: 5, ITime: 1.26  02-24 @ 12:25--7.17 / -- / -- / -- / --   HOB elevated at 30    CARDS:   Levophed  BP goal -140  EKG sinus tach with RBBB Qtc 482  H/o CAD s/p stents and HTN - hold ASA and lisinopril  Echo (10/13/20): EF 55-60%, trace MR, R atrial echodensity possible thrombus  f/u cardiac enzymes    GI/NUTR:   NPO    OGT  GI Prophylaxis- PPI  Bowel regimen- Senna    /RENAL:   Monitor UO-roblero in place  NS @ 100 cc/hr  BUN/Cr- 18/0.5  -->,  20/0.5   Electrolytes-Na 144 // K 3.9 // Mg -- //  Phos -- 02-24 @ 18:20  Na 142 // K 3.7 // Mg -- //  Phos -- 02-24 @ 10:33    HEME/ONC:   DVT propylaxis- hold in light of ICH  Hb/Hct:  13.7/44.3    Plts:  159    T&S:ABO RH Interpretation:  (02-24)  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    ID:  Leukocytosis- 14.52  --->>  Temp trend- 24hrs T(F): 99 (02-24 @ 17:00), Max: 99.8 (02-24 @ 10:26)  Antibiotics- Vancomycin  Cultures:       ENDO:  h/o DM on Metformin    Glucose, Serum: 197 (02-24 @ 18:20)  FS q 4  ISS  HA1C pending     LINES/DRAINS:  PIV, CVC, Veronica, Roblero     DISPO:    SICU Assessment & Plan    75y Female s/p craniotomy for left frontal hematoma with evacuation and EVD placement. Intubated and sedated. SICU admission for hemodynamic monitoring.     NEURO:  Sedated on Precedex and Propofol  Pain controlled with IV Tylenol fentanyl prn   Keppra 500 mg q 12  EVD clamped  CPP >70  q 1 hr neurocheck  f/u post-op CTH report    RESP:   RR (machine): 20, TV (machine): 400, FiO2: 100, PEEP: 5, ITime: 1.26  02-24 @ 12:25--7.17 / -- / -- / -- / --   HOB elevated at 30    CARDS:   Levophed  BP goal -140  EKG sinus tach with RBBB Qtc 482  H/o CAD s/p stents and HTN - hold ASA and lisinopril  Echo (10/13/20): EF 55-60%, trace MR, R atrial echodensity possible thrombus  f/u cardiac enzymes    GI/NUTR:   NPO    OGT  GI Prophylaxis- PPI  Bowel regimen- Senna    /RENAL:   Monitor UO-roblero in place  NS @ 100 cc/hr  BUN/Cr- 18/0.5  -->,  20/0.5   Electrolytes-Na 144 // K 3.9 // Mg -- //  Phos -- 02-24 @ 18:20  Na 142 // K 3.7 // Mg -- //  Phos -- 02-24 @ 10:33    HEME/ONC:   DVT propylaxis- hold in light of ICH  Hb/Hct:  13.7/44.3    Plts:  159    T&S:ABO RH Interpretation:  (02-24)  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  2U platelets ordered by neurosurgery team.    ID:  Leukocytosis- 14.52   Temp trend- 24hrs T(F): 99 (02-24 @ 17:00), Max: 99.8 (02-24 @ 10:26)  Antibiotics- Vancomycin         ENDO:  h/o DM on Metformin    Glucose, Serum: 197 (02-24 @ 18:20)  FS q 4  ISS  HA1C pending     LINES/DRAINS:  PIV, CVC, Veronica, Roblero     DISPO:    SICU Assessment & Plan    75y Female s/p craniotomy for left frontal hematoma with evacuation and EVD placement. Intubated and sedated. SICU admission for hemodynamic monitoring.     NEURO:  Sedated on Precedex and Propofol  Pain controlled with IV Tylenol fentanyl prn   Keppra 500 mg q 12  EVD clamped  CPP >70  q 1 hr neurocheck  f/u post-op CTH report    RESP:   RR (machine): 20, TV (machine): 400, FiO2: 100, PEEP: 5, ITime: 1.26  02-24 @ 23:06--7.40 /38 /128 /24   HOB elevated at 30 degrees    CARDS:   Levophed  BP goal -140  EKG sinus tach with RBBB Qtc 482  Echo (10/13/20): EF 55-60%, trace MR, R atrial echodensity possible thrombus  H/o CAD s/p stents and HTN - hold ASA and lisinopril  F/u cardiac enzymes    GI/NUTR:   NPO    OGT  GI Prophylaxis- PPI  Bowel regimen- Senna    /RENAL:   Monitor UO-roblero in place  NS @ 100 cc/hr  BUN/Cr- 18/0.5  -->,  20/0.5   Electrolytes-Na 144 // K 3.9 // Mg -- //  Phos -- 02-24 @ 18:20  Na 142 // K 3.7 // Mg -- //  Phos -- 02-24 @ 10:33    HEME/ONC:   DVT prophylaxis hold in light of ICH  Hb/Hct:  13.7/44.3    Plts:  159    T&S: ABO RH Interpretation:  (02-24)  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  ---2U platelets ordered by neurosurgery team.    ID:  Leukocytosis- 14.52   Temp trend- 24hrs T(F): 99 (02-24 @ 17:00), Max: 99.8 (02-24 @ 10:26)  Antibiotics- Vancomycin         ENDO:  h/o DM on Metformin    Glucose, Serum: 197 (02-24 @ 18:20)  FS q 4  ISS  HA1C pending     LINES/DRAINS:  PIV, CVC, [place], Roblero     DISPO:    SICU    Discussed with Dr. Joy

## 2021-02-24 NOTE — H&P ADULT - HISTORY OF PRESENT ILLNESS
75 years old female from home with PMHx of B cell lymphoma on Imbruvica, anemia and  75 years old female from home with PMHx of B cell lymphoma on Imbruvica (since 10/2020), CAD s/p stents 15 years ago, DM, chronic UTI, HTN, DLD, brought in by ambulance due to altered mental status and hypertension. Patient was AAO x4 and fully functional at baseline. Last known normal was 11:00pm 2/23 before beds. In 2/24 AM, patient was found to be letharge 75 years old female from home with PMHx of B cell lymphoma on Imbruvica (since 10/2020), CAD s/p stents 15 years ago, DM, chronic UTI, HTN, DLD, brought in by ambulance due to altered mental status and hypertension. Patient was AAO x4 and fully functional at baseline. Last known normal was 11:00pm 2/23 before beds. In 2/24 at 8:45AM, patient was found to be lethargy in bed by his son, was AAO x 4 and able to talk to his son, but started coughing and had one episode of nose bleed. At 9:15, her physical therapist noticed her becoming more lethargic, but vitals were normal. Soon at 9:30, patient was unresponsive and had a blank stare. EMS was called and her BP was found to be around 230/120. She was rushed to ED as Code Stroke. NIHSS 23. CTH showed Large left frontal intraparenchymal hematoma with intraventricular extension into left lateral ventricle, associated with marked mass effect resulting in 0.9 cm midline shift to the right anteriorly. Patient was intubated for airway protection. S/p 2 units of platelet for ASA reversal. Neurosurgery was planning for emergent OR resection of hematoma.

## 2021-02-24 NOTE — ED PROVIDER NOTE - CLINICAL SUMMARY MEDICAL DECISION MAKING FREE TEXT BOX
Patient presetns with hypoxia, respiratory distress and altered mental status that started this morning. Stroke code called on arrival. Patient intubated for airway protection. labs, ekg, cxr, ct done. Seen by neurology and neurosurgery. Platelets, keppra, cardene given. Plan for OR by neurosurgery. Admitted to the ICU for further management.

## 2021-02-24 NOTE — ED PROVIDER NOTE - CRITICAL CARE ATTENDING CONTRIBUTION TO CARE
I saw this patient with MARIO Freeman       I have personally seen and examined this patient.  I have fully participated in the care of this patient. I have reviewed all pertinent clinical information, including history, physical exam, plan and the PA's note and agree except as noted      74 yo F pmh of splenic cancer or oral chemo, dm, htn, hld, cad, copd no home oxygen, chornic UTI presents with difficulty breathing and alertered mental status. As per EMS she normally is fully functioning, found by family this morning altered. EMS found her to be hypoxic to the 60s, improved with NRB. Also report few episodes of n/v with possible aspiration prior to arrival. Moving all extremities. Follows with Dr. Campuzano. Patient found to be hypoxic. Intubated on arrival with stroke code called. Found to have a bleed.     CONSTITUTIONAL: Well-developed; well-nourished; in no acute distress.   SKIN: warm, dry  HEAD: Normocephalic; atraumatic.  EYES: PERRL, EOMI, no conjunctival erythema  ENT: No nasal discharge; airway clear.  NECK: Supple; non tender.  CARD: S1, S2 normal;  Regular rate and rhythm.   RESP: + ronchi bilaterally + tachypnea, + retractions, + labored breathing.   ABD: soft non tender, non distended, no rebound or guarding  EXT: Normal ROM.  5/5 strength in all 4 extremities   LYMPH: No acute cervical adenopathy.  NEURO: following verbal commands, moving all extremities.   PSYCH: Cooperative, appropriate.

## 2021-02-24 NOTE — H&P ADULT - ASSESSMENT
IMPRESSIONS:  Acute hypercapnic respiratory failure  Large left frontal lobe hematoma  Hypertensive emergency  B-cell lymphoma on Imbruvica  Hx CAD  Hx anemia      PLAN:    CNS: Sedated with Fentanyl, can add versed if needed, SAT; Keppra 500mg q12, start hypertonic saline 3% 50cc, goal Na 145 - 155; q1 neuro check; OR today with neurosx; repeat CTH if there is new neuro changes    ENT: Oral care    CARDIOVASCULAR: Keep I = O; Continue Cardene drip, goal BP < 160    PULMONARY: HOB @ 45; Keep O2 > 94%    GASTROINTESTINAL: Feeding as per Neurosurgery    RENAL: Serial BMP to keep Na 145 - 155    INFECTIOUS DISEASE: No abx    ENDOCRINE: Follow up FS    HEMATOLOGY: No pharmacological DVT ppx, SCDs      CODE STATUS: FULL CODE  LINES: Right radial A line    DISPO: MICU monitoring IMPRESSIONS:  Acute hypercapnic respiratory failure  Large left frontal lobe hematoma  Hypertensive emergency  B-cell lymphoma on Imbruvica  Hx CAD  Hx anemia      PLAN:    CNS: Sedated with Fentanyl, can add versed if needed, SAT; Keppra 500mg q12, start hypertonic saline 3% 50cc, goal Na 145 - 155; q1 neuro check; OR today with neurosx; repeat CTH if there is new neuro changes    ENT: Oral care    CARDIOVASCULAR: Keep I = O; Continue Cardene drip, goal BP < 160    PULMONARY: HOB @ 45; Keep O2 > 94%    GASTROINTESTINAL: OG feeding if no more surgery is planned    RENAL: Serial BMP to keep Na 145 - 155    INFECTIOUS DISEASE: No abx    ENDOCRINE: Follow up FS    HEMATOLOGY: No pharmacological DVT ppx, SCDs      CODE STATUS: FULL CODE  LINES: Right radial A line    DISPO: MICU monitoring

## 2021-02-24 NOTE — CONSULT NOTE ADULT - SUBJECTIVE AND OBJECTIVE BOX
Patient is a 75y old  Female who presents with a chief complaint of Brain Bleed (24 Feb 2021 11:48)      HPI:  75y Female w/ a pmhx of B cell lymphoma on chemotherapy, anemia and pancytopenia with frequent transfusions, HTN, HLD, CAD s/p stent, on ASA, DM BIBEMS for difficulty breathing unknown last known time. Pt minimally responsive on arrival intubated in the ED for airway protection. CTH shows Large left frontal intraparenchymal hematoma with intraventricular extension into left lateral ventricle. Associated marked mass effect resulting in 0.9 cm midline shift to the right anteriorly.    PAST MEDICAL & SURGICAL HISTORY:  Anemia    DM (diabetes mellitus)    High cholesterol    HTN (hypertension)    B-cell chronic lymphocytic leukemia variant    CAD (coronary artery disease)  s/p stenting    H/O heart artery stent        SOCIAL HX:   Smoking   UTO                      ETOH            UTO                Other    FAMILY HISTORY:  Family history of diabetes mellitus (DM)    :  No known cardiovascular family history     Review Of Systems:     All ROS are negative except per HPI       Allergies    penicillin (Anaphylaxis; Hives)    Intolerances          PHYSICAL EXAM    ICU Vital Signs Last 24 Hrs  T(C): 37.7 (24 Feb 2021 10:26), Max: 37.7 (24 Feb 2021 10:26)  T(F): 99.8 (24 Feb 2021 10:26), Max: 99.8 (24 Feb 2021 10:26)  HR: 152 (24 Feb 2021 10:30) (116 - 152)  BP: 243/139 (24 Feb 2021 10:30) (243/139 - 254/132)  RR: 30 (24 Feb 2021 10:30) (30 - 30)  SpO2: 99% (24 Feb 2021 10:40) (60% - 99%)      CONSTITUTIONAL:  Intubated    ENT:   Airway patent,   Mouth with normal mucosa.   No thrush    EYES:   pupils equal,   round and reactive to light.    CARDIAC:   Normal rate,   Regular rhythm.    Heart sounds S1, S2.   No edema    Vascular:   normal systolic impulse  no bruits    RESPIRATORY:   No wheezing   Normal chest expansion  No use of accessory muscles    GASTROINTESTINAL:  Abdomen soft   Non-tender,   No guarding,   + BS    MUSCULOSKELETAL:   Range of motion is not limited,  No clubbing, cyanosis    NEUROLOGICAL:   Doesn't follow commands    SKIN:   Skin normal color for race,   Warm and dry  No evidence of rash.    PSYCHIATRIC:   No apparent risk to self or others.        LABS:                          13.7   14.52 )-----------( 159      ( 24 Feb 2021 10:33 )             44.3                                               02-24    142  |  101  |  18  ----------------------------<  224<H>  3.7   |  25  |  0.5<L>    Ca    9.2      24 Feb 2021 10:33    TPro  7.0  /  Alb  4.4  /  TBili  0.6  /  DBili  x   /  AST  58<H>  /  ALT  43<H>  /  AlkPhos  165<H>  02-24      PT/INR - ( 24 Feb 2021 10:33 )   PT: 14.30 sec;   INR: 1.24 ratio         PTT - ( 24 Feb 2021 10:33 )  PTT:45.2 sec                                           CARDIAC MARKERS ( 24 Feb 2021 10:33 )  x     / <0.01 ng/mL / x     / x     / x                                                LIVER FUNCTIONS - ( 24 Feb 2021 10:33 )  Alb: 4.4 g/dL / Pro: 7.0 g/dL / ALK PHOS: 165 U/L / ALT: 43 U/L / AST: 58 U/L / GGT: x                                                                                               Mode: AC/ CMV (Assist Control/ Continuous Mandatory Ventilation)  RR (machine): 20  TV (machine): 400  FiO2: 100  PEEP: 5  ITime: 1.26  MAP: 10  PIP: 27                                      ABG - ( 24 Feb 2021 12:25 )  pH, Arterial: 7.17  pH, Blood: x     /  pCO2: x     /  pO2: x     / HCO3: x     / Base Excess: x     /  SaO2: x                   X-Rays reviewed:                                                                                    ECHO    CXR interpreted by me: ETMIMI ok    MEDICATIONS  (STANDING):  levETIRAcetam  IVPB 1000 milliGRAM(s) IV Intermittent once  niCARdipine Infusion 5 mG/Hr (25 mL/Hr) IV Continuous <Continuous>  sodium chloride 3%. 500 milliLiter(s) (50 mL/Hr) IV Continuous <Continuous>    MEDICATIONS  (PRN):

## 2021-02-24 NOTE — ED ADULT NURSE NOTE - NSIMPLEMENTINTERV_GEN_ALL_ED
Implemented All Fall with Harm Risk Interventions:  [place] to call system. Call bell, personal items and telephone within reach. Instruct patient to call for assistance. Room bathroom lighting operational. Non-slip footwear when patient is off stretcher. Physically safe environment: no spills, clutter or unnecessary equipment. Stretcher in lowest position, wheels locked, appropriate side rails in place. Provide visual cue, wrist band, yellow gown, etc. Monitor gait and stability. Monitor for mental status changes and reorient to person, place, and time. Review medications for side effects contributing to fall risk. Reinforce activity limits and safety measures with patient and family. Provide visual clues: red socks.

## 2021-02-24 NOTE — CONSULT NOTE ADULT - SUBJECTIVE AND OBJECTIVE BOX
Neurocritical Care Progress Note:    1. Brief Presentation: AMS    2. Today's Acute Problems: Left lobar ICH with IVH extension, likely hypertensive    3. Relevant brief History: 75y Female w/ a pmhx of B cell lymphoma on Imbruvica, anemia and pancytopenia with frequent transfusions, HTN, HLD, CAD s/p stent, on ASA, DM BIBEMS for AMS and difficulty breathing unknown last known time. Pt minimally responsive on arrival intubated in the ED for airway protection. CTH shows Large left frontal intraparenchymal hematoma with intraventricular extension into left lateral ventricle. Associated marked mass effect resulting in 0.9 cm midline shift to the right anteriorly.      6. Medications:   levETIRAcetam  IVPB 1000 milliGRAM(s) IV Intermittent once  niCARdipine Infusion 5 mG/Hr IV Continuous <Continuous>  sodium chloride 3%. 500 milliLiter(s) IV Continuous <Continuous>      7. Ancillary Management:   Chest PT[ ]   Head of bed >35 [ ]   Out of bed to chair [ ]   PT/OT/SP Eval [ ]   Spirometry[ ]   DVT prophalaxis[ ]    8.Neuro: pre-intubation  Awake: Spontaneously[ ] Occasionally[ ] To Voice [ ] To painful stimuli [x ]   AIert [ ]. Following commands: 3 steps[ ], 2 steps[ ], 1 step [ ], None x[ ]   Orientation: 0[x ], 1[ ], 2[ ], 3[ ]. Tracking objects with eyes: [ ]   Language: globally aphasic  Time off sedation for exam: N/A  Pupils: Right   >3   Left    >3 reactive      Corneal: +     Gag reflex: +    EOMI: left gaze preference    NIH STROKE SCALE  Item	                                                        Score  1 a.	Level of Consciousness	               	2  1 b. LOC Questions	                                2  1 c.	LOC Commands	                               	2  2.	Best Gaze	                                        1  3.	Visual	                                                0  4.	Facial Palsy	                                        0  5 a.	Motor Arm - Left	                                2  5 b.	Motor Arm - Right	                        3  6 a.	Motor Leg - Left	                                2  6 b.	Motor Leg - Right	                                3  7.	Limb Ataxia	                                        0  8.	Sensory	                                                1  9.	Language	                                        3  10.	Dysarthria	                                        2  11.	Extinction and Inattention  	        0  ______________________________________  TOTAL	                                                        23      mRS:  0 No symptoms at all  1 No significant disability despite symptoms; able to carry out all usual duties and activities without assistance  2 Slight disability; unable to carry out all previous activities, but able to look after own affairs  3 Moderate disability; requiring some help, but able to walk without assistance  4 Moderately severe disability; unable to walk without assistance and unable to attend to own bodily needs without assistance  5 Severe disability; bedridden, incontinent and requiring constant nursing care and attention  6 Dead    ICHs:3  Age >=80  GCS Score: 3-4 (2 pts)   GCS Score: 5-12 (1 pt)   ICH Volume: >30  (+) IVH  (+) Infratentorial        Last CTH: < from: CT Brain Stroke Protocol (02.24.21 @ 11:06) >  EXAM:  CT BRAIN STROKE PROTOCOL            PROCEDURE DATE:  02/24/2021            INTERPRETATION:  CLINICAL INDICATION: Stroke code    TECHNIQUE: CT of the head was performed without the administration of intravenous contrast.    COMPARISON: CT of the head dated 8/24/2020.    FINDINGS:    There is a large acute intraparenchymal hematoma measuring approximately 6.5 x 5.1 x 5.2 cm (AP x TV x CC) in the left frontal lobe with marked mass effect and mild surrounding edema. There is 0.9 cm midline shift to the right with rightward deviation of the anterior falx.    Also identified is trace scattered subarachnoid hemorrhage along the adjacent left frontal sulci, as well as intraventricular extension into the left lateral ventricle.    There are scattered patchy low attenuations in the periventricular cerebral white matter consistent with stable severe chronic microvascular ischemic changes.    There is no evidence of acute territorial/transcortical infarction.    There is no evidence of hydrocephalus.    The visualized intraorbital contents are normal. The imaged portions of the paranasal sinuses are aerated.The mastoid air cells are aerated. The visualized soft tissues and osseous structures appear normal. Partially visualized parotid glands are normal.      IMPRESSION:    Large left frontal intraparenchymal hematoma with intraventricular extension into left lateral ventricle. Associated marked mass effect resulting in 0.9 cm midline shift to the right anteriorly.    Trace acute subarachnoid hemorrhage along the left frontal sulci.    Stable severe chronic microvascular ischemic changes.    Spoke with HOWARD CONKLIN on 2/24/2021 11:07 AM with readback.    < end of copied text >      Last CTA/MRA:     Last CTP:    Last MRI:    Last TCD:    Last EEG:    EVD: [ ] [place]: [ ]     ICP:     CPP:     Level(cm):     24hr(ml):     CSF:     W:     R:     C:     P:     G:     LA:    9. Cardiovascular:   Vital Signs Last 24 Hrs  T(C): 37.7 (24 Feb 2021 10:26), Max: 37.7 (24 Feb 2021 10:26)  T(F): 99.8 (24 Feb 2021 10:26), Max: 99.8 (24 Feb 2021 10:26)  HR: 152 (24 Feb 2021 10:30) (116 - 152)  BP: 243/139 (24 Feb 2021 10:30) (243/139 - 254/132)  BP(mean): --  RR: 30 (24 Feb 2021 10:30) (30 - 30)  SpO2: 99% (24 Feb 2021 10:40) (60% - 99%)     Last Echo:    Last EKG:    CVP   MAP/CPP/SBP target:   CO:      CI:       Enzymes/Trop:    10. Respiratory:   ABG:ABG - ( 24 Feb 2021 12:25 )  pH, Arterial: 7.17  pH, Blood: x     /  pCO2: x     /  pO2: x     / HCO3: x     / Base Excess: x     /  SaO2: x           VBG:    Chest Xray:    Mode: AC/ CMV (Assist Control/ Continuous Mandatory Ventilation)  RR (machine): 20  TV (machine): 400  FiO2: 100  PEEP: 5  ITime: 1.26  MAP: 10  PIP: 27      Peak Pressure/[place] Pressure:    11.GI:    Prophalaxis:     Bowel mvt:     Abd distension:   LIVER FUNCTIONS - ( 24 Feb 2021 10:33 )  Alb: 4.4 g/dL / Pro: 7.0 g/dL / ALK PHOS: 165 U/L / ALT: 43 U/L / AST: 58 U/L / GGT: x             12.Renal/Fluids/Electrolytes:    02-24    142  |  101  |  18  ----------------------------<  224<H>  3.7   |  25  |  0.5<L>    Ca    9.2      24 Feb 2021 10:33    TPro  7.0  /  Alb  4.4  /  TBili  0.6  /  DBili  x   /  AST  58<H>  /  ALT  43<H>  /  AlkPhos  165<H>  02-24      I&O's Detail      13.ID:   TMax:   Vital Signs Last 24 Hrs  T(C): 37.7 (24 Feb 2021 10:26), Max: 37.7 (24 Feb 2021 10:26)  T(F): 99.8 (24 Feb 2021 10:26), Max: 99.8 (24 Feb 2021 10:26)  HR: 152 (24 Feb 2021 10:30) (116 - 152)  BP: 243/139 (24 Feb 2021 10:30) (243/139 - 254/132)  BP(mean): --  RR: 30 (24 Feb 2021 10:30) (30 - 30)  SpO2: 99% (24 Feb 2021 10:40) (60% - 99%)   Lactate, Blood: 2.6 mmol/L (02-24 @ 10:33)          Lines: Central[] Date inserted: Peripheral[]    14. Hematology:                         13.7   14.52 )-----------( 159      ( 24 Feb 2021 10:33 )             44.3      02-24    142  |  101  |  18  ----------------------------<  224<H>  3.7   |  25  |  0.5<L>    Ca    9.2      24 Feb 2021 10:33    TPro  7.0  /  Alb  4.4  /  TBili  0.6  /  DBili  x   /  AST  58<H>  /  ALT  43<H>  /  AlkPhos  165<H>  02-24     PT/INR - ( 24 Feb 2021 10:33 )   PT: 14.30 sec;   INR: 1.24 ratio         PTT - ( 24 Feb 2021 10:33 )  PTT:45.2 sec    DVT Prophylaxis Lovenox[ ] Heparin[ ] Venodynes[ ] SCD's[ ]    15. Impression: 74 yo female with PMH of HTN, DM, DLD, B cell lymphoma on Imbruvica, Depression, presents with acute onset AMS progressing to obtundation and SOB, GCS of 6 in ER and required intubation. CTH with large left IPH likely hypertensive etiology. ICH score 3.       16. Suggestions:  BP control syst <160 titrate Nicardipine accordingly  Neurosurg consult stat  Repeat CTH in 6-8 hours and CTA head and neck  Q 1 neurochecks  Start 3% Saline at 50 cc and hour (keep Na 145-155)  Keppra load 1 g once and continue 500 BID        Plan discussed with neuroattending Dr. Thompson

## 2021-02-24 NOTE — ED PROVIDER NOTE - PROGRESS NOTE DETAILS
max davey neuro surg aware. stroke code called upon presentation, intubated for airway protection, ct showed bleed, neuro at bedside,, ray pa neuro surg aware. as per neuro surg, give plts for reversal, cardene for sbp 100-160 goal, no manitol, hypertonic fluids as per order, will take to or asap, pt sent to ct for cta, neurosurg will follow these scans. icu fellow aware lisseth approving.

## 2021-02-24 NOTE — ED ADULT TRIAGE NOTE - CHIEF COMPLAINT QUOTE
BIBA from home, for AMS/shortness of breath, vomited x 1 episode BIBA from home, for AMS/shortness of breath, vomited x 1 episode  [place] [phone]

## 2021-02-24 NOTE — H&P ADULT - NSHPPHYSICALEXAM_GEN_ALL_CORE
GENERAL: NAD, lying in bed comfortably  HEAD: Atraumatic, Normocephalic  EYES: EOMI, PERRLA, conjunctiva pink and cornea white  ENT: Normal external ears and nose, no discharges; moist mucous membranes, no erythema on posterior oropharynx  NECK: Supple, nontender to palpation; no JVD  CHEST/LUNG: Clear to auscultation bilaterally; No rales, rhonchi, wheezing, or rubs. Unlabored respirations  HEART: Regular rate and rhythm; No murmurs, rubs, or gallops  ABDOMEN: Soft, nontender, nondistended; no rebound tenderness, no guarding; no hepatomegaly; normoactive/hyperactive/hypoactive bowel sounds  EXTREMITIES:  2+ Peripheral Pulses, brisk capillary refill. No clubbing, cyanosis, or petal edema  NERVOUS SYSTEM: Alert and oriented to person, time, place and situation, speech clear. No focal deficits   MSK: FROM all 4 extremities, full and equal strength  SKIN: No rashes or lesions GENERAL: Sedated  HEAD: Atraumatic, Normocephalic  EYES: EOMI, PERRLA, conjunctiva pink and cornea white  ENT: Normal external ears and nose, no discharges; dry blood on her nose, ET tube in place  NECK: Supple, nontender to palpation; no JVD: RIJ TLC in place  CHEST/LUNG: Bilateral air sounds  HEART: Regular rate and rhythm; No murmurs, rubs, or gallops  ABDOMEN: Soft, nontender, nondistended; no hepatomegaly; normoactive bowel sounds  EXTREMITIES:  2+ Peripheral Pulses, brisk capillary refill. No clubbing, cyanosis, or petal edema  NERVOUS SYSTEM: Sedated  SKIN: No rashes or lesions

## 2021-02-24 NOTE — ED PROVIDER NOTE - PHYSICAL EXAMINATION
Physical Exam    Vital Signs: I have reviewed the initial vital signs.  Constitutional: ill appearing, vomiting  Eyes: Conjunctiva pink, Sclera clear, pupils reactive   Cardiovascular: S1 and S2, tachycardic, regular rhythm, well-perfused extremities, radial pulses equal and 2+  Respiratory: labored respiratory effort, rhonchi scattered  Gastrointestinal: soft, non-tender abdomen, no pulsatile mass, normal bowl sounds  Musculoskeletal: supple neck, no lower extremity edema, no midline tenderness  Integumentary: warm, dry, no rash  Neurologic: eyes open spontaneously, does not follow commands, vomiting, not moving extremities spontaneously.

## 2021-02-24 NOTE — ED ADULT NURSE REASSESSMENT NOTE - NS ED NURSE REASSESS COMMENT FT1
Pt BIBEMS, pt minimally responsive with difficulty breathing upon arrival, stroke code called by ED provider, intubated for airway protection at 1035, lip line 23, ET tube size 7.5.

## 2021-02-24 NOTE — ED PROVIDER NOTE - ATTENDING CONTRIBUTION TO CARE
76 yo F pmh of splenic cancer or oral chemo, dm, htn, hld, cad, copd no home oxygen, chornic UTI presents with difficulty breathing and alertered mental status. As per EMS she normally is fully functioning, found by family this morning altered. EMS found her to be hypoxic to the 60s, improved with NRB. Also report few episodes of n/v with possible aspiration prior to arrival. Moving all extremities. Follows with Dr. Campuzano. Patient found to be hypoxic. 74 yo F pmh of splenic cancer or oral chemo, dm, htn, hld, cad, copd no home oxygen, chornic UTI presents with difficulty breathing and alertered mental status. As per EMS she normally is fully functioning, found by family this morning altered. EMS found her to be hypoxic to the 60s, improved with NRB. Also report few episodes of n/v with possible aspiration prior to arrival. Moving all extremities. Follows with Dr. Campuzano. Patient found to be hypoxic. Intubated on arrival with stroke code called. Found to have a bleed.     CONSTITUTIONAL: Well-developed; well-nourished; in no acute distress.   SKIN: warm, dry  HEAD: Normocephalic; atraumatic.  EYES: PERRL, EOMI, no conjunctival erythema  ENT: No nasal discharge; airway clear.  NECK: Supple; non tender.  CARD: S1, S2 normal;  Regular rate and rhythm.   RESP: + ronchi bilaterally + tachypnea, + retractions, + labored breathing.   ABD: soft non tender, non distended, no rebound or guarding  EXT: Normal ROM.  5/5 strength in all 4 extremities   LYMPH: No acute cervical adenopathy.  NEURO: following verbal commands, moving all extremities.   PSYCH: Cooperative, appropriate.

## 2021-02-24 NOTE — CHART NOTE - NSCHARTNOTEFT_GEN_A_CORE
PACU ANESTHESIA ADMISSION NOTE      Procedure: Craniotomy for intracerebral hemorrhage      Post op diagnosis:      __x__  Intubated  TV:__400____       Rate: ___16___      FiO2: __50%____    ____  Patent Airway    ____  Full return of protective reflexes    ____  Full recovery from anesthesia / back to baseline     Vitals:  see anesthesia record       Mental Status:  ____ Awake   _____ Alert   _____ Drowsy   __x___ Sedated    Nausea/Vomiting:  ____ NO  ______Yes,   See Post - Op Orders          Pain Scale (0-10):  _____    Treatment: ____ None    ___x_ See Post - Op/PCA Orders    Post - Operative Fluids:   ____ Oral   __x__ See Post - Op Orders    Plan: Discharge:   ____Home       _____Floor     __x___Critical Care    _____  Other:_________________    Comments:  Uneventful intraoperative course. No anesthesia issues or complications noted. Patient stable upon arrival to PACU. Report given to RN. Discharge to critical care when criteria met.

## 2021-02-24 NOTE — CONSULT NOTE ADULT - SUBJECTIVE AND OBJECTIVE BOX
HISTORY OF PRESENT ILLNESS: 75y Female w/ a pmhx of B cell lymphoma on chemotherapy, anemia and pancytopenia with frequent transfusions, HTN, HLD, CAD s/p stent, on ASA, DM BIBEMS for difficulty breathing unknown last known time. Pt minimally responsive on arrival intubated in the ED for airway protection. CTH shows Large left frontal intraparenchymal hematoma with intraventricular extension into left lateral ventricle. Associated marked mass effect resulting in 0.9 cm midline shift to the right anteriorly.    PAST MEDICAL & SURGICAL HISTORY:  Anemia    DM (diabetes mellitus)    High cholesterol    HTN (hypertension)    B-cell chronic lymphocytic leukemia variant    CAD (coronary artery disease)  s/p stenting    H/O heart artery stent    FAMILY HISTORY:  Family history of diabetes mellitus (DM)    Allergies  penicillin (Anaphylaxis; Hives)    REVIEW OF SYSTEMS : All systems negative other than those listed in HPI  General:	-  Skin/Breast: -  Ophthalmologic: -   ENMT: -  Respiratory and Thorax: -  Cardiovascular: -	  Gastrointestinal: -  Genitourinary:-  Musculoskeletal:	-  Neurological:	-  Psychiatric:	-  Hematology/Lymphatics:	-  Endocrine:-  Allergic/Immunologic:	-    MEDICATIONS:  acyclovir 400 mg oral tablet: 1 tab(s) orally once a day  alcohol swabs : Apply topically to affected area 4 times a day   amLODIPine 5 mg oral tablet: 1 tab(s) orally once a day  aspirin 81 mg oral tablet, chewable: 1 tab(s) orally once a day  atorvastatin 80 mg oral tablet: 1 tab(s) orally once a day (at bedtime)  fluconazole 200 mg oral tablet: 2 tab(s) orally once a day  folic acid 1 mg oral tablet: 1 tab(s) orally once a day  glucometer (per patient&#x27;s insurance): Test blood sugars four times a day. Dispense #1 glucometer.  lancets: 1 application subcutaneously 4 times a day   metFORMIN 500 mg oral tablet: 1 tab(s) orally 2 times a day   metoprolol tartrate 50 mg oral tablet: 1 tab(s) orally 2 times a day  pantoprazole 40 mg oral delayed release tablet: 1 tab(s) orally 2 times a day  test strips (per patient&#x27;s insurance): 1 application subcutaneously 4 times a day. ** Compatible with patient&#x27;s glucometer **    Anticoagulation: Aspirin    OTHER:  niCARdipine Infusion 5 mG/Hr IV Continuous <Continuous>    Vital Signs Last 24 Hrs  T(C): 37.7 (24 Feb 2021 10:26), Max: 37.7 (24 Feb 2021 10:26)  T(F): 99.8 (24 Feb 2021 10:26), Max: 99.8 (24 Feb 2021 10:26)  HR: 152 (24 Feb 2021 10:30) (116 - 152)  BP: 243/139 (24 Feb 2021 10:30) (243/139 - 254/132)  BP(mean): --  RR: 30 (24 Feb 2021 10:30) (30 - 30)  SpO2: 99% (24 Feb 2021 10:40) (60% - 99%)    Physical Exam :  General :   Pt intubated, off sedation for 40min   Tongue midline  No eye opening   Does not follow commands   Occular :   Pupils 2mm b/l reactive, no deviation or gaze preference   Motor :   No response to painful stimuli   Sensory :  Intact bilaterally     LABS:                        13.7   14.52 )-----------( 159      ( 24 Feb 2021 10:33 )             44.3     02-24    142  |  101  |  18  ----------------------------<  224<H>  3.7   |  25  |  0.5<L>    Ca    9.2      24 Feb 2021 10:33    TPro  7.0  /  Alb  4.4  /  TBili  0.6  /  DBili  x   /  AST  58<H>  /  ALT  43<H>  /  AlkPhos  165<H>  02-24    PT/INR - ( 24 Feb 2021 10:33 )   PT: 14.30 sec;   INR: 1.24 ratio      PTT - ( 24 Feb 2021 10:33 )  PTT:45.2 sec    RADIOLOGY & ADDITIONAL STUDIES:  < from: CT Brain Stroke Protocol (02.24.21 @ 11:06) >    IMPRESSION:    Large left frontal intraparenchymal hematoma with intraventricular extension into left lateral ventricle. Associated marked mass effect resulting in 0.9 cm midline shift to the right anteriorly.    Trace acute subarachnoid hemorrhage along the left frontal sulci.    Stable severe chronic microvascular ischemic changes.    Spoke with HOWARD CONKLIN on 2/24/2021 11:07 AM with readback.    KAY MCDONALD M.D., ATTENDING RADIOLOGIST  This document has beenelectronically signed. Feb 24 2021 11:15AM    < end of copied text >    Assessment / Plan: 75y F +ASA use, Large left frontal .9cm MLS, SBP >200 on arrival no known trauma. Last well yesterday 2.23.21 intubated in ED, off sedation not following commands, no eye opening. pupils 2mm b/l and reactive, no gag, no corneals, no response to painful stim.  - BP control   - Keppra   - non con CTH w/ brain lab protocol (0 gantry 1mm cuts)  - CTA following CTH   - Neuro Crit   - Dr. Rodriguez to speak to daughter who is HCP to determine medical vs surgical management        HISTORY OF PRESENT ILLNESS: 75y Female w/ a pmhx of B cell lymphoma on chemotherapy, anemia and pancytopenia with frequent transfusions, HTN, HLD, CAD s/p stent, on ASA, DM BIBEMS for difficulty breathing unknown last known time. Pt minimally responsive on arrival intubated in the ED for airway protection. CTH shows Large left frontal intraparenchymal hematoma with intraventricular extension into left lateral ventricle. Associated marked mass effect resulting in 0.9 cm midline shift to the right anteriorly.    PAST MEDICAL & SURGICAL HISTORY:  Anemia    DM (diabetes mellitus)    High cholesterol    HTN (hypertension)    B-cell chronic lymphocytic leukemia variant    CAD (coronary artery disease)  s/p stenting    H/O heart artery stent    FAMILY HISTORY:  Family history of diabetes mellitus (DM)    Allergies  penicillin (Anaphylaxis; Hives)    REVIEW OF SYSTEMS : All systems negative other than those listed in HPI  General:	-  Skin/Breast: -  Ophthalmologic: -   ENMT: -  Respiratory and Thorax: -  Cardiovascular: -	  Gastrointestinal: -  Genitourinary:-  Musculoskeletal:	-  Neurological:	-  Psychiatric:	-  Hematology/Lymphatics:	-  Endocrine:-  Allergic/Immunologic:	-    MEDICATIONS:  acyclovir 400 mg oral tablet: 1 tab(s) orally once a day  alcohol swabs : Apply topically to affected area 4 times a day   amLODIPine 5 mg oral tablet: 1 tab(s) orally once a day  aspirin 81 mg oral tablet, chewable: 1 tab(s) orally once a day  atorvastatin 80 mg oral tablet: 1 tab(s) orally once a day (at bedtime)  fluconazole 200 mg oral tablet: 2 tab(s) orally once a day  folic acid 1 mg oral tablet: 1 tab(s) orally once a day  glucometer (per patient&#x27;s insurance): Test blood sugars four times a day. Dispense #1 glucometer.  lancets: 1 application subcutaneously 4 times a day   metFORMIN 500 mg oral tablet: 1 tab(s) orally 2 times a day   metoprolol tartrate 50 mg oral tablet: 1 tab(s) orally 2 times a day  pantoprazole 40 mg oral delayed release tablet: 1 tab(s) orally 2 times a day  test strips (per patient&#x27;s insurance): 1 application subcutaneously 4 times a day. ** Compatible with patient&#x27;s glucometer **    Anticoagulation: Aspirin    OTHER:  niCARdipine Infusion 5 mG/Hr IV Continuous <Continuous>    Vital Signs Last 24 Hrs  T(C): 37.7 (24 Feb 2021 10:26), Max: 37.7 (24 Feb 2021 10:26)  T(F): 99.8 (24 Feb 2021 10:26), Max: 99.8 (24 Feb 2021 10:26)  HR: 152 (24 Feb 2021 10:30) (116 - 152)  BP: 243/139 (24 Feb 2021 10:30) (243/139 - 254/132)  BP(mean): --  RR: 30 (24 Feb 2021 10:30) (30 - 30)  SpO2: 99% (24 Feb 2021 10:40) (60% - 99%)    Physical Exam :  General :   Pt intubated, off sedation for 40min   Tongue midline  No eye opening   Does not follow commands   Occular :   Pupils 2mm b/l reactive, no deviation or gaze preference   Motor :   No response to painful stimuli   Sensory :  Intact bilaterally     LABS:                        13.7   14.52 )-----------( 159      ( 24 Feb 2021 10:33 )             44.3     02-24    142  |  101  |  18  ----------------------------<  224<H>  3.7   |  25  |  0.5<L>    Ca    9.2      24 Feb 2021 10:33    TPro  7.0  /  Alb  4.4  /  TBili  0.6  /  DBili  x   /  AST  58<H>  /  ALT  43<H>  /  AlkPhos  165<H>  02-24    PT/INR - ( 24 Feb 2021 10:33 )   PT: 14.30 sec;   INR: 1.24 ratio      PTT - ( 24 Feb 2021 10:33 )  PTT:45.2 sec    RADIOLOGY & ADDITIONAL STUDIES:  < from: CT Brain Stroke Protocol (02.24.21 @ 11:06) >    IMPRESSION:    Large left frontal intraparenchymal hematoma with intraventricular extension into left lateral ventricle. Associated marked mass effect resulting in 0.9 cm midline shift to the right anteriorly.    Trace acute subarachnoid hemorrhage along the left frontal sulci.    Stable severe chronic microvascular ischemic changes.    Spoke with HOWARD CONKLIN on 2/24/2021 11:07 AM with readback.    KAY MCDONALD M.D., ATTENDING RADIOLOGIST  This document has beenelectronically signed. Feb 24 2021 11:15AM    < end of copied text >    Assessment / Plan: 75y F +ASA use, Large left frontal .9cm MLS, SBP >200 on arrival no known trauma. Last well yesterday 2.23.21 intubated in ED, off sedation not following commands, no eye opening. pupils 2mm b/l and reactive, no gag, no corneals, no response to painful stim.  - BP control   - Keppra   - non con CTH w/ brain lab protocol (0 gantry 1mm cuts)  - CTA following CTH   - Neuro Crit   - Pt going to OR for emergent Right Frontal Crani Resection of Hematoma

## 2021-02-25 NOTE — PROGRESS NOTE ADULT - ATTENDING COMMENTS
74 y/o female with Left Intracerebral Hemorrhage.  S/P Left Craniotomy. EVD placement.  Coma.  Acute respiratory failure with hypoxia.  HTN.  B Cell Lymphoma.  DM    PLAN:  - sedation with Precedex  - continue Vent support; follow ABG  - keep normotensive  - start tube feeds; bowel regiment  - follow serum electrolytes and UOP  - monitor for DI  - FS, ise ISS as needed  - GI and DVT prophylaxis 74 y/o female with Left Intracerebral Hemorrhage.  S/P Left Craniotomy. EVD placement.  Cerebral edema.  Coma.  Acute respiratory failure with hypoxia.  HTN.  B Cell Lymphoma.  DM    PLAN:  - sedation with Precedex  - continue Vent support; follow ABG  - keep normotensive  - start tube feeds; bowel regiment  - follow serum electrolytes and UOP  - monitor for DI  - FS, ise ISS as needed  - GI and DVT prophylaxis

## 2021-02-25 NOTE — PROGRESS NOTE ADULT - SUBJECTIVE AND OBJECTIVE BOX
NEUROSURGERY POST OP NOTE:    S/P 4 U plts - pre and post Left MIS craniotomy with evacuation of hematoma.   2U Plts            Vital Signs Last 24 Hrs  T(C): 37.7 (25 Feb 2021 03:00), Max: 39.1 (24 Feb 2021 21:59)  T(F): 99.8 (25 Feb 2021 03:00), Max: 102.3 (24 Feb 2021 21:59)  HR: 103 (25 Feb 2021 03:00) (102 - 155)  BP: 116/58 (25 Feb 2021 03:00) (98/52 - 254/132)  BP(mean): 83 (25 Feb 2021 03:00) (72 - 95)  RR: 27 (25 Feb 2021 03:00) (17 - 35)  SpO2: 100% (25 Feb 2021 03:00) (60% - 100%)      02-24-21 @ 07:01  -  02-25-21 @ 04:11  --------------------------------------------------------  IN: 1948.2 mL / OUT: 2125 mL / NET: -176.8 mL        atorvastatin 80 milliGRAM(s) Oral at bedtime  chlorhexidine 0.12% Liquid 15 milliLiter(s) Oral Mucosa every 12 hours  dexMEDEtomidine Infusion 0.01 MICROgram(s)/kG/Hr IV Continuous <Continuous>  dextrose 40% Gel 15 Gram(s) Oral once  dextrose 5%. 1000 milliLiter(s) IV Continuous <Continuous>  dextrose 5%. 1000 milliLiter(s) IV Continuous <Continuous>  dextrose 50% Injectable 25 Gram(s) IV Push once  dextrose 50% Injectable 12.5 Gram(s) IV Push once  dextrose 50% Injectable 25 Gram(s) IV Push once  fentaNYL    Injectable 25 MICROGram(s) IV Push every 4 hours PRN  glucagon  Injectable 1 milliGRAM(s) IntraMuscular once  insulin regular  human corrective regimen sliding scale   IV Push every 4 hours  levETIRAcetam  IVPB 750 milliGRAM(s) IV Intermittent every 12 hours  norepinephrine Infusion 0.01 MICROgram(s)/kG/Min IV Continuous <Continuous>  pantoprazole  Injectable 40 milliGRAM(s) IV Push daily  senna 2 Tablet(s) Oral at bedtime PRN  sodium chloride 0.9%. 1000 milliLiter(s) IV Continuous <Continuous>  vancomycin  IVPB 1000 milliGRAM(s) IV Intermittent every 12 hours      Exam: Precedex   Intubated Sedated Pupil 3mm b/l symmetric ?hippus  No eye opening  No gaze preference noted  Withdraws to pain in LUE/LLE      WOUND/DRAINS:  EVD Clamped  ICP 2-8      Assessment: Left MIS craniotomy with evacuation of hematoma      Plan: Keep EVD at 10cm H20 and clamped - if ICP>20 sustain please call Neurosurgery   HCT - done immediately Post op   Keep SBP below 140  Keppra 750 mg q 12H   Post transfusion of 2 U of Plts  Keep HOB at 30 degrees  Neuro checks/wake exam am  Keep Na normal & Normothermic

## 2021-02-25 NOTE — CONSULT NOTE ADULT - SUBJECTIVE AND OBJECTIVE BOX
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 Patient was AAO x4 and fully functional at baseline. Last known normal was 11:00pm 2/23 before beds. In 2/24 at 8:45AM, patient was found to be lethargy in bed by his son, was AAO x 4 and able to talk to his son, but started coughing and had one episode of nose bleed. At 9:15, her physical therapist noticed her becoming more lethargic, but vitals were normal. Soon at 9:30, patient was unresponsive and had a blank stare. EMS was called and her BP was found to be around 230/120. She was rushed to ED as Code Stroke. NIHSS 23. CTH showed Large left frontal intraparenchymal hematoma with intraventricular extension into left lateral ventricle, associated with marked mass effect resulting in 0.9 cm midline shift to the right anteriorly. Patient was intubated for airway protection. S/p 2 units of platelet for ASA reversal. Neurosurgery was planning for emergent OR resection of hematoma. (24 Feb 2021 13:43)    Additional history  History detailed as above. 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 She has complicated history including history of multiple UTIs complicated by septic shock and anemia and thrombocytopenia likely secondary to Imbruvica. She recently was doing well with dramatic improvement in both hemoglobin and platelet levels. Unfortunately she now presents with significant large left frontal intraparenchymal hematoma with intraventricular extension into left lateral ventricle, associated with marked mass effect resulting in 0.9 cm midline shift to the right anteriorly. She received 2 units of platelets for ASA reversal and received 2 units intraoperatively. She underwent Craniotomy for intracerebral hemorrhage. Repeat CT scan postoperatively showed scattered subarachnoid hemorrhage and layering intraventricular hemorrhage, stable to slightly increased but with decreased mass effect with near complete resolution of the right with midline shift.        ROS:  Negative except for:    PAST MEDICAL & SURGICAL HISTORY:  Anemia    DM (diabetes mellitus)    High cholesterol    HTN (hypertension)    B-cell chronic lymphocytic leukemia variant    CAD (coronary artery disease)  s/p stenting    H/O heart artery stent        SOCIAL HISTORY:    FAMILY HISTORY:  Family history of diabetes mellitus (DM)        MEDICATIONS  (STANDING):  atorvastatin 80 milliGRAM(s) Oral at bedtime  chlorhexidine 0.12% Liquid 15 milliLiter(s) Oral Mucosa every 12 hours  dexMEDEtomidine Infusion 0.01 MICROgram(s)/kG/Hr (0.18 mL/Hr) IV Continuous <Continuous>  dextrose 40% Gel 15 Gram(s) Oral once  dextrose 5%. 1000 milliLiter(s) (50 mL/Hr) IV Continuous <Continuous>  dextrose 5%. 1000 milliLiter(s) (100 mL/Hr) IV Continuous <Continuous>  dextrose 50% Injectable 25 Gram(s) IV Push once  dextrose 50% Injectable 12.5 Gram(s) IV Push once  dextrose 50% Injectable 25 Gram(s) IV Push once  glucagon  Injectable 1 milliGRAM(s) IntraMuscular once  insulin regular  human corrective regimen sliding scale   IV Push every 4 hours  levETIRAcetam  IVPB 750 milliGRAM(s) IV Intermittent every 12 hours  norepinephrine Infusion 0.01 MICROgram(s)/kG/Min (0.69 mL/Hr) IV Continuous <Continuous>  pantoprazole  Injectable 40 milliGRAM(s) IV Push daily  sodium chloride 0.9%. 1000 milliLiter(s) (100 mL/Hr) IV Continuous <Continuous>  vancomycin  IVPB 1000 milliGRAM(s) IV Intermittent every 12 hours    MEDICATIONS  (PRN):  fentaNYL    Injectable 25 MICROGram(s) IV Push every 4 hours PRN Severe Pain (7 - 10)  senna 2 Tablet(s) Oral at bedtime PRN Constipation    Height (cm): 160 (02-24-21 @ 17:20)  Weight (kg): 73.028 (02-24-21 @ 21:59)  BMI (kg/m2): 28.5 (02-24-21 @ 21:59)  BSA (m2): 1.76 (02-24-21 @ 21:59)  Allergies    penicillin (Anaphylaxis; Hives)    Intolerances        Vital Signs Last 24 Hrs  T(C): 38.4 (25 Feb 2021 09:00), Max: 39.1 (24 Feb 2021 21:59)  T(F): 101.1 (25 Feb 2021 09:00), Max: 102.3 (24 Feb 2021 21:59)  HR: 93 (25 Feb 2021 09:00) (93 - 155)  BP: 108/53 (25 Feb 2021 09:00) (98/52 - 254/132)  BP(mean): 83 (25 Feb 2021 03:00) (72 - 95)  RR: 19 (25 Feb 2021 09:00) (17 - 35)  SpO2: 99% (25 Feb 2021 09:00) (60% - 100%)    PHYSICAL EXAM  General: adult in NAD  HEENT: clear oropharynx, anicteric sclera, pink conjunctiva  Neck: supple  CV: normal S1/S2 with no murmur rubs or gallops  Lungs: positive air movement b/l ant lungs,clear to auscultation, no wheezes, no rales  Abdomen: soft non-tender non-distended, no hepatosplenomegaly  Ext: no clubbing cyanosis or edema  Skin: no rashes and no petechiae  Neuro: alert and oriented X 4, no focal deficits      LABS:                          8.8    7.32  )-----------( 153      ( 25 Feb 2021 00:00 )             28.2         Mean Cell Volume : 83.4 fL  Mean Cell Hemoglobin : 26.0 pg  Mean Cell Hemoglobin Concentration : 31.2 g/dL  Auto Neutrophil # : 5.54 K/uL  Auto Lymphocyte # : 0.48 K/uL  Auto Monocyte # : 0.76 K/uL  Auto Eosinophil # : 0.00 K/uL  Auto Basophil # : 0.03 K/uL  Auto Neutrophil % : 75.6 %  Auto Lymphocyte % : 6.6 %  Auto Monocyte % : 10.4 %  Auto Eosinophil % : 0.0 %  Auto Basophil % : 0.4 %      Serial CBC's  02-25 @ 00:00  Hct-28.2 / Hgb-8.8 / Plat-153 / RBC-3.38 / WBC-7.32  Serial CBC's  02-24 @ 10:33  Hct-44.3 / Hgb-13.7 / Plat-159 / RBC-5.27 / WBC-14.52      02-25    142  |  112<H>  |  15  ----------------------------<  167<H>  4.6   |  22  |  <0.5<L>    Ca    7.8<L>      25 Feb 2021 04:40  Phos  4.0     02-25  Mg     1.1     02-25    TPro  7.0  /  Alb  4.4  /  TBili  0.6  /  DBili  x   /  AST  58<H>  /  ALT  43<H>  /  AlkPhos  165<H>  02-24      PT/INR - ( 25 Feb 2021 00:00 )   PT: 17.80 sec;   INR: 1.55 ratio         PTT - ( 25 Feb 2021 00:00 )  PTT:34.9 sec      BLOOD SMEAR INTERPRETATION:       RADIOLOGY & ADDITIONAL STUDIES:    < from: CT Head No Cont (02.24.21 @ 16:21) >  INTERPRETATION:  CLINICAL INDICATION: Follow-up hemorrhage.    Technique: CT of the head was performed without contrast.    Multiple contiguous axial images were acquired from the skullbase to the vertex without the administration of intravenous contrast.  Coronal and sagittal reformations were made.    COMPARISON:  prior head CT dated CT head dated 2/24/2021 at 10:56 AM    FINDINGS:    Redemonstration of a large left frontal intraparenchymal hemorrhage with local surrounding mass effect and midline shift to the right. The hematoma now measures approximately 8.1 x 4.8 x 5.6 cm (AP x TR x CC) and appears slightly increased in size since the prior examination. There is minimal intraventricular extension of hemorrhage into the left lateral ventricle. There is approximately 1.0 cm midline shift to the right. There is subfalcine herniation.    There is bilateral sulcal subarachnoid hemorrhage, slightly increased since prior examination..    The basal cisterns are patent. There is no hydrocephalus.    The calvarium is intact. There is partial opacification of the bilateral mastoid air cells and middle ear cavities, left greater than right. Visualized paranasal sinuses are unremarkable. Orbits are unremarkable. Partially visualized endotracheal and enteric tubes within the oropharynx.    IMPRESSION:  Slight interval increase in size of a large left frontal lobe acute intraparenchymal hemorrhage with intraventricular extension, local mass effect and midline shift as well as minimal bilateral sulcal subarachnoid hemorrhage.    MARIO JAY M.D., ATTENDING RADIOLOGIST  This document has been electronically signed. Feb 24 2021  4:29PM    < end of copied text >    < from: CT Head No Cont (02.24.21 @ 22:32) >  INTERPRETATION:  Clinical History / Reason for exam: Left intracranial hemorrhage, status post craniotomy.    Technique: Multiple contiguous axial CT images of the head were obtainedfrom the base of the skull to the vertex without administration of intravenous contrast. Sagittal and coronal reformations were also obtained.    Comparison: CT head performed earlier the same day.    FINDINGS:    The patient is status post interval left frontal craniotomy with expected postsurgical changes including multiple overlying skin staples, and pneumocephalus. There has been interval placement of a left transfrontal EVD traversing the left lateral ventricle and third ventricle, with distal tip at the level of the cerebral aqueduct/tectum.    There is a left frontal hematoma cavity measuring about 4.5 cm, with residual blood products noted. Left frontal extra-axial collection measures about 5 mm, with air, fluid and blood products.    There is scattered subarachnoid hemorrhage and layering intraventricular hemorrhage, stable to slightly increased.    There is decreased mass effect with near complete resolution of the right with midline shift.    Scattered paranasal sinus mucosal thickening and bilateral mastoid fluid noted.    IMPRESSION:  Since the CT head performed earlier the same day at 4:13 PM:    1.  Interval left frontal craniotomy for hematoma evacuation. Expected postsurgical changes as described.    2.  Interval placement of a left transfrontal EVD with distal tip in the region of the cerebral aqueduct/tectum. Increased ventricular size may reflect decompression.    3.  Left frontal hematoma cavity measures about 4.5 cm containing fluid, blood products and air.    4.  Scattered subarachnoid hemorrhage and layering intraventricular hemorrhage, stable to slightly increased.            LEVAR TAM M.D., RESIDENT RADIOLOGIST  This document has been electronically signed.  CORRIE FORRESTER MD; Attending Radiologist  This document has been electronically signed. 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 Patient was AAO x4 and fully functional at baseline. Last known normal was 11:00pm 2/23 before beds. In 2/24 at 8:45AM, patient was found to be lethargy in bed by his son, was AAO x 4 and able to talk to his son, but started coughing and had one episode of nose bleed. At 9:15, her physical therapist noticed her becoming more lethargic, but vitals were normal. Soon at 9:30, patient was unresponsive and had a blank stare. EMS was called and her BP was found to be around 230/120. She was rushed to ED as Code Stroke. NIHSS 23. CTH showed Large left frontal intraparenchymal hematoma with intraventricular extension into left lateral ventricle, associated with marked mass effect resulting in 0.9 cm midline shift to the right anteriorly. Patient was intubated for airway protection. S/p 2 units of platelet for ASA reversal. Neurosurgery was planning for emergent OR resection of hematoma. (24 Feb 2021 13:43)    Additional history  History detailed as above. 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 She has complicated history including history of multiple UTIs complicated by septic shock and anemia and thrombocytopenia likely secondary to Imbruvica. She recently was doing well with dramatic improvement in both hemoglobin and platelet levels. Unfortunately she now presents with significant large left frontal intraparenchymal hematoma with intraventricular extension into left lateral ventricle, associated with marked mass effect resulting in 0.9 cm midline shift to the right anteriorly. She received 2 units of platelets for ASA reversal and received 2 units intraoperatively. She underwent Craniotomy for intracerebral hemorrhage. Repeat CT scan postoperatively showed scattered subarachnoid hemorrhage and layering intraventricular hemorrhage, stable to slightly increased but with decreased mass effect with near complete resolution of the right with midline shift. EVD was also placed.         ROS:  Negative except for:    PAST MEDICAL & SURGICAL HISTORY:  Anemia    DM (diabetes mellitus)    High cholesterol    HTN (hypertension)    B-cell chronic lymphocytic leukemia variant    CAD (coronary artery disease)  s/p stenting    H/O heart artery stent        SOCIAL HISTORY:    FAMILY HISTORY:  Family history of diabetes mellitus (DM)        MEDICATIONS  (STANDING):  atorvastatin 80 milliGRAM(s) Oral at bedtime  chlorhexidine 0.12% Liquid 15 milliLiter(s) Oral Mucosa every 12 hours  dexMEDEtomidine Infusion 0.01 MICROgram(s)/kG/Hr (0.18 mL/Hr) IV Continuous <Continuous>  dextrose 40% Gel 15 Gram(s) Oral once  dextrose 5%. 1000 milliLiter(s) (50 mL/Hr) IV Continuous <Continuous>  dextrose 5%. 1000 milliLiter(s) (100 mL/Hr) IV Continuous <Continuous>  dextrose 50% Injectable 25 Gram(s) IV Push once  dextrose 50% Injectable 12.5 Gram(s) IV Push once  dextrose 50% Injectable 25 Gram(s) IV Push once  glucagon  Injectable 1 milliGRAM(s) IntraMuscular once  insulin regular  human corrective regimen sliding scale   IV Push every 4 hours  levETIRAcetam  IVPB 750 milliGRAM(s) IV Intermittent every 12 hours  norepinephrine Infusion 0.01 MICROgram(s)/kG/Min (0.69 mL/Hr) IV Continuous <Continuous>  pantoprazole  Injectable 40 milliGRAM(s) IV Push daily  sodium chloride 0.9%. 1000 milliLiter(s) (100 mL/Hr) IV Continuous <Continuous>  vancomycin  IVPB 1000 milliGRAM(s) IV Intermittent every 12 hours    MEDICATIONS  (PRN):  fentaNYL    Injectable 25 MICROGram(s) IV Push every 4 hours PRN Severe Pain (7 - 10)  senna 2 Tablet(s) Oral at bedtime PRN Constipation    Height (cm): 160 (02-24-21 @ 17:20)  Weight (kg): 73.028 (02-24-21 @ 21:59)  BMI (kg/m2): 28.5 (02-24-21 @ 21:59)  BSA (m2): 1.76 (02-24-21 @ 21:59)  Allergies    penicillin (Anaphylaxis; Hives)    Intolerances        Vital Signs Last 24 Hrs  T(C): 38.4 (25 Feb 2021 09:00), Max: 39.1 (24 Feb 2021 21:59)  T(F): 101.1 (25 Feb 2021 09:00), Max: 102.3 (24 Feb 2021 21:59)  HR: 93 (25 Feb 2021 09:00) (93 - 155)  BP: 108/53 (25 Feb 2021 09:00) (98/52 - 254/132)  BP(mean): 83 (25 Feb 2021 03:00) (72 - 95)  RR: 19 (25 Feb 2021 09:00) (17 - 35)  SpO2: 99% (25 Feb 2021 09:00) (60% - 100%)    PHYSICAL EXAM  General: intubated and sedated   HEENT: anicteric sclera, pink conjunctiva  Neck: supple  CV: normal S1/S2 with no murmur rubs or gallops  Lungs: mechanical breath sounds   Abdomen: soft non-tender non-distended  Ext: no edema  Skin: no rashes   Neuro: sedated       LABS:                          8.8    7.32  )-----------( 153      ( 25 Feb 2021 00:00 )             28.2         Mean Cell Volume : 83.4 fL  Mean Cell Hemoglobin : 26.0 pg  Mean Cell Hemoglobin Concentration : 31.2 g/dL  Auto Neutrophil # : 5.54 K/uL  Auto Lymphocyte # : 0.48 K/uL  Auto Monocyte # : 0.76 K/uL  Auto Eosinophil # : 0.00 K/uL  Auto Basophil # : 0.03 K/uL  Auto Neutrophil % : 75.6 %  Auto Lymphocyte % : 6.6 %  Auto Monocyte % : 10.4 %  Auto Eosinophil % : 0.0 %  Auto Basophil % : 0.4 %      Serial CBC's  02-25 @ 00:00  Hct-28.2 / Hgb-8.8 / Plat-153 / RBC-3.38 / WBC-7.32  Serial CBC's  02-24 @ 10:33  Hct-44.3 / Hgb-13.7 / Plat-159 / RBC-5.27 / WBC-14.52      02-25    142  |  112<H>  |  15  ----------------------------<  167<H>  4.6   |  22  |  <0.5<L>    Ca    7.8<L>      25 Feb 2021 04:40  Phos  4.0     02-25  Mg     1.1     02-25    TPro  7.0  /  Alb  4.4  /  TBili  0.6  /  DBili  x   /  AST  58<H>  /  ALT  43<H>  /  AlkPhos  165<H>  02-24      PT/INR - ( 25 Feb 2021 00:00 )   PT: 17.80 sec;   INR: 1.55 ratio         PTT - ( 25 Feb 2021 00:00 )  PTT:34.9 sec      BLOOD SMEAR INTERPRETATION:       RADIOLOGY & ADDITIONAL STUDIES:    < from: CT Head No Cont (02.24.21 @ 16:21) >  INTERPRETATION:  CLINICAL INDICATION: Follow-up hemorrhage.    Technique: CT of the head was performed without contrast.    Multiple contiguous axial images were acquired from the skullbase to the vertex without the administration of intravenous contrast.  Coronal and sagittal reformations were made.    COMPARISON:  prior head CT dated CT head dated 2/24/2021 at 10:56 AM    FINDINGS:    Redemonstration of a large left frontal intraparenchymal hemorrhage with local surrounding mass effect and midline shift to the right. The hematoma now measures approximately 8.1 x 4.8 x 5.6 cm (AP x TR x CC) and appears slightly increased in size since the prior examination. There is minimal intraventricular extension of hemorrhage into the left lateral ventricle. There is approximately 1.0 cm midline shift to the right. There is subfalcine herniation.    There is bilateral sulcal subarachnoid hemorrhage, slightly increased since prior examination..    The basal cisterns are patent. There is no hydrocephalus.    The calvarium is intact. There is partial opacification of the bilateral mastoid air cells and middle ear cavities, left greater than right. Visualized paranasal sinuses are unremarkable. Orbits are unremarkable. Partially visualized endotracheal and enteric tubes within the oropharynx.    IMPRESSION:  Slight interval increase in size of a large left frontal lobe acute intraparenchymal hemorrhage with intraventricular extension, local mass effect and midline shift as well as minimal bilateral sulcal subarachnoid hemorrhage.    MARIO JAY M.D., ATTENDING RADIOLOGIST  This document has been electronically signed. Feb 24 2021  4:29PM    < end of copied text >    < from: CT Head No Cont (02.24.21 @ 22:32) >  INTERPRETATION:  Clinical History / Reason for exam: Left intracranial hemorrhage, status post craniotomy.    Technique: Multiple contiguous axial CT images of the head were obtainedfrom the base of the skull to the vertex without administration of intravenous contrast. Sagittal and coronal reformations were also obtained.    Comparison: CT head performed earlier the same day.    FINDINGS:    The patient is status post interval left frontal craniotomy with expected postsurgical changes including multiple overlying skin staples, and pneumocephalus. There has been interval placement of a left transfrontal EVD traversing the left lateral ventricle and third ventricle, with distal tip at the level of the cerebral aqueduct/tectum.    There is a left frontal hematoma cavity measuring about 4.5 cm, with residual blood products noted. Left frontal extra-axial collection measures about 5 mm, with air, fluid and blood products.    There is scattered subarachnoid hemorrhage and layering intraventricular hemorrhage, stable to slightly increased.    There is decreased mass effect with near complete resolution of the right with midline shift.    Scattered paranasal sinus mucosal thickening and bilateral mastoid fluid noted.    IMPRESSION:  Since the CT head performed earlier the same day at 4:13 PM:    1.  Interval left frontal craniotomy for hematoma evacuation. Expected postsurgical changes as described.    2.  Interval placement of a left transfrontal EVD with distal tip in the region of the cerebral aqueduct/tectum. Increased ventricular size may reflect decompression.    3.  Left frontal hematoma cavity measures about 4.5 cm containing fluid, blood products and air.    4.  Scattered subarachnoid hemorrhage and layering intraventricular hemorrhage, stable to slightly increased.            LEVAR TAM M.D., RESIDENT RADIOLOGIST  This document has been electronically signed.  CORRIE FORRESTER MD; Attending Radiologist  This document has been electronically signed. Feb 25 2021  8:10AM    < end of copied text >

## 2021-02-25 NOTE — PROGRESS NOTE ADULT - ASSESSMENT
Assessment and Recommendation:     75y Female s/p craniotomy for left frontal hematoma with evacuation and EVD placement. Intubated and sedated. SICU admission for hemodynamic monitoring.     NEURO:  Sedated on Precedex and Propofol  Pain controlled with IV Tylenol fentanyl prn   Keppra 500 mg q 12  EVD clamped  CPP >70  q 1 hr neurocheck  f/u post-op CTH report    RESP:   RR (machine): 20, TV (machine): 400, FiO2: 100, PEEP: 5, ITime: 1.26  02-24 @ 23:06--7.40 /38 /128 /24   HOB elevated at 30 degrees    CARDS:   Levophed  BP goal -140  EKG sinus tach with RBBB Qtc 482  Echo (10/13/20): EF 55-60%, trace MR, R atrial echodensity possible thrombus  H/o CAD s/p stents and HTN - hold ASA and lisinopril  Trop 0.24, f/u 2 sets    GI/NUTR:   NPO    OGT  GI Prophylaxis- PPI  Bowel regimen- Senna    /RENAL:   Monitor UO-roblero in place  NS @ 100 cc/hr  BUN/Cr- 18/0.5  -->,  20/0.5   Electrolytes-Na 144 // K 3.9 // Mg -- //  Phos -- 02-24 @ 18:20  Na 142 // K 3.4 // Mg 1.1 //  Phos 4.0 02-25 @ 00:00  - lytes repleted     HEME/ONC:   DVT prophylaxis hold in light of ICH  Hb/Hct:  13.7/44.3  --> 8.8/28.2  Plts:  159  -->s/p 2U plts-->153  T&S: ABO RH Interpretation:  (02-24)  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  ---2U platelets ordered by neurosurgery team.    ID:  Leukocytosis- 14.52   Temp trend- 24hrs T(F): 99 (02-24 @ 17:00), Max: 99.8 (02-24 @ 10:26)  Antibiotics- Vancomycin         ENDO:  h/o DM on Metformin    Glucose, Serum: 197 (02-24 @ 18:20)  FS q 4  ISS  HA1C pending     LINES/DRAINS:  PIV, CVC, [place], [place]     DISPO:    SICU

## 2021-02-25 NOTE — PROGRESS NOTE ADULT - ATTENDING COMMENTS
Message   Recorded as Task   Date: 09/27/2017 11:18 AM, Created By: Durwin Kanner   Task Name: Call Back   Assigned To: Vel Diaz   Regarding Patient: Mac Payne, Status: Active   Comment:    OzzyDisha - 27 Sep 2017 11:18 AM     TASK CREATED  CALL FROM PT'S [facility] HOW IS HELPING TO TAKE CARE OF HER NOW  WANTS TO TALK TO YOU ABOUT PAPER WORK AND IF WE ARE ABLE TO HELP HER WITH IT FOR CERTAIN DAYS  Army Cutting BRE  AT [address] - 27 Sep 2017 1:37 PM     TASK EDITED  Called and left message on Bre's cell phone with the fax number for her to fax the [facility] paperwork  [facility] is to call back to let the office know if she needs continuous leave or a reduced work schedule  Active Problems    1  At risk for fall due to comorbid condition (V15 88) (Z91 81)   2  Bone metastases (198 5) (C79 51)   3  Breast cancer, right breast (174 9) (C50 911)   4  Closed fracture of 4th metacarpal (815 00) (S62 308A)   5  Closed nondisplaced fracture of shaft of fourth metacarpal bone of left hand, initial   encounter (815 03) (S62 355A)   6  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)   7  Hand injury (959 4) (S69 90XA)   8  Hyperlipidemia (272 4) (E78 5)   9  Lung density on x-ray (518 89) (J98 4)   10  Osteoporosis (733 00) (M81 0)   11  Preop examination (V72 84) (Z01 818)   12  Pulmonary embolism (415 19) (I26 99)   13  Recurrent urinary tract infection (599 0) (N39 0)   14  Urinary tract infection (599 0) (N39 0)    Current Meds   1  Calcium CAPS Recorded   2  Simvastatin 5 MG Oral Tablet; Take 1 tablet by mouth  daily; Therapy: [id number] to (Evaluate:[id number])  Requested for: 90Fqm7539; Last   Rx:[id number] Ordered   3  Xarelto 20 MG Oral Tablet; Take 1 tablet daily  Requested for: 25Nov2016; Last   Rx:[id number] Ordered    Allergies    1  Codeine Derivatives   2  Codeine Sulfate TABS    Signatures   Electronically signed by :  Dharmesh Puri RN; Sep 27 2017  1:37PM EST (Author) Patient seen today with neurology ACP NP [place].      I was physically present for the key portions of the evaluation and management (E/M) service provided.  I agree with the above history, physical, and plan with the following additions/modifications     Patient with good surgical evacuation result, but remains unresponsive on high dose precedex, reocmmend weaning rapidly today, will reassess exam.  EVD clamped, now decompressed, goal normonatremia, SBP<140    Broderick Ordonez MD  Attending Neurointensivist

## 2021-02-25 NOTE — PROGRESS NOTE ADULT - SUBJECTIVE AND OBJECTIVE BOX
1.Presentation: AMS and difficulty breathing    2. Today's Acute Problems:  -Left frontal intra parenchymal bleed with mass effect and midline shift is s/p Minimally invasive craniotomy for evacuation of hematoma on    -Intubation for GCS of 6      3.History:  75y Female w/ a pmhx of B cell lymphoma on Imbruvica, anemia and pancytopenia with frequent transfusions, HTN, HLD, CAD s/p stent, on ASA, DM BIBEMS for AMS and difficulty breathing. Pt minimally responsive with a GCS of 5-6 on arrival and was  intubated in the ED for airway protection. CTH revealed a  Large left frontal intraparenchymal hematoma with intraventricular extension into left lateral ventricle. Associated marked mass effect resulting in 0.9 cm midline shift to the right anteriorly. Was given a total of 4 units of platelets.        Yesterday's Plan:  -BP control syst <160 titrate Nicardipine accordingly  -Neurosurgery  consult stat  -Repeat CTH in 6-8 hours and CTA head and neck  -Q 1 neuro checks  -Start 3% Saline at 50 cc and hour (keep Na 145-155)  -Keppra load 1 g once and continue 500 BID        4.Last 24 hour updates:  Patient examined s/post Left MIS craniotomy with evacuation of hematoma and EVD placement.  Patient currently in recovery room remains intubated sedated on precedex 1.5mcg/kg/hr and is on 1 pressor for bp control. She is currently not following any commands, moving LE slightly to pain , but no mvmts noted in the UE currently. Brain stem reflexes are currently intact.      5.Medications:  atorvastatin 80 milliGRAM(s) Oral at bedtime  chlorhexidine 0.12% Liquid 15 milliLiter(s) Oral Mucosa every 12 hours  dexMEDEtomidine Infusion 0.01 MICROgram(s)/kG/Hr IV Continuous <Continuous>  dextrose 40% Gel 15 Gram(s) Oral once  dextrose 5%. 1000 milliLiter(s) IV Continuous <Continuous>  dextrose 5%. 1000 milliLiter(s) IV Continuous <Continuous>  dextrose 50% Injectable 25 Gram(s) IV Push once  dextrose 50% Injectable 12.5 Gram(s) IV Push once  dextrose 50% Injectable 25 Gram(s) IV Push once  glucagon  Injectable 1 milliGRAM(s) IntraMuscular once  insulin regular  human corrective regimen sliding scale   IV Push every 4 hours  levETIRAcetam  IVPB 750 milliGRAM(s) IV Intermittent every 12 hours  norepinephrine Infusion 0.01 MICROgram(s)/kG/Min IV Continuous <Continuous>  pantoprazole  Injectable 40 milliGRAM(s) IV Push daily  sodium chloride 0.9%. 1000 milliLiter(s) IV Continuous <Continuous>  vancomycin  IVPB 1000 milliGRAM(s) IV Intermittent every 12 hours      6.Ancillary Mangement:  Chest PT[]  Head of bed >35 [x]  Out of bed to chair []  PT/OT/ST []  Spirometry[]  Mechanical DVT prophylaxis [x]      7.Neuro Exam:  Patient intubated and sedated , not following commands  Pupils 2mm reactive, eyes midline, +weak gag + corneals  No spontaneous movements noted   Trace movement to deep pain on the Lower extremity , no response noted on the upper exts  B/L upgoing toes          Last CTH:  < from: CT Head No Cont (21 @ 22:32) >  FINDINGS:  Status post left frontal craniotomy with evacuation of underlying intraparenchymal hematoma.    There is underlying mixed density fluid collectionand air measuring approximately 3 x 4 cm. Surrounding left frontal lobe gliosis and edema. Interval placement of left frontal approach intraventricular catheter with tip terminating superior to the fourth ventricle.    Interval decrease in mass effect upon the bilateral ventricles with 0.5 cm rightward midline shift, previously 1 cm.    Bilateral subcortical and periventricular white matter hypodensities, consistent with chronic microvascular ischemic changes.    The visualized paranasal sinuses are well aerated.  There is partial opacification of the bilateral mastoid air cells and middle ear cavities, left greater than right.    IMPRESSION:  1.  Status post left frontal craniotomy with evacuation of underlying intraparenchymal hematoma and placement of left frontal approach intraventricular catheter.  2.  Mixed fluid collection and air measuring approximately 4 cm within the left frontal lobe with surrounding gliosis and edema.  3.  Decreased mass effect upon the bilateral ventricles with 0.5 cm rightward midline shift, previously 1 cm.          ******PRELIMINARY REPORT******    ******PRELIMINARY REPORT******          LEVAR TAM M.D., RESIDENT RADIOLOGIST    < end of copied text >        CTA H/N    < from: CT Angio HEAD/ Neck w/ IV Cont (21 @ 13:51) >  IMPRESSION:    1.  No evidence of major vascular stenosis or occlusion. No evidence of aneurysm or vascular malformation.    2.  Calcific plaque of the carotid bifurcations with about 50% stenosis of the distal right CCA and <50% stenosis of the bilateral proximal ICAs.    CORRIE FORRESTER MD; Attending Radiologist  This document has been electronically signed. 2021  2:01PM    < end of copied text >      8.CVS:  Vital Signs Last 24 Hrs  T(C): 37.7 (2021 03:00), Max: 39.1 (2021 21:59)  T(F): 99.8 (2021 03:00), Max: 102.3 (2021 21:59)  HR: 103 (2021 03:00) (102 - 155)  BP: 116/58 (2021 03:00) (98/52 - 254/132)  BP(mean): 83 (2021 03:00) (72 - 95)  RR: 27 (2021 03:00) (17 - 35)  SpO2: 100% (2021 03:00) (60% - 100%)      Last EKG:  < from: 12 Lead ECG (21 @ 10:42) >  Ventricular Rate 154 BPM    Atrial Rate 154 BPM    QRS Duration 120 ms    Q-T Interval 312 ms    QTC Calculation(Bazett) 499 ms    R Axis 84 degrees    T Axis 21 degrees    Diagnosis Line Atrial flutter with 2:1 A-V conduction  Right bundle branch block  Abnormal ECG    Confirmed by Benton Yousif ([id number]) on 2021 12:08:37 PM    < end of copied text >        9.Respiratory:  ABG:ABG - ( 2021 23:06 )  pH, Arterial: 7.40  pH, Blood: x     /  pCO2: 38    /  pO2: 128   / HCO3: 24    / Base Excess: -0.7  /  SaO2: 99              Chest Xray:  < from: Xray Chest 1 View-PORTABLE IMMEDIATE (Xray Chest 1 View-PORTABLE IMMEDIATE .) (21 @ 12:51) >  FINDINGS:    SUPPORT DEVICES: Interval retraction of endotracheal tube, now approximately 2 cm above the kelsey. Enteric tube traversing the left hemidiaphragm.    CARDIAC/MEDIASTINUM/HILUM: Stable.    LUNG PARENCHYMA/PLEURA:Bilateral basilar opacities    SKELETON/SOFT TISSUES: Stable.    IMPRESSION:    Interval retraction of endotracheal tube, now approximately 2 cm above the kelsey      ALFONSO LOBO M.D., RESIDENT RADIOLOGIST  This document has been electronically signed.  CAMILO CASTANEDA MD; Attending Radiologist  This document has been electronically signed. 2021 12:55PM    < end of copied text >    Mode: AC/ CMV (Assist Control/ Continuous Mandatory Ventilation)  RR (machine): 16  TV (machine): 400  FiO2: 40  PEEP: 5  ITime: 1  MAP: 7  PIP: 18      10.GI:  Prophylaxis    GI:  Protonix 40 mg q 24  LIVER FUNCTIONS - ( 2021 10:33 )  Alb: 4.4 g/dL / Pro: 7.0 g/dL / ALK PHOS: 165 U/L / ALT: 43 U/L / AST: 58 U/L / GGT: x             11.Renal/Fluids/Electrolytes:        142  |  110  |  19  ----------------------------<  186<H>  3.4<L>   |  22  |  <0.5<L>    Ca    7.3<L>      2021 00:00  Phos  4.0       Mg     1.1      (repleted)    TPro  7.0  /  Alb  4.4  /  TBili  0.6  /  DBili  x   /  AST  58<H>  /  ALT  43<H>  /  AlkPhos  165<H>            I&O's Detail    2021 07:01  -  2021 04:55  --------------------------------------------------------  IN:    Dexmedetomidine: 8.8 mL    Dexmedetomidine: 54.8 mL    Dexmedetomidine: 82.2 mL    Norepinephrine: 20.4 mL    Platelets - Single Donor: 432 mL    sodium chloride 0.9%: 500 mL    Sodium Chloride 0.9% Bolus: 750 mL    sodium chloride 3%: 100 mL  Total IN: 1948.2 mL    OUT:    Indwelling Catheter - Urethral (mL): 1725 mL    NiCARdipine: 0 mL    Voided (mL): 400 mL  Total OUT: 2125 mL    Total NET: -176.8 mL          12.ID:  TMax:  Vital Signs Last 24 Hrs  T(C): 37.7 (2021 03:00), Max: 39.1 (2021 21:59)  T(F): 99.8 (2021 03:00), Max: 102.3 (2021 21:59)  HR: 103 (2021 03:00) (102 - 155)  BP: 116/58 (2021 03:00) (98/52 - 254/132)  BP(mean): 83 (2021 03:00) (72 - 95)  RR: 27 (2021 03:00) (17 - 35)  SpO2: 100% (2021 03:00) (60% - 100%)  Lactate, Blood: 2.6 mmol/L (02-24 @ 10:33)    Urinalysis Basic - ( 2021 14:54 )    Color: Yellow / Appearance: Clear / S.025 / pH: x  Gluc: x / Ketone: Negative  / Bili: Negative / Urobili: <2 mg/dL   Blood: x / Protein: 30 mg/dL / Nitrite: Negative   Leuk Esterase: Small / RBC: 1 /HPF / WBC 9 /HPF   Sq Epi: x / Non Sq Epi: 1 /HPF / Bacteria: Negative        13.Hematology:                        8.8    7.32  )-----------( 153      ( 2021 00:00 )             28.2       PT/INR - ( 2021 00:00 )   PT: 17.80 sec;   INR: 1.55 ratio         PTT - ( 2021 00:00 )  PTT:34.9 sec              DVT Prophylaxis  Lovenox[]   Heparin[]   Venodyne []   SCD's[x]           1.Presentation: AMS and difficulty breathing    2. Today's Acute Problems:  -Left frontal intra parenchymal bleed with mass effect and midline shift is s/p Minimally invasive craniotomy for evacuation of hematoma on    -Intubation for GCS of 6      3.History:  75y Female w/ a pmhx of B cell lymphoma on Imbruvica, anemia and pancytopenia with frequent transfusions, HTN, HLD, CAD s/p stent, on ASA, DM BIBEMS for AMS and difficulty breathing. Pt minimally responsive with a GCS of 5-6 on arrival and was  intubated in the ED for airway protection. CTH revealed a  Large left frontal intraparenchymal hematoma with intraventricular extension into left lateral ventricle. Associated marked mass effect resulting in 0.9 cm midline shift to the right anteriorly. Was given a total of 4 units of platelets.        Yesterday's Plan:  -BP control syst <160 titrate Nicardipine accordingly  -Neurosurgery  consult stat  -Repeat CTH in 6-8 hours and CTA head and neck  -Q 1 neuro checks  -Start 3% Saline at 50 cc and hour (keep Na 145-155)  -Keppra load 1 g once and continue 500 BID        4.Last 24 hour updates:  Patient examined s/post Left MIS craniotomy with evacuation of hematoma and EVD placement.  Patient currently in recovery room remains intubated sedated on precedex 1.5mcg/kg/hr and is on 1 pressor for bp control. She is currently not following any commands, moving LE slightly to pain , but no mvmts noted in the UE currently. Brain stem reflexes are currently intact.      5.Medications:  atorvastatin 80 milliGRAM(s) Oral at bedtime  chlorhexidine 0.12% Liquid 15 milliLiter(s) Oral Mucosa every 12 hours  dexMEDEtomidine Infusion 0.01 MICROgram(s)/kG/Hr IV Continuous <Continuous>  dextrose 40% Gel 15 Gram(s) Oral once  dextrose 5%. 1000 milliLiter(s) IV Continuous <Continuous>  dextrose 5%. 1000 milliLiter(s) IV Continuous <Continuous>  dextrose 50% Injectable 25 Gram(s) IV Push once  dextrose 50% Injectable 12.5 Gram(s) IV Push once  dextrose 50% Injectable 25 Gram(s) IV Push once  glucagon  Injectable 1 milliGRAM(s) IntraMuscular once  insulin regular  human corrective regimen sliding scale   IV Push every 4 hours  levETIRAcetam  IVPB 750 milliGRAM(s) IV Intermittent every 12 hours  norepinephrine Infusion 0.01 MICROgram(s)/kG/Min IV Continuous <Continuous>  pantoprazole  Injectable 40 milliGRAM(s) IV Push daily  sodium chloride 0.9%. 1000 milliLiter(s) IV Continuous <Continuous>  vancomycin  IVPB 1000 milliGRAM(s) IV Intermittent every 12 hours      6.Ancillary Mangement:  Chest PT[]  Head of bed >35 [x]  Out of bed to chair []  PT/OT/ST []  Spirometry[]  Mechanical DVT prophylaxis [x]      7.Neuro Exam:  Patient intubated and sedated , not following commands  Pupils 2mm reactive, eyes midline, +weak gag + corneals  No spontaneous movements noted   Trace movement to deep pain on the Lower extremity , no response noted on the upper exts  B/L upgoing toes          Last CTH:  < from: CT Head No Cont (21 @ 22:32) >  FINDINGS:  Status post left frontal craniotomy with evacuation of underlying intraparenchymal hematoma.    There is underlying mixed density fluid collectionand air measuring approximately 3 x 4 cm. Surrounding left frontal lobe gliosis and edema. Interval placement of left frontal approach intraventricular catheter with tip terminating superior to the fourth ventricle.    Interval decrease in mass effect upon the bilateral ventricles with 0.5 cm rightward midline shift, previously 1 cm.    Bilateral subcortical and periventricular white matter hypodensities, consistent with chronic microvascular ischemic changes.    The visualized paranasal sinuses are well aerated.  There is partial opacification of the bilateral mastoid air cells and middle ear cavities, left greater than right.    IMPRESSION:  1.  Status post left frontal craniotomy with evacuation of underlying intraparenchymal hematoma and placement of left frontal approach intraventricular catheter.  2.  Mixed fluid collection and air measuring approximately 4 cm within the left frontal lobe with surrounding gliosis and edema.  3.  Decreased mass effect upon the bilateral ventricles with 0.5 cm rightward midline shift, previously 1 cm.          ******PRELIMINARY REPORT******    ******PRELIMINARY REPORT******          LEVAR TAM M.D., RESIDENT RADIOLOGIST    < end of copied text >        CTA H/N    < from: CT Angio HEAD/ Neck w/ IV Cont (21 @ 13:51) >  IMPRESSION:    1.  No evidence of major vascular stenosis or occlusion. No evidence of aneurysm or vascular malformation.    2.  Calcific plaque of the carotid bifurcations with about 50% stenosis of the distal right CCA and <50% stenosis of the bilateral proximal ICAs.    CORRIE FORRESTER MD; Attending Radiologist  This document has been electronically signed. 2021  2:01PM    < end of copied text >      8.CVS:  Vital Signs Last 24 Hrs  T(C): 37.7 (2021 03:00), Max: 39.1 (2021 21:59)  T(F): 99.8 (2021 03:00), Max: 102.3 (2021 21:59)  HR: 103 (2021 03:00) (102 - 155)  BP: 116/58 (2021 03:00) (98/52 - 254/132)  BP(mean): 83 (2021 03:00) (72 - 95)  RR: 27 (2021 03:00) (17 - 35)  SpO2: 100% (2021 03:00) (60% - 100%)      Last EKG:  < from: 12 Lead ECG (21 @ 10:42) >  Ventricular Rate 154 BPM    Atrial Rate 154 BPM    QRS Duration 120 ms    Q-T Interval 312 ms    QTC Calculation(Bazett) 499 ms    R Axis 84 degrees    T Axis 21 degrees    Diagnosis Line Atrial flutter with 2:1 A-V conduction  Right bundle branch block  Abnormal ECG    Confirmed by Benton Yousif ([id number]) on 2021 12:08:37 PM    < end of copied text >        9.Respiratory:  ABG:ABG - ( 2021 23:06 )  pH, Arterial: 7.40  pH, Blood: x     /  pCO2: 38    /  pO2: 128   / HCO3: 24    / Base Excess: -0.7  /  SaO2: 99              Chest Xray:  < from: Xray Chest 1 View-PORTABLE IMMEDIATE (Xray Chest 1 View-PORTABLE IMMEDIATE .) (21 @ 12:51) >  FINDINGS:    SUPPORT DEVICES: Interval retraction of endotracheal tube, now approximately 2 cm above the kelsey. Enteric tube traversing the left hemidiaphragm.    CARDIAC/MEDIASTINUM/HILUM: Stable.    LUNG PARENCHYMA/PLEURA:Bilateral basilar opacities    SKELETON/SOFT TISSUES: Stable.    IMPRESSION:    Interval retraction of endotracheal tube, now approximately 2 cm above the kelsey      ALFONSO LOBO M.D., RESIDENT RADIOLOGIST  This document has been electronically signed.  CAMILO CASTANEDA MD; Attending Radiologist  This document has been electronically signed. 2021 12:55PM    < end of copied text >    Mode: AC/ CMV (Assist Control/ Continuous Mandatory Ventilation)  RR (machine): 16  TV (machine): 400  FiO2: 40  PEEP: 5  ITime: 1  MAP: 7  PIP: 18      10.GI:  Prophylaxis    GI:  Protonix 40 mg q 24  LIVER FUNCTIONS - ( 2021 10:33 )  Alb: 4.4 g/dL / Pro: 7.0 g/dL / ALK PHOS: 165 U/L / ALT: 43 U/L / AST: 58 U/L / GGT: x             11.Renal/Fluids/Electrolytes:        142  |  110  |  19  ----------------------------<  186<H>  3.4<L>   |  22  |  <0.5<L>    Ca    7.3<L>      2021 00:00  Phos  4.0       Mg     1.1      (repleted)    TPro  7.0  /  Alb  4.4  /  TBili  0.6  /  DBili  x   /  AST  58<H>  /  ALT  43<H>  /  AlkPhos  165<H>            I&O's Detail    2021 07:01  -  2021 04:55  --------------------------------------------------------  IN:    Dexmedetomidine: 8.8 mL    Dexmedetomidine: 54.8 mL    Dexmedetomidine: 82.2 mL    Norepinephrine: 20.4 mL    Platelets - Single Donor: 432 mL    sodium chloride 0.9%: 500 mL    Sodium Chloride 0.9% Bolus: 750 mL    sodium chloride 3%: 100 mL  Total IN: 1948.2 mL    OUT:    Indwelling Catheter - Urethral (mL): 1725 mL    NiCARdipine: 0 mL    Voided (mL): 400 mL  Total OUT: 2125 mL    Total NET: -176.8 mL          12.ID:  TMax:  Vital Signs Last 24 Hrs  T(C): 37.7 (2021 03:00), Max: 39.1 (2021 21:59)  T(F): 99.8 (2021 03:00), Max: 102.3 (2021 21:59)  HR: 103 (2021 03:00) (102 - 155)  BP: 116/58 (2021 03:00) (98/52 - 254/132)  BP(mean): 83 (2021 03:00) (72 - 95)  RR: 27 (2021 03:00) (17 - 35)  SpO2: 100% (2021 03:00) (60% - 100%)  Lactate, Blood: 2.6 mmol/L (02-24 @ 10:33)    Urinalysis Basic - ( 2021 14:54 )    Color: Yellow / Appearance: Clear / S.025 / pH: x  Gluc: x / Ketone: Negative  / Bili: Negative / Urobili: <2 mg/dL   Blood: x / Protein: 30 mg/dL / Nitrite: Negative   Leuk Esterase: Small / RBC: 1 /HPF / WBC 9 /HPF   Sq Epi: x / Non Sq Epi: 1 /HPF / Bacteria: Negative        13.Hematology:                        8.8    7.32  )-----------( 153      ( 2021 00:00 )             28.2       PT/INR - ( 2021 00:00 )   PT: 17.80 sec;   INR: 1.55 ratio         PTT - ( 2021 00:00 )  PTT:34.9 sec                DVT Prophylaxis  Lovenox[]   Heparin[]   Venodyne []   SCD's[x]

## 2021-02-25 NOTE — PROGRESS NOTE ADULT - SUBJECTIVE AND OBJECTIVE BOX
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 Patient was AAO x4 and fully functional at baseline. Last known normal was 11:00pm  before beds. On  at 8:45AM, patient was found to be lethargy in bed by his son, was AAO x 4 and able to talk to his son, but started coughing and had one episode of nose bleed. At 9:15, her physical therapist noticed her becoming more lethargic, but vitals were normal. Soon at 9:30, patient was unresponsive and had a blank stare. EMS was called and her BP was found to be around 230/120. She was rushed to ED as Code Stroke. NIHSS 23. CTH showed Large left frontal intraparenchymal hematoma with intraventricular extension into left lateral ventricle, associated with marked mass effect resulting in 0.9 cm midline shift to the right anteriorly. Patient was intubated in ED for airway protection. S/p 2 units of platelet for ASA reversal. Neurosurgery was planning for emergent OR resection of hematoma. OR course uncomplicated, hematoma evacuated and EVD placed, s/p 2U platelets given in OR. Pt currently intubated and sedated. SICU admission for hemodynamic and neurovascular monitoring.      Consults-  Consult Note Adult-Critical Care Fellow/Attending [SAQIB Nugent] (21)  Consult Note Adult-Neurology Physician Assistant/Attending [SAQIB Lopez] (21)  Consult Note Adult-Neurosurgery Physician Assistant/Attendin [BOB Browning] (21)  Consult Note Adult-Heme/Onc Resident/Attending [CHAZ Nunez] (10-13-20)  Consult Note Adult-Infectious Disease Attending [WILDER Wang] (10-13-20)  Consult Note Adult-Gastroenterology Fellow/Attending [PANDA Cunningham] (10-13-20)  Consult Note Adult-Critical Care Attending [JEANNE Brand] (10-12-20)  Consult Note Adult-Physiatry Attending [ISACC Weber] (20)  Consult Note Adult-Infectious Disease Attending [YANG Lerner] (20)  Consult Note Adult-Heme/Onc Resident/Attending [CHAZ Nunez] (20)  Consult Note Adult-Critical Care Attending [JEANNE Karimi] (20)  Consult Note Adult-Physiatry Attending [DAYAMI Morrison] (20)  Consult Note Adult-Infectious Disease Attending [WILDER Wang] (20)  Consult Note Adult-Heme/Onc Resident/Attending [CHAZ Singh] (20)  Consult Note Adult-Heme/Onc Resident [DAMIR Nunez] (20)  Consult Note Adult-Physiatry Attending [PANDA Tran] (20)  Consult Note Adult-Heme/Onc Fellow/Attending [JIM Nunez] (20)  Consult Note Adult-Vascular Surgery Resident/Attending [LEX Shaver] (20)      Procedure:  OR Stats  OR Time:   2.5hrs          EBL:    500cc      IV Fluids:   2L     Blood Products: none      UOP:  400cc    Findings- large hematoma    PMH  PAST MEDICAL & SURGICAL HISTORY:  Anemia    DM (diabetes mellitus)    High cholesterol    HTN (hypertension)    B-cell chronic lymphocytic leukemia variant    CAD (coronary artery disease)  s/p stenting    H/O heart artery stent        Home Meds:  Home Medications:  aspirin 81 mg oral tablet, chewable: 1 tab(s) orally once a day (2021 15:11)  atorvastatin 80 mg oral tablet: 1 tab(s) orally once a day (at bedtime) (2021 15:11)  Imbruvica 70 mg oral capsule: 2 cap(s) orally once a day (2021 15:11)  Levaquin 500 mg oral tablet: 1 tab(s) orally every 24 hours (2021 15:11)  lisinopril 10 mg oral tablet: 1 tab(s) orally once a day (2021 15:11)         Allergies  Allergies    penicillin (Anaphylaxis; Hives)    Intolerances         Current Medications:  chlorhexidine 0.12% Liquid 15 milliLiter(s) Oral Mucosa every 12 hours  dexMEDEtomidine Infusion 0.2 MICROgram(s)/kG/Hr (5.22 mL/Hr) IV Continuous <Continuous>  propofol Infusion 20 MICROgram(s)/kG/Min (12.5 mL/Hr) IV Continuous <Continuous>  sodium chloride 0.9%. 1000 milliLiter(s) (100 mL/Hr) IV Continuous <Continuous>      Advanced Directives: Presumed Full Code    VITAL SIGNS, INS/OUTS (Last 24hours):    ICU Vital Signs Last 24 Hrs  T(C): 37.3 (2021 17:20), Max: 37.7 (2021 10:26)  T(F): 99 (2021 17:00), Max: 99.8 (2021 10:26)  HR: 108 (2021 17:20) (106 - 155)  BP: 130/45 (2021 17:20) (130/45 - 254/132)  BP(mean): --  ABP: 102/52 (2021 17:00) (98/36 - 185/62)  ABP(mean): 84 (2021 16:00) (66 - 84)  RR: 19 (2021 17:20) (18 - 30)  SpO2: 100% (2021 17:20) (60% - 100%)    I&O's Summary    2021 07:01  -  2021 21:47  --------------------------------------------------------  IN: 108.8 mL / OUT: 1100 mL / NET: -991.2 mL        Height (cm): 160 (21)  Weight (kg): 104.3 (21)  BMI (kg/m2): 40.7 (21)  BSA (m2): 2.05 (21)    Physical Exam:   ---------------------------------------------------------------------------------------  Neuro: Intubated and sedated, dressing dry/intact, EVD in place. PERRL, painfully responsive    RESPIRATORY:  Normal expansion/effort  Mechanical Ventilation: Mode: AC/ CMV (Assist Control/ Continuous Mandatory Ventilation)  RR (machine): 20  TV (machine): 400  FiO2: 100  PEEP: 5  ITime: 1.26  MAP: 10  PIP: 27    CARDIOVASCULAR:   S1/S2.  RRR    GASTROINTESTINAL:  Abdomen soft, non-distended. OGT     MUSCULOSKELETAL:  Extremities warm, pink, well-perfused.    DERM:  No skin breakdown     :   Exam: Murry catheter in place.       Tubes/Lines/Drains   ----------------------------------------------------------------------------------------------------------  [x] Peripheral IV  [X] Central Venous Line         RIJ             Date Placed:    [X] Arterial Line		      Radial   Date Placed:   [X] Urinary Catheter Murry                                             Date Placed:        LABS  --------------------------------------------------------------------------------------  LFTs  LIVER FUNCTIONS - ( 2021 10:33 )  Alb: 4.4 g/dL / Pro: 7.0 g/dL / ALK PHOS: 165 U/L / ALT: 43 U/L / AST: 58 U/L / GGT: x             Cardiac Markers  CARDIAC MARKERS ( 2021 10:33 )  x     / <0.01 ng/mL / x     / x     / x            Coagulation  PT/INR - ( 2021 10:33 )   PT: 14.30 sec;   INR: 1.24 ratio         PTT - ( 2021 10:33 )  PTT:45.2 sec    Arterial Blood Gas  ABG - ( 2021 12:25 )  pH, Arterial: 7.17  pH, Blood: x     /  pCO2: x     /  pO2: x     / HCO3: x     / Base Excess: x     /  SaO2: x                   Blood Sugar  CAPILLARY BLOOD GLUCOSE          Urinalysis  Urinalysis Basic - ( 2021 14:54 )    Color: Yellow / Appearance: Clear / S.025 / pH: x  Gluc: x / Ketone: Negative  / Bili: Negative / Urobili: <2 mg/dL   Blood: x / Protein: 30 mg/dL / Nitrite: Negative   Leuk Esterase: Small / RBC: 1 /HPF / WBC 9 /HPF   Sq Epi: x / Non Sq Epi: 1 /HPF / Bacteria: Negative            CT/XRAY/ECHO/TCD/EEG  ----------------------------------------------------------------------------------------------  CTH: Large acute intraparenchymal hematoma measuring 6.5 x 5.1 x 5.2 cm in the left frontal lobe with marked mass effect and mild surrounding edema. There is 0.9 cm midline shift to  right with rightward deviation of the anterior falx. Trace SAH adjacent left frontal sulci as well as IV extension into left lateral ventricle.

## 2021-02-25 NOTE — CONSULT NOTE ADULT - ASSESSMENT
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     ASSESSMENT  #) Intraparenchymal bleed s/p craniotomy   #) Hx of Marginal Zone Lymphoma on Imbruvica     PLAN  - Cont to hold Imbruvica as it is associated with potential adverse reaction of hemorrhage. Also note that the effect of Imbruvica can last up to 7 days post ingestion of medication  - If any repeat CT scans show increase in bleed, transfuse platelets ASAP at least 1-2 units despite platelet count   - BP control and management of hemorrhage has per neurosurgery and neurology   - Cont with frequent q1h neurochecks, management as per neuro ICU

## 2021-02-25 NOTE — PROGRESS NOTE ADULT - ASSESSMENT
How Many Skin Cancers Have You Had?: one Impression:  76 yo f with PMH of HTN, DM, DLD, B cell lymphoma on Imbruvica, Depression, CAD s/p stent, on ASA,  presents with acute onset AMS progressing to obtundation and S GCS of 5-6 in ER and required intubation. CTH with large left IPH likely hypertensive etiology. cta h/n was negative for aneurysms or AVM.  ICH score 3. S/post Left MIS craniotomy with evacuation of hematoma and EVD placement. Post procedural follow up cth revealed decreased mass effect upon the bilateral ventricles with 0.5 cm rightward midline shift, previously 1 cm. Patient was Febrile with tmax of 102.3 . Currently  mechanically vented and sedated on precedex not responsive to commands. + brain stem reflexes        Plan:  #Neuro:  - Neuro checks q1hr  - EVD as per Neuro Sx (Currently clamped)  - Continue Keppra 750mg IV q12hrs  - Continue to wean sedation as tolerated for better neuroexam  - Neurosurgery follow up      #CV:  - Keep SBP >140 < 160       #Resp:  - Ventilator management as per primary team  - HOB > 35 degrees  - Aspiration precautions      #Renal/Fluid/Electolytes:  - Strict I&Os  - Monitor lytes, replete as needed  - Follow up magnesium levels      #ID:  -Antibiotics as per primary team  -Maintain normothermia  -Tylenol 650mg prn for fever >100.5     #GI:  - Continue Protonix 40mg IV q12hrs  - Senna 2 tablets PO q24hrs HS      #Endocrine   -Maintain normoglycemia      #DVT prophylaxis:  - Sequential stockings      Disposition: Critical care monitoring     What Is The Reason For Today's Visit?: History of Non-Melanoma Skin Cancer Impression:  76 yo f with PMH of HTN, DM, DLD, B cell lymphoma on Imbruvica, Depression, CAD s/p stent, on ASA,  presents with acute onset AMS progressing to obtundation and S GCS of 5-6 in ER and required intubation. CTH with large left IPH. cta h/n was negative for aneurysms or AVM.  ICH score 3. S/post Left MIS craniotomy with evacuation of hematoma and EVD placement. Post procedural follow up cth revealed decreased mass effect upon the bilateral ventricles with 0.5 cm rightward midline shift, previously 1 cm. Patient was Febrile with tmax of 102.3 . Currently  mechanically vented and sedated on precedex not responsive to commands. + brain stem reflexes        Plan:  #Neuro:  - Neuro checks q1hr  - EVD as per Neuro Sx (Currently clamped)  - Continue Keppra 750mg IV q12hrs  - Continue to wean sedation as tolerated for better neuroexam  - Neurosurgery follow up      #CV:  - Keep SBP >140 < 160       #Resp:  - Ventilator management as per primary team  - HOB > 35 degrees  - Aspiration precautions      #Renal/Fluid/Electolytes:  - Strict I&Os  - Monitor lytes, replete as needed  - Follow up magnesium levels      #ID:  -Antibiotics as per primary team  -Maintain normothermia  -Tylenol 650mg prn for fever >100.5     #GI:  - Continue Protonix 40mg IV q12hrs  - Senna 2 tablets PO q24hrs HS      #Endocrine   -Maintain normoglycemia      #DVT prophylaxis:  - Sequential stockings      Disposition: Critical care monitoring     Impression:  74 yo f with PMH of HTN, DM, DLD, B cell lymphoma on Imbruvica, Depression, CAD s/p stent, on ASA,  presents with acute onset AMS progressing to obtundation and S GCS of 5-6 in ER and required intubation. CTH with large left IPH. cta h/n was negative for aneurysms or AVM.  ICH score 3. S/post Left MIS craniotomy with evacuation of hematoma and EVD placement. Post procedural follow up cth revealed decreased mass effect upon the bilateral ventricles with 0.5 cm rightward midline shift, previously 1 cm. Patient was Febrile with tmax of 102.3 . Currently  mechanically vented and sedated on precedex not responsive to commands. + brain stem reflexes        Plan:  #Neuro:  - Neuro checks q1hr  - EVD as per Neuro Sx (Currently clamped)  - Continue Keppra 750mg IV q12hrs  - Continue to wean sedation as tolerated for better neuroexam  - Neurosurgery follow up      #CV:  - Keep SBP >140 < 160       #Resp:  -RR (machine): 20, TV (machine): 400, FiO2: 100, PEEP: 5,  - Ventilator management as per primary team  - HOB > 35 degrees  - Aspiration precautions      #Renal/Fluid/Electolytes:  - Strict I&Os  - Monitor lytes, replete as needed  - Follow up magnesium levels      #ID:  -Antibiotics as per primary team  -Maintain normothermia  -Tylenol 650mg prn for fever >100.5       #GI:  - NPO    - OGT  - Continue Protonix 40mg IV q12hrs  - Senna 2 tablets PO q24hrs HS      #Endocrine   -H/o DM on Metformin    -FS as per unit protocol   -HA1C   -Maintain strict glycemic control    HEME/ONC:   -DVT prophylaxis hold in light of ICH  -H/o B cell Lymphoma on Imbruvica  - Sequential stockings      Disposition: Critical care monitoring

## 2021-02-26 NOTE — DIETITIAN INITIAL EVALUATION ADULT. - FEEDING SKILL
OGT in place; pt currently tolerating TF at 45ml/hr well, progressing to goal rate w/o any issues per RN. TF at goal to provide 1200kcal, 53gm pro, 1010ml free water./total assistance

## 2021-02-26 NOTE — PROGRESS NOTE ADULT - ATTENDING COMMENTS
Kathrine is intubated and sedated.   Please administer 5mg of IV Vit K for rising INR, transfuse platelets if bleed is enlarging or platelets drop below 100K.   HOLD Ibrutinib.   Case was discussed with daughter Kathrine.   Will follow.

## 2021-02-26 NOTE — DIETITIAN INITIAL EVALUATION ADULT. - OTHER CALCULATIONS
Dosing wt: 161#/73kg, Ve: 6.59, Tmax: 39.2--Calories: 1687 kcal/day ([id number]); Protein:  gm/day (1.2-1.5 gm/kg CBW); Fluids per SICU

## 2021-02-26 NOTE — PROGRESS NOTE ADULT - SUBJECTIVE AND OBJECTIVE BOX
Patient is a 75y old  Female who presents with a chief complaint of altered mental status (26 Feb 2021 12:18)      Subjective: She remains intubated and sedated       Vital Signs Last 24 Hrs  T(C): 37.5 (26 Feb 2021 19:00), Max: 38.6 (25 Feb 2021 22:00)  T(F): 99.5 (26 Feb 2021 19:00), Max: 101.5 (25 Feb 2021 22:00)  HR: 84 (26 Feb 2021 19:00) (83 - 108)  BP: 129/59 (26 Feb 2021 19:00) (99/50 - 150/69)  BP(mean): 85 (26 Feb 2021 19:00) (71 - 99)  RR: 17 (26 Feb 2021 19:00) (16 - 24)  SpO2: 100% (26 Feb 2021 19:00) (95% - 100%)    PHYSICAL EXAM  General: intubated and sedated   HEENT: anicteric sclera, pink conjunctiva  Neck: supple  CV: normal S1/S2 with no murmur rubs or gallops  Lungs: mechanical breath sounds   Abdomen: soft non-tender non-distended  Ext: no edema  Skin: no rashes   Neuro: sedated     MEDICATIONS  (STANDING):  acetaminophen   Tablet .. 650 milliGRAM(s) Oral every 6 hours  atorvastatin 80 milliGRAM(s) Oral at bedtime  chlorhexidine 0.12% Liquid 15 milliLiter(s) Oral Mucosa two times a day  chlorhexidine 4% Liquid 1 Application(s) Topical daily  dexMEDEtomidine Infusion 0.01 MICROgram(s)/kG/Hr (0.18 mL/Hr) IV Continuous <Continuous>  dextrose 50% Injectable 25 Gram(s) IV Push once  dextrose 50% Injectable 25 Gram(s) IV Push once  insulin regular  human corrective regimen sliding scale   IV Push every 4 hours  levETIRAcetam  IVPB 750 milliGRAM(s) IV Intermittent every 12 hours  pantoprazole  Injectable 40 milliGRAM(s) IV Push daily  potassium chloride  20 mEq/100 mL IVPB 20 milliEquivalent(s) IV Intermittent every 2 hours  vancomycin  IVPB 1000 milliGRAM(s) IV Intermittent every 12 hours    MEDICATIONS  (PRN):  fentaNYL    Injectable 25 MICROGram(s) IV Push every 4 hours PRN Severe Pain (7 - 10)  senna 2 Tablet(s) Oral at bedtime PRN Constipation      LABS:                          8.0    3.57  )-----------( 119      ( 26 Feb 2021 17:20 )             25.6         Mean Cell Volume : 83.4 fL  Mean Cell Hemoglobin : 26.1 pg  Mean Cell Hemoglobin Concentration : 31.3 g/dL  Auto Neutrophil # : x  Auto Lymphocyte # : x  Auto Monocyte # : x  Auto Eosinophil # : x  Auto Basophil # : x  Auto Neutrophil % : x  Auto Lymphocyte % : x  Auto Monocyte % : x  Auto Eosinophil % : x  Auto Basophil % : x      Serial CBC's  02-26 @ 17:20  Hct-25.6 / Hgb-8.0 / Plat-119 / RBC-3.07 / WBC-3.57  Serial CBC's  02-25 @ 22:36  Hct-26.2 / Hgb-8.4 / Plat-125 / RBC-3.15 / WBC-5.00  Serial CBC's  02-25 @ 00:00  Hct-28.2 / Hgb-8.8 / Plat-153 / RBC-3.38 / WBC-7.32  Serial CBC's  02-24 @ 10:33  Hct-44.3 / Hgb-13.7 / Plat-159 / RBC-5.27 / WBC-14.52      02-26    140  |  110  |  15  ----------------------------<  196<H>  3.7   |  21  |  <0.5<L>    Ca    8.4<L>      26 Feb 2021 16:33  Phos  2.5     02-25  Mg     2.0     02-25        PT/INR - ( 26 Feb 2021 10:40 )   PT: 17.10 sec;   INR: 1.49 ratio         PTT - ( 26 Feb 2021 10:40 )  PTT:35.1 sec                            BLOOD SMEAR INTERPRETATION:       RADIOLOGY & ADDITIONAL STUDIES:

## 2021-02-26 NOTE — PROGRESS NOTE ADULT - SUBJECTIVE AND OBJECTIVE BOX
Subjective: 75yFemale with a pmhx of CVA (CEREBRAL VASCULAR ACCIDENT);INTRACRANIAL BLEED    ^CVA (CEREBRAL VASCULAR ACCIDENT);INTRACRANIAL BLEED    Family history of diabetes mellitus (DM)    No pertinent family history in first degree relatives    Handoff    MEWS Score    Anemia    DM (diabetes mellitus)    High cholesterol    HTN (hypertension)    B-cell chronic lymphocytic leukemia variant    CAD (coronary artery disease)    CVA (cerebral vascular accident)    Craniotomy for intracerebral hemorrhage    H/O heart artery stent    No significant past surgical history    AMS    90+    Intracranial bleed    SysAdmin_VisitLink        POD#2 s/p left MIS craniotomy for evacuation of hematoma  Patient remains in SICU, febrile overnight Tmax 101, pancultured.  on/off levo throughout the night  on precedex                Allergies    penicillin (Anaphylaxis; Hives)    Intolerances        Vital Signs Last 24 Hrs  T(C): 38 (26 Feb 2021 12:00), Max: 38.7 (25 Feb 2021 13:00)  T(F): 100.4 (26 Feb 2021 12:00), Max: 101.6 (25 Feb 2021 13:00)  HR: 91 (26 Feb 2021 12:00) (83 - 128)  BP: 117/55 (26 Feb 2021 12:00) (99/50 - 156/96)  BP(mean): 79 (26 Feb 2021 12:00) (71 - 99)  RR: 18 (26 Feb 2021 12:00) (16 - 24)  SpO2: 100% (26 Feb 2021 12:00) (95% - 100%)      acetaminophen   Tablet .. 650 milliGRAM(s) Oral every 6 hours  atorvastatin 80 milliGRAM(s) Oral at bedtime  chlorhexidine 0.12% Liquid 15 milliLiter(s) Oral Mucosa two times a day  chlorhexidine 4% Liquid 1 Application(s) Topical daily  dexMEDEtomidine Infusion 0.01 MICROgram(s)/kG/Hr IV Continuous <Continuous>  dextrose 50% Injectable 25 Gram(s) IV Push once  dextrose 50% Injectable 25 Gram(s) IV Push once  fentaNYL    Injectable 25 MICROGram(s) IV Push every 4 hours PRN  insulin regular  human corrective regimen sliding scale   IV Push every 4 hours  levETIRAcetam  IVPB 750 milliGRAM(s) IV Intermittent every 12 hours  pantoprazole  Injectable 40 milliGRAM(s) IV Push daily  senna 2 Tablet(s) Oral at bedtime PRN  vancomycin  IVPB 1000 milliGRAM(s) IV Intermittent every 12 hours        02-25-21 @ 07:01  -  02-26-21 @ 07:00  --------------------------------------------------------  IN: 3649.3 mL / OUT: 2030 mL / NET: 1619.3 mL    02-26-21 @ 07:01  -  02-26-21 @ 12:24  --------------------------------------------------------  IN: 256.5 mL / OUT: 265 mL / NET: -8.5 mL        REVIEW OF SYSTEMS    [ ] A ten-point review of systems was otherwise negative except as noted.  [ ] Due to altered mental status/intubation, subjective information were not able to be obtained from the patient. History was obtained, to the extent possible, from review of the chart and collateral sources of information.      Exam:  intubated, sedated  Opens eyes to verbal stimuli  Pupils 2-3mm, PERRLA  +corneal  +gag  Left gaze deviation  Does not follow commands  LUE/LLE + movement to noxious stimuli, moves spontaneously  RUE/RLE minimal movement  Incision C/D/I, no edema, erythema or drainage  EVD in place, clamped        CBC Full  -  ( 25 Feb 2021 22:36 )  WBC Count : 5.00 K/uL  RBC Count : 3.15 M/uL  Hemoglobin : 8.4 g/dL  Hematocrit : 26.2 %  Platelet Count - Automated : 125 K/uL  Mean Cell Volume : 83.2 fL  Mean Cell Hemoglobin : 26.7 pg  Mean Cell Hemoglobin Concentration : 32.1 g/dL  Auto Neutrophil # : x  Auto Lymphocyte # : x  Auto Monocyte # : x  Auto Eosinophil # : x  Auto Basophil # : x  Auto Neutrophil % : x  Auto Lymphocyte % : x  Auto Monocyte % : x  Auto Eosinophil % : x  Auto Basophil % : x    02-25    140  |  110  |  12  ----------------------------<  135<H>  4.0   |  21  |  <0.5<L>    Ca    7.9<L>      25 Feb 2021 22:36  Phos  2.5     02-25  Mg     2.0     02-25      PT/INR - ( 26 Feb 2021 10:40 )   PT: 17.10 sec;   INR: 1.49 ratio         PTT - ( 26 Feb 2021 10:40 )  PTT:35.1 sec          Imaging:  CT Head No Cont:   EXAM:  CT BRAIN            IMPRESSION:    The ventricular drain entering from the left frontal region has been pulled back. The tip of the catheter is partially obscured by streak artifact but appears to be in the region of the third ventricle. There has been a decrease in the size of the lateral ventricles when compared with the previous study.    The previously noted areasof scattered subarachnoid hemorrhage and layering of intraventricular hemorrhage in the posterior horns of lateral ventricles appear to be slightly increased.          Last CTA/MRA:  < from: CT Angio Neck w/ IV Cont (02.24.21 @ 13:51) >  IMPRESSION:    1.  No evidence of major vascular stenosis or occlusion. No evidence of aneurysm or vascular malformation.    2.  Calcific plaque of the carotid bifurcations with about 50% stenosis of the distal right CCA and <50% stenosis of the bilateral proximal ICAs.              CORRIE FORRESTER MD; Attending Radiologist    < end of copied text >    Assessment/Plan:   76 yo f with PMH of HTN, DM, DLD, B cell lymphoma on Imbruvica, Depression, CAD s/p stent, on ASA,  presents with acute onset AMS progressing to obtundation and S GCS of 5-6 in ER and required intubation. CTH with large left IPH. cta h/n was negative for aneurysms or AVM.  ICH score 3. S/post Left MIS craniotomy with evacuation of hematoma and EVD placement. Post procedural follow up cth revealed decreased mass effect upon the bilateral ventricles with 0.5 cm rightward midline shift, previously 1 cm. Patient was Febrile with tmax of 102.6 . Currently  mechanically vented and sedated on precedex not responsive to commands. Patient opens eyes to verbal stimuli.  Brain stem reflexes intact.      - Neuro checks q1hr  - maintain EVD at 10cm and clamped  if ICP>20 sustain please call Neurosurgery  - Keep SBP below 140  - Continue Keppra 750mg IV q12hrs  - Continue to wean sedation as tolerated for better neuroexam  - Keep HOB at 30 degrees  - will send CSF today  - continue to follow   Subjective: 75yFemale with a pmhx of CVA (CEREBRAL VASCULAR ACCIDENT);INTRACRANIAL BLEED    ^CVA (CEREBRAL VASCULAR ACCIDENT);INTRACRANIAL BLEED    Family history of diabetes mellitus (DM)    No pertinent family history in first degree relatives    Handoff    MEWS Score    Anemia    DM (diabetes mellitus)    High cholesterol    HTN (hypertension)    B-cell chronic lymphocytic leukemia variant    CAD (coronary artery disease)    CVA (cerebral vascular accident)    Craniotomy for intracerebral hemorrhage    H/O heart artery stent    No significant past surgical history    AMS    90+    Intracranial bleed    SysAdmin_VisitLink        POD#2 s/p left MIS craniotomy for evacuation of hematoma  Patient remains in SICU, febrile overnight Tmax 101, pancultured.  on/off levo throughout the night  on precedex                Allergies    penicillin (Anaphylaxis; Hives)    Intolerances        Vital Signs Last 24 Hrs  T(C): 38 (26 Feb 2021 12:00), Max: 38.7 (25 Feb 2021 13:00)  T(F): 100.4 (26 Feb 2021 12:00), Max: 101.6 (25 Feb 2021 13:00)  HR: 91 (26 Feb 2021 12:00) (83 - 128)  BP: 117/55 (26 Feb 2021 12:00) (99/50 - 156/96)  BP(mean): 79 (26 Feb 2021 12:00) (71 - 99)  RR: 18 (26 Feb 2021 12:00) (16 - 24)  SpO2: 100% (26 Feb 2021 12:00) (95% - 100%)      acetaminophen   Tablet .. 650 milliGRAM(s) Oral every 6 hours  atorvastatin 80 milliGRAM(s) Oral at bedtime  chlorhexidine 0.12% Liquid 15 milliLiter(s) Oral Mucosa two times a day  chlorhexidine 4% Liquid 1 Application(s) Topical daily  dexMEDEtomidine Infusion 0.01 MICROgram(s)/kG/Hr IV Continuous <Continuous>  dextrose 50% Injectable 25 Gram(s) IV Push once  dextrose 50% Injectable 25 Gram(s) IV Push once  fentaNYL    Injectable 25 MICROGram(s) IV Push every 4 hours PRN  insulin regular  human corrective regimen sliding scale   IV Push every 4 hours  levETIRAcetam  IVPB 750 milliGRAM(s) IV Intermittent every 12 hours  pantoprazole  Injectable 40 milliGRAM(s) IV Push daily  senna 2 Tablet(s) Oral at bedtime PRN  vancomycin  IVPB 1000 milliGRAM(s) IV Intermittent every 12 hours        02-25-21 @ 07:01  -  02-26-21 @ 07:00  --------------------------------------------------------  IN: 3649.3 mL / OUT: 2030 mL / NET: 1619.3 mL    02-26-21 @ 07:01  -  02-26-21 @ 12:24  --------------------------------------------------------  IN: 256.5 mL / OUT: 265 mL / NET: -8.5 mL        REVIEW OF SYSTEMS    [ ] A ten-point review of systems was otherwise negative except as noted.  [x ] Due to altered mental status/intubation, subjective information were not able to be obtained from the patient. History was obtained, to the extent possible, from review of the chart and collateral sources of information.      Exam:  intubated, sedated  Opens eyes to verbal stimuli  Pupils 2-3mm, PERRLA  +corneal  +gag  Left gaze deviation  Does not follow commands  LUE/LLE + movement to noxious stimuli, moves spontaneously  RUE/RLE minimal movement  Incision C/D/I, no edema, erythema or drainage  EVD in place, clamped        CBC Full  -  ( 25 Feb 2021 22:36 )  WBC Count : 5.00 K/uL  RBC Count : 3.15 M/uL  Hemoglobin : 8.4 g/dL  Hematocrit : 26.2 %  Platelet Count - Automated : 125 K/uL  Mean Cell Volume : 83.2 fL  Mean Cell Hemoglobin : 26.7 pg  Mean Cell Hemoglobin Concentration : 32.1 g/dL  Auto Neutrophil # : x  Auto Lymphocyte # : x  Auto Monocyte # : x  Auto Eosinophil # : x  Auto Basophil # : x  Auto Neutrophil % : x  Auto Lymphocyte % : x  Auto Monocyte % : x  Auto Eosinophil % : x  Auto Basophil % : x    02-25    140  |  110  |  12  ----------------------------<  135<H>  4.0   |  21  |  <0.5<L>    Ca    7.9<L>      25 Feb 2021 22:36  Phos  2.5     02-25  Mg     2.0     02-25      PT/INR - ( 26 Feb 2021 10:40 )   PT: 17.10 sec;   INR: 1.49 ratio         PTT - ( 26 Feb 2021 10:40 )  PTT:35.1 sec          Imaging:  CT Head No Cont:   EXAM:  CT BRAIN            IMPRESSION:    The ventricular drain entering from the left frontal region has been pulled back. The tip of the catheter is partially obscured by streak artifact but appears to be in the region of the third ventricle. There has been a decrease in the size of the lateral ventricles when compared with the previous study.    The previously noted areasof scattered subarachnoid hemorrhage and layering of intraventricular hemorrhage in the posterior horns of lateral ventricles appear to be slightly increased.          Last CTA/MRA:  < from: CT Angio Neck w/ IV Cont (02.24.21 @ 13:51) >  IMPRESSION:    1.  No evidence of major vascular stenosis or occlusion. No evidence of aneurysm or vascular malformation.    2.  Calcific plaque of the carotid bifurcations with about 50% stenosis of the distal right CCA and <50% stenosis of the bilateral proximal ICAs.              CORRIE FORRESTER MD; Attending Radiologist    < end of copied text >    Assessment/Plan:   76 yo f with PMH of HTN, DM, DLD, B cell lymphoma on Imbruvica, Depression, CAD s/p stent, on ASA,  presents with acute onset AMS progressing to obtundation and S GCS of 5-6 in ER and required intubation. CTH with large left IPH. cta h/n was negative for aneurysms or AVM.  ICH score 3. S/post Left MIS craniotomy with evacuation of hematoma and EVD placement. Post procedural follow up cth revealed decreased mass effect upon the bilateral ventricles with 0.5 cm rightward midline shift, previously 1 cm. Patient was Febrile with tmax of 102.6 . Currently  mechanically vented and sedated on precedex not responsive to commands. Patient opens eyes to verbal stimuli.  Brain stem reflexes intact.      - Neuro checks q1hr  - maintain EVD at 10cm and keep clamped,   if ICP>25 sustain please call Neurosurgery  - Keep SBP below 140  - Continue Keppra 750mg IV q12hrs  - Continue to wean sedation as tolerated for better neuro exam  - Keep HOB at 30 degrees  - will send CSF today  - continue to follow

## 2021-02-26 NOTE — PROGRESS NOTE ADULT - ASSESSMENT
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     ASSESSMENT  #) Intraparenchymal bleed s/p craniotomy with anemia and thrombocytopenia   #) Hx of Marginal Zone Lymphoma on Imbruvica     PLAN  - Cont to hold Imbruvica as it is associated with potential adverse reaction of hemorrhage. Also note that the effect of Imbruvica can last up to 7 days post ingestion of medication  - If any repeat CT scans show increase in bleed, transfuse platelets ASAP at least 1-2 units despite platelet count   - BP control and management of hemorrhage has per neurosurgery and neurology   - Cont with frequent q1h neurochecks, management as per neurosurgery/ neuro ICU   - Anemia and thrombocytopenia is likely secondary to post-operative blood loss, transfuse for platelet count <100K

## 2021-02-26 NOTE — PROGRESS NOTE ADULT - ASSESSMENT
15. Impression:  74 yo f with PMH of HTN, DM, DLD, B cell lymphoma on Imbruvica, Depression, CAD s/p stent, on ASA,  presents with acute onset AMS progressing to obtundation and S GCS of 5-6 in ER and required intubation. CTH with large left IPH. cta h/n was negative for aneurysms or AVM.  ICH score 3. S/post Left MIS craniotomy with evacuation of hematoma and EVD placement. Post procedural follow up cth revealed decreased mass effect upon the bilateral ventricles with 0.5 cm rightward midline shift, previously 1 cm. Patient was Febrile with tmax of 102.6 . Currently  mechanically vented and sedated on precedex not responsive to commands. Patient opens eyes to verbal stimuli.  Brain stem reflexes intact.        Plan:  #Neuro:  - Neuro checks q1hr  - EVD as per Neuro Sx  - Continue Keppra 750mg IV q12hrs  - Continue to wean sedation as tolerated for better neuroexam  - Neurosurgery follow up  - Low CPP, levophed started for hypotension.     #CV:  - Keep SBP >140 < 160       #Resp:  - Ventilator management as per primary team  - HOB > 35 degrees  - Aspiration precautions      #Renal/Fluid/Electolytes:  - Strict I&Os  - Monitor lytes, replete as needed  - Follow up magnesium levels      #ID:  -Antibiotics as per primary team  -Maintain normothermia  -Tylenol 650mg prn for fever >100.5       #GI:  - NPO    - OGT  - Continue Protonix 40mg IV q12hrs  - Senna 2 tablets PO q24hrs HS      #Endocrine   -H/o DM on Metformin    -FS as per unit protocol   -HA1C   -Maintain strict glycemic control    HEME/ONC:   -DVT prophylaxis hold in light of ICH  -H/o B cell Lymphoma on Imbruvica  - Sequential stockings      Disposition: Critical care monitoring

## 2021-02-26 NOTE — PROGRESS NOTE ADULT - ATTENDING COMMENTS
Patient seen today 2/26.    I was physically present for the key portions of the evaluation and management (E/M) service provided.  I agree with the above history, physical, and plan per above NP Benedicto with the following additions/modifications     SBP in 170's today, goal under 140, please start oral antihypertensives via NGT +/- drip as needed.  Doing well on minimal precedex + fentanyl push PRN.  Opens eyes to noxious minimally, weak withdrawal on LUE otherwise no motor responses.  Nearing readiness for SBT though not awake enough to extubate at this time yet.  EVD at 10, ICPs wnl.   Etiology of hemorrhage may be 2/2 side effect of Imbruvica, per hematology ntoes, effects can last up to 7 days.  Low threshold to repeat CTH if any new clinical concerns    Broderick Ordonez MD  Attending Neurointensivist

## 2021-02-26 NOTE — PROGRESS NOTE ADULT - SUBJECTIVE AND OBJECTIVE BOX
AD BASURTO  [id number]  75y Female    Indication for ICU admission: s/p crani -evac of large hematoma (IPH)    Admit Date:02-24-21  ICU Date: 2/24/21  OR Date: 2/24/21    penicillin (Anaphylaxis; Hives)    PAST MEDICAL & SURGICAL HISTORY:  Anemia    DM (diabetes mellitus)    High cholesterol    HTN (hypertension)    B-cell chronic lymphocytic leukemia variant    CAD (coronary artery disease)  s/p stenting    H/O heart artery stent      Home Medications:  aspirin 81 mg oral tablet, chewable: 1 tab(s) orally once a day (24 Feb 2021 15:11)  atorvastatin 80 mg oral tablet: 1 tab(s) orally once a day (at bedtime) (24 Feb 2021 15:11)  Imbruvica 70 mg oral capsule: 2 cap(s) orally once a day (24 Feb 2021 15:11)  Levaquin 500 mg oral tablet: 1 tab(s) orally every 24 hours (24 Feb 2021 15:11)  lisinopril 10 mg oral tablet: 1 tab(s) orally once a day (24 Feb 2021 15:11)        24HRS EVENT:  Night  -CTH: ventricular drain in 3rd ventricle, decr size of lateral ventricles, scattered SAH and layering IVH in posterior horns of  lateral ventricle slightly increased  -Levo off at 8pm back on at 1050pm  - INR 1.95, nsx rec: 1 FFP and 10 vit K  - f/u coags 11 AM        DAY:  -per NSG --> rept CT hd pending  -no HSQ yet  -1100 trop trending down 0.15  -started TF- Osmolite 1.0 goal 50  -spiked temp to 101.2, Pan culture        DVT PTX: none    GI PTX:pantoprazole  Injectable 40 milliGRAM(s) IV Push daily    Tubes/Lines/Drains   ----------------------------------------------------------------------------------------------------------  [x] Peripheral IV  [X] Central Venous Line         RIJ             Date Placed:  2/24  [X] Arterial Line		      Radial   Date Placed: 2/24  [X] Urinary Catheter Murry                                             Date Placed: 2/24     REVIEW OF SYSTEMS    [ ] A ten-point review of systems was otherwise negative except as noted.  [x ] Due to altered mental status/intubation, subjective information were not able to be obtained from the patient. History was

## 2021-02-26 NOTE — DIETITIAN INITIAL EVALUATION ADULT. - NUTRITION DIAGNOSTIC #1 OTHER
Spoke with spouse instructed that a Z-Joaquin and a prescription for prednisone were called in. Inadequate protein-energy intake

## 2021-02-26 NOTE — DIETITIAN INITIAL EVALUATION ADULT. - REASON FOR ADMISSION
Pt brought in by ambulance due to altered mental status and hypertension, found to have significant large left frontal intraparenchymal hematoma with intraventricular extension into left lateral ventricle, associated with marked mass effect resulting in 0.9 cm midline shift to the right anteriorly s/p craniotomy for intracerebral hemorrhage. Intubated and sedated. SICU admission for hemodynamic monitoring.

## 2021-02-26 NOTE — DIETITIAN INITIAL EVALUATION ADULT. - ENTERAL
To avoid underfeeding pt, please switch to the following TF regimen: Osmolite 1.5 @ 45ml/hr + 2 pckts of Prosource TF daily. TF to provide a total of 1528kcal, 82gm pro, 732ml free water. Additional flushes per LIP. All recs discussed with LIP ([phone])

## 2021-02-26 NOTE — PROGRESS NOTE ADULT - ASSESSMENT
Assessment and Recommendation:     75y Female s/p craniotomy for left frontal hematoma with evacuation and EVD placement. Intubated and sedated. SICU admission for hemodynamic monitoring.     NEURO:  Sedated on Precedex   Pain controlled with IV Tylenol, fentanyl prn   Keppra 500 mg q 12  EVD in place  CPP >70  q 1 hr neurocheck  f/u CTH (done @ 6pm)-->    RESP:   RR (machine): 16, TV (machine): 400, FiO2: 30, PEEP: 5, ITime: 1.26  -f/up AB.38/38/118/22 -99%  HOB elevated at 30 degrees    CARDS:   Levophed -low dose  BP goal -140  EKG sinus tach with RBBB Qtc 482  Echo (10/13/20): EF 55-60%, trace MR, R atrial echodensity possible thrombus  H/o CAD s/p stents and HTN - hold ASA and lisinopril  Trop 0.24 --> 0.21->0.15, no longer trending    GI/NUTR:   -started TF- Osmolite 1.0 goal 50  OGT  GI Prophylaxis- PPI  Bowel regimen- Senna    /RENAL:   Monitor UO-roblero in place  NS @ 100 cc/hr  BUN/Cr- 18/0.5  -->,  20/0.5   Na 140 // K 4.0 // Mg 2.0 //  Phos 2.5  @ 22:36  - lytes repleted     HEME/ONC:   DVT prophylaxis hold in light of ICH  Hb/Hct:  13.7/44.3  --> 8.8/28.2 --> 8.4/26.2  Plts:  159  -->s/p 2U plts-->153 --> 2 U plts --> 125  INR 1.85, nsx rec 1 FFP and 10 vit K    T&S: ABO RH Interpretation:  ()  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      ID:  WBC 5  T max 101.5  Antibiotics- Vancomycin  Pancx       ENDO:  h/o DM on Metformin    Glucose, Serum: 197 ( @ 18:20)  FS q 4  ISS  HA1C pending     LINES/DRAINS:  PIV, CVC, [place], [place]     DISPO:    SICU

## 2021-02-26 NOTE — PROGRESS NOTE ADULT - SUBJECTIVE AND OBJECTIVE BOX
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 Patient was AAO x4 and fully functional at baseline. Last known normal was 11:00pm  before beds. In  at 8:45AM, patient was found to be lethargy in bed by his son, was AAO x 4 and able to talk to his son, but started coughing and had one episode of nose bleed. At 9:15, her physical therapist noticed her becoming more lethargic, but vitals were normal. Soon at 9:30, patient was unresponsive and had a blank stare. EMS was called and her BP was found to be around 230/120. She was rushed to ED as Code Stroke. NIHSS 23. CTH showed Large left frontal intraparenchymal hematoma with intraventricular extension into left lateral ventricle, associated with marked mass effect resulting in 0.9 cm midline shift to the right anteriorly. Patient was intubated for airway protection. S/p 2 units of platelet for ASA reversal. Neurosurgery was planning for emergent OR resection of hematoma.      Medications:   acetaminophen   Tablet .. 650 milliGRAM(s) Oral every 6 hours  atorvastatin 80 milliGRAM(s) Oral at bedtime  dexMEDEtomidine Infusion 0.01 MICROgram(s)/kG/Hr IV Continuous <Continuous>  dextrose 40% Gel 15 Gram(s) Oral once  dextrose 5%. 1000 milliLiter(s) IV Continuous <Continuous>  dextrose 5%. 1000 milliLiter(s) IV Continuous <Continuous>  dextrose 50% Injectable 25 Gram(s) IV Push once  dextrose 50% Injectable 12.5 Gram(s) IV Push once  dextrose 50% Injectable 25 Gram(s) IV Push once  insulin regular  human corrective regimen sliding scale   IV Push every 4 hours  levETIRAcetam  IVPB 750 milliGRAM(s) IV Intermittent every 12 hours  norepinephrine Infusion 0.05 MICROgram(s)/kG/Min IV Continuous <Continuous>  pantoprazole  Injectable 40 milliGRAM(s) IV Push daily  phytonadione  IVPB 10 milliGRAM(s) IV Intermittent once  sodium chloride 0.9%. 1000 milliLiter(s) IV Continuous <Continuous>  vancomycin  IVPB 1000 milliGRAM(s) IV Intermittent every 12 hours      7. Ancillary Management:   Chest PT[ ]   Head of bed >35 [ x]   Out of bed to chair [ ]   PT/OT/SP Eval [ ]   Spirometry[ ]   DVT prophalaxis[x ]    8.Neuro:   Awake: Spontaneously[ ] Occasionally[ ] To Voice [ ] To painful stimuli [ ]   AIert [ ].none[x] Following commands: 3 steps[ ], 2 steps[ ], 1 step [ ], None [ x]   Orientation: 0[ x], 1[ ], 2[ ], 3[ ]. Tracking objects with eyes: [ ]   Language: vented  Time off sedation for exam:   Pupils: Right   3>2   Left    3>2      Corneal:  +    Gag reflex: +    EOMI: left gaze deviation  Sedated, opens eyes to verbal stimuli.   Movement to noxious stimuli on LUE, LLE. Uses proximal strength on LUE.   Minimal movement on RUE RLE    Bilateral Up going toes.      mRS:4  0 No symptoms at all  1 No significant disability despite symptoms; able to carry out all usual duties and activities without assistance  2 Slight disability; unable to carry out all previous activities, but able to look after own affairs  3 Moderate disability; requiring some help, but able to walk without assistance  4 Moderately severe disability; unable to walk without assistance and unable to attend to own bodily needs without assistance  5 Severe disability; bedridden, incontinent and requiring constant nursing care and attention  6 Dead    ICHs:4  Age >=80  GCS Score: 3-4 (2 pts)   GCS Score: 5-12 (1 pt)   ICH Volume: >30  (+) IVH  (+) Infratentorial    Justice&Rinaldi:  Grade I = Asymptomatic, mild headache, slight nuchal rigidity  Grade II = Moderate to severe headache, nuchal rigidity, no neurological deficit other than cranial nerve palsy  Grade III = Drowiness, confusion, mild focal neurological deficit  Grade IV = Stupor, moderate to severe hemiparesis  Grade V = Coma, decerebrate posturing    m-Olivera:  Grade 0   (No Subarachnoid Hemorrhage (SAH)), No intraventricular hemorrhage (IVH), Incidence of symptomatic vasopasm 0%  Grade 1   (Focal or diffuse, thin SAH), No IVH, incidence of symptomatic vasospasm 24%  Grade 2   (Thin focal or diffuse SAH), IVH present, incidence of symptomatic vasospasm 33%  Grade 3   (Thick focal or diffuse SAH), No IVH, incidence of symptomatic vasospasm 33%  Grade 4   (Thick focal or diffuse SAH), IVH present, incidence of symptomatic vasosasm 40%    Last CTH:  CT Head No Cont:   EXAM:  CT BRAIN            IMPRESSION:    The ventricular drain entering from the left frontal region has been pulled back. The tip of the catheter is partially obscured by streak artifact but appears to be in the region of the third ventricle. There has been a decrease in the size of the lateral ventricles when compared with the previous study.    The previously noted areasof scattered subarachnoid hemorrhage and layering of intraventricular hemorrhage in the posterior horns of lateral ventricles appear to be slightly increased.          Last CTA/MRA:  < from: CT Angio Neck w/ IV Cont (21 @ 13:51) >  IMPRESSION:    1.  No evidence of major vascular stenosis or occlusion. No evidence of aneurysm or vascular malformation.    2.  Calcific plaque of the carotid bifurcations with about 50% stenosis of the distal right CCA and <50% stenosis of the bilateral proximal ICAs.              CORRIE FORRESTER MD; Attending Radiologist    < end of copied text >    Last CTP:    Last MRI:    Last TCD:    Last EEG:  `  EVD: [ ] [place]: [ ]     ICP:  10  CPP:  50  Level(cm): 10    24hr(ml):  pending   CSF:     W:     R:     Cx:     P:     G:     LA:       Gram stain:    9. Cardiovascular:   Vital Signs Last 24 Hrs  T(C): 37.3 (2021 23:00), Max: 39.2 (2021 12:00)  T(F): 99.1 (2021 23:00), Max: 102.6 (2021 12:00)  HR: 101 (2021 22:30) (92 - 128)  BP: 114/57 (2021 22:00) (96/51 - 156/96)  BP(mean): 82 (2021 22:00) (69 - 95)  RR: 16 (2021 22:30) (16 - 30)  SpO2: 95% (2021 22:30) (95% - 100%)     Last Echo:  `  Last EKG:  `< from: 12 Lead ECG (21 @ 22:58) >    Diagnosis Line Sinus tachycardia  Right bundle branch block  Abnormal ECG    < end of copied text >    CVP   MAP/CPP/SBP target:   CO:      CI:       Enzymes/Trop:    10. Respiratory:   ABG:ABG - ( 2021 19:07 )  pH, Arterial: 7.43  pH, Blood: x     /  pCO2: 35    /  pO2: 100   / HCO3: 23    / Base Excess: -1.3  /  SaO2: 98        VBG:    Chest Xray:  `< from: Xray Chest 1 View- PORTABLE-Urgent (Xray Chest 1 View- PORTABLE-Urgent .) (21 @ 22:40) >  Impression:    Bilateral opacifications. Support devices as described.    Without difference.    < end of copied text >    Mode: AC/ CMV (Assist Control/ Continuous Mandatory Ventilation)  RR (machine): 16  TV (machine): 400  FiO2: 30  PEEP: 5  ITime: 1  MAP: 13  PIP: 27      Peak Pressure/[place] Pressure:    11.GI:    Prophalaxis: protonix     Bowel mvt:     Abd distension:   LIVER FUNCTIONS - ( 2021 10:33 )  Alb: 4.4 g/dL / Pro: 7.0 g/dL / ALK PHOS: 165 U/L / ALT: 43 U/L / AST: 58 U/L / GGT: x             12.Renal/Fluids/Electrolytes:        140  |  110  |  12  ----------------------------<  135<H>  4.0   |  21  |  <0.5<L>    Ca    7.9<L>      2021 22:36  Phos  2.5       Mg     2.0         TPro  7.0  /  Alb  4.4  /  TBili  0.6  /  DBili  x   /  AST  58<H>  /  ALT  43<H>  /  AlkPhos  165<H>        I&O's Detail    2021 07:01  -  2021 07:00  --------------------------------------------------------  IN:    Dexmedetomidine: 8.8 mL    Dexmedetomidine: 54.8 mL    Dexmedetomidine: 191.8 mL    IV PiggyBack: 100 mL    IV PiggyBack: 250 mL    Norepinephrine: 45 mL    Platelets - Single Donor: 432 mL    sodium chloride 0.9%: 700 mL    sodium chloride 0.9%: 200 mL    Sodium Chloride 0.9% Bolus: 750 mL    sodium chloride 3%: 100 mL  Total IN: 2832.4 mL    OUT:    Indwelling Catheter - Urethral (mL): 2175 mL    NiCARdipine: 0 mL    Voided (mL): 400 mL  Total OUT: 2575 mL    Total NET: 257.4 mL      2021 07:01  -  2021 00:15  --------------------------------------------------------  IN:    Dexmedetomidine: 149.8 mL    IV PiggyBack: 100 mL    IV PiggyBack: 200 mL    IV PiggyBack: 250 mL    IV PiggyBack: 100 mL    Norepinephrine: 42.4 mL    Oral Fluid: 50 mL    sodium chloride 0.9%: 1400 mL  Total IN: 2292.2 mL    OUT:    Indwelling Catheter - Urethral (mL): 725 mL    Nasogastric/Oral tube (mL): 30 mL  Total OUT: 755 mL    Total NET: 1537.2 mL          13.ID:   TMax: 102.6  Vital Signs Last 24 Hrs  T(C): 37.3 (2021 23:00), Max: 39.2 (2021 12:00)  T(F): 99.1 (2021 23:00), Max: 102.6 (2021 12:00)  HR: 101 (2021 22:30) (92 - 128)  BP: 114/57 (2021 22:00) (96/51 - 156/96)  BP(mean): 82 (2021 22:00) (69 - 95)  RR: 16 (2021 22:30) (16 - 30)  SpO2: 95% (2021 22:30) (95% - 100%)   Lactate, Blood: 2.6 mmol/L ( @ 10:33)     Urinalysis Basic - ( 2021 14:54 )    Color: Yellow / Appearance: Clear / S.025 / pH: x  Gluc: x / Ketone: Negative  / Bili: Negative / Urobili: <2 mg/dL   Blood: x / Protein: 30 mg/dL / Nitrite: Negative   Leuk Esterase: Small / RBC: 1 /HPF / WBC 9 /HPF   Sq Epi: x / Non Sq Epi: 1 /HPF / Bacteria: Negative         Lines: Central[] Date inserted: Peripheral[]    14. Hematology:                         8.4    5.00  )-----------( 125      ( 2021 22:36 )             26.2          140  |  110  |  12  ----------------------------<  135<H>  4.0   |  21  |  <0.5<L>    Ca    7.9<L>      2021 22:36  Phos  2.5       Mg     2.0         TPro  7.0  /  Alb  4.4  /  TBili  0.6  /  DBili  x   /  AST  58<H>  /  ALT  43<H>  /  AlkPhos  165<H>       PT/INR - ( 2021 22:36 )   PT: 21.30 sec;   INR: 1.85 ratio         PTT - ( 2021 22:36 )  PTT:37.2 sec    DVT Prophylaxis Lovenox[ ] Heparin[ ] Venodynes[ ] SCD's[ x]

## 2021-02-26 NOTE — PROGRESS NOTE ADULT - ATTENDING COMMENTS
Patient is arousable, does not follow commands.  Requires low dose of Levo.  Was started on tube feeds yesterday.  Received 1 FFP and Vit. K last night elevated INR 1.95.    ASSESSMENT:  76 y/o female with Left Intracerebral Hemorrhage.  S/P Left Craniotomy. EVD placement.  Cerebral edema.  Coma.  Acute respiratory failure with hypoxia.  HTN.  B Cell Lymphoma.  DM    PLAN:  - sedation with Precedex  - continue Vent support  - keep MAP>65; Levo as needed  - continue tube feeds to goal; bowel regiment  - follow serum electrolytes and UOP  - monitor for DI  - FS, ise ISS as needed  - GI and DVT prophylaxis.   - Neurosurgery f/u

## 2021-02-27 NOTE — PROGRESS NOTE ADULT - SUBJECTIVE AND OBJECTIVE BOX
AD BASURTO  [id number]  75y Female    Indication for ICU admission: s/p crani -evac of large hematoma (IPH)    Admit Date:02-24-21  ICU Date: 2/24/21  OR Date: 2/24/21    penicillin (Anaphylaxis; Hives)    PAST MEDICAL & SURGICAL HISTORY:  Anemia    DM (diabetes mellitus)    High cholesterol    HTN (hypertension)    B-cell chronic lymphocytic leukemia variant    CAD (coronary artery disease)  s/p stenting    H/O heart artery stent      Home Medications:  aspirin 81 mg oral tablet, chewable: 1 tab(s) orally once a day (24 Feb 2021 15:11)  atorvastatin 80 mg oral tablet: 1 tab(s) orally once a day (at bedtime) (24 Feb 2021 15:11)  Imbruvica 70 mg oral capsule: 2 cap(s) orally once a day (24 Feb 2021 15:11)  Levaquin 500 mg oral tablet: 1 tab(s) orally every 24 hours (24 Feb 2021 15:11)  lisinopril 10 mg oral tablet: 1 tab(s) orally once a day (24 Feb 2021 15:11)        24HRS EVENT:  NIGHT  -ICP 17-20 at bedside, nsx notified advised if ICP > 25 to unclamp  -monitor fevers, blanketrol in place, Tmax 100.6  -2g Mg, 30 NaPhos  -STAT coags    DAY:   -off levo since 07:00  -INR 1.49  -vanc level at 16:00  -fs elevated, changed to glucerna   -ICP 20, NSX called   -EVD clamped at 10cmHg  -hypertensive, labetalol 5mg x1, vasotec 1.25x1  -HSQ started   afebrile, tmax 100.4  CSF cx sent     DVT PTX: started HSQ    GI PTX:pantoprazole  Injectable 40 milliGRAM(s) IV Push daily    Tubes/Lines/Drains   ----------------------------------------------------------------------------------------------------------  [x] Peripheral IV  [X] Central Venous Line         RIJ             Date Placed:  2/24  [X] Arterial Line		      Radial   Date Placed: 2/24  [X] Urinary Catheter Murry                                             Date Placed: 2/24     REVIEW OF SYSTEMS    [ ] A ten-point review of systems was otherwise negative except as noted.  [x ] Due to altered mental status/intubation, subjective information were not able to be obtained from the patient. History was

## 2021-02-27 NOTE — PROGRESS NOTE ADULT - ASSESSMENT
Assessment and Recommendation:     75y Female s/p craniotomy for left frontal hematoma with evacuation and EVD placement. Intubated and sedated. SICU admission for hemodynamic monitoring.     NEURO:  Sedated on Precedex   Pain controlled with IV Tylenol, fentanyl prn   Keppra 500 mg q 12  EVD in place @ [id number], clamped, plan to unclamp if ICP > 25  CPP >70  q 1 hr neurocheck  2/25 CTH- ventricular drain in 3rd ventricle, decr size of lateral ventricles, scattered SAH and layering IVH in posterior horns of  lateral ventricle slightly increased    RESP:   Intubated: /16/30/5  ABG:   HOB elevated at 30 degrees    CARDS:   Off levo since 2/26 7am, -hypertensive, labetalol 5mg x1, vasotec 1.25x1  BP goal SBP <160  EKG sinus tach with RBBB Qtc 482  Echo (10/13/20): EF 55-60%, trace MR, R atrial echodensity possible thrombus  H/o CAD s/p stents and HTN - hold ASA and lisinopril  Trop 0.24 --> 0.21->0.15, no longer trending    GI/NUTR:   -started TF- changed to Glucerna @ goal  OGT  GI Prophylaxis- PPI  Bowel regimen- Senna    /RENAL:   Monitor UO-roblero in place  IVL  BUN/Cr- 12/0.5  Na 136// K+ 4.3 // Mg 1.6 // Phos 2.1 02/26 @ 23:45  repleted      HEME/ONC:   DVT prophylaxis: started HSQ  Hb/Hct:  13.7/44.3--> 8.8/28.2 --> 8.4/26.2--> 8.0/25.6--> 8.1/25.3  Plts:  159  -->s/p 2U plts-->153 --> 2 U plts --> 125-->119-->114  (02/25) INR 1.85, nsx rec 1 FFP and 10 vit K, INR 1.49 (2/26)    T&S: ABO RH Interpretation:  (02-24)  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  Heme/Onc consult: transfuse platelets if < 100, give vit K for elevated INR  s/p 1 FFP & 10 vit K on 2/26 for INR 1.9 rpt 1.4, f/u STAT COAGS__    ID:  WBC 3.5- > 3.79  Tmax 100.8, cooling blanket, ATC tylenol   Antibiotics- Vancomycin, level 7.8   2/25 blood cx pending   2/26: CSF pending        ENDO:  Holding home metformin  FS q 4  ISS  HA1C 6.3     LINES/DRAINS:  PIV, CVC, [place], [place], EVD     DISPO:    SICU     Assessment and Recommendation:     75y Female s/p craniotomy for left frontal hematoma with evacuation and EVD placement. Intubated and sedated. SICU admission for hemodynamic monitoring.     NEURO:  Sedated on Precedex   Pain controlled with IV Tylenol, fentanyl prn   Keppra 500 mg q 12  EVD in place @ [id number], clamped, plan to unclamp if ICP > 25  CPP >70  q 1 hr neurocheck  2/25 CTH- ventricular drain in 3rd ventricle, decr size of lateral ventricles, scattered SAH and layering IVH in posterior horns of  lateral ventricle slightly increased    RESP:   Intubated: /16/30/5  ABG:   HOB elevated at 30 degrees    CARDS:   Off levo since 2/26 7am, -hypertensive, labetalol 5mg x1, vasotec 1.25x1  BP goal SBP <160  EKG sinus tach with RBBB Qtc 482  Echo (10/13/20): EF 55-60%, trace MR, R atrial echodensity possible thrombus  H/o CAD s/p stents and HTN - hold ASA and lisinopril  Trop 0.24 --> 0.21->0.15, no longer trending    GI/NUTR:   -started TF- changed to Glucerna @ goal  OGT  GI Prophylaxis- PPI  Bowel regimen- Senna    /RENAL:   Monitor UO-roblero in place  IVL  BUN/Cr- 12/0.5  Na 136// K+ 4.3 // Mg 1.6 // Phos 2.1 02/26 @ 23:45  repleted      HEME/ONC:   DVT prophylaxis: started HSQ  Hb/Hct:  13.7/44.3--> 8.8/28.2 --> 8.4/26.2--> 8.0/25.6--> 8.1/25.3  Plts:  159  -->s/p 2U plts-->153 --> 2 U plts --> 125-->119-->114  (02/25) INR 1.85, nsx rec 1 FFP and 10 vit K, INR 1.49 (2/26)    T&S: ABO RH Interpretation:  (02-24)  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  Heme/Onc consult: transfuse platelets if < 100, give vit K for elevated INR  s/p 1 FFP & 10 vit K on 2/26 for INR 1.9 rpt 1.4, f/u STAT COAGS__    ID:  WBC 3.5- > 3.79  Tmax 100.6, cooling blanket, ATC tylenol   Antibiotics- Vancomycin, level 7.8   2/25 blood cx pending   2/26: CSF pending        ENDO:   Holding home metformin  FS q 4  ISS  HA1C 6.3     LINES/DRAINS:  PIV, CVC, [place], [place], EVD     DISPO:    SICU     Assessment and Recommendation:     75y Female s/p craniotomy for left frontal hematoma with evacuation and EVD placement. Intubated and sedated. SICU admission for hemodynamic monitoring.     NEURO:  Sedated on Precedex   Pain controlled with IV Tylenol, fentanyl prn   Keppra 500 mg q 12  EVD in place @ [id number], clamped, plan to unclamp if ICP > 25  CPP >70  q 1 hr neurocheck  2/25 CTH- ventricular drain in 3rd ventricle, decr size of lateral ventricles, scattered SAH and layering IVH in posterior horns of  lateral ventricle slightly increased    RESP:   Intubated: /16/30/5  ABG: ABG - ( 27 Feb 2021 03:39 )  pH, Arterial: 7.51  pH, Blood: x     /  pCO2: 28    /  pO2: 80    / HCO3: 22    / Base Excess: 0.5   /  SaO2: 98      AM CXR  HOB elevated at 30 degrees    CARDS:   Off levo since 2/26 7am, -hypertensive, labetalol 5mg x1, vasotec 1.25x1  BP goal SBP <160  EKG sinus tach with RBBB Qtc 482  Echo (10/13/20): EF 55-60%, trace MR, R atrial echodensity possible thrombus  H/o CAD s/p stents and HTN - hold ASA and lisinopril  Trop 0.24 --> 0.21->0.15, no longer trending    GI/NUTR:   -started TF- changed to Glucerna @ goal  OGT  GI Prophylaxis- PPI  Bowel regimen- Senna    /RENAL:   Monitor UO-roblero in place  IVL  BUN/Cr- 12/0.5  Na 136// K+ 4.3 // Mg 1.6 // Phos 2.1 02/26 @ 23:45  repleted      HEME/ONC:   DVT prophylaxis: started HSQ  Hb/Hct:  13.7/44.3--> 8.8/28.2 --> 8.4/26.2--> 8.0/25.6--> 8.1/25.3  Plts:  159  -->s/p 2U plts-->153 --> 2 U plts --> 125-->119-->114  (02/25) INR 1.85, nsx rec 1 FFP and 10 vit K, INR 1.49 (2/26)    T&S: ABO RH Interpretation:  (02-24)  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  Heme/Onc consult: transfuse platelets if < 100, give vit K for elevated INR  s/p 1 FFP & 10 vit K on 2/26 for INR 1.9 rpt 1.4, f/u STAT COAGS__    ID:  WBC 3.5- > 3.79  Tmax 100.6, cooling blanket, ATC tylenol   Antibiotics- Vancomycin, level 7.8   2/25 blood cx pending   2/26: CSF pending        ENDO:   Holding home metformin  FS q 4  ISS  HA1C 6.3     LINES/DRAINS:  PIV, CVC, [place], [place], EVD     DISPO:    SICU

## 2021-02-27 NOTE — PROGRESS NOTE ADULT - SUBJECTIVE AND OBJECTIVE BOX
POD#3    S/P Left Minimally Invasive Craniotomy for evac of IPH with Placement of EVD    Pt seen and examined at bedside.     Vital Signs Last 24 Hrs  T(C): 37.4 (27 Feb 2021 04:00), Max: 38.5 (26 Feb 2021 17:00)  T(F): 99.3 (27 Feb 2021 04:00), Max: 101.3 (26 Feb 2021 17:00)  HR: 69 (27 Feb 2021 07:00) (69 - 95)  BP: 118/56 (27 Feb 2021 07:00) (107/51 - 146/65)  BP(mean): 88 (27 Feb 2021 06:00) (73 - 94)  RR: 17 (27 Feb 2021 07:00) (16 - 24)  SpO2: 99% (27 Feb 2021 07:00) (98% - 100%)  ICP: 13-20    I&O's Detail    26 Feb 2021 07:01  -  27 Feb 2021 07:00  --------------------------------------------------------  IN:    Dexmedetomidine: 160.6 mL    Enteral Tube Flush: 1070 mL    IV PiggyBack: 100 mL    IV PiggyBack: 250 mL  Total IN: 1580.6 mL    OUT:    Indwelling Catheter - Urethral (mL): 1135 mL  Total OUT: 1135 mL    Total NET: 445.6 mL        I&O's Summary    26 Feb 2021 07:01  -  27 Feb 2021 07:00  --------------------------------------------------------  IN: 1580.6 mL / OUT: 1135 mL / NET: 445.6 mL        REVIEW OF SYSTEMS    [ ] A ten-point review of systems was otherwise negative except as noted.  [X] Due to altered mental status/intubation, subjective information were not able to be obtained from the patient. History was obtained, to the extent possible, from review of the chart and collateral sources of information.      PHYSICAL EXAM:  Neurological:                LABS:                        8.1    3.79  )-----------( 114      ( 26 Feb 2021 23:45 )             25.3     02-26    136  |  108  |  12  ----------------------------<  196<H>  4.3   |  21  |  <0.5<L>    Ca    8.1<L>      26 Feb 2021 23:45  Phos  2.1     02-26  Mg     1.6     02-26      PT/INR - ( 27 Feb 2021 05:06 )   PT: 17.90 sec;   INR: 1.56 ratio         PTT - ( 27 Feb 2021 05:06 )  PTT:31.8 sec    CARDIAC MARKERS ( 25 Feb 2021 12:08 )  x     / 0.15 ng/mL / 107 U/L / x     / 4.1 ng/mL      CAPILLARY BLOOD GLUCOSE      POCT Blood Glucose.: 180 mg/dL (27 Feb 2021 05:25)  POCT Blood Glucose.: 185 mg/dL (26 Feb 2021 22:46)  POCT Blood Glucose.: 178 mg/dL (26 Feb 2021 16:54)  POCT Blood Glucose.: 180 mg/dL (26 Feb 2021 14:03)  POCT Blood Glucose.: 226 mg/dL (26 Feb 2021 09:36)    Magnesium, Serum: 1.6 mg/dL (02-26-21 @ 23:45)          CSF Analysis: [] N/A  CSF Lymphocytes: 5 % (02-26 @ 15:20)  RBC Count - Spinal Fluid: 5150 /uL (02-26 @ 15:20)  Glucose, CSF: 88 mg/dL (02-26 @ 15:20)  Protein, CSF: 161 mg/dL (02-26 @ 15:20)      Allergies    penicillin (Anaphylaxis; Hives)    Intolerances      MEDICATIONS:  Antibiotics:  vancomycin  IVPB 1000 milliGRAM(s) IV Intermittent every 12 hours    Neuro:  acetaminophen   Tablet .. 650 milliGRAM(s) Oral every 6 hours  dexMEDEtomidine Infusion 0.01 MICROgram(s)/kG/Hr IV Continuous <Continuous>  fentaNYL    Injectable 25 MICROGram(s) IV Push every 4 hours PRN  levETIRAcetam  IVPB 750 milliGRAM(s) IV Intermittent every 12 hours      IVF:      CULTURES:  Culture Results:   No growth to date. (02-25 @ 12:40)      RADIOLOGY & ADDITIONAL TESTS:      ASSESSMENT:  75y Female s/p    CVA (CEREBRAL VASCULAR ACCIDENT);INTRACRANIAL BLEED    ^CVA (CEREBRAL VASCULAR ACCIDENT);INTRACRANIAL BLEED    Family history of diabetes mellitus (DM)    No pertinent family history in first degree relatives    Handoff    MEWS Score    Anemia    DM (diabetes mellitus)    High cholesterol    HTN (hypertension)    B-cell chronic lymphocytic leukemia variant    CAD (coronary artery disease)    CVA (cerebral vascular accident)    Craniotomy for intracerebral hemorrhage    H/O heart artery stent    No significant past surgical history    AMS    90+    Intracranial bleed    [email or web address]        PLAN:   POD#3    S/P Left Minimally Invasive Craniotomy for evac of IPH with Placement of EVD    Pt seen and examined at bedside. Pt remains intubated, sedated with Precidex.    Vital Signs Last 24 Hrs  T(C): 37.4 (27 Feb 2021 04:00), Max: 38.5 (26 Feb 2021 17:00)  T(F): 99.3 (27 Feb 2021 04:00), Max: 101.3 (26 Feb 2021 17:00)  HR: 69 (27 Feb 2021 07:00) (69 - 95)  BP: 118/56 (27 Feb 2021 07:00) (107/51 - 146/65)  BP(mean): 88 (27 Feb 2021 06:00) (73 - 94)  RR: 17 (27 Feb 2021 07:00) (16 - 24)  SpO2: 99% (27 Feb 2021 07:00) (98% - 100%)  ICP: 13-20    I&O's Detail    26 Feb 2021 07:01  -  27 Feb 2021 07:00  --------------------------------------------------------  IN:    Dexmedetomidine: 160.6 mL    Enteral Tube Flush: 1070 mL    IV PiggyBack: 100 mL    IV PiggyBack: 250 mL  Total IN: 1580.6 mL    OUT:    Indwelling Catheter - Urethral (mL): 1135 mL  Total OUT: 1135 mL    Total NET: 445.6 mL    I&O's Summary    26 Feb 2021 07:01  -  27 Feb 2021 07:00  --------------------------------------------------------  IN: 1580.6 mL / OUT: 1135 mL / NET: 445.6 mL        REVIEW OF SYSTEMS    [ ] A ten-point review of systems was otherwise negative except as noted.  [X] Due to altered mental status/intubation, subjective information were not able to be obtained from the patient. History was obtained, to the extent possible, from review of the chart and collateral sources of information.      PHYSICAL EXAM:  Neurological:  Pupils reactive  + corneals  w/d extremities to pain  Incision intact    LABS:                        8.1    3.79  )-----------( 114      ( 26 Feb 2021 23:45 )             25.3     02-26    136  |  108  |  12  ----------------------------<  196<H>  4.3   |  21  |  <0.5<L>    Ca    8.1<L>      26 Feb 2021 23:45  Phos  2.1     02-26  Mg     1.6     02-26      PT/INR - ( 27 Feb 2021 05:06 )   PT: 17.90 sec;   INR: 1.56 ratio    PTT - ( 27 Feb 2021 05:06 )  PTT:31.8 sec    CSF Analysis: [] N/A  CSF Lymphocytes: 5 % (02-26 @ 15:20)  RBC Count - Spinal Fluid: 5150 /uL (02-26 @ 15:20)  Glucose, CSF: 88 mg/dL (02-26 @ 15:20)  Protein, CSF: 161 mg/dL (02-26 @ 15:20)    Allergies    penicillin (Anaphylaxis; Hives)    Intolerances    MEDICATIONS:  Antibiotics:  vancomycin  IVPB 1000 milliGRAM(s) IV Intermittent every 12 hours    Neuro:  acetaminophen   Tablet .. 650 milliGRAM(s) Oral every 6 hours  dexMEDEtomidine Infusion 0.01 MICROgram(s)/kG/Hr IV Continuous <Continuous>  fentaNYL    Injectable 25 MICROGram(s) IV Push every 4 hours PRN  levETIRAcetam  IVPB 750 milliGRAM(s) IV Intermittent every 12 hours    CULTURES:  Culture Results:   No growth to date. (02-25 @ 12:40)      RADIOLOGY & ADDITIONAL TESTS:      ASSESSMENT:  75y Female s/p    CVA (CEREBRAL VASCULAR ACCIDENT);INTRACRANIAL BLEED    ^CVA (CEREBRAL VASCULAR ACCIDENT);INTRACRANIAL BLEED    Family history of diabetes mellitus (DM)    No pertinent family history in first degree relatives    Handoff    MEWS Score    Anemia    DM (diabetes mellitus)    High cholesterol    HTN (hypertension)    B-cell chronic lymphocytic leukemia variant    CAD (coronary artery disease)    CVA (cerebral vascular accident)    Craniotomy for intracerebral hemorrhage    H/O heart artery stent    No significant past surgical history    AMS    90+    Intracranial bleed    [email or web address]        PLAN:  Hold sedation   Neuro checks  Will send CSF for cytology and flow cytometry  F/U OR Pathology

## 2021-02-27 NOTE — PROGRESS NOTE ADULT - SUBJECTIVE AND OBJECTIVE BOX
Neurocritical Care Progress Note:    1. Brief Presentation: AMS    2. Today's Acute Problems: intubated, sedated, does not follow commands, EVD clampepd at 10, ICP and CPP are stable, on cooling blanket    3. Relevant brief History:75 years old female from home with PMHx of B cell lymphoma on Imbruvica (since 10/2020), CAD s/p stents 15 years ago, DM, chronic UTI, HTN, DLD, brought in by ambulance due to altered mental status and hypertension. Patient was AAO x4 and fully functional at baseline. Last known normal was 11:00pm 2/23 before beds. In 2/24 at 8:45AM, patient was found to be lethargy in bed by his son, was AAO x 4 and able to talk to his son, but started coughing and had one episode of nose bleed. At 9:15, her physical therapist noticed her becoming more lethargic, but vitals were normal. Soon at 9:30, patient was unresponsive and had a blank stare. EMS was called and her BP was found to be around 230/120. She was rushed to ED as Code Stroke. NIHSS 23. CTH showed Large left frontal intraparenchymal hematoma with intraventricular extension into left lateral ventricle, associated with marked mass effect resulting in 0.9 cm midline shift to the right anteriorly. Patient was intubated for airway protection. S/p 2 units of platelet for ASA reversal. Neurosurgery was planning for emergent OR resection of hematoma.    4-Yesterday's Plan:    5. Last 24 hour updates:    6. Medications:   acetaminophen   Tablet .. 650 milliGRAM(s) Oral every 6 hours  atorvastatin 80 milliGRAM(s) Oral at bedtime  chlorhexidine 0.12% Liquid 15 milliLiter(s) Oral Mucosa two times a day  chlorhexidine 4% Liquid 1 Application(s) Topical daily  dexMEDEtomidine Infusion 0.01 MICROgram(s)/kG/Hr IV Continuous <Continuous>  dextrose 50% Injectable 25 Gram(s) IV Push once  dextrose 50% Injectable 25 Gram(s) IV Push once  heparin   Injectable 5000 Unit(s) SubCutaneous every 8 hours  insulin regular  human corrective regimen sliding scale   IV Push every 4 hours  levETIRAcetam  IVPB 750 milliGRAM(s) IV Intermittent every 12 hours  pantoprazole  Injectable 40 milliGRAM(s) IV Push daily  vancomycin  IVPB 1000 milliGRAM(s) IV Intermittent every 12 hours      7. Ancillary Management:   Chest PT[ ]   Head of bed >35 [ ]   Out of bed to chair [ ]   PT/OT/SP Eval [ ]   Spirometry[ ]   DVT prophalaxis[ ]      8.Neuro:   Awake: Spontaneously[ ] Occasionally[ ] To Voice [X ] To painful stimuli [ ]   AIert [ ].none[x] Following commands: 3 steps[ ], 2 steps[ ], 1 step [ ], None [ x]   Orientation: 0[ x], 1[ ], 2[ ], 3[ ]. Tracking objects with eyes: [ ]   Language: vented  Time off sedation for exam:   Pupils: Right   3>2   Left    3>2      Corneal:  +    Gag reflex: +    EOMI: +  Sedated, opens eyes to verbal stimuli.   Movement to noxious stimuli on BL LE  No movement in BL UE  Bilateral Up going toes.            mRS:  0 No symptoms at all  1 No significant disability despite symptoms; able to carry out all usual duties and activities without assistance  2 Slight disability; unable to carry out all previous activities, but able to look after own affairs  3 Moderate disability; requiring some help, but able to walk without assistance  4 Moderately severe disability; unable to walk without assistance and unable to attend to own bodily needs without assistance  5 Severe disability; bedridden, incontinent and requiring constant nursing care and attention  6 Dead    ICHs:3  Age >=80  GCS Score: 3-4 (2 pts)   GCS Score: 5-12 (1 pt)   ICH Volume: >30  (+) IVH  (+) Infratentorial    .  Last CTH: < from: CT Head No Cont (02.25.21 @ 18:32) >  EXAM:  CT BRAIN            PROCEDURE DATE:  02/25/2021            INTERPRETATION:  Clinical History / Reason for exam: S/P left intracranial hemorrhage, S/P craniotomy.    Technique: Multiple contiguous axial CT images of the head were obtained from the base of the skull to the vertex without administration of intravenous contrast. Sagittal and coronal reformations were obtained.    Comparison: CT head scanning of the dated 2/24/2021 10:22 PM    FINDINGS:    The patient is again noted to be S/Pleft frontal craniotomy with postsurgical changes such as skin closure staples and pneumocephalus. The left frontal postsurgical hematoma cavity measuring about 4.5 cm, with residual hematoma is essentially unchanged in size. Left frontal extra-axialcollection again measures approximately 5 mm.    The ventricular drain entering from the left frontal region has been pulled back. The tip of the catheter is partially obscured by streak artifact but appears to be in the region of the third ventricle. There has been a decrease in the size of the lateral ventricles when compared with the previous study.    The previously noted areas of scattered subarachnoid hemorrhage and layering of intraventricular hemorrhage in the posterior horns of lateral ventricles appear to be slightly increased.    No evidence of midline shift at this time.    Posterior fossa is stable with midline fourth ventricle.    IMPRESSION:    The ventricular drain entering from the left frontal region has been pulled back. The tip of the catheter is partially obscured by streak artifact but appears to be in the region of the third ventricle. There has been a decrease in the size of the lateral ventricles when compared with the previous study.    The previously noted areasof scattered subarachnoid hemorrhage and layering of intraventricular hemorrhage in the posterior horns of lateral ventricles appear to be slightly increased.      < end of copied text >      Last CTA/MRA:    Last CTP:    Last MRI:    Last TCD:    Last EEG:    EVD: [ ] [place]: [ ]     ICP:     CPP:     Level(cm):     24hr(ml):10     CSF:     W:     R:     C:     P:     G:     LA:    9. Cardiovascular:   Vital Signs Last 24 Hrs  T(C): 37.4 (27 Feb 2021 04:00), Max: 38.5 (26 Feb 2021 17:00)  T(F): 99.3 (27 Feb 2021 04:00), Max: 101.3 (26 Feb 2021 17:00)  HR: 69 (27 Feb 2021 07:00) (69 - 95)  BP: 118/56 (27 Feb 2021 07:00) (107/51 - 146/65)  BP(mean): 88 (27 Feb 2021 06:00) (73 - 94)  RR: 17 (27 Feb 2021 07:00) (16 - 24)  SpO2: 99% (27 Feb 2021 07:00) (98% - 100%)     Last Echo:    Last EKG:    CVP   MAP/CPP/SBP target:   CO:      CI:       Enzymes/Trop:    10. Respiratory:   ABG:ABG - ( 27 Feb 2021 03:40 )  pH, Arterial: 7.44  pH, Blood: x     /  pCO2: 37    /  pO2: 112   / HCO3: 25    / Base Excess: 1.3   /  SaO2: 99                  VBG:    Chest Xray:    Mode: AC/ CMV (Assist Control/ Continuous Mandatory Ventilation)  RR (machine): 16  TV (machine): 400  FiO2: 30  PEEP: 5  ITime: 0.8  MAP: 8  PIP: 25      Peak Pressure/[place] Pressure:    11.GI:    Prophalaxis:     Bowel mvt:     Abd distension:       12.Renal/Fluids/Electrolytes:    02-26    136  |  108  |  12  ----------------------------<  196<H>  4.3   |  21  |  <0.5<L>    Ca    8.1<L>      26 Feb 2021 23:45  Phos  2.1     02-26  Mg     1.6     02-26        I&O's Detail    26 Feb 2021 07:01  -  27 Feb 2021 07:00  --------------------------------------------------------  IN:    Dexmedetomidine: 160.6 mL    Enteral Tube Flush: 1070 mL    IV PiggyBack: 100 mL    IV PiggyBack: 250 mL  Total IN: 1580.6 mL    OUT:    Indwelling Catheter - Urethral (mL): 1135 mL  Total OUT: 1135 mL    Total NET: 445.6 mL          13.ID:   TMax:   Vital Signs Last 24 Hrs  T(C): 37.4 (27 Feb 2021 04:00), Max: 38.5 (26 Feb 2021 17:00)  T(F): 99.3 (27 Feb 2021 04:00), Max: 101.3 (26 Feb 2021 17:00)  HR: 69 (27 Feb 2021 07:00) (69 - 95)  BP: 118/56 (27 Feb 2021 07:00) (107/51 - 146/65)  BP(mean): 88 (27 Feb 2021 06:00) (73 - 94)  RR: 17 (27 Feb 2021 07:00) (16 - 24)  SpO2: 99% (27 Feb 2021 07:00) (98% - 100%)   Lactate, Blood: 2.6 mmol/L (02-24 @ 10:33)          Lines: Central[] Date inserted: Peripheral[]    14. Hematology:                         8.1    3.79  )-----------( 114      ( 26 Feb 2021 23:45 )             25.3      02-26    136  |  108  |  12  ----------------------------<  196<H>  4.3   |  21  |  <0.5<L>    Ca    8.1<L>      26 Feb 2021 23:45  Phos  2.1     02-26  Mg     1.6     02-26       PT/INR - ( 27 Feb 2021 05:06 )   PT: 17.90 sec;   INR: 1.56 ratio         PTT - ( 27 Feb 2021 05:06 )  PTT:31.8 sec    DVT Prophylaxis Lovenox[ ] Heparin[ ] Venodynes[ ] SCD's[ ]    15. Impression:74 yo f with PMH of HTN, DM, DLD, B cell lymphoma on Imbruvica, Depression, CAD s/p stent, on ASA,  presents with acute onset AMS progressing to obtundation and S GCS of 5-6 in ER and required intubation. CTH with large left IPH. cta h/n was negative for aneurysms or AVM.  ICH score 3. S/post Left MIS craniotomy with evacuation of hematoma and EVD placement. Post procedural follow up cth revealed decreased mass effect upon the bilateral ventricles with 0.5 cm rightward midline shift, previously 1 cm. Patient was Febrile with tmax of 102.6 . Currently  mechanically vented and sedated on precedex not responsive to commands. Patient opens eyes to verbal stimuli.  Brain stem reflexes intact. On cooling blanket.        Plan:  #Neuro:  - Neuro checks q1hr  - EVD as per Neuro Sx  - Continue Keppra 750mg IV q12hrs  - Continue to wean sedation as tolerated for better neuroexam  - Neurosurgery follow up  - Low CPP, levophed started for hypotension.     #CV:  - Keep SBP >140 < 160       #Resp:  - Ventilator management as per primary team  - HOB > 35 degrees  - Aspiration precautions      #Renal/Fluid/Electolytes:  - Strict I&Os  - Monitor lytes, replete as needed  - Follow up magnesium levels      #ID:  -Antibiotics as per primary team  -Maintain normothermia  -Tylenol 650mg prn for fever >100.5       #GI:  - NPO    - OGT  - Continue Protonix 40mg IV q12hrs  - Senna 2 tablets PO q24hrs HS      #Endocrine   -H/o DM on Metformin    -FS as per unit protocol   -HA1C   -Maintain strict glycemic control    HEME/ONC:   -DVT prophylaxis hold in light of ICH  -H/o B cell Lymphoma on Imbruvica  - Sequential stockings           Neurocritical Care Progress Note:    1. Brief Presentation: AMS    2. Today's Acute Problems: intubated, sedated, does not follow commands, EVD clampepd at 10, ICP and CPP are stable, on cooling blanket    3. Relevant brief History:75 years old female from home with PMHx of B cell lymphoma on Imbruvica (since 10/2020), CAD s/p stents 15 years ago, DM, chronic UTI, HTN, DLD, brought in by ambulance due to altered mental status and hypertension. Patient was AAO x4 and fully functional at baseline. Last known normal was 11:00pm 2/23 before beds. In 2/24 at 8:45AM, patient was found to be lethargy in bed by his son, was AAO x 4 and able to talk to his son, but started coughing and had one episode of nose bleed. At 9:15, her physical therapist noticed her becoming more lethargic, but vitals were normal. Soon at 9:30, patient was unresponsive and had a blank stare. EMS was called and her BP was found to be around 230/120. She was rushed to ED as Code Stroke. NIHSS 23. CTH showed Large left frontal intraparenchymal hematoma with intraventricular extension into left lateral ventricle, associated with marked mass effect resulting in 0.9 cm midline shift to the right anteriorly. Patient was intubated for airway protection. S/p 2 units of platelet for ASA reversal. Neurosurgery was planning for emergent OR resection of hematoma.    4-Yesterday's Plan:    5. Last 24 hour updates:    6. Medications:   acetaminophen   Tablet .. 650 milliGRAM(s) Oral every 6 hours  atorvastatin 80 milliGRAM(s) Oral at bedtime  chlorhexidine 0.12% Liquid 15 milliLiter(s) Oral Mucosa two times a day  chlorhexidine 4% Liquid 1 Application(s) Topical daily  dexMEDEtomidine Infusion 0.01 MICROgram(s)/kG/Hr IV Continuous <Continuous>  dextrose 50% Injectable 25 Gram(s) IV Push once  dextrose 50% Injectable 25 Gram(s) IV Push once  heparin   Injectable 5000 Unit(s) SubCutaneous every 8 hours  insulin regular  human corrective regimen sliding scale   IV Push every 4 hours  levETIRAcetam  IVPB 750 milliGRAM(s) IV Intermittent every 12 hours  pantoprazole  Injectable 40 milliGRAM(s) IV Push daily  vancomycin  IVPB 1000 milliGRAM(s) IV Intermittent every 12 hours      7. Ancillary Management:   Chest PT[ ]   Head of bed >35 [ ]   Out of bed to chair [ ]   PT/OT/SP Eval [ ]   Spirometry[ ]   DVT prophalaxis[ ]      8.Neuro:   Awake: Spontaneously[ ] Occasionally[ ] To Voice [X ] To painful stimuli [ ]   AIert [ ].none[x] Following commands: 3 steps[ ], 2 steps[ ], 1 step [ ], None [ x]   Orientation: 0[ x], 1[ ], 2[ ], 3[ ]. Tracking objects with eyes: [ ]   Language: vented  Time off sedation for exam:   Pupils: Right   3>2   Left    3>2      Corneal:  +    Gag reflex: +    EOMI: +  Sedated, opens eyes to verbal stimuli.   Movement to noxious stimuli on BL LE  No movement in BL UE  Bilateral Up going toes.            mRS:  0 No symptoms at all  1 No significant disability despite symptoms; able to carry out all usual duties and activities without assistance  2 Slight disability; unable to carry out all previous activities, but able to look after own affairs  3 Moderate disability; requiring some help, but able to walk without assistance  4 Moderately severe disability; unable to walk without assistance and unable to attend to own bodily needs without assistance  5 Severe disability; bedridden, incontinent and requiring constant nursing care and attention  6 Dead    ICHs:3  Age >=80  GCS Score: 3-4 (2 pts)   GCS Score: 5-12 (1 pt)   ICH Volume: >30  (+) IVH  (+) Infratentorial    .  Last CTH: < from: CT Head No Cont (02.25.21 @ 18:32) >  EXAM:  CT BRAIN            PROCEDURE DATE:  02/25/2021            INTERPRETATION:  Clinical History / Reason for exam: S/P left intracranial hemorrhage, S/P craniotomy.    Technique: Multiple contiguous axial CT images of the head were obtained from the base of the skull to the vertex without administration of intravenous contrast. Sagittal and coronal reformations were obtained.    Comparison: CT head scanning of the dated 2/24/2021 10:22 PM    FINDINGS:    The patient is again noted to be S/Pleft frontal craniotomy with postsurgical changes such as skin closure staples and pneumocephalus. The left frontal postsurgical hematoma cavity measuring about 4.5 cm, with residual hematoma is essentially unchanged in size. Left frontal extra-axialcollection again measures approximately 5 mm.    The ventricular drain entering from the left frontal region has been pulled back. The tip of the catheter is partially obscured by streak artifact but appears to be in the region of the third ventricle. There has been a decrease in the size of the lateral ventricles when compared with the previous study.    The previously noted areas of scattered subarachnoid hemorrhage and layering of intraventricular hemorrhage in the posterior horns of lateral ventricles appear to be slightly increased.    No evidence of midline shift at this time.    Posterior fossa is stable with midline fourth ventricle.    IMPRESSION:    The ventricular drain entering from the left frontal region has been pulled back. The tip of the catheter is partially obscured by streak artifact but appears to be in the region of the third ventricle. There has been a decrease in the size of the lateral ventricles when compared with the previous study.    The previously noted areasof scattered subarachnoid hemorrhage and layering of intraventricular hemorrhage in the posterior horns of lateral ventricles appear to be slightly increased.      < end of copied text >      Last CTA/MRA:    Last CTP:    Last MRI:    Last TCD:    Last EEG:    EVD: [ ] [place]: [ ]     ICP:     CPP:     Level(cm):     24hr(ml):10     CSF:     W:     R:     C:     P:     G:     LA:    9. Cardiovascular:   Vital Signs Last 24 Hrs  T(C): 37.4 (27 Feb 2021 04:00), Max: 38.5 (26 Feb 2021 17:00)  T(F): 99.3 (27 Feb 2021 04:00), Max: 101.3 (26 Feb 2021 17:00)  HR: 69 (27 Feb 2021 07:00) (69 - 95)  BP: 118/56 (27 Feb 2021 07:00) (107/51 - 146/65)  BP(mean): 88 (27 Feb 2021 06:00) (73 - 94)  RR: 17 (27 Feb 2021 07:00) (16 - 24)  SpO2: 99% (27 Feb 2021 07:00) (98% - 100%)     Last Echo:    Last EKG:    CVP   MAP/CPP/SBP target:   CO:      CI:       Enzymes/Trop:    10. Respiratory:   ABG:ABG - ( 27 Feb 2021 03:40 )  pH, Arterial: 7.44  pH, Blood: x     /  pCO2: 37    /  pO2: 112   / HCO3: 25    / Base Excess: 1.3   /  SaO2: 99                  VBG:    Chest Xray:    Mode: AC/ CMV (Assist Control/ Continuous Mandatory Ventilation)  RR (machine): 16  TV (machine): 400  FiO2: 30  PEEP: 5  ITime: 0.8  MAP: 8  PIP: 25      Peak Pressure/Sod Pressure:    11.GI:    Prophalaxis:     Bowel mvt:     Abd distension:       12.Renal/Fluids/Electrolytes:    02-26    136  |  108  |  12  ----------------------------<  196<H>  4.3   |  21  |  <0.5<L>    Ca    8.1<L>      26 Feb 2021 23:45  Phos  2.1     02-26  Mg     1.6     02-26        I&O's Detail    26 Feb 2021 07:01  -  27 Feb 2021 07:00  --------------------------------------------------------  IN:    Dexmedetomidine: 160.6 mL    Enteral Tube Flush: 1070 mL    IV PiggyBack: 100 mL    IV PiggyBack: 250 mL  Total IN: 1580.6 mL    OUT:    Indwelling Catheter - Urethral (mL): 1135 mL  Total OUT: 1135 mL    Total NET: 445.6 mL          13.ID:   TMax:   Vital Signs Last 24 Hrs  T(C): 37.4 (27 Feb 2021 04:00), Max: 38.5 (26 Feb 2021 17:00)  T(F): 99.3 (27 Feb 2021 04:00), Max: 101.3 (26 Feb 2021 17:00)  HR: 69 (27 Feb 2021 07:00) (69 - 95)  BP: 118/56 (27 Feb 2021 07:00) (107/51 - 146/65)  BP(mean): 88 (27 Feb 2021 06:00) (73 - 94)  RR: 17 (27 Feb 2021 07:00) (16 - 24)  SpO2: 99% (27 Feb 2021 07:00) (98% - 100%)   Lactate, Blood: 2.6 mmol/L (02-24 @ 10:33)          Lines: Central[] Date inserted: Peripheral[]    14. Hematology:                         8.1    3.79  )-----------( 114      ( 26 Feb 2021 23:45 )             25.3      02-26    136  |  108  |  12  ----------------------------<  196<H>  4.3   |  21  |  <0.5<L>    Ca    8.1<L>      26 Feb 2021 23:45  Phos  2.1     02-26  Mg     1.6     02-26       PT/INR - ( 27 Feb 2021 05:06 )   PT: 17.90 sec;   INR: 1.56 ratio         PTT - ( 27 Feb 2021 05:06 )  PTT:31.8 sec    DVT Prophylaxis Lovenox[ ] Heparin[ ] Venodynes[ ] SCD's[ ]    15. Impression:76 yo f with PMH of HTN, DM, DLD, B cell lymphoma on Imbruvica, Depression, CAD s/p stent, on ASA,  presents with acute onset AMS progressing to obtundation and S GCS of 5-6 in ER and required intubation. CTH with large left IPH. cta h/n was negative for aneurysms or AVM.  ICH score 3. S/post Left MIS craniotomy with evacuation of hematoma and EVD placement. Post procedural follow up cth revealed decreased mass effect upon the bilateral ventricles with 0.5 cm rightward midline shift, previously 1 cm. Patient was Febrile with tmax of 102.6 . Currently  mechanically vented and sedated on precedex not responsive to commands. Patient opens eyes to verbal stimuli.  Brain stem reflexes intact. On cooling blanket.        Plan:  #Neuro:  - Neuro checks q1hr  - EVD as per Neuro Sx  - Continue Keppra 750mg IV q12hrs  - Continue to wean sedation as tolerated for better neuroexam  - Neurosurgery follow up  - Low CPP, levophed started for hypotension.     #CV:  - Keep SBP >140 < 160       #Resp:  - Ventilator management as per primary team  - HOB > 35 degrees  - Aspiration precautions      #Renal/Fluid/Electolytes:  - Strict I&Os  - Monitor lytes, replete as needed. Keep positive   - Follow up magnesium levels      #ID:  -Antibiotics as per primary team  -Maintain normothermia  -Tylenol 650mg prn for fever >100.5       #GI:  - NPO    - OGT  - Continue Protonix 40mg IV q12hrs  - Senna 2 tablets PO q24hrs HS      #Endocrine   -H/o DM on Metformin    -FS as per unit protocol   -HA1C   -Maintain strict glycemic control    HEME/ONC:   -DVT prophylaxis hold in light of ICH  -H/o B cell Lymphoma on Imbruvica  - Sequential stockings

## 2021-02-27 NOTE — PROGRESS NOTE ADULT - ATTENDING COMMENTS
Patient is arousable, does not follow commands.  Had fever 101.3F    ASSESSMENT:  76 y/o female with Left Intracerebral Hemorrhage.  S/P Left Craniotomy. EVD placement.  Cerebral edema.  Acute respiratory failure with hypoxia.  HTN.  B Cell Lymphoma.  DM    PLAN:  - continue sedation with Precedex  - continue Vent support; follow CXR; ABG  - keep MAP>65; Levo as needed  - continue tube feeds to goal; bowel regiment  - follow serum electrolytes and UOP  - ID: on Vanco; get ID evaluation; send pancultures  - FS,  ISS as needed  - GI and DVT prophylaxis.   - Neurosurgery f/u .

## 2021-02-27 NOTE — PROGRESS NOTE ADULT - ATTENDING COMMENTS
I have personally seen and examined this patient on 2/27 I have fully participated in the care of this patient.  I have reviewed all pertinent clinical information, including history, physical exam, plan and note. Patient grimace and localizes the pain. Keep SBP: . Continue Precedex.   I have reviewed all pertinent clinical information and reviewed all relevant imaging and diagnostic studies personally.  Recommendations as above.  Agree with above assessment except as noted.

## 2021-02-28 NOTE — PROGRESS NOTE ADULT - ATTENDING COMMENTS
POD 4.  Patient remains on a vent.  Not arousable this am, possibly due to sedation.  Tolerates feeds.    ASSESSMENT:  74 y/o female with Left Intracerebral Hemorrhage.  S/P Left Craniotomy. EVD placement.  Cerebral edema.  Acute respiratory failure with hypoxia.  HTN.  B Cell Lymphoma.  DM    PLAN:  - sedation with Precedex  - needs CT head this am as per Neurosurgery  - continue Vent support; follow CXR; ABG  - keep normotensive  - continue tube feeds; bowel regiment  - follow serum electrolytes and UOP  - ID: Fever - send cultures; start Vanco and Aztreonam  - FS,  ISS as needed  - GI and DVT prophylaxis.

## 2021-02-28 NOTE — CONSULT NOTE ADULT - ASSESSMENT
ASSESSMENT  75y F admitted with CVA (CEREBRAL VASCULAR ACCIDENT); INTRACRANIAL BLEED    HPI:  75 years old female from home with PMHx of B cell lymphoma on Imbruvica (since 10/2020), CAD s/p stents 15 years ago, DM, chronic UTI, HTN, DLD, brought in by ambulance due to altered mental status and hypertension. Patient was AAO x4 and fully functional at baseline. Last known normal was 11:00pm 2/23 before beds. In 2/24 at 8:45AM, patient was found to be lethargy in bed by his son, was AAO x 4 and able to talk to his son, but started coughing and had one episode of nose bleed. At 9:15, her physical therapist noticed her becoming more lethargic, but vitals were normal. Soon at 9:30, patient was unresponsive and had a blank stare. EMS was called and her BP was found to be around 230/120. She was rushed to ED as Code Stroke. NIHSS 23. CTH showed Large left frontal intraparenchymal hematoma with intraventricular extension into left lateral ventricle, associated with marked mass effect resulting in 0.9 cm midline shift to the right anteriorly. Patient was intubated for airway protection. S/p 2 units of platelet for ASA reversal. Neurosurgery was planning for emergent OR resection of hematoma.         PROBLEMS  1. Fevers after craniotomy for intracranial hemorrhage  Diff: hematoma, post op atelectasis vs aspiration PTA (pt was coughing)    ·  PROCEDURES:  Craniotomy for intracerebral hemorrhage 24-Feb-2021 21:01:07  Zulema Rodriguez.    T(F): 100 (28 Feb 2021 04:00), Max: 100.3 (28 Feb 2021 00:00)  (improved)  2/27 Tmax 100.6  2/26 Tmax 101.6    On vancomycin  IVPB 1000 milliGRAM(s) IV Intermittent every 8 hours  PCN allergy    MRSA PCR Result.: Negative (08-25-20 @ 05:04)  2/27 U/A  Nitrite: Negative/Leuk Esterase: Negative / RBC: 12 /HPF / WBC 4 /HPF / Bacteria: Negative  2/27 cxray with pulmonary vascular congestion and left lower lobe opacity/pleural effusion.     New problem with additional W/U   acute illness which poses threat to bodily function     2. Pancytopenia  wbc 3.31    3. Lactic acidosis    4. Hypomagnesemia    PLAN  D/C Vanco  Clindamycin 600mg q8h IVPB   Repeat wbc, Mg

## 2021-02-28 NOTE — PROGRESS NOTE ADULT - SUBJECTIVE AND OBJECTIVE BOX
Subjective: 75yFemale with a pmhx of CVA (CEREBRAL VASCULAR ACCIDENT);INTRACRANIAL BLEED    ^CVA (CEREBRAL VASCULAR ACCIDENT);INTRACRANIAL BLEED    Family history of diabetes mellitus (DM)    No pertinent family history in first degree relatives    Handoff    MEWS Score    Anemia    DM (diabetes mellitus)    High cholesterol    HTN (hypertension)    B-cell chronic lymphocytic leukemia variant    CAD (coronary artery disease)    CVA (cerebral vascular accident)    Craniotomy for intracerebral hemorrhage    H/O heart artery stent    No significant past surgical history    AMS    90+    Intracranial bleed    SysAdmin_VisitLink      POD#4    S/P Left Minimally Invasive Craniotomy for evac of IPH with Placement of EVD    Pt seen and examined at bedside w/ Dr Rodriguez. Pt remains intubated, sedated with Precedex. CSF to be sent for cytology today.  CSF pending for flow cytometry, culture from 2.27.  Tmax  101.3.       CTH done:  < from: CT Head No Cont (02.25.21 @ 18:32) >    IMPRESSION:    The ventricular drain entering from the left frontal region has been pulled back. The tip of the catheter is partially obscured by streak artifact but appears to be in the region of the third ventricle. There has been a decrease in the size of the lateral ventricles when compared with the previous study.    The previously noted areasof scattered subarachnoid hemorrhage and layering of intraventricular hemorrhage in the posterior horns of lateral ventricles appear to be slightly increased.    < end of copied text >      Allergies    penicillin (Anaphylaxis; Hives)    Intolerances        Imaging:    Vital Signs Last 24 Hrs  T(C): 38.5 (28 Feb 2021 10:00), Max: 38.5 (28 Feb 2021 10:00)  T(F): 101.3 (28 Feb 2021 10:00), Max: 101.3 (28 Feb 2021 10:00)  HR: 80 (28 Feb 2021 10:00) (73 - 100)  BP: 122/57 (28 Feb 2021 10:00) (113/53 - 159/69)  BP(mean): 82 (28 Feb 2021 10:00) (69 - 106)  RR: 24 (28 Feb 2021 10:00) (21 - 24)  SpO2: 99% (28 Feb 2021 10:00) (97% - 100%)      acetaminophen   Tablet .. 650 milliGRAM(s) Oral every 6 hours  atorvastatin 80 milliGRAM(s) Oral at bedtime  chlorhexidine 0.12% Liquid 15 milliLiter(s) Oral Mucosa two times a day  chlorhexidine 4% Liquid 1 Application(s) Topical daily  clindamycin IVPB 600 milliGRAM(s) IV Intermittent every 8 hours  dexMEDEtomidine Infusion 0.1 MICROgram(s)/kG/Hr IV Continuous <Continuous>  dextrose 50% Injectable 25 Gram(s) IV Push once  dextrose 50% Injectable 25 Gram(s) IV Push once  fentaNYL    Injectable 25 MICROGram(s) IV Push every 4 hours PRN  heparin   Injectable 5000 Unit(s) SubCutaneous every 8 hours  insulin regular  human corrective regimen sliding scale   IV Push every 4 hours  levETIRAcetam  IVPB 750 milliGRAM(s) IV Intermittent every 12 hours  lisinopril 20 milliGRAM(s) Oral daily  pantoprazole  Injectable 40 milliGRAM(s) IV Push daily  senna 2 Tablet(s) Oral at bedtime PRN        02-27-21 @ 07:01  -  02-28-21 @ 07:00  --------------------------------------------------------  IN: 1633.2 mL / OUT: 1955 mL / NET: -321.8 mL    02-28-21 @ 07:01  -  02-28-21 @ 11:19  --------------------------------------------------------  IN: 288.5 mL / OUT: 275 mL / NET: 13.5 mL        REVIEW OF SYSTEMS    [ ] A ten-point review of systems was otherwise negative except as noted.  [x ] Due to altered mental status/intubation, subjective information were not able to be obtained from the patient. History was obtained, to the extent possible, from review of the chart and collateral sources of information.      Neuro Exam:  Intubated, sedated  not following commands  Pupils reactive  + corneals  withdraws ext to pain      Wound: EVD clamped at 10cm H2O    CBC Full  -  ( 28 Feb 2021 00:30 )  WBC Count : 3.31 K/uL  RBC Count : 3.12 M/uL  Hemoglobin : 8.2 g/dL  Hematocrit : 25.4 %  Platelet Count - Automated : 116 K/uL  Mean Cell Volume : 81.4 fL  Mean Cell Hemoglobin : 26.3 pg  Mean Cell Hemoglobin Concentration : 32.3 g/dL  Auto Neutrophil # : 1.73 K/uL  Auto Lymphocyte # : 0.69 K/uL  Auto Monocyte # : 0.38 K/uL  Auto Eosinophil # : 0.08 K/uL  Auto Basophil # : 0.03 K/uL  Auto Neutrophil % : 52.3 %  Auto Lymphocyte % : 20.8 %  Auto Monocyte % : 11.5 %  Auto Eosinophil % : 2.4 %  Auto Basophil % : 0.9 %    02-28    136  |  104  |  12  ----------------------------<  196<H>  4.2   |  24  |  <0.5<L>    Ca    8.3<L>      28 Feb 2021 00:30  Phos  3.9     02-28  Mg     1.7     02-28      PT/INR - ( 28 Feb 2021 00:30 )   PT: 14.50 sec;   INR: 1.26 ratio         PTT - ( 28 Feb 2021 00:30 )  PTT:35.6 sec        Assessment/Plan:   send CSF today for cytology  CT head today   cont EVD care and monitor ICP  monitor fever curve  f/u OR path  cont neuro checks  d/w attg

## 2021-02-28 NOTE — PROGRESS NOTE ADULT - SUBJECTIVE AND OBJECTIVE BOX
AD BASURTO  [id number]  75y Female    Indication for ICU admission: s/p crani -evac of large hematoma (IPH)    Admit Date:02-24-21  ICU Date: 2/24/21  OR Date: 2/24/21    penicillin (Anaphylaxis; Hives)    PAST MEDICAL & SURGICAL HISTORY:  Anemia    DM (diabetes mellitus)    High cholesterol    HTN (hypertension)    B-cell chronic lymphocytic leukemia variant    CAD (coronary artery disease)  s/p stenting    H/O heart artery stent      Home Medications:  aspirin 81 mg oral tablet, chewable: 1 tab(s) orally once a day (24 Feb 2021 15:11)  atorvastatin 80 mg oral tablet: 1 tab(s) orally once a day (at bedtime) (24 Feb 2021 15:11)  Imbruvica 70 mg oral capsule: 2 cap(s) orally once a day (24 Feb 2021 15:11)  Levaquin 500 mg oral tablet: 1 tab(s) orally every 24 hours (24 Feb 2021 15:11)  lisinopril 10 mg oral tablet: 1 tab(s) orally once a day (24 Feb 2021 15:11)        24HRS EVENT:  2/27  Day  -INR 5.6  -vanco level @ 4pm = 10 --> incr to 1g 8h  -d/c precedex  -HTN --> restarted low dose Precedex, Labetalol 10 x1, restarted home Lisinopril      Night:  -Hypertensive 170s requiring Labetalol 10mg IVx1, also gave additional Lisinopril 10mg POx1 and increased dose to 20mg starting in am  -BAL gram stain: yeast like cells, few gram + cocci in pairs        DVT PTX: HSQ    GI PTX:pantoprazole  Injectable 40 milliGRAM(s) IV Push daily    Tubes/Lines/Drains   ----------------------------------------------------------------------------------------------------------  [x] Peripheral IV  [X] Central Venous Line         RIJ             Date Placed:  2/24  [X] Arterial Line		      Radial   Date Placed: 2/24  [X] Urinary Catheter Murry                                             Date Placed: 2/24     REVIEW OF SYSTEMS    [ ] A ten-point review of systems was otherwise negative except as noted.  [x ] Due to altered mental status/intubation, subjective information were not able to be obtained from the patient. History was     AD BASURTO  [id number]  75y Female    Indication for ICU admission: s/p crani -evac of large hematoma (IPH)    Admit Date:21  ICU Date: 21  OR Date: 21    penicillin (Anaphylaxis; Hives)    PAST MEDICAL & SURGICAL HISTORY:  Anemia    DM (diabetes mellitus)    High cholesterol    HTN (hypertension)    B-cell chronic lymphocytic leukemia variant    CAD (coronary artery disease)  s/p stenting    H/O heart artery stent      Home Medications:  aspirin 81 mg oral tablet, chewable: 1 tab(s) orally once a day (2021 15:11)  atorvastatin 80 mg oral tablet: 1 tab(s) orally once a day (at bedtime) (2021 15:11)  Imbruvica 70 mg oral capsule: 2 cap(s) orally once a day (2021 15:11)  Levaquin 500 mg oral tablet: 1 tab(s) orally every 24 hours (2021 15:11)  lisinopril 10 mg oral tablet: 1 tab(s) orally once a day (2021 15:11)        24HRS EVENT:    Day  -INR 5.6  -vanco level @ 4pm = 10 --> incr to 1g 8h  -d/c precedex  -HTN --> restarted low dose Precedex, Labetalol 10 x1, restarted home Lisinopril      Night:  -Hypertensive 170s requiring Labetalol 10mg IVx1, also gave additional Lisinopril 10mg POx1 and increased dose to 20mg starting in am  -BAL gram stain: yeast like cells, few gram + cocci in pairs        DVT PTX: HSQ    GI PTX:pantoprazole  Injectable 40 milliGRAM(s) IV Push daily    Tubes/Lines/Drains   ----------------------------------------------------------------------------------------------------------  [x] Peripheral IV  [X] Central Venous Line         RIJ             Date Placed:    [X] Arterial Line		      Radial   Date Placed:   [X] Urinary Catheter Murry                                             Date Placed:      REVIEW OF SYSTEMS    [ ] A ten-point review of systems was otherwise negative except as noted.  [x ] Due to altered mental status/intubation, subjective information were not able to be obtained from the patient. History was    Daily     Daily     CURRENT MEDS:  Neurologic Medications  acetaminophen   Tablet .. 650 milliGRAM(s) Oral every 6 hours  dexMEDEtomidine Infusion 0.1 MICROgram(s)/kG/Hr IV Continuous <Continuous>  fentaNYL    Injectable 25 MICROGram(s) IV Push every 4 hours PRN Severe Pain (7 - 10)  levETIRAcetam  IVPB 750 milliGRAM(s) IV Intermittent every 12 hours    Respiratory Medications    Cardiovascular Medications  lisinopril 20 milliGRAM(s) Oral daily    Gastrointestinal Medications  pantoprazole  Injectable 40 milliGRAM(s) IV Push daily  senna 2 Tablet(s) Oral at bedtime PRN Constipation    Genitourinary Medications    Hematologic/Oncologic Medications  heparin   Injectable 5000 Unit(s) SubCutaneous every 8 hours    Antimicrobial/Immunologic Medications  vancomycin  IVPB 1000 milliGRAM(s) IV Intermittent every 8 hours    Endocrine/Metabolic Medications  atorvastatin 80 milliGRAM(s) Oral at bedtime  dextrose 50% Injectable 25 Gram(s) IV Push once  dextrose 50% Injectable 25 Gram(s) IV Push once  insulin regular  human corrective regimen sliding scale   IV Push every 4 hours    Topical/Other Medications  chlorhexidine 0.12% Liquid 15 milliLiter(s) Oral Mucosa two times a day  chlorhexidine 4% Liquid 1 Application(s) Topical daily      ICU Vital Signs Last 24 Hrs  T(C): 37.8 (2021 04:00), Max: 37.9 (2021 00:00)  T(F): 100 (2021 04:00), Max: 100.3 (2021 00:00)  HR: 83 (2021 06:00) (64 - 100)  BP: 130/60 (2021 06:00) (112/56 - 159/69)  BP(mean): 86 (2021 06:00) (77 - 106)  ABP: 154/63 (2021 06:00) (98/88 - 181/71)  ABP(mean): 96 (2021 06:00) (73 - 112)  RR: 22 (2021 04:00) (17 - 24)  SpO2: 100% (2021 06:00) (97% - 100%)      Adult Advanced Hemodynamics Last 24 Hrs  CVP(mm Hg): --  CVP(cm H2O): --  CO: --  CI: --  PA: --  PA(mean): --  PCWP: --  SVR: --  SVRI: --  PVR: --  PVRI: --    Mode: AC/ CMV (Assist Control/ Continuous Mandatory Ventilation)  RR (machine): 16  TV (machine): 400  FiO2: 30  PEEP: 5  ITime: 1  MAP: 8  PIP: 19    ABG - ( 2021 03:10 )  pH, Arterial: 7.48  pH, Blood: x     /  pCO2: 37    /  pO2: 74    / HCO3: 28    / Base Excess: 3.8   /  SaO2: 98                  I&O's Detail    2021 07:01  -  2021 07:00  --------------------------------------------------------  IN:    Dexmedetomidine: 160.6 mL    Enteral Tube Flush: 1070 mL    IV PiggyBack: 100 mL    IV PiggyBack: 250 mL  Total IN: 1580.6 mL    OUT:    Indwelling Catheter - Urethral (mL): 1135 mL  Total OUT: 1135 mL    Total NET: 445.6 mL      2021 07:01  -  2021 06:57  --------------------------------------------------------  IN:    Dexmedetomidine: 29.2 mL    Dexmedetomidine: 154 mL    Glucerna 1.5: 1200 mL    IV PiggyBack: 250 mL  Total IN: 1633.2 mL    OUT:    Indwelling Catheter - Urethral (mL): 1955 mL  Total OUT: 1955 mL    Total NET: -321.8 mL        I&O's Summary    2021 07:01  -  2021 07:00  --------------------------------------------------------  IN: 1580.6 mL / OUT: 1135 mL / NET: 445.6 mL    2021 07:01  -  2021 06:57  --------------------------------------------------------  IN: 1633.2 mL / OUT: 1955 mL / NET: -321.8 mL        LABS:    CAPILLARY BLOOD GLUCOSE      POCT Blood Glucose.: 118 mg/dL (2021 06:06)  POCT Blood Glucose.: 211 mg/dL (2021 02:27)  POCT Blood Glucose.: 172 mg/dL (2021 16:55)  POCT Blood Glucose.: 124 mg/dL (2021 13:04)  POCT Blood Glucose.: 151 mg/dL (2021 09:49)                          8.2    3.31  )-----------( 116      ( 2021 00:30 )             25.4       Auto Neutrophil %: 52.3 % (21 @ 00:30)  Auto Immature Granulocyte %: 12.1 % (21 @ 00:30)        136  |  104  |  12  ----------------------------<  196<H>  4.2   |  24  |  <0.5<L>      Calcium, Total Serum: 8.3 mg/dL (21 @ 00:30)      LFTs:     Blood Gas Arterial, Lactate: 1.4 mmoL/L (21 @ 03:10)  Blood Gas Arterial, Lactate: 1.2 mmoL/L (21 @ 17:28)  Blood Gas Arterial, Lactate: 1.2 mmoL/L (21 @ 03:40)  Blood Gas Arterial, Lactate: 1.9 mmoL/L (21 @ 17:55)  Blood Gas Arterial, Lactate: 0.7 mmoL/L (21 @ 03:20)  Blood Gas Arterial, Lactate: 0.6 mmoL/L (21 @ 19:07)    ABG - ( 2021 03:10 )  pH: 7.48  /  pCO2: 37    /  pO2: 74    / HCO3: 28    / Base Excess: 3.8   /  SaO2: 98          ABG - ( 2021 17:28 )  pH: 7.45  /  pCO2: 38    /  pO2: 97    / HCO3: 26    / Base Excess: 2.3   /  SaO2: 98          ABG - ( 2021 03:40 )  pH: 7.44  /  pCO2: 37    /  pO2: 112   / HCO3: 25    / Base Excess: 1.3   /  SaO2: 99            Coags:     14.50  ----< 1.26    ( 2021 00:30 )     35.6          Urinalysis Basic - ( 2021 16:45 )    Color: Yellow / Appearance: Clear / S.023 / pH: x  Gluc: x / Ketone: Negative  / Bili: Negative / Urobili: <2 mg/dL   Blood: x / Protein: 30 mg/dL / Nitrite: Negative   Leuk Esterase: Negative / RBC: 12 /HPF / WBC 4 /HPF   Sq Epi: x / Non Sq Epi: 1 /HPF / Bacteria: Negative        Culture - CSF with Gram Stain (collected 2021 19:50)  Source: .CSF CSF  Gram Stain (2021 01:32):    polymorphonuclear leukocytes seen    No organisms seen    by cytocentrifuge    Culture - Bronchial (collected 2021 12:22)  Source: Bronch Wash Bronchoalveolar Lavage  Gram Stain (2021 22:39):    Moderate polymorphonuclear leukocytes per low power field    Few Squamous epithelial cells per low power field    Moderate Yeast like cells per oil power field    Few Gram positive cocci in pairs per oil power field    Culture - Acid Fast - CSF (collected 2021 15:20)  Source: .CSF CSF    Culture - CSF with Gram Stain (collected 2021 15:20)  Source: .CSF CSF... lumbar tap  Gram Stain (2021 07:24):    polymorphonuclear leukocytes seen    No organisms seen    by cytocentrifuge  Preliminary Report (2021 21:06):    No growth    Culture - Blood (collected 2021 12:40)  Source: .Blood Blood-Peripheral  Preliminary Report (2021 23:01):    No growth to date.          PHYSICAL EXAM:    General/Neuro  Deficits:  opens eyes, arousable, withdraws to pain   Lungs:  equal air entry  b/l, Normal expansion/effort.   Cardiovascular : S1, S2.  Regular rate and rhythm.    Cardiac Rhythm: Normal Sinus Rhythm  GI: Abdomen soft, Non-tender, Non-distended.    Extremities: Extremities warm, well-perfused.  No Peripheral edema.    Derm: Good skin turgor, no skin breakdown.    : Murry catheter in place.

## 2021-02-28 NOTE — PROGRESS NOTE ADULT - ASSESSMENT
Assessment and Recommendation:     75y Female s/p craniotomy for left frontal hematoma with evacuation and EVD placement. Intubated and sedated. SICU admission for hemodynamic monitoring.     NEURO:  Sedated on Precedex   Pain controlled with IV Tylenol, fentanyl prn   Keppra 500 mg q 12  EVD in place @ 97fsL3G, clamped, plan to unclamp if ICP > 25  CPP >70  q 1 hr neurocheck  2/25 CTH- ventricular drain in 3rd ventricle, decr size of lateral ventricles, scattered SAH and layering IVH in posterior horns of  lateral ventricle slightly increased    RESP:   Intubated: /16/30/5  ABG: No Hx of Dx  AM CXR  HOB elevated at 30 degrees    CARDS:   Off levo since 2/26 7am, -hypertensive, labetalol 5mg x1, vasotec 1.25x1  BP goal SBP <160  EKG sinus tach with RBBB Qtc 482  Echo (10/13/20): EF 55-60%, trace MR, R atrial echodensity possible thrombus  H/o CAD s/p stents and HTN - hold ASA, restarted home lisinopril  Trop 0.24 --> 0.21->0.15, no longer trending  ***Overnight Hypertensive 170s requiring Labetalol 10mg IVx1, also gave additional Lisinopril 10mg POx1 and increased dose to 20mg starting in am    GI/NUTR:   -TF- Glucerna 1.2 @ goal 60/hr  OGT  GI Prophylaxis- PPI  Bowel regimen- Senna    /RENAL:   Monitor UO-roblero in place  IVL  BUN/Cr- 12/0.5  Na 136// K+ 4.2 // Mg 1.7 // Phos 3.9  repleted    HEME/ONC:   DVT prophylaxis: started HSQ  Hb/Hct:  8.2/25.4  Plts:  159  -->s/p 2U plts-->153 --> 2 U plts --> 125-->119-->114->116  (02/25) INR 1.85, nsx rec 1 FFP and 10 vit K, INR 1.49 (2/26)    T&S: ABO RH Interpretation:  (02-24)  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  Heme/Onc consult: transfuse platelets if < 100, give vit K for elevated INR  s/p 1 FFP & 10 vit K on 2/26 for INR 1.9 rpt 1.4    ID:  WBC 3.5- > 3.79->3.3  Tmax 100.3   Antibiotics- Vancomycin, level 7.8   2/25 blood cx pending   2/26: CSF pending   -BAL gram stain: yeast like cells, few gram + cocci in pairs         ENDO:  Holding home metformin  FS q 4  ISS  HA1C 6.3     LINES/DRAINS:  PIV, CVC, Veronica, Roblero, EVD     DISPO:    SICU

## 2021-02-28 NOTE — CONSULT NOTE ADULT - SUBJECTIVE AND OBJECTIVE BOX
AD BASURTO  75y, Female  Allergy: penicillin (Anaphylaxis; Hives)      CHIEF COMPLAINT: altered mental status (2021 04:08)      HPI:  75 years old female from home with PMHx of B cell lymphoma on Imbruvica (since 10/2020), CAD s/p stents 15 years ago, DM, chronic UTI, HTN, DLD, brought in by ambulance due to altered mental status and hypertension. Patient was AAO x4 and fully functional at baseline. Last known normal was 11:00pm  before beds. In  at 8:45AM, patient was found to be lethargy in bed by his son, was AAO x 4 and able to talk to his son, but started coughing and had one episode of nose bleed. At 9:15, her physical therapist noticed her becoming more lethargic, but vitals were normal. Soon at 9:30, patient was unresponsive and had a blank stare. EMS was called and her BP was found to be around 230/120. She was rushed to ED as Code Stroke. NIHSS 23. CTH showed Large left frontal intraparenchymal hematoma with intraventricular extension into left lateral ventricle, associated with marked mass effect resulting in 0.9 cm midline shift to the right anteriorly. Patient was intubated for airway protection. S/p 2 units of platelet for ASA reversal. Neurosurgery was planning for emergent OR resection of hematoma.      (2021 13:43)    FAMILY HISTORY:  Family history of diabetes mellitus (DM)      PAST MEDICAL & SURGICAL HISTORY:  Anemia    DM (diabetes mellitus)    High cholesterol    HTN (hypertension)    B-cell chronic lymphocytic leukemia variant    CAD (coronary artery disease)  s/p stenting    H/O heart artery stent        SOCIAL HISTORY  Social History:  None (2021 13:43)        ROS  General: Denies fevers, chills, nightsweats, weight loss  HEENT: Denies headache, rhinorrhea, sore throat, eye pain  CV: Denies CP, palpitations  PULM: Denies SOB, cough  GI: Denies abdominal pain, diarrhea  : Denies dysuria, hematuria  MSK: Denies arthralgias  SKIN: Denies rash   NEURO: Denies paresthesias, weakness  PSYCH: Denies depression    VITALS:  T(F): 100, Max: 100.3 (21 @ 00:00)  HR: 83  BP: 130/60  RR: 22Vital Signs Last 24 Hrs  T(C): 37.8 (2021 04:00), Max: 37.9 (2021 00:00)  T(F): 100 (2021 04:00), Max: 100.3 (2021 00:00)  HR: 83 (2021 06:00) (64 - 100)  BP: 130/60 (2021 06:00) (112/56 - 159/69)  BP(mean): 86 (2021 06:00) (77 - 106)  RR: 22 (2021 04:00) (17 - 24)  SpO2: 100% (2021 06:00) (97% - 100%)    PHYSICAL EXAM:  Gen: NAD, resting in bed  HEENT: Normocephalic, atraumatic  Neck: supple, no lymphadenopathy  CV: Regular rate & regular rhythm  Lungs: decreased BS at bases, no fremitus  Abdomen: Soft, BS present  Ext: Warm, well perfused  Neuro: non focal, awake  Skin: no rash, no erythema    TESTS & MEASUREMENTS:                        8.2    3.31  )-----------( 116      ( 2021 00:30 )             25.4         136  |  104  |  12  ----------------------------<  196<H>  4.2   |  24  |  <0.5<L>    Ca    8.3<L>      2021 00:30  Phos  3.9       Mg     1.7           eGFR if Non African American: 102 mL/min/1.73M2 (21 @ 00:30)  eGFR if : 118 mL/min/1.73M2 (21 @ 00:30)  eGFR if Non African American: 112 mL/min/1.73M2 (21 @ 16:33)  eGFR if African American: 130 mL/min/1.73M2 (21 @ 16:33)      Urinalysis Basic - ( 2021 16:45 )    Color: Yellow / Appearance: Clear / S.023 / pH: x  Gluc: x / Ketone: Negative  / Bili: Negative / Urobili: <2 mg/dL   Blood: x / Protein: 30 mg/dL / Nitrite: Negative   Leuk Esterase: Negative / RBC: 12 /HPF / WBC 4 /HPF   Sq Epi: x / Non Sq Epi: 1 /HPF / Bacteria: Negative        Culture - CSF with Gram Stain (collected 21 @ 19:50)  Source: .CSF CSF  Gram Stain (21 @ 01:32):    polymorphonuclear leukocytes seen    No organisms seen    by cytocentrifuge    Culture - Bronchial (collected 21 @ 12:22)  Source: Bronch Wash Bronchoalveolar Lavage  Gram Stain (21 @ 22:39):    Moderate polymorphonuclear leukocytes per low power field    Few Squamous epithelial cells per low power field    Moderate Yeast like cells per oil power field    Few Gram positive cocci in pairs per oil power field    Culture - Acid Fast - CSF (collected 21 @ 15:20)  Source: .CSF CSF    Culture - CSF with Gram Stain (collected 21 @ 15:20)  Source: .CSF CSF... lumbar tap  Gram Stain (21 @ 07:24):    polymorphonuclear leukocytes seen    No organisms seen    by cytocentrifuge  Preliminary Report (21 @ 21:06):    No growth    Culture - Blood (collected 21 @ 12:40)  Source: .Blood Blood-Peripheral  Preliminary Report (21 @ 23:01):    No growth to date.        Lactate, Blood: 2.6 mmol/L (21 @ 10:33)  Blood Gas Venous - Lactate: 2.4 mmoL/L (21 @ 10:33)      INFECTIOUS DISEASES TESTING  MRSA PCR Result.: Negative (20 @ 05:04)      RADIOLOGY & ADDITIONAL TESTS:  I have personally reviewed the last Chest xray  CXR      CT      CARDIOLOGY TESTING  12 Lead ECG:   Systolic  mmHg    Diastolic BP 47 mmHg    Ventricular Rate 125 BPM    Atrial Rate 125 BPM    P-R Interval 142 ms    QRS Duration 124 ms    Q-T Interval 334 ms    QTC Calculation(Bazett) 482 ms    P Axis 81 degrees    R Axis 90 degrees    T Axis 76 degrees    Diagnosis Line Sinus tachycardia  Right bundle branch block  Abnormal ECG    Confirmed by VERONIKA FOSTER MD ([id number]) on 2021 10:07:07 PM (21 @ 22:58)  12 Lead ECG:   Ventricular Rate 154 BPM    Atrial Rate 154 BPM    QRS Duration 120 ms    Q-T Interval 312 ms    QTC Calculation(Bazett) 499 ms    R Axis 84 degrees    T Axis 21 degrees    Diagnosis Line Atrial flutter with 2:1 A-V conduction  Right bundle branch block  Abnormal ECG    Confirmed by Benton Yousif ([id number]) on 2021 12:08:37 PM (21 @ 10:42)      MEDICATIONS  acetaminophen   Tablet .. 650  atorvastatin 80  chlorhexidine 0.12% Liquid 15  chlorhexidine 4% Liquid 1  dexMEDEtomidine Infusion 0.1  dextrose 50% Injectable 25  dextrose 50% Injectable 25  heparin   Injectable 5000  insulin regular  human corrective regimen sliding scale   levETIRAcetam  IVPB 750  lisinopril 20  pantoprazole  Injectable 40  vancomycin  IVPB 1000      ANTIBIOTICS:  vancomycin  IVPB 1000 milliGRAM(s) IV Intermittent every 8 hours      All available historical data has been reviewed

## 2021-03-01 NOTE — PROGRESS NOTE ADULT - SUBJECTIVE AND OBJECTIVE BOX
AD BASURTO  75y, Female  Allergy: penicillin (Anaphylaxis; Hives)      LOS  5d    CHIEF COMPLAINT: altered mental status (01 Mar 2021 00:46)      INTERVAL EVENTS/HPI  - febrile 103.1 tm, vented , Hypertensive at times of fever   - T(F): , Max: 103.1 (21 @ 17:00)  - WBC Count: 6.44 (21 @ 23:46)  WBC Count: 3.31 (21 @ 00:30)  - Creatinine, Serum: <0.5 (21 @ 23:46)  Creatinine, Serum: <0.5 (21 @ 00:30)       ROS  unable to obtain history secondary to patient's mental status and/or sedation     VITALS:  T(F): 100.8, Max: 103.1 (21 @ 17:00)  HR: 97  BP: 195/82  RR: 28Vital Signs Last 24 Hrs  T(C): 38.2 (01 Mar 2021 08:00), Max: 39.5 (2021 17:00)  T(F): 100.8 (01 Mar 2021 08:00), Max: 103.1 (2021 17:00)  HR: 97 (01 Mar 2021 08:24) (76 - 103)  BP: 195/82 (2021 18:20) (120/58 - 195/82)  BP(mean): 87 (2021 17:00) (82 - 88)  RR: 28 (01 Mar 2021 08:00) (22 - 28)  SpO2: 99% (01 Mar 2021 08:24) (86% - 100%)    PHYSICAL EXAM:  ***    FH: Non-contributory  Social Hx: Non-contributory    TESTS & MEASUREMENTS:                        9.0    6.44  )-----------( 140      ( 2021 23:46 )             27.7         137  |  101  |  12  ----------------------------<  237<H>  3.9   |  25  |  <0.5<L>    Ca    8.5      2021 23:46  Phos  3.9       Mg     1.7           eGFR if Non African American: 102 mL/min/1.73M2 (21 @ 23:46)  eGFR if : 118 mL/min/1.73M2 (21 @ 23:46)      Urinalysis Basic - ( 2021 16:45 )    Color: Yellow / Appearance: Clear / S.023 / pH: x  Gluc: x / Ketone: Negative  / Bili: Negative / Urobili: <2 mg/dL   Blood: x / Protein: 30 mg/dL / Nitrite: Negative   Leuk Esterase: Negative / RBC: 12 /HPF / WBC 4 /HPF   Sq Epi: x / Non Sq Epi: 1 /HPF / Bacteria: Negative        Culture - CSF with Gram Stain (collected 21 @ 19:50)  Source: .CSF CSF  Gram Stain (21 @ 01:32):    polymorphonuclear leukocytes seen    No organisms seen    by cytocentrifuge  Preliminary Report (21 @ 21:30):    No growth    Culture - Bronchial (collected 21 @ 12:22)  Source: Bronch Wash Bronchoalveolar Lavage  Gram Stain (21 @ 22:39):    Moderate polymorphonuclear leukocytes per low power field    Few Squamous epithelial cells per low power field    Moderate Yeast like cells per oil power field    Few Gram positive cocci in pairs per oil power field  Preliminary Report (21 @ 17:08):    Normal Respiratory Macie present    Culture - Acid Fast - CSF (collected 21 @ 15:20)  Source: .CSF CSF    Culture - CSF with Gram Stain (collected 21 @ 15:20)  Source: .CSF CSF... lumbar tap  Gram Stain (21 @ 07:24):    polymorphonuclear leukocytes seen    No organisms seen    by cytocentrifuge  Preliminary Report (21 @ 21:06):    No growth    Culture - Blood (collected 21 @ 12:40)  Source: .Blood Blood-Peripheral  Preliminary Report (21 @ 23:01):    No growth to date.        Lactate, Blood: 2.6 mmol/L (21 @ 10:33)  Blood Gas Venous - Lactate: 2.4 mmoL/L (21 @ 10:33)      INFECTIOUS DISEASES TESTING  Vancomycin Level, Random: 9.8 (21 @ 16:33)  Vancomycin Level, Trough: 7.8 (21 @ 16:33)  Rapid RVP Result: NotDetec (21 @ 15:43)  Procalcitonin, Serum: 0.43 (10-12-20 @ 20:34)  COVID-19 PCR: NotDetec (10-12-20 @ 16:21)  MRSA PCR Result.: Negative (20 @ 05:04)  COVID-19 PCR: NotDetec (20 @ 19:00)  Vancomycin Level, Trough: 11.5 (20 @ 07:05)  Procalcitonin, Serum: 0.26 (20 @ 09:30)  COVID-19 PCR: NotDetec (20 @ 18:24)  COVID-19 PCR: NotDetec (06-10-20 @ 14:21)  COVID-19 PCR: NotDetec (20 @ 14:28)      INFLAMMATORY MARKERS      RADIOLOGY & ADDITIONAL TESTS:  I have personally reviewed the last available Chest xray  CXR      CT      CARDIOLOGY TESTING  12 Lead ECG:   Systolic  mmHg    Diastolic BP 47 mmHg    Ventricular Rate 125 BPM    Atrial Rate 125 BPM    P-R Interval 142 ms    QRS Duration 124 ms    Q-T Interval 334 ms    QTC Calculation(Bazett) 482 ms    P Axis 81 degrees    R Axis 90 degrees    T Axis 76 degrees    Diagnosis Line Sinus tachycardia  Right bundle branch block  Abnormal ECG    Confirmed by VERONIKA FOSTER MD ([id number]) on 2021 10:07:07 PM (21 @ 22:58)  12 Lead ECG:   Ventricular Rate 154 BPM    Atrial Rate 154 BPM    QRS Duration 120 ms    Q-T Interval 312 ms    QTC Calculation(Bazett) 499 ms    R Axis 84 degrees    T Axis 21 degrees    Diagnosis Line Atrial flutter with 2:1 A-V conduction  Right bundle branch block  Abnormal ECG    Confirmed by Benton Yousif ([id number]) on 2021 12:08:37 PM (21 @ 10:42)      MEDICATIONS  acetaminophen   Tablet .. 650 Oral every 6 hours  atorvastatin 80 Oral at bedtime  aztreonam  IVPB 2000 IV Intermittent every 8 hours  aztreonam  IVPB     chlorhexidine 0.12% Liquid 15 Oral Mucosa two times a day  chlorhexidine 4% Liquid 1 Topical daily  clevidipine Infusion 2 IV Continuous <Continuous>  dexMEDEtomidine Infusion 0.1 IV Continuous <Continuous>  dextrose 50% Injectable 25 IV Push once  dextrose 50% Injectable 25 IV Push once  heparin   Injectable 5000 SubCutaneous every 8 hours  insulin regular  human corrective regimen sliding scale  IV Push every 4 hours  insulin regular Infusion 1 IV Continuous <Continuous>  levETIRAcetam  IVPB 750 IV Intermittent every 12 hours  lisinopril 20 Oral daily  pantoprazole  Injectable 40 IV Push daily  vancomycin  IVPB 1000 IV Intermittent every 8 hours      WEIGHT  Weight (kg): 73.028 (21 @ 21:59)  Creatinine, Serum: <0.5 mg/dL (21 @ 23:46)      ANTIBIOTICS:  aztreonam  IVPB 2000 milliGRAM(s) IV Intermittent every 8 hours  aztreonam  IVPB      vancomycin  IVPB 1000 milliGRAM(s) IV Intermittent every 8 hours      All available historical records have been reviewed       AD BASURTO  75y, Female  Allergy: penicillin (Anaphylaxis; Hives)      LOS  5d    CHIEF COMPLAINT: altered mental status (01 Mar 2021 00:46)      INTERVAL EVENTS/HPI  - febrile 103.1 tm, vented , Hypertensive at times of fever   - T(F): , Max: 103.1 (21 @ 17:00)  - WBC Count: 6.44 (21 @ 23:46)  WBC Count: 3.31 (21 @ 00:30)  - Creatinine, Serum: <0.5 (21 @ 23:46)  Creatinine, Serum: <0.5 (21 @ 00:30)       ROS  unable to obtain history secondary to patient's mental status and/or sedation     VITALS:  T(F): 100.8, Max: 103.1 (21 @ 17:00)  HR: 97  BP: 195/82  RR: 28Vital Signs Last 24 Hrs  T(C): 38.2 (01 Mar 2021 08:00), Max: 39.5 (2021 17:00)  T(F): 100.8 (01 Mar 2021 08:00), Max: 103.1 (2021 17:00)  HR: 97 (01 Mar 2021 08:24) (76 - 103)  BP: 195/82 (2021 18:20) (120/58 - 195/82)  BP(mean): 87 (2021 17:00) (82 - 88)  RR: 28 (01 Mar 2021 08:00) (22 - 28)  SpO2: 99% (01 Mar 2021 08:24) (86% - 100%)    PHYSICAL EXAM:  General: intubated under cooling blanket  HEENT: NCAT, incision staples clean, no erythema/purulence   CV: RRR  Lungs: symmetric chest expansion, decreased BS at bases  Abd: Soft  Skin: no rash  Neuro: sedated  Lines: no phlebitis     FH: Non-contributory  Social Hx: Non-contributory    TESTS & MEASUREMENTS:                        9.0    6.44  )-----------( 140      ( 2021 23:46 )             27.7         137  |  101  |  12  ----------------------------<  237<H>  3.9   |  25  |  <0.5<L>    Ca    8.5      2021 23:46  Phos  3.9       Mg     1.7           eGFR if Non African American: 102 mL/min/1.73M2 (21 @ 23:46)  eGFR if : 118 mL/min/1.73M2 (21 @ 23:46)      Urinalysis Basic - ( 2021 16:45 )    Color: Yellow / Appearance: Clear / S.023 / pH: x  Gluc: x / Ketone: Negative  / Bili: Negative / Urobili: <2 mg/dL   Blood: x / Protein: 30 mg/dL / Nitrite: Negative   Leuk Esterase: Negative / RBC: 12 /HPF / WBC 4 /HPF   Sq Epi: x / Non Sq Epi: 1 /HPF / Bacteria: Negative        Culture - CSF with Gram Stain (collected 21 @ 19:50)  Source: .CSF CSF  Gram Stain (21 @ 01:32):    polymorphonuclear leukocytes seen    No organisms seen    by cytocentrifuge  Preliminary Report (21 @ 21:30):    No growth    Culture - Bronchial (collected 21 @ 12:22)  Source: Bronch Wash Bronchoalveolar Lavage  Gram Stain (21 @ 22:39):    Moderate polymorphonuclear leukocytes per low power field    Few Squamous epithelial cells per low power field    Moderate Yeast like cells per oil power field    Few Gram positive cocci in pairs per oil power field  Preliminary Report (21 @ 17:08):    Normal Respiratory Macie present    Culture - Acid Fast - CSF (collected 21 @ 15:20)  Source: .CSF CSF    Culture - CSF with Gram Stain (collected 21 @ 15:20)  Source: .CSF CSF... lumbar tap  Gram Stain (21 @ 07:24):    polymorphonuclear leukocytes seen    No organisms seen    by cytocentrifuge  Preliminary Report (21 @ 21:06):    No growth    Culture - Blood (collected 21 @ 12:40)  Source: .Blood Blood-Peripheral  Preliminary Report (21 @ 23:01):    No growth to date.        Lactate, Blood: 2.6 mmol/L (21 @ 10:33)  Blood Gas Venous - Lactate: 2.4 mmoL/L (21 @ 10:33)      INFECTIOUS DISEASES TESTING  Vancomycin Level, Random: 9.8 (21 @ 16:33)  Vancomycin Level, Trough: 7.8 (21 @ 16:33)  Rapid RVP Result: NotDetec (21 @ 15:43)  Procalcitonin, Serum: 0.43 (10-12-20 @ 20:34)  COVID-19 PCR: NotDetec (10-12-20 @ 16:21)  MRSA PCR Result.: Negative (20 @ 05:04)  COVID-19 PCR: NotDetec (20 @ 19:00)  Vancomycin Level, Trough: 11.5 (20 @ 07:05)  Procalcitonin, Serum: 0.26 (20 @ 09:30)  COVID-19 PCR: NotDetec (20 @ 18:24)  COVID-19 PCR: NotDetec (06-10-20 @ 14:21)  COVID-19 PCR: NotDetec (20 @ 14:28)      INFLAMMATORY MARKERS      RADIOLOGY & ADDITIONAL TESTS:  I have personally reviewed the last available Chest xray  CXR      CT      CARDIOLOGY TESTING  12 Lead ECG:   Systolic  mmHg    Diastolic BP 47 mmHg    Ventricular Rate 125 BPM    Atrial Rate 125 BPM    P-R Interval 142 ms    QRS Duration 124 ms    Q-T Interval 334 ms    QTC Calculation(Bazett) 482 ms    P Axis 81 degrees    R Axis 90 degrees    T Axis 76 degrees    Diagnosis Line Sinus tachycardia  Right bundle branch block  Abnormal ECG    Confirmed by VERONIKA FOSTER MD ([id number]) on 2021 10:07:07 PM (21 @ 22:58)  12 Lead ECG:   Ventricular Rate 154 BPM    Atrial Rate 154 BPM    QRS Duration 120 ms    Q-T Interval 312 ms    QTC Calculation(Bazett) 499 ms    R Axis 84 degrees    T Axis 21 degrees    Diagnosis Line Atrial flutter with 2:1 A-V conduction  Right bundle branch block  Abnormal ECG    Confirmed by Benton Yousif ([id number]) on 2021 12:08:37 PM (21 @ 10:42)      MEDICATIONS  acetaminophen   Tablet .. 650 Oral every 6 hours  atorvastatin 80 Oral at bedtime  aztreonam  IVPB 2000 IV Intermittent every 8 hours  aztreonam  IVPB     chlorhexidine 0.12% Liquid 15 Oral Mucosa two times a day  chlorhexidine 4% Liquid 1 Topical daily  clevidipine Infusion 2 IV Continuous <Continuous>  dexMEDEtomidine Infusion 0.1 IV Continuous <Continuous>  dextrose 50% Injectable 25 IV Push once  dextrose 50% Injectable 25 IV Push once  heparin   Injectable 5000 SubCutaneous every 8 hours  insulin regular  human corrective regimen sliding scale  IV Push every 4 hours  insulin regular Infusion 1 IV Continuous <Continuous>  levETIRAcetam  IVPB 750 IV Intermittent every 12 hours  lisinopril 20 Oral daily  pantoprazole  Injectable 40 IV Push daily  vancomycin  IVPB 1000 IV Intermittent every 8 hours      WEIGHT  Weight (kg): 73.028 (21 @ 21:59)  Creatinine, Serum: <0.5 mg/dL (21 @ 23:46)      ANTIBIOTICS:  aztreonam  IVPB 2000 milliGRAM(s) IV Intermittent every 8 hours  aztreonam  IVPB      vancomycin  IVPB 1000 milliGRAM(s) IV Intermittent every 8 hours      All available historical records have been reviewed

## 2021-03-01 NOTE — CONSULT NOTE ADULT - PROBLEM SELECTOR RECOMMENDATION 9
Will touch base with neurosurgery re: prognosis  Still receiving aggressive medical management Patient remains sedated and intubated with EVD in place  Patient remains acute  Will touch base with neurosurgery re: prognosis prior to any GOC discussion with family  Still receiving aggressive medical management

## 2021-03-01 NOTE — ADVANCED PRACTICE NURSE CONSULT - RECOMMEDATIONS
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 Cleanse skin w/ normal saline, gently pat dry. Apply silicone foam Allevyn dressing to cushion area, decrease friction and shear, and prevent further skin breakdown. Change dressing q3d and prn for soiling.  -Assess skin/wound qshift, report changes to primary provider.     Additional recs:   -Continue turning/positioning patient from side-to-side q2h while in bed, or in accordance w/ pt's plan of care. Continue utilizing pillows and/or z-christy fluidized positioner to assist w/ turning/positioning.   -Continue to offload heels from bed surface with soft pillow under calfs or by applying offloading boots to BLEs.   -Continue applying Coloplast Lilia Protect moisture barrier cream to buttock and perineal area daily and prn after each incontinent episode.    -Continue utilizing one underpad underneath patient to contain incontinence episodes; change pad when saturated/soiled.   -When/if roblero d/c'ed, consider utilizing female incontinence device/PrimaFit (if patient candidate) to contain urinary incontinence.  -Continue tight glucose control for optimal wound healing.    Plan of Care: Primary RN Rhina at bedside & made aware of above recs. Spoke w/  covering/primary MD  (at [id number]) in regards to above. No further needs/recs from [facility] service at this time. Staff RN to perform routine skin/wound assessment and manage wound care. Questions or concerns or if wound worsens and reconsult needed, please contact [facility], [phone].

## 2021-03-01 NOTE — PROGRESS NOTE ADULT - SUBJECTIVE AND OBJECTIVE BOX
POD# 5  S/P Left Minimally Invasive Craniotomy for evac of IPH with Placement of EVD    pt seen and examined at bedside pt seen and examined at bedside pt is intubated sedated on precedex .        Vital Signs Last 24 Hrs  T(C): 38.9 (01 Mar 2021 11:00), Max: 39.5 (2021 17:00)  T(F): 102 (01 Mar 2021 11:00), Max: 103.1 (2021 17:00)  HR: 104 (01 Mar 2021 11:00) (80 - 104)  BP: 195/82 (2021 18:20) (121/60 - 195/82)  BP(mean): 87 (2021 17:00) (86 - 88)  RR: 28 (01 Mar 2021 08:00) (23 - 28)  SpO2: 100% (01 Mar 2021 11:00) (86% - 100%)    I&O's Detail    2021 07:  -  01 Mar 2021 07:00  --------------------------------------------------------  IN:    Clevidipine: 100 mL    Dexmedetomidine: 221.7 mL    Enteral Tube Flush: 300 mL    Glucerna 1.5: 925 mL    IV PiggyBack: 100 mL    IV PiggyBack: 100 mL    IV PiggyBack: 750 mL    IV PiggyBack: 200 mL    IV PiggyBack: 400 mL    IV PiggyBack: 100 mL  Total IN: 3196.7 mL    OUT:    Indwelling Catheter - Urethral (mL): 2415 mL  Total OUT: 2415 mL    Total NET: 781.7 mL      01 Mar 2021 07:01  -  01 Mar 2021 12:12  --------------------------------------------------------  IN:    Clevidipine: 60 mL    Enteral Tube Flush: 30 mL    Glucerna 1.5: 90 mL    Insulin: 11 mL  Total IN: 191 mL    OUT:    Dexmedetomidine: 0 mL    Indwelling Catheter - Urethral (mL): 400 mL  Total OUT: 400 mL    Total NET: -209 mL        I&O's Summary    2021 07:01  -  01 Mar 2021 07:00  --------------------------------------------------------  IN: 3196.7 mL / OUT: 2415 mL / NET: 781.7 mL    01 Mar 2021 07:01  -  01 Mar 2021 12:12  --------------------------------------------------------  IN: 191 mL / OUT: 400 mL / NET: -209 mL        PHYSICAL EXAM:    Intubated , PERRL   Withdraws bilateral UE's to noxious stimuli   withdraws bilateral LE's to noxious stimuli       LABS:                        9.0    6.44  )-----------( 140      ( 2021 23:46 )             27.7     -28    137  |  101  |  12  ----------------------------<  237<H>  3.9   |  25  |  <0.5<L>    Ca    8.5      2021 23:46  Phos  3.9       Mg     1.7           PT/INR - ( 2021 23:46 )   PT: 15.20 sec;   INR: 1.32 ratio         PTT - ( 2021 23:46 )  PTT:38.3 sec  Urinalysis Basic - ( 2021 16:45 )    Color: Yellow / Appearance: Clear / S.023 / pH: x  Gluc: x / Ketone: Negative  / Bili: Negative / Urobili: <2 mg/dL   Blood: x / Protein: 30 mg/dL / Nitrite: Negative   Leuk Esterase: Negative / RBC: 12 /HPF / WBC 4 /HPF   Sq Epi: x / Non Sq Epi: 1 /HPF / Bacteria: Negative          CAPILLARY BLOOD GLUCOSE      POCT Blood Glucose.: 103 mg/dL (01 Mar 2021 11:04)  POCT Blood Glucose.: 154 mg/dL (01 Mar 2021 09:03)  POCT Blood Glucose.: 200 mg/dL (01 Mar 2021 08:06)  POCT Blood Glucose.: 226 mg/dL (01 Mar 2021 06:50)  POCT Blood Glucose.: 225 mg/dL (01 Mar 2021 05:05)  POCT Blood Glucose.: 192 mg/dL (01 Mar 2021 03:22)  POCT Blood Glucose.: 236 mg/dL (2021 23:13)  POCT Blood Glucose.: 140 mg/dL (2021 17:31)  POCT Blood Glucose.: 173 mg/dL (2021 13:51)      Drug Levels: [] N/A  Vancomycin Level, Trough: 7.8 ug/mL ( @ 16:33)    CSF Analysis: [] N/A      Allergies    penicillin (Anaphylaxis; Hives)    Intolerances      MEDICATIONS:  Antibiotics:  aztreonam  IVPB 2000 milliGRAM(s) IV Intermittent every 8 hours  aztreonam  IVPB      vancomycin  IVPB 1000 milliGRAM(s) IV Intermittent every 8 hours    Neuro:  acetaminophen   Tablet .. 650 milliGRAM(s) Oral every 6 hours  fentaNYL    Injectable 25 MICROGram(s) IV Push every 4 hours PRN  levETIRAcetam  IVPB 750 milliGRAM(s) IV Intermittent every 12 hours    Anticoagulation:  heparin   Injectable 5000 Unit(s) SubCutaneous every 8 hours    OTHER:  atorvastatin 80 milliGRAM(s) Oral at bedtime  chlorhexidine 0.12% Liquid 15 milliLiter(s) Oral Mucosa two times a day  chlorhexidine 4% Liquid 1 Application(s) Topical daily  clevidipine Infusion 2 mG/Hr IV Continuous <Continuous>  dextrose 50% Injectable 25 Gram(s) IV Push once  dextrose 50% Injectable 25 Gram(s) IV Push once  insulin regular Infusion 1 Unit(s)/Hr IV Continuous <Continuous>  labetalol 100 milliGRAM(s) Oral every 8 hours  lactulose Syrup 10 Gram(s) Oral daily  lisinopril 20 milliGRAM(s) Oral daily  pantoprazole   Suspension 40 milliGRAM(s) Oral daily  senna 2 Tablet(s) Oral at bedtime    IVF:    CULTURES:  Culture Results:   No growth ( @ 19:50)  Culture Results:   Normal Respiratory Macie present ( @ 12:22)    RADIOLOGY & ADDITIONAL TESTS:      ASSESSMENT:  75y Female s/p    CVA (CEREBRAL VASCULAR ACCIDENT);INTRACRANIAL BLEED    ^CVA (CEREBRAL VASCULAR ACCIDENT);INTRACRANIAL BLEED    Family history of diabetes mellitus (DM)    No pertinent family history in first degree relatives    Handoff    MEWS Score    Anemia    DM (diabetes mellitus)    High cholesterol    HTN (hypertension)    B-cell chronic lymphocytic leukemia variant    CAD (coronary artery disease)    CVA (cerebral vascular accident)    Craniotomy for intracerebral hemorrhage    H/O heart artery stent    No significant past surgical history    AMS    90+    Intracranial bleed    [email or web address]      A/p            S/P Left Minimally Invasive Craniotomy for evac of IPH with Placement       of EVD                Monitor ICP / EVD                  neuro checks                  F/u CSF for cytology

## 2021-03-01 NOTE — PROGRESS NOTE ADULT - SUBJECTIVE AND OBJECTIVE BOX
Neurocritical Care Progress Note:    1. Brief Presentation: AMS, RHP    2. Today's Acute Problems: patient is examined at the bedside, she remains intubated with EDV clamped at 10. CSF with questionable bacterial meningitis, patient was started on Vanco and Aztreonam for empiric coverage.     3. Relevant brief History::75 years old female from home with PMHx of B cell lymphoma on Imbruvica (since 10/2020), CAD s/p stents 15 years ago, DM, chronic UTI, HTN, DLD, brought in by ambulance due to altered mental status and hypertension. Patient was AAO x4 and fully functional at baseline. Last known normal was 11:00pm  before beds. In  at 8:45AM, patient was found to be lethargy in bed by his son, was AAO x 4 and able to talk to his son, but started coughing and had one episode of nose bleed. At 9:15, her physical therapist noticed her becoming more lethargic, but vitals were normal. Soon at 9:30, patient was unresponsive and had a blank stare. EMS was called and her BP was found to be around 230/120. She was rushed to ED as Code Stroke. NIHSS 23. CTH showed Large left frontal intraparenchymal hematoma with intraventricular extension into left lateral ventricle, associated with marked mass effect resulting in 0.9 cm midline shift to the right anteriorly. Patient was intubated for airway protection. S/p 2 units of platelet for ASA reversal. Neurosurgery was planning for emergent OR resection of hematoma.      4-Yesterday's Plan:    5. Last 24 hour updates:    6. Medications:   acetaminophen   Tablet .. 650 milliGRAM(s) Oral every 6 hours  amphotericin B  liposome  IVPB 370 milliGRAM(s) IV Intermittent every 24 hours  atorvastatin 80 milliGRAM(s) Oral at bedtime  aztreonam  IVPB 2000 milliGRAM(s) IV Intermittent every 8 hours  aztreonam  IVPB      chlorhexidine 0.12% Liquid 15 milliLiter(s) Oral Mucosa two times a day  chlorhexidine 4% Liquid 1 Application(s) Topical daily  clevidipine Infusion 2 mG/Hr IV Continuous <Continuous>  dexMEDEtomidine Infusion 0.1 MICROgram(s)/kG/Hr IV Continuous <Continuous>  dextrose 50% Injectable 25 Gram(s) IV Push once  dextrose 50% Injectable 25 Gram(s) IV Push once  heparin   Injectable 5000 Unit(s) SubCutaneous every 8 hours  insulin regular Infusion 1 Unit(s)/Hr IV Continuous <Continuous>  labetalol 100 milliGRAM(s) Oral every 8 hours  lactulose Syrup 10 Gram(s) Oral daily  levETIRAcetam  IVPB 750 milliGRAM(s) IV Intermittent every 12 hours  lisinopril 20 milliGRAM(s) Oral daily  pantoprazole   Suspension 40 milliGRAM(s) Oral daily  senna 2 Tablet(s) Oral at bedtime  vancomycin  IVPB 1000 milliGRAM(s) IV Intermittent every 8 hours      7. Ancillary Management:   Chest PT[ ]   Head of bed >35 [ ]   Out of bed to chair [ ]   PT/OT/SP Eval [ ]   Spirometry[ ]   DVT prophalaxis[ ]    8.Neuro:   Awake: Spontaneously[ ] Occasionally[ ] To Voice [ ] To painful stimuli [x ]   AIert [ ]. Following commands: 3 steps[ ], 2 steps[ ], 1 step [ ], None [ x]   Orientation: 0[ x], 1[ ], 2[ ], 3[ ]. Tracking objects with eyes: [ ]   Language: intubated, aphasic  Time off sedation for exam: on Precedex  Pupils: Right   > 2  Left    >2      Corneal:  +    Gag reflex: +    EOMI:+  Motor: localized on the left, plegic on RUE  Sensory: withdraws on LUE and BL LE  Reflexes: Upgoing in left           mRS:  0 No symptoms at all  1 No significant disability despite symptoms; able to carry out all usual duties and activities without assistance  2 Slight disability; unable to carry out all previous activities, but able to look after own affairs  3 Moderate disability; requiring some help, but able to walk without assistance  4 Moderately severe disability; unable to walk without assistance and unable to attend to own bodily needs without assistance  5 Severe disability; bedridden, incontinent and requiring constant nursing care and attention  6 Dead    ICHs:  Age >=80  GCS Score: 3-4 (2 pts)   GCS Score: 5-12 (1 pt)   ICH Volume: >30  (+) IVH  (+) Infratentorial    .  Last CTH: < from: CT Head No Cont (21 @ 18:22) >  EXAM:  CT BRAIN            PROCEDURE DATE:  2021            INTERPRETATION:  Clinical History / Reason for exam: Altered mental status.    Technique: Contiguous axial CT images were obtained from the base of the skull to the vertex without administration of intravenous contrast. Coronal and sagittal reformatted images were reconstructed.    Comparison: 2021 and 2021    Findings:    Patient status post left frontal craniotomy with continued interval decrease in pneumocephalus. Unchanged position of left transfrontal ventricular drain with distal tip at the level of the third ventricle.    Continued interval improvement in extracalvarial soft tissue swelling. Unchanged left frontal surgical staples. Overall unchanged left frontal hematoma cavity measuring approximately 4.5 cm containing slightly decreased amount of residual blood products. Overall unchanged scattered subarachnoid hemorrhage and layering intraventricular hemorrhage. Improving mass effect without significant midline shift.    No new acute osseous abnormality. Paranasal sinuses are opacified.    IMPRESSION:    Since 2021;    1.  Unchanged left frontal hematoma cavity measuring approximately 4.5 cm containing slightly decreasedamount of residual blood products.    2. Overall unchanged scattered subarachnoid hemorrhage and layering intraventricular hemorrhage. No significant midline shift.    3. Interval decrease in pneumocephalus.    4. Left transfrontal ventricular drain with distal tip at the level of the third ventricle.          < end of copied text >      Last CTA/MRA:    Last CTP:    Last MRI:    Last TCD:    Last EEG:    EVD: [ x] Harrison: [ ]  clamped at 10    ICP: 5    CPP:  74   Level(cm):  10  24hr(ml):     CSF:     W: 82    R: 3640    C:     P:161        LA:    9. Cardiovascular:   Vital Signs Last 24 Hrs  T(C): 38.5 (01 Mar 2021 19:00), Max: 39.1 (01 Mar 2021 18:00)  T(F): 101.3 (01 Mar 2021 19:00), Max: 102.4 (01 Mar 2021 18:00)  HR: 80 (01 Mar 2021 19:00) (80 - 104)  BP: --  BP(mean): --  RR: 28 (01 Mar 2021 08:00) (23 - 28)  SpO2: 98% (01 Mar 2021 19:00) (86% - 100%)     Last Echo:    Last EKG:    CVP   MAP/CPP/SBP target:   CO:      CI:       Enzymes/Trop:    10. Respiratory:   ABG:ABG - ( 01 Mar 2021 17:38 )  pH, Arterial: 7.52  pH, Blood: x     /  pCO2: 35    /  pO2: 75    / HCO3: 29    / Base Excess: 5.6   /  SaO2: 97          VBG:    Chest Xray:    Mode: AC/ CMV (Assist Control/ Continuous Mandatory Ventilation)  RR (machine): 16  TV (machine): 400  FiO2: 40  PEEP: 5  ITime: 0.8  MAP: 7  PIP: 17      Peak Pressure/Milner Pressure:    11.GI:    Prophalaxis:     Bowel mvt:     Abd distension:       12.Renal/Fluids/Electrolytes:        137  |  100  |  12  ----------------------------<  139<H>  3.6   |  26  |  <0.5<L>    Ca    8.3<L>      01 Mar 2021 12:25  Phos  3.9       Mg     1.7             I&O's Detail    2021 07:01  -  01 Mar 2021 07:00  --------------------------------------------------------  IN:    Clevidipine: 100 mL    Dexmedetomidine: 221.7 mL    Enteral Tube Flush: 300 mL    Glucerna 1.5: 925 mL    IV PiggyBack: 100 mL    IV PiggyBack: 100 mL    IV PiggyBack: 750 mL    IV PiggyBack: 200 mL    IV PiggyBack: 400 mL    IV PiggyBack: 100 mL  Total IN: 3196.7 mL    OUT:    Indwelling Catheter - Urethral (mL): 2415 mL  Total OUT: 2415 mL    Total NET: 781.7 mL      01 Mar 2021 07:01  -  01 Mar 2021 19:31  --------------------------------------------------------  IN:    Clevidipine: 128 mL    Dexmedetomidine: 40 mL    Enteral Tube Flush: 140 mL    Glucerna 1.5: 440 mL    Insulin: 11 mL    IV PiggyBack: 100 mL    IV PiggyBack: 100 mL    IV PiggyBack: 250 mL    IV PiggyBack: 100 mL  Total IN: 1309 mL    OUT:    Dexmedetomidine: 0 mL    Indwelling Catheter - Urethral (mL): 1455 mL  Total OUT: 1455 mL    Total NET: -146 mL          13.ID:   TMax:   Vital Signs Last 24 Hrs  T(C): 38.5 (01 Mar 2021 19:00), Max: 39.1 (01 Mar 2021 18:00)  T(F): 101.3 (01 Mar 2021 19:00), Max: 102.4 (01 Mar 2021 18:00)  HR: 80 (01 Mar 2021 19:00) (80 - 104)  BP: --  BP(mean): --  RR: 28 (01 Mar 2021 08:00) (23 - 28)  SpO2: 98% (01 Mar 2021 19:00) (86% - 100%)           Lines: Central[] Date inserted: Peripheral[]    14. Hematology:                         9.0    6.44  )-----------( 140      ( 2021 23:46 )             27.7      03-01    137  |  100  |  12  ----------------------------<  139<H>  3.6   |  26  |  <0.5<L>    Ca    8.3<L>      01 Mar 2021 12:25  Phos  3.9     02-28  Mg     1.7     02-28       PT/INR - ( 2021 23:46 )   PT: 15.20 sec;   INR: 1.32 ratio         PTT - ( 2021 23:46 )  PTT:38.3 sec    DVT Prophylaxis Lovenox[ ] Heparin[ ] Venodynes[ ] SCD's[ ]    15. Impression:74 yo f with PMH of HTN, DM, DLD, B cell lymphoma on Imbruvica, Depression, CAD s/p stent, on ASA,  presents with acute onset AMS progressing to obtundation and S GCS of 5-6 in ER and required intubation. CTH with large left IPH. cta h/n was negative for aneurysms or AVM.  ICH score 3. S/post Left MIS craniotomy with evacuation of hematoma and EVD placement. Post procedural follow up cth revealed decreased mass effect upon the bilateral ventricles with 0.5 cm rightward midline shift, previously 1 cm. Patient was Febrile with tmax of 102.6 . Currently  mechanically vented and sedated on precedex not responsive to commands. Patient opens eyes to verbal stimuli.  Brain stem reflexes intact. Localizes on the left.         Plan:  #Neuro:  - Neuro checks q1hr  - EVD as per Neuro Sx  - Continue Keppra 750mg IV q12hrs  - Continue to wean sedation as tolerated for better neuroexam    #ID:  - F/u on CSF cytology and culture  - Continue empiric antibiotics      #CV:  - Keep SBP >140 < 160       #Resp:  - Ventilator management as per primary team  - HOB > 35 degrees  - Aspiration precautions      #Renal/Fluid/Electolytes:  - Strict I&Os  - Monitor lytes, replete as needed. Keep positive   - Follow up magnesium levels          Plan discussed with Dr. Ordonez

## 2021-03-01 NOTE — PROGRESS NOTE ADULT - ATTENDING COMMENTS
intubated   off sedation   EVD in place   will advance TF to Goal   Repeat Head CT   continue SICU Care

## 2021-03-01 NOTE — CONSULT NOTE ADULT - PROBLEM SELECTOR RECOMMENDATION 2
Full Code  Will address GOC with family going forward Full Code, Aggressive medical management  Will address GOC with family going forward once discussing prognosis with neurosurgery  Surrogates:  and daughter listed in chart

## 2021-03-01 NOTE — CONSULT NOTE ADULT - ASSESSMENT
75yFemale with PMH including B cell lymphoma on Imbruvica (since 10/2020), CAD s/p stents 15 years ago, DM, chronic UTI, HTN, DLD, brought in by ambulance due to altered mental status and hypertension. Stroke code was called in ED, s/p crani and evacuation of large hematoma on 2/24, CTH was stable on 2/28. Patient remains intubated and mental status not improving since holding sedation 2 days ago. Palliative consulted for GOC.     Plan is to touch base with neurosurgery regarding prognosis prior to reaching out to family.      See Recs below.    Please call [phone] with questions or concerns 24/7.   We will continue to follow.    75yFemale with PMH including B cell lymphoma on Imbruvica (since 10/2020), CAD s/p stents 15 years ago, DM, chronic UTI, HTN, DLD, brought in by ambulance due to altered mental status and hypertension. Stroke code was called in ED, s/p crani and evacuation of large hematoma on 2/24, CTH was stable on 2/28. Patient remains intubated and mental status not improving since holding sedation 2 days ago, now experiencing fevers. Palliative consulted for GOC.     Plan is to touch base with neurosurgery regarding prognosis prior to reaching out to family.      See Recs below.    Please call [phone] with questions or concerns 24/7.   We will continue to follow.

## 2021-03-01 NOTE — ADVANCED PRACTICE NURSE CONSULT - ASSESSMENT
75 years old female from home with PMHx of B cell lymphoma on Imbruvica (since 10/2020), CAD s/p stents 15 years ago, DM, chronic UTI, HTN, DLD, brought in by ambulance (2/24) due to altered mental status and hypertension.   Patient was AAO x4 and fully functional at baseline. Last known normal was 11:00pm 2/23 before beds. In 2/24 at 8:45AM, patient was found to be lethargy in bed by his son, was AAO x 4 and able to talk to his son, but started coughing and had one episode of nose bleed. At 9:15, her physical therapist noticed her becoming more lethargic, but vitals were normal. Soon at 9:30, patient was unresponsive and had a blank stare. EMS was called and her BP was found to be around 230/120. She was rushed to ED as Code Stroke. NIHSS 23. CTH showed Large left frontal intraparenchymal hematoma with intraventricular extension into left lateral ventricle, associated with marked mass effect resulting in 0.9 cm midline shift to the right anteriorly. Patient was intubated for airway protection. S/p 2 units of platelet for ASA reversal. Neurosurgery was planning for emergent OR resection of hematoma. Pt s/p Left Minimally Invasive Craniotomy for evac of IPH with Placement of EVD 2/24.    Received patient in BICU/ICU, laying supine in bed, turned to right side (pillow under left side & underneath BLEs elevating heels), HOB elevated 30 degrees. Pt intubated, nonresponsive, cooling blanket in place, primary RN Rhina made aware of purpose of WOCN visit, agreeable to consult. With assistance from RN, turned patient completely to right side for skin assessment.     Foam Allevyn dressing to coccyx area in place, dressing removed for skin assessment. Partial thickness ulceration to intergluteal cleft area presenting as ruptured blister-likely r/t friction-opening area measuring 0.5cm x 0.5cm x 0.1cm with light pink wound base, edges attached, periwound intact & with slight blanchable erythema,  no drainage, odor, induration, warmth or s/s pain present.     Patient bedbound, immobile, + roblero, incontinent of urine and stool. Receivng TF via NGT.

## 2021-03-01 NOTE — CONSULT NOTE ADULT - SUBJECTIVE AND OBJECTIVE BOX
AD BASURTO          MRN-[id number]              HPI:    75 years old female from home with PMHx of B cell lymphoma on Imbruvica (since 10/2020), CAD s/p stents 15 years ago, DM, chronic UTI, HTN, DLD, brought in by ambulance due to altered mental status and hypertension. Patient was AAO x4 and fully functional at baseline. Last known normal was 11:00pm  before beds. In  at 8:45AM, patient was found to be lethargy in bed by his son, was AAO x 4 and able to talk to his son, but started coughing and had one episode of nose bleed. At 9:15, her physical therapist noticed her becoming more lethargic, but vitals were normal. Soon at 9:30, patient was unresponsive and had a blank stare. EMS was called and her BP was found to be around 230/120. She was rushed to ED as Code Stroke. NIHSS 23. CTH showed Large left frontal intraparenchymal hematoma with intraventricular extension into left lateral ventricle, associated with marked mass effect resulting in 0.9 cm midline shift to the right anteriorly. Patient was intubated for airway protection. S/p 2 units of platelet for ASA reversal. Neurosurgery was planning for emergent OR resection of hematoma.      (2021 13:43)      PAST MEDICAL & SURGICAL HISTORY:  Anemia    DM (diabetes mellitus)    High cholesterol    HTN (hypertension)    B-cell chronic lymphocytic leukemia variant    CAD (coronary artery disease)  s/p stenting    H/O heart artery stent        FAMILY HISTORY:  Family history of diabetes mellitus (DM)     Reviewed and found non contributory in mother or father    SOCIAL HISTORY: Patient was living at home, required assistance with bathing and used rolling walker, but otherwise independent with ADLs.     ROS:    Unable to attain due to:  mental status/intubation , no nonverbal signs of distress.    Allergies    penicillin (Anaphylaxis; Hives)    Intolerances    -iStop reviewed   Ref#:   This report was requested by: Robyn Kimball | Reference #: [id number]       Medications:      MEDICATIONS  (STANDING):  acetaminophen   Tablet .. 650 milliGRAM(s) Oral every 6 hours  atorvastatin 80 milliGRAM(s) Oral at bedtime  aztreonam  IVPB 2000 milliGRAM(s) IV Intermittent every 8 hours  aztreonam  IVPB      chlorhexidine 0.12% Liquid 15 milliLiter(s) Oral Mucosa two times a day  chlorhexidine 4% Liquid 1 Application(s) Topical daily  clevidipine Infusion 2 mG/Hr (4 mL/Hr) IV Continuous <Continuous>  dexMEDEtomidine Infusion 0.1 MICROgram(s)/kG/Hr (1.83 mL/Hr) IV Continuous <Continuous>  dextrose 50% Injectable 25 Gram(s) IV Push once  dextrose 50% Injectable 25 Gram(s) IV Push once  heparin   Injectable 5000 Unit(s) SubCutaneous every 8 hours  insulin regular Infusion 1 Unit(s)/Hr (1 mL/Hr) IV Continuous <Continuous>  labetalol 100 milliGRAM(s) Oral every 8 hours  lactulose Syrup 10 Gram(s) Oral daily  levETIRAcetam  IVPB 750 milliGRAM(s) IV Intermittent every 12 hours  lisinopril 20 milliGRAM(s) Oral daily  pantoprazole   Suspension 40 milliGRAM(s) Oral daily  potassium chloride  20 mEq/100 mL IVPB 20 milliEquivalent(s) IV Intermittent every 1 hour  senna 2 Tablet(s) Oral at bedtime  vancomycin  IVPB 1000 milliGRAM(s) IV Intermittent every 8 hours    MEDICATIONS  (PRN):  fentaNYL    Injectable 25 MICROGram(s) IV Push every 4 hours PRN Severe Pain (7 - 10)      Labs:    CBC:                        9.0    6.44  )-----------( 140      ( 2021 23:46 )             27.7     CMP:    03    137  |  100  |  12  ----------------------------<  139<H>  3.6   |  26  |  <0.5<L>    Ca    8.3<L>      01 Mar 2021 12:25  Phos  3.9       Mg     1.7            Albumin, Serum: 4.4 g/dL (21 @ 10:33)    PT/INR - ( 2021 23:46 )   PT: 15.20 sec;   INR: 1.32 ratio         PTT - ( 2021 23:46 )  PTT:38.3 sec     Urinalysis Basic - ( 2021 16:45 )    Color: Yellow / Appearance: Clear / S.023 / pH: x  Gluc: x / Ketone: Negative  / Bili: Negative / Urobili: <2 mg/dL   Blood: x / Protein: 30 mg/dL / Nitrite: Negative   Leuk Esterase: Negative / RBC: 12 /HPF / WBC 4 /HPF   Sq Epi: x / Non Sq Epi: 1 /HPF / Bacteria: Negative        Imaging:  Reviewed    PEx:  T(C): 38.9 (21 @ 11:00), Max: 39.5 (21 @ 17:00)  HR: 89 (21 @ 14:00) (80 - 104)  BP: 195/82 (21 @ 18:20) (124/60 - 195/82)  RR: 28 (21 @ 08:00) (23 - 28)  SpO2: 99% (21 @ 14:00) (86% - 100%)  Wt(kg): --  Daily     Daily Weight in k.9 (01 Mar 2021 06:00)    General: in bed, NAD, ill appearing  HEENT:  Normocephalic, eyes closed  Neck: Supple no masses  CVS: No tachycardia, no edema  Resp: vented, synchronous with vent, no distress  GI:  Soft ND  :  Murry  Musc: No C/C/E    Neuro: Minimally responsive, no agitation   Skin: no rashes, warm and dry    Preadmit Karnofsky: 70 %           Current Karnofsky:     10%    Advanced Directives:     Full Code    Decision maker: ARELIS Chisholm (daughter)  AD BASURTO          MRN-[id number]              HPI:    75 years old female from home with PMHx of B cell lymphoma on Imbruvica (since 10/2020), CAD s/p stents 15 years ago, DM, chronic UTI, HTN, DLD, brought in by ambulance due to altered mental status and hypertension. Patient was AAO x4 and fully functional at baseline. Last known normal was 11:00pm  before beds. In  at 8:45AM, patient was found to be lethargy in bed by his son, was AAO x 4 and able to talk to his son, but started coughing and had one episode of nose bleed. At 9:15, her physical therapist noticed her becoming more lethargic, but vitals were normal. Soon at 9:30, patient was unresponsive and had a blank stare. EMS was called and her BP was found to be around 230/120. She was rushed to ED as Code Stroke. NIHSS 23. CTH showed Large left frontal intraparenchymal hematoma with intraventricular extension into left lateral ventricle, associated with marked mass effect resulting in 0.9 cm midline shift to the right anteriorly. Patient was intubated for airway protection. S/p 2 units of platelet for ASA reversal. Neurosurgery was planning for emergent OR resection of hematoma.      (2021 13:43)      PAST MEDICAL & SURGICAL HISTORY:  Anemia    DM (diabetes mellitus)    High cholesterol    HTN (hypertension)    B-cell chronic lymphocytic leukemia variant    CAD (coronary artery disease)  s/p stenting    H/O heart artery stent        FAMILY HISTORY:  Family history of diabetes mellitus (DM)     Reviewed and found non contributory in mother or father    SOCIAL HISTORY: Patient was living at home, required assistance with bathing and used rolling walker, but otherwise independent with ADLs.     ROS:    Unable to attain due to:  mental status/intubation , no nonverbal signs of distress.    Allergies    penicillin (Anaphylaxis; Hives)    Intolerances    -iStop reviewed   Ref#:   This report was requested by: Robyn Kimball | Reference #: [id number]       Medications:      MEDICATIONS  (STANDING):  acetaminophen   Tablet .. 650 milliGRAM(s) Oral every 6 hours  atorvastatin 80 milliGRAM(s) Oral at bedtime  aztreonam  IVPB 2000 milliGRAM(s) IV Intermittent every 8 hours  aztreonam  IVPB      chlorhexidine 0.12% Liquid 15 milliLiter(s) Oral Mucosa two times a day  chlorhexidine 4% Liquid 1 Application(s) Topical daily  clevidipine Infusion 2 mG/Hr (4 mL/Hr) IV Continuous <Continuous>  dexMEDEtomidine Infusion 0.1 MICROgram(s)/kG/Hr (1.83 mL/Hr) IV Continuous <Continuous>  dextrose 50% Injectable 25 Gram(s) IV Push once  dextrose 50% Injectable 25 Gram(s) IV Push once  heparin   Injectable 5000 Unit(s) SubCutaneous every 8 hours  insulin regular Infusion 1 Unit(s)/Hr (1 mL/Hr) IV Continuous <Continuous>  labetalol 100 milliGRAM(s) Oral every 8 hours  lactulose Syrup 10 Gram(s) Oral daily  levETIRAcetam  IVPB 750 milliGRAM(s) IV Intermittent every 12 hours  lisinopril 20 milliGRAM(s) Oral daily  pantoprazole   Suspension 40 milliGRAM(s) Oral daily  potassium chloride  20 mEq/100 mL IVPB 20 milliEquivalent(s) IV Intermittent every 1 hour  senna 2 Tablet(s) Oral at bedtime  vancomycin  IVPB 1000 milliGRAM(s) IV Intermittent every 8 hours    MEDICATIONS  (PRN):  fentaNYL    Injectable 25 MICROGram(s) IV Push every 4 hours PRN Severe Pain (7 - 10)      Labs:    CBC:                        9.0    6.44  )-----------( 140      ( 2021 23:46 )             27.7     CMP:    03    137  |  100  |  12  ----------------------------<  139<H>  3.6   |  26  |  <0.5<L>    Ca    8.3<L>      01 Mar 2021 12:25  Phos  3.9       Mg     1.7            Albumin, Serum: 4.4 g/dL (21 @ 10:33)    PT/INR - ( 2021 23:46 )   PT: 15.20 sec;   INR: 1.32 ratio         PTT - ( 2021 23:46 )  PTT:38.3 sec     Urinalysis Basic - ( 2021 16:45 )    Color: Yellow / Appearance: Clear / S.023 / pH: x  Gluc: x / Ketone: Negative  / Bili: Negative / Urobili: <2 mg/dL   Blood: x / Protein: 30 mg/dL / Nitrite: Negative   Leuk Esterase: Negative / RBC: 12 /HPF / WBC 4 /HPF   Sq Epi: x / Non Sq Epi: 1 /HPF / Bacteria: Negative        Imaging:  Reviewed    PEx:  T(C): 38.9 (21 @ 11:00), Max: 39.5 (21 @ 17:00)  HR: 89 (21 @ 14:00) (80 - 104)  BP: 195/82 (21 @ 18:20) (124/60 - 195/82)  RR: 28 (21 @ 08:00) (23 - 28)  SpO2: 99% (21 @ 14:00) (86% - 100%)  Wt(kg): --  Daily     Daily Weight in k.9 (01 Mar 2021 06:00)    General: in bed, NAD, ill appearing, does not waken to voice  HEENT:  Normocephalic, eyes closed, signficant secretions; EVD in place  Neck: no JVD noted, supply  CVS: +tachycardia, no edema  Resp: vented, synchronous with vent, no distress  GI:  Soft ND  :  Murry  Neuro: Minimally responsive, no agitation; unable to participate in exam  Skin: no rashes, warm and dry    Preadmit Karnofsky: 70 %           Current Karnofsky:     10%    Advanced Directives:     Full Code    Decision maker: ARELIS Chisholm (daughter)

## 2021-03-01 NOTE — PROGRESS NOTE ADULT - ASSESSMENT
Assessment and Recommendation:     75y Female s/p craniotomy for left frontal hematoma with evacuation and EVD placement. Intubated and sedated. SICU admission for hemodynamic monitoring.     NEURO:  Off Precedex 2/28    Pain controlled with IV Tylenol, fentanyl prn   Keppra 500 mg q 12  EVD in place @ 03luH2T, clamped, plan to unclamp if ICP > 25  CPP >70  q 1 hr neurocheck  2/25 CTH- ventricular drain in 3rd ventricle, decr size of lateral ventricles, scattered SAH and layering IVH in posterior horns of  lateral ventricle slightly increased  2/28 rpt CTH - stable- L frontal hemoatom cavity 4.5 cm, slighlty decr amount of blood, interval decr in pneumocephalus, L transfrontal drain tip in the third ventricle     RESP:   Intubated: /16/30/5  ABG: _____  AM CXR  HOB elevated at 30 degrees    CARDS:   Acute on chronic HTN - increased home Lisinopril to 20mg  - SBP goal <160  2/28: Cleviprex started   H/o CAD s/p stents - hold ASA  EKG sinus tach with RBBB Qtc 482  Echo (10/13/20): EF 55-60%, trace MR, R atrial echodensity possible thrombus  Trop 0.24 --> 0.21->0.15, no longer trending    GI/NUTR:   -Diet: TF- Glucerna 1.2 @ goal 60/hr  OGT  GI Prophylaxis- PPI  Bowel regimen- Senna    /RENAL:   Monitor UO-roblero in place  IVL  BUN/Cr- 12/0.5  Na 137// K+ 3.9 // Mg 1.7 // Phos 3.9 2/28 @ 23:46  -lytes repleted     HEME/ONC:   DVT prophylaxis: HSQ  Hb/Hct: stable 9.0/27.7   Plts:  159  -->s/p 2U plts-->153 --> 2 U plts --> 125-->119-->114->116-->140  (02/25) INR 1.85, nsx rec 1 FFP and 10 vit K, INR 1.49 (2/26)    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  Heme/Onc consult: transfuse platelets if < 100, give vit K for elevated INR  s/p 1 FFP & 10 vit K on 2/26 for INR 1.9 rpt 1.5>1.6>1.3>1.3    ID:  Immunocompromised  Leukopenic, WBC 3.3>6.4  2/28: Febrile throughout the day, Tmax 103 (bladder) -- given IV Tylenol  -pancx (2/28): UA neg, f/u blood cx, f/u BAL  -d/c'ed Clinda (only received on 2/28),  2/28: restarted Vanco, started Aztreonam (PCN allergy)    2/25 blood cx pending   2/26, 2/28: CSF pending   -BAL gram stain: yeast like cells, few gram + cocci in pairs         ENDO:  Holding home metformin  FS q 4  ISS  HA1C 6.3     LINES/DRAINS:  PIV, CVC, Veronica, Roblero, EVD     DISPO:    SICU

## 2021-03-01 NOTE — PROGRESS NOTE ADULT - SUBJECTIVE AND OBJECTIVE BOX
AD BASURTO  [id number]  75y Female    Indication for ICU admission: s/p crani -evac of large hematoma (IPH)    Admit Date:02-24-21  ICU Date: 2/24/21  OR Date: 2/24/21    penicillin (Anaphylaxis; Hives)    PAST MEDICAL & SURGICAL HISTORY:  Anemia    DM (diabetes mellitus)    High cholesterol    HTN (hypertension)    B-cell chronic lymphocytic leukemia variant    CAD (coronary artery disease)  s/p stenting    H/O heart artery stent      Home Medications:  aspirin 81 mg oral tablet, chewable: 1 tab(s) orally once a day (24 Feb 2021 15:11)  atorvastatin 80 mg oral tablet: 1 tab(s) orally once a day (at bedtime) (24 Feb 2021 15:11)  Imbruvica 70 mg oral capsule: 2 cap(s) orally once a day (24 Feb 2021 15:11)  Levaquin 500 mg oral tablet: 1 tab(s) orally every 24 hours (24 Feb 2021 15:11)  lisinopril 10 mg oral tablet: 1 tab(s) orally once a day (24 Feb 2021 15:11)        24HRS EVENT:  2/28  CTH-unchanged L frontal hemoatom cavity 4.5 cm, slighlty decr amount of blood, interval decr in pneumocephalus, L transfrontal drain tip in the third ventricle   SBP 190s Labetalol 10 x 1, poor response, Cleviprex gtt started at 2300  Fever-101.3 @2200, tylenol given, blanketrol in place     DAY:  -CT H  -off Precedex  -ID recs: d/c vanc, start clinda  -spiking 101.1F rectally --> 103 (bladder)  -restarted Vanco + Aztreonam, d/c'ed Clinda  -HTN to 200, tachycardic in CT --> Labetalol 10 x1, restarted Precedex  -new CSF sent by nsx        DVT PTX: HSQ    GI PTX:pantoprazole  Injectable 40 milliGRAM(s) IV Push daily    Tubes/Lines/Drains   ----------------------------------------------------------------------------------------------------------  [x] Peripheral IV  [X] Central Venous Line         RIJ             Date Placed:  2/24  [X] Arterial Line		      Radial   Date Placed: 2/24  [X] Urinary Catheter Murry                                             Date Placed: 2/24     REVIEW OF SYSTEMS    [ ] A ten-point review of systems was otherwise negative except as noted.  [x ] Due to altered mental status/intubation, subjective information were not able to be obtained from the patient. History was         AD BASURTO  [id number]  75y Female    Indication for ICU admission: s/p crani -evac of large hematoma (IPH)    Admit Date:21  ICU Date: 21  OR Date: 21    penicillin (Anaphylaxis; Hives)    PAST MEDICAL & SURGICAL HISTORY:  Anemia    DM (diabetes mellitus)    High cholesterol    HTN (hypertension)    B-cell chronic lymphocytic leukemia variant    CAD (coronary artery disease)  s/p stenting    H/O heart artery stent      Home Medications:  aspirin 81 mg oral tablet, chewable: 1 tab(s) orally once a day (2021 15:11)  atorvastatin 80 mg oral tablet: 1 tab(s) orally once a day (at bedtime) (2021 15:11)  Imbruvica 70 mg oral capsule: 2 cap(s) orally once a day (2021 15:11)  Levaquin 500 mg oral tablet: 1 tab(s) orally every 24 hours (2021 15:11)  lisinopril 10 mg oral tablet: 1 tab(s) orally once a day (2021 15:11)        24HRS EVENT:    CTH-unchanged L frontal hemoatom cavity 4.5 cm, slighlty decr amount of blood, interval decr in pneumocephalus, L transfrontal drain tip in the third ventricle   SBP 190s Labetalol 10 x 1, poor response, Cleviprex gtt started at 2300  Fever-101.3 @2200, tylenol given, blanketrol in place     DAY:  -CT H  -off Precedex  -ID recs: d/c vanc, start clinda  -spiking 101.1F rectally --> 103 (bladder)  -restarted Vanco + Aztreonam, d/c'ed Clinda  -HTN to 200, tachycardic in CT --> Labetalol 10 x1, restarted Precedex  -new CSF sent by nsx        DVT PTX: HSQ    GI PTX:pantoprazole  Injectable 40 milliGRAM(s) IV Push daily    Tubes/Lines/Drains   ----------------------------------------------------------------------------------------------------------  [x] Peripheral IV  [X] Central Venous Line         RIJ             Date Placed:    [X] Arterial Line		      Radial   Date Placed:   [X] Urinary Catheter Murry                                             Date Placed:      REVIEW OF SYSTEMS    [ ] A ten-point review of systems was otherwise negative except as noted.  [x ] Due to altered mental status/intubation, subjective information were not able to be obtained from the patient. History was  HD: 5d  Daily     Daily Weight in k.9 (01 Mar 2021 06:00)    Diet, NPO with Tube Feed:   Tube Feeding Modality: Nasogastric  Glucerna 1.2 Jerry  Intermittent  Starting Tube Feed Rate mL per Hour: 10  Starting Tube Feed Rate mL per Hour: 30  Increase Tube Feed Rate by (mL): 10  Increase Tube Feed Rate by (mL): 5    Every 2 hours     Every 2 hours  Until Goal Tube Feed Rate (mL per Hour): 50  Until Goal Tube Feed Rate (mL per Hour): 50  Tube Feeding Hours ON: 1  Tube Feeding OFF (Hours): 0  Tube Feed Start Time: 11:55  Tube Feed Start Time: 12:00 (21 @ 11:55)      CURRENT MEDS:  Neurologic Medications  acetaminophen   Tablet .. 650 milliGRAM(s) Oral every 6 hours  dexMEDEtomidine Infusion 0.1 MICROgram(s)/kG/Hr IV Continuous <Continuous>  fentaNYL    Injectable 25 MICROGram(s) IV Push every 4 hours PRN Severe Pain (7 - 10)  levETIRAcetam  IVPB 750 milliGRAM(s) IV Intermittent every 12 hours    Respiratory Medications    Cardiovascular Medications  clevidipine Infusion 2 mG/Hr IV Continuous <Continuous>  lisinopril 20 milliGRAM(s) Oral daily    Gastrointestinal Medications  pantoprazole  Injectable 40 milliGRAM(s) IV Push daily  senna 2 Tablet(s) Oral at bedtime PRN Constipation    Genitourinary Medications    Hematologic/Oncologic Medications  heparin   Injectable 5000 Unit(s) SubCutaneous every 8 hours    Antimicrobial/Immunologic Medications  aztreonam  IVPB 2000 milliGRAM(s) IV Intermittent every 8 hours  aztreonam  IVPB      vancomycin  IVPB 1000 milliGRAM(s) IV Intermittent every 8 hours    Endocrine/Metabolic Medications  atorvastatin 80 milliGRAM(s) Oral at bedtime  dextrose 50% Injectable 25 Gram(s) IV Push once  dextrose 50% Injectable 25 Gram(s) IV Push once  insulin regular  human corrective regimen sliding scale   IV Push every 4 hours  insulin regular Infusion 1 Unit(s)/Hr IV Continuous <Continuous>    Topical/Other Medications  chlorhexidine 0.12% Liquid 15 milliLiter(s) Oral Mucosa two times a day  chlorhexidine 4% Liquid 1 Application(s) Topical daily      ICU Vital Signs Last 24 Hrs  T(C): 38.5 (01 Mar 2021 06:00), Max: 39.5 (2021 17:00)  T(F): 101.3 (01 Mar 2021 06:00), Max: 103.1 (2021 17:00)  HR: 91 (01 Mar 2021 07:00) (76 - 103)  BP: 195/82 (2021 18:20) (120/58 - 195/82)  BP(mean): 87 (2021 17:00) (82 - 88)  ABP: 151/53 (01 Mar 2021 07:00) (123/45 - 202/70)  ABP(mean): 84 (01 Mar 2021 07:00) (70 - 111)  RR: 27 (2021 19:00) (22 - 27)  SpO2: 100% (01 Mar 2021 07:00) (86% - 100%)      Adult Advanced Hemodynamics Last 24 Hrs  CVP(mm Hg): --  CVP(cm H2O): --  CO: --  CI: --  PA: --  PA(mean): --  PCWP: --  SVR: --  SVRI: --  PVR: --  PVRI: --    Mode: AC/ CMV (Assist Control/ Continuous Mandatory Ventilation)  RR (machine): 16  TV (machine): 400  FiO2: 40  PEEP: 5  ITime: 0.8  MAP: 10  PIP: 36    ABG - ( 01 Mar 2021 02:39 )  pH, Arterial: 7.51  pH, Blood: x     /  pCO2: 36    /  pO2: 59    / HCO3: 28    / Base Excess: 4.9   /  SaO2: 93                  I&O's Summary    2021 07:01  -  01 Mar 2021 07:00  --------------------------------------------------------  IN: 3196.7 mL / OUT: 2415 mL / NET: 781.7 mL      I&O's Detail    2021 07:01  -  01 Mar 2021 07:00  --------------------------------------------------------  IN:    Clevidipine: 100 mL    Dexmedetomidine: 221.7 mL    Enteral Tube Flush: 300 mL    Glucerna 1.5: 925 mL    IV PiggyBack: 100 mL    IV PiggyBack: 100 mL    IV PiggyBack: 750 mL    IV PiggyBack: 200 mL    IV PiggyBack: 400 mL    IV PiggyBack: 100 mL  Total IN: 3196.7 mL    OUT:    Indwelling Catheter - Urethral (mL): 2415 mL  Total OUT: 2415 mL    Total NET: 781.7 mL          EXAM:  NEURO: NAD, withdraws to pain x4 extremities, +cough/gag/corneals.     RESPIRATORY: Lungs clear to auscultation, Normal expansion/effort.    Mechanical Ventilation: Mode: AC/ CMV (Assist Control/ Continuous Mandatory Ventilation)  RR (machine): 16  TV (machine): 400  FiO2: 40  PEEP: 5  ITime: 0.8  MAP: 10  PIP: 36      CARDIOVASCULAR: Regular rate and rhythm. No peripheral edema.    GI: Abdomen soft, Non-tender, Non-distended.      EXTREMITIES: Extremities warm, pink, well-perfused.      DERM: Good skin turgor, no skin breakdown.      :  Murry catheter in place.     CXR:     LABS:  Labs:  CAPILLARY BLOOD GLUCOSE      POCT Blood Glucose.: 226 mg/dL (01 Mar 2021 06:50)  POCT Blood Glucose.: 225 mg/dL (01 Mar 2021 05:05)  POCT Blood Glucose.: 192 mg/dL (01 Mar 2021 03:22)  POCT Blood Glucose.: 236 mg/dL (2021 23:13)  POCT Blood Glucose.: 140 mg/dL (2021 17:31)  POCT Blood Glucose.: 173 mg/dL (2021 13:51)  POCT Blood Glucose.: 137 mg/dL (2021 10:28)                          9.0    6.44  )-----------( 140      ( 2021 23:46 )             27.7       Auto Neutrophil %: 57.4 % (21 @ 23:46)        137  |  101  |  12  ----------------------------<  237<H>  3.9   |  25  |  <0.5<L>      Calcium, Total Serum: 8.5 mg/dL (21 @ 23:46)      LFTs:     Blood Gas Arterial, Lactate: 1.2 mmoL/L (21 @ 02:39)  Blood Gas Arterial, Lactate: 1.7 mmoL/L (21 @ 16:34)  Blood Gas Arterial, Lactate: 1.4 mmoL/L (21 @ 03:10)  Blood Gas Arterial, Lactate: 1.2 mmoL/L (21 @ 17:28)  Blood Gas Arterial, Lactate: 1.2 mmoL/L (21 @ 03:40)  Blood Gas Arterial, Lactate: 1.9 mmoL/L (21 @ 17:55)    ABG - ( 01 Mar 2021 02:39 )  pH: 7.51  /  pCO2: 36    /  pO2: 59    / HCO3: 28    / Base Excess: 4.9   /  SaO2: 93              ABG - ( 2021 16:34 )  pH: 7.51  /  pCO2: 36    /  pO2: 91    / HCO3: 29    / Base Excess: 5.4   /  SaO2: 99              ABG - ( 2021 03:10 )  pH: 7.48  /  pCO2: 37    /  pO2: 74    / HCO3: 28    / Base Excess: 3.8   /  SaO2: 98                Coags:     15.20  ----< 1.32    ( 2021 23:46 )     38.3                Urinalysis Basic - ( 2021 16:45 )    Color: Yellow / Appearance: Clear / S.023 / pH: x  Gluc: x / Ketone: Negative  / Bili: Negative / Urobili: <2 mg/dL   Blood: x / Protein: 30 mg/dL / Nitrite: Negative   Leuk Esterase: Negative / RBC: 12 /HPF / WBC 4 /HPF   Sq Epi: x / Non Sq Epi: 1 /HPF / Bacteria: Negative        Culture - CSF with Gram Stain (collected 2021 19:50)  Source: .CSF CSF  Gram Stain (2021 01:32):    polymorphonuclear leukocytes seen    No organisms seen    by cytocentrifuge  Preliminary Report (2021 21:30):    No growth    Culture - Bronchial (collected 2021 12:22)  Source: Bronch Wash Bronchoalveolar Lavage  Gram Stain (2021 22:39):    Moderate polymorphonuclear leukocytes per low power field    Few Squamous epithelial cells per low power field    Moderate Yeast like cells per oil power field    Few Gram positive cocci in pairs per oil power field  Preliminary Report (2021 17:08):    Normal Respiratory Macie present    Culture - Acid Fast - CSF (collected 2021 15:20)  Source: .CSF CSF    Culture - CSF with Gram Stain (collected 2021 15:20)  Source: .CSF CSF... lumbar tap  Gram Stain (2021 07:24):    polymorphonuclear leukocytes seen    No organisms seen    by cytocentrifuge  Preliminary Report (2021 21:06):    No growth

## 2021-03-01 NOTE — PROGRESS NOTE ADULT - ASSESSMENT
ASSESSMENT  75 years old female from home with PMHx of B cell lymphoma on Imbruvica (since 10/2020), CAD s/p stents 15 years ago, DM, chronic UTI, HTN, DLD, brought in by ambulance due to altered mental status and hypertension. Patient was AAO x4 and fully functional at baseline. Last known normal was 11:00pm 2/23 before beds. In 2/24 at 8:45AM, patient was found to be lethargy in bed by his son, was AAO x 4 and able to talk to his son, but started coughing and had one episode of nose bleed. At 9:15, her physical therapist noticed her becoming more lethargic, but vitals were normal. Soon at 9:30, patient was unresponsive and had a blank stare. EMS was called and her BP was found to be around 230/120. She was rushed to ED as Code Stroke. NIHSS 23. CTH showed Large left frontal intraparenchymal hematoma with intraventricular extension into left lateral ventricle, associated with marked mass effect resulting in 0.9 cm midline shift to the right anteriorly. Patient was intubated for airway protection    IMPRESSION  #Fevers after craniotomy for intracranial hemorrhage    s/p Craniotomy for intracerebral hemorrhage 24-Feb-2021 21:01:07  Zulema Rodriguez.    2/27 CSF cx NG; Total Nucleated Cell Count, CSF: 30 /uL (02.27.21 @ 19:50)- not consistent with infection    2/27 Bronch cx NG    2/25 BCX NG    Rapid RVP Result: NotDetec (02-24-21 @ 15:43)    UA unremarkable     CTH- unchanged L frontal hematoma cavity 4.5 cm, slightly decr amount of blood, interval decr in pneumocephalus, L transfrontal drain tip in the third ventricle   < from: Xray Chest 1 View- PORTABLE-Routine (Xray Chest 1 View- PORTABLE-Routine in AM.) (02.28.21 @ 05:34) >  Unchanged bibasilar opacity/effusion.  #Bcell lymphoma  #Pancytopenia  # Lactic acidosis  # Hypomagnesemia  # PCN allergy- unknown    RECOMMEND  - f/u repeat BCX  - as hypertensive with fevers may be more central related than infectious  - if repeat BCX NG, then D/C VANC  - on aztreonam  This is a preliminary incomplete pended note, all final recommendations to follow after interview and examination of the patient.     Please call with any questions or send a message on [employer] Teams  Spectra [phone]        ASSESSMENT  75 years old female from home with PMHx of B cell lymphoma on Imbruvica (since 10/2020), CAD s/p stents 15 years ago, DM, chronic UTI, HTN, DLD, brought in by ambulance due to altered mental status and hypertension. Patient was AAO x4 and fully functional at baseline. Last known normal was 11:00pm 2/23 before beds. In 2/24 at 8:45AM, patient was found to be lethargy in bed by his son, was AAO x 4 and able to talk to his son, but started coughing and had one episode of nose bleed. At 9:15, her physical therapist noticed her becoming more lethargic, but vitals were normal. Soon at 9:30, patient was unresponsive and had a blank stare. EMS was called and her BP was found to be around 230/120. She was rushed to ED as Code Stroke. NIHSS 23. CTH showed Large left frontal intraparenchymal hematoma with intraventricular extension into left lateral ventricle, associated with marked mass effect resulting in 0.9 cm midline shift to the right anteriorly. Patient was intubated for airway protection    IMPRESSION  #Fevers after craniotomy for intracranial hemorrhage ?Central fevers    s/p Craniotomy for intracerebral hemorrhage 24-Feb-2021 21:01:07  Zulema Rodriguez.    2/27 CSF cx NG; Total Nucleated Cell Count, CSF: 30 /uL (02.27.21 @ 19:50)- not consistent with infection    2/27 Bronch cx NG    2/25 BCX NG    Rapid RVP Result: NotDetec (02-24-21 @ 15:43)    UA unremarkable     CTH- unchanged L frontal hematoma cavity 4.5 cm, slightly decr amount of blood, interval decr in pneumocephalus, L transfrontal drain tip in the third ventricle   < from: Xray Chest 1 View- PORTABLE-Routine (Xray Chest 1 View- PORTABLE-Routine in AM.) (02.28.21 @ 05:34) >  Unchanged bibasilar opacity/effusion.  #Bcell lymphoma  #Pancytopenia  # Lactic acidosis  # Hypomagnesemia  # PCN allergy- unknown    RECOMMEND  - f/u repeat BCX  - as hypertensive with fevers may be more central related than infectious  - if repeat BCX NG, then D/C VANC  - on aztreonam      Please call with any questions or send a message on [employer] Teams  Spectra [phone]

## 2021-03-02 NOTE — PROGRESS NOTE ADULT - ASSESSMENT
A/p:  S/P Left Minimally Invasive Craniotomy for evac of IPH with Placement of EVD           plan to d/c EVD           will send fungus culture prior discharge EVD           follow up all cultures

## 2021-03-02 NOTE — PROGRESS NOTE ADULT - SUBJECTIVE AND OBJECTIVE BOX
MIGUEL ANGELAD BRAUN  75y, Female  Allergy: penicillin (Anaphylaxis; Hives)      LOS  6d    CHIEF COMPLAINT: altered mental status (02 Mar 2021 00:23)      INTERVAL EVENTS/HPI  - T(F): , Max: 102.4 (03-01-21 @ 18:00), febrile, HTN  - bronch cx with mold like fungus  - started on ambisome  - WBC Count: 6.78 (03-01-21 @ 23:20)  WBC Count: 6.44 (02-28-21 @ 23:46)  - Creatinine, Serum: <0.5 (03-01-21 @ 23:20)  Creatinine, Serum: <0.5 (03-01-21 @ 12:25)      ROS  cannot obtain secondary to patient's sedation and/or mental status    VITALS:  T(F): 100.4, Max: 102.4 (03-01-21 @ 18:00)  HR: 75  BP: --  RR: 22Vital Signs Last 24 Hrs  T(C): 38 (02 Mar 2021 08:00), Max: 39.1 (01 Mar 2021 18:00)  T(F): 100.4 (02 Mar 2021 08:00), Max: 102.4 (01 Mar 2021 18:00)  HR: 75 (02 Mar 2021 08:00) (75 - 104)  BP: --  BP(mean): --  RR: 22 (02 Mar 2021 08:00) (18 - 30)  SpO2: 100% (02 Mar 2021 08:00) (95% - 100%)    PHYSICAL EXAM:  Gen: Intubated  HEENT: incision with staples  CV: RRR  Lungs: Decreased BS at bases  Abd: Soft  Neuro: Sedated  Lines clean, no phlebitis    FH: Non-contributory  Social Hx: Non-contributory    TESTS & MEASUREMENTS:                        8.4    6.78  )-----------( 121      ( 01 Mar 2021 23:20 )             26.0     03-01    130<L>  |  96<L>  |  13  ----------------------------<  233<H>  4.1   |  23  |  <0.5<L>    Ca    7.9<L>      01 Mar 2021 23:20  Phos  3.2     03-01  Mg     1.7     03-01      eGFR if Non African American: 112 mL/min/1.73M2 (03-01-21 @ 23:20)  eGFR if African American: 130 mL/min/1.73M2 (03-01-21 @ 23:20)  eGFR if Non African American: 112 mL/min/1.73M2 (03-01-21 @ 12:25)  eGFR if African American: 130 mL/min/1.73M2 (03-01-21 @ 12:25)          Culture - CSF with Gram Stain (collected 02-27-21 @ 19:50)  Source: .CSF CSF  Gram Stain (02-28-21 @ 01:32):    polymorphonuclear leukocytes seen    No organisms seen    by cytocentrifuge  Preliminary Report (02-28-21 @ 21:30):    No growth    Culture - Bronchial (collected 02-27-21 @ 12:22)  Source: Bronch Wash Bronchoalveolar Lavage  Gram Stain (02-27-21 @ 22:39):    Moderate polymorphonuclear leukocytes per low power field    Few Squamous epithelial cells per low power field    Moderate Yeast like cells per oil power field    Few Gram positive cocci in pairs per oil power field  Preliminary Report (03-01-21 @ 18:40):    Mold like fungus Referred to Mycology    Normal Respiratory Macie present    Culture - Fungal, CSF (collected 02-26-21 @ 15:20)  Source: .CSF CSF  Preliminary Report (03-01-21 @ 08:46):    Testing in progress    Culture - Acid Fast - CSF (collected 02-26-21 @ 15:20)  Source: .CSF CSF    Culture - CSF with Gram Stain (collected 02-26-21 @ 15:20)  Source: .CSF CSF... lumbar tap  Gram Stain (02-27-21 @ 07:24):    polymorphonuclear leukocytes seen    No organisms seen    by cytocentrifuge  Final Report (03-01-21 @ 16:14):    No growth at 3 days.    Culture - Blood (collected 02-25-21 @ 12:40)  Source: .Blood Blood-Peripheral  Preliminary Report (02-26-21 @ 23:01):    No growth to date.            INFECTIOUS DISEASES TESTING  Rapid RVP Result: NotDetec (02-24-21 @ 15:43)  Procalcitonin, Serum: 0.43 (10-12-20 @ 20:34)  COVID-19 PCR: NotDetec (10-12-20 @ 16:21)  MRSA PCR Result.: Negative (08-25-20 @ 05:04)  COVID-19 PCR: NotDetec (08-24-20 @ 19:00)  Procalcitonin, Serum: 0.26 (07-06-20 @ 09:30)  COVID-19 PCR: NotDetec (07-05-20 @ 18:24)  COVID-19 PCR: NotDetec (06-10-20 @ 14:21)  COVID-19 PCR: NotDetec (05-18-20 @ 14:28)      INFLAMMATORY MARKERS      RADIOLOGY & ADDITIONAL TESTS:  I have personally reviewed the last available Chest xray  CXR      CT  CT Chest w/ IV Cont:   EXAM:  CT CHEST IC            PROCEDURE DATE:  03/01/2021            INTERPRETATION:  REASON FOR EXAM / CLINICAL STATEMENT:  spiking temps; s/p OR    TECHNIQUE:  Non-contrast CT of the thorax. Contiguous axial CT images were obtained from the thoracic inlet to the upper abdomen with IV contrast.  Reformatted images in the coronal and sagittal planes were acquired.      COMPARISON: None.    FINDINGS:    TUBES AND LINES: Endotracheal tube is in place extending to just above the kelsey. An NG tubeplace extending to the stomach. Right-sided central line extends to the level of the superior vena cava.    HEART AND VESSELS: The heart is normal in size. There are coronary artery calcifications. There is no pericardial effusion.    No evidence of central pulmonary embolism.    Aortic calcifications are noted. Normal caliber aorta and pulmonary artery. There is no evidence of aortic aneurysm or dissection.    Brachiocephalic vessels are unremarkable.    MEDIASTINUM: There are no enlarged mediastinal, hilar or axillary lymph nodes. The visualized portion of the thyroid gland is unremarkable.    AIRWAYS, LUNGS AND PLEURA: The trachea and right bronchial tree are patent. Filling defect compatible with mucous plugging is noted in the left lowerlobe bronchus. There are moderate bilateral pleural effusions with areas of compression atelectasis in both lower lobes. An area of thickened interlobular septa with tree-in-bud opacities are noted in the posterior aspect of the right upper lobe.There is no pneumothorax.    UPPER ABDOMEN: The partially visualized upper abdomen shows no acute pathology.    BONES AND SOFT TISSUES: Degenerative changes of the spine are noted. Misregistration artifact noted from the lower aspect of the sternum.    IMPRESSION:    Filling defect compatible with mucous plugging is noted in the left lower lobe bronchus. There are moderate bilateral pleural effusions with areas of compression atelectasis in both lower lobes. An area of thickened interlobular septa with tree-in-bud opacities are noted in the posterior aspect of the right upper lobe.              FRANCO MCDANIEL MD; Attending Interventional Radiologist  This document has been electronically signed. Mar  1 2021  9:44PM (03-01-21 @ 20:56)      CARDIOLOGY TESTING  12 Lead ECG:   Systolic  mmHg    Diastolic BP 47 mmHg    Ventricular Rate 125 BPM    Atrial Rate 125 BPM    P-R Interval 142 ms    QRS Duration 124 ms    Q-T Interval 334 ms    QTC Calculation(Bazett) 482 ms    P Axis 81 degrees    R Axis 90 degrees    T Axis 76 degrees    Diagnosis Line Sinus tachycardia  Right bundle branch block  Abnormal ECG    Confirmed by VERONIKA FOSTER MD ([id number]) on 2/25/2021 10:07:07 PM (02-24-21 @ 22:58)  12 Lead ECG:   Ventricular Rate 154 BPM    Atrial Rate 154 BPM    QRS Duration 120 ms    Q-T Interval 312 ms    QTC Calculation(Bazett) 499 ms    R Axis 84 degrees    T Axis 21 degrees    Diagnosis Line Atrial flutter with 2:1 A-V conduction  Right bundle branch block  Abnormal ECG    Confirmed by Benton Yousif ([id number]) on 2/24/2021 12:08:37 PM (02-24-21 @ 10:42)      MEDICATIONS  acetaminophen   Tablet .. 650  amphotericin B  liposome  IVPB 370  atorvastatin 80  aztreonam  IVPB 2000  aztreonam  IVPB   chlorhexidine 0.12% Liquid 15  chlorhexidine 4% Liquid 1  clevidipine Infusion 2  dexMEDEtomidine Infusion 0.1  dextrose 50% Injectable 25  dextrose 50% Injectable 25  heparin   Injectable 5000  insulin regular Infusion 1  labetalol 300  labetalol 100  lactulose Syrup 10  levETIRAcetam  IVPB 750  lisinopril 20  pantoprazole   Suspension 40  senna 2  vancomycin  IVPB 1000      WEIGHT  Weight (kg): 73.028 (02-24-21 @ 21:59)  Creatinine, Serum: <0.5 mg/dL (03-01-21 @ 23:20)  Creatinine, Serum: <0.5 mg/dL (03-01-21 @ 12:25)      ANTIBIOTICS:  amphotericin B  liposome  IVPB 370 milliGRAM(s) IV Intermittent every 24 hours  aztreonam  IVPB 2000 milliGRAM(s) IV Intermittent every 8 hours  aztreonam  IVPB      vancomycin  IVPB 1000 milliGRAM(s) IV Intermittent every 8 hours      All available historical records have been reviewed   AD BASURTO  75y, Female  Allergy: penicillin (Anaphylaxis; Hives)      LOS  6d    CHIEF COMPLAINT: altered mental status (02 Mar 2021 00:23)      INTERVAL EVENTS/HPI  - T(F): , Max: 102.4 (03-01-21 @ 18:00), febrile, HTN  - bronch cx with mold like fungus- spoke with micro- aspergilllus  - started on ambisome   - WBC Count: 6.78 (03-01-21 @ 23:20)  WBC Count: 6.44 (02-28-21 @ 23:46)  - Creatinine, Serum: <0.5 (03-01-21 @ 23:20)  Creatinine, Serum: <0.5 (03-01-21 @ 12:25)      ROS  cannot obtain secondary to patient's sedation and/or mental status    VITALS:  T(F): 100.4, Max: 102.4 (03-01-21 @ 18:00)  HR: 75  BP: --  RR: 22Vital Signs Last 24 Hrs  T(C): 38 (02 Mar 2021 08:00), Max: 39.1 (01 Mar 2021 18:00)  T(F): 100.4 (02 Mar 2021 08:00), Max: 102.4 (01 Mar 2021 18:00)  HR: 75 (02 Mar 2021 08:00) (75 - 104)  BP: --  BP(mean): --  RR: 22 (02 Mar 2021 08:00) (18 - 30)  SpO2: 100% (02 Mar 2021 08:00) (95% - 100%)    PHYSICAL EXAM:  Gen: Intubated  HEENT: incision with staples  CV: RRR  Lungs: Decreased BS at bases  Abd: Soft  Neuro: Sedated  Lines clean, no phlebitis    FH: Non-contributory  Social Hx: Non-contributory    TESTS & MEASUREMENTS:                        8.4    6.78  )-----------( 121      ( 01 Mar 2021 23:20 )             26.0     03-01    130<L>  |  96<L>  |  13  ----------------------------<  233<H>  4.1   |  23  |  <0.5<L>    Ca    7.9<L>      01 Mar 2021 23:20  Phos  3.2     03-01  Mg     1.7     03-01      eGFR if Non African American: 112 mL/min/1.73M2 (03-01-21 @ 23:20)  eGFR if African American: 130 mL/min/1.73M2 (03-01-21 @ 23:20)  eGFR if Non African American: 112 mL/min/1.73M2 (03-01-21 @ 12:25)  eGFR if African American: 130 mL/min/1.73M2 (03-01-21 @ 12:25)          Culture - CSF with Gram Stain (collected 02-27-21 @ 19:50)  Source: .CSF CSF  Gram Stain (02-28-21 @ 01:32):    polymorphonuclear leukocytes seen    No organisms seen    by cytocentrifuge  Preliminary Report (02-28-21 @ 21:30):    No growth    Culture - Bronchial (collected 02-27-21 @ 12:22)  Source: Bronch Wash Bronchoalveolar Lavage  Gram Stain (02-27-21 @ 22:39):    Moderate polymorphonuclear leukocytes per low power field    Few Squamous epithelial cells per low power field    Moderate Yeast like cells per oil power field    Few Gram positive cocci in pairs per oil power field  Preliminary Report (03-01-21 @ 18:40):    Mold like fungus Referred to Mycology    Normal Respiratory Macie present    Culture - Fungal, CSF (collected 02-26-21 @ 15:20)  Source: .CSF CSF  Preliminary Report (03-01-21 @ 08:46):    Testing in progress    Culture - Acid Fast - CSF (collected 02-26-21 @ 15:20)  Source: .CSF CSF    Culture - CSF with Gram Stain (collected 02-26-21 @ 15:20)  Source: .CSF CSF... lumbar tap  Gram Stain (02-27-21 @ 07:24):    polymorphonuclear leukocytes seen    No organisms seen    by cytocentrifuge  Final Report (03-01-21 @ 16:14):    No growth at 3 days.    Culture - Blood (collected 02-25-21 @ 12:40)  Source: .Blood Blood-Peripheral  Preliminary Report (02-26-21 @ 23:01):    No growth to date.            INFECTIOUS DISEASES TESTING  Rapid RVP Result: NotDetec (02-24-21 @ 15:43)  Procalcitonin, Serum: 0.43 (10-12-20 @ 20:34)  COVID-19 PCR: NotDetec (10-12-20 @ 16:21)  MRSA PCR Result.: Negative (08-25-20 @ 05:04)  COVID-19 PCR: NotDetec (08-24-20 @ 19:00)  Procalcitonin, Serum: 0.26 (07-06-20 @ 09:30)  COVID-19 PCR: NotDetec (07-05-20 @ 18:24)  COVID-19 PCR: NotDetec (06-10-20 @ 14:21)  COVID-19 PCR: NotDetec (05-18-20 @ 14:28)      INFLAMMATORY MARKERS      RADIOLOGY & ADDITIONAL TESTS:  I have personally reviewed the last available Chest xray  CXR      CT  CT Chest w/ IV Cont:   EXAM:  CT CHEST IC            PROCEDURE DATE:  03/01/2021            INTERPRETATION:  REASON FOR EXAM / CLINICAL STATEMENT:  spiking temps; s/p OR    TECHNIQUE:  Non-contrast CT of the thorax. Contiguous axial CT images were obtained from the thoracic inlet to the upper abdomen with IV contrast.  Reformatted images in the coronal and sagittal planes were acquired.      COMPARISON: None.    FINDINGS:    TUBES AND LINES: Endotracheal tube is in place extending to just above the kelsey. An NG tubeplace extending to the stomach. Right-sided central line extends to the level of the superior vena cava.    HEART AND VESSELS: The heart is normal in size. There are coronary artery calcifications. There is no pericardial effusion.    No evidence of central pulmonary embolism.    Aortic calcifications are noted. Normal caliber aorta and pulmonary artery. There is no evidence of aortic aneurysm or dissection.    Brachiocephalic vessels are unremarkable.    MEDIASTINUM: There are no enlarged mediastinal, hilar or axillary lymph nodes. The visualized portion of the thyroid gland is unremarkable.    AIRWAYS, LUNGS AND PLEURA: The trachea and right bronchial tree are patent. Filling defect compatible with mucous plugging is noted in the left lowerlobe bronchus. There are moderate bilateral pleural effusions with areas of compression atelectasis in both lower lobes. An area of thickened interlobular septa with tree-in-bud opacities are noted in the posterior aspect of the right upper lobe.There is no pneumothorax.    UPPER ABDOMEN: The partially visualized upper abdomen shows no acute pathology.    BONES AND SOFT TISSUES: Degenerative changes of the spine are noted. Misregistration artifact noted from the lower aspect of the sternum.    IMPRESSION:    Filling defect compatible with mucous plugging is noted in the left lower lobe bronchus. There are moderate bilateral pleural effusions with areas of compression atelectasis in both lower lobes. An area of thickened interlobular septa with tree-in-bud opacities are noted in the posterior aspect of the right upper lobe.              FRANCO MCDANIEL MD; Attending Interventional Radiologist  This document has been electronically signed. Mar  1 2021  9:44PM (03-01-21 @ 20:56)      CARDIOLOGY TESTING  12 Lead ECG:   Systolic  mmHg    Diastolic BP 47 mmHg    Ventricular Rate 125 BPM    Atrial Rate 125 BPM    P-R Interval 142 ms    QRS Duration 124 ms    Q-T Interval 334 ms    QTC Calculation(Bazett) 482 ms    P Axis 81 degrees    R Axis 90 degrees    T Axis 76 degrees    Diagnosis Line Sinus tachycardia  Right bundle branch block  Abnormal ECG    Confirmed by VERONIKA FOSTER MD ([id number]) on 2/25/2021 10:07:07 PM (02-24-21 @ 22:58)  12 Lead ECG:   Ventricular Rate 154 BPM    Atrial Rate 154 BPM    QRS Duration 120 ms    Q-T Interval 312 ms    QTC Calculation(Bazett) 499 ms    R Axis 84 degrees    T Axis 21 degrees    Diagnosis Line Atrial flutter with 2:1 A-V conduction  Right bundle branch block  Abnormal ECG    Confirmed by Benton Yousif ([id number]) on 2/24/2021 12:08:37 PM (02-24-21 @ 10:42)      MEDICATIONS  acetaminophen   Tablet .. 650  amphotericin B  liposome  IVPB 370  atorvastatin 80  aztreonam  IVPB 2000  aztreonam  IVPB   chlorhexidine 0.12% Liquid 15  chlorhexidine 4% Liquid 1  clevidipine Infusion 2  dexMEDEtomidine Infusion 0.1  dextrose 50% Injectable 25  dextrose 50% Injectable 25  heparin   Injectable 5000  insulin regular Infusion 1  labetalol 300  labetalol 100  lactulose Syrup 10  levETIRAcetam  IVPB 750  lisinopril 20  pantoprazole   Suspension 40  senna 2  vancomycin  IVPB 1000      WEIGHT  Weight (kg): 73.028 (02-24-21 @ 21:59)  Creatinine, Serum: <0.5 mg/dL (03-01-21 @ 23:20)  Creatinine, Serum: <0.5 mg/dL (03-01-21 @ 12:25)      ANTIBIOTICS:  amphotericin B  liposome  IVPB 370 milliGRAM(s) IV Intermittent every 24 hours  aztreonam  IVPB 2000 milliGRAM(s) IV Intermittent every 8 hours  aztreonam  IVPB      vancomycin  IVPB 1000 milliGRAM(s) IV Intermittent every 8 hours      All available historical records have been reviewed

## 2021-03-02 NOTE — PHARMACY COMMUNICATION NOTE - COMMENTS
Spoke to provider ([id number])- 1200mg-2400mg per day used for pt w severe HTN; pt has severe HTN as per provider

## 2021-03-02 NOTE — PROGRESS NOTE ADULT - ASSESSMENT
15. Impression:  76 yo f with PMH of HTN, DM, DLD, B cell lymphoma on Imbruvica, Depression, CAD s/p stent, on ASA,  presents with acute onset AMS progressing to obtundation and S GCS of 5-6 in ER and required intubation. CTH with large left IPH. cta h/n was negative for aneurysms or AVM.  ICH score 3. S/post Left MIS craniotomy with evacuation of hematoma and EVD placement. Post procedural follow up cth revealed decreased mass effect upon the bilateral ventricles with 0.5 cm rightward midline shift, previously 1 cm. Patient with tmax of 102.6 . Off sedation, not following commands, no gaze but not reacting to visual threat on the right.  Brain stem reflexes intact.     Plan:  #Neuro:  - Neuro checks q1hr  - EVD as per Neuro Sx  - Continue Keppra 750mg IV q12hrs    #ID:  - Continue empiric antibiotics    #CV:  - Keep SBP >140 < 160       #Resp:  - Ventilator management as per primary team  - HOB > 35 degrees  - Aspiration precautions      #Renal/Fluid/Electolytes:  - Strict I&Os  - Monitor lytes, replete as needed. Keep positive   - Follow up magnesium levels          Broderick Diane NP  [id number]

## 2021-03-02 NOTE — PROGRESS NOTE ADULT - ASSESSMENT
ASSESSMENT  75 years old female from home with PMHx of B cell lymphoma on Imbruvica (since 10/2020), CAD s/p stents 15 years ago, DM, chronic UTI, HTN, DLD, brought in by ambulance due to altered mental status and hypertension. Patient was AAO x4 and fully functional at baseline. Last known normal was 11:00pm 2/23 before beds. In 2/24 at 8:45AM, patient was found to be lethargy in bed by his son, was AAO x 4 and able to talk to his son, but started coughing and had one episode of nose bleed. At 9:15, her physical therapist noticed her becoming more lethargic, but vitals were normal. Soon at 9:30, patient was unresponsive and had a blank stare. EMS was called and her BP was found to be around 230/120. She was rushed to ED as Code Stroke. NIHSS 23. CTH showed Large left frontal intraparenchymal hematoma with intraventricular extension into left lateral ventricle, associated with marked mass effect resulting in 0.9 cm midline shift to the right anteriorly. Patient was intubated for airway protection    IMPRESSION  #Fevers after craniotomy for intracranial hemorrhage ?Central fevers.   s/p Craniotomy for intracerebral hemorrhage 24-Feb-2021 21:01:07  Zulema Rodriguez.    2/27 bronch-   Mold like fungus Referred to Mycology ?aspergillus ?mucor  < from: CT Chest w/ IV Cont (03.01.21 @ 20:56) >  Filling defect compatible with mucous plugging is noted in the left lower lobe bronchus. There are moderate bilateral pleural effusions with areas of compression atelectasis in both lower lobes. An area of thickened interlobular septa with tree-in-bud opacities are noted in the posterior aspect of the right upper lobe.  < from: CT Sinuses w/ IV Cont (03.01.21 @ 21:18) >  1.  Opacification of the mastoid air cells and left middle ear canal consistent with inflammation or infection.  2.  Minimal mucosal thickening in the bilateral frontal, bilateral ethmoid, bilateral sphenoid, and left maxillary sinuses.  3.  Postoperative changes in left frontal lobe hemorrhage better demonstrated on the CT scan of the brain performed on February 28, 2021.    2/25 BCX NG  #CSF studies unremarkable     2/27 CSF cx NG; Total Nucleated Cell Count, CSF: 30 /uL (02.27.21 @ 19:50)- not consistent with infection    CTH- unchanged L frontal hematoma cavity 4.5 cm, slightly decr amount of blood, interval decr in pneumocephalus, L transfrontal drain tip in the third ventricle   < from: Xray Chest 1 View- PORTABLE-Routine (Xray Chest 1 View- PORTABLE-Routine in AM.) (02.28.21 @ 05:34) >  Unchanged bibasilar opacity/effusion.  #Bcell lymphoma  #Pancytopenia  # Lactic acidosis  # Hypomagnesemia  # PCN allergy- unknown    RECOMMENDATIONS  - f/u repeat BCX  - Continue ambisome 5mg/kg q24h IV, monitor electrolytes  - Micro to call me once look at plate  - Send fungitell and serum aspergillus galactomannan   - as hypertensive with fevers may be more central related than infectious  - if repeat BCX NG, then D/C VANC  - on aztreonam IV      Please call with any questions or send a message on [employer] Teams  Spectra [phone]        ASSESSMENT  75 years old female from home with PMHx of B cell lymphoma on Imbruvica (since 10/2020), CAD s/p stents 15 years ago, DM, chronic UTI, HTN, DLD, brought in by ambulance due to altered mental status and hypertension. Patient was AAO x4 and fully functional at baseline. Last known normal was 11:00pm 2/23 before beds. In 2/24 at 8:45AM, patient was found to be lethargy in bed by his son, was AAO x 4 and able to talk to his son, but started coughing and had one episode of nose bleed. At 9:15, her physical therapist noticed her becoming more lethargic, but vitals were normal. Soon at 9:30, patient was unresponsive and had a blank stare. EMS was called and her BP was found to be around 230/120. She was rushed to ED as Code Stroke. NIHSS 23. CTH showed Large left frontal intraparenchymal hematoma with intraventricular extension into left lateral ventricle, associated with marked mass effect resulting in 0.9 cm midline shift to the right anteriorly. 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   s/p Craniotomy for intracerebral hemorrhage 24-Feb-2021 21:01:07  Zulema Rodriguez.    2/27 bronch-   Mold like fungus Referred to Mycology  < from: CT Chest w/ IV Cont (03.01.21 @ 20:56) >Filling defect compatible with mucous plugging is noted in the left lower lobe bronchus. There are moderate bilateral pleural effusions with areas of compression atelectasis in both lower lobes. An area of thickened interlobular septa with tree-in-bud opacities are noted in the posterior aspect of the right upper lobe.  < from: CT Sinuses w/ IV Cont (03.01.21 @ 21:18) >  1.  Opacification of the mastoid air cells and left middle ear canal consistent with inflammation or infection.  2.  Minimal mucosal thickening in the bilateral frontal, bilateral ethmoid, bilateral sphenoid, and left maxillary sinuses.  3.  Postoperative changes in left frontal lobe hemorrhage better demonstrated on the CT scan of the brain performed on February 28, 2021.    2/25 BCX NG  #CSF studies unremarkable     2/27 CSF cx NG; Total Nucleated Cell Count, CSF: 30 /uL (02.27.21 @ 19:50)- not consistent with infection    CTH- unchanged L frontal hematoma cavity 4.5 cm, slightly decr amount of blood, interval decr in pneumocephalus, L transfrontal drain tip in the third ventricle   < from: Xray Chest 1 View- PORTABLE-Routine (Xray Chest 1 View- PORTABLE-Routine in AM.) (02.28.21 @ 05:34) >  Unchanged bibasilar opacity/effusion.  #Bcell lymphoma  #Pancytopenia  # Lactic acidosis  # Hypomagnesemia  # PCN allergy- unknown    RECOMMENDATIONS  - f/u repeat BCX  - spoke with micro- aspergillus  - D/C ambisome-->Voriconazole 6 mg/kg twice daily for 2 doses, then 4 mg/kg twice daily   - ADD on LFTs to AM lab  - asked NSYG to send fungal cx and aspergillus galactomannan Ag (BAL) from CSF prior to d/c'ing drain  - Send fungitell and serum aspergillus galactomannan   - if repeat BCX NG, then D/C VANC  - on aztreonam IV      Please call with any questions or send a message on [employer] Teams  Spectra [phone]

## 2021-03-02 NOTE — PROGRESS NOTE ADULT - ATTENDING COMMENTS
Patient seen today with neurology team NP Benedicto.         I was physically present for the key portions of the evaluation and management (E/M) service provided.  I agree with the above history, physical, and plan with the following additions/modifications      Exam somewhat worse than expected for her focal frontal IPH - densely encephalopathic and with apparent right hemianopsia (consistently absent BTT on R hemifield, unexpected for location of bleed), would consider MRI brain once more stable to do so.  Otherwise, start 3% target normonatremia 135-145, EVD weaning per nsgy (no ICP issues), and consider SBT's.    Broderick Ordonez MD  Attending Neurointensivist

## 2021-03-02 NOTE — CHART NOTE - NSCHARTNOTEFT_GEN_A_CORE
Registered Dietitian Follow-Up     Patient Profile Reviewed                           Yes [x]   No []     Nutrition History Previously Obtained        Yes []  No [x]--unable to obtain as pt remains intubated     Pertinent Medical Interventions: Pt s/p left minimally invasive craniotomy for evac of IPH with placement of EVD; plan to d/c EVD per Neurosx. Bronchial culture showing concern for mold like fungus; will send fungus culture prior discharge EVD per Neurosx.      Diet order: Glucerna 1.2 @ 50ml/hr (1440kcal, 72gm pro, 966ml free water)--per RN, pt tolerating current TF regimen well w/o any issues. MAP 96.      Anthropometrics:  - Ht. 63"   - Wt. 154#/69.9kg (3/1)--will continue to monitor wt trends closely   (2/24): 161#/73kg--dosing wt   - %wt change  - BMI: 28.5 sing dosing wt   - IBW: 115#      Pertinent Lab Data: (3/2): POCT 155     Pertinent Meds: heparin, abx, insulin, precedex, labetalol, lipitor, lisinopril, keppra, protonix, senna     Physical Findings:  - Appearance: intubated/ventilated; 1+ edema to B/L arm, hand, wrist   - GI function: no GI distress noted per RN, LBM 3/2   - Tubes: OGT   - Oral/Mouth cavity: NPO   - Skin: surgical incision; stage 2 pressure injury to sacrum (documented on 2/27)      Nutrition Requirements  Weight Used: Dosing wt: 161#/73kg, Ve: 10.7, Tmax: 39.1; kcal needs slightly readjusted today      Calories: 1798 kcal/day ([id number])   Protein:  gm/day (1.2-1.5 gm/kg CBW)   Fluids per SICU     Nutrient Intake: only meeting 80% of kcal needs and 65-82% of pro needs      Previous Nutrition Diagnosis: Inadequate protein-energy intake (ongoing)      Nutrition Intervention: enteral nutrition    Recommendations:  1. As pt has been tolerating standarized formula (Glucerna 1.2) w/o any issues, will keep pt on it, however to avoid underfeeding pt, recommend increasing rate to 60ml/hr. TF to provide a total of 1728kcal, 86gm pro, 1159ml free water. Additional flushes per LIP. All recs discussed with LIP ([phone])      Goal/Expected Outcome: Pt to meet % of estimated nutrient needs within 3 days     Indicator/Monitoring: RD to monitor diet order, energy intake, glucose profile, NFPF

## 2021-03-02 NOTE — CHART NOTE - NSCHARTNOTEFT_GEN_A_CORE
Palliative team sign off note, goals of care clear for now, spoke with neurosurgery who is aware of our availability if they needs assistance.   please reconsult PRN x [phone]

## 2021-03-02 NOTE — PROGRESS NOTE ADULT - SUBJECTIVE AND OBJECTIVE BOX
Patient is a 75y old  Female who presents with a chief complaint of altered mental status (02 Mar 2021 08:37)      Subjective: Patient remains intubated, bronchial culture showing concern for mold like fungus?       Vital Signs Last 24 Hrs  T(C): 38.8 (02 Mar 2021 11:00), Max: 39.1 (01 Mar 2021 18:00)  T(F): 101.8 (02 Mar 2021 11:00), Max: 102.4 (01 Mar 2021 18:00)  HR: 87 (02 Mar 2021 11:00) (75 - 100)  BP: --  BP(mean): --  RR: 31 (02 Mar 2021 11:00) (18 - 31)  SpO2: 100% (02 Mar 2021 11:00) (95% - 100%)    PHYSICAL EXAM  General: intubated and sedated   HEENT: anicteric sclera, pink conjunctiva  Neck: supple  CV: normal S1/S2 with no murmur rubs or gallops  Lungs: mechanical breath sounds   Abdomen: soft non-tender non-distended  Ext: no edema  Skin: no rashes   Neuro: sedated     MEDICATIONS  (STANDING):  acetaminophen   Tablet .. 650 milliGRAM(s) Oral every 6 hours  alvimopan 12 milliGRAM(s) Oral once  amphotericin B  liposome  IVPB 370 milliGRAM(s) IV Intermittent every 24 hours  atorvastatin 80 milliGRAM(s) Oral at bedtime  aztreonam  IVPB 2000 milliGRAM(s) IV Intermittent every 8 hours  aztreonam  IVPB      chlorhexidine 0.12% Liquid 15 milliLiter(s) Oral Mucosa two times a day  chlorhexidine 4% Liquid 1 Application(s) Topical daily  dexMEDEtomidine Infusion 0.1 MICROgram(s)/kG/Hr (1.83 mL/Hr) IV Continuous <Continuous>  dextrose 50% Injectable 25 Gram(s) IV Push once  dextrose 50% Injectable 25 Gram(s) IV Push once  heparin   Injectable 5000 Unit(s) SubCutaneous every 8 hours  insulin regular Infusion 1 Unit(s)/Hr (1 mL/Hr) IV Continuous <Continuous>  labetalol 300 milliGRAM(s) Oral every 8 hours  levETIRAcetam  IVPB 750 milliGRAM(s) IV Intermittent every 12 hours  lisinopril 20 milliGRAM(s) Oral daily  pantoprazole   Suspension 40 milliGRAM(s) Oral daily  senna 2 Tablet(s) Oral at bedtime  vancomycin  IVPB 1000 milliGRAM(s) IV Intermittent every 8 hours    MEDICATIONS  (PRN):      LABS:                          8.4    6.78  )-----------( 121      ( 01 Mar 2021 23:20 )             26.0         Mean Cell Volume : 80.0 fL  Mean Cell Hemoglobin : 25.8 pg  Mean Cell Hemoglobin Concentration : 32.3 g/dL  Auto Neutrophil # : x  Auto Lymphocyte # : x  Auto Monocyte # : x  Auto Eosinophil # : x  Auto Basophil # : x  Auto Neutrophil % : x  Auto Lymphocyte % : x  Auto Monocyte % : x  Auto Eosinophil % : x  Auto Basophil % : x      Serial CBC's  03-01 @ 23:20  Hct-26.0 / Hgb-8.4 / Plat-121 / RBC-3.25 / WBC-6.78  Serial CBC's  02-28 @ 23:46  Hct-27.7 / Hgb-9.0 / Plat-140 / RBC-3.42 / WBC-6.44  Serial CBC's  02-28 @ 00:30  Hct-25.4 / Hgb-8.2 / Plat-116 / RBC-3.12 / WBC-3.31  Serial CBC's  02-26 @ 23:45  Hct-25.3 / Hgb-8.1 / Plat-114 / RBC-3.06 / WBC-3.79  Serial CBC's  02-26 @ 17:20  Hct-25.6 / Hgb-8.0 / Plat-119 / RBC-3.07 / WBC-3.57      03-01    130<L>  |  96<L>  |  13  ----------------------------<  233<H>  4.1   |  23  |  <0.5<L>    Ca    7.9<L>      01 Mar 2021 23:20  Phos  3.2     03-01  Mg     1.7     03-01        PT/INR - ( 01 Mar 2021 23:20 )   PT: 16.50 sec;   INR: 1.43 ratio         PTT - ( 01 Mar 2021 23:20 )  PTT:38.3 sec                      Culture - Blood (collected 28 Feb 2021 23:21)  Source: .Blood None  Preliminary Report (02 Mar 2021 09:01):    No growth to date.    Culture - CSF with Gram Stain (collected 27 Feb 2021 19:50)  Source: .CSF CSF  Gram Stain (28 Feb 2021 01:32):    polymorphonuclear leukocytes seen    No organisms seen    by cytocentrifuge  Preliminary Report (28 Feb 2021 21:30):    No growth    Culture - Bronchial (collected 27 Feb 2021 12:22)  Source: Bronch Wash Bronchoalveolar Lavage  Gram Stain (27 Feb 2021 22:39):    Moderate polymorphonuclear leukocytes per low power field    Few Squamous epithelial cells per low power field    Moderate Yeast like cells per oil power field    Few Gram positive cocci in pairs per oil power field  Preliminary Report (01 Mar 2021 18:40):    Mold like fungus Referred to Mycology    Normal Respiratory Macie present    BLOOD SMEAR INTERPRETATION:       RADIOLOGY & ADDITIONAL STUDIES:    < from: CT Head No Cont (02.28.21 @ 18:22) >  IMPRESSION:    Since February 25, 2021;    1.  Unchanged left frontal hematoma cavity measuring approximately 4.5 cm containing slightly decreasedamount of residual blood products.    2. Overall unchanged scattered subarachnoid hemorrhage and layering intraventricular hemorrhage. No significant midline shift.    3. Interval decrease in pneumocephalus.    4. Left transfrontal ventricular drain with distal tip at the level of the third ventricle.        ELLIOT LANDAU MD; Attending Radiologist  This document has been electronically signed. Feb 28 2021  6:42PM    < end of copied text >

## 2021-03-02 NOTE — PROGRESS NOTE ADULT - SUBJECTIVE AND OBJECTIVE BOX
AD BASURTO  [id number]  75y Female    Indication for ICU admission: s/p crani -evac of large hematoma (IPH)    Admit Date:02-24-21  ICU Date: 2/24/21  OR Date: 2/24/21    penicillin (Anaphylaxis; Hives)    PAST MEDICAL & SURGICAL HISTORY:  Anemia    DM (diabetes mellitus)    High cholesterol    HTN (hypertension)    B-cell chronic lymphocytic leukemia variant    CAD (coronary artery disease)  s/p stenting    H/O heart artery stent      Home Medications:  aspirin 81 mg oral tablet, chewable: 1 tab(s) orally once a day (24 Feb 2021 15:11)  atorvastatin 80 mg oral tablet: 1 tab(s) orally once a day (at bedtime) (24 Feb 2021 15:11)  Imbruvica 70 mg oral capsule: 2 cap(s) orally once a day (24 Feb 2021 15:11)  Levaquin 500 mg oral tablet: 1 tab(s) orally every 24 hours (24 Feb 2021 15:11)  lisinopril 10 mg oral tablet: 1 tab(s) orally once a day (24 Feb 2021 15:11)        24HRS EVENT:  Night:  CT chest -mucus plug L lower lobe bronchus, mod bilat pleural effusions w/ areas of atlectasis in both lower lobes. tree-in-bud opacities in posterior aspect of R upper lobe  CT sinus-prelim- no evidence of inflammatory/infectious diseases of paransal sinuses   Na 130, rpt cbc and serum osmo at 11am  Fevers T-max 102.4 (1800) blanketrol and iv tylenol     DAY  -DC precedex --> restarted due to tachycardic and hypertensive  -add labetalol 100 q8h (DC cleviprex)  -start lactulose and follow up BM   -vanc level at 12:00  -FU ID recs   -FU cultures   -get palliative on board   -off insulin gtt   -bal: +mold --> ct chest, ct sinus, started ampho b, aspergillus serum  -pending CT chest/sinus (with contrast per radiology)          DVT PTX: HSQ    GI PTX:pantoprazole  Injectable 40 milliGRAM(s) IV Push daily    Tubes/Lines/Drains   ----------------------------------------------------------------------------------------------------------  [x] Peripheral IV  [X] Central Venous Line         RIJ             Date Placed:  2/24  [X] Arterial Line		      Radial   Date Placed: 2/24  [X] Urinary Catheter Murry                                             Date Placed: 2/24     REVIEW OF SYSTEMS    [ ] A ten-point review of systems was otherwise negative except as noted.  [x ] Due to altered mental status/intubation, subjective information were not able to be obtained from the patient. History was         AD BASURTO  [id number]  75y Female    Indication for ICU admission: s/p crani -evac of large hematoma (IPH)    Admit Date:02-24-21  ICU Date: 2/24/21  OR Date: 2/24/21    penicillin (Anaphylaxis; Hives)    PAST MEDICAL & SURGICAL HISTORY:  Anemia    DM (diabetes mellitus)    High cholesterol    HTN (hypertension)    B-cell chronic lymphocytic leukemia variant    CAD (coronary artery disease)  s/p stenting    H/O heart artery stent      Home Medications:  aspirin 81 mg oral tablet, chewable: 1 tab(s) orally once a day (24 Feb 2021 15:11)  atorvastatin 80 mg oral tablet: 1 tab(s) orally once a day (at bedtime) (24 Feb 2021 15:11)  Imbruvica 70 mg oral capsule: 2 cap(s) orally once a day (24 Feb 2021 15:11)  Levaquin 500 mg oral tablet: 1 tab(s) orally every 24 hours (24 Feb 2021 15:11)  lisinopril 10 mg oral tablet: 1 tab(s) orally once a day (24 Feb 2021 15:11)        24HRS EVENT:  Night:  CT chest -mucus plug L lower lobe bronchus, mod bilat pleural effusions w/ areas of atlectasis in both lower lobes. tree-in-bud opacities in posterior aspect of R upper lobe  CT sinus-prelim- no evidence of inflammatory/infectious diseases of paransal sinuses   Na 130, rpt cbc and serum osmo at 11am  Fevers T-max 102.4 (1800) blanketrol and iv tylenol     DAY  -DC precedex --> restarted due to tachycardic and hypertensive  -add labetalol 100 q8h (DC cleviprex)  -start lactulose and follow up BM   -vanc level at 12:00  -FU ID recs   -FU cultures   -get palliative on board   -off insulin gtt   -bal: +mold --> ct chest, ct sinus, started ampho b, aspergillus serum  -pending CT chest/sinus (with contrast per radiology)          DVT PTX: HSQ    GI PTX:pantoprazole  Injectable 40 milliGRAM(s) IV Push daily    Tubes/Lines/Drains   ----------------------------------------------------------------------------------------------------------  [x] Peripheral IV  [X] Central Venous Line         RIJ             Date Placed:  2/24  [X] Arterial Line		      Radial   Date Placed: 2/24  [X] Urinary Catheter Murry                                             Date Placed: 2/24     REVIEW OF SYSTEMS    [ ] A ten-point review of systems was otherwise negative except as noted.  [x ] Due to altered mental status/intubation, subjective information were not able to be obtained from the patient. History was      HD: 6d  Daily     Daily     Diet, NPO with Tube Feed:   Tube Feeding Modality: Nasogastric  Glucerna 1.2 Jerry  Intermittent  Starting Tube Feed Rate mL per Hour: 10  Starting Tube Feed Rate mL per Hour: 30  Increase Tube Feed Rate by (mL): 10  Increase Tube Feed Rate by (mL): 5    Every 2 hours     Every 2 hours  Until Goal Tube Feed Rate (mL per Hour): 50  Until Goal Tube Feed Rate (mL per Hour): 50  Tube Feeding Hours ON: 1  Tube Feeding OFF (Hours): 0  Tube Feed Start Time: 11:55  Tube Feed Start Time: 12:00 (02-26-21 @ 11:55)      CURRENT MEDS:  Neurologic Medications  acetaminophen   Tablet .. 650 milliGRAM(s) Oral every 6 hours  dexMEDEtomidine Infusion 0.1 MICROgram(s)/kG/Hr IV Continuous <Continuous>  fentaNYL    Injectable 25 MICROGram(s) IV Push every 4 hours PRN Severe Pain (7 - 10)  levETIRAcetam  IVPB 750 milliGRAM(s) IV Intermittent every 12 hours    Respiratory Medications    Cardiovascular Medications  clevidipine Infusion 2 mG/Hr IV Continuous <Continuous>  labetalol 200 milliGRAM(s) Oral every 8 hours  lisinopril 20 milliGRAM(s) Oral daily    Gastrointestinal Medications  lactulose Syrup 10 Gram(s) Oral daily  pantoprazole   Suspension 40 milliGRAM(s) Oral daily  senna 2 Tablet(s) Oral at bedtime    Genitourinary Medications    Hematologic/Oncologic Medications  heparin   Injectable 5000 Unit(s) SubCutaneous every 8 hours    Antimicrobial/Immunologic Medications  amphotericin B  liposome  IVPB 370 milliGRAM(s) IV Intermittent every 24 hours  aztreonam  IVPB 2000 milliGRAM(s) IV Intermittent every 8 hours  aztreonam  IVPB      vancomycin  IVPB 1000 milliGRAM(s) IV Intermittent every 8 hours    Endocrine/Metabolic Medications  atorvastatin 80 milliGRAM(s) Oral at bedtime  dextrose 50% Injectable 25 Gram(s) IV Push once  dextrose 50% Injectable 25 Gram(s) IV Push once  insulin regular Infusion 1 Unit(s)/Hr IV Continuous <Continuous>    Topical/Other Medications  chlorhexidine 0.12% Liquid 15 milliLiter(s) Oral Mucosa two times a day  chlorhexidine 4% Liquid 1 Application(s) Topical daily      ICU Vital Signs Last 24 Hrs  T(C): 38.1 (02 Mar 2021 07:00), Max: 39.1 (01 Mar 2021 18:00)  T(F): 100.6 (02 Mar 2021 07:00), Max: 102.4 (01 Mar 2021 18:00)  HR: 76 (02 Mar 2021 07:00) (76 - 104)  BP: --  BP(mean): --  ABP: 112/44 (02 Mar 2021 07:00) (110/42 - 167/54)  ABP(mean): 66 (02 Mar 2021 07:00) (64 - 91)  RR: 23 (02 Mar 2021 07:00) (18 - 30)  SpO2: 100% (02 Mar 2021 07:00) (95% - 100%)      Adult Advanced Hemodynamics Last 24 Hrs  CVP(mm Hg): --  CVP(cm H2O): --  CO: --  CI: --  PA: --  PA(mean): --  PCWP: --  SVR: --  SVRI: --  PVR: --  PVRI: --    Mode: AC/ CMV (Assist Control/ Continuous Mandatory Ventilation)  RR (machine): 16  TV (machine): 400  FiO2: 30  PEEP: 5  ITime: 0.8  MAP: 9  PIP: 21    ABG - ( 02 Mar 2021 02:44 )  pH, Arterial: 7.46  pH, Blood: x     /  pCO2: 39    /  pO2: 74    / HCO3: 28    / Base Excess: 3.9   /  SaO2: 96                  I&O's Summary    01 Mar 2021 07:01  -  02 Mar 2021 07:00  --------------------------------------------------------  IN: 3344.9 mL / OUT: 3130 mL / NET: 214.9 mL      I&O's Detail    01 Mar 2021 07:01  -  02 Mar 2021 07:00  --------------------------------------------------------  IN:    Clevidipine: 160 mL    Dexmedetomidine: 123.9 mL    Enteral Tube Flush: 350 mL    Glucerna 1.5: 1040 mL    Insulin: 10 mL    Insulin: 11 mL    IV PiggyBack: 300 mL    IV PiggyBack: 100 mL    IV PiggyBack: 300 mL    IV PiggyBack: 750 mL    IV PiggyBack: 200 mL  Total IN: 3344.9 mL    OUT:    Dexmedetomidine: 0 mL    Indwelling Catheter - Urethral (mL): 3130 mL  Total OUT: 3130 mL    Total NET: 214.9 mL          EXAM:  NEURO: NAD, withdraws to pain x4 extremities. +cough/corneal.  PERRLA.    RESPIRATORY: Lungs clear to auscultation, Normal expansion/effort.    Mechanical Ventilation: Mode: AC/ CMV (Assist Control/ Continuous Mandatory Ventilation)  RR (machine): 16  TV (machine): 400  FiO2: 30  PEEP: 5  ITime: 0.8  MAP: 9  PIP: 21      CARDIOVASCULAR: Regular rate and rhythm. No peripheral edema.    GI: Abdomen soft, Non-tender, Non-distended.      EXTREMITIES: Extremities warm, pink, well-perfused.      DERM: Good skin turgor, no skin breakdown.      :  Murry catheter in place.     CXR:     LABS:  Labs:  CAPILLARY BLOOD GLUCOSE      POCT Blood Glucose.: 225 mg/dL (02 Mar 2021 06:52)  POCT Blood Glucose.: 227 mg/dL (02 Mar 2021 05:48)  POCT Blood Glucose.: 234 mg/dL (02 Mar 2021 01:54)  POCT Blood Glucose.: 164 mg/dL (01 Mar 2021 21:29)  POCT Blood Glucose.: 200 mg/dL (01 Mar 2021 17:27)  POCT Blood Glucose.: 155 mg/dL (01 Mar 2021 13:17)  POCT Blood Glucose.: 146 mg/dL (01 Mar 2021 12:27)  POCT Blood Glucose.: 103 mg/dL (01 Mar 2021 11:04)  POCT Blood Glucose.: 154 mg/dL (01 Mar 2021 09:03)  POCT Blood Glucose.: 200 mg/dL (01 Mar 2021 08:06)                          8.4    6.78  )-----------( 121      ( 01 Mar 2021 23:20 )             26.0         03-01    130<L>  |  96<L>  |  13  ----------------------------<  233<H>  4.1   |  23  |  <0.5<L>      Calcium, Total Serum: 7.9 mg/dL (03-01-21 @ 23:20)      LFTs:     Blood Gas Arterial, Lactate: 1.9 mmoL/L (03-02-21 @ 02:44)  Blood Gas Arterial, Lactate: 1.6 mmoL/L (03-01-21 @ 17:38)  Blood Gas Arterial, Lactate: 1.2 mmoL/L (03-01-21 @ 02:39)  Blood Gas Arterial, Lactate: 1.7 mmoL/L (02-28-21 @ 16:34)  Blood Gas Arterial, Lactate: 1.4 mmoL/L (02-28-21 @ 03:10)  Blood Gas Arterial, Lactate: 1.2 mmoL/L (02-27-21 @ 17:28)    ABG - ( 02 Mar 2021 02:44 )  pH: 7.46  /  pCO2: 39    /  pO2: 74    / HCO3: 28    / Base Excess: 3.9   /  SaO2: 96              ABG - ( 01 Mar 2021 17:38 )  pH: 7.52  /  pCO2: 35    /  pO2: 75    / HCO3: 29    / Base Excess: 5.6   /  SaO2: 97              ABG - ( 01 Mar 2021 02:39 )  pH: 7.51  /  pCO2: 36    /  pO2: 59    / HCO3: 28    / Base Excess: 4.9   /  SaO2: 93                Coags:     16.50  ----< 1.43    ( 01 Mar 2021 23:20 )     38.3                    Culture - CSF with Gram Stain (collected 27 Feb 2021 19:50)  Source: .CSF CSF  Gram Stain (28 Feb 2021 01:32):    polymorphonuclear leukocytes seen    No organisms seen    by cytocentrifuge  Preliminary Report (28 Feb 2021 21:30):    No growth    Culture - Bronchial (collected 27 Feb 2021 12:22)  Source: Bronch Wash Bronchoalveolar Lavage  Gram Stain (27 Feb 2021 22:39):    Moderate polymorphonuclear leukocytes per low power field    Few Squamous epithelial cells per low power field    Moderate Yeast like cells per oil power field    Few Gram positive cocci in pairs per oil power field  Preliminary Report (01 Mar 2021 18:40):    Mold like fungus Referred to Mycology    Normal Respiratory Macie present

## 2021-03-02 NOTE — PROGRESS NOTE ADULT - ATTENDING COMMENTS
withdraws spontaneously   hyponatremic   will continue nutrition support   possible removal of EVD   Palliative care consult   continue SICU Care

## 2021-03-02 NOTE — PROGRESS NOTE ADULT - ASSESSMENT
Assessment and Plan:     75y Female s/p craniotomy for left frontal hematoma with evacuation and EVD placement. Intubated and sedated. SICU admission for hemodynamic monitoring.     NEURO:  Precedex for sedation   Pain controlled with IV Tylenol, fentanyl prn   Keppra 750 mg q 12  EVD in place @ [id number], clamped, plan to unclamp if ICP > 25  Keep CPP >70  q 1 hr neurocheck  2/25 CTH- ventricular drain in 3rd ventricle, decr size of lateral ventricles, scattered SAH and layering IVH in posterior horns of  lateral ventricle slightly increased  2/28 rpt CTH - stable- L frontal hemoatom cavity 4.5 cm, slighlty decr amount of blood, interval decr in pneumocephalus, L transfrontal drain tip in the third ventricle   3/1: CT sinus-prelim- no evidence of inflammatory/infectious diseases of paransal sinuses     RESP:   Intubated: /16/40/5  03-01 @ 17:38--7.52 / 35 / 75 / 29 / 97   AM CXR  3/1: CT chest -mucus plug L lower lobe bronchus, mod bilat pleural effusions w/ areas of atlectasis in both lower lobes. tree-in-bud opacities in posterior aspect of R upper lobe  Keep HOB elevated at 30 degrees    CARDS:   Acute on chronic HTN        - increased home Lisinopril to 20mg       - labetalol 100 q8h        - 2/28: Cleviprex started (SBP goal <160)  H/o CAD s/p stents - hold ASA  EKG sinus tach with RBBB Qtc 482  Echo (10/13/20): EF 55-60%, trace MR, R atrial echodensity possible thrombus  Trop 0.24 --> 0.21->0.15, no longer trending    GI/NUTR:   -Diet: TF- Glucerna 1.2 @ goal 50/hr  OGT  GI Prophylaxis- PPI  Bowel regimen- Senna, lactulose   BM__    /RENAL:   Monitor UO-roblero in place  IVL  BUN/Cr- 13/<0.5  -->,  12/<0.5  -->,  12/<0.5  -->  Na 130 // K 4.1 // Mg 1.7 //  Phos 3.2 03-01 @ 23:20  -repleted Mg    HEME/ONC:   DVT prophylaxis: heparin   Injectable 5000 every 8 hours  Hb/Hct:  8.1/25.3  -->,  8.2/25.4  -->,  9.0/27.7  --> 8.4/26.0  Plts:  114  -->,  116  -->,  140  --> 121  (02/25) INR 1.85, nsx rec 1 FFP and 10 vit K, INR 1.49 (2/26)    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  Heme/Onc consult: transfuse platelets if < 100, give vit K for elevated INR  s/p 1 FFP & 10 vit K on 2/26 for INR 1.9 rpt 1.5>1.6>1.3>1.3    ID:  Immunocompromised  WBC- 3.79  -->,  3.31  -->,  6.44  --> 6.78  Temp trend- 24hrs T(F): 102.4 (03-01 @ 18:00), Max: 102.4 (03-01 @ 18:00)  ABX: vanco (2/28-  ), aztreonam (2/28-  ), amphotericin B (3/1-)  Vanc lvl (3/1): 15.2  Cultures:     -UA (2/28): negative     -CSF with Gram Stain  (02.26.21 @ 15:20): No growth at 3 days.     -CSF (fungal) (collected 02-26): pending     -CSF with Gram Stain (collected 02-27): No growth    -Bronchial (collected 02-27):   Normal Respiratory Macie present, Mold like fungus          ENDO:  Holding home metformin  Insulin gtt d'cd 3/1  FS q 4  HA1C 6.3     LINES/DRAINS:  PIV, CVC, Veronica, [place]o, EVD     DISPO:    SICU

## 2021-03-02 NOTE — PROGRESS NOTE ADULT - SUBJECTIVE AND OBJECTIVE BOX
Neurocritical Care Progress Note:    Brief update:75 years old female from home with PMHx of B cell lymphoma on Imbruvica (since 10/2020), CAD s/p stents 15 years ago, DM, chronic UTI, HTN, DLD, brought in by ambulance due to altered mental status and hypertension. Patient was AAO x4 and fully functional at baseline. Last known normal was 11:00pm 2/23 before beds. In 2/24 at 8:45AM, patient was found to be lethargy in bed by his son, was AAO x 4 and able to talk to his son, but started coughing and had one episode of nose bleed. At 9:15, her physical therapist noticed her becoming more lethargic, but vitals were normal. Soon at 9:30, patient was unresponsive and had a blank stare. EMS was called and her BP was found to be around 230/120. She was rushed to ED as Code Stroke. NIHSS 23. CTH showed Large left frontal intraparenchymal hematoma with intraventricular extension into left lateral ventricle, associated with marked mass effect resulting in 0.9 cm midline shift to the right anteriorly. Patient was intubated for airway protection. S/p 2 units of platelet for ASA reversal. Neurosurgery was planning for emergent OR resection of hematoma.    24 hour update: Remains encephalopathic, remains mechanically vented. Hyponatremic.    Medications:   acetaminophen   Tablet .. 650 milliGRAM(s) Oral every 6 hours  amphotericin B  liposome  IVPB 370 milliGRAM(s) IV Intermittent every 24 hours  atorvastatin 80 milliGRAM(s) Oral at bedtime  aztreonam  IVPB 2000 milliGRAM(s) IV Intermittent every 8 hours  aztreonam  IVPB      chlorhexidine 0.12% Liquid 15 milliLiter(s) Oral Mucosa two times a day  chlorhexidine 4% Liquid 1 Application(s) Topical daily  dexMEDEtomidine Infusion 0.1 MICROgram(s)/kG/Hr IV Continuous <Continuous>  dextrose 50% Injectable 25 Gram(s) IV Push once  dextrose 50% Injectable 25 Gram(s) IV Push once  heparin   Injectable 5000 Unit(s) SubCutaneous every 8 hours  insulin regular Infusion 1 Unit(s)/Hr IV Continuous <Continuous>  labetalol 300 milliGRAM(s) Oral every 8 hours  levETIRAcetam  IVPB 750 milliGRAM(s) IV Intermittent every 12 hours  lisinopril 20 milliGRAM(s) Oral daily  pantoprazole   Suspension 40 milliGRAM(s) Oral daily  senna 2 Tablet(s) Oral at bedtime  vancomycin  IVPB 1000 milliGRAM(s) IV Intermittent every 8 hours      Ancillary Management:   Chest PT[ ]   Head of bed >35 [ x]   Out of bed to chair [ ]   PT/OT/SP Eval [ ]   Spirometry[ ]   DVT prophalaxis[ x]    Neuro:   Awake: Spontaneously[ ] Occasionally[ ] To Voice [ ] To painful stimuli [ ] none [x]  AIert [ ]. Following commands: 3 steps[ ], 2 steps[ ], 1 step [ ], None [ x]   Orientation: 0[x ], 1[ ], 2[ ], 3[ ]. Tracking objects with eyes: [ ]   Language: vented  Time off sedation for exam:   Pupils: Right   2.5>2   Left    2.5>2      Corneal:  +    Gag reflex: +    EOMI: no gaze.   Less response to visual threat on the right.   Withdrawing all 4 extremities. L>R.    mRS:4  0 No symptoms at all  1 No significant disability despite symptoms; able to carry out all usual duties and activities without assistance  2 Slight disability; unable to carry out all previous activities, but able to look after own affairs  3 Moderate disability; requiring some help, but able to walk without assistance  4 Moderately severe disability; unable to walk without assistance and unable to attend to own bodily needs without assistance  5 Severe disability; bedridden, incontinent and requiring constant nursing care and attention  6 Dead    ICHs:  Age >=80  GCS Score: 3-4 (2 pts)   GCS Score: 5-12 (1 pt)   ICH Volume: >30  (+) IVH  (+) Infratentorial    Justice&Rinaldi:  Grade I = Asymptomatic, mild headache, slight nuchal rigidity  Grade II = Moderate to severe headache, nuchal rigidity, no neurological deficit other than cranial nerve palsy  Grade III = Drowiness, confusion, mild focal neurological deficit  Grade IV = Stupor, moderate to severe hemiparesis  Grade V = Coma, decerebrate posturing    m-Olivera:  Grade 0   (No Subarachnoid Hemorrhage (SAH)), No intraventricular hemorrhage (IVH), Incidence of symptomatic vasopasm 0%  Grade 1   (Focal or diffuse, thin SAH), No IVH, incidence of symptomatic vasospasm 24%  Grade 2   (Thin focal or diffuse SAH), IVH present, incidence of symptomatic vasospasm 33%  Grade 3   (Thick focal or diffuse SAH), No IVH, incidence of symptomatic vasospasm 33%  Grade 4   (Thick focal or diffuse SAH), IVH present, incidence of symptomatic vasosasm 40%    Last CTH:  < from: CT Head No Cont (02.28.21 @ 18:22) >  IMPRESSION:    Since February 25, 2021;    1.  Unchanged left frontal hematoma cavity measuring approximately 4.5 cm containing slightly decreasedamount of residual blood products.    2. Overall unchanged scattered subarachnoid hemorrhage and layering intraventricular hemorrhage. No significant midline shift.    3. Interval decrease in pneumocephalus.    4. Left transfrontal ventricular drain with distal tip at the level of the third ventricle.        < end of copied text >    Last CTA/MRA:  < from: CT Angio Neck w/ IV Cont (02.24.21 @ 13:51) >    IMPRESSION:    1.  No evidence of major vascular stenosis or occlusion. No evidence of aneurysm or vascular malformation.    2.  Calcific plaque of the carotid bifurcations with about 50% stenosis of the distal right CCA and <50% stenosis of the bilateral proximal ICAs.    < end of copied text >    Last CTP:    Last MRI:    Last TCD:    Last EEG:  `  EVD: [ ] Borden: [ ]     EVD in place and clamped  ICP:  8   CPP:  85   Level(cm):     24hr(ml):     CSF:     W:     R:     Cx:     P:     G:     LA:       Gram stain:    Cardiovascular:   Vital Signs Last 24 Hrs  T(C): 38.8 (02 Mar 2021 11:00), Max: 39.1 (01 Mar 2021 18:00)  T(F): 101.8 (02 Mar 2021 11:00), Max: 102.4 (01 Mar 2021 18:00)  HR: 83 (02 Mar 2021 11:45) (75 - 100)  BP: 145/64 (02 Mar 2021 11:45) (145/64 - 145/64)  BP(mean): 92 (02 Mar 2021 11:45) (92 - 92)  RR: 25 (02 Mar 2021 11:45) (18 - 31)  SpO2: 100% (02 Mar 2021 11:45) (95% - 100%)     Last Echo:    Last EKG:  < from: 12 Lead ECG (02.24.21 @ 22:58) >    Diagnosis Line Sinus tachycardia  Right bundle branch block  Abnormal ECG    < end of copied text >    CVP   MAP/CPP/SBP target:   CO:      CI:       Enzymes/Trop:    Respiratory:   ABG:ABG - ( 02 Mar 2021 02:44 )  pH, Arterial: 7.46  pH, Blood: x     /  pCO2: 39    /  pO2: 74    / HCO3: 28    / Base Excess: 3.9   /  SaO2: 96                  VBG:    Chest Xray:  `< from: Xray Chest 1 View- PORTABLE-Routine (03.02.21 @ 06:02) >  Impression:    Endotracheal tube tip low in position at the level of the kelsey. Repositioning needed.    Bilateral opacities/effusions similar to prior.    Findings discussed with  on 3/2/2021 at 10:34 AM.        < end of copied text >    Mode: AC/ CMV (Assist Control/ Continuous Mandatory Ventilation)  RR (machine): 16  TV (machine): 400  FiO2: 30  PEEP: 5  ITime: 1  MAP: 8  PIP: 20      Peak Pressure/[place] Pressure:    GI:    Prophalaxis:  protonix    Bowel mvt:     Abd distension:       12.Renal/Fluids/Electrolytes:    03-01    130<L>  |  96<L>  |  13  ----------------------------<  233<H>  4.1   |  23  |  <0.5<L>    Ca    7.9<L>      01 Mar 2021 23:20  Phos  3.2     03-01  Mg     1.7     03-01        I&O's Detail    01 Mar 2021 07:01  -  02 Mar 2021 07:00  --------------------------------------------------------  IN:    Clevidipine: 160 mL    Dexmedetomidine: 123.9 mL    Enteral Tube Flush: 350 mL    Glucerna 1.5: 1040 mL    Insulin: 10 mL    Insulin: 11 mL    IV PiggyBack: 300 mL    IV PiggyBack: 100 mL    IV PiggyBack: 300 mL    IV PiggyBack: 750 mL    IV PiggyBack: 200 mL  Total IN: 3344.9 mL    OUT:    Dexmedetomidine: 0 mL    Indwelling Catheter - Urethral (mL): 3130 mL  Total OUT: 3130 mL    Total NET: 214.9 mL      02 Mar 2021 07:01  -  02 Mar 2021 12:13  --------------------------------------------------------  IN:    Dexmedetomidine: 4.4 mL    Glucerna 1.5: 250 mL    Insulin: 12 mL  Total IN: 266.4 mL    OUT:    Clevidipine: 0 mL    Indwelling Catheter - Urethral (mL): 470 mL  Total OUT: 470 mL    Total NET: -203.6 mL          13.ID:   TMax: 102.4  Vital Signs Last 24 Hrs  T(C): 38.8 (02 Mar 2021 11:00), Max: 39.1 (01 Mar 2021 18:00)  T(F): 101.8 (02 Mar 2021 11:00), Max: 102.4 (01 Mar 2021 18:00)  HR: 83 (02 Mar 2021 11:45) (75 - 100)  BP: 145/64 (02 Mar 2021 11:45) (145/64 - 145/64)  BP(mean): 92 (02 Mar 2021 11:45) (92 - 92)  RR: 25 (02 Mar 2021 11:45) (18 - 31)  SpO2: 100% (02 Mar 2021 11:45) (95% - 100%)           Lines: Central[] Date inserted: Peripheral[]    14. Hematology:                         8.4    6.78  )-----------( 121      ( 01 Mar 2021 23:20 )             26.0      03-01    130<L>  |  96<L>  |  13  ----------------------------<  233<H>  4.1   |  23  |  <0.5<L>    Ca    7.9<L>      01 Mar 2021 23:20  Phos  3.2     03-01  Mg     1.7     03-01       PT/INR - ( 01 Mar 2021 23:20 )   PT: 16.50 sec;   INR: 1.43 ratio         PTT - ( 01 Mar 2021 23:20 )  PTT:38.3 sec    DVT Prophylaxis Lovenox[ ] Heparin[ ] Venodynes[ ] SCD's[x ]

## 2021-03-02 NOTE — PROGRESS NOTE ADULT - ATTENDING COMMENTS
Intubated, sedated, continues to be febrile.  Case was at length discussed with daughter Kathrine, she is interested in aggressive measures at this time, trach/PEG is necessary.   I have explained to her that in light of intracranial bleeding I recommend against restarting on Ibrutinib that resulted in great hematological control of patient's underlying lymphoma. Options for treating lymphoma at this point are limited.   Will follow closely.

## 2021-03-03 NOTE — PROGRESS NOTE ADULT - ASSESSMENT
ASSESSMENT  75 years old female from home with PMHx of B cell lymphoma on Imbruvica (since 10/2020), CAD s/p stents 15 years ago, DM, chronic UTI, HTN, DLD, brought in by ambulance due to altered mental status and hypertension. Patient was AAO x4 and fully functional at baseline. Last known normal was 11:00pm 2/23 before beds. In 2/24 at 8:45AM, patient was found to be lethargy in bed by his son, was AAO x 4 and able to talk to his son, but started coughing and had one episode of nose bleed. At 9:15, her physical therapist noticed her becoming more lethargic, but vitals were normal. Soon at 9:30, patient was unresponsive and had a blank stare. EMS was called and her BP was found to be around 230/120. She was rushed to ED as Code Stroke. NIHSS 23. CTH showed Large left frontal intraparenchymal hematoma with intraventricular extension into left lateral ventricle, associated with marked mass effect resulting in 0.9 cm midline shift to the right anteriorly. 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   s/p Craniotomy for intracerebral hemorrhage 24-Feb-2021 21:01:07  Zulema Rodriguez.    2/27 bronch-   Mold like fungus Referred to Mycology  < from: CT Chest w/ IV Cont (03.01.21 @ 20:56) >Filling defect compatible with mucous plugging is noted in the left lower lobe bronchus. There are moderate bilateral pleural effusions with areas of compression atelectasis in both lower lobes. An area of thickened interlobular septa with tree-in-bud opacities are noted in the posterior aspect of the right upper lobe.  < from: CT Sinuses w/ IV Cont (03.01.21 @ 21:18) >  1.  Opacification of the mastoid air cells and left middle ear canal consistent with inflammation or infection.  2.  Minimal mucosal thickening in the bilateral frontal, bilateral ethmoid, bilateral sphenoid, and left maxillary sinuses.  3.  Postoperative changes in left frontal lobe hemorrhage better demonstrated on the CT scan of the brain performed on February 28, 2021.    2/25 BCX NG  #CSF studies unremarkable     2/27 CSF cx NG; Total Nucleated Cell Count, CSF: 30 /uL (02.27.21 @ 19:50)- not consistent with infection    CTH- unchanged L frontal hematoma cavity 4.5 cm, slightly decr amount of blood, interval decr in pneumocephalus, L transfrontal drain tip in the third ventricle   < from: Xray Chest 1 View- PORTABLE-Routine (Xray Chest 1 View- PORTABLE-Routine in AM.) (02.28.21 @ 05:34) >  Unchanged bibasilar opacity/effusion.  #Bcell lymphoma  #Pancytopenia  # Lactic acidosis  # Hypomagnesemia  # PCN allergy- unknown    RECOMMENDATIONS  - spoke with micro- aspergillus, f/u speciation and sensitivities   - Voriconazole 4 mg/kg twice daily IV, monitor LFTs  - Repeat BCX (last 2/28) as fever  - f/u fungal CSF culture  - Please send tracheal fluid for BAL aspergillus galactomannan   - If repeat BCX are NG, will D/C ABX (on vancomycin 1g q8h IV and aztreonam)  - Please recheck vanc trough 30 min prior to 4th new dose   - F/u serum fungitell and serum aspergillus galactomannan   - Guarded prognosis    Please call with any questions or send a message on [employer] Teams  Spectra [phone]

## 2021-03-03 NOTE — PROGRESS NOTE ADULT - ASSESSMENT
15. Impression:  74 yo f with PMH of HTN, DM, DLD, B cell lymphoma on Imbruvica, Depression, CAD s/p stent, on ASA,  presents with acute onset AMS progressing to obtundation and S GCS of 5-6 in ER and required intubation. CTH with large left IPH. cta h/n was negative for aneurysms or AVM.  ICH score 3. S/post Left MIS craniotomy with evacuation of hematoma and EVD placement. Post procedural follow up cth revealed decreased mass effect upon the bilateral ventricles with 0.5 cm rightward midline shift, previously 1 cm. Patient with tmax of 101.3 . PE significant for pt not following commands, respondsive to painful stimuli bilateral lower extremity,  no gaze, +gag and corneal reflex      16. Plan:  #Neuro:  - Neuro checks q1hr  - Continue Keppra 750mg IV q12hrs  -consider MRI brain once stable     #ID:  - Continue empiric antibiotics  - follow up cultures     #CV:  - Keep -160       #Resp:  - Ventilator management as per primary team  - HOB > 35 degrees  - Aspiration precautions  - consider SBT     #Renal/Fluid/Electolytes:  -start 3% NS, maintain NA between 135-145  - Strict I&Os  - Monitor lytes, replete as needed. Keep positive   - Follow up magnesium levels      attending note to follow  15. Impression:  76 yo f with PMH of HTN, DM, DLD, B cell lymphoma on Imbruvica, Depression, CAD s/p stent, on ASA,  presents with acute onset AMS progressing to obtundation and S GCS of 5-6 in ER and required intubation. CTH with large left IPH. cta h/n was negative for aneurysms or AVM.  ICH score 3. S/post Left MIS craniotomy with evacuation of hematoma and EVD placement. Post procedural follow up cth revealed decreased mass effect upon the bilateral ventricles with 0.5 cm rightward midline shift, previously 1 cm. Patient with tmax of 101.3 . PE significant for pt not following commands, respondsive to painful stimuli bilateral lower extremity,  no gaze, +gag and corneal reflex      16. Plan:  #Neuro:  - Neuro checks q1hr  - Continue Keppra 750mg IV q12hrs  -consider MRI brain once stable     #ID:  - Continue empiric antibiotics  - follow up cultures     #CV:  - Keep -160       #Resp:  - Ventilator management as per primary team  - HOB > 35 degrees  - Aspiration precautions  - consider SBT     #Renal/Fluid/Electolytes:  -start 3% NS, maintain NA between 135-145  - Strict I&Os  - Monitor lytes, replete as needed. Keep positive   - Follow up magnesium levels      attending note to follow     disposition continue ICU monitoring

## 2021-03-03 NOTE — PROGRESS NOTE ADULT - SUBJECTIVE AND OBJECTIVE BOX
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  - T(F): , Max: 102.2 (21 @ 12:00)  - WBC Count: 6.59 (21 @ 00:02)  WBC Count: 6.78 (21 @ 23:20)     - Creatinine, Serum: <0.5 (21 @ 04:45)  Creatinine, Serum: 0.5 (21 @ 00:02)       ROS  unable to obtain history secondary to patient's mental status and/or sedation     VITALS:  T(F): 100, Max: 102.2 (21 @ 12:00)  HR: 73  BP: 152/67  RR: 25Vital Signs Last 24 Hrs  T(C): 37.8 (03 Mar 2021 06:00), Max: 39 (02 Mar 2021 12:00)  T(F): 100 (03 Mar 2021 06:00), Max: 102.2 (02 Mar 2021 12:00)  HR: 73 (03 Mar 2021 06:00) (67 - 87)  BP: 152/67 (02 Mar 2021 12:15) (145/64 - 152/67)  BP(mean): 96 (02 Mar 2021 12:15) (92 - 96)  RR: 25 (03 Mar 2021 06:00) (20 - 32)  SpO2: 100% (03 Mar 2021 06:00) (99% - 100%)    PHYSICAL EXAM:  ***    FH: Non-contributory  Social Hx: Non-contributory    TESTS & MEASUREMENTS:                        7.8    6.59  )-----------( 112      ( 03 Mar 2021 00:02 )             23.8     03-03    131<L>  |  98  |  21<H>  ----------------------------<  176<H>  4.1   |  23  |  <0.5<L>    Ca    8.0<L>      03 Mar 2021 04:45  Phos  4.1     03-03  Mg     1.9         TPro  5.1<L>  /  Alb  3.1<L>  /  TBili  0.6  /  DBili  x   /  AST  46<H>  /  ALT  35  /  AlkPhos  181<H>      eGFR if : 118 mL/min/1.73M2 (21 @ 04:45)  eGFR if Non African American: 102 mL/min/1.73M2 (21 @ 04:45)  eGFR if Non African American: 95 mL/min/1.73M2 (21 @ 00:02)  eGFR if African American: 110 mL/min/1.73M2 (21 @ 00:02)  eGFR if Non African American: 95 mL/min/1.73M2 (21 @ 16:19)  eGFR if African American: 110 mL/min/1.73M2 (21 @ 16:19)    LIVER FUNCTIONS - ( 03 Mar 2021 00:02 )  Alb: 3.1 g/dL / Pro: 5.1 g/dL / ALK PHOS: 181 U/L / ALT: 35 U/L / AST: 46 U/L / GGT: x           Urinalysis Basic - ( 02 Mar 2021 12:50 )    Color: Yellow / Appearance: Clear / S.033 / pH: x  Gluc: x / Ketone: Negative  / Bili: Negative / Urobili: 6 mg/dL   Blood: x / Protein: 30 mg/dL / Nitrite: Negative   Leuk Esterase: Negative / RBC: 1 /HPF / WBC 4 /HPF   Sq Epi: x / Non Sq Epi: 4 /HPF / Bacteria: Negative        Culture - Fungal, CSF (collected 21 @ 14:31)  Source: .CSF CSF  Preliminary Report (21 @ 08:04):    Testing in progress    Culture - Blood (collected 21 @ 23:21)  Source: .Blood None  Preliminary Report (21 @ 09:01):    No growth to date.    Culture - CSF with Gram Stain (collected 21 @ 19:50)  Source: .CSF CSF  Gram Stain (21 @ 01:32):    polymorphonuclear leukocytes seen    No organisms seen    by cytocentrifuge  Final Report (21 @ 15:57):    No growth at 3 days.    Culture - Bronchial (collected 21 @ 12:22)  Source: Bronch Wash Bronchoalveolar Lavage  Gram Stain (21 @ 22:39):    Moderate polymorphonuclear leukocytes per low power field    Few Squamous epithelial cells per low power field    Moderate Yeast like cells per oil power field    Few Gram positive cocci in pairs per oil power field  Preliminary Report (21 @ 18:40):    Mold like fungus Referred to Mycology    Normal Respiratory Macie present    Culture - Fungal, CSF (collected 21 @ 15:20)  Source: .CSF CSF  Preliminary Report (21 @ 08:46):    Testing in progress    Culture - Acid Fast - CSF (collected 21 @ 15:20)  Source: .CSF CSF    Culture - CSF with Gram Stain (collected 21 @ 15:20)  Source: .CSF CSF... lumbar tap  Gram Stain (21 @ 07:24):    polymorphonuclear leukocytes seen    No organisms seen    by cytocentrifuge  Final Report (21 @ 16:14):    No growth at 3 days.    Culture - Blood (collected 21 @ 12:40)  Source: .Blood Blood-Peripheral  Final Report (21 @ 23:00):    No Growth Final            INFECTIOUS DISEASES TESTING  Vancomycin Level, Random: 15.2 (21 @ 12:25)  Vancomycin Level, Random: 9.8 (21 @ 16:33)  Vancomycin Level, Trough: 7.8 (21 @ 16:33)  Rapid RVP Result: NotDetec (21 @ 15:43)  Procalcitonin, Serum: 0.43 (10-12-20 @ 20:34)  COVID-19 PCR: NotDetec (10-12-20 @ 16:21)  MRSA PCR Result.: Negative (20 @ 05:04)  COVID-19 PCR: NotDetec (20 @ 19:00)  Vancomycin Level, Trough: 11.5 (20 @ 07:05)  Procalcitonin, Serum: 0.26 (20 @ 09:30)  COVID-19 PCR: NotDetec (20 @ 18:24)  COVID-19 PCR: NotDetec (06-10-20 @ 14:21)  COVID-19 PCR: NotDetec (20 @ 14:28)      INFLAMMATORY MARKERS      RADIOLOGY & ADDITIONAL TESTS:  I have personally reviewed the last available Chest xray  CXR      CT  CT Chest w/ IV Cont:   EXAM:  CT CHEST IC            PROCEDURE DATE:  2021            INTERPRETATION:  REASON FOR EXAM / CLINICAL STATEMENT:  spiking temps; s/p OR    TECHNIQUE:  Non-contrast CT of the thorax. Contiguous axial CT images were obtained from the thoracic inlet to the upper abdomen with IV contrast.  Reformatted images in the coronal and sagittal planes were acquired.      COMPARISON: None.    FINDINGS:    TUBES AND LINES: Endotracheal tube is in place extending to just above the kelsey. An NG tubeplace extending to the stomach. Right-sided central line extends to the level of the superior vena cava.    HEART AND VESSELS: The heart is normal in size. There are coronary artery calcifications. There is no pericardial effusion.    No evidence of central pulmonary embolism.    Aortic calcifications are noted. Normal caliber aorta and pulmonary artery. There is no evidence of aortic aneurysm or dissection.    Brachiocephalic vessels are unremarkable.    MEDIASTINUM: There are no enlarged mediastinal, hilar or axillary lymph nodes. The visualized portion of the thyroid gland is unremarkable.    AIRWAYS, LUNGS AND PLEURA: The trachea and right bronchial tree are patent. Filling defect compatible with mucous plugging is noted in the left lowerlobe bronchus. There are moderate bilateral pleural effusions with areas of compression atelectasis in both lower lobes. An area of thickened interlobular septa with tree-in-bud opacities are noted in the posterior aspect of the right upper lobe.There is no pneumothorax.    UPPER ABDOMEN: The partially visualized upper abdomen shows no acute pathology.    BONES AND SOFT TISSUES: Degenerative changes of the spine are noted. Misregistration artifact noted from the lower aspect of the sternum.    IMPRESSION:    Filling defect compatible with mucous plugging is noted in the left lower lobe bronchus. There are moderate bilateral pleural effusions with areas of compression atelectasis in both lower lobes. An area of thickened interlobular septa with tree-in-bud opacities are noted in the posterior aspect of the right upper lobe.              FRANCO MCDANIEL MD; Attending Interventional Radiologist  This document has been electronically signed. Mar  1 2021  9:44PM (21 @ 20:56)      CARDIOLOGY TESTING  12 Lead ECG:   Systolic  mmHg    Diastolic BP 47 mmHg    Ventricular Rate 125 BPM    Atrial Rate 125 BPM    P-R Interval 142 ms    QRS Duration 124 ms    Q-T Interval 334 ms    QTC Calculation(Bazett) 482 ms    P Axis 81 degrees    R Axis 90 degrees    T Axis 76 degrees    Diagnosis Line Sinus tachycardia  Right bundle branch block  Abnormal ECG    Confirmed by VERONIKA FOSTER MD ([id number]) on 2021 10:07:07 PM (21 @ 22:58)  12 Lead ECG:   Ventricular Rate 154 BPM    Atrial Rate 154 BPM    QRS Duration 120 ms    Q-T Interval 312 ms    QTC Calculation(Bazett) 499 ms    R Axis 84 degrees    T Axis 21 degrees    Diagnosis Line Atrial flutter with 2:1 A-V conduction  Right bundle branch block  Abnormal ECG    Confirmed by Benton Yousif ([id number]) on 2021 12:08:37 PM (21 @ 10:42)      MEDICATIONS  acetaminophen   Tablet .. 650 Oral every 6 hours  atorvastatin 80 Oral at bedtime  aztreonam  IVPB 2000 IV Intermittent every 8 hours  aztreonam  IVPB     chlorhexidine 0.12% Liquid 15 Oral Mucosa two times a day  chlorhexidine 4% Liquid 1 Topical daily  dexMEDEtomidine Infusion 0.1 IV Continuous <Continuous>  heparin   Injectable 5000 SubCutaneous every 8 hours  insulin regular Infusion 1 IV Continuous <Continuous>  labetalol 300 Oral every 8 hours  levETIRAcetam  IVPB 750 IV Intermittent every 12 hours  lisinopril 20 Oral daily  pantoprazole   Suspension 40 Oral daily  senna 2 Oral at bedtime  sodium chloride 3%. 500 IV Continuous <Continuous>  vancomycin  IVPB 1000 IV Intermittent every 8 hours  voriconazole IVPB 300 IV Intermittent every 12 hours      WEIGHT  Weight (kg): 73.028 (21 @ 21:59)  Creatinine, Serum: <0.5 mg/dL (21 @ 04:45)  Creatinine, Serum: 0.5 mg/dL (21 @ 00:02)  Creatinine, Serum: 0.5 mg/dL (21 @ 16:19)      ANTIBIOTICS:  aztreonam  IVPB 2000 milliGRAM(s) IV Intermittent every 8 hours  aztreonam  IVPB      vancomycin  IVPB 1000 milliGRAM(s) IV Intermittent every 8 hours  voriconazole IVPB 300 milliGRAM(s) IV Intermittent every 12 hours      All available historical records have been reviewed       AD BASURTO  75y, Female  Allergy: penicillin (Anaphylaxis; Hives)      LOS  7d    CHIEF COMPLAINT: altered mental status (03 Mar 2021 04:33)      INTERVAL EVENTS/HPI  - febrile yesterday, curve downtrending   - 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  - T(F): , Max: 102.2 (21 @ 12:00)  - WBC Count: 6.59 (21 @ 00:02)  WBC Count: 6.78 (21 @ 23:20)     - Creatinine, Serum: <0.5 (21 @ 04:45)  Creatinine, Serum: 0.5 (21 @ 00:02)       ROS  unable to obtain history secondary to patient's mental status and/or sedation     VITALS:  T(F): 100, Max: 102.2 (21 @ 12:00)  HR: 73  BP: 152/67  RR: 25Vital Signs Last 24 Hrs  T(C): 37.8 (03 Mar 2021 06:00), Max: 39 (02 Mar 2021 12:00)  T(F): 100 (03 Mar 2021 06:00), Max: 102.2 (02 Mar 2021 12:00)  HR: 73 (03 Mar 2021 06:00) (67 - 87)  BP: 152/67 (02 Mar 2021 12:15) (145/64 - 152/67)  BP(mean): 96 (02 Mar 2021 12:15) (92 - 96)  RR: 25 (03 Mar 2021 06:00) (20 - 32)  SpO2: 100% (03 Mar 2021 06:00) (99% - 100%)    PHYSICAL EXAM:  General: intubated  HEENT: NCAT, incision staples clean, no erythema/purulence, drain removed  CV: RRR  Lungs: symmetric chest expansion, decreased BS at bases  Abd: Soft  Skin: no rash  Neuro: sedated  Lines: no phlebitis     FH: Non-contributory  Social Hx: Non-contributory    TESTS & MEASUREMENTS:                        7.8    6.59  )-----------( 112      ( 03 Mar 2021 00:02 )             23.8     03-    131<L>  |  98  |  21<H>  ----------------------------<  176<H>  4.1   |  23  |  <0.5<L>    Ca    8.0<L>      03 Mar 2021 04:45  Phos  4.1       Mg     1.9         TPro  5.1<L>  /  Alb  3.1<L>  /  TBili  0.6  /  DBili  x   /  AST  46<H>  /  ALT  35  /  AlkPhos  181<H>      eGFR if : 118 mL/min/1.73M2 (21 @ 04:45)  eGFR if Non African American: 102 mL/min/1.73M2 (21 @ 04:45)  eGFR if Non African American: 95 mL/min/1.73M2 (21 @ 00:02)  eGFR if African American: 110 mL/min/1.73M2 (21 @ 00:02)  eGFR if Non African American: 95 mL/min/1.73M2 (21 @ 16:19)  eGFR if African American: 110 mL/min/1.73M2 (21 @ 16:19)    LIVER FUNCTIONS - ( 03 Mar 2021 00:02 )  Alb: 3.1 g/dL / Pro: 5.1 g/dL / ALK PHOS: 181 U/L / ALT: 35 U/L / AST: 46 U/L / GGT: x           Urinalysis Basic - ( 02 Mar 2021 12:50 )    Color: Yellow / Appearance: Clear / S.033 / pH: x  Gluc: x / Ketone: Negative  / Bili: Negative / Urobili: 6 mg/dL   Blood: x / Protein: 30 mg/dL / Nitrite: Negative   Leuk Esterase: Negative / RBC: 1 /HPF / WBC 4 /HPF   Sq Epi: x / Non Sq Epi: 4 /HPF / Bacteria: Negative        Culture - Fungal, CSF (collected 21 @ 14:31)  Source: .CSF CSF  Preliminary Report (21 @ 08:04):    Testing in progress    Culture - Blood (collected 21 @ 23:21)  Source: .Blood None  Preliminary Report (21 @ 09:01):    No growth to date.    Culture - CSF with Gram Stain (collected 21 @ 19:50)  Source: .CSF CSF  Gram Stain (21 @ 01:32):    polymorphonuclear leukocytes seen    No organisms seen    by cytocentrifuge  Final Report (21 @ 15:57):    No growth at 3 days.    Culture - Bronchial (collected 21 @ 12:22)  Source: Bronch Wash Bronchoalveolar Lavage  Gram Stain (21 @ 22:39):    Moderate polymorphonuclear leukocytes per low power field    Few Squamous epithelial cells per low power field    Moderate Yeast like cells per oil power field    Few Gram positive cocci in pairs per oil power field  Preliminary Report (21 @ 18:40):    Mold like fungus Referred to Mycology    Normal Respiratory Macie present    Culture - Fungal, CSF (collected 21 @ 15:20)  Source: .CSF CSF  Preliminary Report (21 @ 08:46):    Testing in progress    Culture - Acid Fast - CSF (collected 21 @ 15:20)  Source: .CSF CSF    Culture - CSF with Gram Stain (collected 21 @ 15:20)  Source: .CSF CSF... lumbar tap  Gram Stain (21 @ 07:24):    polymorphonuclear leukocytes seen    No organisms seen    by cytocentrifuge  Final Report (21 @ 16:14):    No growth at 3 days.    Culture - Blood (collected 21 @ 12:40)  Source: .Blood Blood-Peripheral  Final Report (21 @ 23:00):    No Growth Final            INFECTIOUS DISEASES TESTING  Vancomycin Level, Random: 15.2 (21 @ 12:25)  Vancomycin Level, Random: 9.8 (21 @ 16:33)  Vancomycin Level, Trough: 7.8 (21 @ 16:33)  Rapid RVP Result: NotDetec (21 @ 15:43)  Procalcitonin, Serum: 0.43 (10-12-20 @ 20:34)  COVID-19 PCR: NotDetec (10-12-20 @ 16:21)  MRSA PCR Result.: Negative (20 @ 05:04)  COVID-19 PCR: NotDetec (20 @ 19:00)  Vancomycin Level, Trough: 11.5 (20 @ 07:05)  Procalcitonin, Serum: 0.26 (20 @ 09:30)  COVID-19 PCR: NotDetec (20 @ 18:24)  COVID-19 PCR: NotDetec (06-10-20 @ 14:21)  COVID-19 PCR: NotDetec (20 @ 14:28)      INFLAMMATORY MARKERS      RADIOLOGY & ADDITIONAL TESTS:  I have personally reviewed the last available Chest xray  CXR      CT  CT Chest w/ IV Cont:   EXAM:  CT CHEST IC            PROCEDURE DATE:  2021            INTERPRETATION:  REASON FOR EXAM / CLINICAL STATEMENT:  spiking temps; s/p OR    TECHNIQUE:  Non-contrast CT of the thorax. Contiguous axial CT images were obtained from the thoracic inlet to the upper abdomen with IV contrast.  Reformatted images in the coronal and sagittal planes were acquired.      COMPARISON: None.    FINDINGS:    TUBES AND LINES: Endotracheal tube is in place extending to just above the kelsey. An NG tubeplace extending to the stomach. Right-sided central line extends to the level of the superior vena cava.    HEART AND VESSELS: The heart is normal in size. There are coronary artery calcifications. There is no pericardial effusion.    No evidence of central pulmonary embolism.    Aortic calcifications are noted. Normal caliber aorta and pulmonary artery. There is no evidence of aortic aneurysm or dissection.    Brachiocephalic vessels are unremarkable.    MEDIASTINUM: There are no enlarged mediastinal, hilar or axillary lymph nodes. The visualized portion of the thyroid gland is unremarkable.    AIRWAYS, LUNGS AND PLEURA: The trachea and right bronchial tree are patent. Filling defect compatible with mucous plugging is noted in the left lowerlobe bronchus. There are moderate bilateral pleural effusions with areas of compression atelectasis in both lower lobes. An area of thickened interlobular septa with tree-in-bud opacities are noted in the posterior aspect of the right upper lobe.There is no pneumothorax.    UPPER ABDOMEN: The partially visualized upper abdomen shows no acute pathology.    BONES AND SOFT TISSUES: Degenerative changes of the spine are noted. Misregistration artifact noted from the lower aspect of the sternum.    IMPRESSION:    Filling defect compatible with mucous plugging is noted in the left lower lobe bronchus. There are moderate bilateral pleural effusions with areas of compression atelectasis in both lower lobes. An area of thickened interlobular septa with tree-in-bud opacities are noted in the posterior aspect of the right upper lobe.              FRANCO MCDANIEL MD; Attending Interventional Radiologist  This document has been electronically signed. Mar  1 2021  9:44PM (21 @ 20:56)      CARDIOLOGY TESTING  12 Lead ECG:   Systolic  mmHg    Diastolic BP 47 mmHg    Ventricular Rate 125 BPM    Atrial Rate 125 BPM    P-R Interval 142 ms    QRS Duration 124 ms    Q-T Interval 334 ms    QTC Calculation(Bazett) 482 ms    P Axis 81 degrees    R Axis 90 degrees    T Axis 76 degrees    Diagnosis Line Sinus tachycardia  Right bundle branch block  Abnormal ECG    Confirmed by VERONIKA FOSTER MD ([id number]) on 2021 10:07:07 PM (21 @ 22:58)  12 Lead ECG:   Ventricular Rate 154 BPM    Atrial Rate 154 BPM    QRS Duration 120 ms    Q-T Interval 312 ms    QTC Calculation(Bazett) 499 ms    R Axis 84 degrees    T Axis 21 degrees    Diagnosis Line Atrial flutter with 2:1 A-V conduction  Right bundle branch block  Abnormal ECG    Confirmed by Benton Yousif ([id number]) on 2021 12:08:37 PM (21 @ 10:42)      MEDICATIONS  acetaminophen   Tablet .. 650 Oral every 6 hours  atorvastatin 80 Oral at bedtime  aztreonam  IVPB 2000 IV Intermittent every 8 hours  aztreonam  IVPB     chlorhexidine 0.12% Liquid 15 Oral Mucosa two times a day  chlorhexidine 4% Liquid 1 Topical daily  dexMEDEtomidine Infusion 0.1 IV Continuous <Continuous>  heparin   Injectable 5000 SubCutaneous every 8 hours  insulin regular Infusion 1 IV Continuous <Continuous>  labetalol 300 Oral every 8 hours  levETIRAcetam  IVPB 750 IV Intermittent every 12 hours  lisinopril 20 Oral daily  pantoprazole   Suspension 40 Oral daily  senna 2 Oral at bedtime  sodium chloride 3%. 500 IV Continuous <Continuous>  vancomycin  IVPB 1000 IV Intermittent every 8 hours  voriconazole IVPB 300 IV Intermittent every 12 hours      WEIGHT  Weight (kg): 73.028 (21 @ 21:59)  Creatinine, Serum: <0.5 mg/dL (21 @ 04:45)  Creatinine, Serum: 0.5 mg/dL (21 @ 00:02)  Creatinine, Serum: 0.5 mg/dL (21 @ 16:19)      ANTIBIOTICS:  aztreonam  IVPB 2000 milliGRAM(s) IV Intermittent every 8 hours  aztreonam  IVPB      vancomycin  IVPB 1000 milliGRAM(s) IV Intermittent every 8 hours  voriconazole IVPB 300 milliGRAM(s) IV Intermittent every 12 hours      All available historical records have been reviewed

## 2021-03-03 NOTE — PROGRESS NOTE ADULT - SUBJECTIVE AND OBJECTIVE BOX
Neurocritical Care Progress Note:    1. Brief Presentation:  AMS    2. Today's Acute Problems: intubated, on precedex     3. Relevant brief History: HPI:  75 years old female from home with PMHx of B cell lymphoma on Imbruvica (since 10/2020), CAD s/p stents 15 years ago, DM, chronic UTI, HTN, DLD, brought in by ambulance due to altered mental status and hypertension. Patient was AAO x4 and fully functional at baseline. Last known normal was 11:00pm  before beds. In  at 8:45AM, patient was found to be lethargy in bed by his son, was AAO x 4 and able to talk to his son, but started coughing and had one episode of nose bleed. At 9:15, her physical therapist noticed her becoming more lethargic, but vitals were normal. Soon at 9:30, patient was unresponsive and had a blank stare. EMS was called and her BP was found to be around 230/120. She was rushed to ED as Code Stroke. NIHSS 23. CTH showed Large left frontal intraparenchymal hematoma with intraventricular extension into left lateral ventricle, associated with marked mass effect resulting in 0.9 cm midline shift to the right anteriorly. Patient was intubated for airway protection. S/p 2 units of platelet for ASA reversal. Neurosurgery was planning for emergent OR resection of hematoma.      (2021 13:43)      4-Yesterday's Plan:   Plan:  - Neuro checks q1hr  - EVD as per Neuro Sx  - Continue Keppra 750mg IV q12hrs    5. Last 24 hour updates: EVD removed, pt on precedex, more responsive     6. Medications:   acetaminophen   Tablet .. 650 milliGRAM(s) Oral every 6 hours  atorvastatin 80 milliGRAM(s) Oral at bedtime  aztreonam  IVPB 2000 milliGRAM(s) IV Intermittent every 8 hours  aztreonam  IVPB      chlorhexidine 0.12% Liquid 15 milliLiter(s) Oral Mucosa two times a day  chlorhexidine 4% Liquid 1 Application(s) Topical daily  dexMEDEtomidine Infusion 0.1 MICROgram(s)/kG/Hr IV Continuous <Continuous>  heparin   Injectable 5000 Unit(s) SubCutaneous every 8 hours  insulin regular Infusion 1 Unit(s)/Hr IV Continuous <Continuous>  labetalol 300 milliGRAM(s) Oral every 8 hours  levETIRAcetam  IVPB 750 milliGRAM(s) IV Intermittent every 12 hours  lisinopril 20 milliGRAM(s) Oral daily  pantoprazole   Suspension 40 milliGRAM(s) Oral daily  senna 2 Tablet(s) Oral at bedtime  sodium chloride 3%. 500 milliLiter(s) IV Continuous <Continuous>  vancomycin  IVPB 1000 milliGRAM(s) IV Intermittent every 8 hours  voriconazole IVPB 300 milliGRAM(s) IV Intermittent every 12 hours  voriconazole IVPB 438 milliGRAM(s) IV Intermittent every 12 hours      7. Ancillary Management:   Chest PT[ ]   Head of bed >35 [ ]   Out of bed to chair [ ]   PT/OT/SP Eval [ ]   Spirometry[ ]   DVT prophalaxis[ ]    8.Neuro:     on precedex, intubated, not following commands, minor reaction to peripheral stimuli of BLE,  no reaction to stimuli on upper extremity.     3 sluggish pupils, normal gaze,  +corneal reflex, did not open eyes to stimuli or voice  +gag reflex   no spontaneous movement of BUE and BLE, no posturing        mRS:  0 No symptoms at all  1 No significant disability despite symptoms; able to carry out all usual duties and activities without assistance  2 Slight disability; unable to carry out all previous activities, but able to look after own affairs  3 Moderate disability; requiring some help, but able to walk without assistance  4 Moderately severe disability; unable to walk without assistance and unable to attend to own bodily needs without assistance  5 Severe disability; bedridden, incontinent and requiring constant nursing care and attention  6 Dead    ICHs:  Age >=80  GCS Score: 3-4 (2 pts)   GCS Score: 5-12 (1 pt)   ICH Volume: >30  (+) IVH  (+) Infratentorial    Justice&Rinaldi:  Grade I = Asymptomatic, mild headache, slight nuchal rigidity  Grade II = Moderate to severe headache, nuchal rigidity, no neurological deficit other than cranial nerve palsy  Grade III = Drowiness, confusion, mild focal neurological deficit  Grade IV = Stupor, moderate to severe hemiparesis  Grade V = Coma, decerebrate posturing    m-Olivera:  Grade 0   (No Subarachnoid Hemorrhage (SAH)), No intraventricular hemorrhage (IVH), Incidence of symptomatic vasopasm 0%  Grade 1   (Focal or diffuse, thin SAH), No IVH, incidence of symptomatic vasospasm 24%  Grade 2   (Thin focal or diffuse SAH), IVH present, incidence of symptomatic vasospasm 33%  Grade 3   (Thick focal or diffuse SAH), No IVH, incidence of symptomatic vasospasm 33%  Grade 4   (Thick focal or diffuse SAH), IVH present, incidence of symptomatic vasosasm 40%    Last CTH:  < from: CT Head No Cont (21 @ 18:22) >  IMPRESSION:    Since 2021;    1.  Unchanged left frontal hematoma cavity measuring approximately 4.5 cm containing slightly decreasedamount of residual blood products.    2. Overall unchanged scattered subarachnoid hemorrhage and layering intraventricular hemorrhage. No significant midline shift.    3. Interval decrease in pneumocephalus.    4. Left transfrontal ventricular drain with distal tip at the level of the third ventricle.      < end of copied text >      Last CTA/MRA:  < from: CT Angio Head w/ IV Cont (21 @ 13:38) >  IMPRESSION:    1.  No evidence of major vascular stenosis or occlusion. No evidence of aneurysm or vascular malformation.    2.  Calcific plaque of the carotid bifurcations with about 50% stenosis of the distal right CCA and <50% stenosis of the bilateral proximal ICAs.      < end of copied text >        EVD: removed       9. Cardiovascular:   Vital Signs Last 24 Hrs  T(C): 37.9 (03 Mar 2021 03:00), Max: 39 (02 Mar 2021 12:00)  T(F): 100.2 (03 Mar 2021 03:00), Max: 102.2 (02 Mar 2021 12:00)  HR: 79 (03 Mar 2021 03:00) (67 - 94)  BP: 152/67 (02 Mar 2021 12:15) (145/64 - 152/67)  BP(mean): 96 (02 Mar 2021 12:15) (92 - 96)  RR: 24 (03 Mar 2021 03:00) (20 - 32)  SpO2: 100% (03 Mar 2021 03:00) (95% - 100%)     Last Echo: < from: TTE Echo Complete w/o Contrast w/ Doppler (10.13.20 @ 10:12) >  PHYSICIAN INTERPRETATION:  Left Ventricle: The left ventricular internal cavity size is normal. Left ventricular wall thickness is normal. Left ventricular ejection fraction, by visual estimation, is 55 to 60%. Normal segmental left ventricular systolic function. Dynamic gradient in aotic outflow [id number].  Right Ventricle: Normal right ventricular size and function.  Left Atrium: Normal left atrial size.  Right Atrium: Right atrial echodensity present. Consider thrombus vs mass of unspecified etiology.  Pericardium: There is no evidence of pericardial effusion.  Mitral Valve: Trace mitral valve regurgitation is seen. Mildly thickened with adequate leaflet motion. Possible calcified chordal structure. Cannot exclude vegetation.  Tricuspid Valve: Structurally normal tricuspid valve, with normal leaflet excursion.  Aortic Valve: Mildly thickened with adequate leaflet motion.  Aorta: The aortic root is normal in size and structure.  Pulmonary Artery: The main pulmonary artery is normal in size.  Venous: The inferior vena cava is normal.      Last EKG: < from: 12 Lead ECG (21 @ 22:58) >    Diagnosis Line Sinus tachycardia  Right bundle branch block  Abnormal ECG        10. Respiratory:   ABG:ABG - ( 03 Mar 2021 03:14 )  pH, Arterial: 7.44  pH, Blood: x     /  pCO2: 38    /  pO2: 71    / HCO3: 26    / Base Excess: 1.4   /  SaO2: 96          Chest Xray: < from: Xray Chest 1 View-PORTABLE IMMEDIATE (Xray Chest 1 View-PORTABLE IMMEDIATE .) (21 @ 11:53) >  Impression:    Endotracheal tube has been repositioned and is in satisfactory position.    Bilateral opacities similar toprior.    < end of copied text >      Mode: AC/ CMV (Assist Control/ Continuous Mandatory Ventilation)  RR (machine): 16  TV (machine): 400  FiO2: 30  PEEP: 5  ITime: 1  MAP: 6  PIP: 18      Peak Pressure/Velva Pressure:    11.GI:    Prophalaxis:    protonix   LIVER FUNCTIONS - ( 03 Mar 2021 00:02 )  Alb: 3.1 g/dL / Pro: 5.1 g/dL / ALK PHOS: 181 U/L / ALT: 35 U/L / AST: 46 U/L / GGT: x             12.Renal/Fluids/Electrolytes:        131<L>  |  98  |  22<H>  ----------------------------<  176<H>  4.8   |  23  |  0.5<L>    Ca    8.1<L>      03 Mar 2021 00:02  Phos  4.1     03-  Mg     1.9         TPro  5.1<L>  /  Alb  3.1<L>  /  TBili  0.6  /  DBili  x   /  AST  46<H>  /  ALT  35  /  AlkPhos  181<H>        I&O's Detail    01 Mar 2021 07:01  -  02 Mar 2021 07:00  --------------------------------------------------------  IN:    Clevidipine: 160 mL    Dexmedetomidine: 123.9 mL    Enteral Tube Flush: 350 mL    Glucerna 1.5: 1040 mL    Insulin: 10 mL    Insulin: 11 mL    IV PiggyBack: 300 mL    IV PiggyBack: 100 mL    IV PiggyBack: 300 mL    IV PiggyBack: 750 mL    IV PiggyBack: 200 mL  Total IN: 3344.9 mL    OUT:    Dexmedetomidine: 0 mL    Indwelling Catheter - Urethral (mL): 3130 mL  Total OUT: 3130 mL    Total NET: 214.9 mL      02 Mar 2021 07:01  -  03 Mar 2021 04:34  --------------------------------------------------------  IN:    Dexmedetomidine: 45.7 mL    Enteral Tube Flush: 90 mL    Glucerna 1.5: 1050 mL    Insulin: 33 mL    IV PiggyBack: 100 mL    IV PiggyBack: 250 mL    IV PiggyBack: 100 mL    IV PiggyBack: 300 mL    IV PiggyBack: 100 mL    Sodium Chloride 0.9% Bolus: 250 mL    sodium chloride 3%: 330 mL  Total IN: 2648.7 mL    OUT:    Clevidipine: 0 mL    Indwelling Catheter - Urethral (mL): 1365 mL  Total OUT: 1365 mL    Total NET: 1283.7 mL          13.ID:   TMax:   Vital Signs Last 24 Hrs  T(C): 37.9 (03 Mar 2021 03:00), Max: 39 (02 Mar 2021 12:00)  T(F): 100.2 (03 Mar 2021 03:00), Max: 102.2 (02 Mar 2021 12:00)  HR: 79 (03 Mar 2021 03:00) (67 - 94)  BP: 152/67 (02 Mar 2021 12:15) (145/64 - 152/67)  BP(mean): 96 (02 Mar 2021 12:15) (92 - 96)  RR: 24 (03 Mar 2021 03:00) (20 - 32)  SpO2: 100% (03 Mar 2021 03:00) (95% - 100%)      Urinalysis Basic - ( 02 Mar 2021 12:50 )    Color: Yellow / Appearance: Clear / S.033 / pH: x  Gluc: x / Ketone: Negative  / Bili: Negative / Urobili: 6 mg/dL   Blood: x / Protein: 30 mg/dL / Nitrite: Negative   Leuk Esterase: Negative / RBC: 1 /HPF / WBC 4 /HPF   Sq Epi: x / Non Sq Epi: 4 /HPF / Bacteria: Negative         Lines: Central[x ] Date inserted: Peripheral[]    14. Hematology:                         7.8    6.59  )-----------( 112      ( 03 Mar 2021 00:02 )             23.8      03-03    131<L>  |  98  |  22<H>  ----------------------------<  176<H>  4.8   |  23  |  0.5<L>    Ca    8.1<L>      03 Mar 2021 00:02  Phos  4.1     03-03  Mg     1.9     03-03    TPro  5.1<L>  /  Alb  3.1<L>  /  TBili  0.6  /  DBili  x   /  AST  46<H>  /  ALT  35  /  AlkPhos  181<H>  03-03     PT/INR - ( 03 Mar 2021 00:02 )   PT: 14.70 sec;   INR: 1.28 ratio         PTT - ( 01 Mar 2021 23:20 )  PTT:38.3 sec    DVT Prophylaxis Lovenox[ ] Heparin[x ] Venodynes[ ] SCD's[ ]

## 2021-03-03 NOTE — PROGRESS NOTE ADULT - SUBJECTIVE AND OBJECTIVE BOX
Subjective: 75yFemale with a pmhx of CVA (CEREBRAL VASCULAR ACCIDENT);INTRACRANIAL BLEED    ^CVA (CEREBRAL VASCULAR ACCIDENT);INTRACRANIAL BLEED    Family history of diabetes mellitus (DM)    No pertinent family history in first degree relatives    Handoff    MEWS Score    Anemia    DM (diabetes mellitus)    High cholesterol    HTN (hypertension)    B-cell chronic lymphocytic leukemia variant    CAD (coronary artery disease)    CVA (cerebral vascular accident)    Altered mental status    Palliative care encounter    Intracranial bleed    Craniotomy for intracerebral hemorrhage    H/O heart artery stent    No significant past surgical history    AMS    90+    Intracranial bleed    [email or web address]        POD# 7  S/P Left Minimally Invasive Craniotomy for evac of IPH with Placement of EVD    Pt seen and examined at bedside.  Pt remains intubated.  EVD removed yesterday.  Currently on 3% NaCl to correct hyponatremia.  Pt on insulin gtt to correct hyperglycemia.   Allergies    penicillin (Anaphylaxis; Hives)    Intolerances          Vital Signs Last 24 Hrs  T(C): 37.3 (03 Mar 2021 08:28), Max: 39 (02 Mar 2021 12:00)  T(F): 99.2 (03 Mar 2021 08:28), Max: 102.2 (02 Mar 2021 12:00)  HR: 75 (03 Mar 2021 09:00) (67 - 87)  BP: 152/67 (02 Mar 2021 12:15) (145/64 - 152/67)  BP(mean): 96 (02 Mar 2021 12:15) (92 - 96)  RR: 27 (03 Mar 2021 09:00) (20 - 32)  SpO2: 100% (03 Mar 2021 09:00) (99% - 100%)      acetaminophen   Tablet .. 650 milliGRAM(s) Oral every 6 hours  atorvastatin 80 milliGRAM(s) Oral at bedtime  aztreonam  IVPB 2000 milliGRAM(s) IV Intermittent every 8 hours  aztreonam  IVPB      chlorhexidine 0.12% Liquid 15 milliLiter(s) Oral Mucosa two times a day  chlorhexidine 4% Liquid 1 Application(s) Topical daily  dexMEDEtomidine Infusion 0.1 MICROgram(s)/kG/Hr IV Continuous <Continuous>  heparin   Injectable 5000 Unit(s) SubCutaneous every 8 hours  insulin regular Infusion 1 Unit(s)/Hr IV Continuous <Continuous>  labetalol 300 milliGRAM(s) Oral every 8 hours  levETIRAcetam  IVPB 750 milliGRAM(s) IV Intermittent every 12 hours  pantoprazole   Suspension 40 milliGRAM(s) Oral daily  senna 2 Tablet(s) Oral at bedtime  sodium chloride 3%. 500 milliLiter(s) IV Continuous <Continuous>  vancomycin  IVPB 1000 milliGRAM(s) IV Intermittent every 8 hours  voriconazole IVPB 300 milliGRAM(s) IV Intermittent every 12 hours        03-02-21 @ 07:01  -  03-03-21 @ 07:00  --------------------------------------------------------  IN: 3605.7 mL / OUT: 1480 mL / NET: 2125.7 mL    03-03-21 @ 07:01  -  03-03-21 @ 09:47  --------------------------------------------------------  IN: 252 mL / OUT: 165 mL / NET: 87 mL        REVIEW OF SYSTEMS    [ ] A ten-point review of systems was otherwise negative except as noted.  [ x] Due to altered mental status/intubation, subjective information were not able to be obtained from the patient. History was obtained, to the extent possible, from review of the chart and collateral sources of information.      Neuro Exam:  Intubated, not sedated currently  not following commands  PERRL  +corneals  +gag  withdraw b/l UE/LE's L>R  previous EVD site dry          CBC Full  -  ( 03 Mar 2021 00:02 )  WBC Count : 6.59 K/uL  RBC Count : 2.95 M/uL  Hemoglobin : 7.8 g/dL  Hematocrit : 23.8 %  Platelet Count - Automated : 112 K/uL  Mean Cell Volume : 80.7 fL  Mean Cell Hemoglobin : 26.4 pg  Mean Cell Hemoglobin Concentration : 32.8 g/dL  Auto Neutrophil # : x  Auto Lymphocyte # : x  Auto Monocyte # : x  Auto Eosinophil # : x  Auto Basophil # : x  Auto Neutrophil % : x  Auto Lymphocyte % : x  Auto Monocyte % : x  Auto Eosinophil % : x  Auto Basophil % : x    03-03    131<L>  |  98  |  21<H>  ----------------------------<  176<H>  4.1   |  23  |  <0.5<L>    Ca    8.0<L>      03 Mar 2021 04:45  Phos  4.1     03-03  Mg     1.9     03-03    TPro  5.1<L>  /  Alb  3.1<L>  /  TBili  0.6  /  DBili  x   /  AST  46<H>  /  ALT  35  /  AlkPhos  181<H>  03-03    PT/INR - ( 03 Mar 2021 00:02 )   PT: 14.70 sec;   INR: 1.28 ratio         PTT - ( 01 Mar 2021 23:20 )  PTT:38.3 sec        Assessment/Plan:   follow up CSF fungal culture  cont antibiotics and antifungals  cont 3% NaCl to correct Na level  cont insulin gtt to correct Glu  per ID: if repeat BCX NG, then D/C [facility]  care per BICU  d/w attg

## 2021-03-03 NOTE — PROGRESS NOTE ADULT - ATTENDING COMMENTS
Patient seen today with neurology ACP team.     I was physically present for the key portions of the evaluation and management (E/M) service provided.  I agree with the above history, physical, and plan with the following additions/modifications     EVD pulled yesterday, being covered with abx/antifungals for PNA +/- CNs infection.  Patient reportedly opening eyes spontaneously at times, exam today for me off sedation with brisk localization of LUE, weak withdrawal of RUE, no eye opening, +grimmace to noxious.    Recommendations:  -CEEG given somewhat worse exam than expected and reportedly fluctuating level of reponsiveness, r/o nonconvulsive sz's  -3% drip for target normonatremia 135-145  -SBT - if tolerates would continue on CPAP and work towards extubation; not currently alert enough but may be ready in next 1-2 days    Broderick Ordonez MD  Attending Neurointensivist

## 2021-03-03 NOTE — PROGRESS NOTE ADULT - SUBJECTIVE AND OBJECTIVE BOX
AD BASURTO  [id number]  75y Female    Indication for ICU admission: s/p crani -evac of large hematoma (IPH)    Admit Date:02-24-21  ICU Date: 2/24/21  OR Date: 2/24/21    penicillin (Anaphylaxis; Hives)    PAST MEDICAL & SURGICAL HISTORY:  Anemia  DM (diabetes mellitus)  High cholesterol  HTN (hypertension)  B-cell chronic lymphocytic leukemia variant  CAD (coronary artery disease)  s/p stenting  H/O heart artery stent      Home Medications:  aspirin 81 mg oral tablet, chewable: 1 tab(s) orally once a day (24 Feb 2021 15:11)  atorvastatin 80 mg oral tablet: 1 tab(s) orally once a day (at bedtime) (24 Feb 2021 15:11)  Imbruvica 70 mg oral capsule: 2 cap(s) orally once a day (24 Feb 2021 15:11)  Levaquin 500 mg oral tablet: 1 tab(s) orally every 24 hours (24 Feb 2021 15:11)  lisinopril 10 mg oral tablet: 1 tab(s) orally once a day (24 Feb 2021 15:11)        24HRS EVENT:  OV  no acute events       DAY  -Sodium downtrending 130, started 3% at 30cc/hr   -DC'ed Fentanyl pushes  -Removed EVD , sent fungal CSF cultures  -Blood cultures NGTD   -BAL + asperigillus , d/c ampothericin, changed  to voriconazole  -pulled back ETT 2 cm, repeat CXR shows good position  -Per ID: voriconazole, aztreonam and vancomycin   -continues to spike, Tmax 102.2  -HTN to 180, given 10 labetalol x2, increased Labetalol 400 q8hr then decreased back to labetalol 300 Q8H for soft BP   -cleviprex off since 10:00am      DVT PTX: HSQ    GI PTX:pantoprazole  Injectable 40 milliGRAM(s) IV Push daily    Tubes/Lines/Drains   ----------------------------------------------------------------------------------------------------------  [x] Peripheral IV  [X] Central Venous Line         RIJ             Date Placed:  2/24  [X] Arterial Line		      Radial   Date Placed: 2/24  [X] Urinary Catheter Murry                                             Date Placed: 2/24     REVIEW OF SYSTEMS    [ ] A ten-point review of systems was otherwise negative except as noted.  [x ] Due to altered mental status/intubation, subjective information were not able to be obtained from the patient. History was       AD BASURTO  [id number]  75y Female    Indication for ICU admission: s/p crani -evac of large hematoma (IPH)    Admit Date:21  ICU Date: 21  OR Date: 21    penicillin (Anaphylaxis; Hives)    PAST MEDICAL & SURGICAL HISTORY:  Anemia  DM (diabetes mellitus)  High cholesterol  HTN (hypertension)  B-cell chronic lymphocytic leukemia variant  CAD (coronary artery disease)  s/p stenting  H/O heart artery stent      Home Medications:  aspirin 81 mg oral tablet, chewable: 1 tab(s) orally once a day (2021 15:11)  atorvastatin 80 mg oral tablet: 1 tab(s) orally once a day (at bedtime) (2021 15:11)  Imbruvica 70 mg oral capsule: 2 cap(s) orally once a day (2021 15:11)  Levaquin 500 mg oral tablet: 1 tab(s) orally every 24 hours (2021 15:11)  lisinopril 10 mg oral tablet: 1 tab(s) orally once a day (2021 15:11)        24HRS EVENT:  OV  no acute events       DAY  -Sodium downtrending 130, started 3% at 30cc/hr   -DC'ed Fentanyl pushes  -Removed EVD , sent fungal CSF cultures  -Blood cultures NGTD   -BAL + asperigillus , d/c ampothericin, changed  to voriconazole  -pulled back ETT 2 cm, repeat CXR shows good position  -Per ID: voriconazole, aztreonam and vancomycin   -continues to spike, Tmax 102.2  -HTN to 180, given 10 labetalol x2, increased Labetalol 400 q8hr then decreased back to labetalol 300 Q8H for soft BP   -cleviprex off since 10:00am      DVT PTX: HSQ    GI PTX:pantoprazole  Injectable 40 milliGRAM(s) IV Push daily    Tubes/Lines/Drains   ----------------------------------------------------------------------------------------------------------  [x] Peripheral IV  [X] Central Venous Line         RIJ             Date Placed:    [X] Arterial Line		      Radial   Date Placed:   [X] Urinary Catheter Murry                                             Date Placed:      REVIEW OF SYSTEMS    [ ] A ten-point review of systems was otherwise negative except as noted.  [x ] Due to altered mental status/intubation, subjective information were not able to be obtained from the patient. History was    Daily     Daily     Diet, NPO with Tube Feed:   Tube Feeding Modality: Nasogastric  Glucerna 1.2 Jerry  Intermittent  Starting Tube Feed Rate mL per Hour: 10  Starting Tube Feed Rate mL per Hour: 30  Increase Tube Feed Rate by (mL): 10  Increase Tube Feed Rate by (mL): 5    Every 2 hours     Every 2 hours  Until Goal Tube Feed Rate (mL per Hour): 50  Until Goal Tube Feed Rate (mL per Hour): 50  Tube Feeding Hours ON: 1  Tube Feeding OFF (Hours): 0  Tube Feed Start Time: 11:55  Tube Feed Start Time: 12:00 (21 @ 11:55)      CURRENT MEDS:  Neurologic Medications  acetaminophen   Tablet .. 650 milliGRAM(s) Oral every 6 hours  dexMEDEtomidine Infusion 0.1 MICROgram(s)/kG/Hr IV Continuous <Continuous>  levETIRAcetam  IVPB 750 milliGRAM(s) IV Intermittent every 12 hours    Respiratory Medications    Cardiovascular Medications  labetalol 300 milliGRAM(s) Oral every 8 hours    Gastrointestinal Medications  pantoprazole   Suspension 40 milliGRAM(s) Oral daily  senna 2 Tablet(s) Oral at bedtime  sodium chloride 3%. 500 milliLiter(s) IV Continuous <Continuous>    Genitourinary Medications    Hematologic/Oncologic Medications  heparin   Injectable 5000 Unit(s) SubCutaneous every 8 hours    Antimicrobial/Immunologic Medications  aztreonam  IVPB 2000 milliGRAM(s) IV Intermittent every 8 hours  aztreonam  IVPB      vancomycin  IVPB 1000 milliGRAM(s) IV Intermittent every 8 hours  voriconazole IVPB 300 milliGRAM(s) IV Intermittent every 12 hours    Endocrine/Metabolic Medications  atorvastatin 80 milliGRAM(s) Oral at bedtime  insulin regular Infusion 1 Unit(s)/Hr IV Continuous <Continuous>    Topical/Other Medications  chlorhexidine 0.12% Liquid 15 milliLiter(s) Oral Mucosa two times a day  chlorhexidine 4% Liquid 1 Application(s) Topical daily      ICU Vital Signs Last 24 Hrs  T(C): 37.3 (03 Mar 2021 08:28), Max: 39 (02 Mar 2021 12:00)  T(F): 99.2 (03 Mar 2021 08:28), Max: 102.2 (02 Mar 2021 12:00)  HR: 75 (03 Mar 2021 09:00) (67 - 87)  BP: 152/67 (02 Mar 2021 12:15) (145/64 - 152/67)  BP(mean): 96 (02 Mar 2021 12:15) (92 - 96)  ABP: 126/47 (03 Mar 2021 09:00) (95/39 - 207/71)  ABP(mean): 71 (03 Mar 2021 09:00) (56 - 109)  RR: 27 (03 Mar 2021 09:00) (20 - 32)  SpO2: 100% (03 Mar 2021 09:00) (99% - 100%)      Adult Advanced Hemodynamics Last 24 Hrs  CVP(mm Hg): --  CVP(cm H2O): --  CO: --  CI: --  PA: --  PA(mean): --  PCWP: --  SVR: --  SVRI: --  PVR: --  PVRI: --    Mode: AC/ CMV (Assist Control/ Continuous Mandatory Ventilation)  RR (machine): 16  TV (machine): 400  FiO2: 30  PEEP: 5  ITime: 1  MAP: 8  PIP: 23    ABG - ( 03 Mar 2021 03:14 )  pH, Arterial: 7.44  pH, Blood: x     /  pCO2: 38    /  pO2: 71    / HCO3: 26    / Base Excess: 1.4   /  SaO2: 96                  I&O's Summary    02 Mar 2021 07:01  -  03 Mar 2021 07:00  --------------------------------------------------------  IN: 3605.7 mL / OUT: 1480 mL / NET: 2125.7 mL    03 Mar 2021 07:01  -  03 Mar 2021 10:09  --------------------------------------------------------  IN: 252 mL / OUT: 165 mL / NET: 87 mL      I&O's Detail    02 Mar 2021 07:01  -  03 Mar 2021 07:00  --------------------------------------------------------  IN:    Dexmedetomidine: 45.7 mL    Enteral Tube Flush: 150 mL    Glucerna 1.5: 1200 mL    Insulin: 40 mL    IV PiggyBack: 300 mL    IV PiggyBack: 200 mL    IV PiggyBack: 300 mL    IV PiggyBack: 200 mL    IV PiggyBack: 500 mL    Sodium Chloride 0.9% Bolus: 250 mL    sodium chloride 3%: 420 mL  Total IN: 3605.7 mL    OUT:    Clevidipine: 0 mL    Indwelling Catheter - Urethral (mL): 1480 mL  Total OUT: 1480 mL    Total NET: 2125.7 mL      03 Mar 2021 07:01  -  03 Mar 2021 10:09  --------------------------------------------------------  IN:    Glucerna 1.5: 150 mL    Insulin: 12 mL    sodium chloride 3%: 90 mL  Total IN: 252 mL    OUT:    Dexmedetomidine: 0 mL    Indwelling Catheter - Urethral (mL): 165 mL  Total OUT: 165 mL    Total NET: 87 mL          PHYSICAL EXAM:    General/Neuro:  NAD, withdraws to pain x4 extremities, +cough, +corneal, PERRLA    Lungs: Mode: Normal expansion/effort.   AC/ CMV (Assist Control/ Continuous Mandatory Ventilation)  RR (machine): 16  TV (machine): 400  FiO2: 30  PEEP: 5  ITime: 1  MAP: 8  PIP: 23    Cardiovascular : S1, S2.  Regular rate and rhythm.  No Peripheral edema   Cardiac Rhythm: Normal Sinus Rhythm    GI: Abdomen soft, Non-tender, Non-distended.      Extremities: Extremities warm, pink, well-perfused.     Derm: Good skin turgor, no skin breakdown.      :       Murry catheter in place.      LABS:  CAPILLARY BLOOD GLUCOSE      POCT Blood Glucose.: 203 mg/dL (03 Mar 2021 08:03)  POCT Blood Glucose.: 200 mg/dL (03 Mar 2021 06:52)  POCT Blood Glucose.: 190 mg/dL (03 Mar 2021 04:48)  POCT Blood Glucose.: 166 mg/dL (03 Mar 2021 03:54)  POCT Blood Glucose.: 135 mg/dL (03 Mar 2021 02:02)  POCT Blood Glucose.: 159 mg/dL (03 Mar 2021 01:01)  POCT Blood Glucose.: 162 mg/dL (03 Mar 2021 00:14)  POCT Blood Glucose.: 181 mg/dL (02 Mar 2021 23:23)  POCT Blood Glucose.: 183 mg/dL (02 Mar 2021 22:22)  POCT Blood Glucose.: 139 mg/dL (02 Mar 2021 21:05)  POCT Blood Glucose.: 129 mg/dL (02 Mar 2021 19:47)  POCT Blood Glucose.: 98 mg/dL (02 Mar 2021 18:28)  POCT Blood Glucose.: 162 mg/dL (02 Mar 2021 17:37)  POCT Blood Glucose.: 189 mg/dL (02 Mar 2021 16:44)  POCT Blood Glucose.: 184 mg/dL (02 Mar 2021 15:22)  POCT Blood Glucose.: 155 mg/dL (02 Mar 2021 13:33)  POCT Blood Glucose.: 98 mg/dL (02 Mar 2021 11:23)  POCT Blood Glucose.: 99 mg/dL (02 Mar 2021 10:19)                          7.8    6.59  )-----------( 112      ( 03 Mar 2021 00:02 )             23.8       03-03    131<L>  |  98  |  21<H>  ----------------------------<  176<H>  4.1   |  23  |  <0.5<L>    Ca    8.0<L>      03 Mar 2021 04:45  Phos  4.1     03-03  Mg     1.9     03-03    TPro  5.1<L>  /  Alb  3.1<L>  /  TBili  0.6  /  DBili  x   /  AST  46<H>  /  ALT  35  /  AlkPhos  181<H>  03-03      PT/INR - ( 03 Mar 2021 00:02 )   PT: 14.70 sec;   INR: 1.28 ratio         PTT - ( 01 Mar 2021 23:20 )  PTT:38.3 sec      Urinalysis Basic - ( 02 Mar 2021 12:50 )    Color: Yellow / Appearance: Clear / S.033 / pH: x  Gluc: x / Ketone: Negative  / Bili: Negative / Urobili: 6 mg/dL   Blood: x / Protein: 30 mg/dL / Nitrite: Negative   Leuk Esterase: Negative / RBC: 1 /HPF / WBC 4 /HPF   Sq Epi: x / Non Sq Epi: 4 /HPF / Bacteria: Negative        Culture - Fungal, CSF (collected 02 Mar 2021 14:31)  Source: .CSF CSF  Preliminary Report (03 Mar 2021 08:04):    Testing in progress    Culture - Blood (collected 2021 23:21)  Source: .Blood None  Preliminary Report (02 Mar 2021 09:01):    No growth to date.

## 2021-03-03 NOTE — PROGRESS NOTE ADULT - ASSESSMENT
Assessment and Plan:     75y Female s/p craniotomy for left frontal hematoma with evacuation and EVD placement. Intubated and sedated. SICU admission for hemodynamic monitoring.     NEURO:  Precedex for sedation   Pain controlled with IV Tylenol, fentanyl prn   Keppra 750 mg q 12  EVD removed 3/2   Keep CPP >70  q 1 hr neurocheck, f/u with NSX  2/25 CTH- ventricular drain in 3rd ventricle, decr size of lateral ventricles, scattered SAH and layering IVH in posterior horns of  lateral ventricle slightly increased  2/28 rpt CTH - stable- L frontal hemoatom cavity 4.5 cm, slighlty decr amount of blood, interval decr in pneumocephalus, L transfrontal drain tip in the third ventricle   3/1: CT sinus-prelim- no evidence of inflammatory/infectious diseases of paransal sinuses     RESP:   Intubated: /16/40/5  AM ABG:  AM CXR  3/1: CT chest -mucus plug L lower lobe bronchus, mod bilat pleural effusions w/ areas of atlectasis in both lower lobes. tree-in-bud opacities in posterior aspect of R upper lobe  +aspergillus  Keep HOB elevated at 30 degrees    CARDS:   Acute on chronic HTN        - increased home Lisinopril to 20mg       - labetalol 300 q8h        - Off cleviprex since 10am  H/o CAD s/p stents - hold ASA  EKG sinus tach with RBBB Qtc 482  Echo (10/13/20): EF 55-60%, trace MR, R atrial echodensity possible thrombus  Trop 0.24 --> 0.21->0.15, no longer trending    GI/NUTR:   -Diet: TF- Glucerna 1.2 @ goal 50/hr  OGT  GI Prophylaxis- PPI  Bowel regimen- Senna, lactulose   BM 3/2       /RENAL:   Monitor UO-roblero in place  IVL  Hyponatremia started on 3% @ 30cc/hr  q6BMP & Osm  BUN/Cr- 12/<0.5  -->,  12/<0.5  -->,  13/<0.5  -->23/0.5  >22/0.5  Electrolytes-Na 131 // K 4.8 // Mg 1.9 (repleted) //  Phos 4.1        HEME/ONC:   DVT prophylaxis: heparin   Injectable 5000 every 8 hours  Hb/Hct:  8.2/25.4  -->,  9.0/27.7  -->,  8.4/26.0  --> 7.8/23  Plts:  116  -->,  140  -->,  121  --> 112  Last T&S: 2/27  (02/25) INR 1.85, nsx rec 1 FFP and 10 vit K, INR 1.49 (2/26)  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  Heme/Onc consult: transfuse platelets if < 100, give vit K for elevated INR  s/p 1 FFP & 10 vit K on 2/26 for INR 1.9 rpt 1.5>1.6>1.3>1.3    ID:  Immunocompromised  continues to spike T max 101.3  WBC- 3.31  -->,  6.44  -->,  6.78  --> 6.5  ABX: vanco (2/28-  ), aztreonam (2/28-  ), DC amphotericin B --> started Voriconazole per ID recs (3/2)  Vanc lvl (3/1): 15.2  Cultures:     -UA (2/28): negative     -CSF with Gram Stain  (02.26.21 @ 15:20): No growth at 3 days.     -CSF (fungal) (collected 02-26): pending     -CSF with Gram Stain (collected 02-27): negative    -Bronchial (collected 02-27):+aspergillus    -f/u rpt fungal CSF cultures (3/2)    -aspergillus galactaman antigen       ENDO:  Holding home metformin  Insulin gtt   FS q 4  HA1C 6.3     LINES/DRAINS:  PIV, CVC, [place], [place], EVD     GOC: palliative care following    DISPO:    SICU   Assessment and Plan:     75y Female s/p craniotomy for left frontal hematoma with evacuation and EVD placement. Intubated and sedated. SICU admission for hemodynamic monitoring.     NEURO:  Precedex for sedation   Pain controlled with IV Tylenol, fentanyl prn   Keppra 750 mg q 12  EVD removed 3/2   Keep CPP >70  q 1 hr neurocheck, f/u with NSX   CTH- ventricular drain in 3rd ventricle, decr size of lateral ventricles, scattered SAH and layering IVH in posterior horns of  lateral ventricle slightly increased   rpt CTH - stable- L frontal hemoatom cavity 4.5 cm, slighlty decr amount of blood, interval decr in pneumocephalus, L transfrontal drain tip in the third ventricle   3/1: CT sinus-prelim- no evidence of inflammatory/infectious diseases of paransal sinuses     RESP:   Intubated: /16/40/5  AM AB.44/38/71/26  AM CXR  3/1: CT chest -mucus plug L lower lobe bronchus, mod bilat pleural effusions w/ areas of atlectasis in both lower lobes. tree-in-bud opacities in posterior aspect of R upper lobe  +aspergillus  Keep HOB elevated at 30 degrees    CARDS:   Acute on chronic HTN        - increased home Lisinopril to 20mg       - labetalol 300 q8h        - Off cleviprex since 10am  H/o CAD s/p stents - hold ASA  EKG sinus tach with RBBB Qtc 482  Echo (10/13/20): EF 55-60%, trace MR, R atrial echodensity possible thrombus  Trop 0.24 --> 0.21->0.15, no longer trending    GI/NUTR:   -Diet: TF- Glucerna 1.2 @ goal 50/hr  OGT  GI Prophylaxis- PPI  Bowel regimen- Senna, lactulose   BM 3/2       /RENAL:   Monitor UO-roblero in place  IVL  Hyponatremia started on 3% @ 30cc/hr  q6BMP & Osm  BUN/Cr- 12/<0.5  -->,  12/<0.5  -->,  13/<0.5  -->23/0.5  >22/0.5  Electrolytes-Na 131 // K 4.8 // Mg 1.9 (repleted) //  Phos 4.1        HEME/ONC:   DVT prophylaxis: heparin   Injectable 5000 every 8 hours  Hb/Hct:  8.2/25.4  -->,  9.0/27.7  -->,  8.4/26.0  --> 7.8/23  Plts:  116  -->,  140  -->,  121  --> 112  Last T&S:   () INR 1.85, nsx rec 1 FFP and 10 vit K, INR 1.49 ()  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  Heme/Onc consult: transfuse platelets if < 100, give vit K for elevated INR  s/p 1 FFP & 10 vit K on  for INR 1.9 rpt 1.5>1.6>1.3>1.3    ID:  Immunocompromised  continues to spike T max 101.3  WBC- 3.31  -->,  6.44  -->,  6.78  --> 6.5  ABX: vanco (-  ), aztreonam (-  ), DC amphotericin B --> started Voriconazole per ID recs (3/2)  Vanc lvl (3/1): 15.2  Cultures:     -UA (): negative     -CSF with Gram Stain  (21 @ 15:20): No growth at 3 days.     -CSF (fungal) (collected ): pending     -CSF with Gram Stain (collected ): negative    -Bronchial (collected ):+aspergillus    -f/u rpt fungal CSF cultures (3/2)    -aspergillus galactaman antigen       ENDO:  Holding home metformin  Insulin gtt   FS q 4  HA1C 6.3     LINES/DRAINS:  PIV, CVC, [place], [place], EVD     GOC: palliative care following    DISPO:    SICU

## 2021-03-03 NOTE — PROGRESS NOTE ADULT - ATTENDING COMMENTS
remains hyponatremic   EVD removed   continue 3% NaCl   family meeting   continue nutrition and vent support

## 2021-03-04 NOTE — PROGRESS NOTE ADULT - ASSESSMENT
ASSESSMENT  75 years old female from home with PMHx of B cell lymphoma on Imbruvica (since 10/2020), CAD s/p stents 15 years ago, DM, chronic UTI, HTN, DLD, brought in by ambulance due to altered mental status and hypertension. Patient was AAO x4 and fully functional at baseline. Last known normal was 11:00pm 2/23 before beds. In 2/24 at 8:45AM, patient was found to be lethargy in bed by his son, was AAO x 4 and able to talk to his son, but started coughing and had one episode of nose bleed. At 9:15, her physical therapist noticed her becoming more lethargic, but vitals were normal. Soon at 9:30, patient was unresponsive and had a blank stare. EMS was called and her BP was found to be around 230/120. She was rushed to ED as Code Stroke. NIHSS 23. CTH showed Large left frontal intraparenchymal hematoma with intraventricular extension into left lateral ventricle, associated with marked mass effect resulting in 0.9 cm midline shift to the right anteriorly. Patient was intubated for airway protection    IMPRESSION  #Persistent Fevers  #Suspected aspergillus PNA    2/27 bronch-   Mold like fungus Referred to Mycology    Fungitell: <31 (03-01-21 @ 23:20), would suspect it to be +   < from: CT Chest w/ IV Cont (03.01.21 @ 20:56) >Filling defect compatible with mucous plugging is noted in the left lower lobe bronchus. There are moderate bilateral pleural effusions with areas of compression atelectasis in both lower lobes. An area of thickened interlobular septa with tree-in-bud opacities are noted in the posterior aspect of the right upper lobe.  < from: CT Sinuses w/ IV Cont (03.01.21 @ 21:18) >  1.  Opacification of the mastoid air cells and left middle ear canal consistent with inflammation or infection.  2.  Minimal mucosal thickening in the bilateral frontal, bilateral ethmoid, bilateral sphenoid, and left maxillary sinuses.  3.  Postoperative changes in left frontal lobe hemorrhage better demonstrated on the CT scan of the brain performed on February 28, 2021.    2/25 BCX NG  #Large left frontal intraparenchymal hemorrhage, low suspicion but Cannot rule out CSF infection - CSF studies + WBC/ RBC    Per family no proceeding symptoms week prior such as fevers, HA. Was in usual state of health week prior.    CSF PCR NEGATIVE     CrAg NEGATIVE     Total Nucleated Cell Count, CSF: 373 /uL (02.26.21 @ 15:20)   Total Nucleated Cell Count, CSF: 30 /uL (02.27.21 @ 19:50)    CSF Segmented Neutrophils: 82 % (02.27.21 @ 19:50) CSF Segmented Neutrophils: 91 % (02.26.21 @ 15:20)  #Bcell lymphoma on chemo  #Pancytopenia  #Lactic acidosis  #Hypomagnesemia  #PCN allergy- unknown    RECOMMENDATIONS  - Please repeat BCX (last 2/28) as continued fever  - Please MONITOR LFTS on vori   - Vanc 1g q12h IV, give next dose IN PM as level 16 this AM   - CHANGE aztreonam to Meropenem 2g q8h IV, aware of PCN allergy . See if changes fever curve  - Less likely HSV as frontal bleed, not temporal, and initial CSF with mostly NPs, not Lymphs, CSF PCR HSV NEGATIVE   - Spoke with micro- aspergillus, f/u speciation and sensitivities   - Voriconazole 4 mg/kg twice daily IV  - f/u fungal CSF culture  - tracheal fluid for BAL aspergillus galactomannan   - noé, TLC 2/24  - F/u serum aspergillus galactomannan   - Guarded prognosis, GOC    Please call with any questions or send a message on [employer] Teams  Spectra [phone]        ASSESSMENT  75 years old female from home with PMHx of B cell lymphoma on Imbruvica (since 10/2020), CAD s/p stents 15 years ago, DM, chronic UTI, HTN, DLD, brought in by ambulance due to altered mental status and hypertension. Patient was AAO x4 and fully functional at baseline. Last known normal was 11:00pm 2/23 before beds. In 2/24 at 8:45AM, patient was found to be lethargy in bed by his son, was AAO x 4 and able to talk to his son, but started coughing and had one episode of nose bleed. At 9:15, her physical therapist noticed her becoming more lethargic, but vitals were normal. Soon at 9:30, patient was unresponsive and had a blank stare. EMS was called and her BP was found to be around 230/120. She was rushed to ED as Code Stroke. NIHSS 23. CTH showed Large left frontal intraparenchymal hematoma with intraventricular extension into left lateral ventricle, associated with marked mass effect resulting in 0.9 cm midline shift to the right anteriorly. 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 There are moderate bilateral pleural effusions with areas of compression atelectasis in both lower lobes. An area of thickened interlobular septa with tree-in-bud opacities are noted in the posterior aspect of the right upper lobe.  < from: CT Sinuses w/ IV Cont (03.01.21 @ 21:18) >  1.  Opacification of the mastoid air cells and left middle ear canal consistent with inflammation or infection.  2.  Minimal mucosal thickening in the bilateral frontal, bilateral ethmoid, bilateral sphenoid, and left maxillary sinuses.  3.  Postoperative changes in left frontal lobe hemorrhage better demonstrated on the CT scan of the brain performed on February 28, 2021.    2/25 BCX NG  #Large left frontal intraparenchymal hemorrhage, low suspicion but Cannot rule out CSF infection - CSF studies + WBC/ RBC    Per family no proceeding symptoms week prior such as fevers, HA. Was in usual state of health week prior.    CSF PCR NEGATIVE     CrAg NEGATIVE     Total Nucleated Cell Count, CSF: 373 /uL (02.26.21 @ 15:20)   Total Nucleated Cell Count, CSF: 30 /uL (02.27.21 @ 19:50)    CSF Segmented Neutrophils: 82 % (02.27.21 @ 19:50) CSF Segmented Neutrophils: 91 % (02.26.21 @ 15:20)  #Bcell lymphoma on chemo  #Pancytopenia  #Lactic acidosis  #Hypomagnesemia  #PCN allergy- unknown    RECOMMENDATIONS  - Please repeat BCX (last 2/28) as continued fever  - Please MONITOR LFTS on vori   - Vanc 1g q12h IV, give next dose IN PM as level 16 this AM   - CHANGE aztreonam to Meropenem 2g q8h IV, aware of PCN allergy . See if changes fever curve  - Less likely HSV as frontal bleed, not temporal, and initial CSF with mostly NPs, not Lymphs, CSF PCR HSV NEGATIVE   - Spoke with micro- aspergillus, f/u speciation and sensitivities   - Voriconazole 4 mg/kg twice daily IV  - f/u fungal CSF culture  - tracheal fluid for BAL aspergillus galactomannan   - noé, TLC 2/24  - F/u serum aspergillus galactomannan   - Guarded prognosis, GOC    Please call with any questions or send a message on [employer] Teams  Spectra [phone]

## 2021-03-04 NOTE — PROGRESS NOTE ADULT - SUBJECTIVE AND OBJECTIVE BOX
Interval events: remains off sedation, EEG placed, prelim read showing slowing but no epileptiform discharges    Exam:  Gen: elderly woman intubated, sedated  MSE: eyes closed does not open to noxious, when forcibly opened, does track left>right intermittantly  CN: RAFIQ 3mm, +corneals, absent BTT,   Motor: withdraws on RUE weakly, localizes briskly with LUE.  Legs weak withdrawal to noxious b/l in boots    Allergies: penicillin (Anaphylaxis; Hives)      EXAM:  VITALS:  T(C): 37.7 (21 @ 08:00), Max: 38.1 (21 @ 05:00)  HR: 79 (21 @ 12:00) (74 - 87)  BP: --  RR: 24 (21 @ 12:00) (22 - 30)  SpO2: 100% (21 @ 12:00) (99% - 100%)    MEDICATIONS:  acetaminophen   Tablet .. 650 milliGRAM(s) Oral every 6 hours  atorvastatin 80 milliGRAM(s) Oral at bedtime  chlorhexidine 0.12% Liquid 15 milliLiter(s) Oral Mucosa two times a day  chlorhexidine 4% Liquid 1 Application(s) Topical daily  dexMEDEtomidine Infusion 0.1 MICROgram(s)/kG/Hr IV Continuous <Continuous>  heparin   Injectable 5000 Unit(s) SubCutaneous every 8 hours  insulin regular  human corrective regimen sliding scale   IV Push every 4 hours  labetalol 300 milliGRAM(s) Oral every 8 hours  levETIRAcetam  IVPB 750 milliGRAM(s) IV Intermittent every 12 hours  lisinopril 20 milliGRAM(s) Oral <User Schedule>  meropenem  IVPB      pantoprazole   Suspension 40 milliGRAM(s) Oral daily  senna 2 Tablet(s) Oral at bedtime  sodium chloride 3%. 500 milliLiter(s) IV Continuous <Continuous>  vancomycin  IVPB 1000 milliGRAM(s) IV Intermittent every 12 hours  voriconazole IVPB 300 milliGRAM(s) IV Intermittent every 12 hours      I/O's    21 @ 07:01  -  21 @ 07:00  --------------------------------------------------------  IN: 3503.4 mL / OUT: 1240 mL / NET: 2263.4 mL    21 @ 07:01  -  21 @ 12:22  --------------------------------------------------------  IN: 790 mL / OUT: 180 mL / NET: 610 mL        Ventilator data:  Mode: AC/ CMV (Assist Control/ Continuous Mandatory Ventilation), RR (machine): 16, TV (machine): 400, FiO2: 30, PEEP: 5, ITime: 0.8, MAP: 8, PIP: 21    LABS:                          7.9    6.94  )-----------( 120      ( 03 Mar 2021 23:30 )             24.9     -    138  |  105  |  18  ----------------------------<  170<H>  4.1   |  23  |  <0.5<L>    Ca    8.0<L>      04 Mar 2021 04:50  Phos  3.6     -  Mg     2.0         TPro  5.1<L>  /  Alb  3.1<L>  /  TBili  0.6  /  DBili  x   /  AST  46<H>  /  ALT  35  /  AlkPhos  181<H>  03    PT/INR - ( 03 Mar 2021 23:30 )   PT: 14.40 sec;   INR: 1.25 ratio    PTT - ( 03 Mar 2021 23:30 )  PTT:39.5 sec  Urinalysis Basic - ( 02 Mar 2021 12:50 )    Color: Yellow / Appearance: Clear / S.033 / pH: x  Gluc: x / Ketone: Negative  / Bili: Negative / Urobili: 6 mg/dL   Blood: x / Protein: 30 mg/dL / Nitrite: Negative   Leuk Esterase: Negative / RBC: 1 /HPF / WBC 4 /HPF   Sq Epi: x / Non Sq Epi: 4 /HPF / Bacteria: Negative          CAPILLARY BLOOD GLUCOSE      POCT Blood Glucose.: 200 mg/dL (04 Mar 2021 10:07)  POCT Blood Glucose.: 189 mg/dL (04 Mar 2021 07:01)  POCT Blood Glucose.: 154 mg/dL (04 Mar 2021 05:11)  POCT Blood Glucose.: 189 mg/dL (04 Mar 2021 02:23)  POCT Blood Glucose.: 184 mg/dL (04 Mar 2021 02:04)  POCT Blood Glucose.: 199 mg/dL (03 Mar 2021 21:07)  POCT Blood Glucose.: 164 mg/dL (03 Mar 2021 18:04)  POCT Blood Glucose.: 193 mg/dL (03 Mar 2021 16:08)  POCT Blood Glucose.: 110 mg/dL (03 Mar 2021 13:31)

## 2021-03-04 NOTE — PROGRESS NOTE ADULT - SUBJECTIVE AND OBJECTIVE BOX
POD# 8     S/p Left Minimally invasive Craniotomy for evac of IPH with placement of EVD     pt seen and examined at bedside pt is intubated , sedated with precedex .        Vital Signs Last 24 Hrs  T(C): 37.7 (04 Mar 2021 08:00), Max: 38.3 (03 Mar 2021 11:00)  T(F): 99.9 (04 Mar 2021 08:00), Max: 101 (03 Mar 2021 11:00)  HR: 80 (04 Mar 2021 08:00) (74 - 87)  BP: --  BP(mean): --  RR: 28 (04 Mar 2021 08:00) (21 - 30)  SpO2: 100% (04 Mar 2021 08:00) (99% - 100%)    I&O's Detail    03 Mar 2021 07:01  -  04 Mar 2021 07:00  --------------------------------------------------------  IN:    Dexmedetomidine: 17.4 mL    Enteral Tube Flush: 150 mL    Glucerna 1.5: 1200 mL    Insulin: 26 mL    IV PiggyBack: 200 mL    IV PiggyBack: 500 mL    IV PiggyBack: 300 mL    IV PiggyBack: 250 mL    sodium chloride 3%: 860 mL  Total IN: 3503.4 mL    OUT:    Indwelling Catheter - Urethral (mL): 1240 mL  Total OUT: 1240 mL    Total NET: 2263.4 mL      04 Mar 2021 07:01  -  04 Mar 2021 09:20  --------------------------------------------------------  IN:    Glucerna 1.5: 50 mL    sodium chloride 3%: 40 mL  Total IN: 90 mL    OUT:    Dexmedetomidine: 0 mL    Indwelling Catheter - Urethral (mL): 30 mL  Total OUT: 30 mL    Total NET: 60 mL        I&O's Summary    03 Mar 2021 07:01  -  04 Mar 2021 07:00  --------------------------------------------------------  IN: 3503.4 mL / OUT: 1240 mL / NET: 2263.4 mL    04 Mar 2021 07:01  -  04 Mar 2021 09:20  --------------------------------------------------------  IN: 90 mL / OUT: 30 mL / NET: 60 mL        PHYSICAL EXAM:  Neurological:    Intubated on precedex  no following commands   PERRL   Withdraws bilateral UE's Lt > Rt   Withdraws bilateral LE's  Lt > Rt   Incision clean dry intact       LABS:                        7.9    6.94  )-----------( 120      ( 03 Mar 2021 23:30 )             24.9     03-04    138  |  105  |  18  ----------------------------<  170<H>  4.1   |  23  |  <0.5<L>    Ca    8.0<L>      04 Mar 2021 04:50  Phos  3.6     -03  Mg     2.0     -03    TPro  5.1<L>  /  Alb  3.1<L>  /  TBili  0.6  /  DBili  x   /  AST  46<H>  /  ALT  35  /  AlkPhos  181<H>  03-03    PT/INR - ( 03 Mar 2021 23:30 )   PT: 14.40 sec;   INR: 1.25 ratio         PTT - ( 03 Mar 2021 23:30 )  PTT:39.5 sec  Urinalysis Basic - ( 02 Mar 2021 12:50 )    Color: Yellow / Appearance: Clear / S.033 / pH: x  Gluc: x / Ketone: Negative  / Bili: Negative / Urobili: 6 mg/dL   Blood: x / Protein: 30 mg/dL / Nitrite: Negative   Leuk Esterase: Negative / RBC: 1 /HPF / WBC 4 /HPF   Sq Epi: x / Non Sq Epi: 4 /HPF / Bacteria: Negative          CAPILLARY BLOOD GLUCOSE      POCT Blood Glucose.: 189 mg/dL (04 Mar 2021 07:01)  POCT Blood Glucose.: 154 mg/dL (04 Mar 2021 05:11)  POCT Blood Glucose.: 189 mg/dL (04 Mar 2021 02:23)  POCT Blood Glucose.: 184 mg/dL (04 Mar 2021 02:04)  POCT Blood Glucose.: 199 mg/dL (03 Mar 2021 21:07)  POCT Blood Glucose.: 164 mg/dL (03 Mar 2021 18:04)  POCT Blood Glucose.: 193 mg/dL (03 Mar 2021 16:08)  POCT Blood Glucose.: 110 mg/dL (03 Mar 2021 13:31)  POCT Blood Glucose.: 112 mg/dL (03 Mar 2021 12:11)  POCT Blood Glucose.: 134 mg/dL (03 Mar 2021 11:25)  POCT Blood Glucose.: 157 mg/dL (03 Mar 2021 10:37)  POCT Blood Glucose.: 183 mg/dL (03 Mar 2021 09:32)      Drug Levels: [] N/A  Vancomycin Level, Trough: 16.7 ug/mL ( @ 06:58)  Vancomycin Level, Trough: 24.4 ug/mL ( @ 21:00)  Vancomycin Level, Trough: 7.8 ug/mL ( @ 16:33)    CSF Analysis: [] N/A      Allergies    penicillin (Anaphylaxis; Hives)    Intolerances      MEDICATIONS:  Antibiotics:  aztreonam  IVPB 2000 milliGRAM(s) IV Intermittent every 8 hours  aztreonam  IVPB      vancomycin  IVPB 1000 milliGRAM(s) IV Intermittent every 12 hours  voriconazole IVPB 300 milliGRAM(s) IV Intermittent every 12 hours    Neuro:  acetaminophen   Tablet .. 650 milliGRAM(s) Oral every 6 hours  dexMEDEtomidine Infusion 0.1 MICROgram(s)/kG/Hr IV Continuous <Continuous>  levETIRAcetam  IVPB 750 milliGRAM(s) IV Intermittent every 12 hours    Anticoagulation:  heparin   Injectable 5000 Unit(s) SubCutaneous every 8 hours    OTHER:  atorvastatin 80 milliGRAM(s) Oral at bedtime  chlorhexidine 0.12% Liquid 15 milliLiter(s) Oral Mucosa two times a day  chlorhexidine 4% Liquid 1 Application(s) Topical daily  insulin regular  human corrective regimen sliding scale   IV Push every 4 hours  labetalol 300 milliGRAM(s) Oral every 8 hours  lisinopril 20 milliGRAM(s) Oral <User Schedule>  pantoprazole   Suspension 40 milliGRAM(s) Oral daily  senna 2 Tablet(s) Oral at bedtime    IVF:  sodium chloride 3%. 500 milliLiter(s) IV Continuous <Continuous>    CULTURES:  Culture Results:   Testing in progress ( @ 14:31)  Culture Results:   No growth to date. ( @ 23:21)    RADIOLOGY & ADDITIONAL TESTS:      ASSESSMENT:  75y Female s/p    CVA (CEREBRAL VASCULAR ACCIDENT);INTRACRANIAL BLEED    ^CVA (CEREBRAL VASCULAR ACCIDENT);INTRACRANIAL BLEED    Family history of diabetes mellitus (DM)    No pertinent family history in first degree relatives    Handoff    MEWS Score    Anemia    DM (diabetes mellitus)    High cholesterol    HTN (hypertension)    B-cell chronic lymphocytic leukemia variant    CAD (coronary artery disease)    CVA (cerebral vascular accident)    Altered mental status    Palliative care encounter    Intracranial bleed    Craniotomy for intracerebral hemorrhage    H/O heart artery stent    No significant past surgical history    AMS    90+    Intracranial bleed    SysAdmin_VisitLink        A/p            S/p Left Minimally invasive Craniotomy for evac of IPH with placement of                EVD                continue antibiotics / antifungals                 Await Blood  results                 Care Per CCT   POD# 8     S/p Left Minimally invasive Craniotomy for evac of IPH with placement of EVD     pt seen and examined at bedside pt is intubated , sedated with precedex .        Vital Signs Last 24 Hrs  T(C): 37.7 (04 Mar 2021 08:00), Max: 38.3 (03 Mar 2021 11:00)  T(F): 99.9 (04 Mar 2021 08:00), Max: 101 (03 Mar 2021 11:00)  HR: 80 (04 Mar 2021 08:00) (74 - 87)  BP: --  BP(mean): --  RR: 28 (04 Mar 2021 08:00) (21 - 30)  SpO2: 100% (04 Mar 2021 08:00) (99% - 100%)    I&O's Detail    03 Mar 2021 07:01  -  04 Mar 2021 07:00  --------------------------------------------------------  IN:    Dexmedetomidine: 17.4 mL    Enteral Tube Flush: 150 mL    Glucerna 1.5: 1200 mL    Insulin: 26 mL    IV PiggyBack: 200 mL    IV PiggyBack: 500 mL    IV PiggyBack: 300 mL    IV PiggyBack: 250 mL    sodium chloride 3%: 860 mL  Total IN: 3503.4 mL    OUT:    Indwelling Catheter - Urethral (mL): 1240 mL  Total OUT: 1240 mL    Total NET: 2263.4 mL      04 Mar 2021 07:01  -  04 Mar 2021 09:20  --------------------------------------------------------  IN:    Glucerna 1.5: 50 mL    sodium chloride 3%: 40 mL  Total IN: 90 mL    OUT:    Dexmedetomidine: 0 mL    Indwelling Catheter - Urethral (mL): 30 mL  Total OUT: 30 mL    Total NET: 60 mL        I&O's Summary    03 Mar 2021 07:01  -  04 Mar 2021 07:00  --------------------------------------------------------  IN: 3503.4 mL / OUT: 1240 mL / NET: 2263.4 mL    04 Mar 2021 07:01  -  04 Mar 2021 09:20  --------------------------------------------------------  IN: 90 mL / OUT: 30 mL / NET: 60 mL        PHYSICAL EXAM:  Neurological:    Intubated on precedex  no following commands   PERRL   Withdraws bilateral UE's Lt > Rt   Withdraws bilateral LE's  Lt > Rt   Incision clean dry intact       LABS:                        7.9    6.94  )-----------( 120      ( 03 Mar 2021 23:30 )             24.9     03-04    138  |  105  |  18  ----------------------------<  170<H>  4.1   |  23  |  <0.5<L>    Ca    8.0<L>      04 Mar 2021 04:50  Phos  3.6     -03  Mg     2.0     -03    TPro  5.1<L>  /  Alb  3.1<L>  /  TBili  0.6  /  DBili  x   /  AST  46<H>  /  ALT  35  /  AlkPhos  181<H>  03-03    PT/INR - ( 03 Mar 2021 23:30 )   PT: 14.40 sec;   INR: 1.25 ratio         PTT - ( 03 Mar 2021 23:30 )  PTT:39.5 sec  Urinalysis Basic - ( 02 Mar 2021 12:50 )    Color: Yellow / Appearance: Clear / S.033 / pH: x  Gluc: x / Ketone: Negative  / Bili: Negative / Urobili: 6 mg/dL   Blood: x / Protein: 30 mg/dL / Nitrite: Negative   Leuk Esterase: Negative / RBC: 1 /HPF / WBC 4 /HPF   Sq Epi: x / Non Sq Epi: 4 /HPF / Bacteria: Negative          CAPILLARY BLOOD GLUCOSE      POCT Blood Glucose.: 189 mg/dL (04 Mar 2021 07:01)  POCT Blood Glucose.: 154 mg/dL (04 Mar 2021 05:11)  POCT Blood Glucose.: 189 mg/dL (04 Mar 2021 02:23)  POCT Blood Glucose.: 184 mg/dL (04 Mar 2021 02:04)  POCT Blood Glucose.: 199 mg/dL (03 Mar 2021 21:07)  POCT Blood Glucose.: 164 mg/dL (03 Mar 2021 18:04)  POCT Blood Glucose.: 193 mg/dL (03 Mar 2021 16:08)  POCT Blood Glucose.: 110 mg/dL (03 Mar 2021 13:31)  POCT Blood Glucose.: 112 mg/dL (03 Mar 2021 12:11)  POCT Blood Glucose.: 134 mg/dL (03 Mar 2021 11:25)  POCT Blood Glucose.: 157 mg/dL (03 Mar 2021 10:37)  POCT Blood Glucose.: 183 mg/dL (03 Mar 2021 09:32)      Drug Levels: [] N/A  Vancomycin Level, Trough: 16.7 ug/mL ( @ 06:58)  Vancomycin Level, Trough: 24.4 ug/mL ( @ 21:00)  Vancomycin Level, Trough: 7.8 ug/mL ( @ 16:33)    CSF Analysis: [] N/A      Allergies    penicillin (Anaphylaxis; Hives)    Intolerances      MEDICATIONS:  Antibiotics:  aztreonam  IVPB 2000 milliGRAM(s) IV Intermittent every 8 hours  aztreonam  IVPB      vancomycin  IVPB 1000 milliGRAM(s) IV Intermittent every 12 hours  voriconazole IVPB 300 milliGRAM(s) IV Intermittent every 12 hours    Neuro:  acetaminophen   Tablet .. 650 milliGRAM(s) Oral every 6 hours  dexMEDEtomidine Infusion 0.1 MICROgram(s)/kG/Hr IV Continuous <Continuous>  levETIRAcetam  IVPB 750 milliGRAM(s) IV Intermittent every 12 hours    Anticoagulation:  heparin   Injectable 5000 Unit(s) SubCutaneous every 8 hours    OTHER:  atorvastatin 80 milliGRAM(s) Oral at bedtime  chlorhexidine 0.12% Liquid 15 milliLiter(s) Oral Mucosa two times a day  chlorhexidine 4% Liquid 1 Application(s) Topical daily  insulin regular  human corrective regimen sliding scale   IV Push every 4 hours  labetalol 300 milliGRAM(s) Oral every 8 hours  lisinopril 20 milliGRAM(s) Oral <User Schedule>  pantoprazole   Suspension 40 milliGRAM(s) Oral daily  senna 2 Tablet(s) Oral at bedtime    IVF:  sodium chloride 3%. 500 milliLiter(s) IV Continuous <Continuous>    CULTURES:  Culture Results:   Testing in progress ( @ 14:31)  Culture Results:   No growth to date. ( @ 23:21)    RADIOLOGY & ADDITIONAL TESTS:      ASSESSMENT:  75y Female s/p    CVA (CEREBRAL VASCULAR ACCIDENT);INTRACRANIAL BLEED    ^CVA (CEREBRAL VASCULAR ACCIDENT);INTRACRANIAL BLEED    Family history of diabetes mellitus (DM)    No pertinent family history in first degree relatives    Handoff    MEWS Score    Anemia    DM (diabetes mellitus)    High cholesterol    HTN (hypertension)    B-cell chronic lymphocytic leukemia variant    CAD (coronary artery disease)    CVA (cerebral vascular accident)    Altered mental status    Palliative care encounter    Intracranial bleed    Craniotomy for intracerebral hemorrhage    H/O heart artery stent    No significant past surgical history    AMS    90+    Intracranial bleed    SysAdmin_VisitLink        A/p            S/p Left Minimally invasive Craniotomy for evac of IPH with placement of                 EVD                continue antibiotics / antifungals                 Await Blood  results                 Will discuss with Dr Mejia if we can ASA                 Care Per CCT

## 2021-03-04 NOTE — EEG REPORT - NS EEG TEXT BOX
Epilepsy Attending Note:     AD BASURTO    75y Female  MRN [id number]    Vital Signs Last 24 Hrs  T(C): 37.7 (04 Mar 2021 08:00), Max: 38.1 (04 Mar 2021 05:00)  T(F): 99.9 (04 Mar 2021 08:00), Max: 100.6 (04 Mar 2021 05:00)  HR: 83 (04 Mar 2021 10:00) (74 - 87)  BP: --  BP(mean): --  RR: 25 (04 Mar 2021 10:00) (22 - 30)  SpO2: 100% (04 Mar 2021 10:00) (99% - 100%)                          7.9    6.94  )-----------( 120      ( 03 Mar 2021 23:30 )             24.9       03-04    138  |  105  |  18  ----------------------------<  170<H>  4.1   |  23  |  <0.5<L>    Ca    8.0<L>      04 Mar 2021 04:50  Phos  3.6     03-03  Mg     2.0     03-03    TPro  5.1<L>  /  Alb  3.1<L>  /  TBili  0.6  /  DBili  x   /  AST  46<H>  /  ALT  35  /  AlkPhos  181<H>  03-03      MEDICATIONS  (STANDING):  acetaminophen   Tablet .. 650 milliGRAM(s) Oral every 6 hours  atorvastatin 80 milliGRAM(s) Oral at bedtime  aztreonam  IVPB 2000 milliGRAM(s) IV Intermittent every 8 hours  aztreonam  IVPB      chlorhexidine 0.12% Liquid 15 milliLiter(s) Oral Mucosa two times a day  chlorhexidine 4% Liquid 1 Application(s) Topical daily  dexMEDEtomidine Infusion 0.1 MICROgram(s)/kG/Hr (1.83 mL/Hr) IV Continuous <Continuous>  heparin   Injectable 5000 Unit(s) SubCutaneous every 8 hours  insulin regular  human corrective regimen sliding scale   IV Push every 4 hours  labetalol 300 milliGRAM(s) Oral every 8 hours  levETIRAcetam  IVPB 750 milliGRAM(s) IV Intermittent every 12 hours  lisinopril 20 milliGRAM(s) Oral <User Schedule>  pantoprazole   Suspension 40 milliGRAM(s) Oral daily  senna 2 Tablet(s) Oral at bedtime  sodium chloride 3%. 500 milliLiter(s) (40 mL/Hr) IV Continuous <Continuous>  vancomycin  IVPB 1000 milliGRAM(s) IV Intermittent every 12 hours  voriconazole IVPB 300 milliGRAM(s) IV Intermittent every 12 hours    MEDICATIONS  (PRN):            VEEG in the last 24 hours:   intubated    Background---------------- low amplitude mainly sleep recording. minimaly reactive.     Focal and generalized slowing-------generalized slowing                                                       left hemispheric  focal slowing ( highrer amplitude? breach artifact)    Interictal activity---------------------  none    Events------ none    Seizures---------  none    Impression:  abnormal as above     Plan - discussed with the team

## 2021-03-04 NOTE — PROGRESS NOTE ADULT - SUBJECTIVE AND OBJECTIVE BOX
AD BASURTO  [id number]  75y Female    Indication for ICU admission: s/p crani -evac of large hematoma (IPH)    Admit Date:02-24-21  ICU Date: 2/24/21  OR Date: 2/24/21    penicillin (Anaphylaxis; Hives)    PAST MEDICAL & SURGICAL HISTORY:  Anemia  DM (diabetes mellitus)  High cholesterol  HTN (hypertension)  B-cell chronic lymphocytic leukemia variant  CAD (coronary artery disease)  s/p stenting  H/O heart artery stent      Home Medications:  aspirin 81 mg oral tablet, chewable: 1 tab(s) orally once a day (24 Feb 2021 15:11)  atorvastatin 80 mg oral tablet: 1 tab(s) orally once a day (at bedtime) (24 Feb 2021 15:11)  Imbruvica 70 mg oral capsule: 2 cap(s) orally once a day (24 Feb 2021 15:11)  Levaquin 500 mg oral tablet: 1 tab(s) orally every 24 hours (24 Feb 2021 15:11)  lisinopril 10 mg oral tablet: 1 tab(s) orally once a day (24 Feb 2021 15:11)        24HRS EVENT:  Night   Vanc lvl 24.4, will hold, rpt lvl in AM  Na 137  Osm __    Day  family meeting in pm, possible trach/peg this week , spoke with HCP bedside  IV tylenol x1 for temp 101.8  Na 135, Osm 285  vanc level at 9pm  rescheduled lisinopril 10am c/w labetalol 300 q8hr  vEEG today spoke neurology attending bedside  Na dec from 135 to 133, incr 3% to 40cc/hr        DVT PTX: HSQ    GI PTX:pantoprazole  Injectable 40 milliGRAM(s) IV Push daily    Tubes/Lines/Drains   ----------------------------------------------------------------------------------------------------------  [x] Peripheral IV  [X] Central Venous Line         RIJ             Date Placed:  2/24  [X] Arterial Line		      Radial   Date Placed: 2/24  [X] Urinary Catheter Murry                                             Date Placed: 2/24     REVIEW OF SYSTEMS    [ ] A ten-point review of systems was otherwise negative except as noted.  [x ] Due to altered mental status/intubation, subjective information were not able to be obtained from the patient. History was       AD BASURTO  [id number]  75y Female    Indication for ICU admission: s/p crani -evac of large hematoma (IPH)    Admit Date:02-24-21  ICU Date: 2/24/21  OR Date: 2/24/21    penicillin (Anaphylaxis; Hives)    PAST MEDICAL & SURGICAL HISTORY:  Anemia  DM (diabetes mellitus)  High cholesterol  HTN (hypertension)  B-cell chronic lymphocytic leukemia variant  CAD (coronary artery disease)  s/p stenting  H/O heart artery stent      Home Medications:  aspirin 81 mg oral tablet, chewable: 1 tab(s) orally once a day (24 Feb 2021 15:11)  atorvastatin 80 mg oral tablet: 1 tab(s) orally once a day (at bedtime) (24 Feb 2021 15:11)  Imbruvica 70 mg oral capsule: 2 cap(s) orally once a day (24 Feb 2021 15:11)  Levaquin 500 mg oral tablet: 1 tab(s) orally every 24 hours (24 Feb 2021 15:11)  lisinopril 10 mg oral tablet: 1 tab(s) orally once a day (24 Feb 2021 15:11)        24HRS EVENT:  Night   Vanc lvl 24.4, will hold, rpt lvl in AM  Na 137  Osm 298    Day  family meeting in pm, possible trach/peg this week , spoke with HCP bedside  IV tylenol x1 for temp 101.8  Na 135, Osm 285  vanc level at 9pm  rescheduled lisinopril 10am c/w labetalol 300 q8hr  vEEG today spoke neurology attending bedside  Na dec from 135 to 133, incr 3% to 40cc/hr        DVT PTX: HSQ    GI PTX:pantoprazole  Injectable 40 milliGRAM(s) IV Push daily    Tubes/Lines/Drains   ----------------------------------------------------------------------------------------------------------  [x] Peripheral IV  [X] Central Venous Line         RIJ             Date Placed:  2/24  [X] Arterial Line		      Radial   Date Placed: 2/24  [X] Urinary Catheter Murry                                             Date Placed: 2/24     REVIEW OF SYSTEMS    [ ] A ten-point review of systems was otherwise negative except as noted.  [x ] Due to altered mental status/intubation, subjective information were not able to be obtained from the patient. History was       AD BASURTO  [id number]  75y Female    Indication for ICU admission: s/p crani -evac of large hematoma (IPH)    Admit Date:21  ICU Date: 21  OR Date: 21    penicillin (Anaphylaxis; Hives)    PAST MEDICAL & SURGICAL HISTORY:  Anemia  DM (diabetes mellitus)  High cholesterol  HTN (hypertension)  B-cell chronic lymphocytic leukemia variant  CAD (coronary artery disease)  s/p stenting  H/O heart artery stent      Home Medications:  aspirin 81 mg oral tablet, chewable: 1 tab(s) orally once a day (2021 15:11)  atorvastatin 80 mg oral tablet: 1 tab(s) orally once a day (at bedtime) (2021 15:11)  Imbruvica 70 mg oral capsule: 2 cap(s) orally once a day (2021 15:11)  Levaquin 500 mg oral tablet: 1 tab(s) orally every 24 hours (2021 15:11)  lisinopril 10 mg oral tablet: 1 tab(s) orally once a day (2021 15:11)        24HRS EVENT:  Night   Vanc lvl 24.4, 10pm dose held rpt level 16 this am restarted vanc 1g q12hr  tmax 100.4  Na 137  Osm 298 --> Osm 300 , Na 138, on 40cchr of 3%    Day  family meeting in pm, possible trach/peg this week , spoke with HCP bedside  IV tylenol x1 for temp 101.8  Na 135, Osm 285  vanc level at 9pm  rescheduled lisinopril 10am c/w labetalol 300 q8hr  vEEG today spoke neurology attending bedside  Na dec from 135 to 133, incr 3% to 40cc/hr        DVT PTX: HSQ    GI PTX:pantoprazole  Injectable 40 milliGRAM(s) IV Push daily    Tubes/Lines/Drains   ----------------------------------------------------------------------------------------------------------  [x] Peripheral IV  [X] Central Venous Line         RIJ             Date Placed:    [X] Arterial Line		      Radial   Date Placed:   [X] Urinary Catheter Murry                                             Date Placed:      REVIEW OF SYSTEMS    [ ] A ten-point review of systems was otherwise negative except as noted.  [x ] Due to altered mental status/intubation, subjective information were not able to be obtained from the patient. History was      Daily     Daily Weight in k (04 Mar 2021 05:00)    Diet, NPO with Tube Feed:   Tube Feeding Modality: Nasogastric  Glucerna 1.2 Jerry  Intermittent  Starting Tube Feed Rate mL per Hour: 10  Starting Tube Feed Rate mL per Hour: 30  Increase Tube Feed Rate by (mL): 10  Increase Tube Feed Rate by (mL): 5    Every 2 hours     Every 2 hours  Until Goal Tube Feed Rate (mL per Hour): 50  Until Goal Tube Feed Rate (mL per Hour): 50  Tube Feeding Hours ON: 1  Tube Feeding OFF (Hours): 0  Tube Feed Start Time: 11:55  Tube Feed Start Time: 12:00 (21 @ 11:55)      CURRENT MEDS:  Neurologic Medications  acetaminophen   Tablet .. 650 milliGRAM(s) Oral every 6 hours  dexMEDEtomidine Infusion 0.1 MICROgram(s)/kG/Hr IV Continuous <Continuous>  levETIRAcetam  IVPB 750 milliGRAM(s) IV Intermittent every 12 hours    Respiratory Medications    Cardiovascular Medications  labetalol 300 milliGRAM(s) Oral every 8 hours  lisinopril 20 milliGRAM(s) Oral <User Schedule>    Gastrointestinal Medications  pantoprazole   Suspension 40 milliGRAM(s) Oral daily  senna 2 Tablet(s) Oral at bedtime  sodium chloride 3%. 500 milliLiter(s) IV Continuous <Continuous>    Genitourinary Medications    Hematologic/Oncologic Medications  heparin   Injectable 5000 Unit(s) SubCutaneous every 8 hours    Antimicrobial/Immunologic Medications  aztreonam  IVPB 2000 milliGRAM(s) IV Intermittent every 8 hours  aztreonam  IVPB      vancomycin  IVPB 1000 milliGRAM(s) IV Intermittent every 12 hours  voriconazole IVPB 300 milliGRAM(s) IV Intermittent every 12 hours    Endocrine/Metabolic Medications  atorvastatin 80 milliGRAM(s) Oral at bedtime  insulin regular  human corrective regimen sliding scale   IV Push every 4 hours    Topical/Other Medications  chlorhexidine 0.12% Liquid 15 milliLiter(s) Oral Mucosa two times a day  chlorhexidine 4% Liquid 1 Application(s) Topical daily      ICU Vital Signs Last 24 Hrs  T(C): 37.7 (04 Mar 2021 08:00), Max: 38.3 (03 Mar 2021 11:00)  T(F): 99.9 (04 Mar 2021 08:00), Max: 101 (03 Mar 2021 11:00)  HR: 80 (04 Mar 2021 08:00) (74 - 87)  BP: --  BP(mean): --  ABP: 143/52 (04 Mar 2021 08:00) (106/41 - 168/59)  ABP(mean): 80 (04 Mar 2021 08:00) (62 - 95)  RR: 28 (04 Mar 2021 08:) (21 - 30)  SpO2: 100% (04 Mar 2021 08:) (99% - 100%)      Adult Advanced Hemodynamics Last 24 Hrs  CVP(mm Hg): --  CVP(cm H2O): --  CO: --  CI: --  PA: --  PA(mean): --  PCWP: --  SVR: --  SVRI: --  PVR: --  PVRI: --    Mode: AC/ CMV (Assist Control/ Continuous Mandatory Ventilation)  RR (machine): 16  TV (machine): 400  FiO2: 30  PEEP: 5  ITime: 1  MAP: 9  PIP: 25    ABG - ( 04 Mar 2021 03:07 )  pH, Arterial: 7.42  pH, Blood: x     /  pCO2: 38    /  pO2: 89    / HCO3: 24    / Base Excess: 0.1   /  SaO2: 98                  I&O's Summary    03 Mar 2021 07:01  -  04 Mar 2021 07:00  --------------------------------------------------------  IN: 3503.4 mL / OUT: 1240 mL / NET: 2263.4 mL    04 Mar 2021 07:01  -  04 Mar 2021 08:43  --------------------------------------------------------  IN: 90 mL / OUT: 30 mL / NET: 60 mL      I&O's Detail    03 Mar 2021 07:01  -  04 Mar 2021 07:00  --------------------------------------------------------  IN:    Dexmedetomidine: 17.4 mL    Enteral Tube Flush: 150 mL    Glucerna 1.5: 1200 mL    Insulin: 26 mL    IV PiggyBack: 200 mL    IV PiggyBack: 500 mL    IV PiggyBack: 300 mL    IV PiggyBack: 250 mL    sodium chloride 3%: 860 mL  Total IN: 3503.4 mL    OUT:    Indwelling Catheter - Urethral (mL): 1240 mL  Total OUT: 1240 mL    Total NET: 2263.4 mL      04 Mar 2021 07:01  -  04 Mar 2021 08:43  --------------------------------------------------------  IN:    Glucerna 1.5: 50 mL    sodium chloride 3%: 40 mL  Total IN: 90 mL    OUT:    Dexmedetomidine: 0 mL    Indwelling Catheter - Urethral (mL): 30 mL  Total OUT: 30 mL    Total NET: 60 mL          PHYSICAL EXAM:    General/Neuro  RASS:             GCS:     = E1   / V 1t  / M  4    Pupils: PERRL ,brisk, + gag + corneal,     Lungs:      clear to auscultation, Normal expansion/effort. vented min settings    Cardiovascular : S1, S2.  Regular rate and rhythm.  Peripheral edema   Cardiac Rhythm: Normal Sinus Rhythm    GI: Abdomen soft, Non-tender, Non-distended.    Gastrostomy / Jejunostomy tube in place.  Nasogastric tube in place.  Colostomy / Ileostomy.    Wound:    Extremities: Extremities warm, pink, well-perfused. Pulses: palp R DP +2, L DP dopperable + palp L PT +2    Derm: Good skin turgor, no skin breakdown.      :       Murry catheter in place.      CXR:     LABS:  CAPILLARY BLOOD GLUCOSE      POCT Blood Glucose.: 189 mg/dL (04 Mar 2021 07:01)  POCT Blood Glucose.: 154 mg/dL (04 Mar 2021 05:11)  POCT Blood Glucose.: 189 mg/dL (04 Mar 2021 02:23)  POCT Blood Glucose.: 184 mg/dL (04 Mar 2021 02:04)  POCT Blood Glucose.: 199 mg/dL (03 Mar 2021 21:07)  POCT Blood Glucose.: 164 mg/dL (03 Mar 2021 18:04)  POCT Blood Glucose.: 193 mg/dL (03 Mar 2021 16:08)  POCT Blood Glucose.: 110 mg/dL (03 Mar 2021 13:31)  POCT Blood Glucose.: 112 mg/dL (03 Mar 2021 12:11)  POCT Blood Glucose.: 134 mg/dL (03 Mar 2021 11:25)  POCT Blood Glucose.: 157 mg/dL (03 Mar 2021 10:37)  POCT Blood Glucose.: 183 mg/dL (03 Mar 2021 09:32)                          7.9    6.94  )-----------( 120      ( 03 Mar 2021 23:30 )             24.9       03-04    138  |  105  |  18  ----------------------------<  170<H>  4.1   |  23  |  <0.5<L>    Ca    8.0<L>      04 Mar 2021 04:50  Phos  3.6     03-03  Mg     2.0     03-03    TPro  5.1<L>  /  Alb  3.1<L>  /  TBili  0.6  /  DBili  x   /  AST  46<H>  /  ALT  35  /  AlkPhos  181<H>  03-03      PT/INR - ( 03 Mar 2021 23:30 )   PT: 14.40 sec;   INR: 1.25 ratio         PTT - ( 03 Mar 2021 23:30 )  PTT:39.5 sec      Urinalysis Basic - ( 02 Mar 2021 12:50 )    Color: Yellow / Appearance: Clear / S.033 / pH: x  Gluc: x / Ketone: Negative  / Bili: Negative / Urobili: 6 mg/dL   Blood: x / Protein: 30 mg/dL / Nitrite: Negative   Leuk Esterase: Negative / RBC: 1 /HPF / WBC 4 /HPF   Sq Epi: x / Non Sq Epi: 4 /HPF / Bacteria: Negative        Culture - Fungal, CSF (collected 02 Mar 2021 14:31)  Source: .CSF CSF  Preliminary Report (03 Mar 2021 08:04):    Testing in progress

## 2021-03-04 NOTE — PROGRESS NOTE ADULT - ASSESSMENT
76 yo f with PMH of HTN, DM, DLD, B cell lymphoma on Imbruvica, Depression, CAD s/p stent, on ASA,  presents with acute onset AMS progressing to obtundation and S GCS of 5-6 in ER and required intubation. CTH with large left IPH. cta h/n was negative for aneurysms or AVM.  ICH score 3. S/post Left MIS craniotomy with evacuation of hematoma and EVD placement. Post procedural follow up cth revealed decreased mass effect upon the bilateral ventricles with 0.5 cm rightward midline shift, previously 1 cm.     No seizures, can discontinue CEEG  Continue keppra  If exam not improving, consider MRI brain to assess extent of injury  -3% for goal normonatremia 135-145  -sbp<160  -wean EVD per nsgy, no ICP issues    Broderick Ordonez MD  Attending Neurointensivist

## 2021-03-04 NOTE — PROGRESS NOTE ADULT - SUBJECTIVE AND OBJECTIVE BOX
SBAD  75y, Female  Allergy: penicillin (Anaphylaxis; Hives)      LOS  8d    CHIEF COMPLAINT: altered mental status (04 Mar 2021 01:38)      INTERVAL EVENTS/HPI  - febrile , 100.6 overnight  - T(F): , Max: 101 (21 @ 11:00)  - WBC Count: 6.94 (21 @ 23:30)  WBC Count: 6.59 (21 @ 00:02)  - Creatinine, Serum: <0.5 (21 @ 04:50)  Creatinine, Serum: <0.5 (21 @ 23:30)       ROS  unable to obtain history secondary to patient's mental status and/or sedation     VITALS:  T(F): 99.9, Max: 101 (21 @ 11:00)  HR: 80  BP: --  RR: 28Vital Signs Last 24 Hrs  T(C): 37.7 (04 Mar 2021 08:00), Max: 38.3 (03 Mar 2021 11:00)  T(F): 99.9 (04 Mar 2021 08:00), Max: 101 (03 Mar 2021 11:00)  HR: 80 (04 Mar 2021 08:00) (74 - 87)  BP: --  BP(mean): --  RR: 28 (04 Mar 2021 08:00) (21 - 30)  SpO2: 100% (04 Mar 2021 08:00) (99% - 100%)    PHYSICAL EXAM:  ***    FH: Non-contributory  Social Hx: Non-contributory    TESTS & MEASUREMENTS:                        7.9    6.94  )-----------( 120      ( 03 Mar 2021 23:30 )             24.9         138  |  105  |  18  ----------------------------<  170<H>  4.1   |  23  |  <0.5<L>    Ca    8.0<L>      04 Mar 2021 04:50  Phos  3.6     -  Mg     2.0         TPro  5.1<L>  /  Alb  3.1<L>  /  TBili  0.6  /  DBili  x   /  AST  46<H>  /  ALT  35  /  AlkPhos  181<H>      eGFR if Non African American: 102 mL/min/1.73M2 (21 @ 04:50)  eGFR if African American: 118 mL/min/1.73M2 (21 @ 04:50)  eGFR if Non African American: 102 mL/min/1.73M2 (21 @ 23:30)  eGFR if : 118 mL/min/1.73M2 (21 @ 23:30)  eGFR if Non African American: 102 mL/min/1.73M2 (21 @ 21:00)  eGFR if : 118 mL/min/1.73M2 (21 @ 21:00)  eGFR if Non African American: 102 mL/min/1.73M2 (21 @ 16:00)  eGFR if : 118 mL/min/1.73M2 (21 @ 16:00)  eGFR if Non African American: 102 mL/min/1.73M2 (21 @ 11:13)  eGFR if : 118 mL/min/1.73M2 (21 @ 11:13)    LIVER FUNCTIONS - ( 03 Mar 2021 00:02 )  Alb: 3.1 g/dL / Pro: 5.1 g/dL / ALK PHOS: 181 U/L / ALT: 35 U/L / AST: 46 U/L / GGT: x           Urinalysis Basic - ( 02 Mar 2021 12:50 )    Color: Yellow / Appearance: Clear / S.033 / pH: x  Gluc: x / Ketone: Negative  / Bili: Negative / Urobili: 6 mg/dL   Blood: x / Protein: 30 mg/dL / Nitrite: Negative   Leuk Esterase: Negative / RBC: 1 /HPF / WBC 4 /HPF   Sq Epi: x / Non Sq Epi: 4 /HPF / Bacteria: Negative        Culture - Fungal, CSF (collected 21 @ 14:31)  Source: .CSF CSF  Preliminary Report (21 @ 08:04):    Testing in progress    Culture - Blood (collected 21 @ 23:21)  Source: .Blood None  Preliminary Report (21 @ 09:01):    No growth to date.    Culture - CSF with Gram Stain (collected 21 @ 19:50)  Source: .CSF CSF  Gram Stain (21 @ 01:32):    polymorphonuclear leukocytes seen    No organisms seen    by cytocentrifuge  Final Report (21 @ 15:57):    No growth at 3 days.    Culture - Bronchial (collected 21 @ 12:22)  Source: Bronch Wash Bronchoalveolar Lavage  Gram Stain (21 @ 22:39):    Moderate polymorphonuclear leukocytes per low power field    Few Squamous epithelial cells per low power field    Moderate Yeast like cells per oil power field    Few Gram positive cocci in pairs per oil power field  Preliminary Report (21 @ 18:40):    Mold like fungus Referred to Mycology    Normal Respiratory Macie present    Culture - Fungal, CSF (collected 21 @ 15:20)  Source: .CSF CSF  Preliminary Report (21 @ 08:46):    Testing in progress    Culture - Acid Fast - CSF (collected 21 @ 15:20)  Source: .CSF CSF  Preliminary Report (21 @ 15:07):    Culture is being performed.    Culture - CSF with Gram Stain (collected 21 @ 15:20)  Source: .CSF CSF... lumbar tap  Gram Stain (21 @ 07:24):    polymorphonuclear leukocytes seen    No organisms seen    by cytocentrifuge  Final Report (21 @ 16:14):    No growth at 3 days.    Culture - Blood (collected 21 @ 12:40)  Source: .Blood Blood-Peripheral  Final Report (21 @ 23:00):    No Growth Final            INFECTIOUS DISEASES TESTING  Vancomycin Level, Trough: 16.7 (21 @ 06:58)  Vancomycin Level, Trough: 24.4 (21 @ 21:00)  Fungitell: <31 (21 @ 23:20)  Vancomycin Level, Random: 15.2 (21 @ 12:25)  Vancomycin Level, Random: 9.8 (21 @ 16:33)  Vancomycin Level, Trough: 7.8 (21 @ 16:33)  Rapid RVP Result: NotDetec (21 @ 15:43)  Procalcitonin, Serum: 0.43 (10-12-20 @ 20:34)  COVID-19 PCR: NotDetec (10-12-20 @ 16:21)  MRSA PCR Result.: Negative (20 @ 05:04)  COVID-19 PCR: NotDetec (20 @ 19:00)  Vancomycin Level, Trough: 11.5 (20 @ 07:05)  Procalcitonin, Serum: 0.26 (20 @ 09:30)  COVID-19 PCR: NotDetec (20 @ 18:24)  COVID-19 PCR: NotDetec (06-10-20 @ 14:21)  COVID-19 PCR: NotDetec (20 @ 14:28)      INFLAMMATORY MARKERS      RADIOLOGY & ADDITIONAL TESTS:  I have personally reviewed the last available Chest xray  CXR      CT  CT Chest w/ IV Cont:   EXAM:  CT CHEST IC            PROCEDURE DATE:  2021            INTERPRETATION:  REASON FOR EXAM / CLINICAL STATEMENT:  spiking temps; s/p OR    TECHNIQUE:  Non-contrast CT of the thorax. Contiguous axial CT images were obtained from the thoracic inlet to the upper abdomen with IV contrast.  Reformatted images in the coronal and sagittal planes were acquired.      COMPARISON: None.    FINDINGS:    TUBES AND LINES: Endotracheal tube is in place extending to just above the kelsey. An NG tubeplace extending to the stomach. Right-sided central line extends to the level of the superior vena cava.    HEART AND VESSELS: The heart is normal in size. There are coronary artery calcifications. There is no pericardial effusion.    No evidence of central pulmonary embolism.    Aortic calcifications are noted. Normal caliber aorta and pulmonary artery. There is no evidence of aortic aneurysm or dissection.    Brachiocephalic vessels are unremarkable.    MEDIASTINUM: There are no enlarged mediastinal, hilar or axillary lymph nodes. The visualized portion of the thyroid gland is unremarkable.    AIRWAYS, LUNGS AND PLEURA: The trachea and right bronchial tree are patent. Filling defect compatible with mucous plugging is noted in the left lowerlobe bronchus. There are moderate bilateral pleural effusions with areas of compression atelectasis in both lower lobes. An area of thickened interlobular septa with tree-in-bud opacities are noted in the posterior aspect of the right upper lobe.There is no pneumothorax.    UPPER ABDOMEN: The partially visualized upper abdomen shows no acute pathology.    BONES AND SOFT TISSUES: Degenerative changes of the spine are noted. Misregistration artifact noted from the lower aspect of the sternum.    IMPRESSION:    Filling defect compatible with mucous plugging is noted in the left lower lobe bronchus. There are moderate bilateral pleural effusions with areas of compression atelectasis in both lower lobes. An area of thickened interlobular septa with tree-in-bud opacities are noted in the posterior aspect of the right upper lobe.              FRANCO MCDANIEL MD; Attending Interventional Radiologist  This document has been electronically signed. Mar  1 2021  9:44PM (21 @ 20:56)      CARDIOLOGY TESTING  12 Lead ECG:   Systolic  mmHg    Diastolic BP 47 mmHg    Ventricular Rate 125 BPM    Atrial Rate 125 BPM    P-R Interval 142 ms    QRS Duration 124 ms    Q-T Interval 334 ms    QTC Calculation(Bazett) 482 ms    P Axis 81 degrees    R Axis 90 degrees    T Axis 76 degrees    Diagnosis Line Sinus tachycardia  Right bundle branch block  Abnormal ECG    Confirmed by VERONIKA FOSTER MD ([id number]) on 2021 10:07:07 PM (21 @ 22:58)  12 Lead ECG:   Ventricular Rate 154 BPM    Atrial Rate 154 BPM    QRS Duration 120 ms    Q-T Interval 312 ms    QTC Calculation(Bazett) 499 ms    R Axis 84 degrees    T Axis 21 degrees    Diagnosis Line Atrial flutter with 2:1 A-V conduction  Right bundle branch block  Abnormal ECG    Confirmed by Benton Yousif ([id number]) on 2021 12:08:37 PM (21 @ 10:42)      MEDICATIONS  acetaminophen   Tablet .. 650 Oral every 6 hours  atorvastatin 80 Oral at bedtime  aztreonam  IVPB 2000 IV Intermittent every 8 hours  aztreonam  IVPB     chlorhexidine 0.12% Liquid 15 Oral Mucosa two times a day  chlorhexidine 4% Liquid 1 Topical daily  dexMEDEtomidine Infusion 0.1 IV Continuous <Continuous>  heparin   Injectable 5000 SubCutaneous every 8 hours  insulin regular  human corrective regimen sliding scale  IV Push every 4 hours  labetalol 300 Oral every 8 hours  levETIRAcetam  IVPB 750 IV Intermittent every 12 hours  lisinopril 20 Oral <User Schedule>  pantoprazole   Suspension 40 Oral daily  senna 2 Oral at bedtime  sodium chloride 3%. 500 IV Continuous <Continuous>  vancomycin  IVPB 1000 IV Intermittent every 12 hours  voriconazole IVPB 300 IV Intermittent every 12 hours      WEIGHT  Weight (kg): 73.028 (21 @ 21:59)  Creatinine, Serum: <0.5 mg/dL (21 @ 04:50)  Creatinine, Serum: <0.5 mg/dL (21 @ 23:30)  Creatinine, Serum: <0.5 mg/dL (21 @ 21:00)  Creatinine, Serum: <0.5 mg/dL (21 @ 16:00)  Creatinine, Serum: <0.5 mg/dL (21 @ 11:13)      ANTIBIOTICS:  aztreonam  IVPB 2000 milliGRAM(s) IV Intermittent every 8 hours  aztreonam  IVPB      vancomycin  IVPB 1000 milliGRAM(s) IV Intermittent every 12 hours  voriconazole IVPB 300 milliGRAM(s) IV Intermittent every 12 hours      All available historical records have been reviewed       SBAD  75y, Female  Allergy: penicillin (Anaphylaxis; Hives)      LOS  8d    CHIEF COMPLAINT: altered mental status (04 Mar 2021 01:38)      INTERVAL EVENTS/HPI  - febrile , 100.6 overnight  - T(F): , Max: 101 (21 @ 11:00)  - WBC Count: 6.94 (21 @ 23:30)  WBC Count: 6.59 (21 @ 00:02)  - Creatinine, Serum: <0.5 (21 @ 04:50)  Creatinine, Serum: <0.5 (21 @ 23:30)       ROS  unable to obtain history secondary to patient's mental status and/or sedation     VITALS:  T(F): 99.9, Max: 101 (21 @ 11:00)  HR: 80  BP: --  RR: 28Vital Signs Last 24 Hrs  T(C): 37.7 (04 Mar 2021 08:00), Max: 38.3 (03 Mar 2021 11:00)  T(F): 99.9 (04 Mar 2021 08:00), Max: 101 (03 Mar 2021 11:00)  HR: 80 (04 Mar 2021 08:00) (74 - 87)  BP: --  BP(mean): --  RR: 28 (04 Mar 2021 08:00) (21 - 30)  SpO2: 100% (04 Mar 2021 08:00) (99% - 100%)    PHYSICAL EXAM:  General: intubated  HEENT: NCAT, incision clean, no erythema/purulence   CV: RRR  Lungs: symmetric chest expansion, decreased BS at bases  Abd: Soft  Skin: no rash  Neuro: sedated  Lines: no phlebitis     FH: Non-contributory  Social Hx: Non-contributory    TESTS & MEASUREMENTS:                        7.9    6.94  )-----------( 120      ( 03 Mar 2021 23:30 )             24.9     -    138  |  105  |  18  ----------------------------<  170<H>  4.1   |  23  |  <0.5<L>    Ca    8.0<L>      04 Mar 2021 04:50  Phos  3.6     03-03  Mg     2.0     03-03    TPro  5.1<L>  /  Alb  3.1<L>  /  TBili  0.6  /  DBili  x   /  AST  46<H>  /  ALT  35  /  AlkPhos  181<H>      eGFR if Non African American: 102 mL/min/1.73M2 (21 @ 04:50)  eGFR if African American: 118 mL/min/1.73M2 (21 @ 04:50)  eGFR if Non African American: 102 mL/min/1.73M2 (21 @ 23:30)  eGFR if : 118 mL/min/1.73M2 (21 @ 23:30)  eGFR if Non African American: 102 mL/min/1.73M2 (21 @ 21:00)  eGFR if : 118 mL/min/1.73M2 (21 @ 21:00)  eGFR if Non African American: 102 mL/min/1.73M2 (21 @ 16:00)  eGFR if : 118 mL/min/1.73M2 (21 @ 16:00)  eGFR if Non African American: 102 mL/min/1.73M2 (21 @ 11:13)  eGFR if : 118 mL/min/1.73M2 (21 @ 11:13)    LIVER FUNCTIONS - ( 03 Mar 2021 00:02 )  Alb: 3.1 g/dL / Pro: 5.1 g/dL / ALK PHOS: 181 U/L / ALT: 35 U/L / AST: 46 U/L / GGT: x           Urinalysis Basic - ( 02 Mar 2021 12:50 )    Color: Yellow / Appearance: Clear / S.033 / pH: x  Gluc: x / Ketone: Negative  / Bili: Negative / Urobili: 6 mg/dL   Blood: x / Protein: 30 mg/dL / Nitrite: Negative   Leuk Esterase: Negative / RBC: 1 /HPF / WBC 4 /HPF   Sq Epi: x / Non Sq Epi: 4 /HPF / Bacteria: Negative        Culture - Fungal, CSF (collected 21 @ 14:31)  Source: .CSF CSF  Preliminary Report (21 @ 08:04):    Testing in progress    Culture - Blood (collected 21 @ 23:21)  Source: .Blood None  Preliminary Report (21 @ 09:01):    No growth to date.    Culture - CSF with Gram Stain (collected 21 @ 19:50)  Source: .CSF CSF  Gram Stain (21 @ 01:32):    polymorphonuclear leukocytes seen    No organisms seen    by cytocentrifuge  Final Report (21 @ 15:57):    No growth at 3 days.    Culture - Bronchial (collected 21 @ 12:22)  Source: Bronch Wash Bronchoalveolar Lavage  Gram Stain (21 @ 22:39):    Moderate polymorphonuclear leukocytes per low power field    Few Squamous epithelial cells per low power field    Moderate Yeast like cells per oil power field    Few Gram positive cocci in pairs per oil power field  Preliminary Report (21 @ 18:40):    Mold like fungus Referred to Mycology    Normal Respiratory Macie present    Culture - Fungal, CSF (collected 21 @ 15:20)  Source: .CSF CSF  Preliminary Report (21 @ 08:46):    Testing in progress    Culture - Acid Fast - CSF (collected 21 @ 15:20)  Source: .CSF CSF  Preliminary Report (21 @ 15:07):    Culture is being performed.    Culture - CSF with Gram Stain (collected 21 @ 15:20)  Source: .CSF CSF... lumbar tap  Gram Stain (21 @ 07:24):    polymorphonuclear leukocytes seen    No organisms seen    by cytocentrifuge  Final Report (21 @ 16:14):    No growth at 3 days.    Culture - Blood (collected 21 @ 12:40)  Source: .Blood Blood-Peripheral  Final Report (21 @ 23:00):    No Growth Final            INFECTIOUS DISEASES TESTING  Vancomycin Level, Trough: 16.7 (21 @ 06:58)  Vancomycin Level, Trough: 24.4 (21 @ 21:00)  Fungitell: <31 (21 @ 23:20)  Vancomycin Level, Random: 15.2 (21 @ 12:25)  Vancomycin Level, Random: 9.8 (21 @ 16:33)  Vancomycin Level, Trough: 7.8 (21 @ 16:33)  Rapid RVP Result: NotDetec (21 @ 15:43)  Procalcitonin, Serum: 0.43 (10-12-20 @ 20:34)  COVID-19 PCR: NotDetec (10-12-20 @ 16:21)  MRSA PCR Result.: Negative (20 @ 05:04)  COVID-19 PCR: NotDetec (20 @ 19:00)  Vancomycin Level, Trough: 11.5 (20 @ 07:05)  Procalcitonin, Serum: 0.26 (20 @ 09:30)  COVID-19 PCR: NotDetec (20 @ 18:24)  COVID-19 PCR: NotDetec (06-10-20 @ 14:21)  COVID-19 PCR: NotDetec (20 @ 14:28)      INFLAMMATORY MARKERS      RADIOLOGY & ADDITIONAL TESTS:  I have personally reviewed the last available Chest xray  CXR      CT  CT Chest w/ IV Cont:   EXAM:  CT CHEST IC            PROCEDURE DATE:  2021            INTERPRETATION:  REASON FOR EXAM / CLINICAL STATEMENT:  spiking temps; s/p OR    TECHNIQUE:  Non-contrast CT of the thorax. Contiguous axial CT images were obtained from the thoracic inlet to the upper abdomen with IV contrast.  Reformatted images in the coronal and sagittal planes were acquired.      COMPARISON: None.    FINDINGS:    TUBES AND LINES: Endotracheal tube is in place extending to just above the kelsey. An NG tubeplace extending to the stomach. Right-sided central line extends to the level of the superior vena cava.    HEART AND VESSELS: The heart is normal in size. There are coronary artery calcifications. There is no pericardial effusion.    No evidence of central pulmonary embolism.    Aortic calcifications are noted. Normal caliber aorta and pulmonary artery. There is no evidence of aortic aneurysm or dissection.    Brachiocephalic vessels are unremarkable.    MEDIASTINUM: There are no enlarged mediastinal, hilar or axillary lymph nodes. The visualized portion of the thyroid gland is unremarkable.    AIRWAYS, LUNGS AND PLEURA: The trachea and right bronchial tree are patent. Filling defect compatible with mucous plugging is noted in the left lowerlobe bronchus. There are moderate bilateral pleural effusions with areas of compression atelectasis in both lower lobes. An area of thickened interlobular septa with tree-in-bud opacities are noted in the posterior aspect of the right upper lobe.There is no pneumothorax.    UPPER ABDOMEN: The partially visualized upper abdomen shows no acute pathology.    BONES AND SOFT TISSUES: Degenerative changes of the spine are noted. Misregistration artifact noted from the lower aspect of the sternum.    IMPRESSION:    Filling defect compatible with mucous plugging is noted in the left lower lobe bronchus. There are moderate bilateral pleural effusions with areas of compression atelectasis in both lower lobes. An area of thickened interlobular septa with tree-in-bud opacities are noted in the posterior aspect of the right upper lobe.              FRANCO MCDANIEL MD; Attending Interventional Radiologist  This document has been electronically signed. Mar  1 2021  9:44PM (21 @ 20:56)      CARDIOLOGY TESTING  12 Lead ECG:   Systolic  mmHg    Diastolic BP 47 mmHg    Ventricular Rate 125 BPM    Atrial Rate 125 BPM    P-R Interval 142 ms    QRS Duration 124 ms    Q-T Interval 334 ms    QTC Calculation(Bazett) 482 ms    P Axis 81 degrees    R Axis 90 degrees    T Axis 76 degrees    Diagnosis Line Sinus tachycardia  Right bundle branch block  Abnormal ECG    Confirmed by VERONIKA FOSTER MD ([id number]) on 2021 10:07:07 PM (21 @ 22:58)  12 Lead ECG:   Ventricular Rate 154 BPM    Atrial Rate 154 BPM    QRS Duration 120 ms    Q-T Interval 312 ms    QTC Calculation(Bazett) 499 ms    R Axis 84 degrees    T Axis 21 degrees    Diagnosis Line Atrial flutter with 2:1 A-V conduction  Right bundle branch block  Abnormal ECG    Confirmed by Benton Yousif ([id number]) on 2021 12:08:37 PM (21 @ 10:42)      MEDICATIONS  acetaminophen   Tablet .. 650 Oral every 6 hours  atorvastatin 80 Oral at bedtime  aztreonam  IVPB 2000 IV Intermittent every 8 hours  aztreonam  IVPB     chlorhexidine 0.12% Liquid 15 Oral Mucosa two times a day  chlorhexidine 4% Liquid 1 Topical daily  dexMEDEtomidine Infusion 0.1 IV Continuous <Continuous>  heparin   Injectable 5000 SubCutaneous every 8 hours  insulin regular  human corrective regimen sliding scale  IV Push every 4 hours  labetalol 300 Oral every 8 hours  levETIRAcetam  IVPB 750 IV Intermittent every 12 hours  lisinopril 20 Oral <User Schedule>  pantoprazole   Suspension 40 Oral daily  senna 2 Oral at bedtime  sodium chloride 3%. 500 IV Continuous <Continuous>  vancomycin  IVPB 1000 IV Intermittent every 12 hours  voriconazole IVPB 300 IV Intermittent every 12 hours      WEIGHT  Weight (kg): 73.028 (21 @ 21:59)  Creatinine, Serum: <0.5 mg/dL (21 @ 04:50)  Creatinine, Serum: <0.5 mg/dL (21 @ 23:30)  Creatinine, Serum: <0.5 mg/dL (21 @ 21:00)  Creatinine, Serum: <0.5 mg/dL (21 @ 16:00)  Creatinine, Serum: <0.5 mg/dL (21 @ 11:13)      ANTIBIOTICS:  aztreonam  IVPB 2000 milliGRAM(s) IV Intermittent every 8 hours  aztreonam  IVPB      vancomycin  IVPB 1000 milliGRAM(s) IV Intermittent every 12 hours  voriconazole IVPB 300 milliGRAM(s) IV Intermittent every 12 hours      All available historical records have been reviewed

## 2021-03-04 NOTE — PROGRESS NOTE ADULT - ATTENDING COMMENTS
intubated and sedated   moving all extremities   remains hyponatremic , will continue Hypertonic saline   continue SICU Care.

## 2021-03-04 NOTE — PROGRESS NOTE ADULT - ASSESSMENT
Assessment and Plan:     75y Female s/p craniotomy for left frontal hematoma with evacuation and EVD placement. Intubated and sedated. SICU admission for hemodynamic monitoring.     NEURO:  sedation: keep off if possible  Pain controlled with IV Tylenol   Keppra 750 mg q 12  EVD removed 3/2   24hr EEG: started 3/3 at 5PM  q 1 hr neurocheck, f/u with NSX  2/25 CTH- ventricular drain in 3rd ventricle, decr size of lateral ventricles, scattered SAH and layering IVH in posterior horns of  lateral ventricle slightly increased  2/28 rpt CTH - stable- L frontal hemoatom cavity 4.5 cm, slighlty decr amount of blood, interval decr in pneumocephalus, L transfrontal drain tip in the third ventricle   3/1: CT sinus-prelim- no evidence of inflammatory/infectious diseases of paransal sinuses  Family meeting 3/3: possible trach/peg this week, follow up with palliative      RESP:   Intubated: /16/40/5  AM ABG:  AM CXR  3/1: CT chest -mucus plug L lower lobe bronchus, mod bilat pleural effusions w/ areas of atlectasis in both lower lobes. tree-in-bud opacities in posterior aspect of R upper lobe  +aspergillus  Keep HOB elevated at 30 degrees  Possible trach this week s/p speaking with HCP bedside on 3/3    CARDS:   Acute on chronic HTN        - increased home Lisinopril to 20mg       - labetalol 300 q8h        - rescheduled lisinopril dose s/p hypotension when giving both  H/o CAD s/p stents - hold ASA  EKG:  Echo (10/13/20): EF 55-60%, trace MR, R atrial echodensity possible thrombus  Trop 0.24 --> 0.21->0.15, no longer trending    GI/NUTR:   Diet: TF- Glucerna 1.2 @ goal 50/hr  OGT  GI Prophylaxis- PPI  Bowel regimen- Senna, lactulose   BM 3/2   Possible PEG this week, s/p meeting with HCP bedside    /RENAL:   Monitor UO-roblero in place  IVL  Hyponatremia- 3% @ 40cc/hr  q6BMP & Osm  BUN/Cr- 12/<0.5  -->,  12/<0.5  -->,  13/<0.5  -->23/0.5 --> 22/0.5  --> 21/0.5> 18/0.5  Na 137 // K 4.0 // Mg 2.0 //  Phos 3.6--3/03 @  23:30    HEME/ONC:   DVT prophylaxis: heparin   Injectable 5000 every 8 hours  Hb/Hct:  8.2/25.4  -->,  9.0/27.7  -->,  8.4/26.0  --> 7.8/23-->7.9/24.9  Plts:  116  -->,  140  -->,  121  --> 112-->120  Last T&S: 2/27  (02/25) INR 1.85, nsx rec 1 FFP and 10 vit K, INR 1.49 (2/26)  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  Heme/Onc consult: transfuse platelets if < 100, give vit K for elevated INR  s/p 1 FFP & 10 vit K on 2/26 for INR 1.9 rpt 1.5>1.6>1.3>1.3>1.3    ID:  Immunocompromised  continues to spike T max 101-102  WBC- 3.31  -->,  6.44  -->,  6.78  --> 6.5> 6.94  ABX: vanco (2/28-  ), aztreonam (2/28-  ), Voriconazole (3/2)  Vanc level (3/1): 15.2  Vanc level (3/3): 24.4  f/u Vanc lvl (3/4)  Cultures:     -UA (2/28): negative     -CSF with Gram Stain  (02.26.21 @ 15:20): No growth at 3 days.     -CSF (fungal) (collected 02-26): pending     -CSF with Gram Stain (collected 02-27): negative    -Bronchial (collected 02-27):+aspergillus    -Fiu rpt fungal CSF cultures (3/2)    -Apergillus galactaman antigen       ENDO:  Holding home metformin  Insulin gtt   FS q 4  HA1C 6.3     LINES/DRAINS:  PIV, CVC, [place]    GOC: palliative care following    DISPO:    SICU     Assessment and Plan:     75y Female s/p craniotomy for left frontal hematoma with evacuation and EVD placement. Intubated and sedated. SICU admission for hemodynamic monitoring.     NEURO:  sedation: keep off if possible  Pain controlled with IV Tylenol   Keppra 750 mg q 12  EVD removed 3/2   24hr EEG: started 3/3 at 5PM  q 1 hr neurocheck, f/u with NSX  2/25 CTH- ventricular drain in 3rd ventricle, decr size of lateral ventricles, scattered SAH and layering IVH in posterior horns of  lateral ventricle slightly increased  2/28 rpt CTH - stable- L frontal hematoma cavity 4.5 cm, slighlty decr amount of blood, interval decr in pneumocephalus, L transfrontal drain tip in the third ventricle   3/1: CT sinus-prelim- no evidence of inflammatory/infectious diseases of paransal sinuses  Family meeting 3/3: possible trach/peg this week, follow up with palliative      RESP:   Intubated: /16/30/5  AM ABG:ABG - ( 04 Mar 2021 03:07 )  pH, Arterial: 7.42  pH, Blood: x     /  pCO2: 38    /  pO2: 89    / HCO3: 24    / Base Excess: 0.1   /  SaO2: 98      AM CXR  3/1: CT chest -mucus plug L lower lobe bronchus, mod bilat pleural effusions w/ areas of atelectasis in both lower lobes. tree-in-bud opacities in posterior aspect of R upper lobe  +aspergillus  Keep HOB elevated at 30 degrees  Possible trach this week s/p speaking with HCP bedside on 3/3    CARDS:   Acute on chronic HTN        - increased home Lisinopril to 20mg       - labetalol 300 q8h        - rescheduled lisinopril dose s/p hypotension when giving both  H/o CAD s/p stents - hold ASA  EKG:  Echo (10/13/20): EF 55-60%, trace MR, R atrial echodensity possible thrombus  Trop 0.24 --> 0.21->0.15, no longer trending    GI/NUTR:   Diet: TF- Glucerna 1.2 @ goal 50/hr  OGT  GI Prophylaxis- PPI  Bowel regimen- Senna  BM 3/2   Possible PEG this week, s/p meeting with HCP bedside    /RENAL:   Monitor UO-roblero in place  IVL  Hyponatremia- 3% @ 40cc/hr  q6BMP & Osm  BUN/Cr- 12/<0.5  -->,  12/<0.5  -->,  13/<0.5  -->23/0.5 --> 22/0.5  --> 21/0.5> 18/0.5  Na 137 // K 4.0 // Mg 2.0 //  Phos 3.6--3/03 @  23:30  03-04  138  |  105  |  18  ----------------------------<  170<H>  4.1   |  23  |  <0.5<L>    Ca    8.0<L>      04 Mar 2021 04:50  Phos  3.6     03-03  Mg     2.0     03-03      HEME/ONC:   DVT prophylaxis: heparin   Injectable 5000 every 8 hours  Hb/Hct:  8.2/25.4  -->,  9.0/27.7  -->,  8.4/26.0  --> 7.8/23-->7.9/24.9  Plts:  116  -->,  140  -->,  121  --> 112-->120  Last T&S: 2/27  (02/25) INR 1.85, nsx rec 1 FFP and 10 vit K, INR 1.49 (2/26)  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  Heme/Onc consult: transfuse platelets if < 100, give vit K for elevated INR  s/p 1 FFP & 10 vit K on 2/26 for INR 1.9 rpt 1.5>1.6>1.3>1.3>1.3                 7.9    6.94  )-----------( 120      ( 03 Mar 2021 23:30 )             24.9       ID:  Immunocompromised  fever curve down trending, t max 100.6  WBC- 3.31  -->,  6.44  -->,  6.78  --> 6.5> 6.94  ABX: vanco (2/28-  ), aztreonam (2/28-  ), Voriconazole (3/2)  Vanc level (3/1): 15.2  Vanc level (3/3): 24.4  f/u Vanc lvl (3/4) 16 , rpt vanc  Cultures:     -UA (2/28): negative     -CSF with Gram Stain  (02.26.21 @ 15:20): No growth at 3 days.     -CSF (fungal) (collected 02-26): pending     -CSF with Gram Stain (collected 02-27): negative    -Bronchial (collected 02-27):+aspergillus    -Fiu rpt fungal CSF cultures (3/2)    -Apergillus galactaman antigen       ENDO:  Holding home metformin  Insulin gtt   FS q 4  HA1C 6.3     LINES/DRAINS:  PIV, CVC, [place], [place]    GOC: palliative care following    DISPO:    SICU

## 2021-03-05 NOTE — PROGRESS NOTE ADULT - ASSESSMENT
ASSESSMENT  75 years old female from home with PMHx of B cell lymphoma on Imbruvica (since 10/2020), CAD s/p stents 15 years ago, DM, chronic UTI, HTN, DLD, brought in by ambulance due to altered mental status and hypertension. Patient was AAO x4 and fully functional at baseline. Last known normal was 11:00pm 2/23 before beds. In 2/24 at 8:45AM, patient was found to be lethargy in bed by his son, was AAO x 4 and able to talk to his son, but started coughing and had one episode of nose bleed. At 9:15, her physical therapist noticed her becoming more lethargic, but vitals were normal. Soon at 9:30, patient was unresponsive and had a blank stare. EMS was called and her BP was found to be around 230/120. She was rushed to ED as Code Stroke. NIHSS 23. CTH showed Large left frontal intraparenchymal hematoma with intraventricular extension into left lateral ventricle, associated with marked mass effect resulting in 0.9 cm midline shift to the right anteriorly. 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 There are moderate bilateral pleural effusions with areas of compression atelectasis in both lower lobes. An area of thickened interlobular septa with tree-in-bud opacities are noted in the posterior aspect of the right upper lobe.  < from: CT Sinuses w/ IV Cont (03.01.21 @ 21:18) >  1.  Opacification of the mastoid air cells and left middle ear canal consistent with inflammation or infection.  2.  Minimal mucosal thickening in the bilateral frontal, bilateral ethmoid, bilateral sphenoid, and left maxillary sinuses.  3.  Postoperative changes in left frontal lobe hemorrhage better demonstrated on the CT scan of the brain performed on February 28, 2021.    2/25 BCX NG  #Large left frontal intraparenchymal hemorrhage, low suspicion but Cannot rule out CSF infection - CSF studies + WBC/ RBC    Per family no proceeding symptoms week prior such as fevers, HA. Was in usual state of health week prior. Therefore unlikely infection that caused the intracranial event    CSF PCR NEGATIVE     CrAg NEGATIVE     Total Nucleated Cell Count, CSF: 373 /uL (02.26.21 @ 15:20)   Total Nucleated Cell Count, CSF: 30 /uL (02.27.21 @ 19:50)    CSF Segmented Neutrophils: 82 % (02.27.21 @ 19:50) CSF Segmented Neutrophils: 91 % (02.26.21 @ 15:20)  #B-cell lymphoma on chemo  #Pancytopenia  #Lactic acidosis  #Hypomagnesemia  #PCN allergy- unknown    RECOMMENDATIONS  - f/u repeat BCX (last 2/28) as continued fever  - Monitor LFTs on vori, ALT rising, if >100, change voriconazole to Ambisome  - Vanc 1g q12h IV, repeat trough prior to 4th new dose  - Meropenem 1g q8h IV (changed from aztreonam 3/4, on GNR abx coverage since 2/28-)  - Repeat MRI w/ Brain   - Plan for ABX for 14 days for empiric meningitis (end 3/13)  - Spoke with micro- aspergillus not mucor, f/u speciation and sensitivities   - Voriconazole 4 mg/kg twice daily IV for aspergillus PNA. Good CSF penetration as well.   - f/u fungal CSF culture  - tracheal fluid for BAL aspergillus galactomannan   - noé, TLC 2/24  - F/u serum aspergillus galactomannan   - Guarded prognosis, GOC    Please call with any questions or send a message on [employer] Teams  Spectra [phone]        ASSESSMENT  75 years old female from home with PMHx of B cell lymphoma on Imbruvica (since 10/2020), CAD s/p stents 15 years ago, DM, chronic UTI, HTN, DLD, brought in by ambulance due to altered mental status and hypertension. Patient was AAO x4 and fully functional at baseline. Last known normal was 11:00pm 2/23 before beds. In 2/24 at 8:45AM, patient was found to be lethargy in bed by his son, was AAO x 4 and able to talk to his son, but started coughing and had one episode of nose bleed. At 9:15, her physical therapist noticed her becoming more lethargic, but vitals were normal. Soon at 9:30, patient was unresponsive and had a blank stare. EMS was called and her BP was found to be around 230/120. She was rushed to ED as Code Stroke. NIHSS 23. CTH showed Large left frontal intraparenchymal hematoma with intraventricular extension into left lateral ventricle, associated with marked mass effect resulting in 0.9 cm midline shift to the right anteriorly. Patient was intubated for airway protection    IMPRESSION  #Persistent Fevers    EEG no seizure   #Suspected aspergillus PNA    2/27 bronch-   Aspergillus fumigatus Referred to Mycology    Fungitell: <31 (03-01-21 @ 23:20), would suspect it to be +   < from: CT Chest w/ IV Cont (03.01.21 @ 20:56) >Filling defect compatible with mucous plugging is noted in the left lower lobe bronchus. There are moderate bilateral pleural effusions with areas of compression atelectasis in both lower lobes. An area of thickened interlobular septa with tree-in-bud opacities are noted in the posterior aspect of the right upper lobe.  < from: CT Sinuses w/ IV Cont (03.01.21 @ 21:18) >  1.  Opacification of the mastoid air cells and left middle ear canal consistent with inflammation or infection.  2.  Minimal mucosal thickening in the bilateral frontal, bilateral ethmoid, bilateral sphenoid, and left maxillary sinuses.  3.  Postoperative changes in left frontal lobe hemorrhage better demonstrated on the CT scan of the brain performed on February 28, 2021.    2/25 BCX NG  #Large left frontal intraparenchymal hemorrhage, low suspicion but Cannot rule out CSF infection - CSF studies + WBC/ RBC    Per family no proceeding symptoms week prior such as fevers, HA. Was in usual state of health week prior. Therefore unlikely infection that caused the intracranial event    CSF PCR NEGATIVE     CrAg NEGATIVE     Total Nucleated Cell Count, CSF: 373 /uL (02.26.21 @ 15:20)   Total Nucleated Cell Count, CSF: 30 /uL (02.27.21 @ 19:50)    CSF Segmented Neutrophils: 82 % (02.27.21 @ 19:50) CSF Segmented Neutrophils: 91 % (02.26.21 @ 15:20)  #B-cell lymphoma on chemo  #Pancytopenia  #Lactic acidosis  #Hypomagnesemia  #PCN allergy- unknown    RECOMMENDATIONS  - f/u repeat BCX (last 2/28) as continued fever  - Monitor LFTs on vori, ALT rising, if >100, change voriconazole to Ambisome  - Vanc 1g q12h IV, repeat trough prior to 4th new dose  - Meropenem 1g q8h IV (changed from aztreonam 3/4, on GNR abx coverage since 2/28-)  - Repeat MRI w/ Brain   - Plan for ABX for 14 days for empiric meningitis (end 3/13)  - Spoke with micro- aspergillus not mucor, f/u speciation and sensitivities   - Voriconazole 4 mg/kg twice daily IV for aspergillus PNA. Good CSF penetration as well.   - f/u fungal CSF culture  - tracheal fluid for BAL aspergillus galactomannan   - noé, TLC 2/24  - F/u serum aspergillus galactomannan   - Guarded prognosis, GOC    I will be away 3/8 to 3/18.   Please recall ID [id number] with questions -  Dr. Wang [id number] / Dr. Sherwood [phone]  Thank you.    Please call with any questions or send a message on [employer] Teams  Spectra [phone]        ASSESSMENT  75 years old female from home with PMHx of B cell lymphoma on Imbruvica (since 10/2020), CAD s/p stents 15 years ago, DM, chronic UTI, HTN, DLD, brought in by ambulance due to altered mental status and hypertension. Patient was AAO x4 and fully functional at baseline. Last known normal was 11:00pm 2/23 before beds. In 2/24 at 8:45AM, patient was found to be lethargy in bed by his son, was AAO x 4 and able to talk to his son, but started coughing and had one episode of nose bleed. At 9:15, her physical therapist noticed her becoming more lethargic, but vitals were normal. Soon at 9:30, patient was unresponsive and had a blank stare. EMS was called and her BP was found to be around 230/120. She was rushed to ED as Code Stroke. NIHSS 23. CTH showed Large left frontal intraparenchymal hematoma with intraventricular extension into left lateral ventricle, associated with marked mass effect resulting in 0.9 cm midline shift to the right anteriorly. Patient was intubated for airway protection    IMPRESSION  #Persistent Fevers    EEG no seizure   #Suspected aspergillus PNA    2/27 bronch-   Aspergillus fumigatus Referred to Mycology    Fungitell: <31 (03-01-21 @ 23:20), would suspect it to be +   < from: CT Chest w/ IV Cont (03.01.21 @ 20:56) >Filling defect compatible with mucous plugging is noted in the left lower lobe bronchus. There are moderate bilateral pleural effusions with areas of compression atelectasis in both lower lobes. An area of thickened interlobular septa with tree-in-bud opacities are noted in the posterior aspect of the right upper lobe.  < from: CT Sinuses w/ IV Cont (03.01.21 @ 21:18) >  1.  Opacification of the mastoid air cells and left middle ear canal consistent with inflammation or infection.  2.  Minimal mucosal thickening in the bilateral frontal, bilateral ethmoid, bilateral sphenoid, and left maxillary sinuses.  3.  Postoperative changes in left frontal lobe hemorrhage better demonstrated on the CT scan of the brain performed on February 28, 2021.    2/25 BCX NG  #Large left frontal intraparenchymal hemorrhage, low suspicion but Cannot rule out CSF infection - CSF studies + WBC/ RBC    Per family no proceeding symptoms week prior such as fevers, HA. Was in usual state of health week prior. Therefore unlikely infection that caused the intracranial event    CSF PCR NEGATIVE     CrAg NEGATIVE     Total Nucleated Cell Count, CSF: 373 /uL (02.26.21 @ 15:20)   Total Nucleated Cell Count, CSF: 30 /uL (02.27.21 @ 19:50)    CSF Segmented Neutrophils: 82 % (02.27.21 @ 19:50) CSF Segmented Neutrophils: 91 % (02.26.21 @ 15:20)  #B-cell lymphoma on chemo  #Pancytopenia  #Lactic acidosis  #Hypomagnesemia  #PCN allergy- unknown    RECOMMENDATIONS  - f/u repeat BCX (last 2/28) as continued fever  - Monitor LFTs on vori, ALT rising, if >100, change voriconazole to Ambisome  - Vanc 1g q12h IV, repeat trough prior to 4th new dose  - Meropenem 1g q8h IV (changed from aztreonam 3/4, on GNR abx coverage since 2/28-)  - Repeat MRI w/ Brain   - Plan for ABX for 14 days for empiric meningitis (end 3/13)  - Spoke with micro- aspergillus not mucor, f/u speciation and sensitivities   - Voriconazole 4 mg/kg twice daily IV for aspergillus PNA. Good CSF penetration as well.   - f/u fungal CSF culture  - tracheal fluid for BAL aspergillus galactomannan   - noé, TLC 2/24  - F/u serum aspergillus galactomannan   - Guarded prognosis, [facility]    I will be away 3/8 to 3/18, Dr. Wang [phone] / Dr. Sherwood [phone] will be covering  Thank you.     Please call with any questions or send a message on [employer] Teams  Spectra [phone]

## 2021-03-05 NOTE — CHART NOTE - NSCHARTNOTEFT_GEN_A_CORE
Registered Dietitian Follow-Up     Patient Profile Reviewed                           Yes [x]   No []     Nutrition History Previously Obtained        Yes []  No [x]--unable to obtain as pt remains intubated     Pertinent Medical Interventions: EVD pulled yesterday, being covered with abx/ antifungals for PNA +/- CNs infection. Pt remains intubated and has been off sedation for 24 hours. VEEG was negative for seizures; unchanged neuro exam today. Per Neurology, if exam not improving, consider MRI brain to assess extent of injury. SBT, if tolerates.    Diet order: Glucerna 1.2 @ 50ml/hr (1440kcal, 72gm pro, 966ml free water)--per RN, pt tolerating current TF regimen well w/o any issues. MAP 99.      Anthropometrics:  - Ht. 63"   - Wt. 163#/74kg (3/4)  (3/1): 154#/69.9kg--likely 2/2 bed-scale error   (2/24): 161#/73kg--dosing wt   - %wt change  - BMI: 28.5 sing dosing wt   - IBW: 115#      Pertinent Lab Data: (3/5): H/H 8.0/25.4, Cr. <0.5, Gluc 150, POCT 156     Pertinent Meds: heparin, abx, NS, insulin, metformin, precedex, labetalol, lipitor, lisinopril, keppra, protonix, senna     Physical Findings:  - Appearance: intubated/ventilated; 1+ generalized edema noted   - GI function: no GI distress noted per RN, LBM 3/5  - Tubes: OGT   - Oral/Mouth cavity: NPO   - Skin: surgical incision; stage 2 pressure injury to sacrum (documented on 2/27)      Nutrition Requirements  Weight Used: Dosing wt: 161#/73kg, Ve: 10.9, Tmax: 38.6; kcal needs slightly readjusted today      Calories: 1720 kcal/day ([id number])   Protein:  gm/day (1.2-1.5 gm/kg CBW)   Fluids per SICU     Nutrient Intake: only meeting 84% of kcal needs and 65-82% of pro needs      Previous Nutrition Diagnosis: Inadequate protein-energy intake (ongoing)      Nutrition Intervention: enteral nutrition    Recommendations:  1. As pt has been tolerating standarized formula (Glucerna 1.2) w/o any issues, will keep pt on it, however to avoid underfeeding pt, recommend increasing rate to 60ml/hr. TF to provide a total of 1728kcal, 86gm pro, 1159ml free water. Additional flushes per LIP. All recs discussed with LIP ([id number])      Goal/Expected Outcome: Pt to meet % of estimated nutrient needs within 4 days     Indicator/Monitoring: RD to monitor diet order, energy intake, body composition, glucose profile, NFPF.

## 2021-03-05 NOTE — PROGRESS NOTE ADULT - SUBJECTIVE AND OBJECTIVE BOX
1. Brief Presentation:  AMS    2. Today's Acute Problems:   Left frontal ich s/p evacuation currently intubated.      3. Relevant brief History:   75 years old female from home with PMHx of B cell lymphoma on Imbruvica (since 10/2020), CAD s/p stents 15 years ago, DM, chronic UTI, HTN, DLD, brought in by ambulance due to altered mental status and hypertension. Patient was AAO x4 and fully functional at baseline. Last known normal was 11:00pm 2/23 before beds. In 2/24 at 8:45AM, patient was found to be lethargy in bed by his son, was AAO x 4 and able to talk to his son, but started coughing and had one episode of nose bleed. At 9:15, her physical therapist noticed her becoming more lethargic, but vitals were normal. Soon at 9:30, patient was unresponsive and had a blank stare. EMS was called and her BP was found to be around 230/120. She was rushed to ED as Code Stroke. NIHSS 23. CTH showed Large left frontal intraparenchymal hematoma with intraventricular extension into left lateral ventricle, associated with marked mass effect resulting in 0.9 cm midline shift to the right anteriorly. Patient was intubated for airway protection. S/p 2 units of platelet for ASA reversal. Neurosurgery was planning for emergent OR resection of hematoma.       Yesterday's Plan:  -No seizures, can discontinue CEEG  -Continue keppra  -If exam not improving, consider MRI brain to assess extent of injury  -3% for goal normonatremia 135-145  -sbp<160  -EVD pulled yesterday, being covered with abx/ antifungals for PNA +/- CNs infection.   -SBT - if tolerates would continue on CPAP and work towards extubation; not currently alert enough but may be ready in next 1-2 days      4.Last 24 hour updates:  Patient remains mechanically vented and has been off sedation for approx . 24 hours; still not responsive to commands , with draws to pain left >right . No response to pain on the RUE. She grimaces to pain and no spontaneous eye opening. This exam is essentially unchanged from prior neuro exam. No acute overnight events. Patient is febrile tmax 101.5        5.Medications:  acetaminophen   Tablet .. 650 milliGRAM(s) Oral every 6 hours  atorvastatin 80 milliGRAM(s) Oral at bedtime  chlorhexidine 0.12% Liquid 15 milliLiter(s) Oral Mucosa two times a day  chlorhexidine 4% Liquid 1 Application(s) Topical daily  dexMEDEtomidine Infusion 0.1 MICROgram(s)/kG/Hr IV Continuous <Continuous>  heparin   Injectable 5000 Unit(s) SubCutaneous every 8 hours  insulin regular  human corrective regimen sliding scale   IV Push every 4 hours  labetalol 300 milliGRAM(s) Oral every 8 hours  levETIRAcetam  IVPB 750 milliGRAM(s) IV Intermittent every 12 hours  lisinopril 20 milliGRAM(s) Oral <User Schedule>  meropenem  IVPB      meropenem  IVPB 1000 milliGRAM(s) IV Intermittent every 8 hours  pantoprazole   Suspension 40 milliGRAM(s) Oral daily  senna 2 Tablet(s) Oral at bedtime  sodium chloride 3%. 500 milliLiter(s) IV Continuous <Continuous>  vancomycin  IVPB 1000 milliGRAM(s) IV Intermittent every 12 hours  voriconazole IVPB 300 milliGRAM(s) IV Intermittent every 12 hours      6.Ancillary Mangement:  Chest PT[]  Head of bed >35 [x]  Out of bed to chair []  PT/OT/ST []  Spirometry[]  DVT prophalaxis [x]      7.Neuro Exam:  patient intubated not sedated , not responsive to commands  Pupils 3mm brisk response, eyes midline, +blink + grimace + gag  Moves left upper extremity and B/L LE at random   Responds to pain stimulus all exts except the RUE   B/L Upgoing toes        Last CTH:  < from: CT Head No Cont (02.28.21 @ 18:22) >  IMPRESSION:    Since February 25, 2021;    1.  Unchanged left frontal hematoma cavity measuring approximately 4.5 cm containing slightly decreasedamount of residual blood products.    2. Overall unchanged scattered subarachnoid hemorrhage and layering intraventricular hemorrhage. No significant midline shift.    3. Interval decrease in pneumocephalus.    4. Left transfrontal ventricular drain with distal tip at the level of the third ventricle.    ELLIOT LANDAU MD; Attending Radiologist  This document has been electronically signed. Feb 28 2021  6:42PM    < end of copied text >          Last EEG:    VEEG in the last 24 hours:   intubated    Background---------------- low amplitude mainly sleep recording. minimaly reactive.     Focal and generalized slowing-------generalized slowing                                                       left hemispheric  focal slowing ( highrer amplitude? breach artifact)    Interictal activity---------------------  none    Events------ none    Seizures---------  none    Impression:  abnormal as above     Plan - discussed with the team          Electronic Signatures:  Mauricio Pal)  (Signed 04-Mar-2021 12:19)  	Authored: EEG REPORT      Last Updated: 04-Mar-2021 12:19 by Mauricio Pal)        8.CVS:  Vital Signs Last 24 Hrs  T(C): 38.6 (05 Mar 2021 05:00), Max: 38.6 (05 Mar 2021 05:00)  T(F): 101.5 (05 Mar 2021 05:00), Max: 101.5 (05 Mar 2021 05:00)  HR: 89 (05 Mar 2021 05:00) (73 - 89)  BP: 157/72 (05 Mar 2021 05:00) (130/58 - 157/72)  BP(mean): 103 (05 Mar 2021 05:00) (83 - 103)  RR: 28 (05 Mar 2021 05:00) (15 - 29)  SpO2: 99% (05 Mar 2021 05:00) (99% - 100%)      Last EKG:  < from: 12 Lead ECG (02.24.21 @ 22:58) >  Systolic  mmHg    Diastolic BP 47 mmHg    Ventricular Rate 125 BPM    Atrial Rate 125 BPM    P-R Interval 142 ms    QRS Duration 124 ms    Q-T Interval 334 ms    QTC Calculation(Bazett) 482 ms    P Axis 81 degrees    R Axis 90 degrees    T Axis 76 degrees    Diagnosis Line Sinus tachycardia  Right bundle branch block  Abnormal ECG      < end of copied text >        9.Respiratory:  ABG:ABG - ( 05 Mar 2021 03:22 )  pH, Arterial: 7.43  pH, Blood: x     /  pCO2: 36    /  pO2: 100   / HCO3: 24    / Base Excess: -0.3  /  SaO2: 100         Chest Xray:  < from: Xray Chest 1 View- PORTABLE-Routine (03.04.21 @ 06:43) >  Findings:    Support devices: Endotracheal tube, feeding tube and right IJ central venous catheter are positioned appropriately. Telemetry leads overlie chest.    Cardiac/mediastinum/hilum: Unchanged.    Lung parenchyma/Pleura: Bilateral opacifications/effusions, unchanged. No pneumothorax.    Skeleton/soft tissues: Unchanged.    Impression:    Bilateral opacifications/effusions, unchanged.    Tubes and lines positioned appropriately.        MICHAEL HU MD; Attending Radiologist  This document has been electronically signed. Mar  4 2021 10:26AM    < end of copied text >    Mode: AC/ CMV (Assist Control/ Continuous Mandatory Ventilation)  RR (machine): 16  TV (machine): 400  FiO2: 30  PEEP: 5  ITime: 0.8  MAP: 11  PIP: 29      10.GI:  Prophylaxis    GI:  Protonix 40 mg q 24  LIVER FUNCTIONS - ( 04 Mar 2021 16:50 )  Alb: 2.8 g/dL / Pro: 5.0 g/dL / ALK PHOS: 222 U/L / ALT: 39 U/L / AST: 58 U/L / GGT: x             11.Renal/Fluids/Electrolytes:    03-05    142  |  111<H>  |  16  ----------------------------<  178<H>  4.0   |  21  |  <0.5<L>    Ca    8.2<L>      05 Mar 2021 01:00  Phos  3.1     03-05  Mg     1.9     03-05    TPro  5.0<L>  /  Alb  2.8<L>  /  TBili  0.4  /  DBili  0.2  /  AST  58<H>  /  ALT  39  /  AlkPhos  222<H>  03-04          I&O's Detail    03 Mar 2021 07:01  -  04 Mar 2021 07:00  --------------------------------------------------------  IN:    Dexmedetomidine: 17.4 mL    Enteral Tube Flush: 150 mL    Glucerna 1.5: 1200 mL    Insulin: 26 mL    IV PiggyBack: 200 mL    IV PiggyBack: 500 mL    IV PiggyBack: 300 mL    IV PiggyBack: 250 mL    sodium chloride 3%: 860 mL  Total IN: 3503.4 mL    OUT:    Indwelling Catheter - Urethral (mL): 1240 mL  Total OUT: 1240 mL    Total NET: 2263.4 mL      04 Mar 2021 07:01  -  05 Mar 2021 06:17  --------------------------------------------------------  IN:    Enteral Tube Flush: 190 mL    Glucerna 1.5: 1000 mL    IV PiggyBack: 250 mL    IV PiggyBack: 200 mL    IV PiggyBack: 100 mL    sodium chloride 3%: 400 mL    sodium chloride 3%: 40 mL    sodium chloride 3%: 250 mL  Total IN: 2430 mL    OUT:    Dexmedetomidine: 0 mL    Indwelling Catheter - Urethral (mL): 825 mL  Total OUT: 825 mL    Total NET: 1605 mL          12.ID:  TMax:  Vital Signs Last 24 Hrs  T(C): 38.6 (05 Mar 2021 05:00), Max: 38.6 (05 Mar 2021 05:00)  T(F): 101.5 (05 Mar 2021 05:00), Max: 101.5 (05 Mar 2021 05:00)  HR: 89 (05 Mar 2021 05:00) (73 - 89)  BP: 157/72 (05 Mar 2021 05:00) (130/58 - 157/72)  BP(mean): 103 (05 Mar 2021 05:00) (83 - 103)  RR: 28 (05 Mar 2021 05:00) (15 - 29)  SpO2: 99% (05 Mar 2021 05:00) (99% - 100%)          13.Hematology:                        8.0    7.19  )-----------( 144      ( 05 Mar 2021 01:00 )             25.4       PT/INR - ( 03 Mar 2021 23:30 )   PT: 14.40 sec;   INR: 1.25 ratio         PTT - ( 03 Mar 2021 23:30 )  PTT:39.5 sec        DVT Prophylaxis  Lovenox[]   Heparin[]   Venodyne []   SCD's[x]

## 2021-03-05 NOTE — PROGRESS NOTE ADULT - ASSESSMENT
Impression:  75y Female s/p craniotomy for left frontal hematoma with evacuation and EVD placement. Intubated and has been off sedation for 24 hours . VEEG was negative for seizures. Unchanged neuroexam today. No acute overnight events. Patient is febrile tmax 101.5    Suggestions:    Plan:  #Neuro:  - Neuro checks q1hr  -Continue Keppra 750 mg bid  -If exam not improving, consider MRI brain to assess extent of injury  -3% for goal normonatremia  135-145 (na 143 today )        #CV:  - Keep SBP < 160       #Resp:  - Ventilator management as per ICU team  - HOB > 35 degrees  - Aspiration precautions  --SBT - if tolerates         #Renal/Fluid/Electolytes:  - Strict I&Os  - Monitor lytes, replete as needed         #ID:  - Antibiotics as per ID, being covered with abx/ antifungals for PNA +/- CNs infection.         #GI:  - Continue Protonix 40mg IV q12hrs  - Senna 2 tabs q hs      #DVT prophylaxis:  - SCS while in bed  - heparin sq          Disposition: Continue critical care monitoring

## 2021-03-05 NOTE — PROGRESS NOTE ADULT - SUBJECTIVE AND OBJECTIVE BOX
SBAD  75y, Female  Allergy: penicillin (Anaphylaxis; Hives)      LOS  9d    CHIEF COMPLAINT: altered mental status (05 Mar 2021 07:20)      INTERVAL EVENTS/HPI  - remains febrile, No leukocytosis   - T(F): , Max: 101.5 (03-05-21 @ 05:00)  - Tolerating medication  - WBC Count: 7.19 (03-05-21 @ 01:00)  WBC Count: 6.94 (03-03-21 @ 23:30)  - Creatinine, Serum: <0.5 (03-05-21 @ 05:40)  Creatinine, Serum: <0.5 (03-05-21 @ 01:00)       ROS  unable to obtain history secondary to patient's mental status and/or sedation     VITALS:  T(F): 101.1, Max: 101.5 (03-05-21 @ 05:00)  HR: 86  BP: 136/64  RR: 24Vital Signs Last 24 Hrs  T(C): 38.4 (05 Mar 2021 06:00), Max: 38.6 (05 Mar 2021 05:00)  T(F): 101.1 (05 Mar 2021 06:00), Max: 101.5 (05 Mar 2021 05:00)  HR: 86 (05 Mar 2021 06:00) (73 - 89)  BP: 136/64 (05 Mar 2021 06:00) (130/58 - 157/72)  BP(mean): 80 (05 Mar 2021 06:00) (80 - 103)  RR: 24 (05 Mar 2021 06:00) (15 - 29)  SpO2: 99% (05 Mar 2021 06:00) (99% - 100%)    PHYSICAL EXAM:  ***    FH: Non-contributory  Social Hx: Non-contributory    TESTS & MEASUREMENTS:                        8.0    7.19  )-----------( 144      ( 05 Mar 2021 01:00 )             25.4     03-05    143  |  112<H>  |  17  ----------------------------<  186<H>  4.4   |  23  |  <0.5<L>    Ca    8.7      05 Mar 2021 05:40  Phos  3.1     03-05  Mg     1.9     03-05    TPro  5.0<L>  /  Alb  2.8<L>  /  TBili  0.4  /  DBili  0.2  /  AST  58<H>  /  ALT  39  /  AlkPhos  222<H>  03-04    eGFR if Non African American: 102 mL/min/1.73M2 (03-05-21 @ 05:40)  eGFR if African American: 118 mL/min/1.73M2 (03-05-21 @ 05:40)  eGFR if Non African American: 102 mL/min/1.73M2 (03-05-21 @ 01:00)  eGFR if : 118 mL/min/1.73M2 (03-05-21 @ 01:00)  eGFR if Non African American: 102 mL/min/1.73M2 (03-04-21 @ 16:50)  eGFR if : 118 mL/min/1.73M2 (03-04-21 @ 16:50)  eGFR if Non African American: 102 mL/min/1.73M2 (03-04-21 @ 12:23)  eGFR if : 118 mL/min/1.73M2 (03-04-21 @ 12:23)    LIVER FUNCTIONS - ( 04 Mar 2021 16:50 )  Alb: 2.8 g/dL / Pro: 5.0 g/dL / ALK PHOS: 222 U/L / ALT: 39 U/L / AST: 58 U/L / GGT: x               Culture - Fungal, CSF (collected 03-02-21 @ 14:31)  Source: .CSF CSF  Preliminary Report (03-03-21 @ 08:04):    Testing in progress    Culture - Blood (collected 02-28-21 @ 23:21)  Source: .Blood None  Preliminary Report (03-02-21 @ 09:01):    No growth to date.    Culture - CSF with Gram Stain (collected 02-27-21 @ 19:50)  Source: .CSF CSF  Gram Stain (02-28-21 @ 01:32):    polymorphonuclear leukocytes seen    No organisms seen    by cytocentrifuge  Final Report (03-02-21 @ 15:57):    No growth at 3 days.    Culture - Bronchial (collected 02-27-21 @ 12:22)  Source: Bronch Wash Bronchoalveolar Lavage  Gram Stain (02-27-21 @ 22:39):    Moderate polymorphonuclear leukocytes per low power field    Few Squamous epithelial cells per low power field    Moderate Yeast like cells per oil power field    Few Gram positive cocci in pairs per oil power field  Preliminary Report (03-01-21 @ 18:40):    Mold like fungus Referred to Mycology    Normal Respiratory Macie present    Culture - Fungal, CSF (collected 02-26-21 @ 15:20)  Source: .CSF CSF  Preliminary Report (03-01-21 @ 08:46):    Testing in progress    Culture - Acid Fast - CSF (collected 02-26-21 @ 15:20)  Source: .CSF CSF  Preliminary Report (03-03-21 @ 15:07):    Culture is being performed.    Culture - CSF with Gram Stain (collected 02-26-21 @ 15:20)  Source: .CSF CSF... lumbar tap  Gram Stain (02-27-21 @ 07:24):    polymorphonuclear leukocytes seen    No organisms seen    by cytocentrifuge  Final Report (03-01-21 @ 16:14):    No growth at 3 days.    Culture - Blood (collected 02-25-21 @ 12:40)  Source: .Blood Blood-Peripheral  Final Report (03-02-21 @ 23:00):    No Growth Final            INFECTIOUS DISEASES TESTING  Vancomycin Level, Trough: 10.5 (03-05-21 @ 05:59)  Vancomycin Level, Trough: 16.7 (03-04-21 @ 06:58)  Vancomycin Level, Trough: 24.4 (03-03-21 @ 21:00)  Fungitell: <31 (03-01-21 @ 23:20)  Vancomycin Level, Random: 15.2 (03-01-21 @ 12:25)  Vancomycin Level, Random: 9.8 (02-27-21 @ 16:33)  Vancomycin Level, Trough: 7.8 (02-26-21 @ 16:33)  Rapid RVP Result: NotDetec (02-24-21 @ 15:43)  Procalcitonin, Serum: 0.43 (10-12-20 @ 20:34)  COVID-19 PCR: NotDetec (10-12-20 @ 16:21)  MRSA PCR Result.: Negative (08-25-20 @ 05:04)  COVID-19 PCR: NotDetec (08-24-20 @ 19:00)  Vancomycin Level, Trough: 11.5 (07-07-20 @ 07:05)  Procalcitonin, Serum: 0.26 (07-06-20 @ 09:30)  COVID-19 PCR: NotDetec (07-05-20 @ 18:24)  COVID-19 PCR: NotDetec (06-10-20 @ 14:21)  COVID-19 PCR: NotDetec (05-18-20 @ 14:28)      INFLAMMATORY MARKERS      RADIOLOGY & ADDITIONAL TESTS:  I have personally reviewed the last available Chest xray  CXR      CT      CARDIOLOGY TESTING  12 Lead ECG:   Systolic  mmHg    Diastolic BP 47 mmHg    Ventricular Rate 125 BPM    Atrial Rate 125 BPM    P-R Interval 142 ms    QRS Duration 124 ms    Q-T Interval 334 ms    QTC Calculation(Bazett) 482 ms    P Axis 81 degrees    R Axis 90 degrees    T Axis 76 degrees    Diagnosis Line Sinus tachycardia  Right bundle branch block  Abnormal ECG    Confirmed by VERONIKA FOSTER MD ([id number]) on 2/25/2021 10:07:07 PM (02-24-21 @ 22:58)  12 Lead ECG:   Ventricular Rate 154 BPM    Atrial Rate 154 BPM    QRS Duration 120 ms    Q-T Interval 312 ms    QTC Calculation(Bazett) 499 ms    R Axis 84 degrees    T Axis 21 degrees    Diagnosis Line Atrial flutter with 2:1 A-V conduction  Right bundle branch block  Abnormal ECG    Confirmed by Benton Yousif ([id number]) on 2/24/2021 12:08:37 PM (02-24-21 @ 10:42)      MEDICATIONS  acetaminophen   Tablet .. 650 Oral every 6 hours  atorvastatin 80 Oral at bedtime  chlorhexidine 0.12% Liquid 15 Oral Mucosa two times a day  chlorhexidine 4% Liquid 1 Topical daily  dexMEDEtomidine Infusion 0.1 IV Continuous <Continuous>  heparin   Injectable 5000 SubCutaneous every 8 hours  insulin regular  human corrective regimen sliding scale  IV Push every 4 hours  labetalol 300 Oral every 8 hours  levETIRAcetam  IVPB 750 IV Intermittent every 12 hours  lisinopril 20 Oral <User Schedule>  meropenem  IVPB 1000 IV Intermittent every 8 hours  meropenem  IVPB     pantoprazole   Suspension 40 Oral daily  senna 2 Oral at bedtime  sodium chloride 3%. 500 IV Continuous <Continuous>  vancomycin  IVPB 1000 IV Intermittent every 12 hours  voriconazole IVPB 300 IV Intermittent every 12 hours      WEIGHT  Weight (kg): 73.028 (02-24-21 @ 21:59)  Creatinine, Serum: <0.5 mg/dL (03-05-21 @ 05:40)  Creatinine, Serum: <0.5 mg/dL (03-05-21 @ 01:00)  Creatinine, Serum: <0.5 mg/dL (03-04-21 @ 16:50)  Creatinine, Serum: <0.5 mg/dL (03-04-21 @ 12:23)      ANTIBIOTICS:  meropenem  IVPB 1000 milliGRAM(s) IV Intermittent every 8 hours  meropenem  IVPB      vancomycin  IVPB 1000 milliGRAM(s) IV Intermittent every 12 hours  voriconazole IVPB 300 milliGRAM(s) IV Intermittent every 12 hours      All available historical records have been reviewed       MIGUEL ANGELAD BRAUN  75y, Female  Allergy: penicillin (Anaphylaxis; Hives)      LOS  9d    CHIEF COMPLAINT: altered mental status (05 Mar 2021 07:20)      INTERVAL EVENTS/HPI  - remains febrile, No leukocytosis   - T(F): , Max: 101.5 (03-05-21 @ 05:00)  - Tolerating medication  - WBC Count: 7.19 (03-05-21 @ 01:00)  WBC Count: 6.94 (03-03-21 @ 23:30)  - Creatinine, Serum: <0.5 (03-05-21 @ 05:40)  Creatinine, Serum: <0.5 (03-05-21 @ 01:00)       ROS  unable to obtain history secondary to patient's mental status and/or sedation     VITALS:  T(F): 101.1, Max: 101.5 (03-05-21 @ 05:00)  HR: 86  BP: 136/64  RR: 24Vital Signs Last 24 Hrs  T(C): 38.4 (05 Mar 2021 06:00), Max: 38.6 (05 Mar 2021 05:00)  T(F): 101.1 (05 Mar 2021 06:00), Max: 101.5 (05 Mar 2021 05:00)  HR: 86 (05 Mar 2021 06:00) (73 - 89)  BP: 136/64 (05 Mar 2021 06:00) (130/58 - 157/72)  BP(mean): 80 (05 Mar 2021 06:00) (80 - 103)  RR: 24 (05 Mar 2021 06:00) (15 - 29)  SpO2: 99% (05 Mar 2021 06:00) (99% - 100%)    PHYSICAL EXAM:  General: intubated  HEENT: NCAT, incision clean, no erythema/purulence   CV: RRR  Lungs: symmetric chest expansion, decreased BS at bases  Abd: Soft  Skin: no rash  Neuro: sedated  Lines: no phlebitis     FH: Non-contributory  Social Hx: Non-contributory    TESTS & MEASUREMENTS:                        8.0    7.19  )-----------( 144      ( 05 Mar 2021 01:00 )             25.4     03-05    143  |  112<H>  |  17  ----------------------------<  186<H>  4.4   |  23  |  <0.5<L>    Ca    8.7      05 Mar 2021 05:40  Phos  3.1     03-05  Mg     1.9     03-05    TPro  5.0<L>  /  Alb  2.8<L>  /  TBili  0.4  /  DBili  0.2  /  AST  58<H>  /  ALT  39  /  AlkPhos  222<H>  03-04    eGFR if Non African American: 102 mL/min/1.73M2 (03-05-21 @ 05:40)  eGFR if African American: 118 mL/min/1.73M2 (03-05-21 @ 05:40)  eGFR if Non African American: 102 mL/min/1.73M2 (03-05-21 @ 01:00)  eGFR if : 118 mL/min/1.73M2 (03-05-21 @ 01:00)  eGFR if Non African American: 102 mL/min/1.73M2 (03-04-21 @ 16:50)  eGFR if : 118 mL/min/1.73M2 (03-04-21 @ 16:50)  eGFR if Non African American: 102 mL/min/1.73M2 (03-04-21 @ 12:23)  eGFR if : 118 mL/min/1.73M2 (03-04-21 @ 12:23)    LIVER FUNCTIONS - ( 04 Mar 2021 16:50 )  Alb: 2.8 g/dL / Pro: 5.0 g/dL / ALK PHOS: 222 U/L / ALT: 39 U/L / AST: 58 U/L / GGT: x               Culture - Fungal, CSF (collected 03-02-21 @ 14:31)  Source: .CSF CSF  Preliminary Report (03-03-21 @ 08:04):    Testing in progress    Culture - Blood (collected 02-28-21 @ 23:21)  Source: .Blood None  Preliminary Report (03-02-21 @ 09:01):    No growth to date.    Culture - CSF with Gram Stain (collected 02-27-21 @ 19:50)  Source: .CSF CSF  Gram Stain (02-28-21 @ 01:32):    polymorphonuclear leukocytes seen    No organisms seen    by cytocentrifuge  Final Report (03-02-21 @ 15:57):    No growth at 3 days.    Culture - Bronchial (collected 02-27-21 @ 12:22)  Source: Bronch Wash Bronchoalveolar Lavage  Gram Stain (02-27-21 @ 22:39):    Moderate polymorphonuclear leukocytes per low power field    Few Squamous epithelial cells per low power field    Moderate Yeast like cells per oil power field    Few Gram positive cocci in pairs per oil power field  Preliminary Report (03-01-21 @ 18:40):    Mold like fungus Referred to Mycology    Normal Respiratory Macie present    Culture - Fungal, CSF (collected 02-26-21 @ 15:20)  Source: .CSF CSF  Preliminary Report (03-01-21 @ 08:46):    Testing in progress    Culture - Acid Fast - CSF (collected 02-26-21 @ 15:20)  Source: .CSF CSF  Preliminary Report (03-03-21 @ 15:07):    Culture is being performed.    Culture - CSF with Gram Stain (collected 02-26-21 @ 15:20)  Source: .CSF CSF... lumbar tap  Gram Stain (02-27-21 @ 07:24):    polymorphonuclear leukocytes seen    No organisms seen    by cytocentrifuge  Final Report (03-01-21 @ 16:14):    No growth at 3 days.    Culture - Blood (collected 02-25-21 @ 12:40)  Source: .Blood Blood-Peripheral  Final Report (03-02-21 @ 23:00):    No Growth Final            INFECTIOUS DISEASES TESTING  Vancomycin Level, Trough: 10.5 (03-05-21 @ 05:59)  Vancomycin Level, Trough: 16.7 (03-04-21 @ 06:58)  Vancomycin Level, Trough: 24.4 (03-03-21 @ 21:00)  Fungitell: <31 (03-01-21 @ 23:20)  Vancomycin Level, Random: 15.2 (03-01-21 @ 12:25)  Vancomycin Level, Random: 9.8 (02-27-21 @ 16:33)  Vancomycin Level, Trough: 7.8 (02-26-21 @ 16:33)  Rapid RVP Result: NotDetec (02-24-21 @ 15:43)  Procalcitonin, Serum: 0.43 (10-12-20 @ 20:34)  COVID-19 PCR: NotDetec (10-12-20 @ 16:21)  MRSA PCR Result.: Negative (08-25-20 @ 05:04)  COVID-19 PCR: NotDetec (08-24-20 @ 19:00)  Vancomycin Level, Trough: 11.5 (07-07-20 @ 07:05)  Procalcitonin, Serum: 0.26 (07-06-20 @ 09:30)  COVID-19 PCR: NotDetec (07-05-20 @ 18:24)  COVID-19 PCR: NotDetec (06-10-20 @ 14:21)  COVID-19 PCR: NotDetec (05-18-20 @ 14:28)      INFLAMMATORY MARKERS      RADIOLOGY & ADDITIONAL TESTS:  I have personally reviewed the last available Chest xray  CXR      CT      CARDIOLOGY TESTING  12 Lead ECG:   Systolic  mmHg    Diastolic BP 47 mmHg    Ventricular Rate 125 BPM    Atrial Rate 125 BPM    P-R Interval 142 ms    QRS Duration 124 ms    Q-T Interval 334 ms    QTC Calculation(Bazett) 482 ms    P Axis 81 degrees    R Axis 90 degrees    T Axis 76 degrees    Diagnosis Line Sinus tachycardia  Right bundle branch block  Abnormal ECG    Confirmed by VERONIKA FOSTER MD ([id number]) on 2/25/2021 10:07:07 PM (02-24-21 @ 22:58)  12 Lead ECG:   Ventricular Rate 154 BPM    Atrial Rate 154 BPM    QRS Duration 120 ms    Q-T Interval 312 ms    QTC Calculation(Bazett) 499 ms    R Axis 84 degrees    T Axis 21 degrees    Diagnosis Line Atrial flutter with 2:1 A-V conduction  Right bundle branch block  Abnormal ECG    Confirmed by Benton Yousif ([id number]) on 2/24/2021 12:08:37 PM (02-24-21 @ 10:42)      MEDICATIONS  acetaminophen   Tablet .. 650 Oral every 6 hours  atorvastatin 80 Oral at bedtime  chlorhexidine 0.12% Liquid 15 Oral Mucosa two times a day  chlorhexidine 4% Liquid 1 Topical daily  dexMEDEtomidine Infusion 0.1 IV Continuous <Continuous>  heparin   Injectable 5000 SubCutaneous every 8 hours  insulin regular  human corrective regimen sliding scale  IV Push every 4 hours  labetalol 300 Oral every 8 hours  levETIRAcetam  IVPB 750 IV Intermittent every 12 hours  lisinopril 20 Oral <User Schedule>  meropenem  IVPB 1000 IV Intermittent every 8 hours  meropenem  IVPB     pantoprazole   Suspension 40 Oral daily  senna 2 Oral at bedtime  sodium chloride 3%. 500 IV Continuous <Continuous>  vancomycin  IVPB 1000 IV Intermittent every 12 hours  voriconazole IVPB 300 IV Intermittent every 12 hours      WEIGHT  Weight (kg): 73.028 (02-24-21 @ 21:59)  Creatinine, Serum: <0.5 mg/dL (03-05-21 @ 05:40)  Creatinine, Serum: <0.5 mg/dL (03-05-21 @ 01:00)  Creatinine, Serum: <0.5 mg/dL (03-04-21 @ 16:50)  Creatinine, Serum: <0.5 mg/dL (03-04-21 @ 12:23)      ANTIBIOTICS:  meropenem  IVPB 1000 milliGRAM(s) IV Intermittent every 8 hours  meropenem  IVPB      vancomycin  IVPB 1000 milliGRAM(s) IV Intermittent every 12 hours  voriconazole IVPB 300 milliGRAM(s) IV Intermittent every 12 hours      All available historical records have been reviewed

## 2021-03-05 NOTE — PROGRESS NOTE ADULT - ATTENDING COMMENTS
Patient seen today with neurology ACP team.     I was physically present for the key portions of the evaluation and management (E/M) service provided.  I agree with the above history, physical, and plan with the following additions/modifications     Exam appears to be slightly improving, but still worse than I might expect for this size and location of hemorrhage.  Possibly from superimposed meningitis, though CSF studies were somewhat equivocal - regardless hsould have full antibiotic/antifungal course per ID.  -wean 3%, but maintain target normonatremia 135-145  -SBT if tolerated  -agree with plan for MRI brain w/wo to assess extent of injury and infection  -should be able to achieve reasonably good neurologic recovery if can survive secondary medical illnesses    Broderick Ordonez MD  Attending Neurointensivist

## 2021-03-05 NOTE — PROGRESS NOTE ADULT - ATTENDING COMMENTS
Intubated and sedated, continues to spike fevers, plan for trach/PEG. Counts remain stable.   Case was discussed with patient's daughter Kathrine.

## 2021-03-05 NOTE — PROGRESS NOTE ADULT - ASSESSMENT
Assessment and Plan:     75y Female s/p craniotomy for left frontal hematoma with evacuation and EVD placement. Intubated and sedated. SICU admission for hemodynamic monitoring.     NEURO:  sedation: keep off if possible  Pain controlled with IV Tylenol   Keppra 750 mg q 12  EVD removed 3/2   24hr EEG: started 3/3 at 5PM-  no seizure activity  q 1 hr neurocheck, f/u with NSX  2/25 CTH- ventricular drain in 3rd ventricle, decr size of lateral ventricles, scattered SAH and layering IVH in posterior horns of  lateral ventricle slightly increased  2/28 rpt CTH - stable- L frontal hemoatom cavity 4.5 cm, slighlty decr amount of blood, interval decr in pneumocephalus, L transfrontal drain tip in the third ventricle   3/1: CT sinus-prelim- no evidence of inflammatory/infectious diseases of paransal sinuses  Family meeting 3/3: possible trach/peg this week, follow up with palliative      RESP:   Intubated: /16/30/5  AM ABG:  AM CXR  3/1: CT chest -mucus plug L lower lobe bronchus, mod bilat pleural effusions w/ areas of atlectasis in both lower lobes. tree-in-bud opacities in posterior aspect of R upper lobe  +aspergillus  Keep HOB elevated at 30 degrees  Possible trach this week s/p speaking with HCP bedside on 3/3    CARDS:   Acute on chronic HTN        - increased home Lisinopril to 20mg       - labetalol 300 q8h        - rescheduled lisinopril dose s/p hypotension when giving both  H/o CAD s/p stents - hold ASA  EKG:  Echo (10/13/20): EF 55-60%, trace MR, R atrial echodensity possible thrombus  Trop 0.24 --> 0.21->0.15, no longer trending    GI/NUTR:   Diet: TF- Glucerna 1.2 @ goal 50/hr  OGT  GI Prophylaxis- PPI  Bowel regimen- Senna, lactulose   BM 3/4   Possible PEG this week, s/p meeting with HCP bedside    /RENAL:   Monitor UO-roblero in place  IVL  Hyponatremia- 3% @ 30cc/hr - goal normonatremia  q6BMP & Osm  BUN/Cr- 18/0.5  Na 141 // K 4.2 // Mg --// Phos--// 3/04 @  16:50    HEME/ONC:   DVT prophylaxis: heparin   Injectable 5000 every 8 hours  Hb/Hct:  8.2/25.4  -->,  9.0/27.7  -->,  8.4/26.0  --> 7.8/23-->7.9/24.9 --> 8.0/25.4  Plts:  116  -->,  140  -->,  121  --> 112-->120 > 144  Last T&S: 2/27  (02/25) INR 1.85, nsx rec 1 FFP and 10 vit K, INR 1.49 (2/26)  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     -CSF (fungal) (collected 02-26): pending     -CSF with Gram Stain (collected 02-27): negative    -Bronchial (collected 02-27):+aspergillus    -Fiu rpt fungal CSF cultures (3/2)    -Apergillus galactaman antigen    -blood cx 3/4: pending       ENDO:  Hx of DM, HA1C 6.3  - Holding home metformin  on ISS, FS q 4    GOC: palliative care following    DISPO:    SICU     Assessment and Plan:     75y Female s/p craniotomy for left frontal hematoma with evacuation and EVD placement. Intubated and sedated. SICU admission for hemodynamic monitoring.     NEURO:  sedation: keep off if possible  Pain controlled with IV Tylenol   Keppra 750 mg q 12  EVD removed 3/2   24hr EEG: started 3/3 at 5PM-  no seizure activity  q 1 hr neurocheck, f/u with NSX  2/25 CTH- ventricular drain in 3rd ventricle, decr size of lateral ventricles, scattered SAH and layering IVH in posterior horns of  lateral ventricle slightly increased  2/28 rpt CTH - stable- L frontal hemoatom cavity 4.5 cm, slighlty decr amount of blood, interval decr in pneumocephalus, L transfrontal drain tip in the third ventricle   3/1: CT sinus-prelim- no evidence of inflammatory/infectious diseases of paransal sinuses  Family meeting 3/3: possible trach/peg this week, follow up with palliative      RESP:   Intubated: /16/30/5  AM ABG:  AM CXR  3/1: CT chest -mucus plug L lower lobe bronchus, mod bilat pleural effusions w/ areas of atlectasis in both lower lobes. tree-in-bud opacities in posterior aspect of R upper lobe  +aspergillus  Keep HOB elevated at 30 degrees  Possible trach this week s/p speaking with HCP bedside on 3/3    CARDS:   Acute on chronic HTN        - increased home Lisinopril to 20mg       - labetalol 300 q8h        - rescheduled lisinopril dose s/p hypotension when giving both  H/o CAD s/p stents - hold ASA  EKG:  Echo (10/13/20): EF 55-60%, trace MR, R atrial echodensity possible thrombus  Trop 0.24 --> 0.21->0.15, no longer trending    GI/NUTR:   Diet: TF- Glucerna 1.2 @ goal 50/hr  OGT  GI Prophylaxis- PPI  Bowel regimen- Senna, lactulose   BM 3/4   Possible PEG this week, s/p meeting with HCP bedside    /RENAL:   Monitor UO-roblero in place  IVL  Hyponatremia- 3% @ 30cc/hr - goal normonatremia  q6BMP & Osm  BUN/Cr- 18/0.5  Na 142 // K 4.0 // Mg 1.9// Phos 3.1// 3/05 @  01:00  - 1gm Mg given    HEME/ONC:   DVT prophylaxis: heparin   Injectable 5000 every 8 hours  Hb/Hct:  8.2/25.4  -->,  9.0/27.7  -->,  8.4/26.0  --> 7.8/23-->7.9/24.9 --> 8.0/25.4  Plts:  116  -->,  140  -->,  121  --> 112-->120 > 144  Last T&S: 2/27  (02/25) INR 1.85, nsx rec 1 FFP and 10 vit K, INR 1.49 (2/26)  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     -CSF (fungal) (collected 02-26): pending     -CSF with Gram Stain (collected 02-27): negative    -Bronchial (collected 02-27):+aspergillus    -Fiu rpt fungal CSF cultures (3/2)    -Apergillus galactaman antigen    -blood cx 3/4: pending       ENDO:  Hx of DM, HA1C 6.3  - Holding home metformin  on ISS, FS q 4    GOC: palliative care following    DISPO:    SICU   Assessment and Plan:     75y Female s/p craniotomy for left frontal hematoma with evacuation and EVD placement. Intubated and sedated. SICU admission for hemodynamic monitoring.     NEURO:  sedation: keep off if possible  Pain controlled with IV Tylenol   Keppra 750 mg q 12  EVD removed 3/2   24hr EEG: started 3/3 at 5PM-  no seizure activity  q 1 hr neurocheck, f/u with NSX  2/25 CTH- ventricular drain in 3rd ventricle, decr size of lateral ventricles, scattered SAH and layering IVH in posterior horns of  lateral ventricle slightly increased  2/28 rpt CTH - stable- L frontal hemoatom cavity 4.5 cm, slighlty decr amount of blood, interval decr in pneumocephalus, L transfrontal drain tip in the third ventricle   3/1: CT sinus-prelim- no evidence of inflammatory/infectious diseases of paransal sinuses  Family meeting 3/3: possible trach/peg this week, follow up with palliative      RESP:   Intubated: /16/30/5  AM ABG: ABG - ( 05 Mar 2021 03:22 )  pH, Arterial: 7.43  pH, Blood: x     /  pCO2: 36    /  pO2: 100   / HCO3: 24    / Base Excess: -0.3  /  SaO2: 100     AM CXR  3/1: CT chest -mucus plug L lower lobe bronchus, mod bilat pleural effusions w/ areas of atlectasis in both lower lobes. tree-in-bud opacities in posterior aspect of R upper lobe  +aspergillus  Keep HOB elevated at 30 degrees  Possible trach this week s/p speaking with HCP bedside on 3/3    CARDS:   Acute on chronic HTN        - increased home Lisinopril to 20mg       - labetalol 300 q8h        - rescheduled lisinopril dose s/p hypotension when giving both  H/o CAD s/p stents - hold ASA  EKG:  Echo (10/13/20): EF 55-60%, trace MR, R atrial echodensity possible thrombus  Trop 0.24 --> 0.21->0.15, no longer trending    GI/NUTR:   Diet: TF- Glucerna 1.2 @ goal 50/hr  OGT  GI Prophylaxis- PPI  Bowel regimen- Senna, lactulose   BM 3/4   Possible PEG this week, s/p meeting with HCP bedside  TPro  5.0<L>  /  Alb  2.8<L>  /  TBili  0.4  /  DBili  0.2  /  AST  58<H>  /  ALT  39  /  AlkPhos  222<H>  03-04    /RENAL:   Monitor UO-roblero in place  IVL  Hyponatremia- 3% @ 30cc/hr - goal normonatremia  q6BMP & Osm  BUN/Cr- 18/0.5  Na 142 // K 4.0 // Mg 1.9// Phos 3.1// 3/05 @  01:00  - 1gm Mg given  03-05    143  |  112<H>  |  17  ----------------------------<  186<H>  4.4   |  23  |  <0.5<L>    Ca    8.7      05 Mar 2021 05:40  Phos  3.1     03-05  Mg     1.9     03-05      HEME/ONC:   DVT prophylaxis: heparin   Injectable 5000 every 8 hours  Hb/Hct:  8.2/25.4  -->,  9.0/27.7  -->,  8.4/26.0  --> 7.8/23-->7.9/24.9 --> 8.0/25.4  Plts:  116  -->,  140  -->,  121  --> 112-->120 > 144  Last T&S: 2/27  (02/25) INR 1.85, nsx rec 1 FFP and 10 vit K, INR 1.49 (2/26)  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  Heme/Onc consult: transfuse platelets if < 100, give vit K for elevated INR  s/p 1 FFP & 10 vit K on 2/26 for INR 1.9 rpt 1.5>1.6>1.3>1.3>1.3             8.0    7.19  )-----------( 144      ( 05 Mar 2021 01:00 )             25.4     ID:  Immunocompromised  continues to spike T max 101.5  WBC- 3.31  -->,  6.44  -->,  6.78  --> 6.5> 6.94 >7.19  ABX: vanco (2/28-  ), aztreonam (2/28-3/4), Voriconazole (3/2), Meropenem (3/4-  )  Vanc level (3/1): 15.2  Vanc level (3/3): 24.4  Vanc lvl (3/4): 16.7  Vanc lvl 3/5: 10.5    Cultures:     -UA (2/28): negative     -CSF with Gram Stain  (02.26.21 @ 15:20): No growth at 3 days.     -CSF (fungal) (collected 02-26): pending     -CSF with Gram Stain (collected 02-27): negative    -Bronchial (collected 02-27):+aspergillus    -Fiu rpt fungal CSF cultures (3/2)    -Apergillus galactaman antigen    -blood cx 3/4: pending       ENDO:  Hx of DM, HA1C 6.3  - Holding home metformin  on ISS, FS q 4    GOC: palliative care following    DISPO:    SICU   Assessment and Plan:     75y Female s/p craniotomy for left frontal hematoma with evacuation and EVD placement. Intubated and sedated. SICU admission for hemodynamic monitoring.     NEURO:  sedation: keep off if possible  Pain controlled with IV Tylenol   Keppra 750 mg q 12  EVD removed 3/2   24hr EEG: started 3/3 at 5PM-  no seizure activity  q 1 hr neurocheck, f/u with NSX  2/25 CTH- ventricular drain in 3rd ventricle, decr size of lateral ventricles, scattered SAH and layering IVH in posterior horns of  lateral ventricle slightly increased  2/28 rpt CTH - stable- L frontal hemoatom cavity 4.5 cm, slighlty decr amount of blood, interval decr in pneumocephalus, L transfrontal drain tip in the third ventricle   3/1: CT sinus-prelim- no evidence of inflammatory/infectious diseases of paransal sinuses  Family meeting 3/3: possible trach/peg this week, follow up with palliative      RESP:   Intubated: /16/30/5  AM ABG: ABG - ( 05 Mar 2021 03:22 )  pH, Arterial: 7.43  pH, Blood: x     /  pCO2: 36    /  pO2: 100   / HCO3: 24    / Base Excess: -0.3  /  SaO2: 100     AM CXR  3/1: CT chest -mucus plug L lower lobe bronchus, mod bilat pleural effusions w/ areas of atlectasis in both lower lobes. tree-in-bud opacities in posterior aspect of R upper lobe  +aspergillus  Keep HOB elevated at 30 degrees  Possible trach this week s/p speaking with HCP bedside on 3/3    CARDS:   Acute on chronic HTN        - increased home Lisinopril to 20mg       - labetalol 300 q8h        - rescheduled lisinopril dose s/p hypotension when giving both  H/o CAD s/p stents - hold ASA  EKG:  Echo (10/13/20): EF 55-60%, trace MR, R atrial echodensity possible thrombus  Trop 0.24 --> 0.21->0.15, no longer trending    GI/NUTR:   Diet: TF- Glucerna 1.2 @ goal 50/hr  OGT  GI Prophylaxis- PPI  Bowel regimen- Senna, lactulose   BM 3/4   Possible PEG this week, s/p meeting with HCP bedside  TPro  5.0<L>  /  Alb  2.8<L>  /  TBili  0.4  /  DBili  0.2  /  AST  58<H>  /  ALT  39  /  AlkPhos  222<H>  03-04    /RENAL:   Monitor UO-roblero in place  IVL  Hyponatremia- 3% @ 30cc/hr - goal normonatremia  q6BMP & Osm  BUN/Cr- 18/0.5  Na 142 // K 4.0 // Mg 1.9// Phos 3.1// 3/05 @  01:00  - 1gm Mg given  03-05    143  |  112<H>  |  17  ----------------------------<  186<H>  4.4   |  23  |  <0.5<L>    Ca    8.7      05 Mar 2021 05:40  Phos  3.1     03-05  Mg     1.9     03-05      HEME/ONC:   DVT prophylaxis: heparin   Injectable 5000 every 8 hours  Hb/Hct:  8.2/25.4  -->,  9.0/27.7  -->,  8.4/26.0  --> 7.8/23-->7.9/24.9 --> 8.0/25.4  Plts:  116  -->,  140  -->,  121  --> 112-->120 > 144  Last T&S: 2/27  (02/25) INR 1.85, nsx rec 1 FFP and 10 vit K, INR 1.49 (2/26)  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  Heme/Onc consult: transfuse platelets if < 100, give vit K for elevated INR  s/p 1 FFP & 10 vit K on 2/26 for INR 1.9 rpt 1.5>1.6>1.3>1.3>1.3             8.0    7.19  )-----------( 144      ( 05 Mar 2021 01:00 )             25.4     ID:  Immunocompromised  continues to spike T max 101.5  WBC- 3.31  -->,  6.44  -->,  6.78  --> 6.5> 6.94 >7.19  ABX: vanco (2/28-  ), aztreonam (2/28-3/4), Voriconazole (3/2), Meropenem (3/4-  )  Vanc level (3/1): 15.2  Vanc level (3/3): 24.4  Vanc lvl (3/4): 16.7  Vanc lvl 3/5: 10.5    Cultures:     -UA (2/28): negative     -CSF with Gram Stain  (02.26.21 @ 15:20): No growth at 3 days.     -CSF (fungal) (collected 02-26): pending     -CSF with Gram Stain (collected 02-27): negative    -Bronchial (collected 02-27):+aspergillus    -Fiu rpt fungal CSF cultures (3/2)    -Apergillus galactaman antigen    -blood cx 3/4: pending       ENDO:  Hx of DM, HA1C 6.3  - Holding home metformin  restarted on insulin gtt this am     GOC: palliative care following    DISPO:    SICU

## 2021-03-05 NOTE — PROGRESS NOTE ADULT - SUBJECTIVE AND OBJECTIVE BOX
POD# / HD#    S/P      Vital Signs Last 24 Hrs  T(C): 38.4 (05 Mar 2021 06:00), Max: 38.6 (05 Mar 2021 05:00)  T(F): 101.1 (05 Mar 2021 06:00), Max: 101.5 (05 Mar 2021 05:00)  HR: 86 (05 Mar 2021 06:00) (73 - 89)  BP: 136/64 (05 Mar 2021 06:00) (130/58 - 157/72)  BP(mean): 80 (05 Mar 2021 06:00) (80 - 103)  RR: 24 (05 Mar 2021 06:00) (15 - 29)  SpO2: 99% (05 Mar 2021 06:00) (99% - 100%)    I&O's Detail    04 Mar 2021 07:01  -  05 Mar 2021 07:00  --------------------------------------------------------  IN:    Enteral Tube Flush: 240 mL    Glucerna 1.5: 1050 mL    IV PiggyBack: 200 mL    IV PiggyBack: 350 mL    IV PiggyBack: 100 mL    IV PiggyBack: 250 mL    sodium chloride 3%: 400 mL    sodium chloride 3%: 250 mL    sodium chloride 3%: 60 mL  Total IN: 2900 mL    OUT:    Dexmedetomidine: 0 mL    Indwelling Catheter - Urethral (mL): 860 mL  Total OUT: 860 mL    Total NET: 2040 mL        I&O's Summary    04 Mar 2021 07:01  -  05 Mar 2021 07:00  --------------------------------------------------------  IN: 2900 mL / OUT: 860 mL / NET: 2040 mL        REVIEW OF SYSTEMS    [ ] A ten-point review of systems was otherwise negative except as noted.  [ ] Due to altered mental status/intubation, subjective information were not able to be obtained from the patient. History was obtained, to the extent possible, from review of the chart and collateral sources of information.      PHYSICAL EXAM:  Neurological:                LABS:                        8.0    7.19  )-----------( 144      ( 05 Mar 2021 01:00 )             25.4     03-05    143  |  112<H>  |  17  ----------------------------<  186<H>  4.4   |  23  |  <0.5<L>    Ca    8.7      05 Mar 2021 05:40  Phos  3.1     03-05  Mg     1.9     03-05    TPro  5.0<L>  /  Alb  2.8<L>  /  TBili  0.4  /  DBili  0.2  /  AST  58<H>  /  ALT  39  /  AlkPhos  222<H>  03-04    PT/INR - ( 03 Mar 2021 23:30 )   PT: 14.40 sec;   INR: 1.25 ratio         PTT - ( 03 Mar 2021 23:30 )  PTT:39.5 sec        CAPILLARY BLOOD GLUCOSE      POCT Blood Glucose.: 260 mg/dL (05 Mar 2021 07:05)  POCT Blood Glucose.: 189 mg/dL (05 Mar 2021 02:38)  POCT Blood Glucose.: 203 mg/dL (04 Mar 2021 22:36)  POCT Blood Glucose.: 209 mg/dL (04 Mar 2021 18:30)  POCT Blood Glucose.: 156 mg/dL (04 Mar 2021 14:17)  POCT Blood Glucose.: 200 mg/dL (04 Mar 2021 10:07)    Magnesium, Serum: 1.9 mg/dL (03-05-21 @ 01:00)          CSF Analysis: [] N/A      Allergies    penicillin (Anaphylaxis; Hives)    Intolerances      MEDICATIONS:  Antibiotics:  meropenem  IVPB 1000 milliGRAM(s) IV Intermittent every 8 hours  meropenem  IVPB      vancomycin  IVPB 1000 milliGRAM(s) IV Intermittent every 12 hours  voriconazole IVPB 300 milliGRAM(s) IV Intermittent every 12 hours    Neuro:  acetaminophen   Tablet .. 650 milliGRAM(s) Oral every 6 hours  dexMEDEtomidine Infusion 0.1 MICROgram(s)/kG/Hr IV Continuous <Continuous>  levETIRAcetam  IVPB 750 milliGRAM(s) IV Intermittent every 12 hours      IVF:  sodium chloride 3%. 500 milliLiter(s) IV Continuous <Continuous>      CULTURES:  Culture Results:   Testing in progress (03-02 @ 14:31)  Culture Results:   No growth to date. (02-28 @ 23:21)      RADIOLOGY & ADDITIONAL TESTS:      ASSESSMENT:  75y Female s/p    CVA (CEREBRAL VASCULAR ACCIDENT);INTRACRANIAL BLEED    ^CVA (CEREBRAL VASCULAR ACCIDENT);INTRACRANIAL BLEED    Family history of diabetes mellitus (DM)    No pertinent family history in first degree relatives    Handoff    MEWS Score    Anemia    DM (diabetes mellitus)    High cholesterol    HTN (hypertension)    B-cell chronic lymphocytic leukemia variant    CAD (coronary artery disease)    CVA (cerebral vascular accident)    Altered mental status    Palliative care encounter    Intracranial bleed    Craniotomy for intracerebral hemorrhage    H/O heart artery stent    No significant past surgical history    AMS    90+    Intracranial bleed    [email or web address]        PLAN:   POD#9    S/P Left Minimally invasive Craniotomy for evac of IPH with placement of EVD     Pt seen and examined a bedside. Pt remains intubated. No sedation.     Vital Signs Last 24 Hrs  T(C): 38.4 (05 Mar 2021 06:00), Max: 38.6 (05 Mar 2021 05:00)  T(F): 101.1 (05 Mar 2021 06:00), Max: 101.5 (05 Mar 2021 05:00)  HR: 86 (05 Mar 2021 06:00) (73 - 89)  BP: 136/64 (05 Mar 2021 06:00) (130/58 - 157/72)  BP(mean): 80 (05 Mar 2021 06:00) (80 - 103)  RR: 24 (05 Mar 2021 06:00) (15 - 29)  SpO2: 99% (05 Mar 2021 06:00) (99% - 100%)    I&O's Detail    04 Mar 2021 07:01  -  05 Mar 2021 07:00  --------------------------------------------------------  IN:    Enteral Tube Flush: 240 mL    Glucerna 1.5: 1050 mL    IV PiggyBack: 200 mL    IV PiggyBack: 350 mL    IV PiggyBack: 100 mL    IV PiggyBack: 250 mL    sodium chloride 3%: 400 mL    sodium chloride 3%: 250 mL    sodium chloride 3%: 60 mL  Total IN: 2900 mL    OUT:    Dexmedetomidine: 0 mL    Indwelling Catheter - Urethral (mL): 860 mL  Total OUT: 860 mL    Total NET: 2040 mL        I&O's Summary    04 Mar 2021 07:01  -  05 Mar 2021 07:00  --------------------------------------------------------  IN: 2900 mL / OUT: 860 mL / NET: 2040 mL        REVIEW OF SYSTEMS    [ ] A ten-point review of systems was otherwise negative except as noted.  [X] Due to altered mental status/intubation, subjective information were not able to be obtained from the patient. History was obtained, to the extent possible, from review of the chart and collateral sources of information.      PHYSICAL EXAM:  Neurological:  PERRL  No tracking  + gag  w/d LE to pain  w/d UE to pain  Does not follow commands at this time    LABS:                        8.0    7.19  )-----------( 144      ( 05 Mar 2021 01:00 )             25.4     03-05    143  |  112<H>  |  17  ----------------------------<  186<H>  4.4   |  23  |  <0.5<L>    Ca    8.7      05 Mar 2021 05:40  Phos  3.1     03-05  Mg     1.9     03-05    TPro  5.0<L>  /  Alb  2.8<L>  /  TBili  0.4  /  DBili  0.2  /  AST  58<H>  /  ALT  39  /  AlkPhos  222<H>  03-04    PT/INR - ( 03 Mar 2021 23:30 )   PT: 14.40 sec;   INR: 1.25 ratio    PTT - ( 03 Mar 2021 23:30 )  PTT:39.5 sec    Allergies    penicillin (Anaphylaxis; Hives)    Intolerances      MEDICATIONS:  Antibiotics:  meropenem  IVPB 1000 milliGRAM(s) IV Intermittent every 8 hours  meropenem  IVPB      vancomycin  IVPB 1000 milliGRAM(s) IV Intermittent every 12 hours  voriconazole IVPB 300 milliGRAM(s) IV Intermittent every 12 hours    Neuro:  acetaminophen   Tablet .. 650 milliGRAM(s) Oral every 6 hours  dexMEDEtomidine Infusion 0.1 MICROgram(s)/kG/Hr IV Continuous <Continuous>  levETIRAcetam  IVPB 750 milliGRAM(s) IV Intermittent every 12 hours      IVF:  sodium chloride 3%. 500 milliLiter(s) IV Continuous <Continuous>      CULTURES:  Culture Results:   Testing in progress (03-02 @ 14:31)  Culture Results:   No growth to date. (02-28 @ 23:21)      RADIOLOGY & ADDITIONAL TESTS:      ASSESSMENT:  75y Female s/p    CVA (CEREBRAL VASCULAR ACCIDENT);INTRACRANIAL BLEED    ^CVA (CEREBRAL VASCULAR ACCIDENT);INTRACRANIAL BLEED    Family history of diabetes mellitus (DM)    No pertinent family history in first degree relatives    Handoff    MEWS Score    Anemia    DM (diabetes mellitus)    High cholesterol    HTN (hypertension)    B-cell chronic lymphocytic leukemia variant    CAD (coronary artery disease)    CVA (cerebral vascular accident)    Altered mental status    Palliative care encounter    Intracranial bleed    Craniotomy for intracerebral hemorrhage    H/O heart artery stent    No significant past surgical history    AMS    90+    Intracranial bleed    [email or web address]        PLAN:  Continue current mgmt

## 2021-03-05 NOTE — PROGRESS NOTE ADULT - ATTENDING COMMENTS
stable overnight with some improvement in mental status   continue 3% NaCl   continue TF   family to decide on Trache/PEG   continue SICU Care

## 2021-03-05 NOTE — PROGRESS NOTE ADULT - ASSESSMENT
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     ASSESSMENT  #) Intraparenchymal bleed s/p craniotomy with anemia and thrombocytopenia   #) Fevers with Aspergillus fumigatus on Bronch culture, also possible meningitis?  #) Hx of Marginal Zone Lymphoma on Imbruvica     PLAN  - Cont to hold Imbruvica as it is associated with potential adverse reaction of hemorrhage. Also note that the effect of Imbruvica can last up to 7 days post ingestion of medication  - If any repeat CT scans show increase in bleed, transfuse platelets ASAP at least 1-2 units despite platelet count   - BP control and management of hemorrhage has per neurosurgery and neurology   - Cont with frequent q1h neurochecks, management as per neurosurgery/ ICU   - Anemia and thrombocytopenia is likely secondary to post-operative blood loss, transfuse for platelet count <100K  - ID recs appreciated. She is to complete 14 days of empiric IV antibiotic therapy for empiric meningitis (end 3/13)    Overall poor prognosis

## 2021-03-05 NOTE — PROGRESS NOTE ADULT - SUBJECTIVE AND OBJECTIVE BOX
Patient is a 75y old  Female who presents with a chief complaint of altered mental status (05 Mar 2021 07:26)      Subjective: She remains intubated. Per discussion with surgical team she has "minimal mental status." She does roll her eyes when they are manually opened but she does not track.          Vital Signs Last 24 Hrs  T(C): 37.5 (05 Mar 2021 15:30), Max: 38.6 (05 Mar 2021 05:00)  T(F): 99.5 (05 Mar 2021 15:30), Max: 101.5 (05 Mar 2021 05:00)  HR: 79 (05 Mar 2021 17:00) (74 - 89)  BP: 126/60 (05 Mar 2021 17:00) (108/53 - 157/72)  BP(mean): 86 (05 Mar 2021 17:00) (77 - 103)  RR: 24 (05 Mar 2021 17:00) (15 - 29)  SpO2: 99% (05 Mar 2021 17:00) (98% - 100%)    PHYSICAL EXAM  General: elderly appearing female, intubated   HEENT: anicteric sclera, pink conjunctiva  Neck: supple  CV: normal S1/S2 with no murmur rubs or gallops  Lungs: mechanical breath sounds   Abdomen: soft non-tender non-distended   Ext: no edema  Skin: no rashes   Neuro: alert and oriented X 4, no focal deficits    MEDICATIONS  (STANDING):  acetaminophen   Tablet .. 650 milliGRAM(s) Oral every 6 hours  atorvastatin 80 milliGRAM(s) Oral at bedtime  chlorhexidine 0.12% Liquid 15 milliLiter(s) Oral Mucosa two times a day  chlorhexidine 4% Liquid 1 Application(s) Topical daily  dexMEDEtomidine Infusion 0.1 MICROgram(s)/kG/Hr (1.83 mL/Hr) IV Continuous <Continuous>  heparin   Injectable 5000 Unit(s) SubCutaneous every 8 hours  insulin regular  human corrective regimen sliding scale   IV Push every 4 hours  labetalol 300 milliGRAM(s) Oral every 8 hours  levETIRAcetam  IVPB 750 milliGRAM(s) IV Intermittent every 12 hours  lisinopril 20 milliGRAM(s) Oral <User Schedule>  meropenem  IVPB 1000 milliGRAM(s) IV Intermittent every 8 hours  meropenem  IVPB      metFORMIN 500 milliGRAM(s) Oral every 12 hours  pantoprazole   Suspension 40 milliGRAM(s) Oral daily  senna 2 Tablet(s) Oral at bedtime  sodium chloride 3%. 500 milliLiter(s) (30 mL/Hr) IV Continuous <Continuous>  vancomycin  IVPB 1000 milliGRAM(s) IV Intermittent every 12 hours  voriconazole IVPB 300 milliGRAM(s) IV Intermittent every 12 hours    MEDICATIONS  (PRN):      LABS:                          8.0    7.19  )-----------( 144      ( 05 Mar 2021 01:00 )             25.4         Mean Cell Volume : 83.6 fL  Mean Cell Hemoglobin : 26.3 pg  Mean Cell Hemoglobin Concentration : 31.5 g/dL  Auto Neutrophil # : x  Auto Lymphocyte # : x  Auto Monocyte # : x  Auto Eosinophil # : x  Auto Basophil # : x  Auto Neutrophil % : x  Auto Lymphocyte % : x  Auto Monocyte % : x  Auto Eosinophil % : x  Auto Basophil % : x      Serial CBC's  03-05 @ 01:00  Hct-25.4 / Hgb-8.0 / Plat-144 / RBC-3.04 / WBC-7.19  Serial CBC's  03-03 @ 23:30  Hct-24.9 / Hgb-7.9 / Plat-120 / RBC-3.04 / WBC-6.94  Serial CBC's  03-03 @ 00:02  Hct-23.8 / Hgb-7.8 / Plat-112 / RBC-2.95 / WBC-6.59  Serial CBC's  03-01 @ 23:20  Hct-26.0 / Hgb-8.4 / Plat-121 / RBC-3.25 / WBC-6.78      03-05    139  |  110  |  17  ----------------------------<  150<H>  3.8   |  22  |  <0.5<L>    Ca    8.4<L>      05 Mar 2021 16:00  Phos  3.1     03-05  Mg     1.9     03-05    TPro  5.0<L>  /  Alb  2.8<L>  /  TBili  0.4  /  DBili  0.2  /  AST  58<H>  /  ALT  39  /  AlkPhos  222<H>  03-04      PT/INR - ( 03 Mar 2021 23:30 )   PT: 14.40 sec;   INR: 1.25 ratio         PTT - ( 03 Mar 2021 23:30 )  PTT:39.5 sec        BLOOD SMEAR INTERPRETATION:       RADIOLOGY & ADDITIONAL STUDIES:

## 2021-03-06 NOTE — PROGRESS NOTE ADULT - SUBJECTIVE AND OBJECTIVE BOX
HISTORY OF PRESENT ILLNESS: 75y Female POD#10     S/P Left Minimally invasive Crani Evac of IPH with placement of ventricular drain     Pt seen and examined a bedside in Burn ICU. Pt remains intubated off sedation.     PAST MEDICAL & SURGICAL HISTORY:  Anemia    DM (diabetes mellitus)    High cholesterol    HTN (hypertension)    B-cell chronic lymphocytic leukemia variant    CAD (coronary artery disease)  s/p stenting    H/O heart artery stent    FAMILY HISTORY:  Family history of diabetes mellitus (DM)    Allergies :   penicillin (Anaphylaxis; Hives)    REVIEW OF SYSTEMS : All systems negative other than those listed in HPI  General:	-  Skin/Breast: -  Ophthalmologic: -   ENMT: -  Respiratory and Thorax: -  Cardiovascular: -	  Gastrointestinal: -  Genitourinary:-  Musculoskeletal:	-  Neurological:	-  Psychiatric:	-  Hematology/Lymphatics:	-  Endocrine:-  Allergic/Immunologic:	-    MEDICATIONS:  aspirin 81 mg oral tablet, chewable: 1 tab(s) orally once a day  atorvastatin 80 mg oral tablet: 1 tab(s) orally once a day (at bedtime)  Imbruvica 70 mg oral capsule: 2 cap(s) orally once a day  Levaquin 500 mg oral tablet: 1 tab(s) orally every 24 hours  lisinopril 10 mg oral tablet: 1 tab(s) orally once a day  metFORMIN 500 mg oral tablet: 1 tab(s) orally 2 times a day   pantoprazole 40 mg oral delayed release tablet: 1 tab(s) orally 2 times a day    Antibiotics:  meropenem  IVPB 1000 milliGRAM(s) IV Intermittent every 8 hours  meropenem  IVPB      vancomycin  IVPB 1000 milliGRAM(s) IV Intermittent every 12 hours  voriconazole IVPB 300 milliGRAM(s) IV Intermittent every 12 hours    Anticoagulation:  aspirin  chewable 81 milliGRAM(s) Oral daily  heparin   Injectable 5000 Unit(s) SubCutaneous every 8 hours    OTHER:  atorvastatin 80 milliGRAM(s) Oral at bedtime  chlorhexidine 0.12% Liquid 15 milliLiter(s) Oral Mucosa two times a day  chlorhexidine 4% Liquid 1 Application(s) Topical daily  insulin regular  human corrective regimen sliding scale   IV Push every 4 hours  labetalol 300 milliGRAM(s) Oral every 8 hours  lisinopril 20 milliGRAM(s) Oral <User Schedule>  metFORMIN 500 milliGRAM(s) Oral every 12 hours  pantoprazole   Suspension 40 milliGRAM(s) Oral daily  senna 2 Tablet(s) Oral at bedtime    Vital Signs Last 24 Hrs  T(C): 37.2 (06 Mar 2021 08:00), Max: 37.6 (06 Mar 2021 00:00)  T(F): 99 (06 Mar 2021 08:00), Max: 99.7 (06 Mar 2021 00:00)  HR: 72 (06 Mar 2021 08:00) (72 - 85)  BP: 132/60 (06 Mar 2021 08:00) (94/50 - 161/66)  BP(mean): 87 (06 Mar 2021 08:00) (69 - 105)  RR: 24 (06 Mar 2021 08:00) (20 - 27)  SpO2: 100% (06 Mar 2021 08:00) (98% - 100%)    Physical Exam :  General :   Pt remain intubated currently off sedation   Eyes open slightly to sound / pain  Does not follow commands   Occular :   PERRLA, EOMI  + gag   + corneals   Motor :   MAEx4 L>R   Withdraws to painful stimuli  Purposeful movements on L side     Labs :                         8.2    6.85  )-----------( 158      ( 05 Mar 2021 23:47 )             27.0     03-06    145  |  113<H>  |  17  ----------------------------<  189<H>  4.7   |  23  |  <0.5<L>    Ca    8.0<L>      06 Mar 2021 04:30  Phos  3.2     03-05  Mg     1.8     03-05    TPro  5.1<L>  /  Alb  3.1<L>  /  TBili  0.3  /  DBili  0.2  /  AST  46<H>  /  ALT  33  /  AlkPhos  238<H>  03-05    CULTURES:  Culture Results:   No growth to date. (03-04 @ 20:08)  Culture Results:   Testing in progress (03-02 @ 14:31)  RADIOLOGY & ADDITIONAL STUDIES:    Assessment / Plan: s/p Left minimally invasive crani evac of IPH w/ placement of EVD. EVD removed 3.2.21.   - Going for Trach / Peg today   - Currently on 3% @20mL/ hr ; monitor Na (last 145)  - Bcx from 3.4.21 NGTD prelim; will follow CSF cx  - Continue Abx per ID     Care Coordination :  Per team, plan for bedside trach and PEG  tomorrow w/ Dr. Ross.  Pt remains medically acute at this time. D/c dispo to  SNF STR vs SNV when pt is medically optimized, family aware and in agreement.     Recs per ID :   - Vanc 1g q12h IV, repeat trough prior to 4th new dose  - Meropenem 1g q8h IV (changed from aztreonam 3/4, on GNR abx coverage since 2/28-)  - Repeat MRI w/ Brain   - Plan for ABX for 14 days for empiric meningitis (end 3/13)  - micro- aspergillus not mucor, f/u speciation and sensitivities   - Voriconazole 4 mg/kg twice daily IV for aspergillus PNA. Good CSF penetration as well.   - f/u fungal CSF culture  - tracheal fluid for BAL aspergillus galactomannan   - noé, TLC 2/24  - F/u serum aspergillus galactomannan     Recs per Neurology  :   -Continue Keppra 750 mg bid  -If exam not improving, consider MRI brain to assess extent of injury  -3% for goal normonatremia  135-145     Recs per Heme / Onc :   - Cont to hold Imbruvica as it is associated with potential adverse reaction of hemorrhage. Also note that the effect of Imbruvica can last up to 7 days post ingestion of medication  - If any repeat CT scans show increase in bleed, transfuse platelets ASAP at least 1-2 units despite platelet count   - BP control and management of hemorrhage has per neurosurgery and neurology   - Anemia and thrombocytopenia is likely secondary to post-operative blood loss, transfuse for platelet count <100K

## 2021-03-06 NOTE — PROGRESS NOTE ADULT - SUBJECTIVE AND OBJECTIVE BOX
AD BASURTO  [id number]  75y Female    Indication for ICU admission: s/p crani -evac of large hematoma (IPH)    Admit Date:02-24-21  ICU Date: 2/24/21  OR Date: 2/24/21    penicillin (Anaphylaxis; Hives)    PAST MEDICAL & SURGICAL HISTORY:  Anemia  DM (diabetes mellitus)  High cholesterol  HTN (hypertension)  B-cell chronic lymphocytic leukemia variant  CAD (coronary artery disease)  s/p stenting  H/O heart artery stent      Home Medications:  aspirin 81 mg oral tablet, chewable: 1 tab(s) orally once a day (24 Feb 2021 15:11)  atorvastatin 80 mg oral tablet: 1 tab(s) orally once a day (at bedtime) (24 Feb 2021 15:11)  Imbruvica 70 mg oral capsule: 2 cap(s) orally once a day (24 Feb 2021 15:11)  Levaquin 500 mg oral tablet: 1 tab(s) orally every 24 hours (24 Feb 2021 15:11)  lisinopril 10 mg oral tablet: 1 tab(s) orally once a day (24 Feb 2021 15:11)        24HRS EVENT:  3/5  Night  NPO MN Trach consent done, gave signature needed  Na 144 Osm 305  decr 3% hypertonic saline to 20 cc/hr  >222,  AST 41>58, ALT 33>39, T.gume 0.3>0.4    Day  abx end 3/13  repeat BC today per ID recieved  f/u LFTs tongiht (switch voriconazole to ambisome if ALT >100)  started metformin 500mg q 12 hr, d/c insulin gtt  pre opped for trach/PEG on satruday  restarted ASA - approved by Dr Rodriguez  increased 3% to 30cc/hr  vancomycin changed from q 8 to q 12      DVT PTX: HSQ    GI PTX:pantoprazole  Injectable 40 milliGRAM(s) IV Push daily    Tubes/Lines/Drains   ----------------------------------------------------------------------------------------------------------  [x] Peripheral IV  [X] Central Venous Line         RIJ             Date Placed:  2/24  [X] Arterial Line		      Radial   Date Placed: 2/24  [X] Urinary Catheter Murry                                             Date Placed: 2/24     REVIEW OF SYSTEMS    [ ] A ten-point review of systems was otherwise negative except as noted.  [x ] Due to altered mental status/intubation, subjective information were not able to be obtained from the patient. History was

## 2021-03-06 NOTE — CHART NOTE - NSCHARTNOTEFT_GEN_A_CORE
Called and updated family member, Kathrine Magrie ([phone]) regarding successful placement of Tracheostomy/Gastrostomy today, all questions were answered.

## 2021-03-06 NOTE — PROGRESS NOTE ADULT - ASSESSMENT
Assessment and Plan:     75y Female s/p craniotomy for left frontal hematoma with evacuation and EVD placement. Intubated and sedated. SICU admission for hemodynamic monitoring.     NEURO:  sedation: keep off if possible  Pain controlled with IV Tylenol   Keppra 750 mg q 12  EVD removed 3/2   24hr EEG: started 3/3 at 5PM-  no seizure activity  q 1 hr neurocheck, f/u with NSX  2/25 CTH- ventricular drain in 3rd ventricle, decr size of lateral ventricles, scattered SAH and layering IVH in posterior horns of  lateral ventricle slightly increased  2/28 rpt CTH - stable- L frontal hemoatom cavity 4.5 cm, slighlty decr amount of blood, interval decr in pneumocephalus, L transfrontal drain tip in the third ventricle   3/1: CT sinus-prelim- no evidence of inflammatory/infectious diseases of paransal sinuses  Family meeting 3/3: possible trach/peg this week, follow up with palliative      RESP:   Intubated: /16/30/5  AM ABG:  AM CXR  3/1: CT chest -mucus plug L lower lobe bronchus, mod bilat pleural effusions w/ areas of atlectasis in both lower lobes. tree-in-bud opacities in posterior aspect of R upper lobe  +aspergillus  Keep HOB elevated at 30 degrees  Trach saturday 3/6    CARDS:   Acute on chronic HTN        - increased home Lisinopril to 20mg       - labetalol 300 q8h        - rescheduled lisinopril dose s/p hypotension when giving both  H/o CAD s/p stents - hold ASA  EKG:  Echo (10/13/20): EF 55-60%, trace MR, R atrial echodensity possible thrombus  Trop 0.24 --> 0.21->0.15, no longer trending    GI/NUTR:   Diet: TF- Glucerna 1.2 @ goal 60/hr (increased per Nutrition)  OGT  GI Prophylaxis- PPI  Bowel regimen- Senna, lactulose   BM 3/4   *TRACH/PEG Saturday    /RENAL:   Monitor UO-roblero in place  IVL  Hyponatremia- 3% @ 20cc/hr - Na goal 135-145   q6BMP & Osm  BUN/Cr- 17/0.5  Na 144 // K 3.7 // Mg 1.8 //  Phos 3.2 -- 03-05 @ 23:47  - 2 K+ rider and 1gm Mg given      HEME/ONC:   DVT prophylaxis: heparin   Injectable 5000 every 8 hours  Hb/Hct:  8.0/25.4 --> 8.2/25.4  -->,  9.0/27.7  -->,  8.4/26.0  --> 7.8/23-->7.9/24.9 --> 8.0/25.4> 8.2/27  Plts:  144 ->> 116  -->,  140  -->,  121  --> 112-->120 > 144>158  Last T&S: 2/27  (02/25) INR 1.85, nsx rec 1 FFP and 10 vit K, INR 1.49 (2/26)  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  Heme/Onc consult: transfuse platelets if < 100, give vit K for elevated INR  s/p 1 FFP & 10 vit K on 2/26 for INR 1.9 rpt 1.5>1.6>1.3>1.3>1.3    ID:  Immunocompromised  continues to spike T max 99.8 - repeat BC 3/5/21  WBC- 7.19 --> 3.31  -->,  6.44  -->,  6.78  --> 6.5> 6.94 >7.19> 6.85  ABX: vanco (2/28-  ), aztreonam (2/28-3/4), Voriconazole f/u LFTs (3/2), Meropenem (3/4-?)  Vanc level (3/1): 15.2  Vanc level (3/3): 24.4  Vanc lvl (3/4): 16.7  Cultures:     -UA (2/28): negative     -CSF with Gram Stain  (02.26.21 @ 15:20): No growth at 3 days.     -CSF (fungal) (collected 02-26): pending     -CSF with Gram Stain (collected 02-27): negative    -Bronchial (collected 02-27):+aspergillus    -Fiu rpt fungal CSF cultures (3/2)    -Apergillus galactaman antigen    -blood cx 3/4: pending       ENDO:  Hx of DM, HA1C 6.3  - restarted home metformin 500mg q 12 hr  on ISS, FS q 4    GOC: palliative care following    DISPO:    SICU     Assessment and Plan:     75y Female s/p craniotomy for left frontal hematoma with evacuation and EVD placement. Intubated and sedated. SICU admission for hemodynamic monitoring.     NEURO:  sedation: keep off if possible  Pain controlled with IV Tylenol   Keppra 750 mg q 12  EVD removed 3/2   24hr EEG: started 3/3 at 5PM-  no seizure activity  q 1 hr neurocheck, f/u with NSX  2/25 CTH- ventricular drain in 3rd ventricle, decr size of lateral ventricles, scattered SAH and layering IVH in posterior horns of  lateral ventricle slightly increased  2/28 rpt CTH - stable- L frontal hemoatom cavity 4.5 cm, slighlty decr amount of blood, interval decr in pneumocephalus, L transfrontal drain tip in the third ventricle   3/1: CT sinus-prelim- no evidence of inflammatory/infectious diseases of paransal sinuses  Family meeting 3/3: possible trach/peg this week, follow up with palliative      RESP:   Intubated:  RR (machine): 16, TV (machine): 400, FiO2: 30, PEEP: 5, ITime: 0.8  03-06 @ 02:33--7.43 / 38 / 76 / 25 / 96   3/1: CT chest -mucus plug L lower lobe bronchus, mod bilat pleural effusions w/ areas of atlectasis in both lower lobes. tree-in-bud opacities in posterior aspect of R upper lobe  +aspergillus  Keep HOB elevated at 30 degrees  Trach saturday 3/6    CARDS:   Acute on chronic HTN        - increased home Lisinopril to 20mg       - labetalol 300 q8h        - rescheduled lisinopril dose s/p hypotension when giving both  H/o CAD s/p stents - hold ASA  EKG:  Echo (10/13/20): EF 55-60%, trace MR, R atrial echodensity possible thrombus  Trop 0.24 --> 0.21->0.15, no longer trending    GI/NUTR:   Diet: TF- Glucerna 1.2 @ goal 60/hr (increased per Nutrition)  OGT  GI Prophylaxis- PPI  Bowel regimen- Senna, lactulose   BM 3/4   *TRACH/PEG Saturday    /RENAL:   Monitor UO-roblero in place    BUN/Cr- 17/<0.5  -->,  16/<0.5  -->,  17/<0.5  -->  Electrolytes-Na 145 // K 4.7 // Mg -- //  Phos -- 03-06 @ 04:30  Na 144 // K 3.7 // Mg 1.8 //  Phos 3.2 03-05 @ 23:47  Hyponatremia- 3% @ 20cc/hr - Na goal 135-145   q6BMP & Osm  BUN/Cr- 17/0.5  Na 144 // K 3.7 // Mg 1.8 //  Phos 3.2 -- 03-05 @ 23:47  - 2 K+ rider and 1gm Mg given    HEME/ONC:   DVT propylaxis-heparin   Injectable  Hb/Hct:  7.9/24.9  -->,  8.0/25.4  -->,  8.2/27.0  -->  Plts:  120  -->,  144  -->,  158  -->  T&S:ABO RH Interpretation:  (03-05)  (02/25) INR 1.85, nsx rec 1 FFP and 10 vit K, INR 1.49 (2/26)  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  Heme/Onc consult: transfuse platelets if < 100, give vit K for elevated INR  s/p 1 FFP & 10 vit K on 2/26 for INR 1.9 rpt 1.5>1.6>1.3>1.3>1.3    ID:  Leukocytosis- 6.94  --->>,  7.19  --->>,  6.85  --->>  Temp trend- 24hrs T(F): 99 (03-06 @ 07:36), Max: 100.2 (03-05 @ 08:00)  Antibiotics-meropenem  IVPB 1000 every 8 hours  meropenem  IVPB    vancomycin  IVPB 1000 every 12 hours  voriconazole IVPB 300 every 12 hours    continues to spike T max 99.8 - repeat BC 3/5/21  WBC- 7.19 --> 3.31  -->,  6.44  -->,  6.78  --> 6.5> 6.94 >7.19> 6.85  ABX: vanco (2/28-  ), aztreonam (2/28-3/4), Voriconazole f/u LFTs (3/2), Meropenem (3/4-?)  Vanc level (3/1): 15.2  Vanc level (3/3): 24.4  Vanc lvl (3/4): 16.7  Cultures:     -UA (2/28): negative     -CSF with Gram Stain  (02.26.21 @ 15:20): No growth at 3 days.     -CSF (fungal) (collected 02-26): pending     -CSF with Gram Stain (collected 02-27): negative    -Bronchial (collected 02-27):+aspergillus    -Fiu rpt fungal CSF cultures (3/2)    -Apergillus galactaman antigen    -blood cx 3/4: pending       ENDO:  Hx of DM, HA1C 6.3  - restarted home metformin 500mg q 12 hr  on ISS, FS q 4    GOC: palliative care following    DISPO:    SICU

## 2021-03-07 NOTE — PROGRESS NOTE ADULT - SUBJECTIVE AND OBJECTIVE BOX
AD BASURTO  [id number]  75y Female    Indication for ICU admission: s/p crani -evac of large hematoma (IPH)    Admit Date:02-24-21  ICU Date: 2/24/21  OR Date: 2/24/21    penicillin (Anaphylaxis; Hives)    PAST MEDICAL & SURGICAL HISTORY:  Anemia  DM (diabetes mellitus)  High cholesterol  HTN (hypertension)  B-cell chronic lymphocytic leukemia variant  CAD (coronary artery disease)  s/p stenting  H/O heart artery stent      Home Medications:  aspirin 81 mg oral tablet, chewable: 1 tab(s) orally once a day (24 Feb 2021 15:11)  atorvastatin 80 mg oral tablet: 1 tab(s) orally once a day (at bedtime) (24 Feb 2021 15:11)  Imbruvica 70 mg oral capsule: 2 cap(s) orally once a day (24 Feb 2021 15:11)  Levaquin 500 mg oral tablet: 1 tab(s) orally every 24 hours (24 Feb 2021 15:11)  lisinopril 10 mg oral tablet: 1 tab(s) orally once a day (24 Feb 2021 15:11)        24HRS EVENT:  s/p bedside trach and PEG  tube feeds started via PEG  4 pm BMP-Na+146  3% d/c'd    DVT PTX: HSQ    GI PTX:pantoprazole  Injectable 40 milliGRAM(s) IV Push daily    Tubes/Lines/Drains   ----------------------------------------------------------------------------------------------------------  [x] Peripheral IV  [X] Central Venous Line         RIJ             Date Placed:  2/24  [X] Arterial Line		      Radial   Date Placed: 2/24  [X] Urinary Catheter Murry                                             Date Placed: 2/24     REVIEW OF SYSTEMS    [ ] A ten-point review of systems was otherwise negative except as noted.  [x ] Due to altered mental status/intubation, subjective information were not able to be obtained from the patient. History was     AD BASURTO  [id number]  75y Female    Indication for ICU admission: s/p crani -evac of large hematoma (IPH)    Admit Date:02-24-21  ICU Date: 2/24/21  OR Date: 2/24/21    penicillin (Anaphylaxis; Hives)    PAST MEDICAL & SURGICAL HISTORY:  Anemia  DM (diabetes mellitus)  High cholesterol  HTN (hypertension)  B-cell chronic lymphocytic leukemia variant  CAD (coronary artery disease)  s/p stenting  H/O heart artery stent      Home Medications:  aspirin 81 mg oral tablet, chewable: 1 tab(s) orally once a day (24 Feb 2021 15:11)  atorvastatin 80 mg oral tablet: 1 tab(s) orally once a day (at bedtime) (24 Feb 2021 15:11)  Imbruvica 70 mg oral capsule: 2 cap(s) orally once a day (24 Feb 2021 15:11)  Levaquin 500 mg oral tablet: 1 tab(s) orally every 24 hours (24 Feb 2021 15:11)  lisinopril 10 mg oral tablet: 1 tab(s) orally once a day (24 Feb 2021 15:11)        24HRS EVENT:  s/p bedside trach and PEG  tube feeds started via PEG  4 pm BMP-Na+146  3% d/c'd    Neuro- more alert today, able to mouth some words, but not following commands, localizes to pain, Pupils 3 mm B/L and reactive  HEENT- craniotomy scar clean, dry, healing well, no drainage or erythema, trach in place, no bleeding noted  Resp- lungs clear /L, no crackles, no rhonchi, no wheezing  Abd- abd binder in place, abd is soft, mild tenderness, not distended, PEG tube in place, no drainage or erythema noted  MSK- distal pulses intact, no palpable Left DP pulse, ROM x 4 to painful stimuli, no edema noted        DVT PTX: HSQ    GI PTX:pantoprazole  Injectable 40 milliGRAM(s) IV Push daily    Tubes/Lines/Drains   ----------------------------------------------------------------------------------------------------------  [x] Peripheral IV  [X] Central Venous Line         RIJ             Date Placed:  2/24  [X] Arterial Line		      Radial   Date Placed: 2/24  [X] Urinary Catheter Murry                                             Date Placed: 2/24     REVIEW OF SYSTEMS    [ ] A ten-point review of systems was otherwise negative except as noted.  [x ] Due to altered mental status/intubation, subjective information were not able to be obtained from the patient. History was     AD BASURTO  [id number]  75y Female    Indication for ICU admission: s/p crani -evac of large hematoma (IPH)    Admit Date:02-24-21  ICU Date: 2/24/21  OR Date: 2/24/21    penicillin (Anaphylaxis; Hives)    PAST MEDICAL & SURGICAL HISTORY:  Anemia  DM (diabetes mellitus)  High cholesterol  HTN (hypertension)  B-cell chronic lymphocytic leukemia variant  CAD (coronary artery disease)  s/p stenting  H/O heart artery stent      Home Medications:  aspirin 81 mg oral tablet, chewable: 1 tab(s) orally once a day (24 Feb 2021 15:11)  atorvastatin 80 mg oral tablet: 1 tab(s) orally once a day (at bedtime) (24 Feb 2021 15:11)  Imbruvica 70 mg oral capsule: 2 cap(s) orally once a day (24 Feb 2021 15:11)  Levaquin 500 mg oral tablet: 1 tab(s) orally every 24 hours (24 Feb 2021 15:11)  lisinopril 10 mg oral tablet: 1 tab(s) orally once a day (24 Feb 2021 15:11)        24HRS EVENT:  s/p bedside trach and PEG  tube feeds started via PEG  4 pm BMP-Na+146  3% d/c'd    Neuro- more alert today, able to mouth some words, but not following commands, localizes to pain, Pupils 3 mm B/L and reactive  HEENT- craniotomy scar clean, dry, healing well, no drainage or erythema, trach in place, no bleeding noted  Resp- lungs clear /L, no crackles, no rhonchi, no wheezing  Abd- abd binder in place, abd is soft, mild tenderness, not distended, PEG tube in place, no drainage or erythema noted  MSK- distal pulses intact, ROM x 4 to painful stimuli, no edema noted        DVT PTX: HSQ    GI PTX:pantoprazole  Injectable 40 milliGRAM(s) IV Push daily    Tubes/Lines/Drains   ----------------------------------------------------------------------------------------------------------  [x] Peripheral IV  [X] Central Venous Line         RIJ             Date Placed:  2/24  [X] Arterial Line		      Radial   Date Placed: 2/24  [X] Urinary Catheter Murry                                             Date Placed: 2/24     REVIEW OF SYSTEMS    [ ] A ten-point review of systems was otherwise negative except as noted.  [x ] Due to altered mental status/intubation, subjective information were not able to be obtained from the patient. History was

## 2021-03-07 NOTE — PROGRESS NOTE ADULT - ASSESSMENT
Assessment and Plan:     75y Female s/p craniotomy for left frontal hematoma with evacuation and EVD placement. Intubated and sedated. SICU admission for hemodynamic monitoring.     NEURO:  off all sedation  Pain controlled with IV Tylenol   Keppra 750 mg q 12  EVD removed 3/2   24hr EEG: started 3/3 at 5PM-  no seizure activity  q 1 hr neurocheck, f/u with NSX  2/25 CTH- ventricular drain in 3rd ventricle, decr size of lateral ventricles, scattered SAH and layering IVH in posterior horns of  lateral ventricle slightly increased  2/28 rpt CTH - stable- L frontal hemoatom cavity 4.5 cm, slighlty decr amount of blood, interval decr in pneumocephalus, L transfrontal drain tip in the third ventricle   3/1: CT sinus-prelim- no evidence of inflammatory/infectious diseases of paransal sinuses  Family meeting 3/3: possible trach/peg this week, follow up with palliative      RESP:   Resp Failure, now s/p bedside trach  Intubated: /16/30/5  AM ABG:  AM CXR  3/1: CT chest -mucus plug L lower lobe bronchus, mod bilat pleural effusions w/ areas of atlectasis in both lower lobes. tree-in-bud opacities in posterior aspect of R upper lobe  +aspergillus  Keep HOB elevated at 30 degrees      CARDS:   Acute on chronic HTN        - home Lisinopril to 20mg       - labetalol 300 q8h        - rescheduled lisinopril dose s/p hypotension when giving both  H/o CAD s/p stents - ASA  Echo (10/13/20): EF 55-60%, trace MR, R atrial echodensity possible thrombus  Trop 0.24 --> 0.21->0.15, no longer trending    GI/NUTR:   now s/p bedside PEG  Diet: resume TF via PEG tonight- Glucerna 1.2 @ goal 60/hr (increased per Nutrition)  GI Prophylaxis- PPI  Bowel regimen- Senna, lactulose   BM 3/4       /RENAL:   Monitor UO-roblero in place  IVL  Na goal 135-145   q6BMP & Osm  BUN/Cr-   Hypernatremia- dc'd Hypertonic Saline      HEME/ONC:   DVT prophylaxis: heparin   Injectable 5000 every 8 hours  Hb/Hct:  8.0/25.4 --> 8.2/25.4  -->,  9.0/27.7  -->,  8.4/26.0  --> 7.8/23-->7.9/24.9 --> 8.0/25.4> 8.2/27> 8.4/27.2  Plts:  144 ->> 116  -->,  140  -->,  121  --> 112-->120 > 144>158 > 197  Last T&S: 2/27  (02/25) INR 1.85, nsx rec 1 FFP and 10 vit K, INR 1.49 (2/26)  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  Heme/Onc consult: transfuse platelets if < 100, give vit K for elevated INR  s/p 1 FFP & 10 vit K on 2/26 for INR 1.9 rpt 1.5>1.6>1.3>1.3>1.3    ID:  Immunocompromised  T max 99.7 - repeat BC 3/4/21--> NGTD   WBC- 7.19 --> 3.31  -->,  6.44  -->,  6.78  --> 6.5> 6.94 >7.19> 6.85> 7.27  ABX: vanco (2/28-  ), aztreonam (2/28-3/4), Voriconazole f/u LFTs (3/2), Meropenem (3/4-?)  Vanc level (3/1): 15.2  Vanc level (3/3): 24.4  Vanc lvl (3/4): 16.7  Vanc lvl (3/5) 10.5  Cultures:     -UA (2/28): negative     -CSF with Gram Stain  (02.26.21 @ 15:20): No growth at 3 days.     -CSF (fungal) (collected 02-26): pending     -CSF with Gram Stain (collected 02-27): negative    -Bronchial (collected 02-27):+aspergillus    -Fiu rpt fungal CSF cultures (3/2)    -Apergillus galactaman antigen    -blood cx 3/4: NGTD       ENDO:  Hx of DM, HA1C 6.3  - restarted home metformin 500mg q 12 hr  on ISS, FS q 4    GOC: palliative care following, full code    DISPO:    SICU Assessment and Plan:     75y Female s/p craniotomy for left frontal hematoma with evacuation and EVD placement. Intubated and sedated. SICU admission for hemodynamic monitoring.     NEURO:  off all sedation  Pain controlled with IV Tylenol   Keppra 750 mg q 12  EVD removed 3/2   24hr EEG: started 3/3 at 5PM-  no seizure activity  q 1 hr neurocheck, f/u with NSX   CTH- ventricular drain in 3rd ventricle, decr size of lateral ventricles, scattered SAH and layering IVH in posterior horns of  lateral ventricle slightly increased   rpt CTH - stable- L frontal hemoatom cavity 4.5 cm, slighlty decr amount of blood, interval decr in pneumocephalus, L transfrontal drain tip in the third ventricle   3/1: CT sinus-prelim- no evidence of inflammatory/infectious diseases of paransal sinuses  Family meeting 3/3: possible trach/peg this week, follow up with palliative      RESP:   Resp Failure, now s/p bedside trach  Intubated: /16/30/5  AM AB.45/36/94/25  AM CXR- improving B/L effsuions  3/1: CT chest -mucus plug L lower lobe bronchus, mod bilat pleural effusions w/ areas of atlectasis in both lower lobes. tree-in-bud opacities in posterior aspect of R upper lobe  +aspergillus  Keep HOB elevated at 30 degrees      CARDS:   PMH of  HTN , BP controlled with  home Lisinopril to 20mg and  labetalol 300 q8h        - rescheduled lisinopril dose s/p hypotension when giving both  H/o CAD s/p stents - ASA  HLD- Lipitor  Echo (10/13/20): EF 55-60%, trace MR, R atrial echodensity possible thrombus  Trop 0.24 --> 0.21->0.15, no longer trending    GI/NUTR:   now s/p bedside PEG  Diet: resume TF via PEG tonight- Glucerna 1.2 @ goal 60/hr (increased per Nutrition)  GI Prophylaxis- PPI  Bowel regimen- Senna, lactulose   BM 3/7       /RENAL:   IVL  BUN/Cr- 15/0.5  U/O- 1.1 L x 24 hrs  Hyonatremia- resolved, dc'd Hypertonic Saline      HEME/ONC:   DVT prophylaxis: heparin   Injectable 5000 every 8 hours  Hb/Hct:  8.4/27.2  Plts:   197  INR 1.2  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- held  Heme/Onc consult: transfuse platelets if < 100, give vit K for elevated INR  s/p 1 FFP & 10 vit K on  for INR 1.9     ID:  Immunocompromised  Afebrile, WBC 7.27  -  repeat BC 3/4/21--> NGTD   con't ABX as per ID : vanco (-  ), aztreonam (-3/4), Voriconazole f/u LFTs (3/2), Meropenem (3/4-?)  Vanc level (3/1): 15.2  Vanc level (3/3): 24.4  Vanc lvl (3/4): 16.7  Vanc lvl (3/5) 10.5  Vanc lvl 3/7-  Cultures:     -UA (): negative     -CSF with Gram Stain  (21 @ 15:20): No growth at 3 days.     -CSF (fungal) (collected ): pending     -CSF with Gram Stain (collected ): negative    -Bronchial (collected ):+aspergillus    -Fiu rpt fungal CSF cultures (3/2)    -Apergillus galactaman antigen    -blood cx 3/4: NGTD       ENDO:  Hx of DM, HA1C 6.3  -  Glucose control with  restarted home metformin 500mg q 12 hr and  ISS, FS q 4  last 3 FS- 119,234, 142    GOC: palliative care following, full code    DISPO:   Vent Unit  Subacute rehab, vent capable

## 2021-03-07 NOTE — PROGRESS NOTE ADULT - SUBJECTIVE AND OBJECTIVE BOX
POD# 11    S/p Left minimally invasive Crani for evac of IPH with placement of EVD     pt seen and examined pt is trach'd opens eyes to verbal , follows some simple commands intermittently         Vital Signs Last 24 Hrs  T(C): 37.1 (07 Mar 2021 08:00), Max: 37.6 (06 Mar 2021 23:00)  T(F): 98.8 (07 Mar 2021 08:00), Max: 99.7 (06 Mar 2021 23:00)  HR: 81 (07 Mar 2021 10:00) (72 - 85)  BP: 135/63 (06 Mar 2021 17:00) (116/58 - 162/72)  BP(mean): 91 (06 Mar 2021 17:00) (80 - 104)  RR: 18 (07 Mar 2021 10:00) (16 - 46)  SpO2: 100% (07 Mar 2021 10:00) (98% - 100%)    I&O's Detail    06 Mar 2021 07:01  -  07 Mar 2021 07:00  --------------------------------------------------------  IN:    Enteral Tube Flush: 120 mL    Glucerna 1.5: 150 mL    IV PiggyBack: 1000 mL    IV PiggyBack: 100 mL    IV PiggyBack: 100 mL    IV PiggyBack: 250 mL    IV PiggyBack: 250 mL    sodium chloride 3%: 240 mL  Total IN: 2210 mL    OUT:    Indwelling Catheter - Urethral (mL): 1045 mL    PEG (Percutaneous Endoscopic Gastrostomy) Tube (mL): 30 mL  Total OUT: 1075 mL    Total NET: 1135 mL      07 Mar 2021 07:01  -  07 Mar 2021 10:41  --------------------------------------------------------  IN:    Glucerna 1.5: 90 mL  Total IN: 90 mL    OUT:    Indwelling Catheter - Urethral (mL): 80 mL  Total OUT: 80 mL    Total NET: 10 mL        I&O's Summary    06 Mar 2021 07:01  -  07 Mar 2021 07:00  --------------------------------------------------------  IN: 2210 mL / OUT: 1075 mL / NET: 1135 mL    07 Mar 2021 07:01  -  07 Mar 2021 10:41  --------------------------------------------------------  IN: 90 mL / OUT: 80 mL / NET: 10 mL        PHYSICAL EXAM:  Neurological:  Opens eyes to verbal stimuli   PERRL   +  in the left  moved RUE spontaneously   withdrew bilateral LE's to noxious stimuli   head Incision clean dry intact    LABS:                        8.4    7.27  )-----------( 197      ( 06 Mar 2021 23:30 )             27.2     03-07    144  |  112<H>  |  15  ----------------------------<  152<H>  4.0   |  24  |  <0.5<L>    Ca    8.5      07 Mar 2021 04:30  Phos  3.0     03-06  Mg     1.9     03-06    TPro  5.1<L>  /  Alb  3.1<L>  /  TBili  0.3  /  DBili  0.2  /  AST  46<H>  /  ALT  33  /  AlkPhos  238<H>  03-05    PT/INR - ( 06 Mar 2021 23:30 )   PT: 14.60 sec;   INR: 1.27 ratio         PTT - ( 06 Mar 2021 23:30 )  PTT:36.8 sec        CAPILLARY BLOOD GLUCOSE      POCT Blood Glucose.: 142 mg/dL (07 Mar 2021 10:26)  POCT Blood Glucose.: 234 mg/dL (07 Mar 2021 04:23)  POCT Blood Glucose.: 119 mg/dL (07 Mar 2021 00:57)  POCT Blood Glucose.: 171 mg/dL (06 Mar 2021 21:57)  POCT Blood Glucose.: 152 mg/dL (06 Mar 2021 13:01)      Drug Levels: [] N/A  Vancomycin Level, Trough: 10.5 ug/mL (03-05 @ 05:59)  Vancomycin Level, Trough: 16.7 ug/mL (03-04 @ 06:58)  Vancomycin Level, Trough: 24.4 ug/mL (03-03 @ 21:00)    CSF Analysis: [] N/A      Allergies    penicillin (Anaphylaxis; Hives)    Intolerances      MEDICATIONS:  Antibiotics:  meropenem  IVPB 1000 milliGRAM(s) IV Intermittent every 8 hours  meropenem  IVPB      vancomycin  IVPB 1000 milliGRAM(s) IV Intermittent every 12 hours  voriconazole IVPB 300 milliGRAM(s) IV Intermittent every 12 hours    Neuro:  acetaminophen   Tablet .. 650 milliGRAM(s) Oral every 6 hours  levETIRAcetam  IVPB 750 milliGRAM(s) IV Intermittent every 12 hours    Anticoagulation:  aspirin  chewable 81 milliGRAM(s) Oral daily  heparin   Injectable 5000 Unit(s) SubCutaneous every 8 hours    OTHER:  atorvastatin 80 milliGRAM(s) Oral at bedtime  chlorhexidine 0.12% Liquid 15 milliLiter(s) Oral Mucosa two times a day  chlorhexidine 4% Liquid 1 Application(s) Topical daily  insulin regular  human corrective regimen sliding scale   IV Push every 4 hours  labetalol 300 milliGRAM(s) Oral every 8 hours  lisinopril 20 milliGRAM(s) Oral <User Schedule>  metFORMIN 500 milliGRAM(s) Oral every 12 hours  pantoprazole   Suspension 40 milliGRAM(s) Oral daily  senna 2 Tablet(s) Oral at bedtime    IVF:    CULTURES:  Culture Results:   No growth to date. (03-05 @ 16:46)  Culture Results:   No growth to date. (03-04 @ 20:08)    RADIOLOGY & ADDITIONAL TESTS:      ASSESSMENT:  75y Female s/p    CVA (CEREBRAL VASCULAR ACCIDENT);INTRACRANIAL BLEED    ^CVA (CEREBRAL VASCULAR ACCIDENT);INTRACRANIAL BLEED    Family history of diabetes mellitus (DM)    No pertinent family history in first degree relatives    Handoff    MEWS Score    Anemia    DM (diabetes mellitus)    High cholesterol    HTN (hypertension)    B-cell chronic lymphocytic leukemia variant    CAD (coronary artery disease)    CVA (cerebral vascular accident)    Altered mental status    Palliative care encounter    Intracranial bleed    Craniotomy for intracerebral hemorrhage    H/O heart artery stent    No significant past surgical history    AMS    90+    Intracranial bleed    [email or web address]      A/p                         S/p Left minimally invasive Crani for evac of IPH with placement of EVD                       Neuro checks q 1 hr                      F/u CSF cultures                      care per CCT

## 2021-03-07 NOTE — PROGRESS NOTE ADULT - ASSESSMENT
IMPRESSION:    Acute hypoxemic respiratory failure sp peg/trach  ICH sp craniotomy  Aspergillus pneumonia??    PLAN:    CNS: keep off sedation, Neuro sx f/up    HEENT: Oral care    PULMONARY:  HOB @ 45 degrees. PS trial    CARDIOVASCULAR: Avoid volume overload. BP control,    GI: GI prophylaxis. peg feeding.  Bowel regimen.    RENAL:  Follow up lytes. Correct as needed    INFECTIOUS DISEASE: abx per ID    HEMATOLOGICAL:  DVT prophylaxis. LE doppler    ENDOCRINE:  Follow up FS.  Insulin protocol if needed.    Vent unit  dc TLC/A LINE

## 2021-03-07 NOTE — CHART NOTE - NSCHARTNOTEFT_GEN_A_CORE
SICU DOWNGRADE NOTE    75y Female transferred to floor from SICU    SUBJECTIVE:  -------------------------------------------------------------------------------------------  PMHx/PSHx: PAST MEDICAL & SURGICAL HISTORY:  Anemia    DM (diabetes mellitus)    High cholesterol    HTN (hypertension)    B-cell chronic lymphocytic leukemia variant    CAD (coronary artery disease)  s/p stenting    H/O heart artery stent      Allergies: penicillin (Anaphylaxis; Hives)    -------------------------------------------------------------------------------------------    OBJECTIVE:  Neuro- more alert today, able to mouth some words, but not following commands, localizes to pain, Pupils 3 mm B/L and reactive  HEENT- craniotomy scar clean, dry, healing well, no drainage or erythema, trach in place, no bleeding noted  Resp- lungs clear /L, no crackles, no rhonchi, no wheezing  Abd- abd binder in place, abd is soft, mild tenderness, not distended, PEG tube in place, no drainage or erythema noted  MSK- distal pulses intact, Left DP pulse not palpable, ROM x 4 to painful stimuli, no edema noted    -------------------------------------------------------------------------------------------  Vitals:  T(C): 37.5 (03-07-21 @ 12:00), Max: 37.6 (03-06-21 @ 23:00)  HR: 80 (03-07-21 @ 12:00) (72 - 85)  BP: 135/63 (03-06-21 @ 17:00) (116/58 - 162/72)  RR: 23 (03-07-21 @ 12:00) (16 - 46)  SpO2: 100% (03-07-21 @ 12:00) (98% - 100%)  Wt(kg): --    -------------------------------------------------------------------------------------------  I&O's Summary    06 Mar 2021 07:01  -  07 Mar 2021 07:00  --------------------------------------------------------  IN: 2210 mL / OUT: 1075 mL / NET: 1135 mL    07 Mar 2021 07:01  -  07 Mar 2021 12:01  --------------------------------------------------------  IN: 160 mL / OUT: 130 mL / NET: 30 mL      -------------------------------------------------------------------------------------------  Labs:  CAPILLARY BLOOD GLUCOSE      POCT Blood Glucose.: 142 mg/dL (07 Mar 2021 10:26)  POCT Blood Glucose.: 234 mg/dL (07 Mar 2021 04:23)  POCT Blood Glucose.: 119 mg/dL (07 Mar 2021 00:57)  POCT Blood Glucose.: 171 mg/dL (06 Mar 2021 21:57)  POCT Blood Glucose.: 152 mg/dL (06 Mar 2021 13:01)                          8.4    7.27  )-----------( 197      ( 06 Mar 2021 23:30 )             27.2         03-07    144  |  112<H>  |  15  ----------------------------<  152<H>  4.0   |  24  |  <0.5<L>      Calcium, Total Serum: 8.5 mg/dL (03-07-21 @ 04:30)      LFTs:             5.1  | 0.3  | 46       ------------------[238     ( 05 Mar 2021 23:47 )  3.1  | 0.2  | 33                Blood Gas Arterial, Lactate: 1.0 mmoL/L (03-07-21 @ 04:23)  Blood Gas Arterial, Lactate: 0.8 mmoL/L (03-06-21 @ 02:33)  Blood Gas Arterial, Lactate: 1.5 mmoL/L (03-05-21 @ 03:22)    ABG - ( 07 Mar 2021 04:23 )  pH: 7.45  /  pCO2: 36    /  pO2: 94    / HCO3: 25    / Base Excess: 1.4   /  SaO2: 99      ABG - ( 06 Mar 2021 02:33 )  pH: 7.43  /  pCO2: 38    /  pO2: 76    / HCO3: 25    / Base Excess: 0.6   /  SaO2: 96        ABG - ( 05 Mar 2021 03:22 )  pH: 7.43  /  pCO2: 36    /  pO2: 100   / HCO3: 24    / Base Excess: -0.3  /  SaO2: 100             Coags:     14.60  ----< 1.27    ( 06 Mar 2021 23:30 )     36.8        Culture - Blood (collected 05 Mar 2021 16:46)  Source: .Blood None  Preliminary Report (07 Mar 2021 03:16):    No growth to date.    Culture - Blood (collected 04 Mar 2021 20:08)  Source: .Blood None  Preliminary Report (06 Mar 2021 04:01):    No growth to date.    -------------------------------------------------------------------------------------------  Active Medications:  MEDICATIONS  (STANDING):  acetaminophen   Tablet .. 650 milliGRAM(s) Oral every 6 hours  aspirin  chewable 81 milliGRAM(s) Oral daily  atorvastatin 80 milliGRAM(s) Oral at bedtime  chlorhexidine 0.12% Liquid 15 milliLiter(s) Oral Mucosa two times a day  chlorhexidine 4% Liquid 1 Application(s) Topical daily  heparin   Injectable 5000 Unit(s) SubCutaneous every 8 hours  insulin regular  human corrective regimen sliding scale   IV Push every 4 hours  labetalol 300 milliGRAM(s) Oral every 8 hours  levETIRAcetam  IVPB 750 milliGRAM(s) IV Intermittent every 12 hours  lisinopril 20 milliGRAM(s) Oral <User Schedule>  meropenem  IVPB 1000 milliGRAM(s) IV Intermittent every 8 hours  meropenem  IVPB      metFORMIN 500 milliGRAM(s) Oral every 12 hours  pantoprazole   Suspension 40 milliGRAM(s) Oral daily  senna 2 Tablet(s) Oral at bedtime  vancomycin  IVPB 1000 milliGRAM(s) IV Intermittent every 12 hours  voriconazole IVPB 300 milliGRAM(s) IV Intermittent every 12 hours    MEDICATIONS  (PRN):    -------------------------------------------------------------------------------------------      SIGN OUT AT 03-07-21 @ 12:01 GIVEN TO: SICU DOWNGRADE NOTE    75y Female transferred to floor from SICU    SUBJECTIVE:  -------------------------------------------------------------------------------------------  PMHx/PSHx: PAST MEDICAL & SURGICAL HISTORY:  Anemia    DM (diabetes mellitus)    High cholesterol    HTN (hypertension)    B-cell chronic lymphocytic leukemia variant    CAD (coronary artery disease)  s/p stenting    H/O heart artery stent      Allergies: penicillin (Anaphylaxis; Hives)    -------------------------------------------------------------------------------------------    OBJECTIVE:  Neuro- more alert today, able to mouth some words, but not following commands, localizes to pain, Pupils 3 mm B/L and reactive  HEENT- craniotomy scar clean, dry, healing well, no drainage or erythema, trach in place, no bleeding noted  Resp- lungs clear /L, no crackles, no rhonchi, no wheezing  Abd- abd binder in place, abd is soft, mild tenderness, not distended, PEG tube in place, no drainage or erythema noted  MSK- distal pulses intact, ROM x 4 to painful stimuli, no edema noted    -------------------------------------------------------------------------------------------  Vitals:  T(C): 37.5 (21 @ 12:00), Max: 37.6 (21 @ 23:00)  HR: 80 (21 @ 12:00) (72 - 85)  BP: 135/63 (21 @ 17:00) (116/58 - 162/72)  RR: 23 (21 @ 12:00) (16 - 46)  SpO2: 100% (21 @ 12:00) (98% - 100%)  Wt(kg): --    -------------------------------------------------------------------------------------------  I&O's Summary    06 Mar 2021 07:01  -  07 Mar 2021 07:00  --------------------------------------------------------  IN: 2210 mL / OUT: 1075 mL / NET: 1135 mL    07 Mar 2021 07:01  -  07 Mar 2021 12:01  --------------------------------------------------------  IN: 160 mL / OUT: 130 mL / NET: 30 mL      -------------------------------------------------------------------------------------------  Labs:  CAPILLARY BLOOD GLUCOSE      POCT Blood Glucose.: 142 mg/dL (07 Mar 2021 10:26)  POCT Blood Glucose.: 234 mg/dL (07 Mar 2021 04:23)  POCT Blood Glucose.: 119 mg/dL (07 Mar 2021 00:57)  POCT Blood Glucose.: 171 mg/dL (06 Mar 2021 21:57)  POCT Blood Glucose.: 152 mg/dL (06 Mar 2021 13:01)                          8.4    7.27  )-----------( 197      ( 06 Mar 2021 23:30 )             27.2             144  |  112<H>  |  15  ----------------------------<  152<H>  4.0   |  24  |  <0.5<L>      Calcium, Total Serum: 8.5 mg/dL (21 @ 04:30)      LFTs:             5.1  | 0.3  | 46       ------------------[238     ( 05 Mar 2021 23:47 )  3.1  | 0.2  | 33                Blood Gas Arterial, Lactate: 1.0 mmoL/L (21 @ 04:23)  Blood Gas Arterial, Lactate: 0.8 mmoL/L (21 @ 02:33)  Blood Gas Arterial, Lactate: 1.5 mmoL/L (21 @ 03:22)    ABG - ( 07 Mar 2021 04:23 )  pH: 7.45  /  pCO2: 36    /  pO2: 94    / HCO3: 25    / Base Excess: 1.4   /  SaO2: 99      ABG - ( 06 Mar 2021 02:33 )  pH: 7.43  /  pCO2: 38    /  pO2: 76    / HCO3: 25    / Base Excess: 0.6   /  SaO2: 96        ABG - ( 05 Mar 2021 03:22 )  pH: 7.43  /  pCO2: 36    /  pO2: 100   / HCO3: 24    / Base Excess: -0.3  /  SaO2: 100       Coags:     14.60  ----< 1.27    ( 06 Mar 2021 23:30 )     36.8        Culture - Blood (collected 05 Mar 2021 16:46)  Source: .Blood None  Preliminary Report (07 Mar 2021 03:16):    No growth to date.    Culture - Blood (collected 04 Mar 2021 20:08)  Source: .Blood None  Preliminary Report (06 Mar 2021 04:01):    No growth to date.    -------------------------------------------------------------------------------------------  Active Medications:  MEDICATIONS  (STANDING):  acetaminophen   Tablet .. 650 milliGRAM(s) Oral every 6 hours  aspirin  chewable 81 milliGRAM(s) Oral daily  atorvastatin 80 milliGRAM(s) Oral at bedtime  chlorhexidine 0.12% Liquid 15 milliLiter(s) Oral Mucosa two times a day  chlorhexidine 4% Liquid 1 Application(s) Topical daily  heparin   Injectable 5000 Unit(s) SubCutaneous every 8 hours  insulin regular  human corrective regimen sliding scale   IV Push every 4 hours  labetalol 300 milliGRAM(s) Oral every 8 hours  levETIRAcetam  IVPB 750 milliGRAM(s) IV Intermittent every 12 hours  lisinopril 20 milliGRAM(s) Oral <User Schedule>  meropenem  IVPB 1000 milliGRAM(s) IV Intermittent every 8 hours  meropenem  IVPB      metFORMIN 500 milliGRAM(s) Oral every 12 hours  pantoprazole   Suspension 40 milliGRAM(s) Oral daily  senna 2 Tablet(s) Oral at bedtime  vancomycin  IVPB 1000 milliGRAM(s) IV Intermittent every 12 hours  voriconazole IVPB 300 milliGRAM(s) IV Intermittent every 12 hours    MEDICATIONS  (PRN):    -------------------------------------------------------------------------------------------  Assessment and Plan:     75y Female s/p craniotomy for left frontal hematoma with evacuation and EVD placement. Intubated and sedated. SICU admission for hemodynamic monitoring.     NEURO:  off all sedation  Pain controlled with IV Tylenol   Keppra 750 mg q 12  EVD removed 3/2   24hr EEG: started 3/3 at 5PM-  no seizure activity  q 1 hr neurocheck, f/u with NSX   CTH- ventricular drain in 3rd ventricle, decr size of lateral ventricles, scattered SAH and layering IVH in posterior horns of  lateral ventricle slightly increased   rpt CTH - stable- L frontal hemoatom cavity 4.5 cm, slighlty decr amount of blood, interval decr in pneumocephalus, L transfrontal drain tip in the third ventricle   3/1: CT sinus-prelim- no evidence of inflammatory/infectious diseases of paransal sinuses  Family meeting 3/3: possible trach/peg this week, follow up with palliative      RESP:   Resp Failure, now s/p bedside trach  Intubated: /16/30/5  AM AB.45/36/94/25  AM CXR- improving B/L effsuions  3/1: CT chest -mucus plug L lower lobe bronchus, mod bilat pleural effusions w/ areas of atlectasis in both lower lobes. tree-in-bud opacities in posterior aspect of R upper lobe  +aspergillus  Keep HOB elevated at 30 degrees      CARDS:   PMH of  HTN , BP controlled with  home Lisinopril to 20mg and  labetalol 300 q8h        - rescheduled lisinopril dose s/p hypotension when giving both  H/o CAD s/p stents - ASA  HLD- Lipitor  Echo (10/13/20): EF 55-60%, trace MR, R atrial echodensity possible thrombus  Trop 0.24 --> 0.21->0.15, no longer trending    GI/NUTR:   now s/p bedside PEG  Diet: resume TF via PEG tonight- Glucerna 1.2 @ goal 60/hr (increased per Nutrition)  GI Prophylaxis- PPI  Bowel regimen- Senna, lactulose   BM 3/7       /RENAL:   IVL  BUN/Cr- 15/0.5  U/O- 1.1 L x 24 hrs  Hyonatremia- resolved, dc'd Hypertonic Saline      HEME/ONC:   DVT prophylaxis: heparin   Injectable 5000 every 8 hours  Hb/Hct:  8.4/27.2  Plts:   197  INR 1.2  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- held  Heme/Onc consult: transfuse platelets if < 100, give vit K for elevated INR  s/p 1 FFP & 10 vit K on  for INR 1.9     ID:  Immunocompromised  Afebrile, WBC 7.27  -  repeat BC 3/4/21--> NGTD   con't ABX as per ID : vanco (-  ), aztreonam (-3/4), Voriconazole f/u LFTs (3/2), Meropenem (3/4-?)  Vanc level (3/1): 15.2  Vanc level (3/3): 24.4  Vanc lvl (3/4): 16.7  Vanc lvl (3/5) 10.5  Vanc lvl 3/7-  Cultures:     -UA (): negative     -CSF with Gram Stain  (21 @ 15:20): No growth at 3 days.     -CSF (fungal) (collected ): pending     -CSF with Gram Stain (collected ): negative    -Bronchial (collected ):+aspergillus    -Fiu rpt fungal CSF cultures (3/2)    -Apergillus galactaman antigen    -blood cx 3/4: NGTD       ENDO:  Hx of DM, HA1C 6.3  -  Glucose control with  restarted home metformin 500mg q 12 hr and  ISS, FS q 4  last 3 FS- 119,234, 142    GOC: palliative care following, full code    DISPO:   Vent Unit  Subacute rehab, vent capable      SIGN OUT AT 21 @ 12:01 GIVEN TO:

## 2021-03-07 NOTE — PROGRESS NOTE ADULT - ATTENDING COMMENTS
more awake and alert  following some commands   s/p Trache/PEG   d/c A line and Murry   Transfer to Vent unit

## 2021-03-07 NOTE — PROGRESS NOTE ADULT - SUBJECTIVE AND OBJECTIVE BOX
OVERNIGHT EVENTS: events noted, sp PEG/ trach, called by sx for transfer to vent unit, no drips    Vital Signs Last 24 Hrs  T(C): 37.1 (07 Mar 2021 08:00), Max: 37.6 (06 Mar 2021 23:00)  T(F): 98.8 (07 Mar 2021 08:00), Max: 99.7 (06 Mar 2021 23:00)  HR: 81 (07 Mar 2021 10:00) (72 - 85)  BP: 135/63 (06 Mar 2021 17:00) (116/58 - 162/72)  BP(mean): 91 (06 Mar 2021 17:00) (80 - 104)  RR: 18 (07 Mar 2021 10:00) (16 - 46)  SpO2: 100% (07 Mar 2021 10:00) (98% - 100%)    PHYSICAL EXAMINATION:    GENERAL: ill looking    HEENT: Head is normocephalic and atraumatic. cranio car    NECK: Supple.    LUNGS: dec bs both bases    HEART: FRACISCO 3.6    ABDOMEN: Soft, nontender, and nondistended.      EXTREMITIES: Without any cyanosis, clubbing, rash, lesions or edema.    NEUROLOGIC: not following commands        LABS:                        8.4    7.27  )-----------( 197      ( 06 Mar 2021 23:30 )             27.2     03-07    144  |  112<H>  |  15  ----------------------------<  152<H>  4.0   |  24  |  <0.5<L>    Ca    8.5      07 Mar 2021 04:30  Phos  3.0     03-06  Mg     1.9     03-06    TPro  5.1<L>  /  Alb  3.1<L>  /  TBili  0.3  /  DBili  0.2  /  AST  46<H>  /  ALT  33  /  AlkPhos  238<H>  03-05    PT/INR - ( 06 Mar 2021 23:30 )   PT: 14.60 sec;   INR: 1.27 ratio         PTT - ( 06 Mar 2021 23:30 )  PTT:36.8 sec    ABG - ( 07 Mar 2021 04:23 )  pH, Arterial: 7.45  pH, Blood: x     /  pCO2: 36    /  pO2: 94    / HCO3: 25    / Base Excess: 1.4   /  SaO2: 99          30%                      03-06-21 @ 07:01  -  03-07-21 @ 07:00  --------------------------------------------------------  IN: 2210 mL / OUT: 1075 mL / NET: 1135 mL    03-07-21 @ 07:01  -  03-07-21 @ 10:57  --------------------------------------------------------  IN: 90 mL / OUT: 80 mL / NET: 10 mL        MICROBIOLOGY:  Culture Results:   No growth to date. (03-05 @ 16:46)  Culture Results:   No growth to date. (03-04 @ 20:08)      MEDICATIONS  (STANDING):  acetaminophen   Tablet .. 650 milliGRAM(s) Oral every 6 hours  aspirin  chewable 81 milliGRAM(s) Oral daily  atorvastatin 80 milliGRAM(s) Oral at bedtime  chlorhexidine 0.12% Liquid 15 milliLiter(s) Oral Mucosa two times a day  chlorhexidine 4% Liquid 1 Application(s) Topical daily  heparin   Injectable 5000 Unit(s) SubCutaneous every 8 hours  insulin regular  human corrective regimen sliding scale   IV Push every 4 hours  labetalol 300 milliGRAM(s) Oral every 8 hours  levETIRAcetam  IVPB 750 milliGRAM(s) IV Intermittent every 12 hours  lisinopril 20 milliGRAM(s) Oral <User Schedule>  meropenem  IVPB 1000 milliGRAM(s) IV Intermittent every 8 hours  meropenem  IVPB      metFORMIN 500 milliGRAM(s) Oral every 12 hours  pantoprazole   Suspension 40 milliGRAM(s) Oral daily  senna 2 Tablet(s) Oral at bedtime  vancomycin  IVPB 1000 milliGRAM(s) IV Intermittent every 12 hours  voriconazole IVPB 300 milliGRAM(s) IV Intermittent every 12 hours    MEDICATIONS  (PRN):      RADIOLOGY & ADDITIONAL STUDIES:

## 2021-03-08 NOTE — PROGRESS NOTE ADULT - ASSESSMENT
Assessment and Plan:     75y Female s/p craniotomy for left frontal hematoma with evacuation and EVD placement. Intubated and sedated. SICU admission for hemodynamic monitoring.     NEURO:  off all sedation  Pain controlled with IV Tylenol   Keppra 750 mg q 12  EVD removed 3/2   24hr EEG: started 3/3 at 5PM-  no seizure activity  q 1 hr neurocheck, f/u with NSX  2/25 CTH- ventricular drain in 3rd ventricle, decr size of lateral ventricles, scattered SAH and layering IVH in posterior horns of  lateral ventricle slightly increased  2/28 rpt CTH - stable- L frontal hemoatom cavity 4.5 cm, slighlty decr amount of blood, interval decr in pneumocephalus, L transfrontal drain tip in the third ventricle   3/1: CT sinus-prelim- no evidence of inflammatory/infectious diseases of paransal sinuses  Family meeting 3/3: possible trach/peg this week, follow up with palliative      RESP:   Resp Failure, now s/p bedside trach  Intubated: /16/30/5  AM ABG:  AM CXR  3/1: CT chest -mucus plug L lower lobe bronchus, mod bilat pleural effusions w/ areas of atlectasis in both lower lobes. tree-in-bud opacities in posterior aspect of R upper lobe  +aspergillus  Keep HOB elevated at 30 degrees      CARDS:   Acute on chronic HTN        - home Lisinopril to 20mg       - labetalol 300 q8h        - rescheduled lisinopril dose s/p hypotension when giving both  H/o CAD s/p stents - ASA  Echo (10/13/20): EF 55-60%, trace MR, R atrial echodensity possible thrombus  Trop 0.24 --> 0.21->0.15, no longer trending    GI/NUTR:   now s/p bedside PEG  Diet: TF via PEG - Glucerna 1.2 @ goal 60/hr (increased per Nutrition)  GI Prophylaxis- PPI  Bowel regimen- Senna, lactulose   BM 3/4       /RENAL:   Monitor UO-roblero in place  IVL  Na goal 135-145   q6BMP & Osm  BUN/Cr- 15/0.5  Hypernatremia resolved- dc'd Hypertonic Saline      HEME/ONC:   DVT prophylaxis: heparin   Injectable 5000 every 8 hours  Hb/Hct:  8.0/25.4> 8.2/27> 8.4/27.2 > 8.4/27  Plts:  144>158 > 197 >180  Last T&S: 3/5  (02/25) INR 1.85, nsx rec 1 FFP and 10 vit K, INR 1.49 (2/26) >1.27  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  Heme/Onc consult: transfuse platelets if < 100, give vit K for elevated INR  s/p 1 FFP & 10 vit K on 2/26 for INR 1.9 rpt 1.5>1.6>1.3>1.3>1.3    ID:  Immunocompromised  afebrile  WBC- 7.19> 6.85> 7.27  ABX: vanco (2/28-  ), aztreonam (2/28-3/4), Voriconazole f/u LFTs (3/2), Meropenem (3/4-?)  Vanc lvl (3/7) 17  Cultures:     -UA (2/28): negative     -CSF with Gram Stain  (02.26.21 @ 15:20): No growth at 3 days.     -CSF (fungal) (collected 02-26): neg     -CSF with Gram Stain (collected 02-27): negative    -Bronchial (collected 02-27):+aspergillus    -fungal CSF cultures (3/2)- neg    -Apergillus galactaman antigen    -blood cx 3/4, 3/5: NGTD       ENDO:  Hx of DM, HA1C 6.3  - restarted home metformin 500mg q 12 hr  on ISS, FS q 4    GOC: palliative care following, full code    DISPO:    d/g to vent unit

## 2021-03-08 NOTE — PROGRESS NOTE ADULT - SUBJECTIVE AND OBJECTIVE BOX
75[facility] day: 12    HPI:    75 years old female from home with PMHx of B cell lymphoma on Imbruvica (since 10/2020), CAD s/p stents 15 years ago, DM, chronic UTI, HTN, DLD, brought in by ambulance due to altered mental status and hypertension. Patient was AAO x4 and fully functional at baseline. Last known normal was 11:00pm 2/23 before bed. 2/24 at 8:45AM, patient was found to be lethargic in bed by son. At 9:15, her physical therapist noticed her becoming more lethargic, but vitals were normal. Soon at approx. 9:30, patient was unresponsive and had a blank stare. EMS was called and her BP was found to be around 230/120. She was rushed to ED, Code Stroke called, NIHSS 23. CTH showed Large left frontal intraparenchymal hematoma with intraventricular extension into left lateral ventricle, associated with marked mass effect resulting in 0.9 cm midline shift to the right anteriorly. Patient was intubated for airway protection. S/p 4 units of platelets for ASA reversal. s/p Left MIS craniotomy with evacuation of hematoma and EVD placement on 2/24.       PAST MEDICAL & SURGICAL HISTORY:    Anemia    DM (diabetes mellitus)    High cholesterol    HTN (hypertension)    B-cell chronic lymphocytic leukemia variant    CAD (coronary artery disease)  s/p stenting    H/O heart artery stent    Home Medications:  aspirin 81 mg oral tablet, chewable: 1 tab(s) orally once a day (24 Feb 2021 15:11)  atorvastatin 80 mg oral tablet: 1 tab(s) orally once a day (at bedtime) (24 Feb 2021 15:11)  Imbruvica 70 mg oral capsule: 2 cap(s) orally once a day (24 Feb 2021 15:11)  Levaquin 500 mg oral tablet: 1 tab(s) orally every 24 hours (24 Feb 2021 15:11)  lisinopril 10 mg oral tablet: 1 tab(s) orally once a day (24 Feb 2021 15:11)    Current medications:   acetaminophen   Tablet .. 650 milliGRAM(s) Oral every 6 hours  aspirin  chewable 81 milliGRAM(s) Oral daily  atorvastatin 80 milliGRAM(s) Oral at bedtime  chlorhexidine 0.12% Liquid 15 milliLiter(s) Oral Mucosa two times a day  chlorhexidine 4% Liquid 1 Application(s) Topical daily  heparin   Injectable 5000 Unit(s) SubCutaneous every 8 hours  insulin regular  human corrective regimen sliding scale   SubCutaneous every 4 hours  labetalol 300 milliGRAM(s) Oral every 8 hours  levETIRAcetam  IVPB 750 milliGRAM(s) IV Intermittent every 12 hours  lisinopril 20 milliGRAM(s) Oral <User Schedule>  meropenem  IVPB 1000 milliGRAM(s) IV Intermittent every 8 hours  meropenem  IVPB      metFORMIN 500 milliGRAM(s) Oral every 12 hours  pantoprazole   Suspension 40 milliGRAM(s) Oral daily  senna 2 Tablet(s) Oral at bedtime  vancomycin  IVPB 1000 milliGRAM(s) IV Intermittent every 12 hours  voriconazole IVPB 300 milliGRAM(s) IV Intermittent every 12 hours      T(F): 100 (03-08-21 @ 12:00), Max: 100 (03-08-21 @ 12:00)  HR: 88 (03-08-21 @ 13:00) (76 - 88)  BP: --  RR: 28 (03-08-21 @ 13:00) (11 - 29)  SpO2: 98% (03-08-21 @ 13:00) (98% - 100%)                        8.4    5.17  )-----------( 180      ( 07 Mar 2021 23:30 )             27.1     03-07    140  |  107  |  15  ----------------------------<  129<H>  4.0   |  21  |  <0.5<L>    Ca    8.2<L>      07 Mar 2021 23:30  Phos  3.8     03-07  Mg     1.9     03-07      PT/INR - ( 06 Mar 2021 23:30 )   PT: 14.60 sec;   INR: 1.27 ratio         PTT - ( 06 Mar 2021 23:30 )  PTT:36.8 sec      I&O's Detail    07 Mar 2021 07:01  -  08 Mar 2021 07:00  --------------------------------------------------------  IN:    Enteral Tube Flush: 150 mL    Glucerna 1.5: 1190 mL    IV PiggyBack: 750 mL    IV PiggyBack: 100 mL    IV PiggyBack: 250 mL    IV PiggyBack: 250 mL    IV PiggyBack: 100 mL  Total IN: 2790 mL    OUT:    Indwelling Catheter - Urethral (mL): 770 mL    Rectal Tube (mL): 50 mL  Total OUT: 820 mL    Total NET: 1970 mL      08 Mar 2021 07:01  -  08 Mar 2021 14:34  --------------------------------------------------------  IN:    Glucerna 1.5: 360 mL  Total IN: 360 mL    OUT:    Indwelling Catheter - Urethral (mL): 240 mL  Total OUT: 240 mL    Total NET: 120 mL      Neuro Exam:    Neurological:  Mental Status: Alert and Oriented to person, place, and time, cooperative, pleasant. Affect appropriate, thought, speech, and language coherent. Short- and long-term memory, abstract thinking and calculation intact.  Cranial Nerves:  I: intact  II, III, IV, VI: PERRLA, EOM intact. Lee intact by confrontation. No cranial nerve palsy or nystagmus noted.  V: facial sensation intact; masseter/ temporal muscles intact, corneal reflex intact bilaterally  VII: face symmetrical at rest and with expression  VIII: intact to finger rub in the right ear and left ear  IX and X: no hoarseness, gag intact, palate/ uvula rise symmetrically  XI: SCM/ trapezius strength intact bilateral  XII: no dysarthria, tongue weakness/fasiculation   Motor: Normal muscle bulk/tone. Good posture. Strength 5+/5+ upper & lower extremities  Sensory: Sensation to light touch, pain, vibration, proprioception, and stereognosis intact to trunk and bilaterally to both upper and lower extremities. Normal two point discrimination.   Cerebellar Function: Normal gait including tandem, heel and toe walking. Romberg and pronator drift negative. Normal rapid alternating movements and point to point test.  Reflexes: No clonus    EVD: Discontinued 3/2     Last CTH: < from: CT Head No Cont (02.28.21 @ 18:22) >    IMPRESSION:    Since February 25, 2021;    1.  Unchanged left frontal hematoma cavity measuring approximately 4.5 cm containing slightly decreasedamount of residual blood products.    2. Overall unchanged scattered subarachnoid hemorrhage and layering intraventricular hemorrhage. No significant midline shift.    3. Interval decrease in pneumocephalus.    4. Left transfrontal ventricular drain with distal tip at the level of the third ventricle.    < end of copied text >    < from: CT Brain Stroke Protocol (02.24.21 @ 11:06) >    IMPRESSION:    Large left frontal intraparenchymal hematoma with intraventricular extension into left lateral ventricle. Associated marked mass effect resulting in 0.9 cm midline shift to the right anteriorly.    Trace acute subarachnoid hemorrhage along the left frontal sulci.    Stable severe chronic microvascular ischemic changes.    < end of copied text >        Last EEG:  3/4/21: No Seizures.       ABG:ABG - ( 08 Mar 2021 04:19 )  pH, Arterial: 7.43  pH, Blood: x     /  pCO2: 39    /  pO2: 104   / HCO3: 26    / Base Excess: 1.8   /  SaO2: 99          Mode: AC/ CMV (Assist Control/ Continuous Mandatory Ventilation)  RR (machine): 16  TV (machine): 400  FiO2: 25  PEEP: 5  ITime: 0.8  MAP: 12  PIP: 19             [facility] day: 12    HPI:    75 years old female from home with PMHx of B cell lymphoma on Imbruvica (since 10/2020), CAD s/p stents 15 years ago, DM, chronic UTI, HTN, DLD, brought in by ambulance due to altered mental status and hypertension. Patient was AAO x4 and fully functional at baseline. Last known normal was 11:00pm 2/23 before bed. 2/24 at 8:45AM, patient was found to be lethargic in bed by son. At 9:15, her physical therapist noticed her becoming more lethargic, but vitals were normal. Soon at approx. 9:30, patient was unresponsive and had a blank stare. EMS was called and her BP was found to be around 230/120. She was rushed to ED, Code Stroke called, NIHSS 23. CTH showed Large left frontal intraparenchymal hematoma with intraventricular extension into left lateral ventricle, associated with marked mass effect resulting in 0.9 cm midline shift to the right anteriorly. Patient was intubated for airway protection. S/p 4 units of platelets for ASA reversal. s/p Left MIS craniotomy with evacuation of hematoma and EVD placement on 2/24.       PAST MEDICAL & SURGICAL HISTORY:    Anemia    DM (diabetes mellitus)    High cholesterol    HTN (hypertension)    B-cell chronic lymphocytic leukemia variant    CAD (coronary artery disease)  s/p stenting    H/O heart artery stent    Home Medications:  aspirin 81 mg oral tablet, chewable: 1 tab(s) orally once a day (24 Feb 2021 15:11)  atorvastatin 80 mg oral tablet: 1 tab(s) orally once a day (at bedtime) (24 Feb 2021 15:11)  Imbruvica 70 mg oral capsule: 2 cap(s) orally once a day (24 Feb 2021 15:11)  Levaquin 500 mg oral tablet: 1 tab(s) orally every 24 hours (24 Feb 2021 15:11)  lisinopril 10 mg oral tablet: 1 tab(s) orally once a day (24 Feb 2021 15:11)    Current medications:   acetaminophen   Tablet .. 650 milliGRAM(s) Oral every 6 hours  aspirin  chewable 81 milliGRAM(s) Oral daily  atorvastatin 80 milliGRAM(s) Oral at bedtime  chlorhexidine 0.12% Liquid 15 milliLiter(s) Oral Mucosa two times a day  chlorhexidine 4% Liquid 1 Application(s) Topical daily  heparin   Injectable 5000 Unit(s) SubCutaneous every 8 hours  insulin regular  human corrective regimen sliding scale   SubCutaneous every 4 hours  labetalol 300 milliGRAM(s) Oral every 8 hours  levETIRAcetam  IVPB 750 milliGRAM(s) IV Intermittent every 12 hours  lisinopril 20 milliGRAM(s) Oral <User Schedule>  meropenem  IVPB 1000 milliGRAM(s) IV Intermittent every 8 hours  meropenem  IVPB      metFORMIN 500 milliGRAM(s) Oral every 12 hours  pantoprazole   Suspension 40 milliGRAM(s) Oral daily  senna 2 Tablet(s) Oral at bedtime  vancomycin  IVPB 1000 milliGRAM(s) IV Intermittent every 12 hours  voriconazole IVPB 300 milliGRAM(s) IV Intermittent every 12 hours      T(F): 100 (03-08-21 @ 12:00), Max: 100 (03-08-21 @ 12:00)  HR: 88 (03-08-21 @ 13:00) (76 - 88)  BP: --  RR: 28 (03-08-21 @ 13:00) (11 - 29)  SpO2: 98% (03-08-21 @ 13:00) (98% - 100%)                        8.4    5.17  )-----------( 180      ( 07 Mar 2021 23:30 )             27.1     03-07    140  |  107  |  15  ----------------------------<  129<H>  4.0   |  21  |  <0.5<L>    Ca    8.2<L>      07 Mar 2021 23:30  Phos  3.8     03-07  Mg     1.9     03-07      PT/INR - ( 06 Mar 2021 23:30 )   PT: 14.60 sec;   INR: 1.27 ratio         PTT - ( 06 Mar 2021 23:30 )  PTT:36.8 sec      I&O's Detail    07 Mar 2021 07:01  -  08 Mar 2021 07:00  --------------------------------------------------------  IN:    Enteral Tube Flush: 150 mL    Glucerna 1.5: 1190 mL    IV PiggyBack: 750 mL    IV PiggyBack: 100 mL    IV PiggyBack: 250 mL    IV PiggyBack: 250 mL    IV PiggyBack: 100 mL  Total IN: 2790 mL    OUT:    Indwelling Catheter - Urethral (mL): 770 mL    Rectal Tube (mL): 50 mL  Total OUT: 820 mL    Total NET: 1970 mL      08 Mar 2021 07:01  -  08 Mar 2021 14:34  --------------------------------------------------------  IN:    Glucerna 1.5: 360 mL  Total IN: 360 mL    OUT:    Indwelling Catheter - Urethral (mL): 240 mL  Total OUT: 240 mL    Total NET: 120 mL      Neuro Exam:    Neurological:  Mental Status: Opens eyes intermittently to verbal stimuli, nonverbal.   Cranial Nerves:  II, III, IV, VI: PERRLA, 3mm b/l, EOM intact. Lee intact by confrontation. No cranial nerve palsy or nystagmus noted.  V: corneal reflex intact bilaterally  VII: face symmetrical at rest and with expression  VIII: differed  IX and X: gag intact  XI: differed  XII: differed  Motor/ Sensory: Localizes to pain upper and lower extremities L > R  Reflexes: No clonus    EVD: Discontinued 3/2     Last CTH: < from: CT Head No Cont (02.28.21 @ 18:22) >    IMPRESSION:    Since February 25, 2021;    1.  Unchanged left frontal hematoma cavity measuring approximately 4.5 cm containing slightly decreasedamount of residual blood products.    2. Overall unchanged scattered subarachnoid hemorrhage and layering intraventricular hemorrhage. No significant midline shift.    3. Interval decrease in pneumocephalus.    4. Left transfrontal ventricular drain with distal tip at the level of the third ventricle.    < end of copied text >    < from: CT Brain Stroke Protocol (02.24.21 @ 11:06) >    IMPRESSION:    Large left frontal intraparenchymal hematoma with intraventricular extension into left lateral ventricle. Associated marked mass effect resulting in 0.9 cm midline shift to the right anteriorly.    Trace acute subarachnoid hemorrhage along the left frontal sulci.    Stable severe chronic microvascular ischemic changes.    < end of copied text >        Last EEG:  3/4/21: No Seizures.       ABG:ABG - ( 08 Mar 2021 04:19 )  pH, Arterial: 7.43  pH, Blood: x     /  pCO2: 39    /  pO2: 104   / HCO3: 26    / Base Excess: 1.8   /  SaO2: 99          Mode: AC/ CMV (Assist Control/ Continuous Mandatory Ventilation)  RR (machine): 16  TV (machine): 400  FiO2: 25  PEEP: 5  ITime: 0.8  MAP: 12  PIP: 19             [facility] day: 12    HPI:    75 years old female from home with PMHx of B cell lymphoma on Imbruvica (since 10/2020), CAD s/p stents 15 years ago, DM, chronic UTI, HTN, DLD, brought in by ambulance due to altered mental status and hypertension. Patient was AAO x4 and fully functional at baseline. Last known normal was 11:00pm 2/23 before bed. 2/24 at 8:45AM, patient was found to be lethargic in bed by son. At 9:15, her physical therapist noticed her becoming more lethargic, but vitals were normal. Soon at approx. 9:30, patient was unresponsive and had a blank stare. EMS was called and her BP was found to be around 230/120. She was rushed to ED, Code Stroke called, NIHSS 23. CTH showed Large left frontal intraparenchymal hematoma with intraventricular extension into left lateral ventricle, associated with marked mass effect resulting in 0.9 cm midline shift to the right anteriorly. Patient was intubated for airway protection. S/p 4 units of platelets for ASA reversal. s/p Left MIS craniotomy with evacuation of hematoma and EVD placement on 2/24.       PAST MEDICAL & SURGICAL HISTORY:    Anemia    DM (diabetes mellitus)    High cholesterol    HTN (hypertension)    B-cell chronic lymphocytic leukemia variant    CAD (coronary artery disease)  s/p stenting    H/O heart artery stent    Home Medications:  aspirin 81 mg oral tablet, chewable: 1 tab(s) orally once a day (24 Feb 2021 15:11)  atorvastatin 80 mg oral tablet: 1 tab(s) orally once a day (at bedtime) (24 Feb 2021 15:11)  Imbruvica 70 mg oral capsule: 2 cap(s) orally once a day (24 Feb 2021 15:11)  Levaquin 500 mg oral tablet: 1 tab(s) orally every 24 hours (24 Feb 2021 15:11)  lisinopril 10 mg oral tablet: 1 tab(s) orally once a day (24 Feb 2021 15:11)    Current medications:   acetaminophen   Tablet .. 650 milliGRAM(s) Oral every 6 hours  aspirin  chewable 81 milliGRAM(s) Oral daily  atorvastatin 80 milliGRAM(s) Oral at bedtime  chlorhexidine 0.12% Liquid 15 milliLiter(s) Oral Mucosa two times a day  chlorhexidine 4% Liquid 1 Application(s) Topical daily  heparin   Injectable 5000 Unit(s) SubCutaneous every 8 hours  insulin regular  human corrective regimen sliding scale   SubCutaneous every 4 hours  labetalol 300 milliGRAM(s) Oral every 8 hours  levETIRAcetam  IVPB 750 milliGRAM(s) IV Intermittent every 12 hours  lisinopril 20 milliGRAM(s) Oral <User Schedule>  meropenem  IVPB 1000 milliGRAM(s) IV Intermittent every 8 hours  meropenem  IVPB      metFORMIN 500 milliGRAM(s) Oral every 12 hours  pantoprazole   Suspension 40 milliGRAM(s) Oral daily  senna 2 Tablet(s) Oral at bedtime  vancomycin  IVPB 1000 milliGRAM(s) IV Intermittent every 12 hours  voriconazole IVPB 300 milliGRAM(s) IV Intermittent every 12 hours      T(F): 100 (03-08-21 @ 12:00), Max: 100 (03-08-21 @ 12:00)  HR: 88 (03-08-21 @ 13:00) (76 - 88)  BP: --  RR: 28 (03-08-21 @ 13:00) (11 - 29)  SpO2: 98% (03-08-21 @ 13:00) (98% - 100%)                        8.4    5.17  )-----------( 180      ( 07 Mar 2021 23:30 )             27.1     03-07    140  |  107  |  15  ----------------------------<  129<H>  4.0   |  21  |  <0.5<L>    Ca    8.2<L>      07 Mar 2021 23:30  Phos  3.8     03-07  Mg     1.9     03-07      PT/INR - ( 06 Mar 2021 23:30 )   PT: 14.60 sec;   INR: 1.27 ratio         PTT - ( 06 Mar 2021 23:30 )  PTT:36.8 sec      I&O's Detail    07 Mar 2021 07:01  -  08 Mar 2021 07:00  --------------------------------------------------------  IN:    Enteral Tube Flush: 150 mL    Glucerna 1.5: 1190 mL    IV PiggyBack: 750 mL    IV PiggyBack: 100 mL    IV PiggyBack: 250 mL    IV PiggyBack: 250 mL    IV PiggyBack: 100 mL  Total IN: 2790 mL    OUT:    Indwelling Catheter - Urethral (mL): 770 mL    Rectal Tube (mL): 50 mL  Total OUT: 820 mL    Total NET: 1970 mL      08 Mar 2021 07:01  -  08 Mar 2021 14:34  --------------------------------------------------------  IN:    Glucerna 1.5: 360 mL  Total IN: 360 mL    OUT:    Indwelling Catheter - Urethral (mL): 240 mL  Total OUT: 240 mL    Total NET: 120 mL      Neuro Exam:    Neurological:  Mental Status: Opens eyes intermittently to verbal stimuli, nonverbal.   Cranial Nerves:  II, III, IV, VI: PERRLA, 3mm b/l, EOM intact. Lee intact by confrontation. No cranial nerve palsy or nystagmus noted.  V: corneal reflex intact bilaterally  VII: face symmetrical at rest and with expression  VIII: differed  IX and X: gag intact  XI: differed  XII: differed  Motor/ Sensory: Localizes to pain upper and lower extremities L > R  Reflexes: No clonus    EVD: Discontinued 3/2     Last CTH: < from: CT Head No Cont (02.28.21 @ 18:22) >    IMPRESSION:    Since February 25, 2021;    1.  Unchanged left frontal hematoma cavity measuring approximately 4.5 cm containing slightly decreasedamount of residual blood products.    2. Overall unchanged scattered subarachnoid hemorrhage and layering intraventricular hemorrhage. No significant midline shift.    3. Interval decrease in pneumocephalus.    4. Left transfrontal ventricular drain with distal tip at the level of the third ventricle.    < end of copied text >    < from: CT Brain Stroke Protocol (02.24.21 @ 11:06) >    IMPRESSION:    Large left frontal intraparenchymal hematoma with intraventricular extension into left lateral ventricle. Associated marked mass effect resulting in 0.9 cm midline shift to the right anteriorly.    Trace acute subarachnoid hemorrhage along the left frontal sulci.    Stable severe chronic microvascular ischemic changes.    < end of copied text >        Last EEG:  3/4/21: No Seizures.       ABG:ABG - ( 08 Mar 2021 04:19 )  pH, Arterial: 7.43  pH, Blood: x     /  pCO2: 39    /  pO2: 104   / HCO3: 26    / Base Excess: 1.8   /  SaO2: 99          Mode: AC/ CMV (Assist Control/ Continuous Mandatory Ventilation)  RR (machine): 16  TV (machine): 400  FiO2: 25  PEEP: 5  ITime: 0.8  MAP: 12  PIP: 19

## 2021-03-08 NOTE — PROGRESS NOTE ADULT - SUBJECTIVE AND OBJECTIVE BOX
AD BASURTO  [id number]  75y Female    Indication for ICU admission: s/p crani -evac of large hematoma (IPH)    Admit Date:02-24-21  ICU Date: 2/24/21  OR Date: 2/24/21    penicillin (Anaphylaxis; Hives)    PAST MEDICAL & SURGICAL HISTORY:  Anemia  DM (diabetes mellitus)  High cholesterol  HTN (hypertension)  B-cell chronic lymphocytic leukemia variant  CAD (coronary artery disease)  s/p stenting  H/O heart artery stent      Home Medications:  aspirin 81 mg oral tablet, chewable: 1 tab(s) orally once a day (24 Feb 2021 15:11)  atorvastatin 80 mg oral tablet: 1 tab(s) orally once a day (at bedtime) (24 Feb 2021 15:11)  Imbruvica 70 mg oral capsule: 2 cap(s) orally once a day (24 Feb 2021 15:11)  Levaquin 500 mg oral tablet: 1 tab(s) orally every 24 hours (24 Feb 2021 15:11)  lisinopril 10 mg oral tablet: 1 tab(s) orally once a day (24 Feb 2021 15:11)        24HRS EVENT:  Day  DG to vent unit  Vanco trough 17  1g Mg        DVT PTX: HSQ    GI PTX:pantoprazole  Injectable 40 milliGRAM(s) IV Push daily    Tubes/Lines/Drains   ----------------------------------------------------------------------------------------------------------  [x] Peripheral IV  [X] Central Venous Line         RIJ             Date Placed:  2/24  [X] Arterial Line		      Radial   Date Placed: 2/24  [X] Urinary Catheter Murry                                             Date Placed: 2/24     REVIEW OF SYSTEMS    [ ] A ten-point review of systems was otherwise negative except as noted.  [x ] Due to altered mental status/intubation, subjective information were not able to be obtained from the patient. History was       AD BASURTO  [id number]  75y Female    Indication for ICU admission: s/p crani -evac of large hematoma (IPH)    Admit Date:21  ICU Date: 21  OR Date: 21    penicillin (Anaphylaxis; Hives)    PAST MEDICAL & SURGICAL HISTORY:  Anemia  DM (diabetes mellitus)  High cholesterol  HTN (hypertension)  B-cell chronic lymphocytic leukemia variant  CAD (coronary artery disease)  s/p stenting  H/O heart artery stent      Home Medications:  aspirin 81 mg oral tablet, chewable: 1 tab(s) orally once a day (2021 15:11)  atorvastatin 80 mg oral tablet: 1 tab(s) orally once a day (at bedtime) (2021 15:11)  Imbruvica 70 mg oral capsule: 2 cap(s) orally once a day (2021 15:11)  Levaquin 500 mg oral tablet: 1 tab(s) orally every 24 hours (2021 15:11)  lisinopril 10 mg oral tablet: 1 tab(s) orally once a day (2021 15:11)        24HRS EVENT:  Day  DG to vent unit  Vanco trough 17  1g Mg        DVT PTX: HSQ    GI PTX:pantoprazole  Injectable 40 milliGRAM(s) IV Push daily    Tubes/Lines/Drains   ----------------------------------------------------------------------------------------------------------  [x] Peripheral IV  [X] Central Venous Line         RIJ             Date Placed:    [X] Arterial Line		      Radial   Date Placed:   [X] Urinary Catheter Murry                                             Date Placed:      REVIEW OF SYSTEMS    [ ] A ten-point review of systems was otherwise negative except as noted.  [x ] Due to altered mental status/intubation, subjective information were not able to be obtained from the patient. History was    Daily     Daily Weight in k.3 (08 Mar 2021 00:00)    Diet, NPO with Tube Feed:   Tube Feeding Modality: Orogastric  Glucerna 1.2 Jerry  Total Volume for 24 Hours (mL): 1440  Continuous  Starting Tube Feed Rate mL per Hour: 10     Every 2 hours  Until Goal Tube Feed Rate (mL per Hour): 60  Tube Feed Duration (in Hours): 24  Tube Feed Start Time: 12:20 (21 @ 00:24)      CURRENT MEDS:  Neurologic Medications  acetaminophen   Tablet .. 650 milliGRAM(s) Oral every 6 hours  levETIRAcetam  IVPB 750 milliGRAM(s) IV Intermittent every 12 hours    Respiratory Medications    Cardiovascular Medications  labetalol 300 milliGRAM(s) Oral every 8 hours  lisinopril 20 milliGRAM(s) Oral <User Schedule>    Gastrointestinal Medications  pantoprazole   Suspension 40 milliGRAM(s) Oral daily  senna 2 Tablet(s) Oral at bedtime    Genitourinary Medications    Hematologic/Oncologic Medications  aspirin  chewable 81 milliGRAM(s) Oral daily  heparin   Injectable 5000 Unit(s) SubCutaneous every 8 hours    Antimicrobial/Immunologic Medications  meropenem  IVPB 1000 milliGRAM(s) IV Intermittent every 8 hours  meropenem  IVPB      vancomycin  IVPB 1000 milliGRAM(s) IV Intermittent every 12 hours  voriconazole IVPB 300 milliGRAM(s) IV Intermittent every 12 hours    Endocrine/Metabolic Medications  atorvastatin 80 milliGRAM(s) Oral at bedtime  insulin regular  human corrective regimen sliding scale   SubCutaneous every 4 hours  metFORMIN 500 milliGRAM(s) Oral every 12 hours    Topical/Other Medications  chlorhexidine 0.12% Liquid 15 milliLiter(s) Oral Mucosa two times a day  chlorhexidine 4% Liquid 1 Application(s) Topical daily      ICU Vital Signs Last 24 Hrs  T(C): 36.9 (08 Mar 2021 08:13), Max: 37.5 (07 Mar 2021 12:00)  T(F): 98.4 (08 Mar 2021 08:13), Max: 99.5 (07 Mar 2021 12:00)  HR: 76 (08 Mar 2021 08:20) (74 - 85)  BP: --  BP(mean): --  ABP: 134/55 (08 Mar 2021 08:00) (96/95 - 160/63)  ABP(mean): 81 (08 Mar 2021 08:00) (76 - 96)  RR: 25 (08 Mar 2021 08:00) (11 - 29)  SpO2: 98% (08 Mar 2021 08:20) (98% - 100%)      Adult Advanced Hemodynamics Last 24 Hrs  CVP(mm Hg): --  CVP(cm H2O): --  CO: --  CI: --  PA: --  PA(mean): --  PCWP: --  SVR: --  SVRI: --  PVR: --  PVRI: --    Mode: AC/ CMV (Assist Control/ Continuous Mandatory Ventilation)  RR (machine): 16  TV (machine): 400  FiO2: 25  PEEP: 5  ITime: 0.8  MAP: 12  PIP: 19    ABG - ( 08 Mar 2021 04:19 )  pH, Arterial: 7.43  pH, Blood: x     /  pCO2: 39    /  pO2: 104   / HCO3: 26    / Base Excess: 1.8   /  SaO2: 99                  I&O's Summary    07 Mar 2021 07:01  -  08 Mar 2021 07:00  --------------------------------------------------------  IN: 2790 mL / OUT: 820 mL / NET: 1970 mL    08 Mar 2021 07:01  -  08 Mar 2021 08:48  --------------------------------------------------------  IN: 60 mL / OUT: 45 mL / NET: 15 mL      I&O's Detail    07 Mar 2021 07:01  -  08 Mar 2021 07:00  --------------------------------------------------------  IN:    Enteral Tube Flush: 150 mL    Glucerna 1.5: 1190 mL    IV PiggyBack: 750 mL    IV PiggyBack: 100 mL    IV PiggyBack: 250 mL    IV PiggyBack: 250 mL    IV PiggyBack: 100 mL  Total IN: 2790 mL    OUT:    Indwelling Catheter - Urethral (mL): 770 mL    Rectal Tube (mL): 50 mL  Total OUT: 820 mL    Total NET: 1970 mL      08 Mar 2021 07:01  -  08 Mar 2021 08:48  --------------------------------------------------------  IN:    Glucerna 1.5: 60 mL  Total IN: 60 mL    OUT:    Indwelling Catheter - Urethral (mL): 45 mL  Total OUT: 45 mL    Total NET: 15 mL          PHYSICAL EXAM:    General/Neuro  Alert  Not following commands  Localizes to pain  Pupils: reactive    Lungs:clear to auscultation, Normal expansion/effort.   Trach in place, no bleeding or drainage noted    Cardiovascular : S1, S2.  Regular rate and rhythm.  Peripheral edema   Cardiac Rhythm: Normal Sinus Rhythm    GI: Abdomen soft, Non-tender, Non-distended.    PEG tube in place    Extremities: Extremities warm, pink, well-perfused. Distal pulses intact. No peripheral edeam    Derm: Good skin turgor, no skin breakdown.      : Murry catheter in place.    LABS:  CAPILLARY BLOOD GLUCOSE      POCT Blood Glucose.: 147 mg/dL (08 Mar 2021 05:01)  POCT Blood Glucose.: 152 mg/dL (08 Mar 2021 02:16)  POCT Blood Glucose.: 128 mg/dL (07 Mar 2021 21:56)  POCT Blood Glucose.: 166 mg/dL (07 Mar 2021 19:06)  POCT Blood Glucose.: 141 mg/dL (07 Mar 2021 15:55)  POCT Blood Glucose.: 142 mg/dL (07 Mar 2021 10:26)                          8.4    5.17  )-----------( 180      ( 07 Mar 2021 23:30 )             27.1       03-07    140  |  107  |  15  ----------------------------<  129<H>  4.0   |  21  |  <0.5<L>    Ca    8.2<L>      07 Mar 2021 23:30  Phos  3.8     03-07  Mg     1.9     03-07        PT/INR - ( 06 Mar 2021 23:30 )   PT: 14.60 sec;   INR: 1.27 ratio         PTT - ( 06 Mar 2021 23:30 )  PTT:36.8 sec          Culture - Blood (collected 05 Mar 2021 16:46)  Source: .Blood None  Preliminary Report (07 Mar 2021 03:16):    No growth to date.

## 2021-03-08 NOTE — PROGRESS NOTE ADULT - ATTENDING COMMENTS
Patient had tracheostomy and PEG on 03/06/2021.  Arousable, does not follow commands    ASSESSMENT:  76 y/o female with Left Intracerebral Hemorrhage.  S/P Left Craniotomy. EVD placement.  Cerebral edema.  Acute respiratory failure with hypoxia.  HTN.  B Cell Lymphoma.  DM  Dysphagia.    PLAN:  - pain control  - wean the vent support as tolerated  - keep normotensive  - tube feeds at goal  - follow serum electrolytes and UOP  - remove A-line and CVC  - GI and DVT prophylaxis  - ID f/u needed - currently on Vanco/Aztreonam/Voriconazole for aspergilosis

## 2021-03-08 NOTE — PROGRESS NOTE ADULT - SUBJECTIVE AND OBJECTIVE BOX
Patient is a 75y old  Female who presents with a chief complaint of altered mental status (08 Mar 2021 10:54)      Subjective: Kathrine was had tracheostomy and peg insertion over the weekend. She could open her eyes a little bit when her name was called.       Vital Signs Last 24 Hrs  T(C): 37.4 (08 Mar 2021 11:00), Max: 37.4 (07 Mar 2021 16:00)  T(F): 99.3 (08 Mar 2021 11:00), Max: 99.3 (07 Mar 2021 16:00)  HR: 81 (08 Mar 2021 11:00) (76 - 85)  BP: --  BP(mean): --  RR: 29 (08 Mar 2021 11:00) (11 - 29)  SpO2: 99% (08 Mar 2021 11:00) (98% - 100%)    PHYSICAL EXAM  General: adult in NAD; trach in place   HEENT: anicteric sclera, pink conjunctiva  Neck: supple  CV: normal S1/S2 with no murmur rubs or gallops  Lungs: mechanical breath sounds   Abdomen: soft non-tender non-distended   Ext: no stevenson  Skin: no rashes   Neuro: opens her eyes to names but otherwise not following commands     MEDICATIONS  (STANDING):  acetaminophen   Tablet .. 650 milliGRAM(s) Oral every 6 hours  aspirin  chewable 81 milliGRAM(s) Oral daily  atorvastatin 80 milliGRAM(s) Oral at bedtime  chlorhexidine 0.12% Liquid 15 milliLiter(s) Oral Mucosa two times a day  chlorhexidine 4% Liquid 1 Application(s) Topical daily  heparin   Injectable 5000 Unit(s) SubCutaneous every 8 hours  insulin regular  human corrective regimen sliding scale   SubCutaneous every 4 hours  labetalol 300 milliGRAM(s) Oral every 8 hours  levETIRAcetam  IVPB 750 milliGRAM(s) IV Intermittent every 12 hours  lisinopril 20 milliGRAM(s) Oral <User Schedule>  meropenem  IVPB 1000 milliGRAM(s) IV Intermittent every 8 hours  meropenem  IVPB      metFORMIN 500 milliGRAM(s) Oral every 12 hours  pantoprazole   Suspension 40 milliGRAM(s) Oral daily  senna 2 Tablet(s) Oral at bedtime  vancomycin  IVPB 1000 milliGRAM(s) IV Intermittent every 12 hours  voriconazole IVPB 300 milliGRAM(s) IV Intermittent every 12 hours    MEDICATIONS  (PRN):      LABS:                          8.4    5.17  )-----------( 180      ( 07 Mar 2021 23:30 )             27.1         Mean Cell Volume : 84.4 fL  Mean Cell Hemoglobin : 26.2 pg  Mean Cell Hemoglobin Concentration : 31.0 g/dL  Auto Neutrophil # : x  Auto Lymphocyte # : x  Auto Monocyte # : x  Auto Eosinophil # : x  Auto Basophil # : x  Auto Neutrophil % : x  Auto Lymphocyte % : x  Auto Monocyte % : x  Auto Eosinophil % : x  Auto Basophil % : x      Serial CBC's  03-07 @ 23:30  Hct-27.1 / Hgb-8.4 / Plat-180 / RBC-3.21 / WBC-5.17  Serial CBC's  03-06 @ 23:30  Hct-27.2 / Hgb-8.4 / Plat-197 / RBC-3.22 / WBC-7.27  Serial CBC's  03-05 @ 23:47  Hct-27.0 / Hgb-8.2 / Plat-158 / RBC-3.20 / WBC-6.85  Serial CBC's  03-05 @ 01:00  Hct-25.4 / Hgb-8.0 / Plat-144 / RBC-3.04 / WBC-7.19      03-07    140  |  107  |  15  ----------------------------<  129<H>  4.0   |  21  |  <0.5<L>    Ca    8.2<L>      07 Mar 2021 23:30  Phos  3.8     03-07  Mg     1.9     03-07        PT/INR - ( 06 Mar 2021 23:30 )   PT: 14.60 sec;   INR: 1.27 ratio         PTT - ( 06 Mar 2021 23:30 )  PTT:36.8 sec        Culture - Blood (collected 05 Mar 2021 16:46)  Source: .Blood None  Preliminary Report (07 Mar 2021 03:16):    No growth to date.            BLOOD SMEAR INTERPRETATION:       RADIOLOGY & ADDITIONAL STUDIES:

## 2021-03-08 NOTE — PROGRESS NOTE ADULT - ASSESSMENT
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     ASSESSMENT  #) Intraparenchymal bleed s/p craniotomy with anemia and thrombocytopenia, improving    #) Fevers with Aspergillus fumigatus on Bronch culture, also possible meningitis?  #) Hx of Marginal Zone Lymphoma on Imbruvica     PLAN  - Cont to hold Imbruvica as it is associated with potential adverse reaction of hemorrhage. Also note that the effect of Imbruvica can last up to 7 days post ingestion of medication  - If any repeat CT scans show increase in bleed, transfuse platelets ASAP at least 1-2 units despite platelet count   - BP control and management of hemorrhage has per neurosurgery and neurology   - Cont with neurochecks, management as per neurosurgery/ ICU   - Anemia is stable and thrombocytopenia has resolved; Transfuse for platelet count <100K and hemoglobin < 7  - ID recs appreciated. She is to complete 14 days of empiric IV antibiotic therapy for empiric meningitis (end 3/13)  - Trach and peg were placed over the weekend, awaiting to see if her mentals status improves     Overall poor prognosis

## 2021-03-09 NOTE — PROGRESS NOTE ADULT - SUBJECTIVE AND OBJECTIVE BOX
POD# 13    S/p Left minimally invasive Crani for evac of IPH with placement of EVD     pt seen and examined at bedside pt is trach'd Pegged , opens eyes to noxious follows some simple commands   HPI:  75 years old female from home with PMHx of B cell lymphoma on Imbruvica (since 10/2020), CAD s/p stents 15 years ago, DM, chronic UTI, HTN, DLD, brought in by ambulance due to altered mental status and hypertension. Patient was AAO x4 and fully functional at baseline. Last known normal was 11:00pm 2/23 before beds. In 2/24 at 8:45AM, patient was found to be lethargy in bed by his son, was AAO x 4 and able to talk to his son, but started coughing and had one episode of nose bleed. At 9:15, her physical therapist noticed her becoming more lethargic, but vitals were normal. Soon at 9:30, patient was unresponsive and had a blank stare. EMS was called and her BP was found to be around 230/120. She was rushed to ED as Code Stroke. NIHSS 23. CTH showed Large left frontal intraparenchymal hematoma with intraventricular extension into left lateral ventricle, associated with marked mass effect resulting in 0.9 cm midline shift to the right anteriorly. Patient was intubated for airway protection. S/p 2 units of platelet for ASA reversal. Neurosurgery was planning for emergent OR resection of hematoma.     PAST MEDICAL & SURGICAL HISTORY:  Anemia    DM (diabetes mellitus)    High cholesterol    HTN (hypertension)    B-cell chronic lymphocytic leukemia variant    CAD (coronary artery disease)  s/p stenting    H/O heart artery stent    FAMILY HISTORY:  Family history of diabetes mellitus (DM)    Allergies :   penicillin (Anaphylaxis; Hives)    REVIEW OF SYSTEMS : All systems negative other than those listed in HPI  General:	-  Skin/Breast: -  Ophthalmologic: -   ENMT: -  Respiratory and Thorax: -  Cardiovascular: -	  Gastrointestinal: -  Genitourinary:-  Musculoskeletal:	-  Neurological:	-  Psychiatric:	-  Hematology/Lymphatics:	-  Endocrine:-  Allergic/Immunologic:	-    MEDICATIONS  (STANDING):  acetaminophen   Tablet .. 650 milliGRAM(s) Oral every 6 hours  aspirin  chewable 81 milliGRAM(s) Oral daily  atorvastatin 80 milliGRAM(s) Oral at bedtime  chlorhexidine 0.12% Liquid 15 milliLiter(s) Oral Mucosa two times a day  chlorhexidine 4% Liquid 1 Application(s) Topical daily  heparin   Injectable 5000 Unit(s) SubCutaneous every 8 hours  insulin regular  human corrective regimen sliding scale   SubCutaneous every 4 hours  labetalol 400 milliGRAM(s) Oral every 8 hours  levETIRAcetam  IVPB 750 milliGRAM(s) IV Intermittent every 12 hours  lisinopril 20 milliGRAM(s) Oral <User Schedule>  meropenem  IVPB      meropenem  IVPB 1000 milliGRAM(s) IV Intermittent every 8 hours  metFORMIN 500 milliGRAM(s) Oral every 12 hours  pantoprazole   Suspension 40 milliGRAM(s) Oral daily  senna 2 Tablet(s) Oral at bedtime  vancomycin  IVPB 1000 milliGRAM(s) IV Intermittent every 12 hours  voriconazole IVPB 300 milliGRAM(s) IV Intermittent every 12 hours    MEDICATIONS  (PRN):    Antibiotics:  meropenem  IVPB      meropenem  IVPB 1000 milliGRAM(s) IV Intermittent every 8 hours  vancomycin  IVPB 1000 milliGRAM(s) IV Intermittent every 12 hours  voriconazole IVPB 300 milliGRAM(s) IV Intermittent every 12 hours    Anticoagulation:  aspirin  chewable 81 milliGRAM(s) Oral daily  heparin   Injectable 5000 Unit(s) SubCutaneous every 8 hours    OTHER:  atorvastatin 80 milliGRAM(s) Oral at bedtime  chlorhexidine 0.12% Liquid 15 milliLiter(s) Oral Mucosa two times a day  chlorhexidine 4% Liquid 1 Application(s) Topical daily  insulin regular  human corrective regimen sliding scale   SubCutaneous every 4 hours  labetalol 400 milliGRAM(s) Oral every 8 hours  lisinopril 20 milliGRAM(s) Oral <User Schedule>  metFORMIN 500 milliGRAM(s) Oral every 12 hours  pantoprazole   Suspension 40 milliGRAM(s) Oral daily  senna 2 Tablet(s) Oral at bedtime    Vital Signs Last 24 Hrs  T(C): 37.2 (09 Mar 2021 08:00), Max: 37.8 (08 Mar 2021 12:00)  T(F): 98.9 (09 Mar 2021 08:00), Max: 100 (08 Mar 2021 12:00)  HR: 75 (09 Mar 2021 08:00) (75 - 90)  BP: --  BP(mean): --  RR: 25 (09 Mar 2021 08:00) (17 - 29)  SpO2: 99% (09 Mar 2021 08:00) (98% - 100%)    Physical Exam :  Pt Trached and pegged   Facial features equal and symmetric   Opens eyes to verbal stimuli   PERRL   + hand  of LUE  Moves RUE spontaneously / intermittently     Wound :   Incision remains clean dry intact     LABS:                        8.4    4.74  )-----------( 199      ( 08 Mar 2021 23:45 )             27.7     03-08    139  |  105  |  15  ----------------------------<  145<H>  4.0   |  24  |  <0.5<L>    Ca    8.4<L>      08 Mar 2021 23:45  Phos  3.1     03-08  Mg     1.8     03-08    TPro  4.9<L>  /  Alb  3.1<L>  /  TBili  0.3  /  DBili  0.2  /  AST  28  /  ALT  19  /  AlkPhos  227<H>  03-08    CULTURES:  Culture Results:   No growth to date. (03-05 @ 16:46)  Culture Results:   No growth to date. (03-04 @ 20:08)    Assessment / Plan: Exam improving now opens eyes to verbal stimuli, follows commands on LUE, intermittent spontaneous movement of RUE  -  Per ID; remains on vanc (2/28- ), aztreonam (2/28-3/4), Voriconazole f/u LFTs (3/2), Blake(3/4-?) Vanc lvl (3/7) 17  - CSF cx sent 3.2 negative for Aspergillus   - Continue Care per SICU team   - Will discuss with attending

## 2021-03-09 NOTE — PROGRESS NOTE ADULT - ATTENDING COMMENTS
Patient arousable today again, does not follow commands.  Was hypertensive - given Labetalol  Tolerates tube feeds.    ASSESSMENT:  76 y/o female with Left Intracerebral Hemorrhage.  S/P Left Craniotomy. EVD placement.  Cerebral edema.  Acute respiratory failure with hypoxia.  HTN.  B Cell Lymphoma.  DM  Dysphagia.  Aspergillosis.    PLAN:  - sedation as needed  - try PSV today  - keep normotensive  - continue tube feeds  - follow serum electrolytes and UOP  - GI and DVT prophylaxis  - ID f/u needed.

## 2021-03-09 NOTE — PROGRESS NOTE ADULT - SUBJECTIVE AND OBJECTIVE BOX
AD BASURTO  [id number]  75y Female    Indication for ICU admission: s/p crani -evac of large hematoma (IPH)    Admit Date:02-24-21  ICU Date: 2/24/21  OR Date: 2/24/21    penicillin (Anaphylaxis; Hives)    PAST MEDICAL & SURGICAL HISTORY:  Anemia  DM (diabetes mellitus)  High cholesterol  HTN (hypertension)  B-cell chronic lymphocytic leukemia variant  CAD (coronary artery disease)  s/p stenting  H/O heart artery stent      Home Medications:  aspirin 81 mg oral tablet, chewable: 1 tab(s) orally once a day (24 Feb 2021 15:11)  atorvastatin 80 mg oral tablet: 1 tab(s) orally once a day (at bedtime) (24 Feb 2021 15:11)  Imbruvica 70 mg oral capsule: 2 cap(s) orally once a day (24 Feb 2021 15:11)  Levaquin 500 mg oral tablet: 1 tab(s) orally every 24 hours (24 Feb 2021 15:11)  lisinopril 10 mg oral tablet: 1 tab(s) orally once a day (24 Feb 2021 15:11)      24HRS EVENT:  3/8  Night   5 labetalol IVP, incr to 400 q8  mg x 1    DAY:  -d/c noé and TLC  -Follow up ID for d/c abx date  -Waiting for bed      DVT PTX: HSQ    GI PTX:pantoprazole  Injectable 40 milliGRAM(s) IV Push daily    Tubes/Lines/Drains   ----------------------------------------------------------------------------------------------------------  [x] Peripheral IV  [X] Central Venous Line         RIJ             Date Placed:  2/24  [X] Arterial Line		      Radial   Date Placed: 2/24  [X] Urinary Catheter Murry                                             Date Placed: 2/24     REVIEW OF SYSTEMS    [ ] A ten-point review of systems was otherwise negative except as noted.  [x ] Due to altered mental status/intubation, subjective information were not able to be obtained from the patient. History was

## 2021-03-09 NOTE — PROGRESS NOTE ADULT - ASSESSMENT
Assessment and Plan:     75y Female s/p craniotomy for left frontal hematoma with evacuation and EVD placement. Intubated and sedated. SICU admission for hemodynamic monitoring.     NEURO:  Off all sedation  Pain controlled with IV Tylenol   Keppra 750 mg q 12  EVD removed 3/2   24hr EEG: started 3/3 at 5PM-  no seizure activity  Q 4hr neurochecks    CTH- ventricular drain in 3rd ventricle, decr size of lateral ventricles, scattered SAH and layering IVH in posterior horns of  lateral ventricle slightly increased   rpt CTH - stable- L frontal hemoatom cavity 4.5 cm, slighlty decr amount of blood, interval decr in pneumocephalus, L transfrontal drain tip in the third ventricle   3/1: CT sinus-prelim- no evidence of inflammatory/infectious diseases of paransal sinuses  Family meeting 3/3: possible trach/peg this week, follow up with palliative      RESP:   Resp Failure s/p bedside trach 3/6/21  Trach: /16/30/5  AM ABG:  PM AB.45/39/75/27  97%  AM CXR  3/1: CT chest -mucus plug L lower lobe bronchus, mod bilat pleural effusions w/ areas of atlectasis in both lower lobes. tree-in-bud opacities in posterior aspect of R upper lobe  +aspergillus  Keep HOB elevated at 30 degrees      CARDS:   Acute on chronic HTN        - home Lisinopril to 20mg       - labetalol 400 q8h        - rescheduled lisinopril dose s/p hypotension when giving both  H/o CAD s/p stents - ASA  Echo (10/13/20): EF 55-60%, trace MR, R atrial echodensity possible thrombus  Trop 0.24 --> 0.21->0.15, no longer trending    GI/NUTR:   S/p bedside PEG  Diet: TF via PEG - Glucerna 1.2 @ goal 60/hr (increased per Nutrition)  GI Prophylaxis- PPI  Bowel regimen- Senna, lactulose   BM 3       /RENAL:   Monitor UO-roblero in place  IVL  Na goal 135-145   q6BMP & Osm  BUN/Cr- 15/0.5  Hypernatremia resolved- dc'd Hypertonic Saline  Na 139/ K 4/ Mg 1.8/ 3.1    HEME/ONC:   DVT prophylaxis: heparin   Injectable 5000 every 8 hours  Hb/Hct:  8.0/25.4> 8.2/27> 8.4/27.2 > 8.4/27  Plts:  144>158 > 197 >180  Last T&S: 3/  () INR 1.85, nsx rec 1 FFP and 10 vit K, INR 1.49 () >1.27  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  Heme/Onc consult: transfuse platelets if < 100, give vit K for elevated INR  s/p 1 FFP & 10 vit K on  for INR 1.9 rpt 1.5>1.6>1.3>1.3>1.3    ID:  Immunocompromised  afebrile  WBC- 7.19> 6.85> 7.27> 5.17  ABX: vanco (-  ), aztreonam (-3/4), Voriconazole f/u LFTs (3/2), Meropenem (3/4-?)  Vanc lvl (3/7) 17  Cultures:     -UA (): negative     -CSF with Gram Stain  (21 @ 15:20): No growth at 3 days.     -CSF (fungal) (collected ): neg     -CSF with Gram Stain (collected ): negative    -Bronchial (collected ):+aspergillus    -fungal CSF cultures (3/2)- neg    -Apergillus galactaman antigen    -blood cx 3/4, 3/5: NGTD       ENDO:  Hx of DM, HA1C 6.3  - restarted home metformin 500mg q 12 hr  on ISS, FS q 4    GOC: palliative care following, full code    DISPO:    SICU   Assessment and Plan:     75y Female s/p craniotomy for left frontal hematoma with evacuation and EVD placement. Intubated and sedated. SICU admission for hemodynamic monitoring.     NEURO:  Off all sedation  Pain controlled with IV Tylenol   Keppra 750 mg q 12  EVD removed 3/2   24hr EEG: started 3/3 at 5PM-  no seizure activity  Q 4hr neurochecks    CTH- ventricular drain in 3rd ventricle, decr size of lateral ventricles, scattered SAH and layering IVH in posterior horns of  lateral ventricle slightly increased   rpt CTH - stable- L frontal hemoatom cavity 4.5 cm, slighlty decr amount of blood, interval decr in pneumocephalus, L transfrontal drain tip in the third ventricle   3/1: CT sinus-prelim- no evidence of inflammatory/infectious diseases of paransal sinuses  Family meeting 3/3: possible trach/peg this week, follow up with palliative      RESP:   Resp Failure s/p bedside trach 3/6/21  Trach: /16/30/5  AM AB.48/36/80/27  PM AB.45/39/75/27  97%  AM CXR  3/1: CT chest -mucus plug L lower lobe bronchus, mod bilat pleural effusions w/ areas of atlectasis in both lower lobes. tree-in-bud opacities in posterior aspect of R upper lobe  +aspergillus  Keep HOB elevated at 30 degrees      CARDS:   Acute on chronic HTN        - home Lisinopril to 20mg       - labetalol 400 q8h        - rescheduled lisinopril dose s/p hypotension when giving both  H/o CAD s/p stents - ASA  Echo (10/13/20): EF 55-60%, trace MR, R atrial echodensity possible thrombus  Trop 0.24 --> 0.21->0.15, no longer trending    GI/NUTR:   S/p bedside PEG  Diet: TF via PEG - Glucerna 1.2 @ goal 60/hr (increased per Nutrition)  GI Prophylaxis- PPI  Bowel regimen- Senna, lactulose   BM 3/4       /RENAL:   Monitor UO-roblero in place  IVL  Na goal 135-145   q6BMP & Osm  BUN/Cr- 15/0.5  Hypernatremia resolved- dc'd Hypertonic Saline  Na 139/ K 4/ Mg 1.8/ 3.1    HEME/ONC:   DVT prophylaxis: heparin   Injectable 5000 every 8 hours  Hb/Hct:  8.0/25.4> 8.2/27> 8.4/27.2 > 8.4/  Plts:  144>158 > 197 >180  Last T&S: 3/  () INR 1.85, nsx rec 1 FFP and 10 vit K, INR 1.49 () >1.27  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  Heme/Onc consult: transfuse platelets if < 100, give vit K for elevated INR  s/p 1 FFP & 10 vit K on  for INR 1.9 rpt 1.5>1.6>1.3>1.3>1.3    ID:  Immunocompromised  afebrile  WBC- 7.19> 6.85> 7.27> 5.17  ABX: vanco (-  ), aztreonam (-3/4), Voriconazole f/u LFTs (3/2), Meropenem (3/4-?)  Vanc lvl (3/7) 17  Cultures:     -UA (): negative     -CSF with Gram Stain  (21 @ 15:20): No growth at 3 days.     -CSF (fungal) (collected ): neg     -CSF with Gram Stain (collected ): negative    -Bronchial (collected ):+aspergillus    -fungal CSF cultures (3/2)- neg    -Apergillus galactaman antigen    -blood cx 3/4, 3/5: NGTD       ENDO:  Hx of DM, HA1C 6.3  - restarted home metformin 500mg q 12 hr  on ISS, FS q 4    GOC: palliative care following, full code    DISPO:    SICU

## 2021-03-09 NOTE — CHART NOTE - NSCHARTNOTEFT_GEN_A_CORE
Registered Dietitian Follow-Up     Patient Profile Reviewed                           Yes [x]   No []     Nutrition History Previously Obtained        Yes []  No [x]--unable to obtain as pt lethargic at time of RD visit     Pertinent Medical Interventions: Respiratory failure s/p bedside trach/PEG on 3/6. Per progress noted, sedation as needed, try PSV today.    Diet order: Glucerna 1.2 @ 60ml/hr (1728kcal, 86gm pro, 1159ml free water)--per RN, pt tolerating current TF regimen well w/o any issues.      Anthropometrics:  - Ht. 63"   - Wt. 172#/78kg (3/9)--wt fluctuations likely 2/2 fluids   (3/6): 168#/76kg--new dosing wt   (3/4): 163#/74kg   (3/1): 154#/69.9kg  (2/24): 161#/73kg  - %wt change  - BMI: 29.7 using dosing wt   - IBW: 115#      Pertinent Lab Data: (3/8): H/H 8.4/27.7, POCT 133, Cr. <0.5, Gluc 145     Pertinent Meds: aspirin, heparin, abx, insulin, metformin, labetalol, lipitor, lisinopril, keppra, protonix, senna     Physical Findings:  - Appearance: lethargic/trached; 1+ generalized edema, 3+ to B/L hand   - GI function: LBM 3/9  - Tubes: PEG  - Oral/Mouth cavity: NPO   - Skin: surgical incision; stage 2 pressure injury to sacrum (documented on 2/27)      Nutrition Requirements  Weight Used: IBW: 115#/52kg used 2/2 fluctuating wts      Calories: 3686-2363 kcal/day (30-35 kcal/kg IBW)--increased needs to promote wound healing   Protein: 73-83 gm/day (1.4-1.6 gm/kg IBW)--IBW significantly lower than CBW considered   Fluids per SICU     Nutrient Intake: meeting kcal/pro needs      Previous Nutrition Diagnosis: Inadequate protein-energy intake (resolved)--however will still keep pt at risk 2/2 recent trach/PEG      Nutrition Intervention: enteral nutrition    Recommendations:  1. As pt s/p PEG placement, can transition to bolus feeds of Glucerna 1.2 360ml Q6H, which provides a total of 1728kcal, 86gm pro, 1159ml free water. Additional flushes per LIP. All recs discussed with LIP ([id number])      Goal/Expected Outcome: Pt to meet % of estimated nutrient needs within 3 days     Indicator/Monitoring: RD to monitor diet order, energy intake, body composition, glucose profile, NFPF.

## 2021-03-10 NOTE — PROGRESS NOTE ADULT - ATTENDING COMMENTS
Patient was on PSV yesterday.  Arousable, but very lethargic.    ASSESSMENT:  76 y/o female with Left Intracerebral Hemorrhage.  S/P Left Craniotomy. EVD placement.  Cerebral edema.  Acute respiratory failure with hypoxia.  HTN.  B Cell Lymphoma.  DM  Dysphagia.  Aspergillosis.    PLAN:  - pain control as needed; avoid sedation  - put on PSV again today  - keep normotensive  - PEG tube feeds at goal  - follow serum electrolytes and UOP  - ID f/u needed  - GI and DVT prophylaxis

## 2021-03-10 NOTE — PROGRESS NOTE ADULT - SUBJECTIVE AND OBJECTIVE BOX
AD BASURTO  [id number]  75y Female    Indication for ICU admission: s/p crani -evac of large hematoma (IPH)    Admit Date:02-24-21  ICU Date: 2/24/21  OR Date: 2/24/21    penicillin (Anaphylaxis; Hives)    PAST MEDICAL & SURGICAL HISTORY:  Anemia  DM (diabetes mellitus)  High cholesterol  HTN (hypertension)  B-cell chronic lymphocytic leukemia variant  CAD (coronary artery disease)  s/p stenting  H/O heart artery stent      Home Medications:  aspirin 81 mg oral tablet, chewable: 1 tab(s) orally once a day (24 Feb 2021 15:11)  atorvastatin 80 mg oral tablet: 1 tab(s) orally once a day (at bedtime) (24 Feb 2021 15:11)  Imbruvica 70 mg oral capsule: 2 cap(s) orally once a day (24 Feb 2021 15:11)  Levaquin 500 mg oral tablet: 1 tab(s) orally every 24 hours (24 Feb 2021 15:11)  lisinopril 10 mg oral tablet: 1 tab(s) orally once a day (24 Feb 2021 15:11)      24HRS EVENT:  3/9  NIGHT  mg, phosp repleted    DAY  Follow up with ID  Follow up with bed management      DAY  Follow up with ID  Follow up with bed management      DVT PTX: HSQ    GI PTX:pantoprazole  Injectable 40 milliGRAM(s) IV Push daily    Tubes/Lines/Drains   ----------------------------------------------------------------------------------------------------------  [x] Peripheral IV  [X] Central Venous Line         RIJ             Date Placed:  2/24  [X] Arterial Line		      Radial   Date Placed: 2/24  [X] Urinary Catheter Murry                                             Date Placed: 2/24     REVIEW OF SYSTEMS    [ ] A ten-point review of systems was otherwise negative except as noted.  [x ] Due to altered mental status/intubation, subjective information were not able to be obtained from the patient. History was     AD BASURTO  [id number]  75y Female    Indication for ICU admission: s/p crani -evac of large hematoma (IPH)    Admit Date:02-24-21  ICU Date: 2/24/21  OR Date: 2/24/21    penicillin (Anaphylaxis; Hives)    PAST MEDICAL & SURGICAL HISTORY:  Anemia  DM (diabetes mellitus)  High cholesterol  HTN (hypertension)  B-cell chronic lymphocytic leukemia variant  CAD (coronary artery disease)  s/p stenting  H/O heart artery stent      Home Medications:  aspirin 81 mg oral tablet, chewable: 1 tab(s) orally once a day (24 Feb 2021 15:11)  atorvastatin 80 mg oral tablet: 1 tab(s) orally once a day (at bedtime) (24 Feb 2021 15:11)  Imbruvica 70 mg oral capsule: 2 cap(s) orally once a day (24 Feb 2021 15:11)  Levaquin 500 mg oral tablet: 1 tab(s) orally every 24 hours (24 Feb 2021 15:11)  lisinopril 10 mg oral tablet: 1 tab(s) orally once a day (24 Feb 2021 15:11)      24HRS EVENT:  3/9  NIGHT  mg, phosp repleted    DAY  Follow up with ID  Follow up with bed management      DAY  Follow up with ID  Follow up with bed management      DVT PTX: HSQ    GI PTX:pantoprazole  Injectable 40 milliGRAM(s) IV Push daily    Tubes/Lines/Drains   ----------------------------------------------------------------------------------------------------------  [x] Peripheral IV  [X] Central Venous Line         RIJ             Date Placed:  2/24  [X] Arterial Line		      Radial   Date Placed: 2/24  [X] Urinary Catheter Murry                                             Date Placed: 2/24     REVIEW OF SYSTEMS    [ ] A ten-point review of systems was otherwise negative except as noted.  [x ] Due to altered mental status/intubation, subjective information were not able to be obtained from the patient. History was    Daily     Daily     Diet, NPO with Tube Feed:   Tube Feeding Modality: Gastrostomy  Glucerna 1.2 Jerry  Total Volume for 24 Hours (mL): 1440  Bolus  Total Volume of Bolus (mL):  360  Tube Feed Frequency: Every 6 hours   Tube Feed Start Time: 12:00  Bolus Feed Rate (mL per Hour): 360   Bolus Feed Duration (in Hours): 1 (03-10-21 @ 10:28)      CURRENT MEDS:  Neurologic Medications  acetaminophen   Tablet .. 650 milliGRAM(s) Oral every 6 hours PRN Moderate Pain (4 - 6)  levETIRAcetam  IVPB 750 milliGRAM(s) IV Intermittent every 12 hours    Respiratory Medications    Cardiovascular Medications  labetalol 400 milliGRAM(s) Oral every 8 hours  lisinopril 20 milliGRAM(s) Oral daily    Gastrointestinal Medications  pantoprazole   Suspension 40 milliGRAM(s) Oral daily  senna 2 Tablet(s) Oral at bedtime    Genitourinary Medications    Hematologic/Oncologic Medications  aspirin  chewable 81 milliGRAM(s) Oral daily  heparin   Injectable 5000 Unit(s) SubCutaneous every 8 hours    Antimicrobial/Immunologic Medications  meropenem  IVPB 1000 milliGRAM(s) IV Intermittent every 8 hours  meropenem  IVPB      vancomycin  IVPB 1000 milliGRAM(s) IV Intermittent every 12 hours  voriconazole IVPB 300 milliGRAM(s) IV Intermittent every 12 hours    Endocrine/Metabolic Medications  atorvastatin 80 milliGRAM(s) Oral at bedtime  insulin regular  human corrective regimen sliding scale   SubCutaneous every 6 hours  metFORMIN 500 milliGRAM(s) Oral every 12 hours    Topical/Other Medications  chlorhexidine 0.12% Liquid 15 milliLiter(s) Oral Mucosa two times a day  chlorhexidine 4% Liquid 1 Application(s) Topical daily      ICU Vital Signs Last 24 Hrs  T(C): 37.1 (10 Mar 2021 08:32), Max: 37.3 (09 Mar 2021 20:00)  T(F): 98.7 (10 Mar 2021 08:32), Max: 99.2 (09 Mar 2021 20:00)  HR: 84 (10 Mar 2021 11:00) (76 - 87)  BP: --  BP(mean): --  ABP: 117/95 (10 Mar 2021 11:00) (104/63 - 163/65)  ABP(mean): 106 (10 Mar 2021 11:00) (78 - 106)  RR: 29 (10 Mar 2021 11:00) (23 - 33)  SpO2: 97% (10 Mar 2021 11:00) (97% - 99%)      Adult Advanced Hemodynamics Last 24 Hrs  CVP(mm Hg): --  CVP(cm H2O): --  CO: --  CI: --  PA: --  PA(mean): --  PCWP: --  SVR: --  SVRI: --  PVR: --  PVRI: --    Mode: PS (Pressure Support)/ Spontaneous  FiO2: 25  PEEP: 5  PS: 10  ITime: 1  MAP: 9  PIP: 16    ABG - ( 10 Mar 2021 04:30 )  pH, Arterial: 7.45  pH, Blood: x     /  pCO2: 39    /  pO2: 64    / HCO3: 27    / Base Excess: 3.2   /  SaO2: 94        I&O's Summary    09 Mar 2021 07:01  -  10 Mar 2021 07:00  --------------------------------------------------------  IN: 3115 mL / OUT: 1813 mL / NET: 1302 mL    10 Mar 2021 07:01  -  10 Mar 2021 11:49  --------------------------------------------------------  IN: 300 mL / OUT: 160 mL / NET: 140 mL      I&O's Detail    09 Mar 2021 07:01  -  10 Mar 2021 07:00  --------------------------------------------------------  IN:    Enteral Tube Flush: 85 mL    Glucerna 1.5: 1380 mL    IV PiggyBack: 550 mL    IV PiggyBack: 300 mL    IV PiggyBack: 500 mL    IV PiggyBack: 150 mL    IV PiggyBack: 150 mL  Total IN: 3115 mL    OUT:    Indwelling Catheter - Urethral (mL): 1413 mL    Rectal Tube (mL): 400 mL  Total OUT: 1813 mL    Total NET: 1302 mL      10 Mar 2021 07:01  -  10 Mar 2021 11:49  --------------------------------------------------------  IN:    Glucerna 1.5: 300 mL  Total IN: 300 mL    OUT:    Indwelling Catheter - Urethral (mL): 160 mL  Total OUT: 160 mL    Total NET: 140 mL    PHYSICAL EXAM:    General/Neuro  Alert  Not following commands  Localizes to pain  Pupils: reactive    Lungs:clear to auscultation, Normal expansion/effort.   Trach in place, no bleeding or drainage noted    Cardiovascular : S1, S2.  Regular rate and rhythm.  Peripheral edema   Cardiac Rhythm: Normal Sinus Rhythm    GI: Abdomen soft, Non-tender, Non-distended.    PEG tube in place    Extremities: Extremities warm, pink, well-perfused. Distal pulses intact. No peripheral edeam    Derm: Good skin turgor, no skin breakdown.      : Murry catheter in place.      LABS:  CAPILLARY BLOOD GLUCOSE      POCT Blood Glucose.: 139 mg/dL (10 Mar 2021 09:34)  POCT Blood Glucose.: 203 mg/dL (10 Mar 2021 06:24)  POCT Blood Glucose.: 125 mg/dL (10 Mar 2021 02:06)  POCT Blood Glucose.: 127 mg/dL (09 Mar 2021 17:36)  POCT Blood Glucose.: 133 mg/dL (09 Mar 2021 14:22)                          9.0    4.84  )-----------( 193      ( 10 Mar 2021 00:00 )             29.3       03-10    139  |  106  |  14  ----------------------------<  125<H>  4.0   |  23  |  <0.5<L>    Ca    8.0<L>      10 Mar 2021 00:00  Phos  3.0     03-10  Mg     1.8     03-10    TPro  4.9<L>  /  Alb  3.1<L>  /  TBili  0.3  /  DBili  0.2  /  AST  28  /  ALT  19  /  AlkPhos  227<H>  03-08

## 2021-03-10 NOTE — PROGRESS NOTE ADULT - ASSESSMENT
Assessment and Plan:     75y Female s/p craniotomy for left frontal hematoma with evacuation and EVD placement. Intubated and sedated. SICU admission for hemodynamic monitoring.     NEURO:  Off all sedation  Pain controlled with Tylenol via G tube  Keppra 750 mg q 12  EVD removed 3/2   24hr EEG: started 3/3 at 5PM-  no seizure activity  Q 4hr neurochecks   2/25 CTH- ventricular drain in 3rd ventricle, decr size of lateral ventricles, scattered SAH and layering IVH in posterior horns of  lateral ventricle slightly increased  2/28 rpt CTH - stable- L frontal hemoatom cavity 4.5 cm, slighlty decr amount of blood, interval decr in pneumocephalus, L transfrontal drain tip in the third ventricle   3/1: CT sinus-prelim- no evidence of inflammatory/infectious diseases of paransal sinuses  Family meeting 3/3: possible trach/peg this week, follow up with palliative      RESP:   Resp Failure s/p bedside trach 3/6/21  Trach: /16/25/5  AM ABG: ///  %  PM ABG: ///  %  AM CXR  3/1: CT chest -mucus plug L lower lobe bronchus, mod bilat pleural effusions w/ areas of atlectasis in both lower lobes. tree-in-bud opacities in posterior aspect of R upper lobe  +aspergillus  Keep HOB elevated at 30 degrees      CARDS:   Acute on chronic HTN        - home Lisinopril to 20mg       - labetalol 400 q8h        - rescheduled lisinopril dose s/p hypotension when giving both  H/o CAD s/p stents - ASA  Echo (10/13/20): EF 55-60%, trace MR, R atrial echodensity possible thrombus  Trop 0.24 --> 0.21->0.15, no longer trending    GI/NUTR:   S/p bedside PEG  Diet: TF via PEG - Glucerna 1.2 @ goal 60/hr (increased per Nutrition)  -->before downgrade change to 360 q6hr bolus TFs  GI Prophylaxis- PPI  Bowel regimen- Senna, lactulose   Digni shield      /RENAL:   Monitor UO-roblero in place  IVL  Na goal 135-145   q6BMP & Osm  BUN/Cr- 15/0.5  Hypernatremia resolved- dc'd Hypertonic Saline  Na 139/ K 4.0/ Mg 1.8/ 3.0    HEME/ONC:   DVT prophylaxis: heparin   Injectable 5000 every 8 hours  Hb/Hct:  8.0/25.4> 8.2/27> 8.4/27.2 > 8.4/27 > 9.0/29.3  Plts:  144>158 > 197 >180 > 199 > 193  Last T&S: 3/5   (02/25) INR 1.85, nsx rec 1 FFP and 10 vit K, INR 1.49 (2/26) >1.27  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     -CSF (fungal) (collected 02-26): neg     -CSF with Gram Stain (collected 02-27): negative    -Bronchial (collected 02-27):+aspergillus    -fungal CSF cultures (3/2)- neg    -Apergillus galactaman antigen    -blood cx 3/4, 3/5: NGTD       ENDO:  Hx of DM, HA1C 6.3  - restarted home metformin 500mg q 12 hr  on ISS, FS q 4    GOC: palliative care following, full code    DISPO:    SICU possible downgrade to vent unit

## 2021-03-10 NOTE — PROGRESS NOTE ADULT - SUBJECTIVE AND OBJECTIVE BOX
POD# 14  S/p Left minimally invasive Crani for evac of IPH with placement of EVD     pt seen and examined at bedside pt is trach'd Pegged , opens eyes to noxious follows some simple commands   HPI:  75 years old female from home with PMHx of B cell lymphoma on Imbruvica (since 10/2020), CAD s/p stents 15 years ago, DM, chronic UTI, HTN, DLD, brought in by ambulance due to altered mental status and hypertension. Patient was AAO x4 and fully functional at baseline. Last known normal was 11:00pm 2/23 before beds. In 2/24 at 8:45AM, patient was found to be lethargy in bed by his son, was AAO x 4 and able to talk to his son, but started coughing and had one episode of nose bleed. At 9:15, her physical therapist noticed her becoming more lethargic, but vitals were normal. Soon at 9:30, patient was unresponsive and had a blank stare. EMS was called and her BP was found to be around 230/120. She was rushed to ED as Code Stroke. NIHSS 23. CTH showed Large left frontal intraparenchymal hematoma with intraventricular extension into left lateral ventricle, associated with marked mass effect resulting in 0.9 cm midline shift to the right anteriorly. Patient was intubated for airway protection. S/p 2 units of platelet for ASA reversal. Neurosurgery was planning for emergent OR resection of hematoma.      (24 Feb 2021 13:43)    PAST MEDICAL & SURGICAL HISTORY:  Anemia    DM (diabetes mellitus)    High cholesterol    HTN (hypertension)    B-cell chronic lymphocytic leukemia variant    CAD (coronary artery disease)  s/p stenting    H/O heart artery stent    FAMILY HISTORY:  Family history of diabetes mellitus (DM)    Allergies :   penicillin (Anaphylaxis; Hives)    REVIEW OF SYSTEMS : All systems negative other than those listed in HPI  General:	-  Skin/Breast: -  Ophthalmologic: -   ENMT: -  Respiratory and Thorax: -  Cardiovascular: -	  Gastrointestinal: -  Genitourinary:-  Musculoskeletal:	-  Neurological:	-  Psychiatric:	-  Hematology/Lymphatics:	-  Endocrine:-  Allergic/Immunologic:	-    MEDICATIONS  (STANDING):  aspirin  chewable 81 milliGRAM(s) Oral daily  atorvastatin 80 milliGRAM(s) Oral at bedtime  chlorhexidine 0.12% Liquid 15 milliLiter(s) Oral Mucosa two times a day  chlorhexidine 4% Liquid 1 Application(s) Topical daily  heparin   Injectable 5000 Unit(s) SubCutaneous every 8 hours  insulin regular  human corrective regimen sliding scale   SubCutaneous every 6 hours  labetalol 400 milliGRAM(s) Oral every 8 hours  levETIRAcetam  IVPB 750 milliGRAM(s) IV Intermittent every 12 hours  lisinopril 20 milliGRAM(s) Oral daily  meropenem  IVPB      meropenem  IVPB 1000 milliGRAM(s) IV Intermittent every 8 hours  metFORMIN 500 milliGRAM(s) Oral every 12 hours  pantoprazole   Suspension 40 milliGRAM(s) Oral daily  senna 2 Tablet(s) Oral at bedtime  vancomycin  IVPB 1000 milliGRAM(s) IV Intermittent every 12 hours  voriconazole IVPB 300 milliGRAM(s) IV Intermittent every 12 hours    MEDICATIONS  (PRN):  acetaminophen   Tablet .. 650 milliGRAM(s) Oral every 6 hours PRN Moderate Pain (4 - 6)    Antibiotics:  meropenem  IVPB 1000 milliGRAM(s) IV Intermittent every 8 hours  meropenem  IVPB      vancomycin  IVPB 1000 milliGRAM(s) IV Intermittent every 12 hours  voriconazole IVPB 300 milliGRAM(s) IV Intermittent every 12 hours    Anticoagulation:  aspirin  chewable 81 milliGRAM(s) Oral daily  heparin   Injectable 5000 Unit(s) SubCutaneous every 8 hours    OTHER:  atorvastatin 80 milliGRAM(s) Oral at bedtime  chlorhexidine 0.12% Liquid 15 milliLiter(s) Oral Mucosa two times a day  chlorhexidine 4% Liquid 1 Application(s) Topical daily  insulin regular  human corrective regimen sliding scale   SubCutaneous every 6 hours  labetalol 400 milliGRAM(s) Oral every 8 hours  lisinopril 20 milliGRAM(s) Oral daily  metFORMIN 500 milliGRAM(s) Oral every 12 hours  pantoprazole   Suspension 40 milliGRAM(s) Oral daily  senna 2 Tablet(s) Oral at bedtime    Vital Signs Last 24 Hrs  T(C): 37.1 (10 Mar 2021 08:32), Max: 37.3 (09 Mar 2021 20:00)  T(F): 98.7 (10 Mar 2021 08:32), Max: 99.2 (09 Mar 2021 20:00)  HR: 82 (10 Mar 2021 10:00) (76 - 87)  BP: --  BP(mean): --  RR: 28 (10 Mar 2021 10:00) (23 - 33)  SpO2: 97% (10 Mar 2021 10:00) (97% - 99%)    Physical Exam :  Pt Trached and pegged   Facial features equal and symmetric   Opens eyes to verbal stimuli   PERRL   + hand  of LUE  Moves RUE spontaneously / intermittently     Wound :   Incision remains clean dry intact     LABS:                        9.0    4.84  )-----------( 193      ( 10 Mar 2021 00:00 )             29.3     03-10    139  |  106  |  14  ----------------------------<  125<H>  4.0   |  23  |  <0.5<L>    Ca    8.0<L>      10 Mar 2021 00:00  Phos  3.0     03-10  Mg     1.8     03-10    TPro  4.9<L>  /  Alb  3.1<L>  /  TBili  0.3  /  DBili  0.2  /  AST  28  /  ALT  19  /  AlkPhos  227<H>  03-08    CULTURES:  Culture Results:   No growth to date. (03-05 @ 16:46)    Assessment / Plan: Exam improving now opens eyes to verbal stimuli, follows commands on LUE, intermittent spontaneous movement of RUE  -  Per ID; remains on vanc (2/28- ), aztreonam (2/28-3/4), Voriconazole f/u LFTs (3/2), Blake(3/4-?) Vanc lvl (3/7) 17  - Continue Care per SICU team   - Will discuss with attending   - PT/OT

## 2021-03-11 NOTE — PROGRESS NOTE ADULT - SUBJECTIVE AND OBJECTIVE BOX
AD BASURTO  [id number]  75y Female    Indication for ICU admission: s/p crani -evac of large hematoma (IPH)    Admit Date:02-24-21  ICU Date: 2/24/21  OR Date: 2/24/21    penicillin (Anaphylaxis; Hives)    PAST MEDICAL & SURGICAL HISTORY:  Anemia  DM (diabetes mellitus)  High cholesterol  HTN (hypertension)  B-cell chronic lymphocytic leukemia variant  CAD (coronary artery disease)  s/p stenting  H/O heart artery stent      Home Medications:  aspirin 81 mg oral tablet, chewable: 1 tab(s) orally once a day (24 Feb 2021 15:11)  atorvastatin 80 mg oral tablet: 1 tab(s) orally once a day (at bedtime) (24 Feb 2021 15:11)  Imbruvica 70 mg oral capsule: 2 cap(s) orally once a day (24 Feb 2021 15:11)  Levaquin 500 mg oral tablet: 1 tab(s) orally every 24 hours (24 Feb 2021 15:11)  lisinopril 10 mg oral tablet: 1 tab(s) orally once a day (24 Feb 2021 15:11)      24HRS EVENT:  Day  randolph to re evaluate   as per ID, d/c shantal/vanc  Follow up with bed management  Tube Feeds Switched from Continuous to Bolus 360 q6      DVT PTX: HSQ    GI PTX:pantoprazole  Injectable 40 milliGRAM(s) IV Push daily    Tubes/Lines/Drains   ----------------------------------------------------------------------------------------------------------  [x] Peripheral IV  [X] Central Venous Line         RIJ             Date Placed:  2/24  [X] Arterial Line		      Radial   Date Placed: 2/24  [X] Urinary Catheter Murry                                             Date Placed: 2/24     REVIEW OF SYSTEMS    [ ] A ten-point review of systems was otherwise negative except as noted.  [x ] Due to altered mental status/intubation, subjective information were not able to be obtained from the patient. History was   AD BASURTO  [id number]  75y Female    Indication for ICU admission: s/p crani -evac of large hematoma (IPH)    Admit Date:21  ICU Date: 21  OR Date: 21    penicillin (Anaphylaxis; Hives)    PAST MEDICAL & SURGICAL HISTORY:  Anemia  DM (diabetes mellitus)  High cholesterol  HTN (hypertension)  B-cell chronic lymphocytic leukemia variant  CAD (coronary artery disease)  s/p stenting  H/O heart artery stent      Home Medications:  aspirin 81 mg oral tablet, chewable: 1 tab(s) orally once a day (2021 15:11)  atorvastatin 80 mg oral tablet: 1 tab(s) orally once a day (at bedtime) (2021 15:11)  Imbruvica 70 mg oral capsule: 2 cap(s) orally once a day (2021 15:11)  Levaquin 500 mg oral tablet: 1 tab(s) orally every 24 hours (2021 15:11)  lisinopril 10 mg oral tablet: 1 tab(s) orally once a day (2021 15:11)      24HRS EVENT:  Day  randolph to re evaluate   as per ID, d/c shantal/vanc  Follow up with bed management  Tube Feeds Switched from Continuous to Bolus 360 q6      DVT PTX: HSQ    GI PTX:pantoprazole  Injectable 40 milliGRAM(s) IV Push daily    Tubes/Lines/Drains   ----------------------------------------------------------------------------------------------------------  [x] Peripheral IV  [X] Central Venous Line         RIJ             Date Placed:    [X] Arterial Line		      Radial   Date Placed:   [X] Urinary Catheter Murry                                             Date Placed:      REVIEW OF SYSTEMS    [ ] A ten-point review of systems was otherwise negative except as noted.  [x ] Due to altered mental status/intubation, subjective information were not able to be obtained from the patient. History was      Daily     Daily Weight in k.4 (11 Mar 2021 06:00)    Diet, NPO with Tube Feed:   Tube Feeding Modality: Gastrostomy  Glucerna 1.2 Jerry  Total Volume for 24 Hours (mL): 1440  Bolus  Total Volume of Bolus (mL):  360  Tube Feed Frequency: Every 6 hours   Tube Feed Start Time: 12:00  Bolus Feed Rate (mL per Hour): 360   Bolus Feed Duration (in Hours): 1 (03-10-21 @ 10:28)      CURRENT MEDS:  Neurologic Medications  acetaminophen   Tablet .. 650 milliGRAM(s) Oral every 6 hours PRN Moderate Pain (4 - 6)  levETIRAcetam  IVPB 750 milliGRAM(s) IV Intermittent every 12 hours    Respiratory Medications    Cardiovascular Medications  labetalol 400 milliGRAM(s) Oral every 8 hours  lisinopril 20 milliGRAM(s) Oral daily    Gastrointestinal Medications  pantoprazole   Suspension 40 milliGRAM(s) Oral daily  senna 2 Tablet(s) Oral at bedtime    Genitourinary Medications    Hematologic/Oncologic Medications  aspirin  chewable 81 milliGRAM(s) Oral daily  heparin   Injectable 5000 Unit(s) SubCutaneous every 8 hours    Antimicrobial/Immunologic Medications  voriconazole IVPB 300 milliGRAM(s) IV Intermittent every 12 hours    Endocrine/Metabolic Medications  atorvastatin 80 milliGRAM(s) Oral at bedtime  insulin regular  human corrective regimen sliding scale   SubCutaneous every 6 hours  metFORMIN 500 milliGRAM(s) Oral every 12 hours    Topical/Other Medications  chlorhexidine 0.12% Liquid 15 milliLiter(s) Oral Mucosa two times a day  chlorhexidine 4% Liquid 1 Application(s) Topical daily      ICU Vital Signs Last 24 Hrs  T(C): 36.8 (11 Mar 2021 08:44), Max: 37.5 (10 Mar 2021 15:56)  T(F): 98.3 (11 Mar 2021 08:44), Max: 99.5 (10 Mar 2021 15:56)  HR: 78 (11 Mar 2021 08:05) (78 - 90)  BP: 112/57 (11 Mar 2021 07:00) (112/57 - 160/74)  BP(mean): 82 (11 Mar 2021 07:00) (77 - 107)  ABP: 142/53 (11 Mar 2021 07:00) (134/50 - 187/79)  ABP(mean): 81 (11 Mar 2021 07:00) (76 - 120)  RR: 24 (11 Mar 2021 07:00) (18 - 35)  SpO2: 99% (11 Mar 2021 08:05) (93% - 99%)      Adult Advanced Hemodynamics Last 24 Hrs  CVP(mm Hg): --  CVP(cm H2O): --  CO: --  CI: --  PA: --  PA(mean): --  PCWP: --  SVR: --  SVRI: --  PVR: --  PVRI: --    Mode: CPAP with PS  FiO2: 30  PEEP: 5  PS: 10    ABG - ( 11 Mar 2021 04:11 )  pH, Arterial: 7.48  pH, Blood: x     /  pCO2: 40    /  pO2: 60    / HCO3: 29    / Base Excess: 5.0   /  SaO2: 93                  I&O's Summary    10 Mar 2021 07:01  -  11 Mar 2021 07:00  --------------------------------------------------------  IN: 2730 mL / OUT: 1663 mL / NET: 1067 mL      I&O's Detail    10 Mar 2021 07:01  -  11 Mar 2021 07:00  --------------------------------------------------------  IN:    Enteral Tube Flush: 380 mL    Glucerna 1.5: 1500 mL    IV PiggyBack: 50 mL    IV PiggyBack: 250 mL    IV PiggyBack: 200 mL    IV PiggyBack: 100 mL    IV PiggyBack: 250 mL  Total IN: 2730 mL    OUT:    Indwelling Catheter - Urethral (mL): 1363 mL    Rectal Tube (mL): 300 mL  Total OUT: 1663 mL    Total NET: 1067 mL          PHYSICAL EXAM:    General/Neuro  Alert  Not following commands  Localizes to pain  Pupils: reactive    Lungs:clear to auscultation, Normal expansion/effort.   Trach in place, no bleeding or drainage noted    Cardiovascular : S1, S2.  Regular rate and rhythm.  Peripheral edema   Cardiac Rhythm: Normal Sinus Rhythm    GI: Abdomen soft, Non-tender, Non-distended.    PEG tube in place    Extremities: Extremities warm, pink, well-perfused. Distal pulses intact. No peripheral edeam    Derm: Good skin turgor, no skin breakdown.      : Murry catheter in place.    LABS:  CAPILLARY BLOOD GLUCOSE      POCT Blood Glucose.: 174 mg/dL (11 Mar 2021 06:22)  POCT Blood Glucose.: 138 mg/dL (10 Mar 2021 23:35)  POCT Blood Glucose.: 143 mg/dL (10 Mar 2021 16:56)                          8.8    4.40  )-----------( 198      ( 10 Mar 2021 23:30 )             28.5       03-10    140  |  104  |  14  ----------------------------<  129<H>  4.2   |  26  |  <0.5<L>    Ca    8.1<L>      10 Mar 2021 23:30  Phos  3.2     03-10  Mg     1.8     03-10

## 2021-03-11 NOTE — PROGRESS NOTE ADULT - ATTENDING COMMENTS
Arousable, lethargic.  On bolus tube feeds.    ASSESSMENT:  76 y/o female with Left Intracerebral Hemorrhage.  S/P Left Craniotomy. EVD placement.  Cerebral edema.  Acute respiratory failure with hypoxia.  HTN.  B Cell Lymphoma.  DM  Dysphagia.  Aspergillosis.    PLAN:  - pain control prn  - PSV during the today And AC at night; follow ABG  - keep normotensive  - bolus PEG tube feeds; bowel regiment  - follow serum electrolytes and UOP  - ID: on Voriconazole only as per ID  - GI and DVT prophylaxis .

## 2021-03-11 NOTE — PROGRESS NOTE ADULT - ASSESSMENT
Assessment and Plan:     75y Female s/p craniotomy for left frontal hematoma with evacuation and EVD placement. Intubated and sedated. SICU admission for hemodynamic monitoring.     NEURO:  Off all sedation  Pain controlled with Tylenol via G tube  Keppra 750 mg q 12  EVD removed 3/2   24hr EEG: started 3/3 at 5PM-  no seizure activity  Q 4hr neurochecks    CTH- ventricular drain in 3rd ventricle, decr size of lateral ventricles, scattered SAH and layering IVH in posterior horns of  lateral ventricle slightly increased   rpt CTH - stable- L frontal hemoatom cavity 4.5 cm, slighlty decr amount of blood, interval decr in pneumocephalus, L transfrontal drain tip in the third ventricle   3/1: CT sinus-prelim- no evidence of inflammatory/infectious diseases of paransal sinuses  Family meeting 3/3: possible trach/peg this week, follow up with palliative      RESP:   Resp Failure s/p bedside trach 3/6/21, PSV 10/5 tolerating 12 hours  Trach: /16/25/5  AM AB.48/40/60/29/93%              ,    PM AB.49/39/80/30  100%  AM CXR  3/1: CT chest -mucus plug L lower lobe bronchus, mod bilat pleural effusions w/ areas of atlectasis in both lower lobes. tree-in-bud opacities in posterior aspect of R upper lobe  +aspergillus  Keep HOB elevated at 30 degrees      CARDS:   Acute on chronic HTN        - home Lisinopril to 20mg       - labetalol 400 q8h        - rescheduled lisinopril dose s/p hypotension when giving both  H/o CAD s/p stents - ASA  Echo (10/13/20): EF 55-60%, trace MR, R atrial echodensity possible thrombus  Trop 0.24 --> 0.21->0.15, no longer trending    GI/NUTR:   S/p bedside PEG  Diet: TF via PEG - Glucerna 1.2 changed to bolus for downgrade  GI Prophylaxis- PPI  Bowel regimen- Senna, lactulose   Digni shield      /RENAL:   Monitor UO-roblero in place  IVL  Na goal 135-145   BUN/Cr- 14/<0.5   Na 140/ K 4.2/ Mg 1.8/ 3.0     HEME/ONC:   DVT prophylaxis: heparin   Injectable 5000 every 8 hours  Hb/Hct:  8.0/25.4> 8.2/27> 8.4/27.2 > 8.4/27 > 9.0/29.3 > 8.8/28.5  Plts:  144>158 > 197 >180 > 199 > 193 > 198  Last T&S: 3/5   () INR 1.85, nsx rec 1 FFP and 10 vit K, INR 1.49 () >1.27  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  Heme/Onc consult: transfuse platelets if < 100, give vit K for elevated INR  s/p 1 FFP & 10 vit K on  for INR 1.9     ID:  Immunocompromised  afebrile  WBC- 7.19> 6.85> 7.27> 5.17 > 4.84  > 4.4  Antibioitics: Voriconazole  All  Follow up LFTs    Cultures:     -UA (): negative     -CSF with Gram Stain  (21 @ 15:20): No growth at 3 days.     -CSF (fungal) (collected ): neg     -CSF with Gram Stain (collected ): negative    -Bronchial (collected ):+aspergillus    -fungal CSF cultures (3/2)- neg    -Apergillus galactaman antigen    -blood cx 3/4, 3/5: NGTD       ENDO:  Hx of DM, HA1C 6.3  - restarted home metformin 500mg q 12 hr  on ISS, FS q 4    GOC: palliative care following, full code    DISPO:    SICU possible downgrade to vent unit   Assessment and Plan:     75y Female s/p craniotomy for left frontal hematoma with evacuation and EVD placement. Intubated and sedated. SICU admission for hemodynamic monitoring.     NEURO:  Off all sedation  Pain controlled with Tylenol via G tube  Keppra 750 mg q 12  EVD removed 3/2   24hr EEG: started 3/3 at 5PM-  no seizure activity  Q 4hr neurochecks    CTH- ventricular drain in 3rd ventricle, decr size of lateral ventricles, scattered SAH and layering IVH in posterior horns of  lateral ventricle slightly increased   rpt CTH - stable- L frontal hemoatom cavity 4.5 cm, slighlty decr amount of blood, interval decrease in pneumocephalus, L transfrontal drain tip in the third ventricle   3/1: CT sinus-prelim- no evidence of inflammatory/infectious diseases of paransal sinuses  Family meeting 3/3: possible trach/peg this week, follow up with palliative      RESP:   Resp Failure s/p bedside trach 3/6/21, PSV 10/5 tolerating 12 hours  Trach: /16/25/5  AM AB.48/40/60/29/93%              ,    PM AB.49/39/80/30  100%  AM CXR  3/1: CT chest -mucus plug L lower lobe bronchus, mod bilat pleural effusions w/ areas of atlectasis in both lower lobes. tree-in-bud opacities in posterior aspect of R upper lobe  +aspergillus  Keep HOB elevated at 30 degrees      CARDS:   Acute on chronic HTN        - home Lisinopril to 20mg       - labetalol 400 q8h        - rescheduled lisinopril dose s/p hypotension when giving both  H/o CAD s/p stents - ASA  Echo (10/13/20): EF 55-60%, trace MR, R atrial echodensity possible thrombus  Trop 0.24 --> 0.21->0.15, no longer trending    GI/NUTR:   S/p bedside PEG  Diet: TF via PEG - Glucerna 1.2 changed to bolus for downgrade  GI Prophylaxis- PPI  Bowel regimen- Senna, lactulose   Digni shield      /RENAL:   Monitor UO-roblero in place  IVL  Na goal 135-145   BUN/Cr- 14/<0.5   Na 140/ K 4.2/ Mg 1.8/ 3.0     HEME/ONC:   DVT prophylaxis: heparin   Injectable 5000 every 8 hours  Hb/Hct:  8.0/25.4> 8.2/27> 8.4/27.2 > 8.4/27 > 9.0/29.3 > 8.8/28.5  Plts:  144>158 > 197 >180 > 199 > 193 > 198  Last T&S: 3/   () INR 1.85, nsx rec 1 FFP and 10 vit K, INR 1.49 () >1.27  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  Heme/Onc consult: transfuse platelets if < 100, give vit K for elevated INR  s/p 1 FFP & 10 vit K on  for INR 1.9     ID:  Immunocompromised  afebrile  WBC- 7.19> 6.85> 7.27> 5.17 > 4.84  > 4.4  Antibioitics: Voriconazole  All  Follow up LFTs    Cultures:     -UA (): negative     -CSF with Gram Stain  (21 @ 15:20): No growth at 3 days.     -CSF (fungal) (collected ): neg     -CSF with Gram Stain (collected ): negative    -Bronchial (collected ):+aspergillus    -fungal CSF cultures (3/2)- neg    -Apergillus galactaman antigen    -blood cx 3/4, 3/5: NGTD       ENDO:  Hx of DM, HA1C 6.3  - restarted home metformin 500mg q 12 hr  on ISS, FS q 4    GOC: palliative care following, full code    DISPO:    SICU possible downgrade to vent unit     Assessment and Plan:     75y Female s/p craniotomy for left frontal hematoma with evacuation and EVD placement. Intubated and sedated. SICU admission for hemodynamic monitoring.     NEURO:  Off all sedation  Pain controlled with Tylenol via G tube  Keppra 750 mg q 12  EVD removed 3/2   24hr EEG: started 3/3 at 5PM-  no seizure activity  Q 4hr neurochecks    CTH- ventricular drain in 3rd ventricle, decr size of lateral ventricles, scattered SAH and layering IVH in posterior horns of  lateral ventricle slightly increased   rpt CTH - stable- L frontal hemoatom cavity 4.5 cm, slighlty decr amount of blood, interval decrease in pneumocephalus, L transfrontal drain tip in the third ventricle   3/1: CT sinus-prelim- no evidence of inflammatory/infectious diseases of paransal sinuses  Family meeting 3/3: possible trach/peg this week, follow up with palliative      RESP:   Resp Failure s/p bedside trach 3/6/21, PSV 10/5 tolerating 12 hours  Trach: /16/25/5  AM AB.48/40/60/29/93%              ,    PM AB.49/39/80/30  100%  AM CXR  3/1: CT chest -mucus plug L lower lobe bronchus, mod bilat pleural effusions w/ areas of atlectasis in both lower lobes. tree-in-bud opacities in posterior aspect of R upper lobe  +aspergillus  Keep HOB elevated at 30 degrees      CARDS:   Acute on chronic HTN        - home Lisinopril to 20mg       - labetalol 400 q8h        - rescheduled lisinopril dose s/p hypotension when giving both  H/o CAD s/p stents - ASA  Echo (10/13/20): EF 55-60%, trace MR, R atrial echodensity possible thrombus  Trop 0.24 --> 0.21->0.15, no longer trending    GI/NUTR:   S/p bedside PEG  Diet: TF via PEG - Glucerna 1.2 changed to bolus for downgrade  GI Prophylaxis- PPI  Bowel regimen- Senna, lactulose   Digni shield      /RENAL:   Monitor UO-roblero in place  IVL  Na goal 135-145   BUN/Cr- 14/<0.5   Na 140/ K 4.2/ Mg 1.8/ 3.0     HEME/ONC:   DVT prophylaxis: heparin   Injectable 5000 every 8 hours  Hb/Hct:  8.0/25.4> 8.2/27> 8.4/27.2 > 8.4/27 > 9.0/29.3 > 8.8/28.5  Plts:  144>158 > 197 >180 > 199 > 193 > 198  Last T&S: 3/   () INR 1.85, nsx rec 1 FFP and 10 vit K, INR 1.49 () >1.27  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  Heme/Onc consult: transfuse platelets if < 100, give vit K for elevated INR  s/p 1 FFP & 10 vit K on  for INR 1.9     ID:  Immunocompromised  afebrile  WBC- 7.19> 6.85> 7.27> 5.17 > 4.84  > 4.4  Antibioitics: Voriconazole  All  Follow up LFTs  3/11: Will consult IR for PICC line for prolonged antibiotics.     Cultures:     -UA (): negative     -CSF with Gram Stain  (21 @ 15:20): No growth at 3 days.     -CSF (fungal) (collected ): neg     -CSF with Gram Stain (collected ): negative    -Bronchial (collected ):+aspergillus    -fungal CSF cultures (3/2)- neg    -Apergillus galactaman antigen    -blood cx 3/4, 3/5: NGTD       ENDO:  Hx of DM, HA1C 6.3  - restarted home metformin 500mg q 12 hr  on ISS, FS q 4    GOC: palliative care following, full code    DISPO:    SICU possible downgrade to vent unit

## 2021-03-11 NOTE — PROGRESS NOTE ADULT - SUBJECTIVE AND OBJECTIVE BOX
POD# 15    S/p Left Minimally invasive craniotomy for evacuation of IPH with placement of EVD     pt seen and examined at bedside pt is trach'd , PEG  no sedation       Vital Signs Last 24 Hrs  T(C): 36.8 (11 Mar 2021 08:44), Max: 37.7 (10 Mar 2021 12:00)  T(F): 98.3 (11 Mar 2021 08:44), Max: 99.9 (10 Mar 2021 12:00)  HR: 84 (11 Mar 2021 07:00) (78 - 90)  BP: 112/57 (11 Mar 2021 07:00) (112/57 - 160/74)  BP(mean): 82 (11 Mar 2021 07:00) (77 - 107)  RR: 24 (11 Mar 2021 07:00) (18 - 35)  SpO2: 99% (11 Mar 2021 07:00) (93% - 99%)    I&O's Detail    10 Mar 2021 07:01  -  11 Mar 2021 07:00  --------------------------------------------------------  IN:    Enteral Tube Flush: 380 mL    Glucerna 1.5: 1500 mL    IV PiggyBack: 50 mL    IV PiggyBack: 250 mL    IV PiggyBack: 200 mL    IV PiggyBack: 100 mL    IV PiggyBack: 250 mL  Total IN: 2730 mL    OUT:    Indwelling Catheter - Urethral (mL): 1363 mL    Rectal Tube (mL): 300 mL  Total OUT: 1663 mL    Total NET: 1067 mL        I&O's Summary    10 Mar 2021 07:01  -  11 Mar 2021 07:00  --------------------------------------------------------  IN: 2730 mL / OUT: 1663 mL / NET: 1067 mL        PHYSICAL EXAM:  Neurological:    Trach'd , PEG  Opens eyes to verbal stimuli   +  on the LUE  moves RUE spontaneously  Withdraws bilateral LE's to noxious stimuli   incision clean dry intact       LABS:                        8.8    4.40  )-----------( 198      ( 10 Mar 2021 23:30 )             28.5     03-10    140  |  104  |  14  ----------------------------<  129<H>  4.2   |  26  |  <0.5<L>    Ca    8.1<L>      10 Mar 2021 23:30  Phos  3.2     03-10  Mg     1.8     03-10              CAPILLARY BLOOD GLUCOSE      POCT Blood Glucose.: 174 mg/dL (11 Mar 2021 06:22)  POCT Blood Glucose.: 138 mg/dL (10 Mar 2021 23:35)  POCT Blood Glucose.: 143 mg/dL (10 Mar 2021 16:56)  POCT Blood Glucose.: 136 mg/dL (10 Mar 2021 12:40)      Drug Levels: [] N/A  Vancomycin Level, Trough: 17.4 ug/mL (03-07 @ 16:00)  Vancomycin Level, Trough: 10.5 ug/mL (03-05 @ 05:59)    CSF Analysis: [] N/A      Allergies    penicillin (Anaphylaxis; Hives)    Intolerances      MEDICATIONS:  Antibiotics:  voriconazole IVPB 300 milliGRAM(s) IV Intermittent every 12 hours    Neuro:  acetaminophen   Tablet .. 650 milliGRAM(s) Oral every 6 hours PRN  levETIRAcetam  IVPB 750 milliGRAM(s) IV Intermittent every 12 hours    Anticoagulation:  aspirin  chewable 81 milliGRAM(s) Oral daily  heparin   Injectable 5000 Unit(s) SubCutaneous every 8 hours    OTHER:  atorvastatin 80 milliGRAM(s) Oral at bedtime  chlorhexidine 0.12% Liquid 15 milliLiter(s) Oral Mucosa two times a day  chlorhexidine 4% Liquid 1 Application(s) Topical daily  insulin regular  human corrective regimen sliding scale   SubCutaneous every 6 hours  labetalol 400 milliGRAM(s) Oral every 8 hours  lisinopril 20 milliGRAM(s) Oral daily  metFORMIN 500 milliGRAM(s) Oral every 12 hours  pantoprazole   Suspension 40 milliGRAM(s) Oral daily  senna 2 Tablet(s) Oral at bedtime    IVF:    CULTURES:  Culture Results:   No Growth Final (03-05 @ 16:46)  Culture Results:   No Growth Final (03-04 @ 20:08)    RADIOLOGY & ADDITIONAL TESTS:      ASSESSMENT:  75y Female s/p    CVA (CEREBRAL VASCULAR ACCIDENT);INTRACRANIAL BLEED    ^CVA (CEREBRAL VASCULAR ACCIDENT);INTRACRANIAL BLEED    Family history of diabetes mellitus (DM)    No pertinent family history in first degree relatives    Handoff    MEWS Score    Anemia    DM (diabetes mellitus)    High cholesterol    HTN (hypertension)    B-cell chronic lymphocytic leukemia variant    CAD (coronary artery disease)    CVA (cerebral vascular accident)    Altered mental status    Palliative care encounter    Intracranial bleed    Craniotomy for intracerebral hemorrhage    H/O heart artery stent    No significant past surgical history    AMS    90+    Intracranial bleed    [email or web address]      A/p                   S/p Left Minimally invasive craniotomy for evacuation of IPH with placement of EVD                  care per CCT                  will need rehab                  Social work for DC planning

## 2021-03-12 NOTE — PROGRESS NOTE ADULT - SUBJECTIVE AND OBJECTIVE BOX
Subjective: 75yFemale with a pmhx of CVA (CEREBRAL VASCULAR ACCIDENT);INTRACRANIAL BLEED    ^CVA (CEREBRAL VASCULAR ACCIDENT);INTRACRANIAL BLEED    Family history of diabetes mellitus (DM)    No pertinent family history in first degree relatives    Handoff    MEWS Score    Anemia    DM (diabetes mellitus)    High cholesterol    HTN (hypertension)    B-cell chronic lymphocytic leukemia variant    CAD (coronary artery disease)    CVA (cerebral vascular accident)    Altered mental status    Palliative care encounter    Intracranial bleed    Craniotomy for intracerebral hemorrhage    H/O heart artery stent    No significant past surgical history    AMS    90+    Intracranial bleed    [email or web address]      POD# 16    S/p Left Minimally invasive craniotomy for evacuation of IPH with placement of EVD     pt seen and examined at bedside. Pt is trach'd , PEG. Off sedation.  Awake, alert, did not follow commands this morning.          Allergies    penicillin (Anaphylaxis; Hives)    Intolerances        Vital Signs Last 24 Hrs  T(C): 37.4 (12 Mar 2021 07:51), Max: 37.4 (12 Mar 2021 04:00)  T(F): 99.3 (12 Mar 2021 07:51), Max: 99.4 (12 Mar 2021 04:00)  HR: 83 (12 Mar 2021 09:10) (79 - 85)  BP: 91/49 (12 Mar 2021 07:00) (84/45 - 146/66)  BP(mean): 68 (12 Mar 2021 07:00) (60 - 95)  RR: 27 (12 Mar 2021 07:00) (22 - 30)  SpO2: 97% (12 Mar 2021 09:10) (96% - 99%)      acetaminophen   Tablet .. 650 milliGRAM(s) Oral every 6 hours PRN  aspirin  chewable 81 milliGRAM(s) Oral daily  atorvastatin 80 milliGRAM(s) Oral at bedtime  chlorhexidine 0.12% Liquid 15 milliLiter(s) Oral Mucosa two times a day  chlorhexidine 4% Liquid 1 Application(s) Topical daily  furosemide   Injectable 40 milliGRAM(s) IV Push once  heparin   Injectable 5000 Unit(s) SubCutaneous every 8 hours  insulin regular  human corrective regimen sliding scale   SubCutaneous every 6 hours  labetalol 400 milliGRAM(s) Oral every 8 hours  levETIRAcetam  IVPB 750 milliGRAM(s) IV Intermittent every 12 hours  lisinopril 20 milliGRAM(s) Oral daily  metFORMIN 500 milliGRAM(s) Oral every 12 hours  pantoprazole   Suspension 40 milliGRAM(s) Oral daily  senna 2 Tablet(s) Oral at bedtime  voriconazole IVPB 300 milliGRAM(s) IV Intermittent every 12 hours        03-11-21 @ 07:01  -  03-12-21 @ 07:00  --------------------------------------------------------  IN: 2670 mL / OUT: 2331 mL / NET: 339 mL        REVIEW OF SYSTEMS    [ ] A ten-point review of systems was otherwise negative except as noted.  [x ] Due to altered mental status/intubation, subjective information were not able to be obtained from the patient. History was obtained, to the extent possible, from review of the chart and collateral sources of information.      Exam:    Trach'd , PEG  Opens eyes to verbal stimuli   Did not follow commands this morning  +  on the LUE.   moves RUE spontaneously, purposefully  Withdraws bilateral LE's to noxious stimuli   incision clean dry intact         CBC Full  -  ( 11 Mar 2021 23:11 )  WBC Count : 4.39 K/uL  RBC Count : 3.22 M/uL  Hemoglobin : 8.5 g/dL  Hematocrit : 27.8 %  Platelet Count - Automated : 171 K/uL  Mean Cell Volume : 86.3 fL  Mean Cell Hemoglobin : 26.4 pg  Mean Cell Hemoglobin Concentration : 30.6 g/dL  Auto Neutrophil # : x  Auto Lymphocyte # : x  Auto Monocyte # : x  Auto Eosinophil # : x  Auto Basophil # : x  Auto Neutrophil % : x  Auto Lymphocyte % : x  Auto Monocyte % : x  Auto Eosinophil % : x  Auto Basophil % : x    03-11    141  |  105  |  13  ----------------------------<  133<H>  4.1   |  26  |  <0.5<L>    Ca    8.0<L>      11 Mar 2021 23:11  Phos  3.5     03-11  Mg     1.8     03-11      Imaging:    Assessment/Plan:          S/p Left Minimally invasive craniotomy for evacuation of IPH with placement of EVD. Covid(-) on 3/10/21.     -care per CCT  -will need rehab placement   -SW discussed placement w/ daughter, possibly to SNV if bed available         Subjective: 75yFemale with a pmhx of CVA (CEREBRAL VASCULAR ACCIDENT);INTRACRANIAL BLEED    ^CVA (CEREBRAL VASCULAR ACCIDENT);INTRACRANIAL BLEED    Family history of diabetes mellitus (DM)    No pertinent family history in first degree relatives    Handoff    MEWS Score    Anemia    DM (diabetes mellitus)    High cholesterol    HTN (hypertension)    B-cell chronic lymphocytic leukemia variant    CAD (coronary artery disease)    CVA (cerebral vascular accident)    Altered mental status    Palliative care encounter    Intracranial bleed    Craniotomy for intracerebral hemorrhage    H/O heart artery stent    No significant past surgical history    AMS    90+    Intracranial bleed    [email or web address]      POD# 16    S/p Left Minimally invasive craniotomy for evacuation of IPH with placement of EVD     pt seen and examined at bedside. Pt is trach'd , PEG. Off sedation.  Awake, alert, did not follow commands this morning.          Allergies    penicillin (Anaphylaxis; Hives)    Intolerances        Vital Signs Last 24 Hrs  T(C): 37.4 (12 Mar 2021 07:51), Max: 37.4 (12 Mar 2021 04:00)  T(F): 99.3 (12 Mar 2021 07:51), Max: 99.4 (12 Mar 2021 04:00)  HR: 83 (12 Mar 2021 09:10) (79 - 85)  BP: 91/49 (12 Mar 2021 07:00) (84/45 - 146/66)  BP(mean): 68 (12 Mar 2021 07:00) (60 - 95)  RR: 27 (12 Mar 2021 07:00) (22 - 30)  SpO2: 97% (12 Mar 2021 09:10) (96% - 99%)      acetaminophen   Tablet .. 650 milliGRAM(s) Oral every 6 hours PRN  aspirin  chewable 81 milliGRAM(s) Oral daily  atorvastatin 80 milliGRAM(s) Oral at bedtime  chlorhexidine 0.12% Liquid 15 milliLiter(s) Oral Mucosa two times a day  chlorhexidine 4% Liquid 1 Application(s) Topical daily  furosemide   Injectable 40 milliGRAM(s) IV Push once  heparin   Injectable 5000 Unit(s) SubCutaneous every 8 hours  insulin regular  human corrective regimen sliding scale   SubCutaneous every 6 hours  labetalol 400 milliGRAM(s) Oral every 8 hours  levETIRAcetam  IVPB 750 milliGRAM(s) IV Intermittent every 12 hours  lisinopril 20 milliGRAM(s) Oral daily  metFORMIN 500 milliGRAM(s) Oral every 12 hours  pantoprazole   Suspension 40 milliGRAM(s) Oral daily  senna 2 Tablet(s) Oral at bedtime  voriconazole IVPB 300 milliGRAM(s) IV Intermittent every 12 hours        03-11-21 @ 07:01  -  03-12-21 @ 07:00  --------------------------------------------------------  IN: 2670 mL / OUT: 2331 mL / NET: 339 mL        REVIEW OF SYSTEMS    [ ] A ten-point review of systems was otherwise negative except as noted.  [x ] Due to altered mental status/intubation, subjective information were not able to be obtained from the patient. History was obtained, to the extent possible, from review of the chart and collateral sources of information.      Exam:    Trach'd , PEG  Opens eyes to verbal stimuli   Did not follow commands this morning  +  on the LUE.   moves RUE spontaneously, purposefully  Withdraws bilateral LE's to noxious stimuli   incision clean dry intact         CBC Full  -  ( 11 Mar 2021 23:11 )  WBC Count : 4.39 K/uL  RBC Count : 3.22 M/uL  Hemoglobin : 8.5 g/dL  Hematocrit : 27.8 %  Platelet Count - Automated : 171 K/uL  Mean Cell Volume : 86.3 fL  Mean Cell Hemoglobin : 26.4 pg  Mean Cell Hemoglobin Concentration : 30.6 g/dL  Auto Neutrophil # : x  Auto Lymphocyte # : x  Auto Monocyte # : x  Auto Eosinophil # : x  Auto Basophil # : x  Auto Neutrophil % : x  Auto Lymphocyte % : x  Auto Monocyte % : x  Auto Eosinophil % : x  Auto Basophil % : x    03-11    141  |  105  |  13  ----------------------------<  133<H>  4.1   |  26  |  <0.5<L>    Ca    8.0<L>      11 Mar 2021 23:11  Phos  3.5     03-11  Mg     1.8     03-11      Imaging:    Assessment/Plan:          S/p Left Minimally invasive craniotomy for evacuation of IPH with placement of EVD. Covid(-) on 3/10/21.     -care per CCT  -ID to clarify length of abx treatment  -IR consult for PICC line  -will need rehab placement   -SW discussed placement w/ daughter, possibly to SNV if bed available    d/w attending

## 2021-03-12 NOTE — PROGRESS NOTE ADULT - ASSESSMENT
Assessment and Plan:     75y Female s/p craniotomy for left frontal hematoma with evacuation and EVD placement. Intubated and sedated. SICU admission for hemodynamic monitoring.     NEURO:  Off all sedation  Pain controlled with Tylenol via G tube  Keppra 750 mg q 12  EVD removed 3/2   24hr EEG: 3/3 no seizure activity  Q 4hr neurochecks   2/25 CTH- ventricular drain in 3rd ventricle, decr size of lateral ventricles, scattered SAH and layering IVH in posterior horns of  lateral ventricle slightly increased  2/28 rpt CTH - stable- L frontal hemoatom cavity 4.5 cm, slighlty decr amount of blood, interval decr in pneumocephalus, L transfrontal drain tip in the third ventricle   3/1: CT sinus-prelim- no evidence of inflammatory/infectious diseases of paransal sinuses     RESP:   Resp Failure s/p bedside trach 3/6/21, PSV 10/5 tolerating 12 hours  Trach: /16/30/5  AM ABG: ////%              ,    AM CXR  3/1: CT chest -mucus plug L lower lobe bronchus, mod bilat pleural effusions w/ areas of atlectasis in both lower lobes. tree-in-bud opacities in posterior aspect of R upper lobe  +aspergillus  Keep HOB elevated at 30 degrees  3/11: follow up covid swab    CARDS:   Acute on chronic HTN        - home Lisinopril to 20mg       - labetalol 400 q8h        - rescheduled lisinopril dose s/p hypotension when giving both  H/o CAD s/p stents - ASA  Echo (10/13/20): EF 55-60%, trace MR, R atrial echodensity possible thrombus  Trop 0.24 --> 0.21->0.15, no longer trending    GI/NUTR:   S/p bedside PEG  Diet: TF via PEG - Glucerna 1.2 bolus TF  GI Prophylaxis- PPI  Bowel regimen- Senna  Digni shield    /RENAL:   Monitor UO-roblero in place  IVL  Na goal 135-145   BUN/Cr- 14/<0.5 -> 13/0.5  Na 141/ K 4.1/ Mg 1.8/ 3.0     HEME/ONC:   DVT prophylaxis: heparin   Injectable 5000 every 8 hours  Hb/Hct:  8.0/25.4> 8.2/27> 8.4/27.2 > 8.4/27 > 9.0/29.3 > 8.8/28.5 -> 8.5/27.8  Plts:  144>158 > 197 >180 > 199 > 193 > 198 >171  Last T&S: 3/5   (02/25) INR 1.85, nsx rec 1 FFP and 10 vit K, INR 1.49 (2/26) >1.27   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  Heme/Onc consult: transfuse platelets if < 100, give vit K for elevated INR  s/p 1 FFP & 10 vit K on 2/26 for INR 1.9     ID:  Immunocompromised  afebrile  WBC- 7.19> 6.85> 7.27> 5.17 > 4.84  > 4.4 > 4.39  Antibioitics: Voriconazole  Al  Follow up LFTs    Cultures:     -UA (2/28): negative     -CSF with Gram Stain  (02.26.21 @ 15:20): No growth at 3 days.     -CSF (fungal) (collected 02-26): neg     -CSF with Gram Stain (collected 02-27): negative    -Bronchial (collected 02-27):+aspergillus    -fungal CSF cultures (3/2)- neg    -Apergillus galactaman antigen    -blood cx 3/4, 3/5: NGTD  -3/11 Covid swab for transfer    ENDO:  Hx of DM, HA1C 6.3  - restarted home metformin 500mg q 12 hr  on ISS, FS q 4    GOC: palliative care following, full code  -IR c/s for PICC line placement for transfer    DISPO:    SICU possible downgrade to vent unit  -still no vent unit bed  -Follow up social work for d/c planning --> possible SNIF vs LTAC if daughter agrees   Assessment and Plan:     75y Female s/p craniotomy for left frontal hematoma with evacuation and EVD placement. Intubated and sedated. SICU admission for hemodynamic monitoring.     NEURO:  Off all sedation  Pain controlled with Tylenol via G tube  Keppra 750 mg q 12  EVD removed 3/2   24hr EEG: 3/3 no seizure activity  Q 4hr neurochecks   2/25 CTH- ventricular drain in 3rd ventricle, decr size of lateral ventricles, scattered SAH and layering IVH in posterior horns of  lateral ventricle slightly increased  2/28 rpt CTH - stable- L frontal hemoatom cavity 4.5 cm, slighlty decr amount of blood, interval decr in pneumocephalus, L transfrontal drain tip in the third ventricle   3/1: CT sinus-prelim- no evidence of inflammatory/infectious diseases of paransal sinuses     RESP:   Resp Failure s/p bedside trach 3/6/21, PSV 10/5 tolerating 12 hours  Trach: /16/30/5  AM ABG: ////%              ,    AM CXR  3/1: CT chest -mucus plug L lower lobe bronchus, mod bilat pleural effusions w/ areas of atlectasis in both lower lobes. tree-in-bud opacities in posterior aspect of R upper lobe  +aspergillus  Keep HOB elevated at 30 degrees  3/11: covid negative     CARDS:   Acute on chronic HTN        - home Lisinopril to 20mg       - labetalol 400 q8h        - rescheduled lisinopril dose s/p hypotension when giving both  H/o CAD s/p stents - ASA  Echo (10/13/20): EF 55-60%, trace MR, R atrial echodensity possible thrombus  Trop 0.24 --> 0.21->0.15, no longer trending  IR today for PICC line.     GI/NUTR:   S/p bedside PEG  Diet: TF via PEG - Glucerna 1.2 bolus TF  GI Prophylaxis- PPI  Bowel regimen- Senna  Digni shield    /RENAL:   Monitor UO-roblero in place  IVL  Na goal 135-145   BUN/Cr- 14/<0.5 -> 13/0.5  Na 141/ K 4.1/ Mg 1.8/ 3.0   Lasix 40 x 1, follow up bmp     HEME/ONC:   DVT prophylaxis: heparin   Injectable 5000 every 8 hours  Hb/Hct:  8.0/25.4> 8.2/27> 8.4/27.2 > 8.4/27 > 9.0/29.3 > 8.8/28.5 -> 8.5/27.8  Plts:  144>158 > 197 >180 > 199 > 193 > 198 >171  Last T&S: 3/5   (02/25) INR 1.85, nsx rec 1 FFP and 10 vit K, INR 1.49 (2/26) >1.27   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  Heme/Onc consult: transfuse platelets if < 100, give vit K for elevated INR  s/p 1 FFP & 10 vit K on 2/26 for INR 1.9     ID:  Immunocompromised  afebrile  WBC- 7.19> 6.85> 7.27> 5.17 > 4.84  > 4.4 > 4.39  Antibioitics: Voriconazole  Al  Follow up LFTs  Small sacral stage 2    Cultures:     -UA (2/28): negative     -CSF with Gram Stain  (02.26.21 @ 15:20): No growth at 3 days.     -CSF (fungal) (collected 02-26): neg     -CSF with Gram Stain (collected 02-27): negative    -Bronchial (collected 02-27):+aspergillus    -fungal CSF cultures (3/2)- neg    -Apergillus galactaman antigen    -blood cx 3/4, 3/5: NGTD  -3/11 Covid swab for transfer    ENDO:  Hx of DM, HA1C 6.3  - restarted home metformin 500mg q 12 hr  on ISS, FS q 4    GOC: palliative care following, full code  -IR c/s for PICC line placement for transfer    DISPO:    SICU possible downgrade to vent unit  -still no vent unit bed  -Follow up social work for d/c planning --> possible SNIF vs LTAC if daughter agrees   Assessment and Plan:     75y Female s/p craniotomy for left frontal hematoma with evacuation and EVD placement. Intubated and sedated. SICU admission for hemodynamic monitoring.     NEURO:  Off all sedation  Pain controlled with Tylenol via G tube  Keppra 750 mg q 12  EVD removed 3/2   24hr EEG: 3/3 no seizure activity  Q 4hr neurochecks   2/25 CTH- ventricular drain in 3rd ventricle, decr size of lateral ventricles, scattered SAH and layering IVH in posterior horns of  lateral ventricle slightly increased  2/28 rpt CTH - stable- L frontal hemoatom cavity 4.5 cm, slighlty decr amount of blood, interval decr in pneumocephalus, L transfrontal drain tip in the third ventricle   3/1: CT sinus-prelim- no evidence of inflammatory/infectious diseases of paransal sinuses     RESP:   Resp Failure s/p bedside trach 3/6/21, PSV 10/5 tolerating 12 hours  Trach: /16/30/5  AM ABG: ////%              ,    AM CXR  3/1: CT chest -mucus plug L lower lobe bronchus, mod bilat pleural effusions w/ areas of atlectasis in both lower lobes. tree-in-bud opacities in posterior aspect of R upper lobe  +aspergillus  Keep HOB elevated at 30 degrees  3/11: covid negative     CARDS:   Acute on chronic HTN        - home Lisinopril to 20mg       - labetalol 400 q8h        - rescheduled lisinopril dose s/p hypotension when giving both  H/o CAD s/p stents - ASA  Echo (10/13/20): EF 55-60%, trace MR, R atrial echodensity possible thrombus  Trop 0.24 --> 0.21->0.15, no longer trending  IR today for PICC line.     GI/NUTR:   S/p bedside PEG  Diet: TF via PEG - Glucerna 1.2 bolus TF  GI Prophylaxis- PPI  Bowel regimen- Senna  Digni shield    /RENAL:   Monitor UO-roblero in place  IVL  Na goal 135-145   BUN/Cr- 14/<0.5 -> 13/0.5  Na 141/ K 4.1/ Mg 1.8/ 3.0   Lasix 40 x 1, follow up bmp     HEME/ONC:   DVT prophylaxis: heparin   Injectable 5000 every 8 hours  Hb/Hct:  8.0/25.4> 8.2/27> 8.4/27.2 > 8.4/27 > 9.0/29.3 > 8.8/28.5 -> 8.5/27.8  Plts:  144>158 > 197 >180 > 199 > 193 > 198 >171  Last T&S: 3/5   (02/25) INR 1.85, nsx rec 1 FFP and 10 vit K, INR 1.49 (2/26) >1.27   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  Heme/Onc consult: transfuse platelets if < 100, give vit K for elevated INR  s/p 1 FFP & 10 vit K on 2/26 for INR 1.9     ID:  Immunocompromised  afebrile  WBC- 7.19> 6.85> 7.27> 5.17 > 4.84  > 4.4 > 4.39  Antibioitics: Voriconazole  Al  Follow up LFTs  Small sacral stage 2    Cultures:     -UA (2/28): negative     -CSF with Gram Stain  (02.26.21 @ 15:20): No growth at 3 days.     -CSF (fungal) (collected 02-26): neg     -CSF with Gram Stain (collected 02-27): negative    -Bronchial (collected 02-27):+aspergillus    -fungal CSF cultures (3/2)- neg    -Apergillus galactaman antigen    -blood cx 3/4, 3/5: NGTD      ENDO:  Hx of DM, HA1C 6.3  - restarted home metformin 500mg q 12 hr  on ISS, FS q 4    GOC: palliative care following, full code  -IR c/s for PICC line placement for transfer    DISPO:    SICU possible downgrade to vent unit  -still no vent unit bed  -Follow up social work for d/c planning --> possible SNIF vs LTAC if daughter agrees

## 2021-03-12 NOTE — PROGRESS NOTE ADULT - ATTENDING COMMENTS
Patient looks more awake this am.  Does not follow commands.  Was on PSV yesterday, AC overnight    ASSESSMENT:  74 y/o female with Left Intracerebral Hemorrhage.  S/P Left Craniotomy. EVD placement.  Cerebral edema.  Acute respiratory failure with hypoxia.  HTN.  B Cell Lymphoma.  DM  Dysphagia.  Aspergillosis.    PLAN:  - pain control prn  - continue Vent support, alternate with PSV; follow ABG  - keep normotensive; give Lasix 40 mg iv today  - continue bolus PEG tube feeds; bowel regiment  - follow serum electrolytes and UOP  - ID: on Voriconazole for Aspergillosis   - GI and DVT prophylaxis .

## 2021-03-12 NOTE — CHART NOTE - NSCHARTNOTEFT_GEN_A_CORE
Registered Dietitian Follow-Up     Patient Profile Reviewed                           Yes [x]   No []     Nutrition History Previously Obtained        Yes []  No [x]  assessment completed remotely; no response from emergency contact + only one contact # per chart     Pertinent Subjective Information: Pt continues with PEG feeds @ goal. No noted GI s/s of intolerance at this time.      Pertinent Medical Interventions: Pt sp L minimally invasive craniotomy for evacuation of IPH with placement of EVD; also sp trach and PEG. IR c/s for PICC line. Downgrade to vent unit pending bed avail.      Diet order: Diet, NPO with Tube Feed:   Tube Feeding Modality: Gastrostomy  Glucerna 1.2 Jerry  Total Volume for 24 Hours (mL): 1440  Bolus  Total Volume of Bolus (mL):  360  Tube Feed Frequency: Every 6 hours   Tube Feed Start Time: 12:00  Bolus Feed Rate (mL per Hour): 360   Bolus Feed Duration (in Hours): 1 (03-10-21 @ 10:28) [Active]  Regimen provides: 1728 kcal, 86 g protein and 1166 mL free H2O.     Anthropometrics:  Height: 63"  Weight (kg): 76 (21 @ 08:50)  BMI: 29.7  IBW: 52 kg    Daily Weight in k.3 (-), Weight in k.4 (), Weight in k (), Weight in k.3 (), Weight in k.6 ()  % Weight Change increased wt, likely r/t fluid shifts. Edema remains present.     MEDICATIONS  (STANDING):  aspirin  chewable 81 milliGRAM(s) Oral daily  atorvastatin 80 milliGRAM(s) Oral at bedtime  chlorhexidine 0.12% Liquid 15 milliLiter(s) Oral Mucosa two times a day  chlorhexidine 4% Liquid 1 Application(s) Topical daily  heparin   Injectable 5000 Unit(s) SubCutaneous every 8 hours  insulin regular  human corrective regimen sliding scale   SubCutaneous every 6 hours  labetalol 400 milliGRAM(s) Oral every 8 hours  levETIRAcetam  IVPB 750 milliGRAM(s) IV Intermittent every 12 hours  lisinopril 20 milliGRAM(s) Oral daily  metFORMIN 500 milliGRAM(s) Oral every 12 hours  pantoprazole   Suspension 40 milliGRAM(s) Oral daily  senna 2 Tablet(s) Oral at bedtime  voriconazole IVPB 300 milliGRAM(s) IV Intermittent every 12 hours    MEDICATIONS  (PRN):  acetaminophen   Tablet .. 650 milliGRAM(s) Oral every 6 hours PRN Moderate Pain (4 - 6)    Pertinent Labs:  RBC 3.22, H/H 8.5/27.8, Cr <0.5, glucose 133    Finger Sticks:  POCT Blood Glucose.: 155 mg/dL ( @ 05:35)  POCT Blood Glucose.: 137 mg/dL ( @ 23:20)  POCT Blood Glucose.: 127 mg/dL ( @ 18:44)  POCT Blood Glucose.: 166 mg/dL ( @ 13:12)    Physical Findings:  - Appearance: overweight/borderline obese; (3/12) +1 and +2 generalized edema, +3 edema (L arm), +4 edema (R arm)  - GI function: rectal tube in place; 200 mL output noted today (3/12). Pt remains on a bowel regimen.   - Tubes: trach/PEG  - Oral/Mouth cavity: NPO  - Skin: skin tear (R FA), stage II pressure ulcer (sacral spine)     Nutrition Requirements:  Weight Used: IBW 52 kg (continued from previous RD assessment)     Estimated Energy Needs    Continue [x]  8617-0462 kcal/d (30-35 kcal/kg IBW) for wound healing     Estimated Protein Needs    Continue [x]  73-83 g (1.4-1.6 g/kg IBW) same as above + significant difference between CBW/IBW considered     Estimated Fluid Needs        Continue [x]  per SICU     Nutrient Intake: EN regimen meeting % estimate energy requirements and 104-118% est protein requirements--- ok to meet upper end of ranges for optimal wound healing     [x] No active nutrition diagnosis identified at this time-- will f/u as at risk, to ensure pt tolerating PEG feeds as it was placed <1 week ago.      Nutrition Intervention EN, coordination of care     Goal/Expected Outcome: pt to continue to meet % est protein/energy requirements within the next 4 days     Indicator/Monitoring: RD to monitor diet order, energy intake, body composition, NFPF, glucose profile    Recommendation: continue with the current EN regimen as tolerated, maintain aspiration precautions.

## 2021-03-12 NOTE — PROGRESS NOTE ADULT - SUBJECTIVE AND OBJECTIVE BOX
AD BASURTO  [id number]  75y Female    Indication for ICU admission: s/p crani -evac of large hematoma (IPH)    Admit Date:02-24-21  ICU Date: 2/24/21  OR Date: 2/24/21    penicillin (Anaphylaxis; Hives)    PAST MEDICAL & SURGICAL HISTORY:  Anemia  DM (diabetes mellitus)  High cholesterol  HTN (hypertension)  B-cell chronic lymphocytic leukemia variant  CAD (coronary artery disease)  s/p stenting  H/O heart artery stent      Home Medications:  aspirin 81 mg oral tablet, chewable: 1 tab(s) orally once a day (24 Feb 2021 15:11)  atorvastatin 80 mg oral tablet: 1 tab(s) orally once a day (at bedtime) (24 Feb 2021 15:11)  Imbruvica 70 mg oral capsule: 2 cap(s) orally once a day (24 Feb 2021 15:11)  Levaquin 500 mg oral tablet: 1 tab(s) orally every 24 hours (24 Feb 2021 15:11)  lisinopril 10 mg oral tablet: 1 tab(s) orally once a day (24 Feb 2021 15:11)      24HRS EVENT:  NIGHT  -Mg 1.8, repleted w/ 2g      DAY:  -still no vent unit bed  -Follow up social work for d/c planning --> possible SNIF vs LTAC if daughter agrees  -IR c/s for PICC line placement for transfer  -Covid swab for transfer  -RUE DVT sono pending      DVT PTX: HSQ    GI PTX:pantoprazole  Injectable 40 milliGRAM(s) IV Push daily    Tubes/Lines/Drains   ----------------------------------------------------------------------------------------------------------  [x] Peripheral IV  [X] Central Venous Line         RIJ             Date Placed:  2/24  [X] Arterial Line		      Radial   Date Placed: 2/24  [X] Urinary Catheter Murry                                             Date Placed: 2/24     REVIEW OF SYSTEMS    [ ] A ten-point review of systems was otherwise negative except as noted.  [x ] Due to altered mental status/intubation, subjective information were not able to be obtained from the patient. History was         AD BASURTO  [id number]  75y Female    Indication for ICU admission: s/p crani -evac of large hematoma (IPH)    Admit Date:21  ICU Date: 21  OR Date: 21    penicillin (Anaphylaxis; Hives)    PAST MEDICAL & SURGICAL HISTORY:  Anemia  DM (diabetes mellitus)  High cholesterol  HTN (hypertension)  B-cell chronic lymphocytic leukemia variant  CAD (coronary artery disease)  s/p stenting  H/O heart artery stent      Home Medications:  aspirin 81 mg oral tablet, chewable: 1 tab(s) orally once a day (2021 15:11)  atorvastatin 80 mg oral tablet: 1 tab(s) orally once a day (at bedtime) (2021 15:11)  Imbruvica 70 mg oral capsule: 2 cap(s) orally once a day (2021 15:11)  Levaquin 500 mg oral tablet: 1 tab(s) orally every 24 hours (2021 15:11)  lisinopril 10 mg oral tablet: 1 tab(s) orally once a day (2021 15:11)      24HRS EVENT:  NIGHT  -Mg 1.8, repleted w/ 2g      DAY:  -still no vent unit bed  -Follow up social work for d/c planning --> possible SNIF vs LTAC if daughter agrees  -IR c/s for PICC line placement for transfer  -Covid swab for transfer  -RUE DVT sono pending      DVT PTX: HSQ    GI PTX:pantoprazole  Injectable 40 milliGRAM(s) IV Push daily    Tubes/Lines/Drains   ----------------------------------------------------------------------------------------------------------  [x] Peripheral IV  [X] Central Venous Line         RIJ             Date Placed:    [X] Arterial Line		      Radial   Date Placed:   [X] Urinary Catheter Murry                                             Date Placed:      REVIEW OF SYSTEMS    [ ] A ten-point review of systems was otherwise negative except as noted.  [x ] Due to altered mental status/intubation, subjective information were not able to be obtained from the patient. History was    Daily     Daily Weight in k.3 (12 Mar 2021 05:00)    Diet, NPO with Tube Feed:   Tube Feeding Modality: Gastrostomy  Glucerna 1.2 Jerry  Total Volume for 24 Hours (mL): 1440  Bolus  Total Volume of Bolus (mL):  360  Tube Feed Frequency: Every 6 hours   Tube Feed Start Time: 12:00  Bolus Feed Rate (mL per Hour): 360   Bolus Feed Duration (in Hours): 1 (03-10-21 @ 10:28)      CURRENT MEDS:  Neurologic Medications  acetaminophen   Tablet .. 650 milliGRAM(s) Oral every 6 hours PRN Moderate Pain (4 - 6)  levETIRAcetam  IVPB 750 milliGRAM(s) IV Intermittent every 12 hours    Respiratory Medications    Cardiovascular Medications  labetalol 400 milliGRAM(s) Oral every 8 hours  lisinopril 20 milliGRAM(s) Oral daily    Gastrointestinal Medications  pantoprazole   Suspension 40 milliGRAM(s) Oral daily  senna 2 Tablet(s) Oral at bedtime    Genitourinary Medications    Hematologic/Oncologic Medications  aspirin  chewable 81 milliGRAM(s) Oral daily  heparin   Injectable 5000 Unit(s) SubCutaneous every 8 hours    Antimicrobial/Immunologic Medications  voriconazole IVPB 300 milliGRAM(s) IV Intermittent every 12 hours    Endocrine/Metabolic Medications  atorvastatin 80 milliGRAM(s) Oral at bedtime  insulin regular  human corrective regimen sliding scale   SubCutaneous every 6 hours  metFORMIN 500 milliGRAM(s) Oral every 12 hours    Topical/Other Medications  chlorhexidine 0.12% Liquid 15 milliLiter(s) Oral Mucosa two times a day  chlorhexidine 4% Liquid 1 Application(s) Topical daily      ICU Vital Signs Last 24 Hrs  T(C): 37.1 (12 Mar 2021 12:00), Max: 37.4 (12 Mar 2021 04:00)  T(F): 98.7 (12 Mar 2021 12:00), Max: 99.4 (12 Mar 2021 04:00)  HR: 80 (12 Mar 2021 12:00) (74 - 86)  BP: 144/64 (12 Mar 2021 12:00) (84/45 - 165/79)  BP(mean): 92 (12 Mar 2021 12:00) (60 - 114)  ABP: 157/61 (12 Mar 2021 12:00) (100/41 - 185/79)  ABP(mean): 96 (12 Mar 2021 12:00) (60 - 122)  RR: 31 (12 Mar 2021 12:00) (22 - 92)  SpO2: 100% (12 Mar 2021 12:00) (94% - 100%)      Adult Advanced Hemodynamics Last 24 Hrs  CVP(mm Hg): --  CVP(cm H2O): --  CO: --  CI: --  PA: --  PA(mean): --  PCWP: --  SVR: --  SVRI: --  PVR: --  PVRI: --    Mode: AC/ CMV (Assist Control/ Continuous Mandatory Ventilation)  RR (machine): 14  TV (machine): 400  FiO2: 80  PEEP: 5    ABG - ( 12 Mar 2021 04:30 )  pH, Arterial: 7.46  pH, Blood: x     /  pCO2: 43    /  pO2: 188   / HCO3: 31    / Base Excess: 6.3   /  SaO2: 100                 I&O's Summary    11 Mar 2021 07:01  -  12 Mar 2021 07:00  --------------------------------------------------------  IN: 2670 mL / OUT: 2331 mL / NET: 339 mL    12 Mar 2021 07:  -  12 Mar 2021 13:46  --------------------------------------------------------  IN: 360 mL / OUT: 800 mL / NET: -440 mL      I&O's Detail    11 Mar 2021 07:01  -  12 Mar 2021 07:00  --------------------------------------------------------  IN:    Enteral Tube Flush: 320 mL    Glucerna 1.5: 1800 mL    IV PiggyBack: 50 mL    IV PiggyBack: 500 mL  Total IN: 2670 mL    OUT:    Indwelling Catheter - Urethral (mL): 1831 mL    Rectal Tube (mL): 500 mL  Total OUT: 2331 mL    Total NET: 339 mL      12 Mar 2021 07:01  -  12 Mar 2021 13:46  --------------------------------------------------------  IN:    Glucerna 1.5: 360 mL  Total IN: 360 mL    OUT:    Indwelling Catheter - Urethral (mL): 800 mL  Total OUT: 800 mL    Total NET: -440 mL          PHYSICAL EXAM:  General/Neuro  Alert  Not following commands  Localizes to pain  Pupils: reactive    Lungs:clear to auscultation, Normal expansion/effort.   Trach in place, no bleeding or drainage noted    Cardiovascular : S1, S2.  Regular rate and rhythm.  Peripheral edema   Cardiac Rhythm: Normal Sinus Rhythm    GI: Abdomen soft, Non-tender, Non-distended.    PEG tube in place    Extremities: Extremities warm, pink, well-perfused. Distal pulses intact. No peripheral edeam    Derm: Good skin turgor, no skin breakdown.      : Murry catheter in place.    LABS:  CAPILLARY BLOOD GLUCOSE      POCT Blood Glucose.: 156 mg/dL (12 Mar 2021 12:33)  POCT Blood Glucose.: 151 mg/dL (12 Mar 2021 11:56)  POCT Blood Glucose.: 155 mg/dL (12 Mar 2021 05:35)  POCT Blood Glucose.: 137 mg/dL (11 Mar 2021 23:20)  POCT Blood Glucose.: 127 mg/dL (11 Mar 2021 18:44)                          8.5    4.39  )-----------( 171      ( 11 Mar 2021 23:11 )             27.8       03-11    141  |  105  |  13  ----------------------------<  133<H>  4.1   |  26  |  <0.5<L>    Ca    8.0<L>      11 Mar 2021 23:11  Phos  3.5     11  Mg     1.8     11

## 2021-03-13 NOTE — CONSULT NOTE ADULT - ASSESSMENT
IMPRESSION:  L ICH s/p resection of hematoma and s/p EVD placement and removal  Aspergillus PNA  HO B cell lymphoma    PLAN:    CNS: Avoid CNS depressants. FU Neuro.     HEENT: Oral care    PULMONARY:  HOB @ 45 degrees.  Aspiration precautions. Trach care. Attempt PS today.      CARDIOVASCULAR: Avoid volume overload.     GI: GI prophylaxis. PEG feeding. Bowel regimen. Hold feeds for 24hrs and monitor BM's.     RENAL:  Follow up lytes.  Correct as needed    INFECTIOUS DISEASE: Follow up cultures. Procal.     HEMATOLOGICAL:  DVT prophylaxis.    ENDOCRINE:  Follow up FS.  Insulin protocol if needed.    MUSCULOSKELETAL: bedrest    Dispo- NH pending

## 2021-03-13 NOTE — PROGRESS NOTE ADULT - ASSESSMENT
Assessment and Plan:     75y Female s/p craniotomy for left frontal hematoma with evacuation and EVD placement. Intubated and sedated. SICU admission for hemodynamic monitoring.     NEURO:  Off all sedation  Pain controlled with Tylenol via G tube  Keppra 750 mg q 12  EVD removed 3/2   24hr EEG: 3/3 no seizure activity  Q 4hr neurochecks   2/25 CTH- ventricular drain in 3rd ventricle, decr size of lateral ventricles, scattered SAH and layering IVH in posterior horns of  lateral ventricle slightly increased  2/28 rpt CTH - stable- L frontal hemoatom cavity 4.5 cm, slighlty decr amount of blood, interval decr in pneumocephalus, L transfrontal drain tip in the third ventricle   3/1: CT sinus-prelim- no evidence of inflammatory/infectious diseases of paransal sinuses     RESP:   Resp Failure s/p bedside trach 3/6/21, PSV 10/5 tolerating 12 hours  Trach: /16/30/5  AM ABG: ////%              ,    AM CXR  3/1: CT chest -mucus plug L lower lobe bronchus, mod bilat pleural effusions w/ areas of atlectasis in both lower lobes. tree-in-bud opacities in posterior aspect of R upper lobe  +aspergillus  Keep HOB elevated at 30 degrees  3/11: covid negative     CARDS:   Acute on chronic HTN        - home Lisinopril to 20mg       - labetalol 400 q8h        - rescheduled lisinopril dose s/p hypotension when giving both  H/o CAD s/p stents - ASA  Echo (10/13/20): EF 55-60%, trace MR, R atrial echodensity possible thrombus  Trop 0.24 --> 0.21->0.15, no longer trending  IR today for PICC line.     GI/NUTR:   S/p bedside PEG  Diet: TF via PEG - Glucerna 1.2 bolus TF  GI Prophylaxis- PPI  Bowel regimen- Senna  Digni shield    /RENAL:   Monitor UO-roblero in place  IVL  Na goal 135-145   BUN/Cr- 14/<0.5 -> 13/0.5  Na 141/ K 4.1/ Mg 1.8/ 3.0   Lasix 40 x 1, follow up bmp     HEME/ONC:   DVT prophylaxis: heparin   Injectable 5000 every 8 hours  Hb/Hct:  8.0/25.4> 8.2/27> 8.4/27.2 > 8.4/27 > 9.0/29.3 > 8.8/28.5 -> 8.5/27.8  Plts:  144>158 > 197 >180 > 199 > 193 > 198 >171  Last T&S: 3/5   (02/25) INR 1.85, nsx rec 1 FFP and 10 vit K, INR 1.49 (2/26) >1.27   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  Heme/Onc consult: transfuse platelets if < 100, give vit K for elevated INR  s/p 1 FFP & 10 vit K on 2/26 for INR 1.9   UE negative for DVT.     ID:  Immunocompromised  afebrile  WBC- 7.19> 6.85> 7.27> 5.17 > 4.84  > 4.4 > 4.39  Antibioitics: Voriconazole  Al  Follow up LFTs  Small sacral stage 2    Cultures:     -UA (2/28): negative     -CSF with Gram Stain  (02.26.21 @ 15:20): No growth at 3 days.     -CSF (fungal) (collected 02-26): neg     -CSF with Gram Stain (collected 02-27): negative    -Bronchial (collected 02-27):+aspergillus    -fungal CSF cultures (3/2)- neg    -Apergillus galactaman antigen    -blood cx 3/4, 3/5: NGTD  -3/11 Covid swab for transfer    ENDO:  Hx of DM, HA1C 6.3  - restarted home metformin 500mg q 12 hr  on ISS, FS q 4    GOC: palliative care following, full code  -IR c/s for PICC line placement for transfer    DISPO:    SICU possible downgrade to vent unit  -Has bed on 3E  -Follow up social work for d/c planning --> possible SNIF vs LTAC if daughter agrees

## 2021-03-13 NOTE — CONSULT NOTE ADULT - SUBJECTIVE AND OBJECTIVE BOX
Patient is a 75y old  Female who presents with a chief complaint of altered mental status (13 Mar 2021 05:23)      HPI:  75 years old female from home with PMHx of B cell lymphoma on Imbruvica (since 10/2020), CAD s/p stents 15 years ago, DM, chronic UTI, HTN, DLD, brought in by ambulance due to altered mental status and hypertension. Patient was AAO x4 and fully functional at baseline. Last known normal was 11:00pm 2/23 before beds. In 2/24 at 8:45AM, patient was found to be lethargy in bed by his son, was AAO x 4 and able to talk to his son, but started coughing and had one episode of nose bleed. At 9:15, her physical therapist noticed her becoming more lethargic, but vitals were normal. Soon at 9:30, patient was unresponsive and had a blank stare. EMS was called and her BP was found to be around 230/120. She was rushed to ED as Code Stroke. NIHSS 23. CTH showed Large left frontal intraparenchymal hematoma with intraventricular extension into left lateral ventricle, associated with marked mass effect resulting in 0.9 cm midline shift to the right anteriorly. Patient was intubated for airway protection. S/p 2 units of platelet for ASA reversal. Neurosurgery was planning for emergent OR resection of hematoma.      (24 Feb 2021 13:43)      PAST MEDICAL & SURGICAL HISTORY:  Anemia    DM (diabetes mellitus)    High cholesterol    HTN (hypertension)    B-cell chronic lymphocytic leukemia variant    CAD (coronary artery disease)  s/p stenting    H/O heart artery stent        SOCIAL HX:   Smoking    UTO                     ETOH     UTO                       Other    FAMILY HISTORY:  Family history of diabetes mellitus (DM)        REVIEW OF SYSTEMS      General:	    Skin/Breast:  	  Ophthalmologic:  	  ENMT:	    Respiratory and Thorax:  	  Cardiovascular:	    Gastrointestinal:	    Genitourinary:	    Musculoskeletal:	    Neurological:	    Psychiatric:	    Hematology/Lymphatics:	    Endocrine:	    Allergic/Immunologic:	    Allergies    penicillin (Anaphylaxis; Hives)    Intolerances          PHYSICAL EXAM  Vital Signs Last 24 Hrs  T(C): 36.1 (13 Mar 2021 07:00), Max: 37.1 (12 Mar 2021 12:00)  T(F): 96.9 (13 Mar 2021 07:00), Max: 98.7 (12 Mar 2021 12:00)  HR: 81 (13 Mar 2021 07:00) (74 - 86)  BP: 112/51 (13 Mar 2021 07:00) (90/46 - 165/79)  BP(mean): 74 (13 Mar 2021 07:00) (65 - 114)  RR: 20 (13 Mar 2021 07:00) (17 - 92)  SpO2: 99% (13 Mar 2021 04:45) (94% - 100%)        LABS:                          8.5    5.53  )-----------( 171      ( 12 Mar 2021 23:33 )             27.2                                               03-12    139  |  103  |  15  ----------------------------<  127<H>  4.2   |  30  |  <0.5<L>    Ca    8.2<L>      12 Mar 2021 23:33  Phos  3.4     03-12  Mg     1.9     03-12                                                                                                                                                                                ABG - ( 13 Mar 2021 04:13 )  pH, Arterial: 7.47  pH, Blood: x     /  pCO2: 45    /  pO2: 100   / HCO3: 33    / Base Excess: 8.3   /  SaO2: 99                  MEDICATIONS  (STANDING):  aspirin  chewable 81 milliGRAM(s) Oral daily  atorvastatin 80 milliGRAM(s) Oral at bedtime  chlorhexidine 0.12% Liquid 15 milliLiter(s) Oral Mucosa two times a day  chlorhexidine 4% Liquid 1 Application(s) Topical daily  heparin   Injectable 5000 Unit(s) SubCutaneous every 8 hours  insulin regular  human corrective regimen sliding scale   SubCutaneous every 6 hours  labetalol 400 milliGRAM(s) Oral every 8 hours  levETIRAcetam  IVPB 750 milliGRAM(s) IV Intermittent every 12 hours  lisinopril 20 milliGRAM(s) Oral daily  magnesium sulfate  IVPB 1 Gram(s) IV Intermittent once  metFORMIN 500 milliGRAM(s) Oral every 12 hours  pantoprazole   Suspension 40 milliGRAM(s) Oral daily  senna 2 Tablet(s) Oral at bedtime  voriconazole IVPB 300 milliGRAM(s) IV Intermittent every 12 hours    MEDICATIONS  (PRN):  acetaminophen   Tablet .. 650 milliGRAM(s) Oral every 6 hours PRN Moderate Pain (4 - 6)

## 2021-03-13 NOTE — PROGRESS NOTE ADULT - SUBJECTIVE AND OBJECTIVE BOX
Subjective: 75yFemale with a pmhx of CVA (CEREBRAL VASCULAR ACCIDENT);INTRACRANIAL BLEED    ^CVA (CEREBRAL VASCULAR ACCIDENT);INTRACRANIAL BLEED    Family history of diabetes mellitus (DM)    No pertinent family history in first degree relatives    Handoff    MEWS Score    Anemia    DM (diabetes mellitus)    High cholesterol    HTN (hypertension)    B-cell chronic lymphocytic leukemia variant    CAD (coronary artery disease)    CVA (cerebral vascular accident)    Altered mental status    Palliative care encounter    Intracranial bleed    Craniotomy for intracerebral hemorrhage    H/O heart artery stent    No significant past surgical history    AMS    90+    Intracranial bleed    [email or web address]        POD# 17  S/p Left Minimally invasive craniotomy for evacuation of IPH with placement of EVD     Pt seen and examined at bedside with Dr Rodriguez.   Pt is trach'd , PEG.  Pt is arousable to loud voice. Opens eyes. Does not follow commands.     Allergies    penicillin (Anaphylaxis; Hives)    Intolerances        Vital Signs Last 24 Hrs  T(C): 36.1 (13 Mar 2021 07:00), Max: 37.1 (12 Mar 2021 12:00)  T(F): 96.9 (13 Mar 2021 07:00), Max: 98.7 (12 Mar 2021 12:00)  HR: 81 (13 Mar 2021 07:00) (75 - 84)  BP: 112/51 (13 Mar 2021 07:00) (90/46 - 144/64)  BP(mean): 74 (13 Mar 2021 07:00) (65 - 92)  RR: 20 (13 Mar 2021 07:00) (17 - 31)  SpO2: 99% (13 Mar 2021 04:45) (99% - 100%)      acetaminophen   Tablet .. 650 milliGRAM(s) Oral every 6 hours PRN  aspirin  chewable 81 milliGRAM(s) Oral daily  atorvastatin 80 milliGRAM(s) Oral at bedtime  chlorhexidine 0.12% Liquid 15 milliLiter(s) Oral Mucosa two times a day  chlorhexidine 4% Liquid 1 Application(s) Topical daily  heparin   Injectable 5000 Unit(s) SubCutaneous every 8 hours  insulin regular  human corrective regimen sliding scale   SubCutaneous every 6 hours  labetalol 400 milliGRAM(s) Oral every 8 hours  levETIRAcetam  IVPB 750 milliGRAM(s) IV Intermittent every 12 hours  lisinopril 20 milliGRAM(s) Oral daily  magnesium sulfate  IVPB 1 Gram(s) IV Intermittent once  metFORMIN 500 milliGRAM(s) Oral every 12 hours  pantoprazole   Suspension 40 milliGRAM(s) Oral daily  senna 2 Tablet(s) Oral at bedtime  voriconazole IVPB 300 milliGRAM(s) IV Intermittent every 12 hours        03-12-21 @ 07:01  -  03-13-21 @ 07:00  --------------------------------------------------------  IN: 1430 mL / OUT: 2875 mL / NET: -1445 mL        REVIEW OF SYSTEMS    [ ] A ten-point review of systems was otherwise negative except as noted.  [x ] Due to altered mental status/intubation, subjective information were not able to be obtained from the patient. History was obtained, to the extent possible, from review of the chart and collateral sources of information.        Neuro Exam:  Trached , + PEG  Opens eyes to loud voice, shows recognition  grimaces to pain  not following commands  +  L hand, purposeful movt LUE  moves RUE spontaneously  Withdraws b/l LE's to noxious stimuli   incision clean dry intact     Wound: staples intact    CBC Full  -  ( 12 Mar 2021 23:33 )  WBC Count : 5.53 K/uL  RBC Count : 3.17 M/uL  Hemoglobin : 8.5 g/dL  Hematocrit : 27.2 %  Platelet Count - Automated : 171 K/uL  Mean Cell Volume : 85.8 fL  Mean Cell Hemoglobin : 26.8 pg  Mean Cell Hemoglobin Concentration : 31.3 g/dL  Auto Neutrophil # : x  Auto Lymphocyte # : x  Auto Monocyte # : x  Auto Eosinophil # : x  Auto Basophil # : x  Auto Neutrophil % : x  Auto Lymphocyte % : x  Auto Monocyte % : x  Auto Eosinophil % : x  Auto Basophil % : x    03-12    139  |  103  |  15  ----------------------------<  127<H>  4.2   |  30  |  <0.5<L>    Ca    8.2<L>      12 Mar 2021 23:33  Phos  3.4     03-12  Mg     1.9     03-12              Assessment/Plan:   PICC line placed  spoke w ID Dr Wang- pt to cont Voriconazole for total 4 wks  SW for rehab placement   d/w attending

## 2021-03-13 NOTE — PROGRESS NOTE ADULT - SUBJECTIVE AND OBJECTIVE BOX
AD BASURTO  [id number]  75y Female    Indication for ICU admission: s/p crani -evac of large hematoma (IPH)    Admit Date:02-24-21  ICU Date: 2/24/21  OR Date: 2/24/21    penicillin (Anaphylaxis; Hives)    PAST MEDICAL & SURGICAL HISTORY:  Anemia  DM (diabetes mellitus)  High cholesterol  HTN (hypertension)  B-cell chronic lymphocytic leukemia variant  CAD (coronary artery disease)  s/p stenting  H/O heart artery stent      Home Medications:  aspirin 81 mg oral tablet, chewable: 1 tab(s) orally once a day (24 Feb 2021 15:11)  atorvastatin 80 mg oral tablet: 1 tab(s) orally once a day (at bedtime) (24 Feb 2021 15:11)  Imbruvica 70 mg oral capsule: 2 cap(s) orally once a day (24 Feb 2021 15:11)  Levaquin 500 mg oral tablet: 1 tab(s) orally every 24 hours (24 Feb 2021 15:11)  lisinopril 10 mg oral tablet: 1 tab(s) orally once a day (24 Feb 2021 15:11)      24HRS EVENT:  3/12  NIGHT  -has bed in 3E, BP dropped when moving into bed, gave 500 cc fluid and held AM labetolol    DAY   - IR today  -lasix 40 x1, bmp   -small sacral stage 2; debridged at bedside   -PS 10/5  UE negative for DVT.       DVT PTX: HSQ    GI PTX:pantoprazole  Injectable 40 milliGRAM(s) IV Push daily    Tubes/Lines/Drains   ----------------------------------------------------------------------------------------------------------  [x] Peripheral IV  [X] Central Venous Line         RIJ             Date Placed:  2/24  [X] Arterial Line		      Radial   Date Placed: 2/24  [X] Urinary Catheter Murry                                             Date Placed: 2/24     REVIEW OF SYSTEMS    [ ] A ten-point review of systems was otherwise negative except as noted.  [x ] Due to altered mental status/intubation, subjective information were not able to be obtained from the patient. History was       AD BASURTO  [id number]  75y Female    Indication for ICU admission: s/p crani -evac of large hematoma (IPH)    Admit Date:02-24-21  ICU Date: 2/24/21  OR Date: 2/24/21    penicillin (Anaphylaxis; Hives)    PAST MEDICAL & SURGICAL HISTORY:  Anemia  DM (diabetes mellitus)  High cholesterol  HTN (hypertension)  B-cell chronic lymphocytic leukemia variant  CAD (coronary artery disease)  s/p stenting  H/O heart artery stent      Home Medications:  aspirin 81 mg oral tablet, chewable: 1 tab(s) orally once a day (24 Feb 2021 15:11)  atorvastatin 80 mg oral tablet: 1 tab(s) orally once a day (at bedtime) (24 Feb 2021 15:11)  Imbruvica 70 mg oral capsule: 2 cap(s) orally once a day (24 Feb 2021 15:11)  Levaquin 500 mg oral tablet: 1 tab(s) orally every 24 hours (24 Feb 2021 15:11)  lisinopril 10 mg oral tablet: 1 tab(s) orally once a day (24 Feb 2021 15:11)      24HRS EVENT:  3/12  NIGHT  -has bed in 3E, BP dropped when moving into bed, gave 500 cc fluid and held AM labetolol  -patient monitored for BP, appropriate repsonse with fluid, monitored prior to transfer    DAY   - IR today  -lasix 40 x1, bmp   -small sacral stage 2; debridged at bedside   -PS 10/5  UE negative for DVT.       DVT PTX: HSQ    GI PTX:pantoprazole  Injectable 40 milliGRAM(s) IV Push daily    Tubes/Lines/Drains   ----------------------------------------------------------------------------------------------------------  [x] Peripheral IV  [X] Central Venous Line         RIJ             Date Placed:  2/24  [X] Arterial Line		      Radial   Date Placed: 2/24  [X] Urinary Catheter Murry                                             Date Placed: 2/24     REVIEW OF SYSTEMS    [ ] A ten-point review of systems was otherwise negative except as noted.  [x ] Due to altered mental status/intubation, subjective information were not able to be obtained from the patient. History was

## 2021-03-14 NOTE — CHART NOTE - NSCHARTNOTEFT_GEN_A_CORE
A nutrition consult was ordered (3/13) due to decreased PO intake and is acknowledged. Of note, the patient currently receives enteral nutrition support. In addition, a nutritional assessment note was completed (3/12) by the registered dietitian Janny Turner. Please refer to her nutritional note for specification and recommendations.

## 2021-03-14 NOTE — PROGRESS NOTE ADULT - ASSESSMENT
IMPRESSION:  L ICH s/p resection of hematoma and s/p EVD placement and removal  Aspergillus PNA  HO B cell lymphoma    PLAN:    CNS: Avoid CNS depressants. FU Neuro.     HEENT: Oral care    PULMONARY:  HOB @ 45 degrees.  Aspiration precautions. Trach care.    CARDIOVASCULAR: Avoid volume overload.     GI: GI prophylaxis. PEG feeding.    RENAL:  Follow up lytes.  Correct as needed    INFECTIOUS DISEASE: Follow up cultures. Procal.     HEMATOLOGICAL:  DVT prophylaxis.    ENDOCRINE:  Follow up FS.  Insulin protocol if needed.    MUSCULOSKELETAL: bedrest    Dispo- NH pending

## 2021-03-14 NOTE — PROGRESS NOTE ADULT - SUBJECTIVE AND OBJECTIVE BOX
Patient is a 75y old  Female who presents with a chief complaint of altered mental status (13 Mar 2021 11:02)    Over Night Events: afebrile overnight, decreased diarrhea from prior    ROS:   All pertinent ROS are negative except HPI     PHYSICAL EXAM  ICU Vital Signs Last 24 Hrs  T(C): 35.9 (14 Mar 2021 05:25), Max: 36.9 (13 Mar 2021 21:48)  T(F): 96.6 (14 Mar 2021 05:25), Max: 98.5 (13 Mar 2021 21:48)  HR: 77 (14 Mar 2021 05:25) (77 - 99)  BP: 131/61 (14 Mar 2021 05:25) (115/59 - 144/67)  BP(mean): 82 (13 Mar 2021 21:48) (82 - 96)  RR: 20 (14 Mar 2021 05:25) (20 - 24)  SpO2: 98% (14 Mar 2021 05:25) (97% - 99%)      CONSTITUTIONAL:  Chronically Ill appearing.  Well nourished.  NAD    ENT:   Airway patent,   Mouth with normal mucosa.   No thrush  s/p Trach    EYES:   Pupils equal,   Round and reactive to light.    CARDIAC:   Normal rate,   Regular rhythm.    generalized edema    Vascular:  Normal systolic impulse  No Carotid bruits    RESPIRATORY:   No wheezing  Bilateral BS  Normal chest expansion  Not tachypneic,  No use of accessory muscles    GASTROINTESTINAL:  Abdomen soft,   Non-tender,   No guarding,   + BS    MUSCULOSKELETAL:   Range of motion is not limited,  No clubbing, cyanosis    NEUROLOGICAL:   Alert  Spontaneous eye opening  Right weakness    SKIN:   Skin normal color for race,   Warm and dry  No evidence of rash.  Stage II sacral ulcer    PSYCHIATRIC:   No apparent risk to self or others.      03-13-21 @ 06:01  -  03-14-21 @ 07:00  --------------------------------------------------------  IN:    dextrose 5% + sodium chloride 0.9%: 525 mL    IV PiggyBack: 250 mL    IV PiggyBack: 100 mL  Total IN: 875 mL    OUT:    Indwelling Catheter - Urethral (mL): 1750 mL    Rectal Tube (mL): 200 mL  Total OUT: 1950 mL    Total NET: -1075 mL          LABS:                            8.5    5.53  )-----------( 171      ( 12 Mar 2021 23:33 )             27.2                                               03-12    139  |  103  |  15  ----------------------------<  127<H>  4.2   |  30  |  <0.5<L>    Ca    8.2<L>      12 Mar 2021 23:33  Phos  3.4     03-12  Mg     1.9     03-12    Mode: AC/ CMV (Assist Control/ Continuous Mandatory Ventilation)  RR (machine): 14  TV (machine): 400  FiO2: 35  PEEP: 5  ITime: 1  MAP: 9  PIP: 17    ABG - ( 13 Mar 2021 04:13 )  pH, Arterial: 7.47  pH, Blood: x     /  pCO2: 45    /  pO2: 100   / HCO3: 33    / Base Excess: 8.3   /  SaO2: 99                  MEDICATIONS  (STANDING):  aspirin  chewable 81 milliGRAM(s) Oral daily  atorvastatin 80 milliGRAM(s) Oral at bedtime  chlorhexidine 0.12% Liquid 15 milliLiter(s) Oral Mucosa two times a day  chlorhexidine 4% Liquid 1 Application(s) Topical daily  dextrose 5% + sodium chloride 0.9%. 1000 milliLiter(s) (75 mL/Hr) IV Continuous <Continuous>  heparin   Injectable 5000 Unit(s) SubCutaneous every 8 hours  insulin regular  human corrective regimen sliding scale   SubCutaneous every 6 hours  labetalol 400 milliGRAM(s) Oral every 8 hours  levETIRAcetam  IVPB 750 milliGRAM(s) IV Intermittent every 12 hours  lisinopril 20 milliGRAM(s) Oral daily  metFORMIN 500 milliGRAM(s) Oral every 12 hours  pantoprazole   Suspension 40 milliGRAM(s) Oral daily  senna 2 Tablet(s) Oral at bedtime  voriconazole IVPB 300 milliGRAM(s) IV Intermittent every 12 hours    MEDICATIONS  (PRN):  acetaminophen   Tablet .. 650 milliGRAM(s) Oral every 6 hours PRN Moderate Pain (4 - 6)

## 2021-03-14 NOTE — PROGRESS NOTE ADULT - SUBJECTIVE AND OBJECTIVE BOX
Subjective: 75yFemale with a pmhx of CVA (CEREBRAL VASCULAR ACCIDENT);INTRACRANIAL BLEED    ^CVA (CEREBRAL VASCULAR ACCIDENT);INTRACRANIAL BLEED    Family history of diabetes mellitus (DM)    No pertinent family history in first degree relatives    Handoff    MEWS Score    Anemia    DM (diabetes mellitus)    High cholesterol    HTN (hypertension)    B-cell chronic lymphocytic leukemia variant    CAD (coronary artery disease)    CVA (cerebral vascular accident)    Altered mental status    Palliative care encounter    Intracranial bleed    Craniotomy for intracerebral hemorrhage    H/O heart artery stent    No significant past surgical history    AMS    90+    Intracranial bleed    SysAdmin_VisitLink    POD# 18  S/p Left Minimally invasive craniotomy for evacuation of IPH with placement of EVD     Pt seen and examined at bedside.  Pt is arousable to  voice. Opens eyes. Following commands on LUE to squeeze hand and stick out tongue.    Allergies    penicillin (Anaphylaxis; Hives)    Intolerances      Vital Signs Last 24 Hrs  T(C): 35.9 (14 Mar 2021 05:25), Max: 36.9 (13 Mar 2021 21:48)  T(F): 96.6 (14 Mar 2021 05:25), Max: 98.5 (13 Mar 2021 21:48)  HR: 76 (14 Mar 2021 07:55) (76 - 99)  BP: 131/61 (14 Mar 2021 05:25) (115/59 - 144/67)  BP(mean): 82 (13 Mar 2021 21:48) (82 - 96)  RR: 20 (14 Mar 2021 05:25) (20 - 24)  SpO2: 99% (14 Mar 2021 07:55) (97% - 99%)      acetaminophen   Tablet .. 650 milliGRAM(s) Oral every 6 hours PRN  aspirin  chewable 81 milliGRAM(s) Oral daily  atorvastatin 80 milliGRAM(s) Oral at bedtime  chlorhexidine 0.12% Liquid 15 milliLiter(s) Oral Mucosa two times a day  chlorhexidine 4% Liquid 1 Application(s) Topical daily  dextrose 5% + sodium chloride 0.9%. 1000 milliLiter(s) IV Continuous <Continuous>  heparin   Injectable 5000 Unit(s) SubCutaneous every 8 hours  insulin regular  human corrective regimen sliding scale   SubCutaneous every 6 hours  labetalol 400 milliGRAM(s) Oral every 8 hours  levETIRAcetam  IVPB 750 milliGRAM(s) IV Intermittent every 12 hours  lisinopril 20 milliGRAM(s) Oral daily  metFORMIN 500 milliGRAM(s) Oral every 12 hours  pantoprazole   Suspension 40 milliGRAM(s) Oral daily  senna 2 Tablet(s) Oral at bedtime  voriconazole IVPB 300 milliGRAM(s) IV Intermittent every 12 hours        03-13-21 @ 06:01  -  03-14-21 @ 07:00  --------------------------------------------------------  IN: 875 mL / OUT: 1950 mL / NET: -1075 mL      Neuro Exam:  Trached , + PEG  Opens eyes to voice  grimaces to pain in all extremities  Followed commands to squeeze LUE, stick out tongue  Moving LUE purposefully  No withdrawal/movement in RUE  Withdraws b/l LE's to noxious stimuli   incision clean dry intact with staples in place      CBC Full  -  ( 12 Mar 2021 23:33 )  WBC Count : 5.53 K/uL  RBC Count : 3.17 M/uL  Hemoglobin : 8.5 g/dL  Hematocrit : 27.2 %  Platelet Count - Automated : 171 K/uL  Mean Cell Volume : 85.8 fL  Mean Cell Hemoglobin : 26.8 pg  Mean Cell Hemoglobin Concentration : 31.3 g/dL  Auto Neutrophil # : x  Auto Lymphocyte # : x  Auto Monocyte # : x  Auto Eosinophil # : x  Auto Basophil # : x  Auto Neutrophil % : x  Auto Lymphocyte % : x  Auto Monocyte % : x  Auto Eosinophil % : x  Auto Basophil % : x    03-12    139  |  103  |  15  ----------------------------<  127<H>  4.2   |  30  |  <0.5<L>    Ca    8.2<L>      12 Mar 2021 23:33  Phos  3.4     03-12  Mg     1.9     03-12    Assessment/Plan:     PICC line in place  Continue Voriconazole for total 4 wks, end date 3/31   for rehab placement     Case and plan d/w Dr. Rodriguez Duration Of Freeze Thaw-Cycle (Seconds): 0 Total Number Of Aks Treated: 30 Detail Level: Zone Render Post-Care Instructions In Note?: no

## 2021-03-15 NOTE — PROGRESS NOTE ADULT - ASSESSMENT
IMPRESSION:    L ICH s/p resection of hematoma and s/p EVD placement and removal  Aspergillus PNA  HO B cell lymphoma    PLAN:    CNS: Avoid CNS depressants. FU Neuro.     HEENT: Oral care    PULMONARY:  HOB @ 45 degrees.  Aspiration precautions. Trach care. CPAP trial    CARDIOVASCULAR: Avoid volume overload. Lasix    GI: GI prophylaxis. PEG feeding.    RENAL:  Follow up lytes.  Correct as needed    INFECTIOUS DISEASE: abx per ID    HEMATOLOGICAL:  DVT prophylaxis.    ENDOCRINE:  Follow up FS.  Insulin protocol if needed.    MUSCULOSKELETAL: bedrest    Dispo- NH

## 2021-03-15 NOTE — PROGRESS NOTE ADULT - SUBJECTIVE AND OBJECTIVE BOX
OVERNIGHT EVENTS: events noted, afebrile    Vital Signs Last 24 Hrs  T(C): 36.4 (15 Mar 2021 04:30), Max: 36.4 (15 Mar 2021 04:30)  T(F): 97.5 (15 Mar 2021 04:30), Max: 97.5 (15 Mar 2021 04:30)  HR: 98 (15 Mar 2021 10:27) (82 - 98)  BP: 139/63 (15 Mar 2021 04:30) (137/61 - 139/63)  BP(mean): 91 (15 Mar 2021 04:30) (91 - 91)  RR: 18 (15 Mar 2021 04:30) (18 - 19)  SpO2: 98% (15 Mar 2021 10:27) (97% - 98%)    PHYSICAL EXAMINATION:    GENERAL: ill looking    HEENT: Head is normocephalic and atraumatic. T collar    NECK: Supple.    LUNGS: dec bs both bases    HEART: FRACISCO 3/6  ABDOMEN: Soft, nontender, and nondistended.      EXTREMITIES: Without any cyanosis, clubbing, rash, lesions or edema.    NEUROLOGIC: UNCHNAGED      LABS:                                03-14-21 @ 07:01  -  03-15-21 @ 07:00  --------------------------------------------------------  IN: 2030 mL / OUT: 1450 mL / NET: 580 mL        MICROBIOLOGY:      MEDICATIONS  (STANDING):  aspirin  chewable 81 milliGRAM(s) Oral daily  atorvastatin 80 milliGRAM(s) Oral at bedtime  chlorhexidine 0.12% Liquid 15 milliLiter(s) Oral Mucosa two times a day  chlorhexidine 4% Liquid 1 Application(s) Topical daily  heparin   Injectable 5000 Unit(s) SubCutaneous every 8 hours  insulin regular  human corrective regimen sliding scale   SubCutaneous every 6 hours  labetalol 400 milliGRAM(s) Oral every 8 hours  levETIRAcetam  IVPB 750 milliGRAM(s) IV Intermittent every 12 hours  lisinopril 20 milliGRAM(s) Oral daily  metFORMIN 500 milliGRAM(s) Oral every 12 hours  pantoprazole   Suspension 40 milliGRAM(s) Oral daily  senna 2 Tablet(s) Oral at bedtime  voriconazole IVPB 300 milliGRAM(s) IV Intermittent every 12 hours    MEDICATIONS  (PRN):  acetaminophen   Tablet .. 650 milliGRAM(s) Oral every 6 hours PRN Moderate Pain (4 - 6)      RADIOLOGY & ADDITIONAL STUDIES:

## 2021-03-15 NOTE — PROGRESS NOTE ADULT - SUBJECTIVE AND OBJECTIVE BOX
POD # 19    S/p Left Minimally invasive craniotomy for evacuation of IPH with placement of EVD     pt seen and examined at bedside pt is trach'd & PEG        Vital Signs Last 24 Hrs  T(C): 36.4 (15 Mar 2021 04:30), Max: 36.4 (15 Mar 2021 04:30)  T(F): 97.5 (15 Mar 2021 04:30), Max: 97.5 (15 Mar 2021 04:30)  HR: 82 (15 Mar 2021 04:30) (82 - 85)  BP: 139/63 (15 Mar 2021 04:30) (137/61 - 139/63)  BP(mean): 91 (15 Mar 2021 04:30) (91 - 91)  RR: 18 (15 Mar 2021 04:30) (18 - 19)  SpO2: 98% (15 Mar 2021 04:30) (98% - 98%)    PHYSICAL EXAM:  Opens eyes to noxious stimuli   grimaces to noxious stimuli   not following commands   PERRL    +  on left hand   Purposeful on RUE to noxious stimuli  withdraws bilateral LE's to noxious stimuli   incision clean dry intact               staples & sutures removed         MEDICATIONS:  Antibiotics:  voriconazole IVPB 300 milliGRAM(s) IV Intermittent every 12 hours    Neuro:  acetaminophen   Tablet .. 650 milliGRAM(s) Oral every 6 hours PRN  levETIRAcetam  IVPB 750 milliGRAM(s) IV Intermittent every 12 hours    Anticoagulation:  aspirin  chewable 81 milliGRAM(s) Oral daily  heparin   Injectable 5000 Unit(s) SubCutaneous every 8 hours    OTHER:  atorvastatin 80 milliGRAM(s) Oral at bedtime  chlorhexidine 0.12% Liquid 15 milliLiter(s) Oral Mucosa two times a day  chlorhexidine 4% Liquid 1 Application(s) Topical daily  insulin regular  human corrective regimen sliding scale   SubCutaneous every 6 hours  labetalol 400 milliGRAM(s) Oral every 8 hours  lisinopril 20 milliGRAM(s) Oral daily  metFORMIN 500 milliGRAM(s) Oral every 12 hours  pantoprazole   Suspension 40 milliGRAM(s) Oral daily  senna 2 Tablet(s) Oral at bedtime      A/p        S/p Left Minimally invasive craniotomy for evacuation of IPH with placement of EVD              continue Voricanazole until 3/31              Social work / case management  for placement

## 2021-03-16 NOTE — PROGRESS NOTE ADULT - SUBJECTIVE AND OBJECTIVE BOX
Subjective: 75yFemale with a pmhx of CVA (CEREBRAL VASCULAR ACCIDENT);INTRACRANIAL BLEED    ^CVA (CEREBRAL VASCULAR ACCIDENT);INTRACRANIAL BLEED    Family history of diabetes mellitus (DM)    No pertinent family history in first degree relatives    Handoff    MEWS Score    Anemia    DM (diabetes mellitus)    High cholesterol    HTN (hypertension)    B-cell chronic lymphocytic leukemia variant    CAD (coronary artery disease)    CVA (cerebral vascular accident)    Altered mental status    Palliative care encounter    Intracranial bleed    Craniotomy for intracerebral hemorrhage    H/O heart artery stent    No significant past surgical history    AMS    90+    Intracranial bleed    SysAdmin_VisitLink      Patient is a 75 year-old female POD #20 s/p Left Minimally invasive craniotomy for evacuation of IPH with placement of EVD. Patient was seen and examined at bedside on 3E. No acute over night events. Pt remains trach'd & PEG'd on PEEP of 5. She is lying in bed, opens eyes to voice, not following commands.     Allergies    penicillin (Anaphylaxis; Hives)    Intolerances        Vital Signs Last 24 Hrs  T(C): 36.8 (16 Mar 2021 04:08), Max: 37.1 (15 Mar 2021 13:34)  T(F): 98.2 (16 Mar 2021 04:08), Max: 98.8 (15 Mar 2021 13:34)  HR: 80 (16 Mar 2021 04:08) (80 - 98)  BP: 130/92 (16 Mar 2021 04:08) (130/92 - 151/66)  BP(mean): 105 (16 Mar 2021 04:08) (95 - 105)  RR: 21 (16 Mar 2021 04:08) (18 - 21)  SpO2: 98% (16 Mar 2021 04:08) (98% - 98%)      acetaminophen   Tablet .. 650 milliGRAM(s) Oral every 6 hours PRN  aspirin  chewable 81 milliGRAM(s) Oral daily  atorvastatin 80 milliGRAM(s) Oral at bedtime  chlorhexidine 0.12% Liquid 15 milliLiter(s) Oral Mucosa two times a day  chlorhexidine 4% Liquid 1 Application(s) Topical daily  heparin   Injectable 5000 Unit(s) SubCutaneous every 8 hours  insulin regular  human corrective regimen sliding scale   SubCutaneous every 6 hours  labetalol 400 milliGRAM(s) Oral every 8 hours  levETIRAcetam  IVPB 750 milliGRAM(s) IV Intermittent every 12 hours  lisinopril 20 milliGRAM(s) Oral daily  metFORMIN 500 milliGRAM(s) Oral every 12 hours  pantoprazole   Suspension 40 milliGRAM(s) Oral daily  senna 2 Tablet(s) Oral at bedtime  voriconazole IVPB 300 milliGRAM(s) IV Intermittent every 12 hours        03-15-21 @ 07:01  -  03-16-21 @ 07:00  --------------------------------------------------------  IN: 970 mL / OUT: 1550 mL / NET: -580 mL        REVIEW OF SYSTEMS    [ ] A ten-point review of systems was otherwise negative except as noted.  [ ] Due to altered mental status/intubation, subjective information were not able to be obtained from the patient. History was obtained, to the extent possible, from review of the chart and collateral sources of information.      Physical Exam:  General: Lying in bed, trach'd & PEG'd, not following commands   AAOX3. Verbal function intact  Facial motions symmetric  PERRL  Motor: Grimaces and withdraws LUE, LLE, RLE to noxious stimuli  Grimaces to pain on RUE  Wound: Incision clean, dry, and intact without drainage      Imaging:    Assessment/Plan:   75 year-old female POD #20 s/p Left Minimally invasive craniotomy for evacuation of IPH with placement of EVD.  -Rpt HCT non-contrast  -continue Voricanazole until 3/31   -F/u w/ Social work/case management for discharge planning  -f/u when restart chemo   -Discuss with attending

## 2021-03-16 NOTE — PROGRESS NOTE ADULT - ASSESSMENT
· Assessment	  ASSESSMENT  75 years old female from home with PMHx of B cell lymphoma on Imbruvica (since 10/2020), CAD s/p stents 15 years ago, DM, chronic UTI, HTN, DLD, brought in by ambulance due to altered mental status and hypertension. Patient was AAO x4 and fully functional at baseline. Last known normal was 11:00pm 2/23 before beds. In 2/24 at 8:45AM, patient was found to be lethargy in bed by his son, was AAO x 4 and able to talk to his son, but started coughing and had one episode of nose bleed. At 9:15, her physical therapist noticed her becoming more lethargic, but vitals were normal. Soon at 9:30, patient was unresponsive and had a blank stare. EMS was called and her BP was found to be around 230/120. She was rushed to ED as Code Stroke. NIHSS 23. CTH showed Large left frontal intraparenchymal hematoma with intraventricular extension into left lateral ventricle, associated with marked mass effect resulting in 0.9 cm midline shift to the right anteriorly. Patient was intubated for airway protection    IMPRESSION  #Fevers : resolved    EEG no seizure   #Suspected aspergillus PNA    2/27 bronch-   Aspergillus fumigatus     Fungitell: <31 (03-01-21 @ 23:20), would suspect it to be +   < from: CT Chest w/ IV Cont (03.01.21 @ 20:56) >Filling defect compatible with mucous plugging is noted in the left lower lobe bronchus. There are moderate bilateral pleural effusions with areas of compression atelectasis in both lower lobes. An area of thickened interlobular septa with tree-in-bud opacities are noted in the posterior aspect of the right upper lobe.  < from: CT Sinuses w/ IV Cont (03.01.21 @ 21:18) >  1.  Opacification of the mastoid air cells and left middle ear canal consistent with inflammation or infection.  2.  Minimal mucosal thickening in the bilateral frontal, bilateral ethmoid, bilateral sphenoid, and left maxillary sinuses.  3.  Postoperative changes in left frontal lobe hemorrhage better demonstrated on the CT scan of the brain performed on February 28, 2021.    2/25 BCX NG  #Large left frontal intraparenchymal hemorrhage, low suspicion but Cannot rule out CSF infection - CSF studies + WBC/ RBC    Per family no proceeding symptoms week prior such as fevers, HA. Was in usual state of health week prior. Therefore unlikely infection that caused the intracranial event    CSF PCR NEGATIVE     CrAg NEGATIVE     Total Nucleated Cell Count, CSF: 373 /uL (02.26.21 @ 15:20)   Total Nucleated Cell Count, CSF: 30 /uL (02.27.21 @ 19:50)    CSF Segmented Neutrophils: 82 % (02.27.21 @ 19:50) CSF Segmented Neutrophils: 91 % (02.26.21 @ 15:20)  #B-cell lymphoma on chemo  #Pancytopenia  #Lactic acidosis  #Hypomagnesemia  #PCN allergy- unknown    RECOMMENDATIONS  - Should be off ABX (end 3/13)  - s/p Voriconazole 4 mg/kg twice daily IV for aspergillus PNA. Good CSF penetration as well.   - fungal CSF culture NG  - tracheal fluid for BAL aspergillus galactomannan   -  serum aspergillus galactomannan on 3/11 unremarkable  - Guarded prognosis, GOC  -recall prn please

## 2021-03-16 NOTE — PROGRESS NOTE ADULT - SUBJECTIVE AND OBJECTIVE BOX
OVERNIGHT EVENTS: events noted, afebrile    Vital Signs Last 24 Hrs  T(C): 36.5 (16 Mar 2021 12:55), Max: 36.8 (16 Mar 2021 04:08)  T(F): 97.7 (16 Mar 2021 12:55), Max: 98.2 (16 Mar 2021 04:08)  HR: 80 (16 Mar 2021 12:55) (80 - 84)  BP: 158/67 (16 Mar 2021 12:55) (130/92 - 158/67)  BP(mean): 97 (16 Mar 2021 12:55) (97 - 105)  RR: 20 (16 Mar 2021 12:55) (18 - 21)  SpO2: 99% (16 Mar 2021 12:55) (97% - 99%)    PHYSICAL EXAMINATION:    GENERAL: Ill looking    HEENT: Head is normocephalic and atraumatic. t collar    NECK: Supple.    LUNGS: dec bs both bases    HEART: Regular rate and rhythm without murmur.    ABDOMEN: Soft, nontender, and nondistended.      EXTREMITIES: Without any cyanosis, clubbing, rash, lesions or edema.    NEUROLOGIC: unchanged          LABS:                                03-15-21 @ 07:01  -  03-16-21 @ 07:00  --------------------------------------------------------  IN: 970 mL / OUT: 1550 mL / NET: -580 mL    03-16-21 @ 07:01  - 03-16-21 @ 16:23  --------------------------------------------------------  IN: 0 mL / OUT: 400 mL / NET: -400 mL        MICROBIOLOGY:      MEDICATIONS  (STANDING):  aspirin  chewable 81 milliGRAM(s) Oral daily  atorvastatin 80 milliGRAM(s) Oral at bedtime  chlorhexidine 0.12% Liquid 15 milliLiter(s) Oral Mucosa two times a day  chlorhexidine 4% Liquid 1 Application(s) Topical daily  heparin   Injectable 5000 Unit(s) SubCutaneous every 8 hours  insulin regular  human corrective regimen sliding scale   SubCutaneous every 6 hours  labetalol 400 milliGRAM(s) Oral every 8 hours  levETIRAcetam  IVPB 750 milliGRAM(s) IV Intermittent every 12 hours  lisinopril 20 milliGRAM(s) Oral daily  metFORMIN 500 milliGRAM(s) Oral every 12 hours  pantoprazole   Suspension 40 milliGRAM(s) Oral daily  senna 2 Tablet(s) Oral at bedtime  voriconazole IVPB 300 milliGRAM(s) IV Intermittent every 12 hours    MEDICATIONS  (PRN):  acetaminophen   Tablet .. 650 milliGRAM(s) Oral every 6 hours PRN Moderate Pain (4 - 6)      RADIOLOGY & ADDITIONAL STUDIES:

## 2021-03-16 NOTE — CHART NOTE - NSCHARTNOTEFT_GEN_A_CORE
Registered Dietitian Follow-Up     Patient Profile Reviewed                           Yes [x]   No []     Nutrition History Previously Obtained        Yes []  No [x]  Patient is lethargic, and unable to contact emergency contact at this time     Pertinent Subjective Information: Pt continues with PEG feeds @ goal. No noted GI s/s of intolerance at this time.      Pertinent Medical Interventions:   Per neurosurgery note:  ---s/p Left Minimally invasive craniotomy for evacuation of IPH with placement of EVD.       Diet order: Diet, NPO with Tube Feed:   Tube Feeding Modality: Gastrostomy  Glucerna 1.2 Jerry  Total Volume for 24 Hours (mL): 1440  Bolus  Total Volume of Bolus (mL):  360  Tube Feed Frequency: Every 6 hours   Tube Feed Start Time: 12:00  Bolus Feed Rate (mL per Hour): 360   Bolus Feed Duration (in Hours): 1 (03-10-21 @ 10:28) [Active]  Regimen provides: 1728 kcal, 86 g protein and 1166 mL free H2O.     Anthropometrics:  Height: 63"  Weight (kg): 76 - dosing weight  BMI: 29.7  IBW: 52 kg    Daily Weight in k.3 (), Weight in k.4 (), Weight in k (), Weight in k.3 (), Weight in k.6 ()  % Weight Change increased wt, likely r/t fluid shifts. Edema remains present.     MEDICATIONS  (STANDING):    atorvastatin 80 milliGRAM(s) Oral at bedtime  insulin regular  human corrective regimen sliding scale   SubCutaneous every 6 hours  labetalol 400 milliGRAM(s) Oral every 8 hours  lisinopril 20 milliGRAM(s) Oral daily  metFORMIN 500 milliGRAM(s) Oral every 12 hours  pantoprazole   Suspension 40 milliGRAM(s) Oral daily  senna 2 Tablet(s) Oral at bedtime    Pertinent Labs:  RBC 3.17, H/H 8.5/27.2, Cr <0.5, glucose 127  2/25: HbA1c: 6.3  Finger Sticks:  POCT Blood Glucose.: 155 mg/dL ( @ 05:35)  POCT Blood Glucose.: 137 mg/dL ( @ 23:20)  POCT Blood Glucose.: 127 mg/dL (03-11 @ 18:44)  POCT Blood Glucose.: 166 mg/dL ( @ 13:12)    Physical Findings:  - Appearance: confused/ lethargic / edema + 2generalized; right arm; left leg; right leg per RN flow sheets   - GI function: WDL per RN flow sheets. No GI intolerances per RN. LBM: 3/14; pt. noted on bowel regimen  - Tubes: trach/PEG  - Oral/Mouth cavity: NPO  - Skin: skin tear (R FA), stage II pressure ulcer (sacral spine) per RN flow sheets      Nutrition Requirements:  Weight Used: IBW 52 kg (continued from previous RD assessment)     Estimated Energy Needs    Continue [x]  9193-7033 kcal/d (30-35 kcal/kg IBW) for wound healing       Estimated Protein Needs    Continue [x]  73-83 g (1.4-1.6 g/kg IBW) same as above + significant difference between CBW/IBW considered     Estimated Fluid Needs        Continue [x]  per LIP      Nutrient Intake: EN regimen meeting % estimate energy requirements and 104-118% est protein requirements--- ok to meet upper end of ranges for optimal wound healing     [x] No active nutrition diagnosis identified at this time-- will f/u as at risk, to ensure pt tolerating PEG feeds as it was placed <1 week ago.      Nutrition Intervention EN, coordination of care     Goal/Expected Outcome: pt to continue to meet % est protein/energy requirements within the next 7 days     Indicator/Monitoring: RD to monitor diet order, energy intake, body composition, NFPF, glucose profile    Recommendation: continue with the current EN regimen as tolerated, maintain aspiration precautions. Registered Dietitian Follow-Up     Patient Profile Reviewed                           Yes [x]   No []     Nutrition History Previously Obtained        Yes []  No [x]  Patient is lethargic, and unable to contact emergency contact at this time     Pertinent Subjective Information: Pt continues with PEG feeds @ goal. No noted GI s/s of intolerance at this time per RN. Patien tolerating tube feeding regmen     Pertinent Medical Interventions:   Per neurosurgery note:  ---s/p Left Minimally invasive craniotomy for evacuation of IPH with placement of EVD.       Diet order: Diet, NPO with Tube Feed:   Tube Feeding Modality: Gastrostomy  Glucerna 1.2 Jerry  Total Volume for 24 Hours (mL): 1440  Bolus  Total Volume of Bolus (mL):  360  Tube Feed Frequency: Every 6 hours   Tube Feed Start Time: 12:00  Bolus Feed Rate (mL per Hour): 360   Bolus Feed Duration (in Hours): 1 (03-10-21 @ 10:28) [Active]  Regimen provides: 1728 kcal, 86 g protein and 1166 mL free H2O.     Anthropometrics:  Height: 63"  Weight (kg): 76 - dosing weight  BMI: 29.7  IBW: 52 kg    Daily Weight in k.3 (), Weight in k.4 (), Weight in k (), Weight in k.3 (), Weight in k.6 ()  % Weight Change increased wt, likely r/t fluid shifts. Edema remains present.     MEDICATIONS  (STANDING):    atorvastatin 80 milliGRAM(s) Oral at bedtime  insulin regular  human corrective regimen sliding scale   SubCutaneous every 6 hours  labetalol 400 milliGRAM(s) Oral every 8 hours  lisinopril 20 milliGRAM(s) Oral daily  metFORMIN 500 milliGRAM(s) Oral every 12 hours  pantoprazole   Suspension 40 milliGRAM(s) Oral daily  senna 2 Tablet(s) Oral at bedtime    Pertinent Labs:  RBC 3.17, H/H 8.5/27.2, Cr <0.5, glucose 127  /: HbA1c: 6.3  Finger Sticks:  POCT Blood Glucose.: 155 mg/dL ( @ 05:35)  POCT Blood Glucose.: 137 mg/dL ( @ 23:20)  POCT Blood Glucose.: 127 mg/dL ( @ 18:44)  POCT Blood Glucose.: 166 mg/dL ( @ 13:12)    Physical Findings:  - Appearance: confused/ lethargic / edema + 2generalized; right arm; left leg; right leg per RN flow sheets   - GI function: WDL per RN flow sheets. No GI intolerances per RN. LBM: 3/14; pt. noted on bowel regimen  - Tubes: trach/PEG  - Oral/Mouth cavity: NPO  - Skin: skin tear (R FA), stage II pressure ulcer (sacral spine) per RN flow sheets      Nutrition Requirements:  Weight Used: IBW 52 kg (continued from previous RD assessment)     Estimated Energy Needs    Continue [x]  9685-6468 kcal/d (30-35 kcal/kg IBW) for wound healing       Estimated Protein Needs    Continue [x]  73-83 g (1.4-1.6 g/kg IBW) same as above + significant difference between CBW/IBW considered     Estimated Fluid Needs        Continue [x]  per LIP      Nutrient Intake: EN regimen meeting % estimate energy requirements and 104-118% est protein requirements--- ok to meet upper end of ranges for optimal wound healing     [x] No active nutrition diagnosis identified at this time--Patient is meeting tube feeding at goal and is meeting estimated energy and protein needs      Nutrition Intervention EN, coordination of care     Goal/Expected Outcome: pt to continue to meet % est protein/energy requirements within the next 7 days     Indicator/Monitoring: RD to monitor diet order, energy intake, body composition, NFPF, glucose profile    Recommendation: continue with the current EN regimen as tolerated, maintain aspiration precautions.

## 2021-03-17 NOTE — PROGRESS NOTE ADULT - SUBJECTIVE AND OBJECTIVE BOX
OVERNIGHT EVENTS: events noted, spiking T, Repeat head CT reviewed    Vital Signs Last 24 Hrs  T(C): 38.8 (17 Mar 2021 08:00), Max: 38.8 (17 Mar 2021 08:00)  T(F): 101.8 (17 Mar 2021 08:00), Max: 101.8 (17 Mar 2021 08:00)  HR: 82 (17 Mar 2021 08:30) (80 - 98)  BP: 149/67 (17 Mar 2021 06:04) (132/78 - 158/67)  BP(mean): 97 (17 Mar 2021 06:04) (97 - 101)  RR: 20 (17 Mar 2021 06:04) (20 - 26)  SpO2: 97% (17 Mar 2021 08:30) (97% - 100%)    PHYSICAL EXAMINATION:    GENERAL: ill looking    HEENT: Head is normocephalic and atraumatic. trac    NECK: Supple.    LUNGS: dec bs both bases    HEART: FRACISCO 3/6    ABDOMEN: Soft, nontender, and nondistended.      EXTREMITIES: Without any cyanosis, clubbing, rash, lesions or edema.    NEUROLOGIC: not following commands          LABS:                        8.8    4.67  )-----------( 160      ( 16 Mar 2021 21:55 )             29.4             Urinalysis Basic - ( 17 Mar 2021 09:48 )    Color: Light Yellow / Appearance: Clear / S.018 / pH: x  Gluc: x / Ketone: Negative  / Bili: Negative / Urobili: <2 mg/dL   Blood: x / Protein: Trace / Nitrite: Negative   Leuk Esterase: Negative / RBC: x / WBC x   Sq Epi: x / Non Sq Epi: x / Bacteria: x                        21 @ 07:01  -  21 @ 07:00  --------------------------------------------------------  IN: 2695 mL / OUT: 1300 mL / NET: 1395 mL        MICROBIOLOGY:      MEDICATIONS  (STANDING):  aspirin  chewable 81 milliGRAM(s) Oral daily  atorvastatin 80 milliGRAM(s) Oral at bedtime  chlorhexidine 0.12% Liquid 15 milliLiter(s) Oral Mucosa two times a day  chlorhexidine 4% Liquid 1 Application(s) Topical daily  heparin   Injectable 5000 Unit(s) SubCutaneous every 8 hours  insulin regular  human corrective regimen sliding scale   SubCutaneous every 6 hours  labetalol 400 milliGRAM(s) Oral every 8 hours  levETIRAcetam  IVPB 750 milliGRAM(s) IV Intermittent every 12 hours  lisinopril 20 milliGRAM(s) Oral daily  metFORMIN 500 milliGRAM(s) Oral every 12 hours  pantoprazole   Suspension 40 milliGRAM(s) Oral daily  senna 2 Tablet(s) Oral at bedtime  voriconazole IVPB 300 milliGRAM(s) IV Intermittent every 12 hours    MEDICATIONS  (PRN):  acetaminophen   Tablet .. 650 milliGRAM(s) Oral every 6 hours PRN Moderate Pain (4 - 6)      RADIOLOGY & ADDITIONAL STUDIES:

## 2021-03-17 NOTE — PROGRESS NOTE ADULT - SUBJECTIVE AND OBJECTIVE BOX
Transfer Note:    Transfer from: Neurosurgery  Transfer to:  (  ) Surgery    (  ) Telemetry    ( x ) Medicine    (  ) Palliative    (  ) Stroke Unit    (  ) _______________      Hospital COURSE:  75y Female from home with PMHx of B cell lymphoma on Imbruvica (since 10/2020), CAD s/p stents 15 years ago, DM, chronic UTI, HTN, DLD, brought in by ambulance due to altered mental status and hypertension. Patient was AAO x4 and fully functional at baseline. Last known normal was 11:00pm 2/23 before beds. In 2/24 at 8:45AM, patient was found to be lethargy in bed by his son, was AAO x 4 and able to talk to his son, but started coughing and had one episode of nose bleed. At 9:15, her physical therapist noticed her becoming more lethargic, but vitals were normal. Soon at 9:30, patient was unresponsive and had a blank stare. EMS was called and her BP was found to be around 230/120. She was rushed to ED as Code Stroke. NIHSS 23. CTH showed Large left frontal intraparenchymal hematoma with intraventricular extension into left lateral ventricle, associated with marked mass effect resulting in 0.9 cm midline shift to the right anteriorly. Patient was intubated for airway protection. S/p 2 units of platelet for ASA reversal.   Patient went to OR for Left minimally invasive craniotomy for evacuation of IPH with placement of EVD. OR case was uncomplicated. Patient was transferred intubated and sedated to SICU for hemodynamic and neurovascular monitoring.   Patient experienced fevers while in SICU, CT chest showed mucus plug in L lower lobe bronchus, moderate bilateral pleural effusions w/ areas of atelectasis. Bronchial culture + aspergillus.   Patient was placed on IV abx, followed by ID.  EVD was removed on 3/2.  Bedside trach/peg on 3/6.  PICC line placed on 3/12.  Patient transferred to floor on 3/13.   Discharge planning for LTACH.  RUE edema noted on 3/16.  RUE Duplex ordered.   Patient febrile on 3/17    PAST MEDICAL & SURGICAL HISTORY:  Anemia    DM (diabetes mellitus)    High cholesterol    HTN (hypertension)    B-cell chronic lymphocytic leukemia variant    CAD (coronary artery disease)  s/p stenting    H/O heart artery stent      Allergies    penicillin (Anaphylaxis; Hives)    Intolerances      MEDICATIONS  (STANDING):  aspirin  chewable 81 milliGRAM(s) Oral daily  atorvastatin 80 milliGRAM(s) Oral at bedtime  chlorhexidine 0.12% Liquid 15 milliLiter(s) Oral Mucosa two times a day  chlorhexidine 4% Liquid 1 Application(s) Topical daily  heparin   Injectable 5000 Unit(s) SubCutaneous every 8 hours  insulin regular  human corrective regimen sliding scale   SubCutaneous every 6 hours  labetalol 400 milliGRAM(s) Oral every 8 hours  levETIRAcetam  IVPB 750 milliGRAM(s) IV Intermittent every 12 hours  lisinopril 20 milliGRAM(s) Oral daily  metFORMIN 500 milliGRAM(s) Oral every 12 hours  pantoprazole   Suspension 40 milliGRAM(s) Oral daily  senna 2 Tablet(s) Oral at bedtime  voriconazole IVPB 300 milliGRAM(s) IV Intermittent every 12 hours    MEDICATIONS  (PRN):  acetaminophen   Tablet .. 650 milliGRAM(s) Oral every 6 hours PRN Moderate Pain (4 - 6)        Vital Signs Last 24 Hrs  T(C): 36.9 (17 Mar 2021 13:15), Max: 38.9 (17 Mar 2021 10:00)  T(F): 98.5 (17 Mar 2021 13:15), Max: 102 (17 Mar 2021 10:00)  HR: 98 (17 Mar 2021 13:15) (80 - 98)  BP: 122/60 (17 Mar 2021 13:15) (122/60 - 149/67)  BP(mean): 97 (17 Mar 2021 06:04) (97 - 101)  RR: 20 (17 Mar 2021 06:04) (20 - 26)  SpO2: 97% (17 Mar 2021 08:30) (97% - 100%)  I&O's Summary    16 Mar 2021 07:01  -  17 Mar 2021 07:00  --------------------------------------------------------  IN: 2695 mL / OUT: 1300 mL / NET: 1395 mL    17 Mar 2021 07:01  -  17 Mar 2021 14:15  --------------------------------------------------------  IN: 710 mL / OUT: 0 mL / NET: 710 mL        LABS                                            8.8                   Neurophils% (auto):   x      (03-16 @ 21:55):    4.67 )-----------(160          Lymphocytes% (auto):  x                                             29.4                   Eosinphils% (auto):   x        Manual%: Neutrophils x    ; Lymphocytes x    ; Eosinophils x    ; Bands%: x    ; Blasts x                                    136    |  98     |  12                  Calcium: 8.1   / iCa: x      (03-17 @ 11:00)    ----------------------------<  145       Magnesium: x                                3.9     |  29     |  <0.5             Phosphorous: x                ASSESSMENT/PLAN: 75yFemale      Neurologic:    Respiratory:    Cardiovascular:    Gastrointestinal/Nutrition:    Genitourinary/Renal:    Hematologic:    Infectious Disease:    Endocrine:    Disposition:      For Follow-Up:   Transfer Note:    Transfer from: Neurosurgery  Transfer to:  (  ) Surgery    (  ) Telemetry    ( x ) Medicine    (  ) Palliative    (  ) Stroke Unit    (  ) _______________      Hospital COURSE:  75y Female from home with PMHx of B cell lymphoma on Imbruvica (since 10/2020), CAD s/p stents 15 years ago, DM, chronic UTI, HTN, DLD, brought in by ambulance due to altered mental status and hypertension. Patient was AAO x4 and fully functional at baseline. Last known normal was 11:00pm 2/23 before beds. In 2/24 at 8:45AM, patient was found to be lethargy in bed by his son, was AAO x 4 and able to talk to his son, but started coughing and had one episode of nose bleed. At 9:15, her physical therapist noticed her becoming more lethargic, but vitals were normal. Soon at 9:30, patient was unresponsive and had a blank stare. EMS was called and her BP was found to be around 230/120. She was rushed to ED as Code Stroke. NIHSS 23. CTH showed Large left frontal intraparenchymal hematoma with intraventricular extension into left lateral ventricle, associated with marked mass effect resulting in 0.9 cm midline shift to the right anteriorly. Patient was intubated for airway protection. S/p 2 units of platelet for ASA reversal.   Patient went to OR for Left minimally invasive craniotomy for evacuation of IPH with placement of EVD. OR case was uncomplicated. Patient was transferred intubated and sedated to SICU for hemodynamic and neurovascular monitoring.   Patient experienced fevers while in SICU, CT chest showed mucus plug in L lower lobe bronchus, moderate bilateral pleural effusions w/ areas of atelectasis. Bronchial culture + aspergillus.   Patient was placed on IV abx, followed by ID.  EVD was removed on 3/2.  Bedside trach/peg on 3/6.  PICC line placed on 3/12.  Patient transferred to floor on 3/13.   Discharge planning for LTACH.  RUE edema noted on 3/16.  RUE Duplex ordered - reveals Right cephalic and brachial vein thrombosis.   Patient febrile on 3/17. Blood cultures, UA, Covid swab done.     PAST MEDICAL & SURGICAL HISTORY:  Anemia    DM (diabetes mellitus)    High cholesterol    HTN (hypertension)    B-cell chronic lymphocytic leukemia variant    CAD (coronary artery disease)  s/p stenting    H/O heart artery stent      Allergies    penicillin (Anaphylaxis; Hives)    Intolerances      MEDICATIONS  (STANDING):  aspirin  chewable 81 milliGRAM(s) Oral daily  atorvastatin 80 milliGRAM(s) Oral at bedtime  chlorhexidine 0.12% Liquid 15 milliLiter(s) Oral Mucosa two times a day  chlorhexidine 4% Liquid 1 Application(s) Topical daily  heparin   Injectable 5000 Unit(s) SubCutaneous every 8 hours  insulin regular  human corrective regimen sliding scale   SubCutaneous every 6 hours  labetalol 400 milliGRAM(s) Oral every 8 hours  levETIRAcetam  IVPB 750 milliGRAM(s) IV Intermittent every 12 hours  lisinopril 20 milliGRAM(s) Oral daily  metFORMIN 500 milliGRAM(s) Oral every 12 hours  pantoprazole   Suspension 40 milliGRAM(s) Oral daily  senna 2 Tablet(s) Oral at bedtime  voriconazole IVPB 300 milliGRAM(s) IV Intermittent every 12 hours    MEDICATIONS  (PRN):  acetaminophen   Tablet .. 650 milliGRAM(s) Oral every 6 hours PRN Moderate Pain (4 - 6)        Vital Signs Last 24 Hrs  T(C): 36.9 (17 Mar 2021 13:15), Max: 38.9 (17 Mar 2021 10:00)  T(F): 98.5 (17 Mar 2021 13:15), Max: 102 (17 Mar 2021 10:00)  HR: 98 (17 Mar 2021 13:15) (80 - 98)  BP: 122/60 (17 Mar 2021 13:15) (122/60 - 149/67)  BP(mean): 97 (17 Mar 2021 06:04) (97 - 101)  RR: 20 (17 Mar 2021 06:04) (20 - 26)  SpO2: 97% (17 Mar 2021 08:30) (97% - 100%)  I&O's Summary    16 Mar 2021 07:01  -  17 Mar 2021 07:00  --------------------------------------------------------  IN: 2695 mL / OUT: 1300 mL / NET: 1395 mL    17 Mar 2021 07:01  -  17 Mar 2021 14:15  --------------------------------------------------------  IN: 710 mL / OUT: 0 mL / NET: 710 mL        LABS                                            8.8                   Neurophils% (auto):   x      (03-16 @ 21:55):    4.67 )-----------(160          Lymphocytes% (auto):  x                                             29.4                   Eosinphils% (auto):   x        Manual%: Neutrophils x    ; Lymphocytes x    ; Eosinophils x    ; Bands%: x    ; Blasts x                                    136    |  98     |  12                  Calcium: 8.1   / iCa: x      (03-17 @ 11:00)    ----------------------------<  145       Magnesium: x                                3.9     |  29     |  <0.5             Phosphorous: x                ASSESSMENT/PLAN: 75yFemale       75y Female s/p craniotomy for left frontal hematoma with evacuation and EVD placement.     NEURO:  Off all sedation  Pain controlled with Tylenol via G tube  Keppra 750 mg q 12  EVD removed 3/2   24hr EEG: 3/3 no seizure activity  Q 4hr neurochecks   2/25 CTH- ventricular drain in 3rd ventricle, decr size of lateral ventricles, scattered SAH and layering IVH in posterior horns of  lateral ventricle slightly increased  2/28 rpt CTH - stable- L frontal hemoatom cavity 4.5 cm, slighlty decr amount of blood, interval decr in pneumocephalus, L transfrontal drain tip in the third ventricle   3/1: CT sinus-prelim- no evidence of inflammatory/infectious diseases of paransal sinuses     RESP:   Resp Failure s/p bedside trach 3/6/21, PSV 10/5 tolerating 12 hours  Trach: /16/30/5  CXR- Bibasal pleural-parenchymal opacities without change. No pneumothorax.  3/1: CT chest -mucus plug L lower lobe bronchus, mod bilat pleural effusions w/ areas of atlectasis in both lower lobes. tree-in-bud opacities in posterior aspect of R upper lobe  +aspergillus  Keep HOB elevated at 30 degrees  3/11: covid negative   3/17: Covid swab done    CARDS:   Acute on chronic HTN        - home Lisinopril to 20mg       - labetalol 400 q8h        - rescheduled lisinopril dose s/p hypotension when giving both  H/o CAD s/p stents - ASA  Echo (10/13/20): EF 55-60%, trace MR, R atrial echodensity possible thrombus  Trop 0.24 --> 0.21->0.15, no longer trending      GI/NUTR:   S/p bedside PEG  Diet: TF via PEG - Glucerna 1.2 bolus TF  GI Prophylaxis- PPI  Bowel regimen- Senna  Digni shield    /RENAL:   Monitor UO-roblero in place  IVL  Na goal 135-145   BUN/Cr- 14/<0.5 -> 13/0.5 -> 12/<0.5  Na 136/ K 3.9/ Mg 1.9       HEME/ONC:   DVT prophylaxis: heparin   Injectable 5000 every 8 hours  Hb/Hct:  8.0/25.4> 8.2/27> 8.4/27.2 > 8.4/27 > 9.0/29.3 > 8.8/28.5 -> 8.5/27.8 -> 8.8/29.4  Plts:  144>158 > 197 >180 > 199 > 193 > 198 >171>160  Last T&S: 3/5   (02/25) INR 1.85, nsx rec 1 FFP and 10 vit K, INR 1.49 (2/26) >1.27   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  Heme/Onc consult: transfuse platelets if < 100, give vit K for elevated INR  UE positive for DVT.   vascular consult placed    ID:  Immunocompromised  afebrile  WBC- 7.19> 6.85> 7.27> 5.17 > 4.84  > 4.4 > 4.39 .>4.67  Antibiotics: Voriconazole  Al  Follow up LFTs  Small sacral stage 2  PICC line by IR 3/12    Cultures:     -UA (2/28): negative     -CSF with Gram Stain  (02.26.21 @ 15:20): No growth at 3 days.     -CSF (fungal) (collected 02-26): neg     -CSF with Gram Stain (collected 02-27): negative    -Bronchial (collected 02-27):+aspergillus    -fungal CSF cultures (3/2)- neg    -Apergillus galactaman antigen    -blood cx 3/4, 3/5: NGTD  -3/11 Covid negative  - 3/17 Covid swab pending  - 3/17 Blood cultures pending    ENDO:  Hx of DM, HA1C 6.3  - restarted home metformin 500mg q 12 hr  on ISS, FS q 4    GOC: palliative care following, full code      DISPO:   medically active  -Follow up social work for d/c planning --> possible SNIF vs LTAC if daughter agrees    Signed out to Kassy DOMINGUEZ

## 2021-03-17 NOTE — PROGRESS NOTE ADULT - ASSESSMENT
IMPRESSION:    L ICH s/p resection of hematoma and s/p EVD placement and removal  Aspergillus PNA  HO B cell lymphoma  fever    PLAN:    CNS: Avoid CNS depressants. FU Neuro.     HEENT: Oral care    PULMONARY:  HOB @ 45 degrees.  Aspiration precautions. Trach care. CPAP trial    CARDIOVASCULAR: Avoid volume overload. Lasix daily    GI: GI prophylaxis. PEG feeding.    RENAL:  Follow up lytes.  Correct as needed    INFECTIOUS DISEASE: abx per ID, swab for covid    HEMATOLOGICAL:  DVT prophylaxis.    ENDOCRINE:  Follow up FS.  Insulin protocol if needed.    MUSCULOSKELETAL: bedrest    poor prognosis

## 2021-03-17 NOTE — CHART NOTE - NSCHARTNOTEFT_GEN_A_CORE
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 Unfortunately she was admitted with intracerebral hemorrhage with complicated hospital course   Please note, Imbruvica will NOT be re-started as it is associated with high risk of bleeding  Daughter was explained during her initial hospitalization that this is no longer an option given her bleed and current functional status   Continue with medical management as per medical team.     Plan discussed with Dr. Guardado

## 2021-03-17 NOTE — CHART NOTE - NSCHARTNOTEFT_GEN_A_CORE
Patient endorsed over by Neurosurgery, transfer of service onto Medicine accepted for RUE DVT and fever workup, unable to be discharged to facility at this time, team to sign out to Medical Resident on call.

## 2021-03-17 NOTE — PROGRESS NOTE ADULT - SUBJECTIVE AND OBJECTIVE BOX
Subjective: 75yFemale with a pmhx of CVA (CEREBRAL VASCULAR ACCIDENT);INTRACRANIAL BLEED    ^CVA (CEREBRAL VASCULAR ACCIDENT);INTRACRANIAL BLEED    Family history of diabetes mellitus (DM)    No pertinent family history in first degree relatives    Handoff    MEWS Score    Anemia    DM (diabetes mellitus)    High cholesterol    HTN (hypertension)    B-cell chronic lymphocytic leukemia variant    CAD (coronary artery disease)    CVA (cerebral vascular accident)    Altered mental status    Palliative care encounter    Intracranial bleed    Craniotomy for intracerebral hemorrhage    H/O heart artery stent    No significant past surgical history    AMS    90+    Intracranial bleed    SysAdmin_VisitLink    Patient is a 75 year-old female POD #21 s/p Left Minimally invasive craniotomy for evacuation of IPH with placement of EVD. Patient was seen and examined at bedside on 3E. Pt remains trach'd & PEG'd on PEEP of 5. Patient noted to be febrile this AM, Tmax 101.8F. Fever work up in progress (CXR, blood cx, UA), will continue to follow. She is lying in bed, coughing, does not eyes to voice, not following commands.     Allergies    penicillin (Anaphylaxis; Hives)    Intolerances      Vital Signs Last 24 Hrs  T(C): 38.8 (17 Mar 2021 08:00), Max: 38.8 (17 Mar 2021 08:00)  T(F): 101.8 (17 Mar 2021 08:00), Max: 101.8 (17 Mar 2021 08:00)  HR: 98 (17 Mar 2021 06:04) (80 - 98)  BP: 149/67 (17 Mar 2021 06:04) (132/78 - 158/67)  BP(mean): 97 (17 Mar 2021 06:04) (97 - 101)  RR: 20 (17 Mar 2021 06:04) (20 - 26)  SpO2: 99% (16 Mar 2021 22:00) (99% - 100%)      acetaminophen   Tablet .. 650 milliGRAM(s) Oral every 6 hours PRN  aspirin  chewable 81 milliGRAM(s) Oral daily  atorvastatin 80 milliGRAM(s) Oral at bedtime  chlorhexidine 0.12% Liquid 15 milliLiter(s) Oral Mucosa two times a day  chlorhexidine 4% Liquid 1 Application(s) Topical daily  heparin   Injectable 5000 Unit(s) SubCutaneous every 8 hours  insulin regular  human corrective regimen sliding scale   SubCutaneous every 6 hours  labetalol 400 milliGRAM(s) Oral every 8 hours  levETIRAcetam  IVPB 750 milliGRAM(s) IV Intermittent every 12 hours  lisinopril 20 milliGRAM(s) Oral daily  metFORMIN 500 milliGRAM(s) Oral every 12 hours  pantoprazole   Suspension 40 milliGRAM(s) Oral daily  senna 2 Tablet(s) Oral at bedtime  voriconazole IVPB 300 milliGRAM(s) IV Intermittent every 12 hours        03-16-21 @ 07:01  -  03-17-21 @ 07:00  --------------------------------------------------------  IN: 2695 mL / OUT: 1300 mL / NET: 1395 mL      Physical Exam:  Lying in bed, trach'd & PEG'd, not following commands, not opening eyes to voice  Facial motions symmetric  PERRL  Motor: Grimaces and withdraws LUE, LLE, RLE to noxious stimuli  Grimaces to pain on RUE  Wound: Incision clean, dry, and intact without drainage      CBC Full  -  ( 16 Mar 2021 21:55 )  WBC Count : 4.67 K/uL  RBC Count : 3.35 M/uL  Hemoglobin : 8.8 g/dL  Hematocrit : 29.4 %  Platelet Count - Automated : 160 K/uL  Mean Cell Volume : 87.8 fL  Mean Cell Hemoglobin : 26.3 pg  Mean Cell Hemoglobin Concentration : 29.9 g/dL  Auto Neutrophil # : x  Auto Lymphocyte # : x  Auto Monocyte # : x  Auto Eosinophil # : x  Auto Basophil # : x  Auto Neutrophil % : x  Auto Lymphocyte % : x  Auto Monocyte % : x  Auto Eosinophil % : x  Auto Basophil % : x        Assessment/Plan:   75 year-old female POD #21 s/p Left Minimally invasive craniotomy for evacuation of IPH with placement of EVD.  -FU fever work up-- UA, CXR, blood cx  - COVID swab  -continue Voricanazole until 3/31   -F/u w/ Social work/case management for discharge planning  -f/u when restart chemotherapy treatment    Above case and plan d/w Dr. Rodriguez

## 2021-03-18 NOTE — PROGRESS NOTE ADULT - ASSESSMENT
IMPRESSION:    L ICH s/p resection of hematoma and s/p EVD placement and removal  Aspergillus PNA  HO B cell lymphoma  fever nl wbc    PLAN:    CNS: Avoid CNS depressants. FU Neuro.     HEENT: Oral care    PULMONARY:  HOB @ 45 degrees.  Aspiration precautions. Trach care. CPAP trial    CARDIOVASCULAR: Avoid volume overload.    GI: GI prophylaxis. PEG feeding.    RENAL:  Follow up lytes.  Correct as needed    INFECTIOUS DISEASE: abx per ID, pancx    HEMATOLOGICAL:  DVT prophylaxis. LE doppler    ENDOCRINE:  Follow up FS.  Insulin protocol if needed.    MUSCULOSKELETAL: bedrest    poor prognosis

## 2021-03-18 NOTE — PROGRESS NOTE ADULT - SUBJECTIVE AND OBJECTIVE BOX
OVERNIGHT EVENTS: events noted, spiking T, covid neg    Vital Signs Last 24 Hrs  T(C): 35.9 (18 Mar 2021 13:25), Max: 39.2 (17 Mar 2021 21:12)  T(F): 96.6 (18 Mar 2021 13:25), Max: 102.5 (17 Mar 2021 21:12)  HR: 87 (18 Mar 2021 13:25) (87 - 96)  BP: 140/63 (18 Mar 2021 13:25) (140/63 - 184/79)  BP(mean): 91 (18 Mar 2021 13:25) (91 - 113)  RR: 18 (18 Mar 2021 13:25) (18 - 20)  SpO2: 100% (18 Mar 2021 13:25) (97% - 100%)    PHYSICAL EXAMINATION:    GENERAL: ill looking    HEENT: Head is normocephalic and atraumatic. t collar    NECK: Supple.    LUNGS: dec bs both bases    HEART: FRACISCO 3/6    ABDOMEN: Soft, nontender, and nondistended.      EXTREMITIES: Without any cyanosis,    NEUROLOGIC: not following commands        LABS:                        8.8    7.44  )-----------( 147      ( 18 Mar 2021 11:49 )             28.1     03-18    137  |  98  |  15  ----------------------------<  147<H>  3.9   |  28  |  <0.5<L>    Ca    8.4<L>      18 Mar 2021 11:49  Mg     1.5         TPro  5.0<L>  /  Alb  3.1<L>  /  TBili  0.3  /  DBili  x   /  AST  23  /  ALT  13  /  AlkPhos  219<H>  18      Urinalysis Basic - ( 17 Mar 2021 09:48 )    Color: Light Yellow / Appearance: Clear / S.018 / pH: x  Gluc: x / Ketone: Negative  / Bili: Negative / Urobili: <2 mg/dL   Blood: x / Protein: Trace / Nitrite: Negative   Leuk Esterase: Negative / RBC: x / WBC x   Sq Epi: x / Non Sq Epi: x / Bacteria: x                        21 @ 07:01  -  21 @ 07:00  --------------------------------------------------------  IN: 2840 mL / OUT: 1500 mL / NET: 1340 mL    21 @ 07:01  -  21 @ 17:24  --------------------------------------------------------  IN: 1320 mL / OUT: 0 mL / NET: 1320 mL        MICROBIOLOGY:      MEDICATIONS  (STANDING):  aspirin  chewable 81 milliGRAM(s) Oral daily  atorvastatin 80 milliGRAM(s) Oral at bedtime  BACItracin   Ointment 1 Application(s) Topical two times a day  chlorhexidine 0.12% Liquid 15 milliLiter(s) Oral Mucosa two times a day  chlorhexidine 4% Liquid 1 Application(s) Topical daily  enoxaparin Injectable 75 milliGRAM(s) SubCutaneous every 12 hours  insulin regular  human corrective regimen sliding scale   SubCutaneous every 6 hours  labetalol 400 milliGRAM(s) Oral every 8 hours  levETIRAcetam  IVPB 750 milliGRAM(s) IV Intermittent every 12 hours  lisinopril 20 milliGRAM(s) Oral daily  nystatin Powder 1 Application(s) Topical two times a day  pantoprazole   Suspension 40 milliGRAM(s) Oral daily  senna 2 Tablet(s) Oral at bedtime  voriconazole IVPB 300 milliGRAM(s) IV Intermittent every 12 hours    MEDICATIONS  (PRN):  acetaminophen    Suspension .. 650 milliGRAM(s) Enteral Tube every 6 hours PRN Temp greater or equal to 38C (100.4F)  acetaminophen   Tablet .. 650 milliGRAM(s) Oral every 6 hours PRN Moderate Pain (4 - 6)      RADIOLOGY & ADDITIONAL STUDIES:

## 2021-03-18 NOTE — CONSULT NOTE ADULT - ASSESSMENT
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    # Lt frontal IPH s/p craniotomy/hematoma evacuation and EVD placement  2/24    Developed Right basilic and cephalic thrombosis related to PICC line    Recs:  - needs full AC  - remove PICC line   - elevate right arm on 2 pillows    repeat venous dplx in 3-4 weeks    SPECTRA [id number]

## 2021-03-18 NOTE — CONSULT NOTE ADULT - SUBJECTIVE AND OBJECTIVE BOX
VASCULAR SURGERY CONSULT NOTE      HPI:  75 years old female from home with PMHx of B cell lymphoma on Imbruvica (since 10/2020), CAD s/p stents 15 years ago, DM, chronic UTI, HTN, DLD, brought in by ambulance due to altered mental status and hypertension. Patient was AAO x4 and fully functional at baseline. Last known normal was 11:00pm  before beds. In  at 8:45AM, patient was found to be lethargy in bed by his son, was AAO x 4 and able to talk to his son, but started coughing and had one episode of nose bleed. At 9:15, her physical therapist noticed her becoming more lethargic, but vitals were normal. Soon at 9:30, patient was unresponsive and had a blank stare. EMS was called and her BP was found to be around 230/120. She was rushed to ED as Code Stroke. NIHSS 23. CTH showed Large left frontal intraparenchymal hematoma with intraventricular extension into left lateral ventricle, associated with marked mass effect resulting in 0.9 cm midline shift to the right anteriorly. Patient was intubated for airway protection. S/p 2 units of platelet for ASA reversal. Neurosurgery was planning for emergent OR resection of hematoma.      (2021 13:43)        PAST MEDICAL & SURGICAL HISTORY:  Anemia    DM (diabetes mellitus)    High cholesterol    HTN (hypertension)    B-cell chronic lymphocytic leukemia variant    CAD (coronary artery disease)  s/p stenting    H/O heart artery stent      penicillin (Anaphylaxis; Hives)    Home Medications:  aspirin 81 mg oral tablet, chewable: 1 tab(s) orally once a day (2021 15:11)  atorvastatin 80 mg oral tablet: 1 tab(s) orally once a day (at bedtime) (2021 15:11)  Imbruvica 70 mg oral capsule: 2 cap(s) orally once a day (2021 15:11)  Levaquin 500 mg oral tablet: 1 tab(s) orally every 24 hours (2021 15:11)  lisinopril 10 mg oral tablet: 1 tab(s) orally once a day (2021 15:11)    No permtinent family history of PVD    REVIEW OF SYSTEMS:  GENERAL:                                         negative  SKIN:                                                 negative  OPTHALMOLOGIC:                          negative  ENMT:                                               negative  RESPIRATORY AND THORAX:        negative  CARDIOVASCULAR:                   see HPI  GASTROINTESTINAL:                       negative  NEPHROLOGY:                                  negative  MUSCULOSKELETAL:                       negative  NEUROLOGIC:                                   negative  PSYCHIATRIC:                                    negative  HEMATOLOGY/LYMPHATICS:         negative  ENDOCRINE:                                     negative  ALLERGIC/IMMUNOLOGIC:            negative    12 point ROS otherwise normal except as stated in HPI    PHYSICAL EXAM  Vital Signs Last 24 Hrs  T(C): 35.9 (18 Mar 2021 13:25), Max: 39.2 (17 Mar 2021 21:12)  T(F): 96.6 (18 Mar 2021 13:25), Max: 102.5 (17 Mar 2021 21:12)  HR: 87 (18 Mar 2021 13:25) (87 - 96)  BP: 140/63 (18 Mar 2021 13:25) (140/63 - 184/79)  BP(mean): 91 (18 Mar 2021 13:25) (91 - 113)  RR: 18 (18 Mar 2021 13:25) (18 - 20)  SpO2: 100% (18 Mar 2021 13:25) (97% - 100%)    Appearance: Normal	  HEENT:   Normal oral mucosa, PERRL, EOMI	  Neck: Supple, - JVD;    Cardiovascular: Normal S1 S2, No JVD, No murmurs,   Respiratory: Lungs clear to auscultation, No Rales, Rhonchi, Wheezing	  Gastrointestinal:  Soft, Non-tender, positive BS	  Skin: No rashes, No ecchymoses, No cyanosis  Extremities: Normal range of motion, No clubbing, cyanosis   Right upper extremity + PICC line noted medial aspect, + arm is soft, hand is swollen  Neurologic: Non-focal  Psychiatry: vented, awake but not responding      PULSES:  right radial: palpable pulse    MEDICATIONS:   MEDICATIONS  (STANDING):  aspirin  chewable 81 milliGRAM(s) Oral daily  atorvastatin 80 milliGRAM(s) Oral at bedtime  BACItracin   Ointment 1 Application(s) Topical two times a day  chlorhexidine 0.12% Liquid 15 milliLiter(s) Oral Mucosa two times a day  chlorhexidine 4% Liquid 1 Application(s) Topical daily  enoxaparin Injectable 75 milliGRAM(s) SubCutaneous every 12 hours  insulin regular  human corrective regimen sliding scale   SubCutaneous every 6 hours  labetalol 400 milliGRAM(s) Oral every 8 hours  levETIRAcetam  IVPB 750 milliGRAM(s) IV Intermittent every 12 hours  lisinopril 20 milliGRAM(s) Oral daily  magnesium sulfate  IVPB 2 Gram(s) IV Intermittent every 2 hours  nystatin Powder 1 Application(s) Topical two times a day  pantoprazole   Suspension 40 milliGRAM(s) Oral daily  senna 2 Tablet(s) Oral at bedtime  voriconazole IVPB 300 milliGRAM(s) IV Intermittent every 12 hours    MEDICATIONS  (PRN):  acetaminophen    Suspension .. 650 milliGRAM(s) Enteral Tube every 6 hours PRN Temp greater or equal to 38C (100.4F)  acetaminophen   Tablet .. 650 milliGRAM(s) Oral every 6 hours PRN Moderate Pain (4 - 6)      LAB/STUDIES:                        8.8    7.44  )-----------( 147      ( 18 Mar 2021 11:49 )             28.1         137  |  98  |  15  ----------------------------<  147<H>  3.9   |  28  |  <0.5<L>    Ca    8.4<L>      18 Mar 2021 11:49  Mg     1.5         TPro  5.0<L>  /  Alb  3.1<L>  /  TBili  0.3  /  DBili  x   /  AST  23  /  ALT  13  /  AlkPhos  219<H>        LIVER FUNCTIONS - ( 18 Mar 2021 11:49 )  Alb: 3.1 g/dL / Pro: 5.0 g/dL / ALK PHOS: 219 U/L / ALT: 13 U/L / AST: 23 U/L / GGT: x             Urinalysis Basic - ( 17 Mar 2021 09:48 )    Color: Light Yellow / Appearance: Clear / S.018 / pH: x  Gluc: x / Ketone: Negative  / Bili: Negative / Urobili: <2 mg/dL   Blood: x / Protein: Trace / Nitrite: Negative   Leuk Esterase: Negative / RBC: x / WBC x   Sq Epi: x / Non Sq Epi: x / Bacteria: x          IMAGING:    < from: [facility] Duplex Upper Ext Vein Scan, Right (21 @ 12:54) >  Cephalic vein is thrombosed right brachial vein is thrombosed

## 2021-03-18 NOTE — PROGRESS NOTE ADULT - ATTENDING COMMENTS
#Fever  s/p voriconazole for aspergillus pna  f/u repeat bcx  check procal  check cxr  f/u id  #RUE DVT, brachial  therapeutic lovenox started by surgery  f/u neurosx if ok for ac given ICH  f/u vasc  #L frontal ICH  s/p evacuation, evd s/p removal 3/2  repeat cth with resolving hemorrhage  f/u neurosx

## 2021-03-18 NOTE — PROGRESS NOTE ADULT - ASSESSMENT
75F w/ pmhx of CAD s/p stents, DM, HTN, HLD and B cell lymphoma on Imbruvica (since 10/2020), presented for AMS and /210.     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  - CTA H&N was negative for aneurysms or AVM    # B cell Marginal Zone lymphoma on Imbruvica (since 10/2020)    # HTN    # DM    # HLD/CAD s/p stents  Echo (10/13/20): EF 55-60%, trace MR, R atrial echodensity possible thrombus     # DVT ppx - Lovenox / Heparin SQ / SCDs  # GI ppx - PPI / Non indicated  # Diet -  # COVID test - Positive / Negative  Full code.    75F w/ pmhx of CAD s/p stents, DM, HTN, HLD and B cell lymphoma on Imbruvica (since 10/2020), presented for AMS and /210.     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  - CTA H&N was negative for aneurysms or AVM    # B cell Marginal Zone lymphoma on Imbruvica (since 10/2020)    # HTN    # DM    # HLD/CAD s/p stents  Echo (10/13/20): EF 55-60%, trace MR, R atrial echodensity possible thrombus     # DVT ppx - Lovenox Therapeutic  # GI ppx - PPI   # Diet - TF  # COVID test - Negative  Full code.    75F w/ pmhx of CAD s/p stents, DM, HTN, HLD and B cell lymphoma on Imbruvica (since 10/2020), presented for AMS and /210.     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  - CTA H&N was negative for aneurysms or AVM  - CT chest showed mucus plug in L lower lobe bronchus, moderate bilateral pleural effusions w/ areas of atelectasis  - Bronchial culture + aspergillus, on voriconazole  - EVD was removed on 3/2.  - Bedside trach/peg on 3/6.  - keppra for seizure prophylaxis     # Fever but no leucocytosis  # DVT of RUE likely cause  - on lovenox therapeutic   - will d/c PICC line  - f/u CXR and blood cx    # B cell Marginal Zone lymphoma on Imbruvica (since 10/2020)  - Heme/Onc: Imbruvica will NOT be re-started as it is associated with high risk of bleeding  - Transfuse for platelet count <100K and hemoglobin < 7    # HTN Emergency on presentation - now resolved  - BP controlled now w/ labetalol and Lisinopril    # DM, A1C 6.3  - monitor fingersticks, cont sliding scale for now    # HLD/CAD s/p stents  - Echo (10/13/20): EF 55-60%, trace MR, R atrial echodensity possible thrombus   - Cont Aspirin, Statin, BB    # Hypomagnesemia  - repleted    # DVT ppx - Lovenox Therapeutic  # GI ppx - PPI   # Diet - TF  # COVID test - Negative  Full code.

## 2021-03-18 NOTE — PROGRESS NOTE ADULT - SUBJECTIVE AND OBJECTIVE BOX
Hospital Day:      Subjective: Patient is a 75y old  Female who presents with a chief complaint of altered mental status (17 Mar 2021 14:14)    Pt seen and evaluated at bedside.   Complaints: unable to obtain, Pt is unarousable, withdraws to pain however.   Over the night Events: Transferred to Medicine for Fever work up and RUE DVT    Past Medical Hx:   Anemia    DM (diabetes mellitus)    High cholesterol    HTN (hypertension)    B-cell chronic lymphocytic leukemia variant    CAD (coronary artery disease)      Past Sx:  H/O heart artery stent    No significant past surgical history      Allergies:  penicillin (Anaphylaxis; Hives)    Current Meds:   Standng Meds:  aspirin  chewable 81 milliGRAM(s) Oral daily  atorvastatin 80 milliGRAM(s) Oral at bedtime  BACItracin   Ointment 1 Application(s) Topical two times a day  chlorhexidine 0.12% Liquid 15 milliLiter(s) Oral Mucosa two times a day  chlorhexidine 4% Liquid 1 Application(s) Topical daily  enoxaparin Injectable 75 milliGRAM(s) SubCutaneous every 12 hours  insulin regular  human corrective regimen sliding scale   SubCutaneous every 6 hours  labetalol 400 milliGRAM(s) Oral every 8 hours  levETIRAcetam  IVPB 750 milliGRAM(s) IV Intermittent every 12 hours  lisinopril 20 milliGRAM(s) Oral daily  metFORMIN 500 milliGRAM(s) Oral every 12 hours  nystatin Powder 1 Application(s) Topical two times a day  pantoprazole   Suspension 40 milliGRAM(s) Oral daily  senna 2 Tablet(s) Oral at bedtime  voriconazole IVPB 300 milliGRAM(s) IV Intermittent every 12 hours    PRN Meds:  acetaminophen    Suspension .. 650 milliGRAM(s) Enteral Tube every 6 hours PRN Temp greater or equal to 38C (100.4F)  acetaminophen   Tablet .. 650 milliGRAM(s) Oral every 6 hours PRN Moderate Pain (4 - 6)      Vital Signs:   T(F): 100.9 (21 @ 10:00), Max: 102.5 (21 @ 21:12)  HR: 91 (21 @ 09:53) (91 - 98)  BP: 184/79 (21 @ 05:24) (122/60 - 184/79)  RR: 20 (21 @ 05:24) (20 - 20)  SpO2: 97% (21 @ 09:53) (97% - 99%)    Physical Exam:   GENERAL: NAD, Resting in bed  HEENT: NCAT is Trached  CHEST/LUNG: No wheezing or rubs.   HEART: Regular rate and rhythm  ABDOMEN: Bowel sounds present; Soft, Nontender, Nondistended. PEGd   EXTREMITIES: RUE edema  NERVOUS SYSTEM:  Alert & Oriented X0    FLUID BALANCE    21 @ 07:01  -  21 @ 07:00  --------------------------------------------------------  IN: 2695 mL / OUT: 1300 mL / NET: 1395 mL    21 @ 07:01  -  21 @ 07:00  --------------------------------------------------------  IN: 2840 mL / OUT: 1500 mL / NET: 1340 mL    Labs:                         8.8    4.67  )-----------( 160      ( 16 Mar 2021 21:55 )             29.4       17 Mar 2021 11:00    136    |  98     |  12     ----------------------------<  145    3.9     |  29     |  <0.5     Ca    8.1        17 Mar 2021 11:00    Urinalysis Basic - ( 17 Mar 2021 09:48 )    Color: Light Yellow / Appearance: Clear / S.018 / pH: x  Gluc: x / Ketone: Negative  / Bili: Negative / Urobili: <2 mg/dL   Blood: x / Protein: Trace / Nitrite: Negative   Leuk Esterase: Negative / RBC: x / WBC x   Sq Epi: x / Non Sq Epi: x / Bacteria: x    Radiology:   < from: [facility] Duplex Upper Ext Vein Scan, Right (21 @ 12:54) >  The cephalic vein is thrombosed.  The basilic vein is patent.  Brachial vein is thrombosed  Impression: Cephalic vein is thrombosed right brachial vein is thrombosed  < end of copied text >    < from: Xray Chest 1 View- PORTABLE-Urgent (Xray Chest 1 View- PORTABLE-Urgent .) (21 @ 11:40) >  Impression: No significant radiographic change on frontal view.  < end of copied text >   Hospital Day:      Subjective: Patient is a 75y old  Female who presents with a chief complaint of altered mental status (17 Mar 2021 14:14)    Pt seen and evaluated at bedside.   Complaints: unable to obtain ros, Pt is unarousable, withdraws to pain however.   Over the night Events: Transferred to Medicine for Fever work up and RUE DVT    Past Medical Hx:   Anemia    DM (diabetes mellitus)    High cholesterol    HTN (hypertension)    B-cell chronic lymphocytic leukemia variant    CAD (coronary artery disease)      Past Sx:  H/O heart artery stent    No significant past surgical history      Allergies:  penicillin (Anaphylaxis; Hives)    Current Meds:   Standng Meds:  aspirin  chewable 81 milliGRAM(s) Oral daily  atorvastatin 80 milliGRAM(s) Oral at bedtime  BACItracin   Ointment 1 Application(s) Topical two times a day  chlorhexidine 0.12% Liquid 15 milliLiter(s) Oral Mucosa two times a day  chlorhexidine 4% Liquid 1 Application(s) Topical daily  enoxaparin Injectable 75 milliGRAM(s) SubCutaneous every 12 hours  insulin regular  human corrective regimen sliding scale   SubCutaneous every 6 hours  labetalol 400 milliGRAM(s) Oral every 8 hours  levETIRAcetam  IVPB 750 milliGRAM(s) IV Intermittent every 12 hours  lisinopril 20 milliGRAM(s) Oral daily  metFORMIN 500 milliGRAM(s) Oral every 12 hours  nystatin Powder 1 Application(s) Topical two times a day  pantoprazole   Suspension 40 milliGRAM(s) Oral daily  senna 2 Tablet(s) Oral at bedtime  voriconazole IVPB 300 milliGRAM(s) IV Intermittent every 12 hours    PRN Meds:  acetaminophen    Suspension .. 650 milliGRAM(s) Enteral Tube every 6 hours PRN Temp greater or equal to 38C (100.4F)  acetaminophen   Tablet .. 650 milliGRAM(s) Oral every 6 hours PRN Moderate Pain (4 - 6)      Vital Signs:   T(F): 100.9 (21 @ 10:00), Max: 102.5 (21 @ 21:12)  HR: 91 (21 @ 09:53) (91 - 98)  BP: 184/79 (21 @ 05:24) (122/60 - 184/79)  RR: 20 (21 @ 05:24) (20 - 20)  SpO2: 97% (21 @ 09:53) (97% - 99%)    Physical Exam:   GENERAL: NAD, Resting in bed  HEENT: NCAT is Trached  CHEST/LUNG: No wheezing or rubs.   HEART: Regular rate and rhythm  ABDOMEN: Bowel sounds present; Soft, Nontender, Nondistended. PEGd   EXTREMITIES: RUE edema  NERVOUS SYSTEM:  Alert & Oriented X0    FLUID BALANCE    21 @ 07:01  -  21 @ 07:00  --------------------------------------------------------  IN: 2695 mL / OUT: 1300 mL / NET: 1395 mL    21 @ 07:01  -  21 @ 07:00  --------------------------------------------------------  IN: 2840 mL / OUT: 1500 mL / NET: 1340 mL    Labs:                         8.8    4.67  )-----------( 160      ( 16 Mar 2021 21:55 )             29.4       17 Mar 2021 11:00    136    |  98     |  12     ----------------------------<  145    3.9     |  29     |  <0.5     Ca    8.1        17 Mar 2021 11:00    Urinalysis Basic - ( 17 Mar 2021 09:48 )    Color: Light Yellow / Appearance: Clear / S.018 / pH: x  Gluc: x / Ketone: Negative  / Bili: Negative / Urobili: <2 mg/dL   Blood: x / Protein: Trace / Nitrite: Negative   Leuk Esterase: Negative / RBC: x / WBC x   Sq Epi: x / Non Sq Epi: x / Bacteria: x    Radiology:   < from: [facility] Duplex Upper Ext Vein Scan, Right (21 @ 12:54) >  The cephalic vein is thrombosed.  The basilic vein is patent.  Brachial vein is thrombosed  Impression: Cephalic vein is thrombosed right brachial vein is thrombosed  < end of copied text >    < from: Xray Chest 1 View- PORTABLE-Urgent (Xray Chest 1 View- PORTABLE-Urgent .) (21 @ 11:40) >  Impression: No significant radiographic change on frontal view.  < end of copied text >

## 2021-03-19 NOTE — PROGRESS NOTE ADULT - ATTENDING COMMENTS
#Fever  f/u repeat bcx  f/u procal  check cxr  f/u id re: duration of voriconazole, for aspergillus pna  #RUE DVT, brachial  therapeutic lovenox  f/u vasc  #L frontal ICH  s/p evacuation, evd s/p removal 3/2  repeat cth with resolving hemorrhage  f/u neurosx

## 2021-03-19 NOTE — PROGRESS NOTE ADULT - SUBJECTIVE AND OBJECTIVE BOX
OVERNIGHT EVENTS: events noted, low grade T    Vital Signs Last 24 Hrs  T(C): 37.4 (19 Mar 2021 14:03), Max: 38.2 (19 Mar 2021 06:00)  T(F): 99.4 (19 Mar 2021 14:03), Max: 100.8 (19 Mar 2021 06:00)  HR: 87 (19 Mar 2021 14:03) (78 - 90)  BP: 136/72 (19 Mar 2021 14:03) (136/72 - 167/77)  BP(mean): 93 (19 Mar 2021 14:03) (93 - 111)  RR: 20 (19 Mar 2021 14:03) (18 - 20)  SpO2: 100% (19 Mar 2021 10:05) (95% - 100%)    PHYSICAL EXAMINATION:    GENERAL: ill looking    HEENT: Head is normocephalic and atraumatic. trach    NECK: Supple.    LUNGS: bl crackles    HEART: Regular rate and rhythm without murmur.    ABDOMEN: Soft, nontender, and nondistended.      EXTREMITIES: Without any cyanosis, clubbing, rash, lesions or edema.    NEUROLOGIC: not following commands          LABS:                        8.4    6.44  )-----------( 128      ( 19 Mar 2021 12:00 )             26.8     03-19    133<L>  |  94<L>  |  16  ----------------------------<  151<H>  4.1   |  30  |  <0.5<L>    Ca    8.2<L>      19 Mar 2021 12:00  Mg     2.0     03-19    TPro  4.9<L>  /  Alb  3.0<L>  /  TBili  0.3  /  DBili  x   /  AST  20  /  ALT  11  /  AlkPhos  195<H>  03-19      FIO2 30%                    03-18-21 @ 07:01  -  03-19-21 @ 07:00  --------------------------------------------------------  IN: 2540 mL / OUT: 702 mL / NET: 1838 mL    03-19-21 @ 07:01  -  03-19-21 @ 18:25  --------------------------------------------------------  IN: 0 mL / OUT: 501 mL / NET: -501 mL        MICROBIOLOGY:  Culture Results:   No growth to date. (03-17 @ 11:00)      MEDICATIONS  (STANDING):  aspirin  chewable 81 milliGRAM(s) Oral daily  atorvastatin 80 milliGRAM(s) Oral at bedtime  BACItracin   Ointment 1 Application(s) Topical two times a day  chlorhexidine 0.12% Liquid 15 milliLiter(s) Oral Mucosa two times a day  chlorhexidine 4% Liquid 1 Application(s) Topical daily  enoxaparin Injectable 75 milliGRAM(s) SubCutaneous every 12 hours  insulin regular  human corrective regimen sliding scale   SubCutaneous every 6 hours  labetalol 400 milliGRAM(s) Oral every 8 hours  levETIRAcetam  IVPB 750 milliGRAM(s) IV Intermittent every 12 hours  lisinopril 20 milliGRAM(s) Oral daily  nystatin Powder 1 Application(s) Topical two times a day  pantoprazole   Suspension 40 milliGRAM(s) Oral daily  senna 2 Tablet(s) Oral at bedtime  voriconazole 200 milliGRAM(s) Oral every 12 hours    MEDICATIONS  (PRN):  acetaminophen    Suspension .. 650 milliGRAM(s) Enteral Tube every 6 hours PRN Temp greater or equal to 38C (100.4F)  acetaminophen   Tablet .. 650 milliGRAM(s) Oral every 6 hours PRN Moderate Pain (4 - 6)      RADIOLOGY & ADDITIONAL STUDIES:

## 2021-03-19 NOTE — PROGRESS NOTE ADULT - SUBJECTIVE AND OBJECTIVE BOX
SUBJECTIVE:    Patient is a 75y old Female who presents with a chief complaint of altered mental status (18 Mar 2021 17:24)    Currently admitted to medicine with the primary diagnosis of CVA (cerebral vascular accident)       Today is hospital day 23d.    Overnight no events.     This morning patient was visibly sweating. Was not arousable to voice or sternal rub. She was not following commands.     PAST MEDICAL & SURGICAL HISTORY  Anemia    DM (diabetes mellitus)    High cholesterol    HTN (hypertension)    B-cell chronic lymphocytic leukemia variant    CAD (coronary artery disease)  s/p stenting    H/O heart artery stent        ALLERGIES:  penicillin (Anaphylaxis; Hives)    MEDICATIONS:  STANDING MEDICATIONS  aspirin  chewable 81 milliGRAM(s) Oral daily  atorvastatin 80 milliGRAM(s) Oral at bedtime  BACItracin   Ointment 1 Application(s) Topical two times a day  chlorhexidine 0.12% Liquid 15 milliLiter(s) Oral Mucosa two times a day  chlorhexidine 4% Liquid 1 Application(s) Topical daily  enoxaparin Injectable 75 milliGRAM(s) SubCutaneous every 12 hours  insulin regular  human corrective regimen sliding scale   SubCutaneous every 6 hours  labetalol 400 milliGRAM(s) Oral every 8 hours  levETIRAcetam  IVPB 750 milliGRAM(s) IV Intermittent every 12 hours  lisinopril 20 milliGRAM(s) Oral daily  nystatin Powder 1 Application(s) Topical two times a day  pantoprazole   Suspension 40 milliGRAM(s) Oral daily  senna 2 Tablet(s) Oral at bedtime  voriconazole IVPB 300 milliGRAM(s) IV Intermittent every 12 hours    PRN MEDICATIONS  acetaminophen    Suspension .. 650 milliGRAM(s) Enteral Tube every 6 hours PRN  acetaminophen   Tablet .. 650 milliGRAM(s) Oral every 6 hours PRN    VITALS:   T(F): 100.8  HR: 88  BP: 167/77  RR: 20  SpO2: 95%    LABS:                        8.8    7.44  )-----------( 147      ( 18 Mar 2021 11:49 )             28.1     03-    137  |  98  |  15  ----------------------------<  147<H>  3.9   |  28  |  <0.5<L>    Ca    8.4<L>      18 Mar 2021 11:49  Mg     1.5         TPro  5.0<L>  /  Alb  3.1<L>  /  TBili  0.3  /  DBili  x   /  AST  23  /  ALT  13  /  AlkPhos  219<H>        Urinalysis Basic - ( 17 Mar 2021 09:48 )    Color: Light Yellow / Appearance: Clear / S.018 / pH: x  Gluc: x / Ketone: Negative  / Bili: Negative / Urobili: <2 mg/dL   Blood: x / Protein: Trace / Nitrite: Negative   Leuk Esterase: Negative / RBC: x / WBC x   Sq Epi: x / Non Sq Epi: x / Bacteria: x            Culture - Blood (collected 17 Mar 2021 11:00)  Source: .Blood Blood  Preliminary Report (18 Mar 2021 22:01):    No growth to date.        PHYSICAL EXAM:  GEN: No acute distress  PULM/CHEST: Clear to auscultation bilaterally, no rales, rhonchi or wheezes   CVS: Regular rate and rhythm, S1-S2, no murmurs  ABD: Soft, non-tender, non-distended, +BS  EXT: No edema; significant R arm edema   NEURO: Not following commands          SUBJECTIVE:    Patient is a 75y old Female who presents with a chief complaint of altered mental status (18 Mar 2021 17:24)    Currently admitted to medicine with the primary diagnosis of CVA (cerebral vascular accident)       Today is hospital day 23d.    Overnight no events.     This morning patient was visibly sweating. Was not arousable to voice or sternal rub. She was not following commands.   unable to obtain ros    PAST MEDICAL & SURGICAL HISTORY  Anemia    DM (diabetes mellitus)    High cholesterol    HTN (hypertension)    B-cell chronic lymphocytic leukemia variant    CAD (coronary artery disease)  s/p stenting    H/O heart artery stent        ALLERGIES:  penicillin (Anaphylaxis; Hives)    MEDICATIONS:  STANDING MEDICATIONS  aspirin  chewable 81 milliGRAM(s) Oral daily  atorvastatin 80 milliGRAM(s) Oral at bedtime  BACItracin   Ointment 1 Application(s) Topical two times a day  chlorhexidine 0.12% Liquid 15 milliLiter(s) Oral Mucosa two times a day  chlorhexidine 4% Liquid 1 Application(s) Topical daily  enoxaparin Injectable 75 milliGRAM(s) SubCutaneous every 12 hours  insulin regular  human corrective regimen sliding scale   SubCutaneous every 6 hours  labetalol 400 milliGRAM(s) Oral every 8 hours  levETIRAcetam  IVPB 750 milliGRAM(s) IV Intermittent every 12 hours  lisinopril 20 milliGRAM(s) Oral daily  nystatin Powder 1 Application(s) Topical two times a day  pantoprazole   Suspension 40 milliGRAM(s) Oral daily  senna 2 Tablet(s) Oral at bedtime  voriconazole IVPB 300 milliGRAM(s) IV Intermittent every 12 hours    PRN MEDICATIONS  acetaminophen    Suspension .. 650 milliGRAM(s) Enteral Tube every 6 hours PRN  acetaminophen   Tablet .. 650 milliGRAM(s) Oral every 6 hours PRN    VITALS:   T(F): 100.8  HR: 88  BP: 167/77  RR: 20  SpO2: 95%    LABS:                        8.8    7.44  )-----------( 147      ( 18 Mar 2021 11:49 )             28.1     03-18    137  |  98  |  15  ----------------------------<  147<H>  3.9   |  28  |  <0.5<L>    Ca    8.4<L>      18 Mar 2021 11:49  Mg     1.5         TPro  5.0<L>  /  Alb  3.1<L>  /  TBili  0.3  /  DBili  x   /  AST  23  /  ALT  13  /  AlkPhos  219<H>        Urinalysis Basic - ( 17 Mar 2021 09:48 )    Color: Light Yellow / Appearance: Clear / S.018 / pH: x  Gluc: x / Ketone: Negative  / Bili: Negative / Urobili: <2 mg/dL   Blood: x / Protein: Trace / Nitrite: Negative   Leuk Esterase: Negative / RBC: x / WBC x   Sq Epi: x / Non Sq Epi: x / Bacteria: x            Culture - Blood (collected 17 Mar 2021 11:00)  Source: .Blood Blood  Preliminary Report (18 Mar 2021 22:01):    No growth to date.        PHYSICAL EXAM:  GEN: No acute distress  PULM/CHEST: Clear to auscultation bilaterally, no rales, rhonchi or wheezes   CVS: Regular rate and rhythm, S1-S2, no murmurs  ABD: Soft, non-tender, non-distended, +BS  EXT: No edema; significant R arm edema   NEURO: Not following commands

## 2021-03-19 NOTE — PROGRESS NOTE ADULT - ASSESSMENT
75F w/ pmhx of CAD s/p stents, DM, HTN, HLD and B cell lymphoma on Imbruvica (since 10/2020), presented for AMS and /210.     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  - CTA H&N was negative for aneurysms or AVM  - CT chest showed mucus plug in L lower lobe bronchus, moderate bilateral pleural effusions w/ areas of atelectasis  - Bronchial culture + aspergillus, on voriconazole  - EVD was removed on 3/2.  - Bedside trach/peg on 3/6.  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    75F w/ pmhx of CAD s/p stents, DM, HTN, HLD and B cell lymphoma on Imbruvica (since 10/2020), presented for AMS and /210.     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  - CTA H&N was negative for aneurysms or AVM  - CT chest showed mucus plug in L lower lobe bronchus, moderate bilateral pleural effusions w/ areas of atelectasis  - Bronchial culture + aspergillus, on voriconazole  - EVD was removed on 3/2.  - Bedside trach/peg on 3/6.  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   Dispo: f/u procal and blood cultures    75F w/ pmhx of CAD s/p stents, DM, HTN, HLD and B cell lymphoma on Imbruvica (since 10/2020), presented for AMS and /210.     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  - CTA H&N was negative for aneurysms or AVM  - CT chest showed mucus plug in L lower lobe bronchus, moderate bilateral pleural effusions w/ areas of atelectasis  - Bronchial culture + aspergillus, on voriconazole  - EVD was removed on 3/2.  - Bedside trach/peg on 3/6.  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   Dispo: f/u procal and blood cultures

## 2021-03-19 NOTE — PROGRESS NOTE ADULT - ASSESSMENT
IMPRESSION:    L ICH s/p resection of hematoma and s/p EVD placement and removal  Aspergillus PNA  HO B cell lymphoma  fever nl wbc    PLAN:    CNS: Avoid CNS depressants. FU Neuro.     HEENT: Oral care    PULMONARY:  HOB @ 45 degrees.  Aspiration precautions. Trach care. CPAP trial    CARDIOVASCULAR: Avoid volume overload.    GI: GI prophylaxis. PEG feeding.    RENAL:  Follow up lytes.  Correct as needed    INFECTIOUS DISEASE: abx per ID, pancx    HEMATOLOGICAL:  DVT prophylaxis    ENDOCRINE:  Follow up FS.  Insulin protocol if needed.    MUSCULOSKELETAL: bedrest    poor prognosis

## 2021-03-20 NOTE — PROGRESS NOTE ADULT - SUBJECTIVE AND OBJECTIVE BOX
AD BASURTO  75y, Female  Allergy: penicillin (Anaphylaxis; Hives)      LOS  24d    CHIEF COMPLAINT: altered mental status (20 Mar 2021 07:04)      INTERVAL EVENTS/HPI  - No acute events overnight  - T(F): , Max: 100.6 (03-20-21 @ 05:05)  - remains febrile, WBC stable  - WBC Count: 6.44 (03-19-21 @ 12:00)  WBC Count: 7.44 (03-18-21 @ 11:49)     - Creatinine, Serum: <0.5 (03-19-21 @ 12:00)  Creatinine, Serum: <0.5 (03-18-21 @ 11:49)       ROS  General: Denies rigors, nightsweats  HEENT: Denies headache, rhinorrhea, sore throat, eye pain  CV: Denies CP, palpitations  PULM: Denies wheezing, hemoptysis  GI: Denies hematemesis, hematochezia, melena  : Denies discharge, hematuria  MSK: Denies arthralgias, myalgias  SKIN: Denies rash, lesions  NEURO: Denies paresthesias, weakness  PSYCH: Denies depression, anxiety    VITALS:  T(F): 100.6, Max: 100.6 (03-20-21 @ 05:05)  HR: 81  BP: 138/65  RR: 20Vital Signs Last 24 Hrs  T(C): 38.1 (20 Mar 2021 05:05), Max: 38.1 (20 Mar 2021 05:05)  T(F): 100.6 (20 Mar 2021 05:05), Max: 100.6 (20 Mar 2021 05:05)  HR: 81 (20 Mar 2021 05:05) (78 - 88)  BP: 138/65 (20 Mar 2021 05:05) (123/61 - 138/65)  BP(mean): 93 (20 Mar 2021 05:05) (86 - 93)  RR: 20 (20 Mar 2021 05:05) (19 - 20)  SpO2: 99% (20 Mar 2021 05:05) (98% - 100%)    PHYSICAL EXAM:  Gen: NAD, resting in bed  HEENT: Normocephalic, atraumatic  Neck: supple, no lymphadenopathy  CV: Regular rate & regular rhythm  Lungs: decreased BS at bases, no fremitus  Abdomen: Soft, BS present  Ext: Warm, well perfused  Neuro: non focal, awake  Skin: no rash, no erythema  Lines: no phlebitis    FH: Non-contributory  Social Hx: Non-contributory    TESTS & MEASUREMENTS:                        8.4    6.44  )-----------( 128      ( 19 Mar 2021 12:00 )             26.8     03-19    133<L>  |  94<L>  |  16  ----------------------------<  151<H>  4.1   |  30  |  <0.5<L>    Ca    8.2<L>      19 Mar 2021 12:00  Mg     2.0     03-19    TPro  4.9<L>  /  Alb  3.0<L>  /  TBili  0.3  /  DBili  x   /  AST  20  /  ALT  11  /  AlkPhos  195<H>  03-19    eGFR if Non African American: 112 mL/min/1.73M2 (03-19-21 @ 12:00)  eGFR if African American: 130 mL/min/1.73M2 (03-19-21 @ 12:00)    LIVER FUNCTIONS - ( 19 Mar 2021 12:00 )  Alb: 3.0 g/dL / Pro: 4.9 g/dL / ALK PHOS: 195 U/L / ALT: 11 U/L / AST: 20 U/L / GGT: x               Culture - Blood (collected 03-18-21 @ 11:49)  Source: .Blood None  Preliminary Report (03-19-21 @ 22:02):    No growth to date.    Culture - Blood (collected 03-17-21 @ 11:00)  Source: .Blood Blood  Preliminary Report (03-18-21 @ 22:01):    No growth to date.    Culture - Blood (collected 03-05-21 @ 16:46)  Source: .Blood None  Final Report (03-11-21 @ 02:21):    No Growth Final    Culture - Blood (collected 03-04-21 @ 20:08)  Source: .Blood None  Final Report (03-10-21 @ 04:00):    No Growth Final    Culture - Fungal, CSF (collected 03-02-21 @ 14:31)  Source: .CSF CSF  Preliminary Report (03-10-21 @ 15:02):    No growth    Culture - Blood (collected 02-28-21 @ 23:21)  Source: .Blood None  Final Report (03-06-21 @ 09:00):    No Growth Final    Culture - CSF with Gram Stain (collected 02-27-21 @ 19:50)  Source: .CSF CSF  Gram Stain (02-28-21 @ 01:32):    polymorphonuclear leukocytes seen    No organisms seen    by cytocentrifuge  Final Report (03-02-21 @ 15:57):    No growth at 3 days.    Culture - Bronchial (collected 02-27-21 @ 12:22)  Source: Bronch Wash Bronchoalveolar Lavage  Gram Stain (02-27-21 @ 22:39):    Moderate polymorphonuclear leukocytes per low power field    Few Squamous epithelial cells per low power field    Moderate Yeast like cells per oil power field    Few Gram positive cocci in pairs per oil power field  Final Report (03-05-21 @ 10:10):    Aspergillus fumigatus    Normal Respiratory Macie present    Culture - Fungal, CSF (collected 02-26-21 @ 15:20)  Source: .CSF CSF  Preliminary Report (03-06-21 @ 15:02):    No growth    Culture - Acid Fast - CSF (collected 02-26-21 @ 15:20)  Source: .CSF CSF  Preliminary Report (03-06-21 @ 15:03):    No growth at 1 week.    Culture - CSF with Gram Stain (collected 02-26-21 @ 15:20)  Source: .CSF CSF... lumbar tap  Gram Stain (02-27-21 @ 07:24):    polymorphonuclear leukocytes seen    No organisms seen    by cytocentrifuge  Final Report (03-01-21 @ 16:14):    No growth at 3 days.    Culture - Blood (collected 02-25-21 @ 12:40)  Source: .Blood Blood-Peripheral  Final Report (03-02-21 @ 23:00):    No Growth Final            INFECTIOUS DISEASES TESTING  Procalcitonin, Serum: 0.09 (03-19-21 @ 12:00)  Rapid RVP Result: NotDetec (03-17-21 @ 10:30)  COVID-19 PCR: NotDetec (03-11-21 @ 15:38)  Aspergillus Galactomannan Antigen: <0.500 (03-02-21 @ 15:49)  Fungitell: <31 (03-01-21 @ 23:20)  Aspergillus Galactomannan Antigen: <0.500 (03-01-21 @ 23:20)  Rapid RVP Result: NotDetec (02-24-21 @ 15:43)  Procalcitonin, Serum: 0.43 (10-12-20 @ 20:34)  COVID-19 PCR: NotDetec (10-12-20 @ 16:21)  MRSA PCR Result.: Negative (08-25-20 @ 05:04)  COVID-19 PCR: NotDetec (08-24-20 @ 19:00)  Procalcitonin, Serum: 0.26 (07-06-20 @ 09:30)  COVID-19 PCR: NotDetec (07-05-20 @ 18:24)  COVID-19 PCR: NotDetec (06-10-20 @ 14:21)  COVID-19 PCR: NotDetec (05-18-20 @ 14:28)      INFLAMMATORY MARKERS      RADIOLOGY & ADDITIONAL TESTS:  I have personally reviewed the last available Chest xray  CXR  Xray Chest 1 View- PORTABLE-Urgent:   EXAM:  XR CHEST PORTABLE URGENT 1V            PROCEDURE DATE:  03/17/2021            INTERPRETATION:  STUDY INDICATION: Fever    Comparison: XR  3/13/2021.    Technique/Positioning: Frontal view of the chest.    Findings:    Support devices: Unchanged right tracheostomy and PICC.    Cardiac/mediastinum/hilum: Stable appearance of the cardiomediastinal silhouette.    Lung parenchyma/Pleura: Bibasal pleural-parenchymal opacities without change. No pneumothorax.    Skeleton/soft tissues: Unchanged.    Impression:    No significant radiographic change on frontal view.                ENZO ZEE MD; Attending Radiologist  This document has been electronically signed. Mar 17 2021  2:09PM (03-17-21 @ 11:40)      CT      CARDIOLOGY TESTING      MEDICATIONS  aspirin  chewable 81 Oral daily  atorvastatin 80 Oral at bedtime  BACItracin   Ointment 1 Topical two times a day  chlorhexidine 0.12% Liquid 15 Oral Mucosa two times a day  chlorhexidine 4% Liquid 1 Topical daily  enoxaparin Injectable 75 SubCutaneous every 12 hours  insulin regular  human corrective regimen sliding scale  SubCutaneous every 6 hours  labetalol 400 Oral every 8 hours  levETIRAcetam  IVPB 750 IV Intermittent every 12 hours  lisinopril 20 Oral daily  nystatin Powder 1 Topical two times a day  pantoprazole   Suspension 40 Oral daily  senna 2 Oral at bedtime  voriconazole 200 Oral every 12 hours      WEIGHT  Weight (kg): 76 (03-06-21 @ 08:50)  Creatinine, Serum: <0.5 mg/dL (03-19-21 @ 12:00)      ANTIBIOTICS:  voriconazole 200 milliGRAM(s) Oral every 12 hours      All available historical records have been reviewed

## 2021-03-20 NOTE — PROGRESS NOTE ADULT - ASSESSMENT
75F w/ pmhx of CAD s/p stents, DM, HTN, HLD and B cell lymphoma on Imbruvica (since 10/2020), presented for AMS and /210.     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  - CTA H&N was negative for aneurysms or AVM  - CT chest showed mucus plug in L lower lobe bronchus, moderate bilateral pleural effusions w/ areas of atelectasis  - Bronchial culture + aspergillus, on voriconazole  - EVD was removed on 3/2.  - Bedside trach/peg on 3/6.  - keppra for seizure prophylaxis  - craniotomy sit was pulsating with breathing today; neurosx called and said nothing to do but if it leaks, call neurosx stat      # Fever but no leukocytosis  # DVT RUE extremity  # Aspergillus pneumonia   - Fever likely secondary to DVT vs. 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   Dispo: f/u procal and blood cultures        75F w/ pmhx of CAD s/p stents, DM, HTN, HLD and B cell lymphoma on Imbruvica (since 10/2020), presented for AMS and /210.     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  - CTA H&N was negative for aneurysms or AVM  - CT chest showed mucus plug in L lower lobe bronchus, moderate bilateral pleural effusions w/ areas of atelectasis  - Bronchial culture + aspergillus, on voriconazole  - EVD was removed on 3/2.  - Bedside trach/peg on 3/6.  - keppra for seizure prophylaxis  - craniotomy sit was pulsating with breathing today; neurosx called and said nothing to do but if it leaks, call neurosx stat      # Fever but no leukocytosis  # DVT RUE extremity  # Aspergillus pneumonia   - 2/27 bronch-   Aspergillus fumigatus   - Fever likely secondary to DVT vs. 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   Dispo: f/u procal and blood cultures

## 2021-03-20 NOTE — PROGRESS NOTE ADULT - ATTENDING COMMENTS
#Fever  resolving, repeat bcx ntd  f/u procal  cxr noted  appreciate id  cont vori, f/u trough  #RUE DVT, brachial  therapeutic lovenox  f/u vasc  #L frontal ICH  s/p evacuation, evd s/p removal 3/2  repeat cth with resolving hemorrhage  f/u neurosx #Fever  resolving, repeat bcx ntd  f/u procal  cxr noted  appreciate id  cont vori, f/u trough  #RUE DVT, brachial  therapeutic lovenox  f/u vasc  #L frontal ICH  s/p evacuation, evd s/p removal 3/2  repeat cth with resolving hemorrhage  f/u neurosx  keppra 750 bid

## 2021-03-20 NOTE — CHART NOTE - NSCHARTNOTEFT_GEN_A_CORE
Family concerned about left upper arm swelling.   chart reviewed, pt seen and examined at bedside.   < from: VA Duplex Upper Ext Vein Scan, Right (03.17.21 @ 12:54) >  Impression:  Cephalic vein is thrombosed right brachial vein is thrombosed  pt is on Lovenox 75 BID.   Will order Left upper extremity duplex

## 2021-03-20 NOTE — PROGRESS NOTE ADULT - SUBJECTIVE AND OBJECTIVE BOX
Patient is a 75y old  Female who presents with a chief complaint of altered mental status (20 Mar 2021 09:33)        Over Night Events:  Remains on MV.          ROS:     All ROS are negative except HPI         PHYSICAL EXAM    ICU Vital Signs Last 24 Hrs  T(C): 38.1 (20 Mar 2021 05:05), Max: 38.1 (20 Mar 2021 05:05)  T(F): 100.6 (20 Mar 2021 05:05), Max: 100.6 (20 Mar 2021 05:05)  HR: 81 (20 Mar 2021 05:05) (81 - 88)  BP: 138/65 (20 Mar 2021 05:05) (123/61 - 138/65)  BP(mean): 93 (20 Mar 2021 05:05) (86 - 93)  ABP: --  ABP(mean): --  RR: 20 (20 Mar 2021 05:05) (19 - 20)  SpO2: 99% (20 Mar 2021 05:05) (98% - 100%)      CONSTITUTIONAL:  Well nourished.  Ill appearing. NAD    ENT:   Airway patent,   Mouth with normal mucosa.   No thrush    EYES:   Pupils equal,   Round and reactive to light.    CARDIAC:   Normal rate,   Regular rhythm.     edema      Vascular:  Normal systolic impulse  No Carotid bruits    RESPIRATORY:   No wheezing  Bilateral BS  Normal chest expansion  Not tachypneic,  No use of accessory muscles    GASTROINTESTINAL:  Abdomen soft,   Non-tender,   No guarding,   + BS    MUSCULOSKELETAL:   Range of motion is not limited,  No clubbing, cyanosis    NEUROLOGICAL:   Responds to pain  Does not follow commands     SKIN:   Skin normal color for race,   Warm and dry .   No evidence of rash.    PSYCHIATRIC:   No apparent risk to self or others.    HEMATOLOGICAL:  No cervical  lymphadenopathy.  no inguinal lymphadenopathy      03-19-21 @ 07:01  -  03-20-21 @ 07:00  --------------------------------------------------------  IN:    Enteral Tube Flush: 200 mL    Glucerna 1.5: 1080 mL    IV PiggyBack: 100 mL  Total IN: 1380 mL    OUT:    Indwelling Catheter - Urethral (mL): 1200 mL    Stool (mL): 1 mL  Total OUT: 1201 mL    Total NET: 179 mL          LABS:                            8.6    6.95  )-----------( 124      ( 20 Mar 2021 04:30 )             28.0                                               03-20    138  |  98  |  15  ----------------------------<  178<H>  3.9   |  31  |  <0.5<L>    Ca    8.5      20 Mar 2021 04:30  Mg     1.9     03-20    TPro  5.1<L>  /  Alb  3.1<L>  /  TBili  0.3  /  DBili  x   /  AST  25  /  ALT  15  /  AlkPhos  221<H>  03-20                                                                                           LIVER FUNCTIONS - ( 20 Mar 2021 04:30 )  Alb: 3.1 g/dL / Pro: 5.1 g/dL / ALK PHOS: 221 U/L / ALT: 15 U/L / AST: 25 U/L / GGT: x                                                  Culture - Blood (collected 18 Mar 2021 11:49)  Source: .Blood None  Preliminary Report (19 Mar 2021 22:02):    No growth to date.    Culture - Blood (collected 17 Mar 2021 11:00)  Source: .Blood Blood  Preliminary Report (18 Mar 2021 22:01):    No growth to date.                                                   Mode: AC/ CMV (Assist Control/ Continuous Mandatory Ventilation)  RR (machine): 14  TV (machine): 400  FiO2: 30  PEEP: 5  ITime: 1  MAP: 11  PIP: 24                                          MEDICATIONS  (STANDING):  aspirin  chewable 81 milliGRAM(s) Oral daily  atorvastatin 80 milliGRAM(s) Oral at bedtime  BACItracin   Ointment 1 Application(s) Topical two times a day  chlorhexidine 0.12% Liquid 15 milliLiter(s) Oral Mucosa two times a day  chlorhexidine 4% Liquid 1 Application(s) Topical daily  enoxaparin Injectable 75 milliGRAM(s) SubCutaneous every 12 hours  insulin regular  human corrective regimen sliding scale   SubCutaneous every 6 hours  labetalol 400 milliGRAM(s) Oral every 8 hours  levETIRAcetam  IVPB 750 milliGRAM(s) IV Intermittent every 12 hours  lisinopril 20 milliGRAM(s) Oral daily  nystatin Powder 1 Application(s) Topical two times a day  pantoprazole   Suspension 40 milliGRAM(s) Oral daily  senna 2 Tablet(s) Oral at bedtime  voriconazole 200 milliGRAM(s) Oral every 12 hours    MEDICATIONS  (PRN):  acetaminophen    Suspension .. 650 milliGRAM(s) Enteral Tube every 6 hours PRN Temp greater or equal to 38C (100.4F)  acetaminophen   Tablet .. 650 milliGRAM(s) Oral every 6 hours PRN Moderate Pain (4 - 6)      New X-rays reviewed:                                                                                  ECHO    CXR interpreted by me:  Bilateral effusions

## 2021-03-20 NOTE — PROGRESS NOTE ADULT - ASSESSMENT
IMPRESSION:    L ICH s/p resection of hematoma and s/p EVD placement and removal  Aspergillus PNA  HO B cell lymphoma  UE DVT on LMWH     PLAN:    CNS: Avoid CNS depressants. FU Neuro.     HEENT: Oral care    PULMONARY:  HOB @ 45 degrees.  Aspiration precautions. Trach care. CPAP trial    CARDIOVASCULAR: Avoid volume overload.  Negative balance as tolerated     GI: GI prophylaxis. PEG feeding.     RENAL:  Follow up lytes.  Correct as needed    INFECTIOUS DISEASE: ABX per ID, FU pancx    HEMATOLOGICAL:  DVT prophylaxis.  On LMWH therapeutic     ENDOCRINE:  Follow up FS.  Insulin protocol if needed.    MUSCULOSKELETAL: bedrest    poor prognosis

## 2021-03-20 NOTE — PROGRESS NOTE ADULT - SUBJECTIVE AND OBJECTIVE BOX
Hospital Day:  24d    Subjective: Patient is a 75y old  Female who presents with a chief complaint of altered mental status (19 Mar 2021 18:25)      Pt seen and evaluated at bedside.   Complaints: none  Over the night Events: family requested duplex of left arm     Past Medical Hx:   Anemia    DM (diabetes mellitus)    High cholesterol    HTN (hypertension)    B-cell chronic lymphocytic leukemia variant    CAD (coronary artery disease)      Past Sx:  H/O heart artery stent    No significant past surgical history      Allergies:  penicillin (Anaphylaxis; Hives)    Current Meds:   Standng Meds:  aspirin  chewable 81 milliGRAM(s) Oral daily  atorvastatin 80 milliGRAM(s) Oral at bedtime  BACItracin   Ointment 1 Application(s) Topical two times a day  chlorhexidine 0.12% Liquid 15 milliLiter(s) Oral Mucosa two times a day  chlorhexidine 4% Liquid 1 Application(s) Topical daily  enoxaparin Injectable 75 milliGRAM(s) SubCutaneous every 12 hours  insulin regular  human corrective regimen sliding scale   SubCutaneous every 6 hours  labetalol 400 milliGRAM(s) Oral every 8 hours  levETIRAcetam  IVPB 750 milliGRAM(s) IV Intermittent every 12 hours  lisinopril 20 milliGRAM(s) Oral daily  nystatin Powder 1 Application(s) Topical two times a day  pantoprazole   Suspension 40 milliGRAM(s) Oral daily  senna 2 Tablet(s) Oral at bedtime  voriconazole 200 milliGRAM(s) Oral every 12 hours    PRN Meds:  acetaminophen    Suspension .. 650 milliGRAM(s) Enteral Tube every 6 hours PRN Temp greater or equal to 38C (100.4F)  acetaminophen   Tablet .. 650 milliGRAM(s) Oral every 6 hours PRN Moderate Pain (4 - 6)      Vital Signs:   T(F): 100.6 (21 @ 05:05), Max: 100.6 (21 @ 05:05)  HR: 81 (21 @ 05:05) (78 - 88)  BP: 138/65 (21 @ 05:05) (123/61 - 138/65)  RR: 20 (21 @ 05:05) (19 - 20)  SpO2: 99% (21 @ 05:05) (98% - 100%)    Physical Exam:   GENERAL: NAD, Resting in bed  HEENT: NCAT  CHEST/LUNG: Clear to auscultation bilaterally; No wheezing or rubs.   HEART: Regular rate and rhythm; No murmurs, rubs, or gallops  ABDOMEN: Bowel sounds present; Soft, Nontender, Nondistended.   EXTREMITIES:  No clubbing, cyanosis, or edema  NERVOUS SYSTEM:  Alert & Oriented X3    FLUID BALANCE    21 @ 07:01  -  21 @ 07:00  --------------------------------------------------------  IN: 2540 mL / OUT: 702 mL / NET: 1838 mL    21 @ 07:01  -  21 @ 07:00  --------------------------------------------------------  IN: 820 mL / OUT: 1201 mL / NET: -381 mL        Labs:                         8.4    6.44  )-----------( 128      ( 19 Mar 2021 12:00 )             26.8     Neutophil% 67.1, Lymphocyte% 15.4, Monocyte% 15.7, Bands% 0.5 21 @ 12:00    19 Mar 2021 12:00    133    |  94     |  16     ----------------------------<  151    4.1     |  30     |  <0.5     Ca    8.2        19 Mar 2021 12:00  Mg     2.0       19 Mar 2021 12:00    TPro  4.9    /  Alb  3.0    /  TBili  0.3    /  DBili  x      /  AST  20     /  ALT  11     /  AlkPhos  195    19 Mar 2021 12:00                    Urinalysis Basic - ( 17 Mar 2021 09:48 )    Color: Light Yellow / Appearance: Clear / S.018 / pH: x  Gluc: x / Ketone: Negative  / Bili: Negative / Urobili: <2 mg/dL   Blood: x / Protein: Trace / Nitrite: Negative   Leuk Esterase: Negative / RBC: x / WBC x   Sq Epi: x / Non Sq Epi: x / Bacteria: x          Culture - Blood (collected 21 @ 11:49)  Source: .Blood None  Preliminary Report (21 @ 22:02):    No growth to date.    Culture - Blood (collected 21 @ 11:00)  Source: .Blood Blood  Preliminary Report (21 @ 22:01):    No growth to date.     Hospital Day:  24d    Subjective: Patient is a 75y old  Female who presents with a chief complaint of altered mental status (19 Mar 2021 18:25)      Pt seen and evaluated at bedside.   Complaints: none; not following commands; arouses to painful stimuli   Over the night Events: family requested duplex of left arm     Past Medical Hx:   Anemia    DM (diabetes mellitus)    High cholesterol    HTN (hypertension)    B-cell chronic lymphocytic leukemia variant    CAD (coronary artery disease)      Past Sx:  H/O heart artery stent    No significant past surgical history      Allergies:  penicillin (Anaphylaxis; Hives)    Current Meds:   Standng Meds:  aspirin  chewable 81 milliGRAM(s) Oral daily  atorvastatin 80 milliGRAM(s) Oral at bedtime  BACItracin   Ointment 1 Application(s) Topical two times a day  chlorhexidine 0.12% Liquid 15 milliLiter(s) Oral Mucosa two times a day  chlorhexidine 4% Liquid 1 Application(s) Topical daily  enoxaparin Injectable 75 milliGRAM(s) SubCutaneous every 12 hours  insulin regular  human corrective regimen sliding scale   SubCutaneous every 6 hours  labetalol 400 milliGRAM(s) Oral every 8 hours  levETIRAcetam  IVPB 750 milliGRAM(s) IV Intermittent every 12 hours  lisinopril 20 milliGRAM(s) Oral daily  nystatin Powder 1 Application(s) Topical two times a day  pantoprazole   Suspension 40 milliGRAM(s) Oral daily  senna 2 Tablet(s) Oral at bedtime  voriconazole 200 milliGRAM(s) Oral every 12 hours    PRN Meds:  acetaminophen    Suspension .. 650 milliGRAM(s) Enteral Tube every 6 hours PRN Temp greater or equal to 38C (100.4F)  acetaminophen   Tablet .. 650 milliGRAM(s) Oral every 6 hours PRN Moderate Pain (4 - 6)      Vital Signs:   T(F): 100.6 (21 @ 05:05), Max: 100.6 (21 @ 05:05)  HR: 81 (21 @ 05:05) (78 - 88)  BP: 138/65 (21 @ 05:05) (123/61 - 138/65)  RR: 20 (21 @ 05:05) (19 - 20)  SpO2: 99% (21 @ 05:05) (98% - 100%)    Physical Exam:   GENERAL: NAD, Resting in bed  HEENT: left forehead craniotomy site pulsating   CHEST/LUNG: Clear to auscultation bilaterally; No wheezing or rubs.   HEART: Regular rate and rhythm; No murmurs, rubs, or gallops  ABDOMEN: Bowel sounds present; Soft, Nontender, Nondistended.   EXTREMITIES:  R arm/hand edema > L arm/hand  NERVOUS SYSTEM:  not following commands; withdraws to pain   SKIN: diffuse ecchymosis on b/l arms noted     FLUID BALANCE    21 @ 07:01  -  21 @ 07:00  --------------------------------------------------------  IN: 2540 mL / OUT: 702 mL / NET: 1838 mL    21 @ 07:01  -  21 @ 07:00  --------------------------------------------------------  IN: 820 mL / OUT: 1201 mL / NET: -381 mL        Labs:                         8.4    6.44  )-----------( 128      ( 19 Mar 2021 12:00 )             26.8     Neutophil% 67.1, Lymphocyte% 15.4, Monocyte% 15.7, Bands% 0.5 21 @ 12:00    19 Mar 2021 12:00    133    |  94     |  16     ----------------------------<  151    4.1     |  30     |  <0.5     Ca    8.2        19 Mar 2021 12:00  Mg     2.0       19 Mar 2021 12:00    TPro  4.9    /  Alb  3.0    /  TBili  0.3    /  DBili  x      /  AST  20     /  ALT  11     /  AlkPhos  195    19 Mar 2021 12:00                    Urinalysis Basic - ( 17 Mar 2021 09:48 )    Color: Light Yellow / Appearance: Clear / S.018 / pH: x  Gluc: x / Ketone: Negative  / Bili: Negative / Urobili: <2 mg/dL   Blood: x / Protein: Trace / Nitrite: Negative   Leuk Esterase: Negative / RBC: x / WBC x   Sq Epi: x / Non Sq Epi: x / Bacteria: x          Culture - Blood (collected 21 @ 11:49)  Source: .Blood None  Preliminary Report (21 @ 22:02):    No growth to date.    Culture - Blood (collected 21 @ 11:00)  Source: .Blood Blood  Preliminary Report (21 @ 22:01):    No growth to date.     Hospital Day:  24d    Subjective: Patient is a 75y old  Female who presents with a chief complaint of altered mental status (19 Mar 2021 18:25)      Pt seen and evaluated at bedside.   Complaints: unable to obtain ros  Over the night Events: family requested duplex of left arm     Past Medical Hx:   Anemia    DM (diabetes mellitus)    High cholesterol    HTN (hypertension)    B-cell chronic lymphocytic leukemia variant    CAD (coronary artery disease)      Past Sx:  H/O heart artery stent    No significant past surgical history      Allergies:  penicillin (Anaphylaxis; Hives)    Current Meds:   Standng Meds:  aspirin  chewable 81 milliGRAM(s) Oral daily  atorvastatin 80 milliGRAM(s) Oral at bedtime  BACItracin   Ointment 1 Application(s) Topical two times a day  chlorhexidine 0.12% Liquid 15 milliLiter(s) Oral Mucosa two times a day  chlorhexidine 4% Liquid 1 Application(s) Topical daily  enoxaparin Injectable 75 milliGRAM(s) SubCutaneous every 12 hours  insulin regular  human corrective regimen sliding scale   SubCutaneous every 6 hours  labetalol 400 milliGRAM(s) Oral every 8 hours  levETIRAcetam  IVPB 750 milliGRAM(s) IV Intermittent every 12 hours  lisinopril 20 milliGRAM(s) Oral daily  nystatin Powder 1 Application(s) Topical two times a day  pantoprazole   Suspension 40 milliGRAM(s) Oral daily  senna 2 Tablet(s) Oral at bedtime  voriconazole 200 milliGRAM(s) Oral every 12 hours    PRN Meds:  acetaminophen    Suspension .. 650 milliGRAM(s) Enteral Tube every 6 hours PRN Temp greater or equal to 38C (100.4F)  acetaminophen   Tablet .. 650 milliGRAM(s) Oral every 6 hours PRN Moderate Pain (4 - 6)      Vital Signs:   T(F): 100.6 (21 @ 05:05), Max: 100.6 (21 @ 05:05)  HR: 81 (21 @ 05:05) (78 - 88)  BP: 138/65 (21 @ 05:05) (123/61 - 138/65)  RR: 20 (21 @ 05:05) (19 - 20)  SpO2: 99% (21 @ 05:05) (98% - 100%)    Physical Exam:   GENERAL: NAD, Resting in bed  HEENT: left forehead craniotomy site pulsating   CHEST/LUNG: Clear to auscultation bilaterally; No wheezing or rubs.   HEART: Regular rate and rhythm; No murmurs, rubs, or gallops  ABDOMEN: Bowel sounds present; Soft, Nontender, Nondistended.   EXTREMITIES:  R arm/hand edema > L arm/hand  NERVOUS SYSTEM:  not following commands; withdraws to pain   SKIN: diffuse ecchymosis on b/l arms noted     FLUID BALANCE    21 @ 07:01  -  21 @ 07:00  --------------------------------------------------------  IN: 2540 mL / OUT: 702 mL / NET: 1838 mL    21 @ 07:01  -  21 @ 07:00  --------------------------------------------------------  IN: 820 mL / OUT: 1201 mL / NET: -381 mL        Labs:                         8.4    6.44  )-----------( 128      ( 19 Mar 2021 12:00 )             26.8     Neutophil% 67.1, Lymphocyte% 15.4, Monocyte% 15.7, Bands% 0.5 21 @ 12:00    19 Mar 2021 12:00    133    |  94     |  16     ----------------------------<  151    4.1     |  30     |  <0.5     Ca    8.2        19 Mar 2021 12:00  Mg     2.0       19 Mar 2021 12:00    TPro  4.9    /  Alb  3.0    /  TBili  0.3    /  DBili  x      /  AST  20     /  ALT  11     /  AlkPhos  195    19 Mar 2021 12:00                    Urinalysis Basic - ( 17 Mar 2021 09:48 )    Color: Light Yellow / Appearance: Clear / S.018 / pH: x  Gluc: x / Ketone: Negative  / Bili: Negative / Urobili: <2 mg/dL   Blood: x / Protein: Trace / Nitrite: Negative   Leuk Esterase: Negative / RBC: x / WBC x   Sq Epi: x / Non Sq Epi: x / Bacteria: x          Culture - Blood (collected 21 @ 11:49)  Source: .Blood None  Preliminary Report (21 @ 22:02):    No growth to date.    Culture - Blood (collected 21 @ 11:00)  Source: .Blood Blood  Preliminary Report (21 @ 22:01):    No growth to date.

## 2021-03-20 NOTE — PROGRESS NOTE ADULT - SUBJECTIVE AND OBJECTIVE BOX
Subjective: non verbal, s/p tracheostomy    T(C): 37.2 (03-20-21 @ 14:50), Max: 38.1 (03-20-21 @ 05:05)  HR: 94 (03-20-21 @ 14:50) (81 - 94)  BP: 143/65 (03-20-21 @ 14:50) (123/61 - 143/65)  RR: 20 (03-20-21 @ 14:50) (19 - 20)  SpO2: 99% (03-20-21 @ 05:05) (98% - 100%)  Wt(kg): --    Exam:  opens eyes, non verbal, not following commands no movement RUE, withdraws LUE and bilateral lower extremities to painful stimuli    CBC Full  -  ( 20 Mar 2021 04:30 )  WBC Count : 6.95 K/uL  RBC Count : 3.32 M/uL  Hemoglobin : 8.6 g/dL  Hematocrit : 28.0 %  Platelet Count - Automated : 124 K/uL  Mean Cell Volume : 84.3 fL  Mean Cell Hemoglobin : 25.9 pg  Mean Cell Hemoglobin Concentration : 30.7 g/dL  Auto Neutrophil # : 5.07 K/uL  Auto Lymphocyte # : 0.82 K/uL  Auto Monocyte # : 0.91 K/uL  Auto Eosinophil # : 0.06 K/uL  Auto Basophil # : 0.05 K/uL  Auto Neutrophil % : 72.9 %  Auto Lymphocyte % : 11.8 %  Auto Monocyte % : 13.1 %  Auto Eosinophil % : 0.9 %  Auto Basophil % : 0.7 %    03-20    138  |  98  |  15  ----------------------------<  178<H>  3.9   |  31  |  <0.5<L>    Ca    8.5      20 Mar 2021 04:30  Mg     1.9     03-20    TPro  5.1<L>  /  Alb  3.1<L>  /  TBili  0.3  /  DBili  x   /  AST  25  /  ALT  15  /  AlkPhos  221<H>  03-20          Wound: clean dry, healing well    Imaging:    Assessment/Plan: patient seen and examined by Dr Tomlin, stable exam, continue medical management

## 2021-03-20 NOTE — PROGRESS NOTE ADULT - ASSESSMENT
· Assessment	  ASSESSMENT  75 years old female from home with PMHx of B cell lymphoma on Imbruvica (since 10/2020), CAD s/p stents 15 years ago, DM, chronic UTI, HTN, DLD, brought in by ambulance due to altered mental status and hypertension. Patient was AAO x4 and fully functional at baseline. Last known normal was 11:00pm 2/23 before beds. In 2/24 at 8:45AM, patient was found to be lethargy in bed by his son, was AAO x 4 and able to talk to his son, but started coughing and had one episode of nose bleed. At 9:15, her physical therapist noticed her becoming more lethargic, but vitals were normal. Soon at 9:30, patient was unresponsive and had a blank stare. EMS was called and her BP was found to be around 230/120. She was rushed to ED as Code Stroke. NIHSS 23. CTH showed Large left frontal intraparenchymal hematoma with intraventricular extension into left lateral ventricle, associated with marked mass effect resulting in 0.9 cm midline shift to the right anteriorly. Patient was intubated for airway protection    IMPRESSION  #Fevers : resolved    EEG no seizure   #Suspected aspergillus PNA    2/27 bronch-   Aspergillus fumigatus     Fungitell: <31 (03-01-21 @ 23:20), would suspect it to be +   < from: CT Chest w/ IV Cont (03.01.21 @ 20:56) >Filling defect compatible with mucous plugging is noted in the left lower lobe bronchus. There are moderate bilateral pleural effusions with areas of compression atelectasis in both lower lobes. An area of thickened interlobular septa with tree-in-bud opacities are noted in the posterior aspect of the right upper lobe.  < from: CT Sinuses w/ IV Cont (03.01.21 @ 21:18) >  1.  Opacification of the mastoid air cells and left middle ear canal consistent with inflammation or infection.  2.  Minimal mucosal thickening in the bilateral frontal, bilateral ethmoid, bilateral sphenoid, and left maxillary sinuses.  3.  Postoperative changes in left frontal lobe hemorrhage better demonstrated on the CT scan of the brain performed on February 28, 2021.    2/25 BCX NG    #Large left frontal intraparenchymal hemorrhage, low suspicion but Cannot rule out CSF infection - CSF studies + WBC/ RBC    Per family no proceeding symptoms week prior such as fevers, HA. Was in usual state of health week prior. Therefore unlikely infection that caused the intracranial event    CSF PCR NEGATIVE     CrAg NEGATIVE     Total Nucleated Cell Count, CSF: 373 /uL (02.26.21 @ 15:20)   Total Nucleated Cell Count, CSF: 30 /uL (02.27.21 @ 19:50)    CSF Segmented Neutrophils: 82 % (02.27.21 @ 19:50) CSF Segmented Neutrophils: 91 % (02.26.21 @ 15:20)  #B-cell lymphoma on chemo  #Pancytopenia  #Lactic acidosis  #Hypomagnesemia  #PCN allergy- unknown    RECOMMENDATIONS  - continue voriconazole 200 mg PO q 12 hours -- please check vori levels prior to next dose, should be between 1-5  - trend fever curve -- would be reactive from recent DVT    Please call or message on [employer] Teams if with any questions.  Spectra [id number]

## 2021-03-21 NOTE — PROGRESS NOTE ADULT - SUBJECTIVE AND OBJECTIVE BOX
Patient is a 75y old  Female who presents with a chief complaint of altered mental status (21 Mar 2021 13:53)        Over Night Events: ON MV>          ROS:     All ROS are negative except HPI         PHYSICAL EXAM    ICU Vital Signs Last 24 Hrs  T(C): 37.2 (21 Mar 2021 14:00), Max: 37.3 (21 Mar 2021 06:00)  T(F): 98.9 (21 Mar 2021 14:00), Max: 99.2 (21 Mar 2021 06:00)  HR: 80 (21 Mar 2021 14:00) (80 - 86)  BP: 139/65 (21 Mar 2021 14:00) (139/65 - 185/79)  BP(mean): 93 (21 Mar 2021 14:00) (93 - 109)  ABP: --  ABP(mean): --  RR: 24 (21 Mar 2021 14:00) (18 - 24)  SpO2: 99% (21 Mar 2021 14:00) (97% - 99%)      CONSTITUTIONAL:  Well nourished.  Ill appearing.  NAD    ENT:   Airway patent,   Mouth with normal mucosa.   No thrush    EYES:   Pupils equal,   Round and reactive to light.    CARDIAC:   Normal rate,   Regular rhythm.    No edema      Vascular:  Normal systolic impulse  No Carotid bruits    RESPIRATORY:   No wheezing  Bilateral BS  Normal chest expansion  Not tachypneic,  No use of accessory muscles    GASTROINTESTINAL:  Abdomen soft,   Non-tender,   No guarding,   + BS    MUSCULOSKELETAL:   Range of motion is not limited,  No clubbing, cyanosis    NEUROLOGICAL:   Minimal response to pain     SKIN:   Skin normal color for race,   Warm and dry   No evidence of rash.    PSYCHIATRIC:   No apparent risk to self or others.    HEMATOLOGICAL:  No cervical  lymphadenopathy.  no inguinal lymphadenopathy      03-20-21 @ 07:01  -  03-21-21 @ 07:00  --------------------------------------------------------  IN:    Enteral Tube Flush: 200 mL    Glucerna 1.5: 720 mL    IV PiggyBack: 100 mL  Total IN: 1020 mL    OUT:    Indwelling Catheter - Urethral (mL): 1400 mL    Stool (mL): 2 mL  Total OUT: 1402 mL    Total NET: -382 mL      03-21-21 @ 07:01  -  03-21-21 @ 15:01  --------------------------------------------------------  IN:  Total IN: 0 mL    OUT:    Indwelling Catheter - Urethral (mL): 500 mL  Total OUT: 500 mL    Total NET: -500 mL          LABS:                            9.5    8.68  )-----------( 154      ( 21 Mar 2021 07:52 )             31.0                                               03-21    136  |  97<L>  |  14  ----------------------------<  208<H>  4.5   |  30  |  <0.5<L>    Ca    8.4<L>      21 Mar 2021 07:52  Mg     1.7     03-21    TPro  5.4<L>  /  Alb  3.1<L>  /  TBili  0.3  /  DBili  x   /  AST  28  /  ALT  19  /  AlkPhos  280<H>  03-21                                                                                           LIVER FUNCTIONS - ( 21 Mar 2021 07:52 )  Alb: 3.1 g/dL / Pro: 5.4 g/dL / ALK PHOS: 280 U/L / ALT: 19 U/L / AST: 28 U/L / GGT: x                                                                                               Mode: AC/ CMV (Assist Control/ Continuous Mandatory Ventilation)  RR (machine): 30  TV (machine): 400  FiO2: 30  PEEP: 5  ITime: 1  MAP: 13  PIP: 33                                          MEDICATIONS  (STANDING):  atorvastatin 80 milliGRAM(s) Oral at bedtime  BACItracin   Ointment 1 Application(s) Topical two times a day  chlorhexidine 0.12% Liquid 15 milliLiter(s) Oral Mucosa two times a day  chlorhexidine 4% Liquid 1 Application(s) Topical daily  enoxaparin Injectable 75 milliGRAM(s) SubCutaneous every 12 hours  insulin regular  human corrective regimen sliding scale   SubCutaneous every 6 hours  labetalol 400 milliGRAM(s) Oral every 8 hours  levETIRAcetam  IVPB 750 milliGRAM(s) IV Intermittent every 12 hours  lisinopril 40 milliGRAM(s) Oral daily  magnesium oxide 400 milliGRAM(s) Oral every 8 hours  nystatin Powder 1 Application(s) Topical two times a day  pantoprazole   Suspension 40 milliGRAM(s) Oral daily  senna 2 Tablet(s) Oral at bedtime  voriconazole 200 milliGRAM(s) Oral every 12 hours    MEDICATIONS  (PRN):  acetaminophen    Suspension .. 650 milliGRAM(s) Enteral Tube every 6 hours PRN Temp greater or equal to 38C (100.4F)  acetaminophen   Tablet .. 650 milliGRAM(s) Oral every 6 hours PRN Moderate Pain (4 - 6)      New X-rays reviewed:                                                                                  ECHO    CXR interpreted by me:

## 2021-03-21 NOTE — PROGRESS NOTE ADULT - SUBJECTIVE AND OBJECTIVE BOX
Subjective: non verbal, s/p tracheostomy  s/p evac of IPH, s/p EVD 2/24/21  T(C): 37.3 (03-21-21 @ 06:00), Max: 37.3 (03-21-21 @ 06:00)  HR: 81 (03-21-21 @ 08:53) (81 - 94)  BP: 185/79 (03-21-21 @ 08:00) (142/69 - 185/79)  RR: 20 (03-21-21 @ 08:00) (18 - 20)  SpO2: 97% (03-21-21 @ 08:53) (97% - 99%)  Wt(kg): --    Exam: opens eyes, tracks with eyes, PERRL, withdraws all extremity's to pain, wound dry, intact    CBC Full  -  ( 21 Mar 2021 07:52 )  WBC Count : 8.68 K/uL  RBC Count : 3.67 M/uL  Hemoglobin : 9.5 g/dL  Hematocrit : 31.0 %  Platelet Count - Automated : 154 K/uL  Mean Cell Volume : 84.5 fL  Mean Cell Hemoglobin : 25.9 pg  Mean Cell Hemoglobin Concentration : 30.6 g/dL  Auto Neutrophil # : 7.12 K/uL  Auto Lymphocyte # : 0.65 K/uL  Auto Monocyte # : 0.77 K/uL  Auto Eosinophil # : 0.04 K/uL  Auto Basophil # : 0.04 K/uL  Auto Neutrophil % : 81.9 %  Auto Lymphocyte % : 7.5 %  Auto Monocyte % : 8.9 %  Auto Eosinophil % : 0.5 %  Auto Basophil % : 0.5 %    03-21    136  |  97<L>  |  14  ----------------------------<  208<H>  4.5   |  30  |  <0.5<L>    Ca    8.4<L>      21 Mar 2021 07:52  Mg     1.7     03-21    TPro  5.4<L>  /  Alb  3.1<L>  /  TBili  0.3  /  DBili  x   /  AST  28  /  ALT  19  /  AlkPhos  280<H>  03-21  Imaging:    Assessment/Plan: stable exam, , ID

## 2021-03-21 NOTE — PROGRESS NOTE ADULT - ASSESSMENT
75F w/ pmhx of CAD s/p stents, DM, HTN, HLD and B cell lymphoma on Imbruvica (since 10/2020), presented for AMS and /210.     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  - CTA H&N was negative for aneurysms or AVM  - CT chest showed mucus plug in L lower lobe bronchus, moderate bilateral pleural effusions w/ areas of atelectasis  - Bronchial culture + aspergillus, on voriconazole  - EVD was removed on 3/2.  - Bedside trach/peg on 3/6.  - keppra for seizure prophylaxis  - craniotomy sit was pulsating with breathing; neurosx called and said nothing to do but if it leaks, call neurosx stat   -needs better BP control     # Fevers/?Aspergillus PNA/Extensive RUE DVT  - 2/27 bronch-   Aspergillus fumigatus   - Fever likely secondary to DVT vs. 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     High risk pt. Very poor Px but family want full code for now.    #Progress Note Handoff  Pending: Resolution of fevers__Clinical improvement and stability__x___  Pt/Family discussion: Daughter informed and agrees with the current plan  Disposition: SNF___x__    My note supersedes the residents note should a discrepancy arise.    Chart and consultant notes personally reviewed.  Care Discussed with Consultants/Other Providers/ Housestaff [ x] YES [ ] NO   Radiology, labs, old records personally reviewed.

## 2021-03-21 NOTE — PROGRESS NOTE ADULT - SUBJECTIVE AND OBJECTIVE BOX
Patient is a 75y old  Female who presents with a chief complaint of altered mental status (21 Mar 2021 10:43)    INTERVAL HPI/OVERNIGHT EVENTS: Patient was examined and seen at bedside. Events noted.    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 Patient was AAO x4 and fully functional at baseline. Last known normal was 11:00pm 2/23 before beds. In 2/24 at 8:45AM, patient was found to be lethargy in bed by his son, was AAO x 4 and able to talk to his son, but started coughing and had one episode of nose bleed. At 9:15, her physical therapist noticed her becoming more lethargic, but vitals were normal. Soon at 9:30, patient was unresponsive and had a blank stare. EMS was called and her BP was found to be around 230/120. She was rushed to ED as Code Stroke. NIHSS 23. CTH showed Large left frontal intraparenchymal hematoma with intraventricular extension into left lateral ventricle, associated with marked mass effect resulting in 0.9 cm midline shift to the right anteriorly. Patient was intubated for airway protection. S/p 2 units of platelet for ASA reversal. Neurosurgery performed emergent OR resection of hematoma.      (24 Feb 2021 13:43)    PAST MEDICAL & SURGICAL HISTORY:  Anemia    DM (diabetes mellitus)    High cholesterol    HTN (hypertension)    B-cell chronic lymphocytic leukemia variant    CAD (coronary artery disease)  s/p stenting    H/O heart artery stent        General: min responsive  HEENT:  +trach  CV: S1 S2  Resp: decreased breath sounds at bases  GI: NT/ND/S +BS, +PEG  MS: no clubbing/cyanosis/edema, + pulses b/l  Neuro: unable to access    MEDICATIONS  (STANDING):  atorvastatin 80 milliGRAM(s) Oral at bedtime  BACItracin   Ointment 1 Application(s) Topical two times a day  chlorhexidine 0.12% Liquid 15 milliLiter(s) Oral Mucosa two times a day  chlorhexidine 4% Liquid 1 Application(s) Topical daily  enoxaparin Injectable 75 milliGRAM(s) SubCutaneous every 12 hours  insulin regular  human corrective regimen sliding scale   SubCutaneous every 6 hours  labetalol 400 milliGRAM(s) Oral every 8 hours  levETIRAcetam  IVPB 750 milliGRAM(s) IV Intermittent every 12 hours  lisinopril 40 milliGRAM(s) Oral daily  magnesium oxide 400 milliGRAM(s) Oral every 8 hours  nystatin Powder 1 Application(s) Topical two times a day  pantoprazole   Suspension 40 milliGRAM(s) Oral daily  senna 2 Tablet(s) Oral at bedtime  voriconazole 200 milliGRAM(s) Oral every 12 hours    MEDICATIONS  (PRN):  acetaminophen    Suspension .. 650 milliGRAM(s) Enteral Tube every 6 hours PRN Temp greater or equal to 38C (100.4F)  acetaminophen   Tablet .. 650 milliGRAM(s) Oral every 6 hours PRN Moderate Pain (4 - 6)    Vital Signs Last 24 Hrs  T(C): 37.3 (21 Mar 2021 06:00), Max: 37.3 (21 Mar 2021 06:00)  T(F): 99.2 (21 Mar 2021 06:00), Max: 99.2 (21 Mar 2021 06:00)  HR: 81 (21 Mar 2021 08:53) (81 - 94)  BP: 185/79 (21 Mar 2021 08:00) (142/69 - 185/79)  BP(mean): 109 (21 Mar 2021 05:34) (86 - 109)  RR: 20 (21 Mar 2021 08:00) (18 - 20)  SpO2: 97% (21 Mar 2021 08:53) (97% - 99%)  CAPILLARY BLOOD GLUCOSE      POCT Blood Glucose.: 182 mg/dL (21 Mar 2021 11:28)  POCT Blood Glucose.: 186 mg/dL (21 Mar 2021 05:32)  POCT Blood Glucose.: 211 mg/dL (20 Mar 2021 23:30)  POCT Blood Glucose.: 168 mg/dL (20 Mar 2021 17:20)                          9.5    8.68  )-----------( 154      ( 21 Mar 2021 07:52 )             31.0     03-21    136  |  97<L>  |  14  ----------------------------<  208<H>  4.5   |  30  |  <0.5<L>    Ca    8.4<L>      21 Mar 2021 07:52  Mg     1.7     03-21    TPro  5.4<L>  /  Alb  3.1<L>  /  TBili  0.3  /  DBili  x   /  AST  28  /  ALT  19  /  AlkPhos  280<H>  03-21    LIVER FUNCTIONS - ( 21 Mar 2021 07:52 )  Alb: 3.1 g/dL / Pro: 5.4 g/dL / ALK PHOS: 280 U/L / ALT: 19 U/L / AST: 28 U/L / GGT: x                           Chart, Consultant(s) Notes Reviewed:  [x ] YES  [ ] NO  Care Discussed with Consultants/Other Providers/ Housestaff [ x] YES  [ ] NO  Radiology, labs, old available records personally reviewed.

## 2021-03-21 NOTE — PROGRESS NOTE ADULT - ASSESSMENT
IMPRESSION:    L ICH s/p resection of hematoma and s/p EVD placement and removal  Aspergillus PNA  HO B cell lymphoma  UE DVT on LMWH     PLAN:    CNS: Avoid CNS depressants. FU Neuro and Neuro SX      HEENT: Oral care / trach care     PULMONARY:  HOB @ 45 degrees.  Aspiration precautions. Trach care. CPAP trial    CARDIOVASCULAR: Avoid volume overload.  Negative balance as tolerated     GI: GI prophylaxis. PEG feeding.     RENAL:  Follow up lytes.  Correct as needed    INFECTIOUS DISEASE: ABX per ID, FU pancx    HEMATOLOGICAL:  DVT prophylaxis.  On LMWH therapeutic     ENDOCRINE:  Follow up FS.  Insulin protocol if needed.    MUSCULOSKELETAL: bedrest    poor prognosis

## 2021-03-22 NOTE — PROGRESS NOTE ADULT - SUBJECTIVE AND OBJECTIVE BOX
Patient is a 75y old  Female who presents with a chief complaint of altered mental status (21 Mar 2021 15:01)        Over Night Events:  Patient seen and examined at the bedside  No acute events overnight  Remains on MV      ROS:     All ROS are negative except HPI         PHYSICAL EXAM    ICU Vital Signs Last 24 Hrs  T(C): 37.4 (22 Mar 2021 05:13), Max: 38.2 (21 Mar 2021 17:00)  T(F): 99.4 (22 Mar 2021 05:13), Max: 100.7 (21 Mar 2021 17:00)  HR: 83 (22 Mar 2021 05:13) (77 - 87)  BP: 168/72 (22 Mar 2021 05:13) (132/63 - 168/72)  BP(mean): 104 (22 Mar 2021 05:13) (89 - 104)  ABP: --  ABP(mean): --  RR: 18 (22 Mar 2021 05:13) (18 - 24)  SpO2: 97% (22 Mar 2021 05:13) (97% - 99%)      CONSTITUTIONAL:  Well nourished.  NAD    ENT:   Airway patent,   Mouth with normal mucosa.   No thrush    EYES:   Pupils equal,   Round and reactive to light.    CARDIAC:   Normal rate,   Regular rhythm.    No edema      Vascular:  Normal systolic impulse  No Carotid bruits    RESPIRATORY:   No wheezing  Bilateral BS  Normal chest expansion  Not tachypneic,  No use of accessory muscles    GASTROINTESTINAL:  Abdomen soft,   Non-tender,   No guarding,   + BS    MUSCULOSKELETAL:   Range of motion is not limited,  No clubbing, cyanosis    NEUROLOGICAL:   Alert and oriented   No motor  deficits.    SKIN:   Skin normal color for race,   Warm and dry and intact.   No evidence of rash.    PSYCHIATRIC:   Normal mood and affect.   No apparent risk to self or others.    HEMATOLOGICAL:  No cervical  lymphadenopathy.  no inguinal lymphadenopathy      03-21-21 @ 07:01  -  03-22-21 @ 07:00  --------------------------------------------------------  IN:    Enteral Tube Flush: 300 mL    Glucerna 1.5: 720 mL  Total IN: 1020 mL    OUT:    Indwelling Catheter - Urethral (mL): 2100 mL    Stool (mL): 1 mL  Total OUT: 2101 mL    Total NET: -1081 mL          LABS:                            8.4    6.83  )-----------( 141      ( 22 Mar 2021 05:45 )             27.8                                               03-22    138  |  100  |  12  ----------------------------<  180<H>  4.1   |  30  |  <0.5<L>    Ca    8.3<L>      22 Mar 2021 05:45  Mg     1.7     03-22    TPro  5.0<L>  /  Alb  2.9<L>  /  TBili  0.3  /  DBili  x   /  AST  26  /  ALT  18  /  AlkPhos  280<H>  03-22                                                                                           LIVER FUNCTIONS - ( 22 Mar 2021 05:45 )  Alb: 2.9 g/dL / Pro: 5.0 g/dL / ALK PHOS: 280 U/L / ALT: 18 U/L / AST: 26 U/L / GGT: x                                                                                               Mode: AC/ CMV (Assist Control/ Continuous Mandatory Ventilation)  RR (machine): 14  TV (machine): 400  FiO2: 30  PEEP: 5  ITime: 1  MAP: 11  PIP: 22                                          MEDICATIONS  (STANDING):  atorvastatin 80 milliGRAM(s) Oral at bedtime  BACItracin   Ointment 1 Application(s) Topical two times a day  chlorhexidine 0.12% Liquid 15 milliLiter(s) Oral Mucosa two times a day  chlorhexidine 4% Liquid 1 Application(s) Topical daily  enoxaparin Injectable 75 milliGRAM(s) SubCutaneous every 12 hours  insulin regular  human corrective regimen sliding scale   SubCutaneous every 6 hours  labetalol 400 milliGRAM(s) Oral every 8 hours  levETIRAcetam  IVPB 750 milliGRAM(s) IV Intermittent every 12 hours  lisinopril 40 milliGRAM(s) Oral daily  magnesium oxide 400 milliGRAM(s) Oral every 8 hours  nystatin Powder 1 Application(s) Topical two times a day  pantoprazole   Suspension 40 milliGRAM(s) Oral daily  senna 2 Tablet(s) Oral at bedtime  voriconazole 200 milliGRAM(s) Oral every 12 hours    MEDICATIONS  (PRN):  acetaminophen    Suspension .. 650 milliGRAM(s) Enteral Tube every 6 hours PRN Temp greater or equal to 38C (100.4F)  acetaminophen   Tablet .. 650 milliGRAM(s) Oral every 6 hours PRN Moderate Pain (4 - 6)      New X-rays reviewed:                                                                                  ECHO    CXR interpreted by me:

## 2021-03-22 NOTE — PROCEDURE NOTE - NSINFORMCONSENT_GEN_A_CORE
Obtained from health care proxy/Benefits, risks, and possible complications of procedure explained to patient/caregiver who verbalized understanding and gave verbal consent.
Benefits, risks, and possible complications of procedure explained to patient/caregiver who verbalized understanding and gave written consent.
Benefits, risks, and possible complications of procedure explained to patient/caregiver who verbalized understanding and gave verbal consent.
Benefits, risks, and possible complications of procedure explained to patient/caregiver who verbalized understanding and gave written consent.
Benefits, risks, and possible complications of procedure explained to patient/caregiver who verbalized understanding and gave written consent.

## 2021-03-22 NOTE — CONSULT NOTE ADULT - ATTENDING COMMENTS
Attending Statement: I have personally performed a face to face diagnostic evaluation on this patient. The patient is suffering from cerebral bleed.  I have reviewed the above note and agree with the history, exam and suggestions for care, except as I have noted in the text.
History, events, data and scans reviewed. Patient examined at bedside. I agree with findings/ assessment and approved plan of care as outlined above.
Case discussed with daughter Kathrine over the phone. Case was also discussed with Neurosurg attending.   Please hole Ibrutinib, consider platelet transfusion if hematoma is enlarging or platelet count is below 100K.   Will follow closely.  Full code.
Patient was seen I examined the Murry placement spoke to the family about the urologic care and discussed the case with the subspecialty physicians assistant. The note was not reviewed and signed until now due to conflicting patient demands
Recommend left frontal yang hole evacuation of hematoma.    Discussed with daughter, prognosis poor

## 2021-03-22 NOTE — CONSULT NOTE ADULT - ASSESSMENT
75 year old female from home with pmh of B cell lymphoma on Imbruvica (since 10/2020), CAD s/p stents 15 years ago, DM, chronic UTI, HTN, DLD, admitted to [facility] s/p CVA. Patient intubated and sedated. Per RN she had removed the inital roblero and attempted to replace it with a new one prior too obtaining a urine sample. RN stated that she irrigated the roblero with 30cc of sterile water and paulina back 28cc of sterile water and does not know why urine is not draining s/p roblero placement. Urology consulted for difficult roblero placement/ roblero complications.    ·	Under sterile technique, placed 16 fr roblero catheter into urinary bladder without difficulty. 10cc SW instilled in roblero balloon.  ·	No further urologic intervention indicated at this time  ·	Monitor I and Os  ·	TOV when roblero no longer medically necessary/stable/when ambulating  ·	F/u outpatient with urologist for further urologic management when discharged.  ·	Will discuss w/ attending  75 year old female from home with pmh of B cell lymphoma on Imbruvica (since 10/2020), CAD s/p stents 15 years ago, DM, chronic UTI, HTN, DLD, admitted to [facility] s/p CVA. Patient intubated and sedated. Per RN she had removed the inital roblero and attempted to replace it with a new one prior too obtaining a urine sample. RN stated that she irrigated the roblero with 30cc of sterile water and paulina back 28cc of sterile water and does not know why urine is not draining s/p roblero placement. Urology consulted for difficult roblero placement/ roblero complications.    ·	Under sterile technique, placed 16 fr roblero catheter into urinary bladder without difficulty. 10cc SW instilled in roblero balloon.  ·	No further urologic intervention indicated at this time  ·	Monitor I and Os  ·	TOV when roblero no longer medically necessary/stable/when ambulating  ·	F/u outpatient with urologist for further urologic management when discharged.  ·	Will discuss w/ attending     I saw the patient on afternoon rounds the family was there including the daughter that is the health care proxy as well as others. I discussed with them that from urologic viewpoint at this point given her medical condition we will functioning almost in a technical status to place a Roblero. If there is further urologic issues they can call us back but at this point we will not be coming by in a daily basis. Given her course she will need outpatient urologic care to make sure she is voiding well enough. The family can choose to follow up with our group or choose a different urologist if and when the patient goes home. If long-term catheter care is going to be needed consider a suprapubic tube you in the female

## 2021-03-22 NOTE — PROGRESS NOTE ADULT - ASSESSMENT
75F w/ pmhx of CAD s/p stents, DM, HTN, HLD and B cell lymphoma on Imbruvica (since 10/2020), presented for AMS and /210.     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  - CTA H&N was negative for aneurysms or AVM  - CT chest showed mucus plug in L lower lobe bronchus, moderate bilateral pleural effusions w/ areas of atelectasis  - Bronchial culture + aspergillus, on voriconazole  - EVD was removed on 3/2.  - Bedside trach/peg on 3/6.  - keppra for seizure prophylaxis  - craniotomy sit was pulsating with breathing; neurosx called and said nothing to do but if it leaks, call neurosx stat       # Fevers/?Aspergillus PNA/Extensive RUE DVT  - 2/27 bronch-   Aspergillus fumigatus   - Fever likely secondary to DVT vs. 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     High risk pt. Very poor Px but family want full code for now.    #Progress Note Handoff  Pending: Resolution of fevers__Clinical improvement and stability__x___  Pt/Family discussion: Daughter informed and agrees with the current plan  Disposition: SNF___x__    My note supersedes the residents note should a discrepancy arise.    Chart and consultant notes personally reviewed.  Care Discussed with Consultants/Other Providers/ Housestaff [ x] YES [ ] NO   Radiology, labs, old records personally reviewed.       75F w/ pmhx of CAD s/p stents, DM, HTN, HLD and B cell lymphoma on Imbruvica (since 10/2020), presented for AMS and /210.     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  - CTA H&N was negative for aneurysms or AVM  - CT chest showed mucus plug in L lower lobe bronchus, moderate bilateral pleural effusions w/ areas of atelectasis  - Bronchial culture + aspergillus, on voriconazole  - EVD was removed on 3/2.  - Bedside trach/peg on 3/6.  - keppra for seizure prophylaxis  - craniotomy sit was pulsating with breathing; neurosx called and said nothing to do but if it leaks, call neurosx stat       # Fevers/?Aspergillus PNA/Extensive RUE DVT ?Central  - 2/27 bronch-   Aspergillus fumigatus   - Fever likely secondary to DVT vs. 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     High risk pt. Very poor Px but family want full code for now.    #Progress Note Handoff  Pending: Resolution of fevers_or ID clearance_Clinical improvement and stability__x___  Pt/Family discussion: Daughter informed and agrees with the current plan  Disposition: SNF___x__    My note supersedes the residents note should a discrepancy arise.    Chart and consultant notes personally reviewed.  Care Discussed with Consultants/Other Providers/ Housestaff [ x] YES [ ] NO   Radiology, labs, old records personally reviewed.       75F w/ pmhx of CAD s/p stents, DM, HTN, HLD and B cell lymphoma on Imbruvica (since 10/2020), presented for AMS and /210.     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  - CTA H&N was negative for aneurysms or AVM  - CT chest showed mucus plug in L lower lobe bronchus, moderate bilateral pleural effusions w/ areas of atelectasis  - Bronchial culture + aspergillus, on voriconazole  - EVD was removed on 3/2.  - Bedside trach/peg on 3/6.  - keppra for seizure prophylaxis  - craniotomy sit was pulsating with breathing; neurosx called and said nothing to do but if it leaks, call neurosx stat       # Fevers/?Aspergillus PNA/Extensive RUE DVT ?Central  - 2/27 bronch-   Aspergillus fumigatus   - Fever likely secondary to DVT vs. 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     High risk pt. Very poor Px but HCP (daughter Mrs. Segura) want full code for now. HCP asks only to give updates to her (not her brother or sisters)    #Progress Note Handoff  Pending: Resolution of fevers_or ID clearance_Clinical improvement and stability__x___  Pt/Family discussion: Daughter informed and agrees with the current plan  Disposition: SNF___x__    My note supersedes the residents note should a discrepancy arise.    Chart and consultant notes personally reviewed.  Care Discussed with Consultants/Other Providers/ Housestaff [ x] YES [ ] NO   Radiology, labs, old records personally reviewed.

## 2021-03-22 NOTE — PROGRESS NOTE ADULT - SUBJECTIVE AND OBJECTIVE BOX
Patient is a 75y old  Female who presents with a chief complaint of altered mental status (21 Mar 2021 10:43)    INTERVAL HPI/OVERNIGHT EVENTS: Patient was examined and seen at bedside. Events noted.    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 Patient was AAO x4 and fully functional at baseline. Last known normal was 11:00pm 2/23 before beds. In 2/24 at 8:45AM, patient was found to be lethargy in bed by his son, was AAO x 4 and able to talk to his son, but started coughing and had one episode of nose bleed. At 9:15, her physical therapist noticed her becoming more lethargic, but vitals were normal. Soon at 9:30, patient was unresponsive and had a blank stare. EMS was called and her BP was found to be around 230/120. She was rushed to ED as Code Stroke. NIHSS 23. CTH showed Large left frontal intraparenchymal hematoma with intraventricular extension into left lateral ventricle, associated with marked mass effect resulting in 0.9 cm midline shift to the right anteriorly. Patient was intubated for airway protection. S/p 2 units of platelet for ASA reversal. Neurosurgery performed emergent OR resection of hematoma.      (24 Feb 2021 13:43)    PAST MEDICAL & SURGICAL HISTORY:  Anemia    DM (diabetes mellitus)    High cholesterol    HTN (hypertension)    B-cell chronic lymphocytic leukemia variant    CAD (coronary artery disease)  s/p stenting    H/O heart artery stent        General: min responsive  HEENT:  +trach  CV: S1 S2  Resp: decreased breath sounds at bases  GI: NT/ND/S +BS, +PEG  MS: no clubbing/cyanosis/edema, + pulses b/l  Neuro: unable to access            MEDICATIONS  (STANDING):  atorvastatin 80 milliGRAM(s) Oral at bedtime  BACItracin   Ointment 1 Application(s) Topical two times a day  chlorhexidine 0.12% Liquid 15 milliLiter(s) Oral Mucosa two times a day  chlorhexidine 4% Liquid 1 Application(s) Topical daily  enoxaparin Injectable 75 milliGRAM(s) SubCutaneous every 12 hours  insulin regular  human corrective regimen sliding scale   SubCutaneous every 6 hours  labetalol 400 milliGRAM(s) Oral every 8 hours  levETIRAcetam 750 milliGRAM(s) Oral two times a day  lisinopril 40 milliGRAM(s) Oral daily  magnesium oxide 400 milliGRAM(s) Oral every 8 hours  nystatin Powder 1 Application(s) Topical two times a day  pantoprazole   Suspension 40 milliGRAM(s) Oral daily  senna 2 Tablet(s) Oral at bedtime  voriconazole 200 milliGRAM(s) Oral every 12 hours  warfarin 3 milliGRAM(s) Oral once    MEDICATIONS  (PRN):  acetaminophen    Suspension .. 650 milliGRAM(s) Enteral Tube every 6 hours PRN Temp greater or equal to 38C (100.4F)  acetaminophen   Tablet .. 650 milliGRAM(s) Oral every 6 hours PRN Moderate Pain (4 - 6)    Home Medications:  aspirin 81 mg oral tablet, chewable: 1 tab(s) orally once a day (24 Feb 2021 15:11)  atorvastatin 80 mg oral tablet: 1 tab(s) orally once a day (at bedtime) (24 Feb 2021 15:11)  Imbruvica 70 mg oral capsule: 2 cap(s) orally once a day (24 Feb 2021 15:11)  Levaquin 500 mg oral tablet: 1 tab(s) orally every 24 hours (24 Feb 2021 15:11)  lisinopril 10 mg oral tablet: 1 tab(s) orally once a day (24 Feb 2021 15:11)    Vital Signs Last 24 Hrs  T(C): 37.3 (22 Mar 2021 14:13), Max: 38.8 (22 Mar 2021 11:10)  T(F): 99.1 (22 Mar 2021 14:13), Max: 101.9 (22 Mar 2021 11:10)  HR: 80 (22 Mar 2021 14:13) (77 - 87)  BP: 141/68 (22 Mar 2021 14:13) (132/63 - 168/72)  BP(mean): 97 (22 Mar 2021 14:13) (89 - 104)  RR: 18 (22 Mar 2021 14:13) (18 - 18)  SpO2: 97% (22 Mar 2021 05:13) (97% - 98%)  CAPILLARY BLOOD GLUCOSE      POCT Blood Glucose.: 196 mg/dL (22 Mar 2021 11:08)  POCT Blood Glucose.: 204 mg/dL (22 Mar 2021 05:53)  POCT Blood Glucose.: 168 mg/dL (21 Mar 2021 23:51)    LABS:                        8.4    6.83  )-----------( 141      ( 22 Mar 2021 05:45 )             27.8     03-22    138  |  100  |  12  ----------------------------<  180<H>  4.1   |  30  |  <0.5<L>    Ca    8.3<L>      22 Mar 2021 05:45  Mg     1.7     03-22    TPro  5.0<L>  /  Alb  2.9<L>  /  TBili  0.3  /  DBili  x   /  AST  26  /  ALT  18  /  AlkPhos  280<H>  03-22    LIVER FUNCTIONS - ( 22 Mar 2021 05:45 )  Alb: 2.9 g/dL / Pro: 5.0 g/dL / ALK PHOS: 280 U/L / ALT: 18 U/L / AST: 26 U/L / GGT: x                           Consultant Notes Reviewed:  [x ] YES  [ ] NO  Care Discussed with Consultants/Other Providers/ Housestaff [ x] YES  [ ] NO  Radiology, labs, new studies personally reviewed.

## 2021-03-22 NOTE — PROGRESS NOTE ADULT - SUBJECTIVE AND OBJECTIVE BOX
POD # 25    S/P  Left minimally invasive craniotomy for evacuation of IPH with placement of EVD   S/p Trach/ PEG     pt seen and examined at bedside pt is trach'd no sedation doesnt follow commands of open eyes       Vital Signs Last 24 Hrs  T(C): 37.4 (22 Mar 2021 05:13), Max: 38.2 (21 Mar 2021 17:00)  T(F): 99.4 (22 Mar 2021 05:13), Max: 100.7 (21 Mar 2021 17:00)  HR: 83 (22 Mar 2021 05:13) (77 - 87)  BP: 168/72 (22 Mar 2021 05:13) (132/63 - 168/72)  BP(mean): 104 (22 Mar 2021 05:13) (89 - 104)  RR: 18 (22 Mar 2021 05:13) (18 - 24)  SpO2: 97% (22 Mar 2021 05:13) (97% - 99%)    PHYSICAL EXAM:    Trach'd   PERRL   withdraws LUE ot noxious stimuli   slight movement of fingers on Right hand to noxious stimuli   withdraws bilateral LE's to noxious stimuli   incision - no drainage , slightly boggy           MEDICATIONS:  Antibiotics:  voriconazole 200 milliGRAM(s) Oral every 12 hours    Neuro:  acetaminophen    Suspension .. 650 milliGRAM(s) Enteral Tube every 6 hours PRN  acetaminophen   Tablet .. 650 milliGRAM(s) Oral every 6 hours PRN  levETIRAcetam  IVPB 750 milliGRAM(s) IV Intermittent every 12 hours    Anticoagulation:  enoxaparin Injectable 75 milliGRAM(s) SubCutaneous every 12 hours    OTHER:  atorvastatin 80 milliGRAM(s) Oral at bedtime  BACItracin   Ointment 1 Application(s) Topical two times a day  chlorhexidine 0.12% Liquid 15 milliLiter(s) Oral Mucosa two times a day  chlorhexidine 4% Liquid 1 Application(s) Topical daily  insulin regular  human corrective regimen sliding scale   SubCutaneous every 6 hours  labetalol 400 milliGRAM(s) Oral every 8 hours  lisinopril 40 milliGRAM(s) Oral daily  nystatin Powder 1 Application(s) Topical two times a day  pantoprazole   Suspension 40 milliGRAM(s) Oral daily  senna 2 Tablet(s) Oral at bedtime    IVF:  magnesium oxide 400 milliGRAM(s) Oral every 8 hours        LABS:                        8.4    6.83  )-----------( 141      ( 22 Mar 2021 05:45 )             27.8     03-22    138  |  100  |  12  ----------------------------<  180<H>  4.1   |  30  |  <0.5<L>    Ca    8.3<L>      22 Mar 2021 05:45  Mg     1.7     03-22    TPro  5.0<L>  /  Alb  2.9<L>  /  TBili  0.3  /  DBili  x   /  AST  26  /  ALT  18  /  AlkPhos  280<H>  03-22      A/p           S/P  Left minimally invasive craniotomy for evacuation of IPH with placement of EVD           S/p Trach/ PEG                 social work for discharge planning

## 2021-03-22 NOTE — PROGRESS NOTE ADULT - NSHPATTENDINGPLANDISCUSS_GEN_ALL_CORE
Housestaff, nursing, case mgmt, critical care attending Housestaff, nursing, case mgmt, critical care attending, daughter Mrs. Segura in great details

## 2021-03-22 NOTE — CONSULT NOTE ADULT - CONSULT REASON
patient not improving, palliative consult / meeting with family necessary
stroke code, left ICH
ICH
Stroke Code
Difficult roblero placement
s/p craniotomy for ICH
trach
fevers
Right arm DVT related to PICC line
marginal zone lymphoma

## 2021-03-22 NOTE — PROCEDURE NOTE - NSPROCNAME_GEN_A_CORE
PICC Line Insertion
Feeding Tube Placement
Urinary Device Placement
Tracheal Intubation
Central Line Insertion

## 2021-03-22 NOTE — PROGRESS NOTE ADULT - ASSESSMENT
75F w/ pmhx of CAD s/p stents, DM, HTN, HLD and B cell lymphoma on Imbruvica (since 10/2020), presented for AMS and /210.     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  - CTA H&N was negative for aneurysms or AVM  - EVD was removed on 3/2.  - Bedside trach/peg on 3/6.  - keppra for seizure prophylaxis  - craniotomy sit was pulsating with breathing previously; neurosx called and said nothing to do but if it leaks, call neurosx stat      # Fever but no leukocytosis  # DVT RUE extremity  # Aspergillus pneumonia   - CT chest showed mucus plug in L lower lobe bronchus, moderate bilateral pleural effusions w/ areas of atelectasis  - 2/27 bronch-   Aspergillus fumigatus   - Fever likely secondary to DVT vs. 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   Dispo: placement

## 2021-03-22 NOTE — CHART NOTE - NSCHARTNOTEFT_GEN_A_CORE
RD obtained nutr hx with Jim (son) at bedside  pt is trach to ventilator  tolerating feeding per RN, but noted diarrhea.  NUTRITION HX Obtained - pt previously PTA eats very well, cooks for herself and a big [place]isian cook at home, especially likes omelette every single morning with pasta and other stuff during the day. Good eater despite having missing teeth (will eventually need dentures). No vitamin use but takes Ensure or boost DAILY. NKFA. UBW stable PTA, had some wt loss in the beginning due to cancer but stable PTA.      RD will f/u further 3/23. on 3/22:  RD obtained nutr hx with Jim (son) at bedside  pt is trach to ventilator  tolerating feeding per RN, but noted diarrhea.  NUTRITION HX Obtained - pt previously PTA eats very well, cooks for herself and a big Camb[place]ian cook at home, especially likes omelette every single morning with pasta and other stuff during the day. Good eater despite having missing teeth (will eventually need dentures). No vitamin use but takes Ensure or boost DAILY. NKFA. UBW stable PTA, had some wt loss in the beginning due to cancer but stable PTA.        on 3/23:  Limited f/u  Labs and meds reviewed, Glucose is elevated, HbA1c 6.3  on GLucerna 1.2 at 360mL q6hr (G tube) = 1720 kcal/ 85g protein  1+ generalized edema, stage 2 to sacrum, and LBM 3/22  Spoken with RN, had episodes of diarrhea.      Wt Hx:  (3/6): 76kg  (3/12): 80.3kg - possibly due to 1+ edema, will monitor    Hx of DM, HTN, HLD, B cell lymphoma, on lmbruka since 10/2020.   Lt frontal IPH sp craniology/ hematoma evac.   Heme/onc is following. ID consulted  Monitoring CBC, DVT RUE Extremities.             RECOMMENDATION:  -Continue current Tube feed order of GLUCERNA 1.2 at 360mL q6hr. ALLOW EACH FEED TO RUN FOR 1-2 hour for better tolerance.   - Suggest flush to be at least 70cc each.  -Suggest to add MVI q24hr and a PROBIOTIC daily.

## 2021-03-22 NOTE — PROCEDURE NOTE - NSINDICATIONS_GEN_A_CORE
failure to thrive
critical illness/hypertonic/irritant infusion
prolongerd immobilzation/sedation
airway protection/respiratory failure
venous blood sampling

## 2021-03-22 NOTE — CONSULT NOTE ADULT - SUBJECTIVE AND OBJECTIVE BOX
Urology Consult Note:  Due to altered mental status/intubation, subjective information was not able to be obtained from the patient. History was obtained to the extent possible from review of the chart and collateral sources of information. 75 year old female from home with pmh of B cell lymphoma on Imbruvica (since 10/2020), CAD s/p stents 15 years ago, DM, chronic UTI, HTN, DLD, admitted to [facility] s/p CVA. Patient intubated and sedated. Per RN she had removed the inital roblero and attempted to replace it with a new one prior too obtaining a urine sample. RN stated that she irrigated the roblero with 30cc of sterile water and paulina back 28cc of sterile water and does not know why urine is not draining s/p roblero placement. Urology consulted for difficult roblero placement/ roblero complications.    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 Patient was AAO x4 and fully functional at baseline. Last known normal was 11:00pm  before beds. In  at 8:45AM, patient was found to be lethargy in bed by his son, was AAO x 4 and able to talk to his son, but started coughing and had one episode of nose bleed. At 9:15, her physical therapist noticed her becoming more lethargic, but vitals were normal. Soon at 9:30, patient was unresponsive and had a blank stare. EMS was called and her BP was found to be around 230/120. She was rushed to ED as Code Stroke. NIHSS 23. CTH showed Large left frontal intraparenchymal hematoma with intraventricular extension into left lateral ventricle, associated with marked mass effect resulting in 0.9 cm midline shift to the right anteriorly. Patient was intubated for airway protection. S/p 2 units of platelet for ASA reversal. Neurosurgery was planning for emergent OR resection of hematoma.    (2021 13:43)      PAST MEDICAL & SURGICAL HISTORY:  Anemia    DM (diabetes mellitus)    High cholesterol    HTN (hypertension)    B-cell chronic lymphocytic leukemia variant    CAD (coronary artery disease)  s/p stenting    H/O heart artery stent    [ x  ] Due to altered mental status/intubation, subjective information was not able to be obtained from the patient. History was obtained to the extent possible from review of the chart and collateral sources of information.     MEDICATIONS  (STANDING):  atorvastatin 80 milliGRAM(s) Oral at bedtime  BACItracin   Ointment 1 Application(s) Topical two times a day  chlorhexidine 0.12% Liquid 15 milliLiter(s) Oral Mucosa two times a day  chlorhexidine 4% Liquid 1 Application(s) Topical daily  enoxaparin Injectable 75 milliGRAM(s) SubCutaneous every 12 hours  insulin regular  human corrective regimen sliding scale   SubCutaneous every 6 hours  labetalol 400 milliGRAM(s) Oral every 8 hours  levETIRAcetam 750 milliGRAM(s) Oral two times a day  lisinopril 40 milliGRAM(s) Oral daily  magnesium oxide 400 milliGRAM(s) Oral every 8 hours  nystatin Powder 1 Application(s) Topical two times a day  pantoprazole   Suspension 40 milliGRAM(s) Oral daily  senna 2 Tablet(s) Oral at bedtime  voriconazole 200 milliGRAM(s) Oral every 12 hours    MEDICATIONS  (PRN):  acetaminophen    Suspension .. 650 milliGRAM(s) Enteral Tube every 6 hours PRN Temp greater or equal to 38C (100.4F)  acetaminophen   Tablet .. 650 milliGRAM(s) Oral every 6 hours PRN Moderate Pain (4 - 6)      Allergies: penicillin (Anaphylaxis; Hives)    Intolerances    SOCIAL HISTORY: No illicit drug use    FAMILY HISTORY:  Family history of diabetes mellitus (DM)    Vital Signs Last 24 Hrs  T(C): 37 (22 Mar 2021 20:35), Max: 38.8 (22 Mar 2021 11:10)  T(F): 98.6 (22 Mar 2021 20:35), Max: 101.9 (22 Mar 2021 11:10)  HR: 75 (22 Mar 2021 20:35) (75 - 83)  BP: 146/65 (22 Mar 2021 20:35) (141/68 - 168/72)  BP(mean): 93 (22 Mar 2021 20:35) (93 - 104)  RR: 18 (22 Mar 2021 20:35) (18 - 18)  SpO2: 97% (22 Mar 2021 05:13) (97% - 97%)    PHYSICAL EXAM:  Constitutional: Intubated and sedated  Respiratory: intubated on vent support, +accessory muscle use  Abd: Soft, nontender obese  Cardio: +S1/S2  : Female genitalia without blood or discharge. Roblero balloon inflated and found to be inserted into the vagina  Neurological: Intubated and sedated  Extremities +Edema to extremities x4  Skin: No rashes    Procedure: Under sterile technique, placed 16 fr roblero catheter into urinary bladder without difficulty. 10cc SW instilled in roblero balloon.    I&O's Summary    21 Mar 2021 07:01  -  22 Mar 2021 07:00  --------------------------------------------------------  IN: 1020 mL / OUT: 2101 mL / NET: -1081 mL    22 Mar 2021 07:01  -  22 Mar 2021 23:20  --------------------------------------------------------  IN: 920 mL / OUT: 901 mL / NET: 19 mL        LABS:                        8.4    6.83  )-----------( 141      ( 22 Mar 2021 05:45 )             27.8         138  |  100  |  12  ----------------------------<  180<H>  4.1   |  30  |  <0.5<L>    Ca    8.3<L>      22 Mar 2021 05:45  Mg     1.7         TPro  5.0<L>  /  Alb  2.9<L>  /  TBili  0.3  /  DBili  x   /  AST  26  /  ALT  18  /  AlkPhos  280<H>      PT/INR - ( 22 Mar 2021 17:21 )   PT: 15.40 sec;   INR: 1.34 ratio         PTT - ( 22 Mar 2021 17:21 )  PTT:36.5 sec  Urinalysis Basic - ( 22 Mar 2021 22:20 )    Color: Yellow / Appearance: Slightly Turbid / S.019 / pH: x  Gluc: x / Ketone: Negative  / Bili: Negative / Urobili: <2 mg/dL   Blood: x / Protein: 30 mg/dL / Nitrite: Negative   Leuk Esterase: Large / RBC: 21 /HPF /  /HPF   Sq Epi: x / Non Sq Epi: 0 /HPF / Bacteria: Few    RADIOLOGY & ADDITIONAL STUDIES:  < from: CT Head No Cont (21 @ 23:51) >    EXAM:  CT BRAIN            PROCEDURE DATE:  2021            INTERPRETATION:  CLINICAL INDICATION: Status post evacuation of intraparenchymal hemorrhage.    Technique: CT of the head was performed without contrast.    Multiple contiguous axialimages were acquired from the skullbase to the vertex without the administration of intravenous contrast.  Coronal and sagittal reformations were made.    COMPARISON:  prior head CT dated 2021    FINDINGS:    Redemonstration of left frontal craniotomy changes with encephalomalacia noted of the left anterior frontal lobe with scattered foci of intraparenchymal hemorrhage within the left frontal lobe as well as scattered subarachnoid hemorrhage. Overall the degree of hemorrhage has decreased the prior examination. There has been interval removal of a left frontal approach ventriculostomy catheter.    There is minimal hemorrhage within the bilateral occipital horns, decreased since the prior examination.    There is a predominantly CSF intensity extra-axial collection overlying the left frontoparietal region measuring up to 0.9 cm, new since prior examination.    There is opacification of the bilateral mastoid and middle ear cavities. Paranasal sinuses are unremarkable. The orbits are unremarkable.    IMPRESSION:  Significant decrease in left frontal lobe intraparenchymal hemorrhage status post evacuation with minimal residual intraparenchymal and subarachnoid hemorrhage in the left frontal lobe as well as scattered bilateral sulcalsubarachnoid hemorrhage.    Interval removal of a left frontal approach ventriculostomy catheter. Minimal residual intraventricular hemorrhage.    Interval development of a CSF density extra-axial collection measuring approximately 0.8 cm overlying the left frontoparietal lobes likely representing a subdural hygroma.              MARIO JAY M.D., ATTENDING RADIOLOGIST  This document has been electronically signed. Mar 17 2021  8:56AM    < end of copied text >

## 2021-03-22 NOTE — CONSULT NOTE ADULT - PROVIDER SPECIALTY LIST ADULT
Neurology
Critical Care
Heme/Onc
Neurosurgery
Pulmonology
SICU
Urology
Vascular Surgery
Infectious Disease
Palliative Care

## 2021-03-22 NOTE — PROGRESS NOTE ADULT - SUBJECTIVE AND OBJECTIVE BOX
SUBJECTIVE  Patient is a 75y old Female who presents with a chief complaint of altered mental status (22 Mar 2021 10:09)  Currently admitted to medicine with the primary diagnosis of CVA (cerebral vascular accident)        Today is hospital day 26d, and this morning she is sitting in bed, no acute distress. patient did not follow commands and did not open her eyes    OBJECTIVE  PAST MEDICAL & SURGICAL HISTORY  Anemia    DM (diabetes mellitus)    High cholesterol    HTN (hypertension)    B-cell chronic lymphocytic leukemia variant    CAD (coronary artery disease)  s/p stenting    H/O heart artery stent      ALLERGIES:  penicillin (Anaphylaxis; Hives)    MEDICATIONS:  STANDING MEDICATIONS  atorvastatin 80 milliGRAM(s) Oral at bedtime  BACItracin   Ointment 1 Application(s) Topical two times a day  chlorhexidine 0.12% Liquid 15 milliLiter(s) Oral Mucosa two times a day  chlorhexidine 4% Liquid 1 Application(s) Topical daily  enoxaparin Injectable 75 milliGRAM(s) SubCutaneous every 12 hours  insulin regular  human corrective regimen sliding scale   SubCutaneous every 6 hours  labetalol 400 milliGRAM(s) Oral every 8 hours  levETIRAcetam 750 milliGRAM(s) Oral two times a day  lisinopril 40 milliGRAM(s) Oral daily  magnesium oxide 400 milliGRAM(s) Oral every 8 hours  nystatin Powder 1 Application(s) Topical two times a day  pantoprazole   Suspension 40 milliGRAM(s) Oral daily  senna 2 Tablet(s) Oral at bedtime  voriconazole 200 milliGRAM(s) Oral every 12 hours    PRN MEDICATIONS  acetaminophen    Suspension .. 650 milliGRAM(s) Enteral Tube every 6 hours PRN  acetaminophen   Tablet .. 650 milliGRAM(s) Oral every 6 hours PRN      VITAL SIGNS: Last 24 Hours  T(C): 37.3 (22 Mar 2021 14:13), Max: 38.8 (22 Mar 2021 11:10)  T(F): 99.1 (22 Mar 2021 14:13), Max: 101.9 (22 Mar 2021 11:10)  HR: 80 (22 Mar 2021 14:13) (77 - 87)  BP: 141/68 (22 Mar 2021 14:13) (132/63 - 168/72)  BP(mean): 97 (22 Mar 2021 14:13) (89 - 104)  RR: 18 (22 Mar 2021 14:13) (18 - 18)  SpO2: 97% (22 Mar 2021 05:13) (97% - 98%)    LABS:                        8.4    6.83  )-----------( 141      ( 22 Mar 2021 05:45 )             27.8     03-22    138  |  100  |  12  ----------------------------<  180<H>  4.1   |  30  |  <0.5<L>    Ca    8.3<L>      22 Mar 2021 05:45  Mg     1.7     03-22    TPro  5.0<L>  /  Alb  2.9<L>  /  TBili  0.3  /  DBili  x   /  AST  26  /  ALT  18  /  AlkPhos  280<H>  03-22    RADIOLOGY:    < from: VA Duplex Upper Ext Vein Scan, Left (03.20.21 @ 14:47) >  Impression:    No evidence of deep or superficial thrombosis in the left upper extremity.    ICD-10:M79.89    < end of copied text >      PHYSICAL EXAM:    GENERAL: NAD, well-developed, patient did not follow commands and did not open eyes during examination  HEENT:  left forehead craniotomy  PULMONARY: Clear to auscultation bilaterally; No wheeze  CARDIOVASCULAR: Regular rate and rhythm; No murmurs, rubs, or gallops  GASTROINTESTINAL: Soft, Nontender, Nondistended  MUSCULOSKELETAL: Large edematous RUE  NEUROLOGY: not following commands; withdraws to pain

## 2021-03-22 NOTE — CONSULT NOTE ADULT - CONSULT REQUESTED DATE/TIME
24-Feb-2021 13:00
24-Feb-2021 21:40
24-Feb-2021 11:48
24-Feb-2021 13:16
25-Feb-2021 10:14
13-Mar-2021 09:53
22-Mar-2021 23:19
18-Mar-2021 14:15
28-Feb-2021 06:59
01-Mar-2021 15:05

## 2021-03-22 NOTE — CONSULT NOTE ADULT - REASON FOR ADMISSION
Brain Bleed
altered mental status

## 2021-03-22 NOTE — PROCEDURE NOTE - NSPROCDETAILS_GEN_ALL_CORE
sterile technique, indwelling urinary device inserted
patient pre-oxygenated, tube inserted, placement confirmed
location identified, draped/prepped, sterile technique used/sterile technique, catheter placed
guidewire recovered/lumen(s) aspirated and flushed/sterile dressing applied/sterile technique, catheter placed/ultrasound guidance with use of sterile gel and probe cove

## 2021-03-22 NOTE — PROCEDURE NOTE - ADDITIONAL PROCEDURE DETAILS
37 cm Bard 5 Vincentian Power Injectable PICC with tip at the cavo-atrial junction, okay to use
Under sterile technique, placed 16 fr roblero catheter into urinary bladder without difficulty. 10cc SW instilled in roblero balloon.
8.0 shiley placed percutaneously with bronchoscopic guidance
20fr peg placed with endoscopic guidance at 4cm

## 2021-03-22 NOTE — PROCEDURE NOTE - NSURITECHNIQUE_GU_A_CORE
Proper hand hygiene was performed/Sterile gloves were worn for the duration of the procedure/A sterile drape was used to cover all adjacent areas/The site was cleaned with soap/water and sterile solution (betadine)/The catheter was appropriately lubricated/The catheter was secured with a securement device (e.g. StatLock)/The urinary drainage system is closed at the end of the procedure/All applicable medical record documentation is completed

## 2021-03-23 NOTE — PROGRESS NOTE ADULT - ASSESSMENT
75yFemale with PMH including B cell lymphoma on Imbruvica (since 10/2020), CAD s/p stents 15 years ago, DM, chronic UTI, HTN, DLD, brought in by ambulance due to altered mental status and hypertension. Patient found to have large hematoma that was evacuated.  Patient found to have new large intraparenchymal bleed on CT today. Palliative care consulted for GOC.    Earlier in the day, patient's family had discussion with primary team. At that time, they made the patient DNR.  Went to see patient with family at bedside.  Patient is in no acute distress and does not participate in exam.  Spoke with patient's family at bedside, including  Osman and daughter Kathrine. They had been given a clinical update and were aware of the patient's grave prognosis.  We discussed treatment options. They had already agreed to DNR/DNI and agreed to no escalation of care. Comfort measures with palliative removal of the ventilator was discussed, and patient's family noted more family members were coming to visit prior to making that decision.  THey are aware that patient may die tonight. All questions answered.    MEDD (morphine equivalent daily dose): NA      See Recs below.    Please call [phone] with questions or concerns 24/7.   We will continue to follow.

## 2021-03-23 NOTE — CHART NOTE - NSCHARTNOTEFT_GEN_A_CORE
during AM round around 8:45 am., medical team as pt had 4x NBNB vomiting, on Exam, pt not responding, has fixed dilated pupil, not responding,   and area of scalp wound/craniotomy increased since the PGY-1 exam earlier this AM, NS made awarel pt sent for CTH urgently.      CTH showed ICH, pt given Protamine, NS aware ------ during AM round around 8:45 am., medical team as pt had 4x NBNB vomiting, on Exam, pt not responding, has fixed dilated pupil, not responding,   and area of scalp wound/craniotomy increased since the PGY-1 exam earlier this AM, NS made aware pt sent for CTH urgently.      CTH showed ICH, pt given Protamine, NS aware during AM round around 8:45 am., medical team as pt had 4x NBNB vomiting, on Exam, pt not responding, has fixed dilated pupil,   and area of scalp wound/craniotomy increased since the PGY-1 exam earlier this AM, NS made aware pt sent for CTH urgently.      11 am: CTH showed massive ICH, pt given Protamine, FFP ordered, NS aware, per them, will no intervention as pt prognosis is futile, family called and notified,     3:30pm: Pt was made DNR/DNI by her daughter HCP, no escalation on level of care as for now, pending Palliative eval.

## 2021-03-23 NOTE — PROGRESS NOTE ADULT - SUBJECTIVE AND OBJECTIVE BOX
S: forehead/scalp hematoma noticed  mental status worse. obtunded  Vomited this morning    All other pertinent ROS negative.      03-22-21 @ 07:01  -  03-23-21 @ 07:00  --------------------------------------------------------  IN: 920 mL / OUT: 1701 mL / NET: -781 mL    03-23-21 @ 07:01  -  03-23-21 @ 19:54  --------------------------------------------------------  IN: 0 mL / OUT: 1000 mL / NET: -1000 mL      Vital Signs Last 24 Hrs  T(C): 37.7 (23 Mar 2021 13:23), Max: 37.7 (23 Mar 2021 13:23)  T(F): 99.8 (23 Mar 2021 13:23), Max: 99.8 (23 Mar 2021 13:23)  HR: 65 (23 Mar 2021 13:23) (65 - 85)  BP: 100/49 (23 Mar 2021 13:23) (100/49 - 205/88)  BP(mean): 71 (23 Mar 2021 13:23) (71 - 116)  RR: 20 (23 Mar 2021 13:23) (18 - 20)  SpO2: 94% (23 Mar 2021 08:00) (94% - 96%)  PHYSICAL EXAM:    Constitutional: Obtunded  HEENT: forehead/scalp hematoma noted.   Neck: Soft and supple, No LAD, No JVD  Respiratory: Breath sounds are clear bilaterally, No wheezing, rales or rhonchi  Cardiovascular: S1 and S2, regular rate and rhythm, no Murmurs, gallops or rubs  Gastrointestinal: Bowel Sounds present, soft, nontender, nondistended, no guarding, no rebound  Extremities: anasarca  Neuro: b/l pupils fixed and dilated. does not withdraw to pain.       MEDICATIONS:  MEDICATIONS  (STANDING):  levETIRAcetam 750 milliGRAM(s) Oral two times a day      LABS: All Labs Reviewed:                        8.6    12.53 )-----------( 187      ( 23 Mar 2021 10:47 )             27.9     03-23    137  |  99  |  13  ----------------------------<  202<H>  4.0   |  31  |  <0.5<L>    Ca    8.5      23 Mar 2021 10:47  Mg     1.8     03-23    TPro  5.5<L>  /  Alb  3.2<L>  /  TBili  0.3  /  DBili  x   /  AST  25  /  ALT  19  /  AlkPhos  296<H>  03-23    PT/INR - ( 23 Mar 2021 10:47 )   PT: 17.50 sec;   INR: 1.52 ratio         PTT - ( 23 Mar 2021 10:47 )  PTT:40.4 sec  CARDIAC MARKERS ( 23 Mar 2021 10:47 )  x     / <0.01 ng/mL / x     / x     / x          Blood Culture:     Radiology: reviewed

## 2021-03-23 NOTE — PROGRESS NOTE ADULT - THIS PATIENT HAS THE FOLLOWING CONDITION(S)/DIAGNOSES ON THIS ADMISSION:
Brain Compression / Herniation
Cerebral Edema/Brain Compression / Herniation
Cerebral Edema/Brain Compression / Herniation/Acute Respiratory Failure
None
Brain Compression / Herniation
Cerebral Edema/Brain Compression / Herniation
Cerebral Edema/Brain Compression / Herniation
None
None
Brain Compression / Herniation
Cerebral Edema/Brain Compression / Herniation/Acute Respiratory Failure
Brain Compression / Herniation
Cerebral Edema
Cerebral Edema/Brain Compression / Herniation
Cerebral Edema/Brain Compression / Herniation
Cerebral Edema/Brain Compression / Herniation/Acute Respiratory Failure
None
Cerebral Edema/Brain Compression / Herniation/Acute Respiratory Failure

## 2021-03-23 NOTE — PROGRESS NOTE ADULT - ATTENDING COMMENTS
new event increased cerebral hemorrhage resulting in fixed dilated pupils  as evidenced on new ct  spoke with attendings

## 2021-03-23 NOTE — PROGRESS NOTE ADULT - PROBLEM SELECTOR PLAN 1
New large intracranial bleed noted on CT today. Patient's family does not wish for escalation of care and DNR/DNI.  -continue keppra for seizure prophylaxis

## 2021-03-23 NOTE — PROGRESS NOTE ADULT - SUBJECTIVE AND OBJECTIVE BOX
S/Overnight events:        Hospital Course:   POD# 27 s/p left mini invasive craniotomy for evacuation of IPH with placement of EVD, s/p trach and PEG 3/6 21, patient with episode of vomiting x4 today by nurse, presently still on ventalator at 100 %, pupils fixed and dilated.       Vital Signs Last 24 Hrs  T(C): 37.3 (23 Mar 2021 05:15), Max: 38.8 (22 Mar 2021 11:10)  T(F): 99.2 (23 Mar 2021 05:15), Max: 101.9 (22 Mar 2021 11:10)  HR: 65 (23 Mar 2021 09:26) (65 - 85)  BP: 174/76 (23 Mar 2021 09:26) (141/68 - 205/88)  BP(mean): 109 (23 Mar 2021 09:26) (93 - 116)  RR: 20 (23 Mar 2021 05:15) (18 - 20)  SpO2: 96% (22 Mar 2021 20:40) (96% - 96%)    I&O's Detail    22 Mar 2021 07:01  -  23 Mar 2021 07:00  --------------------------------------------------------  IN:    Enteral Tube Flush: 200 mL    Glucerna 1.5: 720 mL  Total IN: 920 mL    OUT:    Indwelling Catheter - Urethral (mL): 1700 mL    Stool (mL): 1 mL  Total OUT: 1701 mL    Total NET: -781 mL        I&O's Summary    22 Mar 2021 07:01  -  23 Mar 2021 07:00  --------------------------------------------------------  IN: 920 mL / OUT: 1701 mL / NET: -781 mL        PHYSICAL EXAM:  Neurological: patient fixed and dilated to 7mm, neg corneal reflex, on ventilator, no movement of upper extremities to pain, slight movement of toes to pain, possibly reflex, forehead wound bulging outward and reddish in color, no drainage noted.        LABS:                        8.8    14.19 )-----------( 183      ( 23 Mar 2021 06:32 )             28.6         137  |  96<L>  |  14  ----------------------------<  245<H>  4.1   |  31  |  <0.5<L>    Ca    8.8      23 Mar 2021 06:32  Mg     1.8         TPro  5.4<L>  /  Alb  3.2<L>  /  TBili  0.4  /  DBili  x   /  AST  31  /  ALT  20  /  AlkPhos  290<H>      PT/INR - ( 23 Mar 2021 06:32 )   PT: 16.40 sec;   INR: 1.43 ratio         PTT - ( 23 Mar 2021 06:32 )  PTT:24.0 sec  Urinalysis Basic - ( 22 Mar 2021 22:20 )    Color: Yellow / Appearance: Slightly Turbid / S.019 / pH: x  Gluc: x / Ketone: Negative  / Bili: Negative / Urobili: <2 mg/dL   Blood: x / Protein: 30 mg/dL / Nitrite: Negative   Leuk Esterase: Large / RBC: 21 /HPF /  /HPF   Sq Epi: x / Non Sq Epi: 0 /HPF / Bacteria: Few          CAPILLARY BLOOD GLUCOSE      POCT Blood Glucose.: 236 mg/dL (23 Mar 2021 05:06)  POCT Blood Glucose.: 155 mg/dL (22 Mar 2021 23:45)  POCT Blood Glucose.: 173 mg/dL (22 Mar 2021 21:33)  POCT Blood Glucose.: 161 mg/dL (22 Mar 2021 17:29)  POCT Blood Glucose.: 196 mg/dL (22 Mar 2021 11:08)        Allergies    penicillin (Anaphylaxis; Hives)    Intolerances      MEDICATIONS:  Antibiotics:  voriconazole 200 milliGRAM(s) Oral every 12 hours    Neuro:  acetaminophen    Suspension .. 650 milliGRAM(s) Enteral Tube every 6 hours PRN  acetaminophen   Tablet .. 650 milliGRAM(s) Oral every 6 hours PRN  levETIRAcetam 750 milliGRAM(s) Oral two times a day    Anticoagulation:  enoxaparin Injectable 75 milliGRAM(s) SubCutaneous every 12 hours    OTHER:  atorvastatin 80 milliGRAM(s) Oral at bedtime  BACItracin   Ointment 1 Application(s) Topical two times a day  chlorhexidine 0.12% Liquid 15 milliLiter(s) Oral Mucosa two times a day  chlorhexidine 4% Liquid 1 Application(s) Topical daily  insulin regular  human corrective regimen sliding scale   SubCutaneous every 6 hours  labetalol 400 milliGRAM(s) Oral every 8 hours  lisinopril 40 milliGRAM(s) Oral daily  nystatin Powder 1 Application(s) Topical two times a day  pantoprazole   Suspension 40 milliGRAM(s) Oral daily  senna 2 Tablet(s) Oral at bedtime    IVF:  magnesium oxide 400 milliGRAM(s) Oral every 8 hours    CULTURES:  Culture Results:   No growth to date. ( @ 11:49)  Culture Results:   No Growth Final ( @ 11:00)    RADIOLOGY & ADDITIONAL TESTS:      ASSESSMENT:  75y Female s/p mini crani for evac of IPH POD # 27, pupils fixed and dilated, patient on therapeutic lovenox and may have had a cranial bleed,     CVA (CEREBRAL VASCULAR ACCIDENT);INTRACRANIAL BLEED    ^CVA (CEREBRAL VASCULAR ACCIDENT);INTRACRANIAL BLEED    Family history of diabetes mellitus (DM)    No pertinent family history in first degree relatives    Handoff    MEWS Score    Anemia    DM (diabetes mellitus)    High cholesterol    HTN (hypertension)    B-cell chronic lymphocytic leukemia variant    CAD (coronary artery disease)    CVA (cerebral vascular accident)    Altered mental status    Palliative care encounter    Intracranial bleed    Craniotomy for intracerebral hemorrhage    H/O heart artery stent    No significant past surgical history    AMS    90+    Intracranial bleed    [email or web address]        PLAN: Patient examined by Dr Rodriguez, agrees with exam findings, recommend HCT if possible and brain death protocol.

## 2021-03-23 NOTE — PROGRESS NOTE ADULT - ASSESSMENT
75F w/ pmhx of CAD s/p stents, DM, HTN, HLD and B cell lymphoma on Imbruvica (since 10/2020), presented for AMS and /210.     # Lt frontal IPH s/p craniotomy/hematoma evacuation  - Pt w/as intubated for Airway protection on admission, Code Stroke NIHSS initial 23  - CTH showed Lt frontal IPH with intraventricular extension into left lateral ventricle and 0.9cm midline shit to right due to mass effect.  - CTA H&N was negative for aneurysms or AVM  - s/p EVD placement 2/24 s/p removal 3/2  - Bedside trach/peg on 3/6.  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   Dispo: placement    75F w/ pmhx of CAD s/p stents, DM, HTN, HLD and B cell lymphoma on Imbruvica (since 10/2020), presented for AMS and /210.     # Lt frontal IPH s/p craniotomy/hematoma evacuation  - Pt w/as intubated for Airway protection on admission, Code Stroke NIHSS initial 23  - CTH showed Lt frontal IPH with intraventricular extension into left lateral ventricle and 0.9cm midline shit to right due to mass effect.  - s/p EVD placement 2/24 s/p removal 3/2  - Bedside trach/peg on 3/6.  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   Dispo: placement    75F w/ pmhx of CAD s/p stents, DM, HTN, HLD and B cell lymphoma on Imbruvica (since 10/2020), presented for AMS and /210.     # Lt frontal IPH s/p craniotomy/hematoma evacuation  - Pt w/as intubated for Airway protection on admission, Code Stroke NIHSS initial 23  - CTH showed Lt frontal IPH with intraventricular extension into left lateral ventricle and 0.9cm midline shit to right due to mass effect.  - s/p EVD placement 2/24 s/p removal 3/2  - Bedside trach/peg on 3/6.  - keppra for seizure prophylaxis  - Patient with vomiting 3/23 in AM, fixed dilated pupils, CTH demonstrating large acute intraparenchymal hemorrhage w/ marked mass effect with mild downward displacement of the brainstem  - Patient given protamine sulfate 50mg x1   - NSx recalled for findings above, spoke with nsx: no neurosurgical intervention at this time.  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  - Transfuse for platelet count <100K and hemoglobin < 7    # HTN Emergency on presentation - now resolved  - BP controlled now w/ labetalol and Lisinopril  - will hold as patient blood pressure dropping     # DM, A1C 6.3  - monitor fingersticks, cont sliding scale for now    # HLD/CAD s/p stents  - Echo (10/13/20): EF 55-60%, trace MR, R atrial echodensity possible thrombus   - Cont Aspirin, Statin, BB    # Hypomagnesemia  - repleted    DVT ppx: holding for intracranial bleed  GI ppx: PPI   Diet: Tube feeds   Code Status: DNR/ DNI

## 2021-03-23 NOTE — PROGRESS NOTE ADULT - PROBLEM SELECTOR PLAN 3
Patient does not participate in exam and is not agitated  -No recommendations for medications at this time

## 2021-03-23 NOTE — PROGRESS NOTE ADULT - SUBJECTIVE AND OBJECTIVE BOX
SUBJECTIVE  Patient is a 75y old Female who presents with a chief complaint of altered mental status (23 Mar 2021 10:00)  Currently admitted to medicine with the primary diagnosis of CVA (cerebral vascular accident)    Today is hospital day 27d.   Patient seen and examined at 07:30 am    OBJECTIVE  PAST MEDICAL & SURGICAL HISTORY  Anemia    DM (diabetes mellitus)    High cholesterol    HTN (hypertension)    B-cell chronic lymphocytic leukemia variant    CAD (coronary artery disease)  s/p stenting    H/O heart artery stent      ALLERGIES:  penicillin (Anaphylaxis; Hives)    MEDICATIONS:  STANDING MEDICATIONS  atorvastatin 80 milliGRAM(s) Oral at bedtime  BACItracin   Ointment 1 Application(s) Topical two times a day  cefepime   IVPB      cefepime   IVPB 1000 milliGRAM(s) IV Intermittent every 12 hours  chlorhexidine 0.12% Liquid 15 milliLiter(s) Oral Mucosa two times a day  chlorhexidine 4% Liquid 1 Application(s) Topical daily  insulin regular  human corrective regimen sliding scale   SubCutaneous every 6 hours  labetalol 400 milliGRAM(s) Oral every 8 hours  levETIRAcetam 750 milliGRAM(s) Oral two times a day  lisinopril 40 milliGRAM(s) Oral daily  magnesium oxide 400 milliGRAM(s) Oral every 8 hours  nystatin Powder 1 Application(s) Topical two times a day  pantoprazole   Suspension 40 milliGRAM(s) Oral daily  senna 2 Tablet(s) Oral at bedtime  voriconazole 200 milliGRAM(s) Oral every 12 hours    PRN MEDICATIONS  acetaminophen    Suspension .. 650 milliGRAM(s) Enteral Tube every 6 hours PRN  acetaminophen   Tablet .. 650 milliGRAM(s) Oral every 6 hours PRN      VITAL SIGNS: Last 24 Hours  T(C): 37.7 (23 Mar 2021 13:23), Max: 37.7 (23 Mar 2021 13:23)  T(F): 99.8 (23 Mar 2021 13:23), Max: 99.8 (23 Mar 2021 13:23)  HR: 65 (23 Mar 2021 13:23) (65 - 85)  BP: 100/49 (23 Mar 2021 13:23) (100/49 - 205/88)  BP(mean): 71 (23 Mar 2021 13:23) (71 - 116)  RR: 20 (23 Mar 2021 13:23) (18 - 20)  SpO2: 94% (23 Mar 2021 08:00) (94% - 96%)    LABS:                        8.6    12.53 )-----------( 187      ( 23 Mar 2021 10:47 )             27.9         137  |  99  |  13  ----------------------------<  202<H>  4.0   |  31  |  <0.5<L>    Ca    8.5      23 Mar 2021 10:47  Mg     1.8         TPro  5.5<L>  /  Alb  3.2<L>  /  TBili  0.3  /  DBili  x   /  AST  25  /  ALT  19  /  AlkPhos  296<H>      PT/INR - ( 23 Mar 2021 10:47 )   PT: 17.50 sec;   INR: 1.52 ratio         PTT - ( 23 Mar 2021 10:47 )  PTT:40.4 sec  Urinalysis Basic - ( 22 Mar 2021 22:20 )    Color: Yellow / Appearance: Slightly Turbid / S.019 / pH: x  Gluc: x / Ketone: Negative  / Bili: Negative / Urobili: <2 mg/dL   Blood: x / Protein: 30 mg/dL / Nitrite: Negative   Leuk Esterase: Large / RBC: 21 /HPF /  /HPF   Sq Epi: x / Non Sq Epi: 0 /HPF / Bacteria: Few        Troponin T, Serum: <0.01 ng/mL (21 @ 10:47)      CARDIAC MARKERS ( 23 Mar 2021 10:47 )  x     / <0.01 ng/mL / x     / x     / x          RADIOLOGY:    PHYSICAL EXAM:    GENERAL: NAD, well-developed, AAOx3  HEENT:  Atraumatic, Normocephalic. EOMI, PERRLA, conjunctiva and sclera clear, No JVD  PULMONARY: Clear to auscultation bilaterally; No wheeze  CARDIOVASCULAR: Regular rate and rhythm; No murmurs, rubs, or gallops  GASTROINTESTINAL: Soft, Nontender, Nondistended; Bowel sounds present  MUSCULOSKELETAL:  2+ Peripheral Pulses, No clubbing, cyanosis, or edema  NEUROLOGY: non-focal  SKIN: No rashes or lesions     SUBJECTIVE  Patient is a 75y old Female who presents with a chief complaint of altered mental status (23 Mar 2021 10:00)  Currently admitted to medicine with the primary diagnosis of CVA (cerebral vascular accident)    Today is hospital day 27d.   Patient seen and examined at 07:30 am, at that time patient appeared as previous day, not interacting with undersigned.     Later in morning recieved call patient was with vomitus.   Evaluation found patient to be with fixed dilated pupils with L frontal temporal craniotomy sight noted to be increased in rubor and fluctuance.     OBJECTIVE  PAST MEDICAL & SURGICAL HISTORY  Anemia    DM (diabetes mellitus)    High cholesterol    HTN (hypertension)    B-cell chronic lymphocytic leukemia variant    CAD (coronary artery disease)  s/p stenting    H/O heart artery stent      ALLERGIES:  penicillin (Anaphylaxis; Hives)    MEDICATIONS:  STANDING MEDICATIONS  atorvastatin 80 milliGRAM(s) Oral at bedtime  BACItracin   Ointment 1 Application(s) Topical two times a day  cefepime   IVPB      cefepime   IVPB 1000 milliGRAM(s) IV Intermittent every 12 hours  chlorhexidine 0.12% Liquid 15 milliLiter(s) Oral Mucosa two times a day  chlorhexidine 4% Liquid 1 Application(s) Topical daily  insulin regular  human corrective regimen sliding scale   SubCutaneous every 6 hours  labetalol 400 milliGRAM(s) Oral every 8 hours  levETIRAcetam 750 milliGRAM(s) Oral two times a day  lisinopril 40 milliGRAM(s) Oral daily  magnesium oxide 400 milliGRAM(s) Oral every 8 hours  nystatin Powder 1 Application(s) Topical two times a day  pantoprazole   Suspension 40 milliGRAM(s) Oral daily  senna 2 Tablet(s) Oral at bedtime  voriconazole 200 milliGRAM(s) Oral every 12 hours    PRN MEDICATIONS  acetaminophen    Suspension .. 650 milliGRAM(s) Enteral Tube every 6 hours PRN  acetaminophen   Tablet .. 650 milliGRAM(s) Oral every 6 hours PRN      VITAL SIGNS: Last 24 Hours  T(C): 37.7 (23 Mar 2021 13:23), Max: 37.7 (23 Mar 2021 13:23)  T(F): 99.8 (23 Mar 2021 13:23), Max: 99.8 (23 Mar 2021 13:23)  HR: 65 (23 Mar 2021 13:23) (65 - 85)  BP: 100/49 (23 Mar 2021 13:23) (100/49 - 205/88)  BP(mean): 71 (23 Mar 2021 13:23) (71 - 116)  RR: 20 (23 Mar 2021 13:23) (18 - 20)  SpO2: 94% (23 Mar 2021 08:00) (94% - 96%)    LABS:                        8.6    12.53 )-----------( 187      ( 23 Mar 2021 10:47 )             27.9         137  |  99  |  13  ----------------------------<  202<H>  4.0   |  31  |  <0.5<L>    Ca    8.5      23 Mar 2021 10:47  Mg     1.8         TPro  5.5<L>  /  Alb  3.2<L>  /  TBili  0.3  /  DBili  x   /  AST  25  /  ALT  19  /  AlkPhos  296<H>      PT/INR - ( 23 Mar 2021 10:47 )   PT: 17.50 sec;   INR: 1.52 ratio         PTT - ( 23 Mar 2021 10:47 )  PTT:40.4 sec  Urinalysis Basic - ( 22 Mar 2021 22:20 )    Color: Yellow / Appearance: Slightly Turbid / S.019 / pH: x  Gluc: x / Ketone: Negative  / Bili: Negative / Urobili: <2 mg/dL   Blood: x / Protein: 30 mg/dL / Nitrite: Negative   Leuk Esterase: Large / RBC: 21 /HPF /  /HPF   Sq Epi: x / Non Sq Epi: 0 /HPF / Bacteria: Few    Troponin T, Serum: <0.01 ng/mL (21 @ 10:47)      CARDIAC MARKERS ( 23 Mar 2021 10:47 )  x     / <0.01 ng/mL / x     / x     / x          RADIOLOGY:  < from: CT Head No Cont (21 @ 11:16) >  New large acute intraparenchymal hemorrhage in the left temporal lobe extending into the midbrain, left thalamus, posterior left insula with marked mass effect with mild downward displacement of the brainstem as evidenced by effacement of the prepontine cisternal space.New large acute left subdural hematoma with scattered hypodensity which could reflect active arterial hemorrhage. There is associated midline shift to the right measuring approximately 1.8 cm with left uncal herniation.    Loss of gray-white matter differentiation in the medial left frontal lobe suspicious for left IALIN ischemia/infarct.    Intraventricular extension into the left lateral ventricle and the third/fourth ventricles. Interval enlargement of right lateral ventricle likely reflecting ventricular entrapment.    Diffuse scattered subarachnoid hemorrhages along the left cerebral sulci and sylvian fissure.    Interval enlargement of the left frontal scalp collection overlying the craniotomy since the prior exam could reflect decompressed left subdural through the craniotomy defect.    Communication: The summary of above findings were discussed with readback confirmation with Dr. Menjivar by radiology resident Dr. Quinn on 3/23/2021 at 12:30 PM.    < end of copied text >        PHYSICAL EXAM:    GENERAL: NAD, patient did not follow commands and did not open eyes during examination  HEENT:  left forehead craniotomy, now increased in rubor compared to yesterday, Fixed dilated pupils  PULMONARY: Clear to auscultation bilaterally; No wheeze  CARDIOVASCULAR: Regular rate and rhythm; No murmurs, rubs, or gallops  GASTROINTESTINAL: Soft, Nontender, Nondistended  MUSCULOSKELETAL: Large edematous RUE  NEUROLOGY: not following commands; withdraws to pain      SUBJECTIVE  Patient is a 75y old Female who presents with a chief complaint of altered mental status (23 Mar 2021 10:00)  Currently admitted to medicine with the primary diagnosis of CVA (cerebral vascular accident)    Today is hospital day 27d.   Patient seen and examined at 07:30 am, at that time patient appeared as previous day, not interacting with undersigned.     Later in morning recieved call patient was with vomitus.   Evaluation found patient to be with fixed dilated pupils with L frontal temporal craniotomy sight noted to be increased in rubor and fluctuance.     OBJECTIVE  PAST MEDICAL & SURGICAL HISTORY  Anemia    DM (diabetes mellitus)    High cholesterol    HTN (hypertension)    B-cell chronic lymphocytic leukemia variant    CAD (coronary artery disease)  s/p stenting    H/O heart artery stent      ALLERGIES:  penicillin (Anaphylaxis; Hives)    MEDICATIONS:  STANDING MEDICATIONS  atorvastatin 80 milliGRAM(s) Oral at bedtime  BACItracin   Ointment 1 Application(s) Topical two times a day  cefepime   IVPB      cefepime   IVPB 1000 milliGRAM(s) IV Intermittent every 12 hours  chlorhexidine 0.12% Liquid 15 milliLiter(s) Oral Mucosa two times a day  chlorhexidine 4% Liquid 1 Application(s) Topical daily  insulin regular  human corrective regimen sliding scale   SubCutaneous every 6 hours  labetalol 400 milliGRAM(s) Oral every 8 hours  levETIRAcetam 750 milliGRAM(s) Oral two times a day  lisinopril 40 milliGRAM(s) Oral daily  magnesium oxide 400 milliGRAM(s) Oral every 8 hours  nystatin Powder 1 Application(s) Topical two times a day  pantoprazole   Suspension 40 milliGRAM(s) Oral daily  senna 2 Tablet(s) Oral at bedtime  voriconazole 200 milliGRAM(s) Oral every 12 hours    PRN MEDICATIONS  acetaminophen    Suspension .. 650 milliGRAM(s) Enteral Tube every 6 hours PRN  acetaminophen   Tablet .. 650 milliGRAM(s) Oral every 6 hours PRN      VITAL SIGNS: Last 24 Hours  T(C): 37.7 (23 Mar 2021 13:23), Max: 37.7 (23 Mar 2021 13:23)  T(F): 99.8 (23 Mar 2021 13:23), Max: 99.8 (23 Mar 2021 13:23)  HR: 65 (23 Mar 2021 13:23) (65 - 85)  BP: 100/49 (23 Mar 2021 13:23) (100/49 - 205/88)  BP(mean): 71 (23 Mar 2021 13:23) (71 - 116)  RR: 20 (23 Mar 2021 13:23) (18 - 20)  SpO2: 94% (23 Mar 2021 08:00) (94% - 96%)    LABS:                        8.6    12.53 )-----------( 187      ( 23 Mar 2021 10:47 )             27.9         137  |  99  |  13  ----------------------------<  202<H>  4.0   |  31  |  <0.5<L>    Ca    8.5      23 Mar 2021 10:47  Mg     1.8         TPro  5.5<L>  /  Alb  3.2<L>  /  TBili  0.3  /  DBili  x   /  AST  25  /  ALT  19  /  AlkPhos  296<H>      PT/INR - ( 23 Mar 2021 10:47 )   PT: 17.50 sec;   INR: 1.52 ratio         PTT - ( 23 Mar 2021 10:47 )  PTT:40.4 sec  Urinalysis Basic - ( 22 Mar 2021 22:20 )    Color: Yellow / Appearance: Slightly Turbid / S.019 / pH: x  Gluc: x / Ketone: Negative  / Bili: Negative / Urobili: <2 mg/dL   Blood: x / Protein: 30 mg/dL / Nitrite: Negative   Leuk Esterase: Large / RBC: 21 /HPF /  /HPF   Sq Epi: x / Non Sq Epi: 0 /HPF / Bacteria: Few    Troponin T, Serum: <0.01 ng/mL (21 @ 10:47)      CARDIAC MARKERS ( 23 Mar 2021 10:47 )  x     / <0.01 ng/mL / x     / x     / x          RADIOLOGY:  < from: CT Head No Cont (21 @ 11:16) >  New large acute intraparenchymal hemorrhage in the left temporal lobe extending into the midbrain, left thalamus, posterior left insula with marked mass effect with mild downward displacement of the brainstem as evidenced by effacement of the prepontine cisternal space.New large acute left subdural hematoma with scattered hypodensity which could reflect active arterial hemorrhage. There is associated midline shift to the right measuring approximately 1.8 cm with left uncal herniation.    Loss of gray-white matter differentiation in the medial left frontal lobe suspicious for left AILIN ischemia/infarct.    Intraventricular extension into the left lateral ventricle and the third/fourth ventricles. Interval enlargement of right lateral ventricle likely reflecting ventricular entrapment.    Diffuse scattered subarachnoid hemorrhages along the left cerebral sulci and sylvian fissure.    Interval enlargement of the left frontal scalp collection overlying the craniotomy since the prior exam could reflect decompressed left subdural through the craniotomy defect.    Communication: The summary of above findings were discussed with readback confirmation with Dr. Menjivar by radiology resident Dr. Quinn on 3/23/2021 at 12:30 PM.    < end of copied text >        PHYSICAL EXAM:    GENERAL: NAD, patient did not follow commands and did not open eyes during examination  HEENT:  left forehead craniotomy, now increased in rubor compared to yesterday, Fixed dilated pupils  PULMONARY: Clear to auscultation bilaterally; No wheeze  CARDIOVASCULAR: Regular rate and rhythm; No murmurs, rubs, or gallops  GASTROINTESTINAL: Soft, Nontender, Nondistended  MUSCULOSKELETAL: Large edematous RUE  NEUROLOGY: not following commands

## 2021-03-23 NOTE — PROGRESS NOTE ADULT - PROBLEM SELECTOR PLAN 4
No evidence of nonverbal pain on exam  -recommend starting morphine 2mg IV q4h PRN for pain/dyspnea No evidence of nonverbal pain on exam  -recommend starting morphine 2mg IV q4h PRN for pain/dyspnea  -if patient loses IV access, recommend morphine concentrate 5mg sublingual q4h PRN for pain/dyspnea

## 2021-03-23 NOTE — PROGRESS NOTE ADULT - ASSESSMENT
75F w/ pmhx of CAD s/p stents, DM, HTN, HLD and B cell lymphoma on Imbruvica (since 10/2020), presented for AMS and /210.     # Lt frontal IPH s/p craniotomy/hematoma evacuation  - Pt w/as intubated for Airway protection on admission, Code Stroke NIHSS initial 23  - CTH showed Lt frontal IPH with intraventricular extension into left lateral ventricle and 0.9cm midline shit to right due to mass effect.  - s/p EVD placement  s/p removal 3/2  - Bedside trach/peg on 3/6.  -patient was on lovenox since 3/17 by surgery (neurosurgery cleared) for RUE DVT  - keppra for seizure prophylaxis    - Patient with vomiting 3/23 in AM, fixed dilated pupils, CTH demonstrating large acute intraparenchymal hemorrhage w/ marked mass effect with mild downward displacement of the brainstem  -Lovenox d/abdullahi. Patient given protamine sulfate 50mg x1   - NSx recalled for findings above, spoke with nsx: no neurosurgical intervention at this time.  - spoke with family at bedside, patient made DNR/ DNI  - Per palliative's discussion: No escalation of care. Waiting for some more family members to arrive. Tomorrow f/u about palliative weaning from vent.   discontinue all meds not providing comfort. morphine prn for pain/dyspnea.     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  - Transfuse for platelet count <100K and hemoglobin < 7    # HTN Emergency on presentation - now resolved      # DM, A1C 6.3  - monitor fingersticks, cont sliding scale for now    # HLD/CAD s/p stents  - Echo (10/13/20): EF 55-60%, trace MR, R atrial echodensity possible thrombus   - Cont Aspirin, Statin, BB    # Hypomagnesemia  - repleted    DVT ppx: holding for intracranial bleed  GI ppx: PPI   Diet: Tube feeds   Code Status: DNR/ DNI. No escalation of care.   Patient may  tonight. Family aware. f/u with family tomorrow about terminal weaning from vent after other family members have arrived.

## 2021-03-23 NOTE — PROGRESS NOTE ADULT - SUBJECTIVE AND OBJECTIVE BOX
AD BASURTO             MRN-[id number]    CC: altered mental status    HPI:  75 years old female from home with PMHx of B cell lymphoma on Imbruvica (since 10/2020), CAD s/p stents 15 years ago, DM, chronic UTI, HTN, DLD, brought in by ambulance due to altered mental status and hypertension. Patient was AAO x4 and fully functional at baseline. Last known normal was 11:00pm 2/23 before beds. In 2/24 at 8:45AM, patient was found to be lethargy in bed by his son, was AAO x 4 and able to talk to his son, but started coughing and had one episode of nose bleed. At 9:15, her physical therapist noticed her becoming more lethargic, but vitals were normal. Soon at 9:30, patient was unresponsive and had a blank stare. EMS was called and her BP was found to be around 230/120. She was rushed to ED as Code Stroke. NIHSS 23. CTH showed Large left frontal intraparenchymal hematoma with intraventricular extension into left lateral ventricle, associated with marked mass effect resulting in 0.9 cm midline shift to the right anteriorly. Patient was intubated for airway protection. S/p 2 units of platelet for ASA reversal. Neurosurgery was planning for emergent OR resection of hematoma.      (24 Feb 2021 13:43)      SUBJECTIVE:  -Patient at bedside and does not participate in exam  -No nonverbal signs of pain noted on exam    ROS: UNABLE TO DETERMINE AS PATIENT DOES NOT PARTICIPATE IN EXAM AND IS OBTUNDED  DYSPNEA: Y / N	  NAUS/VOM: Y / N	  SECRETIONS: Y / N	  AGITATION: Y / N  Pain (Y/N):       -Provocation/Palliation:  -Quality/Quantity:  -Radiating:  -Severity:  -Timing/Frequency:  -Impact on ADLs:    UNABLE TO OBTAIN  due to: UNABLE TO DETERMINE AS PATIENT DOES NOT PARTICIPATE IN EXAM AND IS OBTUNDED    PEx:  75y              General: does not participate in exam, NAD, vent in place  Eyes: closed, does not open  ENMT: MMM, no ulcers noted; trach/vent in place  CVS: edema noted, no tachycardia  Resp: Unlabored Non tachypneic No increased WOB  GI:  soft, nontender  Musc: No clubbing   Neuro: does not participate in exam, obtunded  Psych: Calm, no agitation, AAOx0  Skin: Non jaundiced, significant bruising  Lymph: Normal      Last BM:   03-18-21 @ 07:01  -  03-19-21 @ 07:00  --------------------------------------------------------  OUT: 2 mL    03-19-21 @ 07:01  -  03-20-21 @ 07:00  --------------------------------------------------------  OUT: 1 mL    03-20-21 @ 07:01  -  03-21-21 @ 07:00  --------------------------------------------------------  OUT: 2 mL    03-21-21 @ 07:01  -  03-22-21 @ 07:00  --------------------------------------------------------  OUT: 1 mL    03-22-21 @ 07:01  -  03-23-21 @ 07:00  --------------------------------------------------------  OUT: 1 mL        ALLERGIES:  penicillin    OPIATE NAÏVE (Y/N): Y    MEDICATIONS: REVIEWED  MEDICATIONS  (STANDING):  levETIRAcetam 750 milliGRAM(s) Oral two times a day    MEDICATIONS  (PRN):  acetaminophen    Suspension .. 650 milliGRAM(s) Enteral Tube every 6 hours PRN Temp greater or equal to 38C (100.4F)  acetaminophen   Tablet .. 650 milliGRAM(s) Oral every 6 hours PRN Moderate Pain (4 - 6)      LABS: REVIEWED  CBC:                        8.6    12.53 )-----------( 187      ( 23 Mar 2021 10:47 )             27.9     CMP:    03-23    137  |  99  |  13  ----------------------------<  202<H>  4.0   |  31  |  <0.5<L>    Ca    8.5      23 Mar 2021 10:47  Mg     1.8     03-23    TPro  5.5<L>  /  Alb  3.2<L>  /  TBili  0.3  /  DBili  x   /  AST  25  /  ALT  19  /  AlkPhos  296<H>  03-23  Albumin, Serum: 3.2 g/dL (03-23-21 @ 10:47)      IMAGING: REVIEWED  Previous EKG reviewed    ADVANCED DIRECTIVES:            DNR DNI            NO escalation of care    DECISION MAKER: patient's  and children    PSYCHOSOCIAL-SPIRITUAL ASSESSMENT:       Reviewed    GOALS OF CARE DISCUSSION       Palliative care info/counseling provided	           Family meeting    CURRENT DISPO PLAN:     WILL REMAIN IN HOSPITAL    REFERRALS	        Palliative Med

## 2021-03-23 NOTE — PROGRESS NOTE ADULT - SUBJECTIVE AND OBJECTIVE BOX
Patient is a 75y old  Female who presents with a chief complaint of altered mental status (23 Mar 2021 08:58)      Subjective: Patient seen and examined. Chart reviewed, and events noted.      Vital Signs Last 24 Hrs  T(C): 37.3 (23 Mar 2021 05:15), Max: 38.8 (22 Mar 2021 11:10)  T(F): 99.2 (23 Mar 2021 05:15), Max: 101.9 (22 Mar 2021 11:10)  HR: 65 (23 Mar 2021 09:26) (65 - 85)  BP: 174/76 (23 Mar 2021 09:26) (141/68 - 205/88)  BP(mean): 109 (23 Mar 2021 09:26) (93 - 116)  RR: 20 (23 Mar 2021 05:15) (18 - 20)  SpO2: 96% (22 Mar 2021 20:40) (96% - 96%)    PHYSICAL EXAM  General: chronically-ill appearing, on ventilator  Neck: tracheostomy tube in place  Lungs: positive air movement b/l ant lungs  Abdomen: PEG tube in place  Ext: B/L UE edema, R>L  : Muryr catheter in place draining clear yellow urine  Skin: ecchymoses at prior phlebotomy sites  Neuro: not interactive    MEDICATIONS  (STANDING):  atorvastatin 80 milliGRAM(s) Oral at bedtime  BACItracin   Ointment 1 Application(s) Topical two times a day  chlorhexidine 0.12% Liquid 15 milliLiter(s) Oral Mucosa two times a day  chlorhexidine 4% Liquid 1 Application(s) Topical daily  enoxaparin Injectable 75 milliGRAM(s) SubCutaneous every 12 hours  insulin regular  human corrective regimen sliding scale   SubCutaneous every 6 hours  labetalol 400 milliGRAM(s) Oral every 8 hours  levETIRAcetam 750 milliGRAM(s) Oral two times a day  lisinopril 40 milliGRAM(s) Oral daily  magnesium oxide 400 milliGRAM(s) Oral every 8 hours  nystatin Powder 1 Application(s) Topical two times a day  pantoprazole   Suspension 40 milliGRAM(s) Oral daily  senna 2 Tablet(s) Oral at bedtime  voriconazole 200 milliGRAM(s) Oral every 12 hours    MEDICATIONS  (PRN):  acetaminophen    Suspension .. 650 milliGRAM(s) Enteral Tube every 6 hours PRN Temp greater or equal to 38C (100.4F)  acetaminophen   Tablet .. 650 milliGRAM(s) Oral every 6 hours PRN Moderate Pain (4 - 6)      LABS:                          8.8    14. )-----------( 183      ( 23 Mar 2021 06:32 )             28.6         Mean Cell Volume : 84.4 fL  Mean Cell Hemoglobin : 26.0 pg  Mean Cell Hemoglobin Concentration : 30.8 g/dL  Auto Neutrophil # : 12.28 K/uL  Auto Lymphocyte # : 0.68 K/uL  Auto Monocyte # : 1.06 K/uL  Auto Eosinophil # : 0.02 K/uL  Auto Basophil # : 0.06 K/uL  Auto Neutrophil % : 86.6 %  Auto Lymphocyte % : 4.8 %  Auto Monocyte % : 7.5 %  Auto Eosinophil % : 0.1 %  Auto Basophil % : 0.4 %      Serial CBC's   @ 06:32  Hct-28.6 / Hgb-8.8 / Plat-183 / RBC-3.39 / WBC-14.19  Serial CBC's   @ 05:45  Hct-27.8 / Hgb-8.4 / Plat-141 / RBC-3.28 / WBC-6.83  Serial CBC's   @ 07:52  Hct-31.0 / Hgb-9.5 / Plat-154 / RBC-3.67 / WBC-8.68  Serial CBC's   @ 04:30  Hct-28.0 / Hgb-8.6 / Plat-124 / RBC-3.32 / WBC-6.95  Serial CBC's   @ 12:00  Hct-26.8 / Hgb-8.4 / Plat-128 / RBC-3.23 / WBC-6.44          137  |  96<L>  |  14  ----------------------------<  245<H>  4.1   |  31  |  <0.5<L>    Ca    8.8      23 Mar 2021 06:32  Mg     1.8         TPro  5.4<L>  /  Alb  3.2<L>  /  TBili  0.4  /  DBili  x   /  AST  31  /  ALT  20  /  AlkPhos  290<H>        PT/INR - ( 23 Mar 2021 06:32 )   PT: 16.40 sec;   INR: 1.43 ratio         PTT - ( 23 Mar 2021 06:32 )  PTT:24.0 sec                Urinalysis Basic - ( 22 Mar 2021 22:20 )    Color: Yellow / Appearance: Slightly Turbid / S.019 / pH: x  Gluc: x / Ketone: Negative  / Bili: Negative / Urobili: <2 mg/dL   Blood: x / Protein: 30 mg/dL / Nitrite: Negative   Leuk Esterase: Large / RBC: 21 /HPF /  /HPF   Sq Epi: x / Non Sq Epi: 0 /HPF / Bacteria: Few

## 2021-03-23 NOTE — PROGRESS NOTE ADULT - SUBJECTIVE AND OBJECTIVE BOX
SBAD  75y, Female  Allergy: penicillin (Anaphylaxis; Hives)      LOS  27d    CHIEF COMPLAINT: altered mental status (23 Mar 2021 06:13)      INTERVAL EVENTS/HPI  - persistently febrile, WBC 14  - 3/22 UA    - T(F): , Max: 101.9 (21 @ 11:10)  - Tolerating medication  - WBC Count: 14.19 (21 @ 06:32)  WBC Count: 6.83 (21 @ 05:45)  - Creatinine, Serum: <0.5 (21 @ 06:32)  Creatinine, Serum: <0.5 (21 @ 05:45)       ROS  ***    VITALS:  T(F): 99.2, Max: 101.9 (21 @ 11:10)  HR: 75  BP: 205/88  RR: 20Vital Signs Last 24 Hrs  T(C): 37.3 (23 Mar 2021 05:15), Max: 38.8 (22 Mar 2021 11:10)  T(F): 99.2 (23 Mar 2021 05:15), Max: 101.9 (22 Mar 2021 11:10)  HR: 75 (23 Mar 2021 05:15) (75 - 85)  BP: 205/88 (23 Mar 2021 05:15) (141/68 - 205/88)  BP(mean): 116 (23 Mar 2021 05:15) (93 - 116)  RR: 20 (23 Mar 2021 05:15) (18 - 20)  SpO2: 96% (22 Mar 2021 20:40) (96% - 96%)    PHYSICAL EXAM:  ***    FH: Non-contributory  Social Hx: Non-contributory    TESTS & MEASUREMENTS:                        8.8    14.19 )-----------( 183      ( 23 Mar 2021 06:32 )             28.6     03-23    137  |  96<L>  |  14  ----------------------------<  245<H>  4.1   |  31  |  <0.5<L>    Ca    8.8      23 Mar 2021 06:32  Mg     1.8         TPro  5.4<L>  /  Alb  3.2<L>  /  TBili  0.4  /  DBili  x   /  AST  31  /  ALT  20  /  AlkPhos  290<H>      eGFR if Non African American: 112 mL/min/1.73M2 (21 @ 06:32)  eGFR if African American: 130 mL/min/1.73M2 (21 @ 06:32)    LIVER FUNCTIONS - ( 23 Mar 2021 06:32 )  Alb: 3.2 g/dL / Pro: 5.4 g/dL / ALK PHOS: 290 U/L / ALT: 20 U/L / AST: 31 U/L / GGT: x           Urinalysis Basic - ( 22 Mar 2021 22:20 )    Color: Yellow / Appearance: Slightly Turbid / S.019 / pH: x  Gluc: x / Ketone: Negative  / Bili: Negative / Urobili: <2 mg/dL   Blood: x / Protein: 30 mg/dL / Nitrite: Negative   Leuk Esterase: Large / RBC: 21 /HPF /  /HPF   Sq Epi: x / Non Sq Epi: 0 /HPF / Bacteria: Few        Culture - Blood (collected 21 @ 11:49)  Source: .Blood None  Preliminary Report (21 @ 22:02):    No growth to date.    Culture - Blood (collected 21 @ 11:00)  Source: .Blood Blood  Final Report (21 @ 22:00):    No Growth Final    Culture - Blood (collected 21 @ 16:46)  Source: .Blood None  Final Report (21 @ 02:21):    No Growth Final    Culture - Blood (collected 21 @ 20:08)  Source: .Blood None  Final Report (03-10-21 @ 04:00):    No Growth Final    Culture - Fungal, CSF (collected 21 @ 14:31)  Source: .CSF CSF  Preliminary Report (03-10-21 @ 15:02):    No growth    Culture - Blood (collected 21 @ 23:21)  Source: .Blood None  Final Report (21 @ 09:00):    No Growth Final    Culture - CSF with Gram Stain (collected 21 @ 19:50)  Source: .CSF CSF  Gram Stain (21 @ 01:32):    polymorphonuclear leukocytes seen    No organisms seen    by cytocentrifuge  Final Report (21 @ 15:57):    No growth at 3 days.    Culture - Bronchial (collected 21 @ 12:22)  Source: Bronch Wash Bronchoalveolar Lavage  Gram Stain (21 @ 22:39):    Moderate polymorphonuclear leukocytes per low power field    Few Squamous epithelial cells per low power field    Moderate Yeast like cells per oil power field    Few Gram positive cocci in pairs per oil power field  Final Report (21 @ 10:10):    Aspergillus fumigatus    Normal Respiratory Macie present    Culture - Fungal, CSF (collected 21 @ 15:20)  Source: .CSF CSF  Preliminary Report (21 @ 15:02):    No growth    Culture - Acid Fast - CSF (collected 21 @ 15:20)  Source: .CSF CSF  Preliminary Report (21 @ 15:03):    No growth at 1 week.    Culture - CSF with Gram Stain (collected 21 @ 15:20)  Source: .CSF CSF... lumbar tap  Gram Stain (21 @ 07:24):    polymorphonuclear leukocytes seen    No organisms seen    by cytocentrifuge  Final Report (21 @ 16:14):    No growth at 3 days.    Culture - Blood (collected 21 @ 12:40)  Source: .Blood Blood-Peripheral  Final Report (21 @ 23:00):    No Growth Final            INFECTIOUS DISEASES TESTING  Procalcitonin, Serum: 0.09 (21 @ 12:00)  Rapid RVP Result: NotDetec (21 @ 10:30)  COVID-19 PCR: NotDetec (21 @ 15:38)  Vancomycin Level, Trough: 17.4 (21 @ 16:00)  Vancomycin Level, Trough: 10.5 (21 @ 05:59)  Vancomycin Level, Trough: 16.7 (21 @ 06:58)  Vancomycin Level, Trough: 24.4 (21 @ 21:00)  Aspergillus Galactomannan Antigen: <0.500 (21 @ 15:49)  Fungitell: <31 (21 @ 23:20)  Aspergillus Galactomannan Antigen: <0.500 (21 @ 23:20)  Vancomycin Level, Random: 15.2 (21 @ 12:25)  Vancomycin Level, Random: 9.8 (21 @ 16:33)  Vancomycin Level, Trough: 7.8 (21 @ 16:33)  Rapid RVP Result: NotDetec (21 @ 15:43)  Procalcitonin, Serum: 0.43 (10-12-20 @ 20:34)  COVID-19 PCR: NotDetec (10-12-20 @ 16:21)  MRSA PCR Result.: Negative (20 @ 05:04)  COVID-19 PCR: NotDetec (20 @ 19:00)  Vancomycin Level, Trough: 11.5 (20 @ 07:05)  Procalcitonin, Serum: 0.26 (20 @ 09:30)  COVID-19 PCR: NotDetec (20 @ 18:24)  COVID-19 PCR: NotDetec (06-10-20 @ 14:21)  COVID-19 PCR: NotDetec (20 @ 14:28)      INFLAMMATORY MARKERS      RADIOLOGY & ADDITIONAL TESTS:  I have personally reviewed the last available Chest xray  CXR      CT      CARDIOLOGY TESTING      MEDICATIONS  atorvastatin 80 Oral at bedtime  BACItracin   Ointment 1 Topical two times a day  chlorhexidine 0.12% Liquid 15 Oral Mucosa two times a day  chlorhexidine 4% Liquid 1 Topical daily  enoxaparin Injectable 75 SubCutaneous every 12 hours  insulin regular  human corrective regimen sliding scale  SubCutaneous every 6 hours  labetalol 400 Oral every 8 hours  levETIRAcetam 750 Oral two times a day  lisinopril 40 Oral daily  magnesium oxide 400 Oral every 8 hours  nystatin Powder 1 Topical two times a day  pantoprazole   Suspension 40 Oral daily  senna 2 Oral at bedtime  voriconazole 200 Oral every 12 hours      WEIGHT  Weight (kg): 76 (21 @ 08:50)  Creatinine, Serum: <0.5 mg/dL (21 @ 06:32)      ANTIBIOTICS:  voriconazole 200 milliGRAM(s) Oral every 12 hours      All available historical records have been reviewed       AD BASURTO  75y, Female  Allergy: penicillin (Anaphylaxis; Hives)      LOS  27d    CHIEF COMPLAINT: altered mental status (23 Mar 2021 06:13)      INTERVAL EVENTS/HPI  - persistently febrile, WBC 14  - 3/22 UA    - T(F): , Max: 101.9 (21 @ 11:10)  - Tolerating medication  - WBC Count: 14.19 (21 @ 06:32)  WBC Count: 6.83 (21 @ 05:45)  - Creatinine, Serum: <0.5 (21 @ 06:32)  Creatinine, Serum: <0.5 (21 @ 05:45)       ROS  unable to obtain history secondary to patient's mental status and/or sedation     VITALS:  T(F): 99.2, Max: 101.9 (21 @ 11:10)  HR: 75  BP: 205/88  RR: 20Vital Signs Last 24 Hrs  T(C): 37.3 (23 Mar 2021 05:15), Max: 38.8 (22 Mar 2021 11:10)  T(F): 99.2 (23 Mar 2021 05:15), Max: 101.9 (22 Mar 2021 11:10)  HR: 75 (23 Mar 2021 05:15) (75 - 85)  BP: 205/88 (23 Mar 2021 05:15) (141/68 - 205/88)  BP(mean): 116 (23 Mar 2021 05:15) (93 - 116)  RR: 20 (23 Mar 2021 05:15) (18 - 20)  SpO2: 96% (22 Mar 2021 20:40) (96% - 96%)    PHYSICAL EXAM:  Gen: chronically ill appearing, trach   HEENT: anterior bulge + erythema, + warmth, no purulence , + eschar  Neck: supple, no lymphadenopathy  CV: Regular rate & regular rhythm  Lungs: decreased BS at bases, no fremitus  Abdomen: Soft, BS present  Ext: Warm, well perfused  Neuro: unresponsive  Skin: no rash, no erythema  Lines: no phlebitis   roblero    FH: Non-contributory  Social Hx: Non-contributory    TESTS & MEASUREMENTS:                        8.8    14.19 )-----------( 183      ( 23 Mar 2021 06:32 )             28.6     03-23    137  |  96<L>  |  14  ----------------------------<  245<H>  4.1   |  31  |  <0.5<L>    Ca    8.8      23 Mar 2021 06:32  Mg     1.8         TPro  5.4<L>  /  Alb  3.2<L>  /  TBili  0.4  /  DBili  x   /  AST  31  /  ALT  20  /  AlkPhos  290<H>      eGFR if Non African American: 112 mL/min/1.73M2 (21 @ 06:32)  eGFR if African American: 130 mL/min/1.73M2 (21 @ 06:32)    LIVER FUNCTIONS - ( 23 Mar 2021 06:32 )  Alb: 3.2 g/dL / Pro: 5.4 g/dL / ALK PHOS: 290 U/L / ALT: 20 U/L / AST: 31 U/L / GGT: x           Urinalysis Basic - ( 22 Mar 2021 22:20 )    Color: Yellow / Appearance: Slightly Turbid / S.019 / pH: x  Gluc: x / Ketone: Negative  / Bili: Negative / Urobili: <2 mg/dL   Blood: x / Protein: 30 mg/dL / Nitrite: Negative   Leuk Esterase: Large / RBC: 21 /HPF /  /HPF   Sq Epi: x / Non Sq Epi: 0 /HPF / Bacteria: Few        Culture - Blood (collected 21 @ 11:49)  Source: .Blood None  Preliminary Report (21 @ 22:02):    No growth to date.    Culture - Blood (collected 21 @ 11:00)  Source: .Blood Blood  Final Report (21 @ 22:00):    No Growth Final    Culture - Blood (collected 21 @ 16:46)  Source: .Blood None  Final Report (21 @ 02:21):    No Growth Final    Culture - Blood (collected 21 @ 20:08)  Source: .Blood None  Final Report (03-10-21 @ 04:00):    No Growth Final    Culture - Fungal, CSF (collected 21 @ 14:31)  Source: .CSF CSF  Preliminary Report (03-10-21 @ 15:02):    No growth    Culture - Blood (collected 21 @ 23:21)  Source: .Blood None  Final Report (21 @ 09:00):    No Growth Final    Culture - CSF with Gram Stain (collected 21 @ 19:50)  Source: .CSF CSF  Gram Stain (21 @ 01:32):    polymorphonuclear leukocytes seen    No organisms seen    by cytocentrifuge  Final Report (21 @ 15:57):    No growth at 3 days.    Culture - Bronchial (collected 21 @ 12:22)  Source: Bronch Wash Bronchoalveolar Lavage  Gram Stain (21 @ 22:39):    Moderate polymorphonuclear leukocytes per low power field    Few Squamous epithelial cells per low power field    Moderate Yeast like cells per oil power field    Few Gram positive cocci in pairs per oil power field  Final Report (21 @ 10:10):    Aspergillus fumigatus    Normal Respiratory Macie present    Culture - Fungal, CSF (collected 21 @ 15:20)  Source: .CSF CSF  Preliminary Report (21 @ 15:02):    No growth    Culture - Acid Fast - CSF (collected 21 @ 15:20)  Source: .CSF CSF  Preliminary Report (21 @ 15:03):    No growth at 1 week.    Culture - CSF with Gram Stain (collected 21 @ 15:20)  Source: .CSF CSF... lumbar tap  Gram Stain (21 @ 07:24):    polymorphonuclear leukocytes seen    No organisms seen    by cytocentrifuge  Final Report (21 @ 16:14):    No growth at 3 days.    Culture - Blood (collected 21 @ 12:40)  Source: .Blood Blood-Peripheral  Final Report (21 @ 23:00):    No Growth Final            INFECTIOUS DISEASES TESTING  Procalcitonin, Serum: 0.09 (21 @ 12:00)  Rapid RVP Result: NotDetec (21 @ 10:30)  COVID-19 PCR: NotDetec (21 @ 15:38)  Vancomycin Level, Trough: 17.4 (21 @ 16:00)  Vancomycin Level, Trough: 10.5 (21 @ 05:59)  Vancomycin Level, Trough: 16.7 (21 @ 06:58)  Vancomycin Level, Trough: 24.4 (21 @ 21:00)  Aspergillus Galactomannan Antigen: <0.500 (21 @ 15:49)  Fungitell: <31 (21 @ 23:20)  Aspergillus Galactomannan Antigen: <0.500 (21 @ 23:20)  Vancomycin Level, Random: 15.2 (21 @ 12:25)  Vancomycin Level, Random: 9.8 (21 @ 16:33)  Vancomycin Level, Trough: 7.8 (21 @ 16:33)  Rapid RVP Result: NotDetec (21 @ 15:43)  Procalcitonin, Serum: 0.43 (10-12-20 @ 20:34)  COVID-19 PCR: NotDetec (10-12-20 @ 16:21)  MRSA PCR Result.: Negative (20 @ 05:04)  COVID-19 PCR: NotDetec (20 @ 19:00)  Vancomycin Level, Trough: 11.5 (20 @ 07:05)  Procalcitonin, Serum: 0.26 (20 @ 09:30)  COVID-19 PCR: NotDetec (20 @ 18:24)  COVID-19 PCR: NotDetec (06-10-20 @ 14:21)  COVID-19 PCR: NotDetec (20 @ 14:28)      INFLAMMATORY MARKERS      RADIOLOGY & ADDITIONAL TESTS:  I have personally reviewed the last available Chest xray  CXR      CT      CARDIOLOGY TESTING      MEDICATIONS  atorvastatin 80 Oral at bedtime  BACItracin   Ointment 1 Topical two times a day  chlorhexidine 0.12% Liquid 15 Oral Mucosa two times a day  chlorhexidine 4% Liquid 1 Topical daily  enoxaparin Injectable 75 SubCutaneous every 12 hours  insulin regular  human corrective regimen sliding scale  SubCutaneous every 6 hours  labetalol 400 Oral every 8 hours  levETIRAcetam 750 Oral two times a day  lisinopril 40 Oral daily  magnesium oxide 400 Oral every 8 hours  nystatin Powder 1 Topical two times a day  pantoprazole   Suspension 40 Oral daily  senna 2 Oral at bedtime  voriconazole 200 Oral every 12 hours      WEIGHT  Weight (kg): 76 (21 @ 08:50)  Creatinine, Serum: <0.5 mg/dL (21 @ 06:32)      ANTIBIOTICS:  voriconazole 200 milliGRAM(s) Oral every 12 hours      All available historical records have been reviewed

## 2021-03-23 NOTE — PROGRESS NOTE ADULT - ATTENDING COMMENTS
Unfortunately Kathrine has experienced repeat intracranial bleeding.   Case was discussed with Neurosurgery, she is not a candidate for additional intervention.   Prognosis is dismal. Family by the bedside was informed.   DNR per family wishes.   I suggest full comfort care at this time.   Agree with palliative care consult.   case was also discussed with primary team

## 2021-03-23 NOTE — CHART NOTE - NSCHARTNOTESELECT_GEN_ALL_CORE
Event Note
Heme Onc/Event Note
RD at risk f/u/Event Note
RD follow up/Event Note
Event Note
PACU Admission/Event Note
Palliative Sign Off Note/Event Note
Transfer Note

## 2021-03-23 NOTE — PROGRESS NOTE ADULT - ASSESSMENT
75 yof from home with PMHx of Marginal zone lymphoma on Imbruvica (since 10/2020), CAD s/p stents 15 years ago, DM, chronic UTIs, HTN, DLD, brought in by ambulance due to altered mental status and hypertension, found to have significant large left frontal intraparenchymal hematoma with intraventricular extension into left lateral ventricle, associated with marked mass effect resulting in 0.9 cm midline shift to the right anteriorly s/p craniotomy for intracerebral hemorrhage.    ASSESSMENT  #) Intraparenchymal bleed s/p craniotomy with anemia and thrombocytopenia, improving    #) Fevers with Aspergillus fumigatus on Bronch culture, also treated empirically for meningitis  #) Hx of Marginal Zone Lymphoma on Imbruvica     PLAN  - Imbruvica is associated with potential adverse reaction of hemorrhage and is contraindicated.  - If any repeat CT scans show increase in bleed, transfuse platelets ASAP at least 1-2 units despite platelet count.  - BP control and management of hemorrhage, as per neurosurgery and neurology.  - Anemia is stable and thrombocytopenia has resolved; Transfuse for platelet count <100K and hemoglobin < 7.  - Overall prognosis is very poor. 75 yof from home with PMHx of Marginal zone lymphoma on Imbruvica (since 10/2020), CAD s/p stents 15 years ago, DM, chronic UTIs, HTN, DLD, brought in by ambulance due to altered mental status and hypertension, found to have significant large left frontal intraparenchymal hematoma with intraventricular extension into left lateral ventricle, associated with marked mass effect resulting in 0.9 cm midline shift to the right anteriorly s/p craniotomy for intracerebral hemorrhage.    ASSESSMENT  #) Intraparenchymal bleed s/p craniotomy with anemia and thrombocytopenia.  #) Fevers with Aspergillus fumigatus on Bronch culture, also treated empirically for meningitis  #) Hx of Marginal Zone Lymphoma-on Imbruvica prior to admission  #) RUE DVT-on weight-based lovenox    PLAN  - Imbruvica is associated with potential adverse reaction of hemorrhage and is contraindicated.  - BP control and management of hemorrhage, as per neurosurgery and neurology.  - Anemia is stable and thrombocytopenia has resolved; Transfuse for platelet count <100K and hemoglobin < 7.  - Chart reviewed. After prior assessment, patient noted to have episodes of vomiting and fixed pupils. CT head showed intracranial hemorrhage with midline shift. Aspirin was discontinued, and patient was given protamine sulfate. Neurosurgery made aware.  - Overall prognosis is very poor. Comfort measures are appropriate. 75 yof from home with PMHx of Marginal zone lymphoma on Imbruvica (since 10/2020), CAD s/p stents 15 years ago, DM, chronic UTIs, HTN, DLD, brought in by ambulance due to altered mental status and hypertension, found to have significant large left frontal intraparenchymal hematoma with intraventricular extension into left lateral ventricle, associated with marked mass effect resulting in 0.9 cm midline shift to the right anteriorly s/p craniotomy for intracerebral hemorrhage.    ASSESSMENT  #) Intraparenchymal bleed s/p craniotomy with anemia and thrombocytopenia.  #) Fevers with Aspergillus fumigatus on Bronch culture, also treated empirically for meningitis  #) Hx of Marginal Zone Lymphoma-on Imbruvica prior to admission  #) RUE DVT-on weight-based lovenox    PLAN  - Imbruvica is associated with potential adverse reaction of hemorrhage and is contraindicated.  - BP control and management of hemorrhage, as per neurosurgery and neurology.  - Anemia is stable and thrombocytopenia has resolved; Transfuse for platelet count <100K and hemoglobin < 7.  - Chart reviewed. After prior assessment, patient noted to have episodes of vomiting and fixed pupils. CT head showed intracranial hemorrhage with midline shift. Aspirin was discontinued, and patient was given protamine sulfate. Neurosurgery made aware. May also give FFP for reversal.  - Overall prognosis is very poor. Comfort measures are appropriate.

## 2021-03-23 NOTE — PROGRESS NOTE ADULT - ASSESSMENT
· Assessment	  ASSESSMENT  75 years old female from home with PMHx of B cell lymphoma on Imbruvica (since 10/2020), CAD s/p stents 15 years ago, DM, chronic UTI, HTN, DLD, brought in by ambulance due to altered mental status and hypertension. Patient was AAO x4 and fully functional at baseline. Last known normal was 11:00pm 2/23 before beds. In 2/24 at 8:45AM, patient was found to be lethargy in bed by his son, was AAO x 4 and able to talk to his son, but started coughing and had one episode of nose bleed. At 9:15, her physical therapist noticed her becoming more lethargic, but vitals were normal. Soon at 9:30, patient was unresponsive and had a blank stare. EMS was called and her BP was found to be around 230/120. She was rushed to ED as Code Stroke. NIHSS 23. CTH showed Large left frontal intraparenchymal hematoma with intraventricular extension into left lateral ventricle, associated with marked mass effect resulting in 0.9 cm midline shift to the right anteriorly. Patient was intubated for airway protection    IMPRESSION  #Fevers     3/22 UA      3/18 BCX NG    Procalcitonin, Serum: 0.09 (03-19-21 @ 12:00)    Rapid RVP Result: NotDetec (03-17-21 @ 10:30)  #Suspected aspergillus PNA    2/27 bronch-   Aspergillus fumigatus     Fungitell: <31 (03-01-21 @ 23:20), would suspect it to be +   < from: CT Chest w/ IV Cont (03.01.21 @ 20:56) >Filling defect compatible with mucous plugging is noted in the left lower lobe bronchus. There are moderate bilateral pleural effusions with areas of compression atelectasis in both lower lobes. An area of thickened interlobular septa with tree-in-bud opacities are noted in the posterior aspect of the right upper lobe.  < from: CT Sinuses w/ IV Cont (03.01.21 @ 21:18) >  1.  Opacification of the mastoid air cells and left middle ear canal consistent with inflammation or infection.  2.  Minimal mucosal thickening in the bilateral frontal, bilateral ethmoid, bilateral sphenoid, and left maxillary sinuses.  3.  Postoperative changes in left frontal lobe hemorrhage better demonstrated on the CT scan of the brain performed on February 28, 2021.    2/25 BCX NG    #Large left frontal intraparenchymal hemorrhage, low suspicion but Cannot rule out CSF infection - CSF studies + WBC/ RBC    Per family no proceeding symptoms week prior such as fevers, HA. Was in usual state of health week prior. Therefore unlikely infection that caused the intracranial event    CSF PCR NEGATIVE     CrAg NEGATIVE     Total Nucleated Cell Count, CSF: 373 /uL (02.26.21 @ 15:20)   Total Nucleated Cell Count, CSF: 30 /uL (02.27.21 @ 19:50)    CSF Segmented Neutrophils: 82 % (02.27.21 @ 19:50) CSF Segmented Neutrophils: 91 % (02.26.21 @ 15:20)  #B-cell lymphoma on chemo  #Pancytopenia  #Lactic acidosis  #Hypomagnesemia  #PCN allergy- unknown    Creatinine, Serum: <0.5 mg/dL (03.23.21 @ 06:32)      RECOMMENDATIONS  - continue voriconazole 200 mg PO q 12 hours --f/u vori levels prior to next dose, should be between 1-5  - Plan for 6 weeks of antifungal therapy (4/12 is 6 weeks) followed by repeat CT Chest as outpatient. Would prolong course if abnormal findings.  - f/u BCX, UCX  - new roblero 3/22  - given rising WBC, start cefepime 1g q12h IV (aware of PCN allergy)      Please call with any questions or send a message on [employer] Teams  Spectra [phone]    · Assessment	  ASSESSMENT  75 years old female from home with PMHx of B cell lymphoma on Imbruvica (since 10/2020), CAD s/p stents 15 years ago, DM, chronic UTI, HTN, DLD, brought in by ambulance due to altered mental status and hypertension. Patient was AAO x4 and fully functional at baseline. Last known normal was 11:00pm 2/23 before beds. In 2/24 at 8:45AM, patient was found to be lethargy in bed by his son, was AAO x 4 and able to talk to his son, but started coughing and had one episode of nose bleed. At 9:15, her physical therapist noticed her becoming more lethargic, but vitals were normal. Soon at 9:30, patient was unresponsive and had a blank stare. EMS was called and her BP was found to be around 230/120. She was rushed to ED as Code Stroke. NIHSS 23. CTH showed Large left frontal intraparenchymal hematoma with intraventricular extension into left lateral ventricle, associated with marked mass effect resulting in 0.9 cm midline shift to the right anteriorly. 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 There are moderate bilateral pleural effusions with areas of compression atelectasis in both lower lobes. An area of thickened interlobular septa with tree-in-bud opacities are noted in the posterior aspect of the right upper lobe.  < from: CT Sinuses w/ IV Cont (03.01.21 @ 21:18) >  1.  Opacification of the mastoid air cells and left middle ear canal consistent with inflammation or infection.  2.  Minimal mucosal thickening in the bilateral frontal, bilateral ethmoid, bilateral sphenoid, and left maxillary sinuses.  3.  Postoperative changes in left frontal lobe hemorrhage better demonstrated on the CT scan of the brain performed on February 28, 2021.    2/25 BCX NG    #Large left frontal intraparenchymal hemorrhage, low suspicion but Cannot rule out CSF infection - CSF studies + WBC/ RBC    Per family no proceeding symptoms week prior such as fevers, HA. Was in usual state of health week prior. Therefore unlikely infection that caused the intracranial event    CSF PCR NEGATIVE     CrAg NEGATIVE     Total Nucleated Cell Count, CSF: 373 /uL (02.26.21 @ 15:20)   Total Nucleated Cell Count, CSF: 30 /uL (02.27.21 @ 19:50)    CSF Segmented Neutrophils: 82 % (02.27.21 @ 19:50) CSF Segmented Neutrophils: 91 % (02.26.21 @ 15:20)  #B-cell lymphoma on chemo  #Pancytopenia  #Lactic acidosis  #Hypomagnesemia  #PCN allergy- unknown    Creatinine, Serum: <0.5 mg/dL (03.23.21 @ 06:32)      RECOMMENDATIONS  - continue voriconazole 200 mg PO q 12 hours --f/u vori level prior to next dose, should be between 1-5  - Plan for 6 weeks of antifungal therapy (4/12 is 6 weeks) followed by repeat CT Chest as outpatient. Would prolong course if abnormal findings.  - f/u BCX, UCX  - new roblero 3/22  - given rising WBC, start cefepime 1g q12h IV (aware of PCN allergy)  - MRSA nares, if positive- start linezolid 600mg q12h IV  - Agree with CTH, ?possible aspiration of anterior head collection, CSF vs infection?      Please call with any questions or send a message on [employer] Teams  Spectra [phone]

## 2021-03-23 NOTE — PROGRESS NOTE ADULT - PROBLEM SELECTOR PLAN 2
DNR  No escalation of care  Family considering comfort measures only  Plan to follow up with family tomorrow via phone about decision in later morning

## 2021-03-23 NOTE — PROGRESS NOTE ADULT - SUBJECTIVE AND OBJECTIVE BOX
Patient is a 75y old  Female who presents with a chief complaint of altered mental status (23 Mar 2021 19:53)        Over Night Events:  Patient seen and examined at the bedside  pt is un responsive pupils fixed and dilated  Remains on MV      ROS:     All ROS are negative except HPI         PHYSICAL EXAM    ICU Vital Signs Last 24 Hrs  T(C): 37.7 (23 Mar 2021 13:23), Max: 37.7 (23 Mar 2021 13:23)  T(F): 99.8 (23 Mar 2021 13:23), Max: 99.8 (23 Mar 2021 13:23)  HR: 65 (23 Mar 2021 13:23) (65 - 85)  BP: 100/49 (23 Mar 2021 13:23) (100/49 - 205/88)  BP(mean): 71 (23 Mar 2021 13:23) (71 - 116)  ABP: --  ABP(mean): --  RR: 20 (23 Mar 2021 13:23) (18 - 20)  SpO2: 94% (23 Mar 2021 08:00) (94% - 96%)      CONSTITUTIONAL:  Well nourished.  NAD    ENT:   Airway patent,   Mouth with normal mucosa.   No thrush    EYES:   Pupils fixed   not responsive    CARDIAC:   Normal rate,   Regular rhythm.    No edema      Vascular:  Normal systolic impulse  No Carotid bruits    RESPIRATORY:   No wheezing  Bilateral BS  Normal chest expansion  Not tachypneic,  No use of accessory muscles    GASTROINTESTINAL:  Abdomen soft,   Non-tender,   No guarding,   + BS    MUSCULOSKELETAL:   Range of motion is not limited,  No clubbing, cyanosis    NEUROLOGICAL:   Alert and oriented   No motor  deficits.    SKIN:   Skin normal color for race,   Warm and dry and intact.   No evidence of rash.    PSYCHIATRIC:   non responsivet.   No apparent risk to self or others.    HEMATOLOGICAL:  No cervical  lymphadenopathy.  no inguinal lymphadenopathy      21 @ 07:01  -  21 @ 07:00  --------------------------------------------------------  IN:    Enteral Tube Flush: 200 mL    Glucerna 1.5: 720 mL  Total IN: 920 mL    OUT:    Indwelling Catheter - Urethral (mL): 1700 mL    Stool (mL): 1 mL  Total OUT: 1701 mL    Total NET: -781 mL      21 @ 07:01  -  21 @ 20:02  --------------------------------------------------------  IN:  Total IN: 0 mL    OUT:    Indwelling Catheter - Urethral (mL): 1000 mL  Total OUT: 1000 mL    Total NET: -1000 mL          LABS:                            8.6    12.53 )-----------( 187      ( 23 Mar 2021 10:47 )             27.9                                                   137  |  99  |  13  ----------------------------<  202<H>  4.0   |  31  |  <0.5<L>    Ca    8.5      23 Mar 2021 10:47  Mg     1.8         TPro  5.5<L>  /  Alb  3.2<L>  /  TBili  0.3  /  DBili  x   /  AST  25  /  ALT  19  /  AlkPhos  296<H>        PT/INR - ( 23 Mar 2021 10:47 )   PT: 17.50 sec;   INR: 1.52 ratio         PTT - ( 23 Mar 2021 10:47 )  PTT:40.4 sec                                       Urinalysis Basic - ( 22 Mar 2021 22:20 )    Color: Yellow / Appearance: Slightly Turbid / S.019 / pH: x  Gluc: x / Ketone: Negative  / Bili: Negative / Urobili: <2 mg/dL   Blood: x / Protein: 30 mg/dL / Nitrite: Negative   Leuk Esterase: Large / RBC: 21 /HPF /  /HPF   Sq Epi: x / Non Sq Epi: 0 /HPF / Bacteria: Few        CARDIAC MARKERS ( 23 Mar 2021 10:47 )  x     / <0.01 ng/mL / x     / x     / x                                                LIVER FUNCTIONS - ( 23 Mar 2021 10:47 )  Alb: 3.2 g/dL / Pro: 5.5 g/dL / ALK PHOS: 296 U/L / ALT: 19 U/L / AST: 25 U/L / GGT: x                                                                                               Mode: AC/ CMV (Assist Control/ Continuous Mandatory Ventilation)  RR (machine): 14  TV (machine): 400  FiO2: 30  PEEP: 5  ITime: 1  MAP: 8  PIP: 20                                          MEDICATIONS  (STANDING):  levETIRAcetam 750 milliGRAM(s) Oral two times a day    MEDICATIONS  (PRN):  acetaminophen    Suspension .. 650 milliGRAM(s) Enteral Tube every 6 hours PRN Temp greater or equal to 38C (100.4F)  acetaminophen   Tablet .. 650 milliGRAM(s) Oral every 6 hours PRN Moderate Pain (4 - 6)  morphine  - Injectable 2 milliGRAM(s) IV Push every 4 hours PRN Pain/dyspnea      New X-rays reviewed:                                                                                  ECHO    CXR interpreted by me:

## 2021-03-24 NOTE — GOALS OF CARE CONVERSATION - ADVANCED CARE PLANNING - CONVERSATION/DISCUSSION
Diagnosis/Prognosis/MOLST Discussed
Diagnosis/Prognosis/MOLST Discussed/Treatment Options/Chaplaincy Referral/Palliative Care Referral

## 2021-03-24 NOTE — PROGRESS NOTE ADULT - PROBLEM SELECTOR PLAN 1
DNR/DNI  CMO: no labs, no IVF, no fingersticks, no vitals, no injections, no face masks, no pulse ox, no artificial nutrition DNR/DNI  Palliative removal of ventilator  CMO: no labs, no IVF, no fingersticks, no vitals, no injections, no face masks, no pulse ox, no artificial nutrition

## 2021-03-24 NOTE — GOALS OF CARE CONVERSATION - ADVANCED CARE PLANNING - TREATMENT GUIDELINES
DNR Order
DNR Order/Comfort measures only/Do not re-hospitalize/No blood draws/No artificial nutrition/No IV fluids

## 2021-03-24 NOTE — DISCHARGE NOTE PROVIDER - HOSPITAL COURSE
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 Patient was AAO x4 and fully functional at baseline. Last known normal was 11:00pm 2/23 before beds. In 2/24 at 8:45AM, patient was found to be lethargy in bed by his son, was AAO x 4 and able to talk to his son, but started coughing and had one episode of nose bleed. At 9:15, her physical therapist noticed her becoming more lethargic, but vitals were normal. Soon at 9:30, patient was unresponsive and had a blank stare. EMS was called and her BP was found to be around 230/120. She was rushed to ED as Code Stroke. NIHSS 23. CTH showed Large left frontal intraparenchymal hematoma with intraventricular extension into left lateral ventricle, associated with marked mass effect resulting in 0.9 cm midline shift to the right anteriorly. Patient was intubated for airway protection. S/p 2 units of platelet for ASA reversal.   pt had Lt frontal IPH s/p craniotomy/hematoma evacuation and EVD placement  2/24, her course complicated with RS failure

## 2021-03-24 NOTE — PROGRESS NOTE ADULT - SUBJECTIVE AND OBJECTIVE BOX
Patient is a 75y old  Female who presents with a chief complaint of altered mental status (23 Mar 2021 20:02)        Over Night Events:  Patient seen and examined at the bedside  No acute events overnight  Remains on MV      ROS:     All ROS are negative except HPI         PHYSICAL EXAM    ICU Vital Signs Last 24 Hrs  T(C): 36.6 (24 Mar 2021 03:31), Max: 37.7 (23 Mar 2021 13:23)  T(F): 97.9 (24 Mar 2021 03:31), Max: 99.8 (23 Mar 2021 13:23)  HR: 78 (24 Mar 2021 07:37) (65 - 87)  BP: 97/53 (24 Mar 2021 03:31) (83/42 - 101/49)  BP(mean): 72 (24 Mar 2021 03:31) (71 - 72)  ABP: --  ABP(mean): --  RR: 18 (24 Mar 2021 03:31) (18 - 20)  SpO2: 98% (24 Mar 2021 07:37) (98% - 100%)      CONSTITUTIONAL:  Well nourished.  NAD    ENT:   Airway patent,   Mouth with normal mucosa.   No thrush    EYES:   Pupils equal,   Round and reactive to light.    CARDIAC:   Normal rate,   Regular rhythm.    No edema      Vascular:  Normal systolic impulse  No Carotid bruits    RESPIRATORY:   No wheezing  Bilateral BS  Normal chest expansion  Not tachypneic,  No use of accessory muscles    GASTROINTESTINAL:  Abdomen soft,   Non-tender,   No guarding,   + BS    MUSCULOSKELETAL:   Range of motion is not limited,  No clubbing, cyanosis    NEUROLOGICAL:   Alert and oriented   No motor  deficits.    SKIN:   Skin normal color for race,   Warm and dry and intact.   No evidence of rash.    PSYCHIATRIC:   Normal mood and affect.   No apparent risk to self or others.    HEMATOLOGICAL:  No cervical  lymphadenopathy.  no inguinal lymphadenopathy      21 @ 07:01  -  21 @ 07:00  --------------------------------------------------------  IN:    Plasma: 615 mL  Total IN: 615 mL    OUT:    Indwelling Catheter - Urethral (mL): 1000 mL  Total OUT: 1000 mL    Total NET: -385 mL          LABS:                            6.2    6.34  )-----------( 120      ( 24 Mar 2021 07:59 )             20.3                                               03-24    140  |  100  |  20  ----------------------------<  100<H>  3.5   |  32  |  0.6<L>    Ca    8.2<L>      24 Mar 2021 07:59  Mg     1.7     03-24    TPro  4.5<L>  /  Alb  2.7<L>  /  TBili  0.3  /  DBili  x   /  AST  15  /  ALT  12  /  AlkPhos  163<H>  03-24      PT/INR - ( 23 Mar 2021 22:07 )   PT: 20.50 sec;   INR: 1.78 ratio         PTT - ( 23 Mar 2021 22:07 )  PTT:37.8 sec                                       Urinalysis Basic - ( 22 Mar 2021 22:20 )    Color: Yellow / Appearance: Slightly Turbid / S.019 / pH: x  Gluc: x / Ketone: Negative  / Bili: Negative / Urobili: <2 mg/dL   Blood: x / Protein: 30 mg/dL / Nitrite: Negative   Leuk Esterase: Large / RBC: 21 /HPF /  /HPF   Sq Epi: x / Non Sq Epi: 0 /HPF / Bacteria: Few        CARDIAC MARKERS ( 23 Mar 2021 10:47 )  x     / <0.01 ng/mL / x     / x     / x                                                LIVER FUNCTIONS - ( 24 Mar 2021 07:59 )  Alb: 2.7 g/dL / Pro: 4.5 g/dL / ALK PHOS: 163 U/L / ALT: 12 U/L / AST: 15 U/L / GGT: x                                                  Culture - Blood (collected 22 Mar 2021 22:44)  Source: .Blood Blood-Peripheral  Preliminary Report (24 Mar 2021 08:01):    No growth to date.    Culture - Urine (collected 22 Mar 2021 22:20)  Source: .Urine Catheterized  Preliminary Report (23 Mar 2021 21:58):    >100,000 CFU/ml Gram Negative Rods                                                   Mode: AC/ CMV (Assist Control/ Continuous Mandatory Ventilation)  RR (machine): 14  TV (machine): 400  FiO2: 40  PEEP: 5  ITime: 1  MAP: 8  PIP: 17                                          MEDICATIONS  (STANDING):  levETIRAcetam 750 milliGRAM(s) Oral two times a day    MEDICATIONS  (PRN):  acetaminophen    Suspension .. 650 milliGRAM(s) Enteral Tube every 6 hours PRN Temp greater or equal to 38C (100.4F)  acetaminophen   Tablet .. 650 milliGRAM(s) Oral every 6 hours PRN Moderate Pain (4 - 6)  morphine  - Injectable 2 milliGRAM(s) IV Push every 4 hours PRN Pain/dyspnea      New X-rays reviewed:                                                                                  ECHO    CXR interpreted by me:

## 2021-03-24 NOTE — PROGRESS NOTE ADULT - ATTENDING COMMENTS
new event increased cerebral hemorrhage resulting in fixed dilated pupils  as evidenced on new ct  spoke with attendings    3/24  no change in status pupils fixed and dilated  apneic will spea with family an [id number]

## 2021-03-24 NOTE — PROGRESS NOTE ADULT - ATTENDING COMMENTS
Patient seen at bedside with family, including daughter Kathrine  Comfort measures only again discussed, along with palliative removal of ventilator  All questions answered  Family wishes to pursue palliative removal of the ventilator and comfort measures only today  Patinet not symptomatic on exam  Please see NP noted above for recommendations for morphine and ativan      30 min spent on advance care planning

## 2021-03-24 NOTE — PROGRESS NOTE ADULT - PROBLEM SELECTOR PLAN 2
Morphine 4 mg IVP Q 15 min PRN for dyspnea Morphine 4 mg IVP Q 15 min PRN for dyspnea following palliative vent removal

## 2021-03-24 NOTE — DISCHARGE NOTE FOR THE EXPIRED PATIENT - HOSPITAL COURSE
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 Patient was AAO x4 and fully functional at baseline. Last known normal was 11:00pm 2/23 before beds. In 2/24 at 8:45AM, patient was found to be lethargy in bed by his son, was AAO x 4 and able to talk to his son, but started coughing and had one episode of nose bleed. At 9:15, her physical therapist noticed her becoming more lethargic, but vitals were normal. Soon at 9:30, patient was unresponsive and had a blank stare. EMS was called and her BP was found to be around 230/120. She was rushed to ED as Code Stroke. NIHSS 23. CTH showed Large left frontal intraparenchymal hematoma with intraventricular extension into left lateral ventricle, associated with marked mass effect resulting in 0.9 cm midline shift to the right anteriorly. Patient was intubated for airway protection. S/p 2 units of platelet for ASA reversal.   pt had Lt frontal IPH s/p craniotomy/hematoma evacuation and EVD placement  2/24, her course complicated with RS failure required tracheostomy, aspergilloma pneumonia, pt also had PEG tube, pt also developed DVT.  pt developed catastrophic intracranial bleeding yesterday, and was made comfort measure today with terminal weaning, found pulseless and pronounced death at 1:18pm according to   cardiopulmonary criteria, 75 years old female from home with PMHx of B cell lymphoma on Imbruvica (since 10/2020), CAD s/p stents 15 years ago, DM, chronic UTI, HTN, DLD, brought in by ambulance due to altered mental status and hypertension. Patient was AAO x4 and fully functional at baseline. Last known normal was 11:00pm 2/23 before beds. In 2/24 at 8:45AM, patient was found to be lethargy in bed by his son, was AAO x 4 and able to talk to his son, but started coughing and had one episode of nose bleed. At 9:15, her physical therapist noticed her becoming more lethargic, but vitals were normal. Soon at 9:30, patient was unresponsive and had a blank stare. EMS was called and her BP was found to be around 230/120. She was rushed to ED as Code Stroke. NIHSS 23. CTH showed Large left frontal intraparenchymal hematoma with intraventricular extension into left lateral ventricle, associated with marked mass effect resulting in 0.9 cm midline shift to the right anteriorly. Patient was intubated for airway protection. S/p 2 units of platelet for ASA reversal.   pt had Lt frontal IPH s/p craniotomy/hematoma evacuation and EVD placement  2/24, her course complicated with RS failure required tracheostomy, aspergilloma pneumonia, pt also had PEG tube, pt also developed DVT.  pt developed catastrophic intracranial bleeding yesterday, and was made comfort measure today with terminal weaning, found pulseless and pronounced death at 1:18pm according to cardiopulmonary criteria.

## 2021-03-24 NOTE — PROGRESS NOTE ADULT - PROVIDER SPECIALTY LIST ADULT
Heme/Onc
Heme/Onc
Internal Medicine
Neurology
Neurology
Neurosurgery
Pulmonology
Pulmonology
SICU
SICU
Hospitalist
Infectious Disease
Internal Medicine
Internal Medicine
Neurology
Neurology
Neurosurgery
Palliative Care
SICU
Heme/Onc
Infectious Disease
Internal Medicine
Neurology
Neurosurgery
Pulmonology
SICU
Infectious Disease
Infectious Disease
Internal Medicine
Neurology
Neurosurgery
Pulmonology
SICU
Infectious Disease
Internal Medicine
Internal Medicine
Neurology
Neurosurgery
Neurosurgery
Pulmonology
Infectious Disease
Infectious Disease
Pulmonology
Infectious Disease
Palliative Care

## 2021-03-24 NOTE — PROGRESS NOTE ADULT - ASSESSMENT
75 F w/ pmhx of CAD s/p stents, DM, HTN, HLD and B cell lymphoma on Imbruvica (since 10/2020), presented on 2/24/21 for AMS and HTN, intubated on admission, found to have IPH s/p evacuation, EVD placement and removal, trach/PEG. Then on 3/23 patient had significant change in neuro exam, found to have new acute large IPC. Family decided then to not escalate care, then this AM decided to proceed with palliative extubation.     Patient has very poor mental status, no gaga, off sedation appears comfortable.   Met with family at bedside and explained procedure.   Morphine 4 mg IVP given prior to removal of ventilator.   Family stepped out, patient was comfortable after removal of ventilation.     MEDD (morphine equivalent daily dose): 0      See Recs below.    Please call [phone] with questions or concerns 24/7.   We will continue to follow.

## 2021-03-24 NOTE — PROGRESS NOTE ADULT - REASON FOR ADMISSION
altered mental status

## 2021-03-24 NOTE — PROGRESS NOTE ADULT - PROBLEM SELECTOR PLAN 3
Ativan 1 mg IVP Q 15 min PRN for agitation Ativan 1 mg IVP Q 15 min PRN for agitation follow palliative vent removal

## 2021-03-24 NOTE — PROGRESS NOTE ADULT - SUBJECTIVE AND OBJECTIVE BOX
AD BASURTO             MRN-[id number]    CC: Stroke    HPI:  75 years old female from home with PMHx of B cell lymphoma on Imbruvica (since 10/2020), CAD s/p stents 15 years ago, DM, chronic UTI, HTN, DLD, brought in by ambulance due to altered mental status and hypertension. Patient was AAO x4 and fully functional at baseline. Last known normal was 11:00pm 2/23 before beds. In 2/24 at 8:45AM, patient was found to be lethargy in bed by his son, was AAO x 4 and able to talk to his son, but started coughing and had one episode of nose bleed. At 9:15, her physical therapist noticed her becoming more lethargic, but vitals were normal. Soon at 9:30, patient was unresponsive and had a blank stare. EMS was called and her BP was found to be around 230/120. She was rushed to ED as Code Stroke. NIHSS 23. CTH showed Large left frontal intraparenchymal hematoma with intraventricular extension into left lateral ventricle, associated with marked mass effect resulting in 0.9 cm midline shift to the right anteriorly. Patient was intubated for airway protection. S/p 2 units of platelet for ASA reversal. Neurosurgery was planning for emergent OR resection of hematoma.     SUBJECTIVE:  UNABLE TO OBTAIN  due to: AMS    PEx:  Vital Signs Last 24 Hrs  T(C): 36.6 (24 Mar 2021 03:31), Max: 37.1 (23 Mar 2021 20:00)  T(F): 97.9 (24 Mar 2021 03:31), Max: 98.8 (23 Mar 2021 20:00)  HR: 78 (24 Mar 2021 07:37) (78 - 87)  BP: 97/53 (24 Mar 2021 03:31) (83/42 - 101/49)  BP(mean): 72 (24 Mar 2021 03:31) (72 - 72)  RR: 18 (24 Mar 2021 03:31) (18 - 18)  SpO2: 98% (24 Mar 2021 07:37) (98% - 100%)    General: ill-appearing. obese, lying in bed, NAD  HEENT:  NCAT, eyes closed   Neck: Supple no masses, + tracheostomy  CVS: RR, No edema  Resp: Unlabored Non tachypneic No increased WOB, trach to ventilator   GI:  Soft NT ND  : Murry   Musc: No C/C/E    Neuro: No gag, no response to pain, not moving   Psych: Calm, a & o x 3  Skin: Non jaundiced, no rash   Lymph: Normal      Last BM:   03-18-21 @ 07:01  -  03-19-21 @ 07:00  --------------------------------------------------------  OUT: 2 mL    03-19-21 @ 07:01  -  03-20-21 @ 07:00  --------------------------------------------------------  OUT: 1 mL    03-20-21 @ 07:01  -  03-21-21 @ 07:00  --------------------------------------------------------  OUT: 2 mL    03-21-21 @ 07:01  -  03-22-21 @ 07:00  --------------------------------------------------------  OUT: 1 mL    03-22-21 @ 07:01  -  03-23-21 @ 07:00  --------------------------------------------------------  OUT: 1 mL        ALLERGIES:     OPIATE NAÏVE (Y/N):    MEDICATIONS: REVIEWED  MEDICATIONS  (STANDING):  levETIRAcetam 750 milliGRAM(s) Oral two times a day    MEDICATIONS  (PRN):  LORazepam   Injectable 1 milliGRAM(s) IV Push every 15 minutes PRN Agitation  morphine  - Injectable 4 milliGRAM(s) IV Push every 15 minutes PRN Dyspnea      LABS: REVIEWED  CBC:                        6.2    6.34  )-----------( 120      ( 24 Mar 2021 07:59 )             20.3     CMP:    03-24    140  |  100  |  20  ----------------------------<  100<H>  3.5   |  32  |  0.6<L>    Ca    8.2<L>      24 Mar 2021 07:59  Mg     1.7     03-24    TPro  4.5<L>  /  Alb  2.7<L>  /  TBili  0.3  /  DBili  x   /  AST  15  /  ALT  12  /  AlkPhos  163<H>  03-24  Albumin, Serum: 2.7 g/dL (03-24-21 @ 07:59)      IMAGING: REVIEWED    ADVANCED DIRECTIVES:            FULL CODE            DNR DNI            LIVING WILL            DPOA       HCP       MOLST    DECISION MAKER:   LEGAL SURROGATE:    PSYCHOSOCIAL-SPIRITUAL ASSESSMENT:       Reviewed       Care plan unchanged       Care plan adjusted as above	    GOALS OF CARE DISCUSSION       Palliative care info/counseling provided	           Family meeting       Advanced Directives addressed please see Advance Care Planning Note	           See previous Palliative Medicine Note       Documentation of GOC:    CURRENT DISPO PLAN:     WILL REMAIN IN HOSPITAL  [facility]  Hospice  Home      REFERRALS	        Palliative Med        Unit SW/Case Mgmt              Speech/Swallow       Patient/Family Support      Hospice       Nutrition       Dietician       PT/OT KATHRINE BASURTO             MRN-[id number]    CC: Stroke    HPI:  75 years old female from home with PMHx of B cell lymphoma on Imbruvica (since 10/2020), CAD s/p stents 15 years ago, DM, chronic UTI, HTN, DLD, brought in by ambulance due to altered mental status and hypertension. Patient was AAO x4 and fully functional at baseline. Last known normal was 11:00pm 2/23 before beds. In 2/24 at 8:45AM, patient was found to be lethargy in bed by his son, was AAO x 4 and able to talk to his son, but started coughing and had one episode of nose bleed. At 9:15, her physical therapist noticed her becoming more lethargic, but vitals were normal. Soon at 9:30, patient was unresponsive and had a blank stare. EMS was called and her BP was found to be around 230/120. She was rushed to ED as Code Stroke. NIHSS 23. CTH showed Large left frontal intraparenchymal hematoma with intraventricular extension into left lateral ventricle, associated with marked mass effect resulting in 0.9 cm midline shift to the right anteriorly. Patient was intubated for airway protection. S/p 2 units of platelet for ASA reversal. Neurosurgery was planning for emergent OR resection of hematoma.     SUBJECTIVE:  UNABLE TO OBTAIN  due to: AMS    PEx:  Vital Signs Last 24 Hrs  T(C): 36.6 (24 Mar 2021 03:31), Max: 37.1 (23 Mar 2021 20:00)  T(F): 97.9 (24 Mar 2021 03:31), Max: 98.8 (23 Mar 2021 20:00)  HR: 78 (24 Mar 2021 07:37) (78 - 87)  BP: 97/53 (24 Mar 2021 03:31) (83/42 - 101/49)  BP(mean): 72 (24 Mar 2021 03:31) (72 - 72)  RR: 18 (24 Mar 2021 03:31) (18 - 18)  SpO2: 98% (24 Mar 2021 07:37) (98% - 100%)    General: ill-appearing. obese, lying in bed, NAD  HEENT:  NCAT, eyes closed   Neck: Supple no masses, + tracheostomy  CVS: RR, No edema  Resp: Unlabored Non tachypneic No increased WOB, trach to ventilator   GI:  Soft NT ND  : Murry   Musc: No C/C/E    Neuro: No gag, no response to pain, not moving   Psych: Calm, a & o x 3  Skin: Non jaundiced, no rash   Lymph: Normal      Last BM:   03-18-21 @ 07:01  -  03-19-21 @ 07:00  --------------------------------------------------------  OUT: 2 mL    03-19-21 @ 07:01  -  03-20-21 @ 07:00  --------------------------------------------------------  OUT: 1 mL    03-20-21 @ 07:01  -  03-21-21 @ 07:00  --------------------------------------------------------  OUT: 2 mL    03-21-21 @ 07:01  -  03-22-21 @ 07:00  --------------------------------------------------------  OUT: 1 mL    03-22-21 @ 07:01  -  03-23-21 @ 07:00  --------------------------------------------------------  OUT: 1 mL        ALLERGIES:     OPIATE NAÏVE (Y/N):    MEDICATIONS: REVIEWED  MEDICATIONS  (STANDING):  levETIRAcetam 750 milliGRAM(s) Oral two times a day    MEDICATIONS  (PRN):  LORazepam   Injectable 1 milliGRAM(s) IV Push every 15 minutes PRN Agitation  morphine  - Injectable 4 milliGRAM(s) IV Push every 15 minutes PRN Dyspnea      LABS: REVIEWED  CBC:                        6.2    6.34  )-----------( 120      ( 24 Mar 2021 07:59 )             20.3     CMP:    03-24    140  |  100  |  20  ----------------------------<  100<H>  3.5   |  32  |  0.6<L>    Ca    8.2<L>      24 Mar 2021 07:59  Mg     1.7     03-24    TPro  4.5<L>  /  Alb  2.7<L>  /  TBili  0.3  /  DBili  x   /  AST  15  /  ALT  12  /  AlkPhos  163<H>  03-24  Albumin, Serum: 2.7 g/dL (03-24-21 @ 07:59)      ADVANCED DIRECTIVES:       DNR    DECISION MAKER: DaughterKathrine ([facility])     GOALS OF CARE DISCUSSION   -last night patient was made no escalation of care in setting of worsening neuro exam  - DNR  - this AM, Kathrine called, family decided on palliative extubation and CMO today   - see GO note with Misty WAN    CURRENT DISPO PLAN:     WILL REMAIN IN HOSPITAL   KATHRINE BASURTO             MRN-[id number]    CC: Stroke    HPI:  75 years old female from home with PMHx of B cell lymphoma on Imbruvica (since 10/2020), CAD s/p stents 15 years ago, DM, chronic UTI, HTN, DLD, brought in by ambulance due to altered mental status and hypertension. Patient was AAO x4 and fully functional at baseline. Last known normal was 11:00pm 2/23 before beds. In 2/24 at 8:45AM, patient was found to be lethargy in bed by his son, was AAO x 4 and able to talk to his son, but started coughing and had one episode of nose bleed. At 9:15, her physical therapist noticed her becoming more lethargic, but vitals were normal. Soon at 9:30, patient was unresponsive and had a blank stare. EMS was called and her BP was found to be around 230/120. She was rushed to ED as Code Stroke. NIHSS 23. CTH showed Large left frontal intraparenchymal hematoma with intraventricular extension into left lateral ventricle, associated with marked mass effect resulting in 0.9 cm midline shift to the right anteriorly. Patient was intubated for airway protection. S/p 2 units of platelet for ASA reversal. Neurosurgery was planning for emergent OR resection of hematoma.     SUBJECTIVE:  UNABLE TO OBTAIN ROS due to: AMS    PEx:  Vital Signs Last 24 Hrs  T(C): 36.6 (24 Mar 2021 03:31), Max: 37.1 (23 Mar 2021 20:00)  T(F): 97.9 (24 Mar 2021 03:31), Max: 98.8 (23 Mar 2021 20:00)  HR: 78 (24 Mar 2021 07:37) (78 - 87)  BP: 97/53 (24 Mar 2021 03:31) (83/42 - 101/49)  BP(mean): 72 (24 Mar 2021 03:31) (72 - 72)  RR: 18 (24 Mar 2021 03:31) (18 - 18)  SpO2: 98% (24 Mar 2021 07:37) (98% - 100%)    General: ill-appearing. obese, lying in bed, NAD  HEENT:  NCAT, eyes closed   Neck: Supple no masses, + tracheostomy  CVS: RR, No edema  Resp: Unlabored Non tachypneic No increased WOB, trach to ventilator   GI:  Soft NT ND  : Murry   Musc: No C/C/E    Neuro: No gag, no response to pain, not moving   Psych: Calm, a & o x 3  Skin: Non jaundiced, no rash   Lymph: Normal      Last BM:   03-18-21 @ 07:01  -  03-19-21 @ 07:00  --------------------------------------------------------  OUT: 2 mL    03-19-21 @ 07:01  -  03-20-21 @ 07:00  --------------------------------------------------------  OUT: 1 mL    03-20-21 @ 07:01  -  03-21-21 @ 07:00  --------------------------------------------------------  OUT: 2 mL    03-21-21 @ 07:01  -  03-22-21 @ 07:00  --------------------------------------------------------  OUT: 1 mL    03-22-21 @ 07:01  -  03-23-21 @ 07:00  --------------------------------------------------------  OUT: 1 mL        ALLERGIES:  penicillin    OPIATE NAÏVE (Y/N): unknown    MEDICATIONS: REVIEWED  MEDICATIONS  (STANDING):  levETIRAcetam 750 milliGRAM(s) Oral two times a day    MEDICATIONS  (PRN):  LORazepam   Injectable 1 milliGRAM(s) IV Push every 15 minutes PRN Agitation  morphine  - Injectable 4 milliGRAM(s) IV Push every 15 minutes PRN Dyspnea      LABS: REVIEWED  CBC:                        6.2    6.34  )-----------( 120      ( 24 Mar 2021 07:59 )             20.3     CMP:    03-24    140  |  100  |  20  ----------------------------<  100<H>  3.5   |  32  |  0.6<L>    Ca    8.2<L>      24 Mar 2021 07:59  Mg     1.7     03-24    TPro  4.5<L>  /  Alb  2.7<L>  /  TBili  0.3  /  DBili  x   /  AST  15  /  ALT  12  /  AlkPhos  163<H>  03-24  Albumin, Serum: 2.7 g/dL (03-24-21 @ 07:59)    Previous imaging and EKG reviewed    ADVANCED DIRECTIVES:       DNR    DECISION MAKER: DaughterKathrine (HCP)     GOALS OF CARE DISCUSSION   -last night patient was made no escalation of care in setting of worsening neuro exam  - DNR  - this AM, Kathrine called, family decided on palliative extubation and CMO today   - see GO note with Misty WAN    CURRENT DISPO PLAN:     WILL REMAIN IN HOSPITAL

## 2021-03-24 NOTE — GOALS OF CARE CONVERSATION - ADVANCED CARE PLANNING - CONVERSATION DETAILS
D/w daughter Kathrine. 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 Daughter is hcp. Pt is full code.
With the new event of left intracranial bleed discussed patient's poor prognosis with daughter Kathrine (HCP) & Son. They agreed for DNR/DNI. Palliative consult pending.  requested.
Palliative Care team met with family to f/u from previous day regarding GOC.  Family clearly understands pt.'s overall condition and poor prognosis, and opt for palliative extubation and CMO at this time understanding that pt. will  in hospital.  All questions answered in detail.  Chaplaincy referral made.  MOLST form completed.  Support provided.  DNR/I; CMO

## 2021-03-27 LAB
CULTURE RESULTS: SIGNIFICANT CHANGE UP
SPECIMEN SOURCE: SIGNIFICANT CHANGE UP

## 2021-03-28 LAB
CULTURE RESULTS: SIGNIFICANT CHANGE UP
SPECIMEN SOURCE: SIGNIFICANT CHANGE UP

## 2021-03-31 LAB
CULTURE RESULTS: SIGNIFICANT CHANGE UP
SPECIMEN SOURCE: SIGNIFICANT CHANGE UP

## 2021-04-06 DIAGNOSIS — I60.9 NONTRAUMATIC SUBARACHNOID HEMORRHAGE, UNSPECIFIED: ICD-10-CM

## 2021-04-06 DIAGNOSIS — J96.01 ACUTE RESPIRATORY FAILURE WITH HYPOXIA: ICD-10-CM

## 2021-04-06 DIAGNOSIS — J96.02 ACUTE RESPIRATORY FAILURE WITH HYPERCAPNIA: ICD-10-CM

## 2021-04-06 DIAGNOSIS — E78.5 HYPERLIPIDEMIA, UNSPECIFIED: ICD-10-CM

## 2021-04-06 DIAGNOSIS — I10 ESSENTIAL (PRIMARY) HYPERTENSION: ICD-10-CM

## 2021-04-06 DIAGNOSIS — Z51.5 ENCOUNTER FOR PALLIATIVE CARE: ICD-10-CM

## 2021-04-06 DIAGNOSIS — B44.9 ASPERGILLOSIS, UNSPECIFIED: ICD-10-CM

## 2021-04-06 DIAGNOSIS — J98.11 ATELECTASIS: ICD-10-CM

## 2021-04-06 DIAGNOSIS — I16.1 HYPERTENSIVE EMERGENCY: ICD-10-CM

## 2021-04-06 DIAGNOSIS — E11.9 TYPE 2 DIABETES MELLITUS WITHOUT COMPLICATIONS: ICD-10-CM

## 2021-04-06 DIAGNOSIS — J90 PLEURAL EFFUSION, NOT ELSEWHERE CLASSIFIED: ICD-10-CM

## 2021-04-06 DIAGNOSIS — I61.1 NONTRAUMATIC INTRACEREBRAL HEMORRHAGE IN HEMISPHERE, CORTICAL: ICD-10-CM

## 2021-04-06 DIAGNOSIS — J44.9 CHRONIC OBSTRUCTIVE PULMONARY DISEASE, UNSPECIFIED: ICD-10-CM

## 2021-04-06 DIAGNOSIS — Z66 DO NOT RESUSCITATE: ICD-10-CM

## 2021-04-06 DIAGNOSIS — R29.723 NIHSS SCORE 23: ICD-10-CM

## 2021-04-06 DIAGNOSIS — L98.419 NON-PRESSURE CHRONIC ULCER OF BUTTOCK WITH UNSPECIFIED SEVERITY: ICD-10-CM

## 2021-04-06 DIAGNOSIS — I25.10 ATHEROSCLEROTIC HEART DISEASE OF NATIVE CORONARY ARTERY WITHOUT ANGINA PECTORIS: ICD-10-CM

## 2021-04-06 DIAGNOSIS — E87.1 HYPO-OSMOLALITY AND HYPONATREMIA: ICD-10-CM

## 2021-04-06 DIAGNOSIS — R40.2222 COMA SCALE, BEST VERBAL RESPONSE, INCOMPREHENSIBLE WORDS, AT ARRIVAL TO EMERGENCY DEPARTMENT: ICD-10-CM

## 2021-04-06 DIAGNOSIS — G93.6 CEREBRAL EDEMA: ICD-10-CM

## 2021-04-06 DIAGNOSIS — R40.2362 COMA SCALE, BEST MOTOR RESPONSE, OBEYS COMMANDS, AT ARRIVAL TO EMERGENCY DEPARTMENT: ICD-10-CM

## 2021-04-06 DIAGNOSIS — E83.42 HYPOMAGNESEMIA: ICD-10-CM

## 2021-04-06 DIAGNOSIS — C85.90 NON-HODGKIN LYMPHOMA, UNSPECIFIED, UNSPECIFIED SITE: ICD-10-CM

## 2021-04-06 DIAGNOSIS — Z95.5 PRESENCE OF CORONARY ANGIOPLASTY IMPLANT AND GRAFT: ICD-10-CM

## 2021-04-06 DIAGNOSIS — D61.818 OTHER PANCYTOPENIA: ICD-10-CM

## 2021-04-06 DIAGNOSIS — G93.5 COMPRESSION OF BRAIN: ICD-10-CM

## 2021-04-06 DIAGNOSIS — T82.868A THROMBOSIS DUE TO VASCULAR PROSTHETIC DEVICES, IMPLANTS AND GRAFTS, INITIAL ENCOUNTER: ICD-10-CM

## 2021-04-06 DIAGNOSIS — R41.82 ALTERED MENTAL STATUS, UNSPECIFIED: ICD-10-CM

## 2021-04-06 DIAGNOSIS — L89.152 PRESSURE ULCER OF SACRAL REGION, STAGE 2: ICD-10-CM

## 2021-04-06 DIAGNOSIS — E87.2 ACIDOSIS: ICD-10-CM

## 2021-04-06 DIAGNOSIS — R40.2122 COMA SCALE, EYES OPEN, TO PAIN, AT ARRIVAL TO EMERGENCY DEPARTMENT: ICD-10-CM

## 2021-04-17 LAB
CULTURE RESULTS: SIGNIFICANT CHANGE UP
SPECIMEN SOURCE: SIGNIFICANT CHANGE UP

## 2021-06-27 NOTE — PATIENT PROFILE ADULT - MONEY FOR FOOD
Critical CBC--platelet count 30.    Diabetes    Hypertension    Pancreatitis     no CVA (cerebral vascular accident)    Diabetes    Hypertension    Pancreatitis

## 2021-08-27 NOTE — ED ADULT NURSE NOTE - CAS EDN DISCHARGE ASSESSMENT
Inpatient Cardiology Consultation      Reason for Consult: Elevated troponin    Consulting Physician: Dr. Amanda Villeda    Requesting Physician: Dr. Natali Ahuja    Date of Consultation: 8/27/2021    HISTORY OF PRESENT ILLNESS:   Patient is a 72year old WM who is not previously known to [facility]. Patient is being evaluated at this hospital admission by Dr. Amanda Villeda for evaluation and recommendations regarding elevated troponin. Of note, due to patient's COVID-19 positive status and in droplet plus isolation, I attempted to call the patient for phone interview in order to preserve PPE and limit exposure. However, patient is currently requiring BiPAP therapy and is unable to talk via telephone at this time. As a result, most of history obtained through chart review and discussion with medical staff/patient's nurse. Patient presented to [facility] on August 27, 2021 with complaints of myalgias, and productive cough. He additionally admitted to shortness of breath and chills. He notes of chronic lower back pain. He reportedly did receive the [employer] vaccination. He denies any known sick contacts. No note of chest discomfort per nursing staff. In the ED, he was found to be COVID-19+ with findings of bilateral pneumonia on CXR and acute hypoxic respiratory failure. He is currently requiring BiPAP therapy. Labs and diagnostic testing as noted below. Please note: past medical records were reviewed per electronic medical record (EMR) - see detailed reports under Past Medical/ Surgical History. PAST MEDICAL HISTORY:    1. Hypertension. 2. Hyperlipidemia, on statin therapy, fenofibrate and Zetia therapy. 3. Hypothyroidism, on replacement therapy. 4. Insulin requiring diabetes mellitus type II, with peripheral neuropathy. 5. GERD. 6. Morbid obesity. 7. Chronic kidney disease. Baseline Cr around 1.2 to 1.3.  8. Chronically elevated AST.   9. Prolonged admission in 2017 after fall from 6ft high ladder resulting in right 2-11 rib fractures, right L2-L5 transverse process fractures, right superior pubic rami fracture. PAST SURGICAL HISTORY:    History reviewed. No pertinent surgical history. HOME MEDICATIONS:  Prior to Admission medications    Medication Sig Start Date End Date Taking? Authorizing Provider   amLODIPine (NORVASC) 5 MG tablet Take 5 mg by mouth 2 times daily   Yes Historical Provider, MD   carvedilol (COREG) 25 MG tablet Take 25 mg by mouth 2 times daily (with meals)   Yes Historical Provider, MD   furosemide (LASIX) 20 MG tablet Take 20 mg by mouth daily   Yes Historical Provider, MD   simvastatin (ZOCOR) 20 MG tablet Take 20 mg by mouth nightly   Yes Historical Provider, MD   HYDROcodone-acetaminophen (NORCO)  MG per tablet Take 1 tablet by mouth every 4 hours as needed for Pain. Yes Historical Provider, MD   levothyroxine (SYNTHROID) 50 MCG tablet Take 75 mcg by mouth Daily    Yes Historical Provider, MD   metFORMIN (GLUCOPHAGE) 500 MG tablet Take 500 mg by mouth 3 times daily (with meals)   Yes Historical Provider, MD   insulin regular (HUMULIN R;NOVOLIN R) 100 UNIT/ML injection Inject 34 Units into the skin 2 times daily (before meals) 34 Units in the morning and 35 Units at dinner   Yes Historical Provider, MD   gabapentin (NEURONTIN) 600 MG tablet Take 600 mg by mouth 4 times daily.     Yes Historical Provider, MD   valsartan-hydrochlorothiazide (DIOVAN-HCT) 160-12.5 MG per tablet Take 1 tablet by mouth daily 320mg   Yes Historical Provider, MD   omeprazole (PRILOSEC) 20 MG delayed release capsule Take 20 mg by mouth daily   Yes Historical Provider, MD   fenofibrate (TRICOR) 145 MG tablet Take 145 mg by mouth nightly   Yes Historical Provider, MD   methocarbamol (ROBAXIN) 750 MG tablet Take 750 mg by mouth 4 times daily    Historical Provider, MD BLACKMON  (90 Base) MCG/ACT inhaler  8/21/17   Historical Provider, MD   LINZESS 290 MCG CAPS capsule  10/1/17 Historical Provider, MD   lidocaine (XYLOCAINE) 5 % ointment Apply topically as needed.  10/31/17   MICHELLE Russell - CNS   metoprolol succinate (TOPROL XL) 50 MG extended release tablet Take 1 tablet by mouth daily 10/26/17   MICHELLE Quezada - CNP   ezetimibe (ZETIA) 10 MG tablet Take 10 mg by mouth daily    Historical Provider, MD       CURRENT MEDICATIONS:      Current Facility-Administered Medications:     0.9 % sodium chloride bolus, 1,000 mL, IntraVENous, Once, MD Justin Norton  Lucio Johnson ON 8/28/2021] dexamethasone (DECADRON) injection 6 mg, 6 mg, IntraVENous, Daily, Celina Beckett MD    sodium chloride flush 0.9 % injection 5-40 mL, 5-40 mL, IntraVENous, 2 times per day, Celina Beckett MD    sodium chloride flush 0.9 % injection 5-40 mL, 5-40 mL, IntraVENous, PRN, Celina Beckett MD    0.9 % sodium chloride infusion, 25 mL, IntraVENous, PRN, Celina Beckett MD    ondansetron (ZOFRAN-ODT) disintegrating tablet 4 mg, 4 mg, Oral, Q8H PRN **OR** ondansetron (ZOFRAN) injection 4 mg, 4 mg, IntraVENous, Q6H PRN, Celina Beckett MD    polyethylene glycol [facility]) packet 17 g, 17 g, Oral, Daily PRN, Celina Beckett MD    acetaminophen (TYLENOL) tablet 650 mg, 650 mg, Oral, Q6H PRN, 650 mg at 08/27/21 1504 **OR** acetaminophen (TYLENOL) suppository 650 mg, 650 mg, Rectal, Q6H PRN, Celina Beckett MD    heparin (porcine) injection 5,000 Units, 5,000 Units, Subcutaneous, Q8H, Celina Beckett MD    insulin lispro (HUMALOG) injection vial 0-6 Units, 0-6 Units, Subcutaneous, TID WC, Celina Beckett MD    insulin lispro (HUMALOG) injection vial 0-3 Units, 0-3 Units, Subcutaneous, Nightly, Celina Beckett MD    glucose (GLUTOSE) 40 % oral gel 15 g, 15 g, Oral, PRN, Celina Beckett MD    dextrose 50 % IV solution, 12.5 g, IntraVENous, PRN, Celina Beckett MD    glucagon (rDNA) injection 1 mg, 1 mg, IntraMUSCular, PRN, Celina Beckett MD    dextrose 5 % solution, 100 mL/hr, IntraVENous, PRN, Cholo Cotton MD      ALLERGIES:  Patient has no known allergies. SOCIAL HISTORY:    Attempted phone interview due to patient's COVID-19+ status and droplet plus isolation in order to limit exposure and preserve PPE, but was unable to reach patient at this time. FAMILY HISTORY:   Attempted phone interview due to patient's COVID-19+ status and droplet plus isolation in order to limit exposure and preserve PPE, but was unable to reach patient at this time. REVIEW OF SYSTEMS:     Attempted phone interview due to patient's COVID-19+ status and droplet plus isolation in order to limit exposure and preserve PPE, but was unable to reach patient at this time. PHYSICAL EXAM:   BP (!) 151/67   Pulse 75   Temp 99 °F (37.2 °C) (Infrared)   Resp (!) 35   Ht 5' 8\" (1.727 m)   Wt (!) 323 lb (146.5 kg)   SpO2 94%   BMI 49.11 kg/m²   Unable to obtain at this time due to patient's COVID-19+ status and droplet plus isolation, physical exam was not performed in order to limit exposure and preserve PPE. DATA:      Diagnostic:  All diagnostic testing and lab work thus far this admission reviewed in detail. CXR 8/27/2021:  Impression:  1. Extensive multifocal bilateral pulmonary infiltrates. No intake or output data in the 24 hours ending 08/27/21 1509    Labs:   CBC:   Recent Labs     08/27/21  1202   WBC 11.5   HGB 10.3*   HCT 30.5*        BMP:   Recent Labs     08/27/21  1047      K 5.6*   CO2 20*   BUN 43*   CREATININE 2.0*   LABGLOM 34   CALCIUM 8.9     HgA1c:   Lab Results   Component Value Date    LABA1C 8.1 (H) 03/30/2021     FASTING LIPID PANEL:  Lab Results   Component Value Date    CHOL 175 03/30/2021    HDL 29 03/30/2021    LDLCALC 78 03/30/2021    TRIG 341 03/30/2021     LIVER PROFILE:  Recent Labs     08/27/21  1047   AST 85*   ALT 47*   LABALBU 3.8             Assessment and plan to follow as per Dr. Dominguez Hong.     Catracho Hernandez, [address] [place] [address], [place] [address] Cardiology    Electronically signed by Katlyn Holbrook PA-C on 8/27/2021 at 3:09 PM     I personally and independently reviewed all done pertinent laboratory data, imaging studies, ECGs and rhythm strips. I have reviewed and agree with the APN history as documented in the above note. Electronically signed by Alirio Bhagat MD on 8/27/2021 at 4:21 PM       We were asked to see the patient for elevated troponin.         IMPRESSION:  1. Mildly elevated troponin in the context of JENNIFER on CKD, doubt secondary to acute coronary syndrome. Also doubt secondary to myocarditis. Denies chest pain and there are no ischemic EKG changes. 2. COVID-19 + infection with associated pneumonia. 3. Acute hypoxic respiratory failure. 4. Acute kidney injury on chronic kidney disease. 5. Hypertension. 6. Hyperlipidemia, on statin, Zetia and fenofibrate therapy. 7. Insulin requiring diabetes mellitus type II. Peripheral neuropathy. 8. Hypothyroidism, on replacement therapy. 9. Chronically elevated LFTs. 10. Chronic back pain s/p fall in 2017 s/p multiple right rib fractures. Right L2-L5 transverse process fractures and right superior rami fracture. 11. Morbid obesity.        PLAN:   1. Repeat troponin. No need for additional diagnostic cardiac work up at present if second troponin level is more or less same. 2. Consider outpatient Lexiscan nuclear stress test if there is further rise in troponin level. 3. Cardiology will sign off/see prn.  Please call if needed.            Electronically signed by Alirio Bhagat MD on 8/27/2021 at 3:28 PM sedated

## 2021-09-10 NOTE — PROCEDURE NOTE - NSFINDINGS_GEN_A_CORE
Central Prior Authorization Team   Phone: [phone]      PA Initiation    Medication: celecoxib (CELEBREX) 100 MG capsule  Insurance Company: [employer] - Phone [phone] Fax [phone]  Pharmacy Filling the Rx: [id number] - [place], [place] - [address] [facility]  Filling Pharmacy Phone: [phone]  Filling Pharmacy Fax:    Start Date: 9/10/2021           positive return of urine in the collection bag

## 2022-03-15 NOTE — PROGRESS NOTE ADULT - ASSESSMENT
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  Aspiration precautions     CARDIOVASCULAR:  Wean Levophed.  Continue NS    GI: GI prophylaxis.  Feeding.      RENAL:  Follow up lytes.  Correct as needed    INFECTIOUS DISEASE: Follow up cultures(prelim -vex2). Continue Meropenem. Fluconazole ppx.    HEMATOLOGICAL:  DVT prophylaxis.     ENDOCRINE:  Follow up FS.      MUSCULOSKELETAL:  OOB to chair     DC Murry  DC TLC once off levophed.  downgrade to CEU.

## 2022-08-03 NOTE — ED PROVIDER NOTE - ADDITIONAL HISTORY OBTAINED FROM [facility]
Mom requested copies to  also.  Original given to mom.  Copy sent to scanning and faxed to  as requested.   Family

## 2023-05-01 NOTE — H&P PST ADULT - MS EXT PE MLT D E PC
Pt made aware of below information and verbalizes understanding.    Pt med list is reconciled and pt is taking all medications per current med list.   LLE/pedal edema

## 2023-07-13 NOTE — ED ADULT TRIAGE NOTE - NS ED NURSE AMBULANCES
Rec'd pt in good spirits and without complaints. First dose Venofer education reviewed and all questions answered to Pt's satisfaction. PIV easily obtained. Venofer now infusing without adverse reactions thus far. [facility]

## 2024-01-09 NOTE — PATIENT PROFILE ADULT - HAVE YOU EXPERIENCED VIOLENCE OR A TRAUMATIC EVENT?
Discussed with patient.  Will try suppository first.  If no improvement, needs repeat eval by Dr. Ruiz  
Patient requesting a call from you. Stated that he wanted to speak with you regarding a colonoscopy that he has last year (chart shows 2022) and hemorrhoids, now having some bleeding when using the restroom . Did let patient know that you were seeing patients during the day and I could not give him an answer as to when you would call.   
no

## 2024-02-13 NOTE — PROGRESS NOTE ADULT - SUBJECTIVE AND OBJECTIVE BOX
Dr. Moore AD BASURTO  [facility] T2-3A 016 A ([facility] T2-3A)            Patient was evaluated and examined  by bedside, no active events over night as per covering nurse report, patient passed trial of void.           REVIEW OF SYSTEMS:  unable to obtain due to patient's on /off confused state      T(C): , Max: 35.8 (09-01-20 @ 13:21)  HR: 81 (09-02-20 @ 05:00)  BP: 144/65 (09-02-20 @ 05:00)  RR: 18 (09-02-20 @ 05:00)  SpO2: 96% (09-02-20 @ 05:00)  CAPILLARY BLOOD GLUCOSE      POCT Blood Glucose.: 158 mg/dL (02 Sep 2020 11:15)  POCT Blood Glucose.: 198 mg/dL (02 Sep 2020 07:24)  POCT Blood Glucose.: 126 mg/dL (01 Sep 2020 16:39)      PHYSICAL EXAM:  General: NAD, AAOX2, patient is laying comfortably in bed  HEENT: AT, NC, Supple, NO JVD, NO CB  Lungs: CTA B/L, no wheezing, no rhonchi  CVS: normal S1, S2, RRR, NO M/G/R  Abdomen: soft, bowel sounds present, non-tender, non-distended  Extremities: no edema, no clubbing, no cyanosis, positive peripheral pulses b/l  Neuro: no acute focal neurological deficits, gait not tested, confused state  Skin: no rash, no ecchymosis      LAB  CBC  Date: 09-01-20 @ 06:46  Mean cell Vfuhcmyqgo69.5  Mean cell Hemoglobin Conc32.1  Mean cell Volum 95.0  Platelet count-Automate 102  RBC Count 2.82  Red Cell Distrib Width20.1  WBC Count10.66  % Albumin, Urine--  Hematocrit 26.8  Hemoglobin 8.6  CBC  Date: 08-31-20 @ 05:28  Mean cell Tlsyxscxus35.0  Mean cell Hemoglobin Conc32.4  Mean cell Volum 95.6  Platelet count-Automate 102  RBC Count 2.74  Red Cell Distrib Width19.4  WBC Count8.88  % Albumin, Urine--  Hematocrit 26.2  Hemoglobin 8.5  CBC  Date: 08-30-20 @ 05:32  Mean cell Ehzstfhrjs88.2  Mean cell Hemoglobin Conc33.0  Mean cell Volum 94.6  Platelet count-Automate 107  RBC Count 2.79  Red Cell Distrib Width18.9  WBC Count8.19  % Albumin, Urine--  Hematocrit 26.4  Hemoglobin 8.7  CBC  Date: 08-29-20 @ 07:10  Mean cell Esufefpihp77.1  Mean cell Hemoglobin Conc33.5  Mean cell Volum 93.0  Platelet count-Automate 88  RBC Count 2.70  Red Cell Distrib Width18.5  WBC Count8.70  % Albumin, Urine--  Hematocrit 25.1  Hemoglobin 8.4  CBC  Date: 08-28-20 @ 06:47  Mean cell Buzchjeivq87.5  Mean cell Hemoglobin Conc33.3  Mean cell Volum 91.6  Platelet count-Automate 79  RBC Count 2.75  Red Cell Distrib Width17.7  WBC Count8.46  % Albumin, Urine--  Hematocrit 25.2  Hemoglobin 8.4  CBC  Date: 08-27-20 @ 18:59  Mean cell Glotdahzvi29.0  Mean cell Hemoglobin Conc33.8  Mean cell Volum 91.6  Platelet count-Automate 82  RBC Count 2.87  Red Cell Distrib Width17.2  WBC Count10.36  % Albumin, Urine--  Hematocrit 26.3  Hemoglobin 8.9  CBC  Date: 08-27-20 @ 04:08  Mean cell Amjwulgekm92.2  Mean cell Hemoglobin Conc33.6  Mean cell Volum 90.0  Platelet count-Automate 97  RBC Count 2.81  Red Cell Distrib Width16.7  WBC Count9.41  % Albumin, Urine--  Hematocrit 25.3  Hemoglobin 8.5  CBC  Date: 08-26-20 @ 20:45  Mean cell Oefflssrfa96.6  Mean cell Hemoglobin Conc33.7  Mean cell Volum 90.6  Platelet count-Automate 90  RBC Count 2.88  Red Cell Distrib Width16.4  WBC Count9.38  % Albumin, Urine--  Hematocrit 26.1  Hemoglobin 8.8  CBC  Date: 08-26-20 @ 18:00  Mean cell Zszenqcrsd71.4  Mean cell Hemoglobin Conc34.5  Mean cell Volum 90.9  Platelet count-Automate 90  RBC Count 2.87  Red Cell Distrib Width16.4  WBC Count9.48  % Albumin, Urine--  Hematocrit 26.1  Hemoglobin 9.0    BMP  09-01-20 @ 06:46  Blood Gas Arterial-Calcium,Ionized--  Blood Urea Nitrogen, Serum 7 mg/dL<L> [10 - 20]  Carbon Dioxide, Serum21 mmol/L [17 - 32]  Chloride, Serum99 mmol/L [98 - 110]  Creatinie, Serum<0.5 mg/dL<L> [0.7 - 1.5]  Glucose, Yhfmy329 mg/dL<H> [70 - 99]  Potassium, Serum4.0 mmol/L [3.5 - 5.0]  Sodium, Serum 130 mmol/L<L> [135 - 146]  [facility]  08-31-20 @ 05:28  Blood Gas Arterial-Calcium,Ionized--  Blood Urea Nitrogen, Serum 9 mg/dL<L> [10 - 20]  Carbon Dioxide, Serum22 mmol/L [17 - 32]  Chloride, Serum96 mmol/L<L> [98 - 110]  Creatinie, Serum<0.5 mg/dL<L> [0.7 - 1.5]  Glucose, Lvmsf121 mg/dL<H> [70 - 99]  Potassium, Serum4.3 mmol/L [3.5 - 5.0]  Sodium, Serum 130 mmol/L<L> [135 - 146]  [facility]  08-30-20 @ 05:32  Blood Gas Arterial-Calcium,Ionized--  Blood Urea Nitrogen, Serum 8 mg/dL<L> [10 - 20]  Carbon Dioxide, Serum25 mmol/L [17 - 32]  Chloride, Ljddg014 mmol/L [98 - 110]  Creatinie, Serum<0.5 mg/dL<L> [0.7 - 1.5]  Glucose, Mznml317 mg/dL<H> [70 - 99]  Potassium, Serum4.4 mmol/L [3.5 - 5.0]  Sodium, Serum 134 mmol/L<L> [135 - 146]  [facility]  08-29-20 @ 07:10  Blood Gas Arterial-Calcium,Ionized--  Blood Urea Nitrogen, Serum 8 mg/dL<L> [10 - 20]  Carbon Dioxide, Serum24 mmol/L [17 - 32]  Chloride, Serum97 mmol/L<L> [98 - 110]  Creatinie, Serum<0.5 mg/dL<L> [0.7 - 1.5]  Glucose, Yizer904 mg/dL<H> [70 - 99]  Potassium, Serum4.2 mmol/L [3.5 - 5.0]  Sodium, Serum 128 mmol/L<L> [135 - 146]              Microbiology:    Culture - Urine (collected 08-30-20 @ 05:00)  Source: .Urine Clean Catch (Midstream)  Final Report (08-31-20 @ 07:22):    No growth    Culture - Urine (collected 08-24-20 @ 19:40)  Source: .Urine Clean Catch (Midstream)  Final Report (08-27-20 @ 08:09):    >100,000 CFU/ml Enterococcus faecium (vancomycin resistant)    <10,000 CFU/ml Normal Urogenital anthony present  Organism: Enterococcus faecium (vancomycin resistant) (08-27-20 @ 08:09)  Organism: Enterococcus faecium (vancomycin resistant) (08-27-20 @ 08:09)      -  Ampicillin: R >8 Predicts results to ampicillin/sulbactam, amoxacillin-clavulanate and  piperacillin-tazobactam.      -  Ciprofloxacin: R >2      -  Daptomycin: SDD 4 The breakpoint for SDD (sensitive dose dependent)is based on a dosage regimen of 8-12 mg/kg administered every 24 h in adults and is intended for serious infections due to E. faecium. Consultation with an infectious diseases specialist is recommended.      -  Levofloxacin: R >4      -  Linezolid: S 2      -  Nitrofurantoin: S <=32 Should not be used to treat pyelonephritis.      -  Tetra/Doxy: S <=4      -  Vancomycin: R >16      Method Type: MAHENDRA    Culture - Blood (collected 08-24-20 @ 18:50)  Source: .Blood Blood-Peripheral  Final Report (08-30-20 @ 02:02):    No Growth Final    Culture - Blood (collected 08-24-20 @ 17:49)  Source: .Blood Blood-Peripheral  Final Report (08-30-20 @ 02:02):    No Growth Final        Medications:  acyclovir   Oral Tab/Cap 400 milliGRAM(s) Oral daily  aspirin  chewable 81 milliGRAM(s) Oral daily  atorvastatin 80 milliGRAM(s) Oral at bedtime  chlorhexidine 4% Liquid 1 Application(s) Topical <User Schedule>  clopidogrel Tablet 75 milliGRAM(s) Oral daily  dextrose 40% Gel 15 Gram(s) Oral once PRN  dextrose 5%. 1000 milliLiter(s) IV Continuous <Continuous>  dextrose 50% Injectable 12.5 Gram(s) IV Push once  dextrose 50% Injectable 25 Gram(s) IV Push once  dextrose 50% Injectable 25 Gram(s) IV Push once  enoxaparin Injectable 40 milliGRAM(s) SubCutaneous daily  fluconAZOLE   Tablet 200 milliGRAM(s) Oral daily  folic acid 1 milliGRAM(s) Oral daily  glucagon  Injectable 1 milliGRAM(s) IntraMuscular once PRN  insulin lispro (HumaLOG) corrective regimen sliding scale   SubCutaneous three times a day before meals  linezolid  IVPB      linezolid  IVPB 600 milliGRAM(s) IV Intermittent every 12 hours  metoprolol tartrate 25 milliGRAM(s) Oral two times a day  pantoprazole    Tablet 40 milliGRAM(s) Oral before breakfast  sodium chloride 0.9%. 1000 milliLiter(s) IV Continuous <Continuous>        Assessment and Plan:    73 y/o F w PMHx of B cell lymphoproliferative disorder on Imbrutinib, DM, HTN, CAD s/p stent p/w AMS, dehydration, & hematuria x1 day admitted for septic shock due to UTI Enterococcus VRE    # Metabolic Encephalopathy due to Septic shock (POA) 2/2 VRE UTI:  sepsis has resolved .     as per ID recommendations during inpatient stay patient was tx. with IV Linezolid till 9/02/20        # Marginal B Cell Lymphoma/thrombocytopenia:    Been on outpatient Ibrutinib.   Has splenomegaly (contributing to thrombocytopenia) with increased FDG uptake   Holding Ibrutinib for now per heme. OP f/u  platelet count stable >100.  On Fluconazole & Acyclovir ppx    urinary retention:   passed TOV on 09/01/2020.     chronic dz Anemia:   Hb stable >8.   Receives weekly transfusions as outpatient with hem/ onc.     Hyponatremia possible Hypovolemic.  Na stable at 130 sp IV hydration.     #H/o CAD s/p stent  - Plavix 75 mg qd/statin.     #HTN:  controlled  c/w bb.     #DM:  Does not use any meds at home  BS controlled here with ISS.   A1c 7.4/ can DC with metformin 500mg bid and endocrine fu as OP.     hypomagnesemia:   supplemented    #Progress Note Handoff: Patient was optimized medically , stable for d/c home with home care today   Family discussion: yes Disposition: Home___with home care today Med [facility] Primary team Dr RUDY Moore

## 2024-05-16 NOTE — CONSULT NOTE ADULT - PROBLEM SELECTOR RECOMMENDATION 3
9S 948 A HCP- daughter Kathrine Quinones, to make medical decisions In the setting of in intracranial bleed  Continue sedation per primary team  HCP- daughter Kathrine Quinones, to make medical decisions

## 2024-11-20 NOTE — PROGRESS NOTE ADULT - SUBJECTIVE AND OBJECTIVE BOX
OB follow up > 20 weeks    Chief Complaint   Patient presents with    Routine Prenatal Visit       Ira Beard is a 29 y.o.  29w0d being seen today for her obstetrical visit.  Patient reports no complaints. Taking prenatal vitamins: Yes.  Growth US today.  Here with significant other.     Review of Systems  Genitourinary: Negative for contractions, cramping, vaginal bleeding, or SROM.   Fetal movement: normal  No Known Allergies     /90   Wt 101 kg (222 lb)   LMP 2024   BMI 36.94 kg/m²     FHT: 151 BPM   Uterine Size: EFW 62.3%       Assessment    1) pregnancy at 29w0d- 2 hr GTT/RPR/HH today    2) Patient advised to have the Tdap shot in the later part of pregnancy to help protect against whooping cough.  Also advised that FOB and other adults who come in contact with the infant should also be vaccinated with Tdap.  Received Tdap today.    RSV Vaccine 32-36 wga ( ) ( Sept - January)      3) CF/SMA/FX neg, Low risk NIPT, AFP neg     4) IVF Pregnancy   Summary of Plan  - Fetal anatomy complete   -Fetal echo complete.   -Serial growth ultrasounds every 4  weeks (by primary OB)   -Starting at 36 weeks: Weekly fetal  surveillance until delivery   -Starting at 28 weeks: Fetal movement instructions given continue daily until delivery; instructed to report to labor and delivery if cannot achieve more than 10 kicks in one hour or if she perceives a decrease in fetal movement  -Continue routine prenatal care with primary ob team   -Recommend delivery at 39 weeks        5) Initial BMI 38   BPP 37 wga ( )   Growth 36 wga ( )      6) Hx of exp lap in - has vertical incision scar      7) PCOS- on Metformin 500mg BID;    hgb A1C 5.3     9)  Hypothyroidism- on Levothyroxine 75mcg daily. Repeat labs in  were too low.  Check TSH  today.     Needs labs every trimester, check with new OB labs      10) Family hx of spina bifida- FOB's cousin- AFP neg     11) Rh Neg - received Rhogam today    12)  Declines flu/C19 vaccine     13) elevated BP today- check CMP, P/C ratio      Plan    Continue prenatal vitamins  Reviewed this stage of pregnancy  Problem list updated   Follow up in 2 week for OBT    Parts of this document have been copied or forwarded from her previous visits and have been reviewed, updated and edited as indicated.      Lucy Rees, APRN  11/20/2024  13:08 EST    Patient is a 74y old  Female who presents with a chief complaint of septic shock (18 Oct 2020 12:08)    Patient was seen and examined.  Pt awake, alert  Denies any  complaints.  All systems reviewed positive history as mentioned above.    PAST MEDICAL & SURGICAL HISTORY:  Anemia  DM (diabetes mellitus)  High cholesterol  HTN (hypertension)  B-cell chronic lymphocytic leukemia variant  CAD (coronary artery disease), s/p stenting    Allergies  penicillin (Anaphylaxis; Hives)    MEDICATIONS  (STANDING):  acyclovir   Oral Tab/Cap 400 milliGRAM(s) Oral daily  amLODIPine   Tablet 5 milliGRAM(s) Oral daily  aspirin  chewable 81 milliGRAM(s) Oral daily  atorvastatin 80 milliGRAM(s) Oral at bedtime  fluconAZOLE   Tablet 400 milliGRAM(s) Oral daily  folic acid 1 milliGRAM(s) Oral daily  insulin glargine Injectable (LANTUS) 15 Unit(s) SubCutaneous at bedtime  insulin lispro Injectable (HumaLOG) 5 Unit(s) SubCutaneous three times a day before meals  metoprolol tartrate 50 milliGRAM(s) Oral two times a day  pantoprazole    Tablet 40 milliGRAM(s) Oral two times a day    MEDICATIONS  (PRN):  acetaminophen   Tablet .. 650 milliGRAM(s) Oral every 6 hours PRN Temp greater or equal to 38C (100.4F), Mild Pain (1 - 3)  dextrose 40% Gel 15 Gram(s) Oral once PRN Blood Glucose LESS THAN 70 milliGRAM(s)/deciliter  glucagon  Injectable 1 milliGRAM(s) IntraMuscular once PRN Glucose LESS THAN 70 milligrams/deciliter    T(C): 35.8 (10-20-20 @ 13:38), Max: 36.6 (10-20-20 @ 05:09)  HR: 71 (10-20-20 @ 13:38) (71 - 73)  BP: 108/54 (10-20-20 @ 13:38) (108/54 - 136/61)  RR: 18 (10-20-20 @ 13:38) (18 - 19)  SpO2: --    O/E:  Awake, alert, not in distress.  HEENT: atraumatic, EOMI.  Chest: clear.  CVS: SIS2 +, no murmur.  P/A: Soft, BS+, splenomegaly  CNS: non focal.  Ext: no edema feet.  Skin: no rash, no ulcers.  All systems reviewed positive findings as above.                          9.3<L>  5.63  )-----------( 87<L>    ( 20 Oct 2020 06:28 )             29.5<L>                        8.9<L>  5.97  )-----------( 80<L>    ( 19 Oct 2020 06:15 )             28.7<L>  10-20    139  |  106  |  8<L>  ----------------------------<  73  4.2   |  24  |  <0.5<L>    Ca    8.0<L>      20 Oct 2020 06:28  Mg     1.5     10-20    TPro  4.3<L>  /  Alb  2.9<L>  /  TBili  1.1  /  DBili  x   /  AST  47<H>  /  ALT  29  /  AlkPhos  166<H>  10-20

## 2025-01-20 NOTE — ED ADULT TRIAGE NOTE - NS ED TRIAGE HISTORIAN
Spoke to patient with  #[id number] about ultrasound results. Offered general surgery consult, explained consult. Pt agreed to consult. She states her understanding and denies any questions at this time.   Patient/Daughter

## 2025-01-21 NOTE — DISCHARGE NOTE PROVIDER - NSDCMRMEDTOKEN_GEN_ALL_CORE_FT
aspirin 81 mg oral tablet, chewable: 1 tab(s) orally once a day  atorvastatin 80 mg oral tablet: 1 tab(s) orally once a day (at bedtime)  Imbruvica 70 mg oral capsule: 2 cap(s) orally once a day  Levaquin 500 mg oral tablet: 1 tab(s) orally every 24 hours  lisinopril 10 mg oral tablet: 1 tab(s) orally once a day  metFORMIN 500 mg oral tablet: 1 tab(s) orally 2 times a day   pantoprazole 40 mg oral delayed release tablet: 1 tab(s) orally 2 times a day   done

## 2025-02-02 NOTE — PROGRESS NOTE ADULT - SUBJECTIVE AND OBJECTIVE BOX
POD# 12    S/p Left minimally invasive Crani for evac of IPH with placement of EVD     pt seen and examined at bedside pt is trach'd Pegged , opens eyes to noxious follows some simple commands       Vital Signs Last 24 Hrs  T(C): 37.1 (08 Mar 2021 09:00), Max: 37.5 (07 Mar 2021 12:00)  T(F): 98.8 (08 Mar 2021 09:00), Max: 99.5 (07 Mar 2021 12:00)  HR: 78 (08 Mar 2021 10:00) (76 - 85)  BP: --  BP(mean): --  RR: 24 (08 Mar 2021 10:00) (11 - 29)  SpO2: 98% (08 Mar 2021 10:00) (98% - 100%)    I&O's Detail    07 Mar 2021 07:01  -  08 Mar 2021 07:00  --------------------------------------------------------  IN:    Enteral Tube Flush: 150 mL    Glucerna 1.5: 1190 mL    IV PiggyBack: 750 mL    IV PiggyBack: 100 mL    IV PiggyBack: 250 mL    IV PiggyBack: 250 mL    IV PiggyBack: 100 mL  Total IN: 2790 mL    OUT:    Indwelling Catheter - Urethral (mL): 770 mL    Rectal Tube (mL): 50 mL  Total OUT: 820 mL    Total NET: 1970 mL      08 Mar 2021 07:01  -  08 Mar 2021 10:56  --------------------------------------------------------  IN:    Glucerna 1.5: 180 mL  Total IN: 180 mL    OUT:    Indwelling Catheter - Urethral (mL): 115 mL  Total OUT: 115 mL    Total NET: 65 mL        I&O's Summary    07 Mar 2021 07:01  -  08 Mar 2021 07:00  --------------------------------------------------------  IN: 2790 mL / OUT: 820 mL / NET: 1970 mL    08 Mar 2021 07:01  -  08 Mar 2021 10:56  --------------------------------------------------------  IN: 180 mL / OUT: 115 mL / NET: 65 mL        PHYSICAL EXAM:  Neurological:    Opens eyes to verbal stimuli   PERRL   +  on the Left   Moves RUE spontaneously   withdraws bilateral LE"s to noxious stimuli   incision clean dry intact     LABS:                        8.4    5.17  )-----------( 180      ( 07 Mar 2021 23:30 )             27.1     03-07    140  |  107  |  15  ----------------------------<  129<H>  4.0   |  21  |  <0.5<L>    Ca    8.2<L>      07 Mar 2021 23:30  Phos  3.8     03-07  Mg     1.9     03-07      PT/INR - ( 06 Mar 2021 23:30 )   PT: 14.60 sec;   INR: 1.27 ratio         PTT - ( 06 Mar 2021 23:30 )  PTT:36.8 sec        CAPILLARY BLOOD GLUCOSE      POCT Blood Glucose.: 146 mg/dL (08 Mar 2021 09:29)  POCT Blood Glucose.: 147 mg/dL (08 Mar 2021 05:01)  POCT Blood Glucose.: 152 mg/dL (08 Mar 2021 02:16)  POCT Blood Glucose.: 128 mg/dL (07 Mar 2021 21:56)  POCT Blood Glucose.: 166 mg/dL (07 Mar 2021 19:06)  POCT Blood Glucose.: 141 mg/dL (07 Mar 2021 15:55)      Drug Levels: [] N/A  Vancomycin Level, Trough: 17.4 ug/mL (03-07 @ 16:00)  Vancomycin Level, Trough: 10.5 ug/mL (03-05 @ 05:59)  Vancomycin Level, Trough: 16.7 ug/mL (03-04 @ 06:58)    CSF Analysis: [] N/A      Allergies    penicillin (Anaphylaxis; Hives)    Intolerances      MEDICATIONS:  Antibiotics:  meropenem  IVPB 1000 milliGRAM(s) IV Intermittent every 8 hours  meropenem  IVPB      vancomycin  IVPB 1000 milliGRAM(s) IV Intermittent every 12 hours  voriconazole IVPB 300 milliGRAM(s) IV Intermittent every 12 hours    Neuro:  acetaminophen   Tablet .. 650 milliGRAM(s) Oral every 6 hours  levETIRAcetam  IVPB 750 milliGRAM(s) IV Intermittent every 12 hours    Anticoagulation:  aspirin  chewable 81 milliGRAM(s) Oral daily  heparin   Injectable 5000 Unit(s) SubCutaneous every 8 hours    OTHER:  atorvastatin 80 milliGRAM(s) Oral at bedtime  chlorhexidine 0.12% Liquid 15 milliLiter(s) Oral Mucosa two times a day  chlorhexidine 4% Liquid 1 Application(s) Topical daily  insulin regular  human corrective regimen sliding scale   SubCutaneous every 4 hours  labetalol 300 milliGRAM(s) Oral every 8 hours  lisinopril 20 milliGRAM(s) Oral <User Schedule>  metFORMIN 500 milliGRAM(s) Oral every 12 hours  pantoprazole   Suspension 40 milliGRAM(s) Oral daily  senna 2 Tablet(s) Oral at bedtime    IVF:    CULTURES:  Culture Results:   No growth to date. (03-05 @ 16:46)  Culture Results:   No growth to date. (03-04 @ 20:08)    RADIOLOGY & ADDITIONAL TESTS:      ASSESSMENT:  75y Female s/p    CVA (CEREBRAL VASCULAR ACCIDENT);INTRACRANIAL BLEED    ^CVA (CEREBRAL VASCULAR ACCIDENT);INTRACRANIAL BLEED    Family history of diabetes mellitus (DM)    No pertinent family history in first degree relatives    Handoff    MEWS Score    Anemia    DM (diabetes mellitus)    High cholesterol    HTN (hypertension)    B-cell chronic lymphocytic leukemia variant    CAD (coronary artery disease)    CVA (cerebral vascular accident)    Altered mental status    Palliative care encounter    Intracranial bleed    Craniotomy for intracerebral hemorrhage    H/O heart artery stent    No significant past surgical history    AMS    90+    Intracranial bleed    [email or web address]        A/p       S/p Left minimally invasive Crani for evac of IPH with placement of EVD              Neuro checks              Care per CCT  For information on Fall & Injury Prevention, visit: [email or web address] OR  [email or web address] OR  https://www.cdc.gov/steadi/patient.html

## 2025-03-12 NOTE — PATIENT PROFILE ADULT - NSTOBACCO TYPE_GEN_A_CORE_RD
Patient reports that she has been ill for 7 days. Reports that she is an asthmatic, using her neb treatments the last 2 days. She feels SOB. Reports that she is coughing blood tinged sputum. Feels hot and cold. Advised that in person visit is needed for evaluation of symptoms and examination. She will proceed to the walk in clinic.   
Cigarettes

## 2025-05-15 NOTE — PROGRESS NOTE ADULT - SUBJECTIVE AND OBJECTIVE BOX
Amlodipine 5mg PO daily        Last Written Prescription Date:  05/14/25  Last Fill Quantity: 30,   # refills: 2  Last Office Visit : 05/14/25  Future Office visit:  08/27/25    Routing refill request to provider for review/approval because:  Blood pressure out of range  Just filled yesterday with 30 day supply, pt reqeusted 90 day supply      POD# 6  S/P Left Minimally Invasive Craniotomy for evac of IPH with Placement of EVD    pt seen and examined at bedside, remain intubated and EVD in place and clamped    Vital Signs Last 24 Hrs  T(C): 38.8 (02 Mar 2021 11:00), Max: 39.1 (01 Mar 2021 18:00)  T(F): 101.8 (02 Mar 2021 11:00), Max: 102.4 (01 Mar 2021 18:00)  HR: 87 (02 Mar 2021 11:00) (75 - 100)  BP: 155/54  RR: 31 (02 Mar 2021 11:00) (18 - 31)  SpO2: 100% (02 Mar 2021 11:00) (95% - 100%)     PE:  Intubated         no eye opening and not follow commands        PERRL, +cornaeal,  wound site dry and clean, EVD in place and clamped        withdraw to pain all extremities in all muscle groups                          8.4    6.78  )-----------( 121      ( 01 Mar 2021 23:20 )             26.0     03-01    130<L>  |  96<L>  |  13  ----------------------------<  233<H>  4.1   |  23  |  <0.5<L>    Ca    7.9<L>      01 Mar 2021 23:20  Phos  3.2     03-01  Mg     1.7     03-01

## 2025-06-25 NOTE — PRE-ANESTHESIA EVALUATION ADULT - BSA (M2)
Yue Serra  1957   Chief Complaint   Patient presents with    Shoulder Pain     Rt         HISTORY OF PRESENT ILLNESS  Yue Serra is a 68 y.o. female who presents today for reevaluation of right shoulder pain. Pain is a 0/10. Pt received a right shoulder cortisone injection last OV on 5/29/2025 in which her shoulder has responded to. Her range of motion has increased since her last visit. Notes little pain on a day to day basis. Overall she is doing well.    Patient denies any fever, chills, chest pain, shortness of breath or calf pain. The remainder of the review of systems is negative. There are no new illness or injuries other than that mentioned above to report since last seen in the office. No changes in medications, allergies, social or family history.      PHYSICAL EXAM:   There were no vitals taken for this visit.  The patient is a well-developed, well-nourished female   in no acute distress.  The patient is alert and oriented times three.  The patient is alert and oriented times three. Mood and affect are normal.  LYMPHATIC: lymph nodes are not enlarged and are within normal limits  SKIN: normal in color and non tender to palpation. There are no bruises or abrasions noted.   NEUROLOGICAL: Motor sensory exam is within normal limits. Reflexes are equal bilaterally. There is normal sensation to pinprick and light touch  MUSCULOSKELETAL:  Excellent rom without pain     PROCEDURE: none    IMAGING:  XR of the right shoulder with 3 views obtained in office dated 5/29/2025 read and reviewed by myself reveal: Slight proximal migration of the humeral head     IMPRESSION:      ICD-10-CM    1. Tear of right rotator cuff, unspecified tear extent, unspecified whether traumatic  M75.101            PLAN:   1. Pt presents today with right shoulder pain secondary to rotator cuff tear.  I think given the increase in symptoms I think she has read torn her previously repaired rotator cuff. Overall she is 
1.62